# Patient Record
Sex: FEMALE | Race: WHITE | NOT HISPANIC OR LATINO | Employment: OTHER | URBAN - METROPOLITAN AREA
[De-identification: names, ages, dates, MRNs, and addresses within clinical notes are randomized per-mention and may not be internally consistent; named-entity substitution may affect disease eponyms.]

---

## 2017-03-30 DIAGNOSIS — Z30.41 SURVEILLANCE OF PREVIOUSLY PRESCRIBED CONTRACEPTIVE PILL: ICD-10-CM

## 2017-03-30 RX ORDER — NORETHINDRONE 0.35 MG/1
TABLET ORAL
Qty: 84 TABLET | Refills: 0 | Status: CANCELLED | OUTPATIENT
Start: 2017-03-30

## 2017-03-30 NOTE — TELEPHONE ENCOUNTER
Medication Detail      Disp Refills Start End DAW   norethindrone (MICRONOR) 0.35 MG per tablet 90 tablet 2 3/21/2016  No   Sig: Take 1 tablet (0.35 mg) by mouth daily       Last Office Visit with [facility], [facility] or [facility] prescribing provider: 12/17/15

## 2017-03-31 NOTE — TELEPHONE ENCOUNTER
,      Pt has not been seen since Dec 2015.  Please call pt.   She needs appt for refills.    Becca Long, RN, BSN

## 2017-04-10 ENCOUNTER — OFFICE VISIT (OUTPATIENT)
Dept: FAMILY MEDICINE | Facility: CLINIC | Age: 42
End: 2017-04-10

## 2017-04-10 VITALS
BODY MASS INDEX: 22.09 KG/M2 | HEART RATE: 66 BPM | SYSTOLIC BLOOD PRESSURE: 96 MMHG | WEIGHT: 140 LBS | DIASTOLIC BLOOD PRESSURE: 66 MMHG | TEMPERATURE: 97.6 F | OXYGEN SATURATION: 97 %

## 2017-04-10 DIAGNOSIS — F17.200 TOBACCO DEPENDENCY: Primary | ICD-10-CM

## 2017-04-10 DIAGNOSIS — Z30.41 SURVEILLANCE OF PREVIOUSLY PRESCRIBED CONTRACEPTIVE PILL: ICD-10-CM

## 2017-04-10 DIAGNOSIS — G35 MULTIPLE SCLEROSIS (H): ICD-10-CM

## 2017-04-10 PROCEDURE — 99213 OFFICE O/P EST LOW 20 MIN: CPT | Performed by: FAMILY MEDICINE

## 2017-04-10 RX ORDER — ACETAMINOPHEN AND CODEINE PHOSPHATE 120; 12 MG/5ML; MG/5ML
1 SOLUTION ORAL DAILY
Qty: 90 TABLET | Refills: 3 | Status: SHIPPED | OUTPATIENT
Start: 2017-04-10 | End: 2017-07-24

## 2017-04-10 NOTE — PROGRESS NOTES
SUBJECTIVE:                                                    Shanna Shanks is a 41 year old female who presents to clinic today for the following health issues:      Medication Followup of norethindrone    Taking Medication as prescribed: yes    Side Effects:  None    Medication Helping Symptoms:  yes       Contraception start -   Method interested in: oral contraceptives              Methods used previously: pill: norethindrone   Problems with previous methods: no    History of pregnancies:         No LMP recorded.           Lab Results   Component Value Date    PAP NIL 2015     : 2  Para: 0  Menstrual cycle: regular  Flow: heavy  History of migraines: no  Smoker: YES    Accompanying Signs & Symptoms:   Dysuria: YES- hesitant   Vaginal discharge: no  Painful intercourse: no    Precipitating and/or Alleviating factors:    Currently sexually active: YES  In stable relationship: YES  Desire STD testing: not applicable  Are you planning a pregnancy soon: no    Smoking around 1/2 pack per day.     No side effects from micronor. Having regular menstrual periods. No history of abnormal pap smear.     MS - in remission for years.     Problem list and histories reviewed & adjusted, as indicated.  Additional history: as documented    Labs reviewed in EPIC    Reviewed and updated as needed this visit by clinical staff       Reviewed and updated as needed this visit by Provider      Social History     Social History     Marital status:      Spouse name: Jama     Number of children: 2     Years of education: N/A     Occupational History     computer graphic design Self     Social History Main Topics     Smoking status: Current Every Day Smoker     Packs/day: 0.25     Types: Cigarettes     Smokeless tobacco: None     Alcohol use 3.5 oz/week     7 Standard drinks or equivalent per week     Drug use: No     Sexual activity: Not Currently     Partners: Male     Birth control/ protection: Pill,  Rhythm     Other Topics Concern     Parent/Sibling W/ Cabg, Mi Or Angioplasty Before 65f 55m? No     Social History Narrative    Caffeine intake/servings daily - 2    Calcium intake/servings daily - 2-3    Exercise 7 times weekly - describe runs on treadmill for 5 minutes    Sunscreen used - Yes    Seatbelts used - Yes    Guns stored in the home - No    Self Breast Exam - Yes    Pap test up to date -  Yes    Eye exam up to date -  Yes    Dental exam up to date -  Yes    DEXA scan up to date -  Yes    Flex Sig/Colonoscopy up to date -  Not Applicable    Mammography up to date -  Not Applicable    Immunizations reviewed and up to date - Yes    Abuse: Current or Past (Physical, Sexual or Emotional) - No    Do you feel safe in your environment - Yes    Do you cope well with stress - Yes    Do you suffer from insomnia - No    Last updated by: Michelle Sanchez  12/22/2010                     Allergies   Allergen Reactions     Diphenhydramine Rash     benadryl cream     Nickel      Patient Active Problem List   Diagnosis     Multiple sclerosis (H)     UTI (urinary tract infection)     CARDIOVASCULAR SCREENING; LDL GOAL LESS THAN 160     Dysmenorrhea     Tobacco dependency     Reviewed medications, social history and  past medical and surgical history.    Review of system: for general, respiratory, CVS, GI and psychiatry negative except for noted above.     EXAM:  BP 96/66 (Cuff Size: Adult Regular)  Pulse 66  Temp 97.6  F (36.4  C) (Oral)  Wt 140 lb (63.5 kg)  SpO2 97%  BMI 22.09 kg/m2  Constitutional: healthy, alert and no distress   Psychiatric: mentation appears normal and affect normal/bright       ASSESSMENT / PLAN:  (F17.200) Tobacco dependency  (primary encounter diagnosis)  Comment: ongoing.  is also pushing her to quit.   Plan: she is not ready to quit yet.     (Z30.41) Surveillance of previously prescribed contraceptive pill  Comment: not a candidate of combined OCP due to her age ad smoking. OK to  continue POP.  Plan: norethindrone (MICRONOR) 0.35 MG per tablet             (G35) Multiple sclerosis (H)  Comment:  Seeing neurologist.   Plan: in remission for multiple years.

## 2017-04-10 NOTE — NURSING NOTE
"Chief Complaint   Patient presents with     Recheck Medication     norethindrone     Contraception       Initial BP 96/66 (Cuff Size: Adult Regular)  Pulse 66  Temp 97.6  F (36.4  C) (Oral)  Wt 140 lb (63.5 kg)  SpO2 97%  BMI 22.09 kg/m2 Estimated body mass index is 22.09 kg/(m^2) as calculated from the following:    Height as of 3/16/15: 5' 6.75\" (1.695 m).    Weight as of this encounter: 140 lb (63.5 kg).  Medication Reconciliation: complete     Luana Torres, DEE      "

## 2017-04-10 NOTE — MR AVS SNAPSHOT
"              After Visit Summary   4/10/2017    Shanna Shanks    MRN: [id number]           Patient Information     Date Of Birth          1975        Visit Information        Provider Department      4/10/2017 11:00 AM Malvin Beck MD [facility]        Today's Diagnoses     Tobacco dependency    -  1    Surveillance of previously prescribed contraceptive pill        Multiple sclerosis (H)           Follow-ups after your visit        Who to contact     If you have questions or need follow up information about today's clinic visit or your schedule please contact [facility] directly at [phone].  Normal or non-critical lab and imaging results will be communicated to you by MyChart, letter or phone within 4 business days after the clinic has received the results. If you do not hear from us within 7 days, please contact the clinic through [employer] or phone. If you have a critical or abnormal lab result, we will notify you by phone as soon as possible.  Submit refill requests through [employer] or call your pharmacy and they will forward the refill request to us. Please allow 3 business days for your refill to be completed.          Additional Information About Your Visit        MyChart Information     [employer] lets you send messages to your doctor, view your test results, renew your prescriptions, schedule appointments and more. To sign up, go to [email or web address] . Click on \"Log in\" on the left side of the screen, which will take you to the Welcome page. Then click on \"Sign up Now\" on the right side of the page.     You will be asked to enter the access code listed below, as well as some personal information. Please follow the directions to create your username and password.     Your access code is: [email or web address]  Expires: 2017  3:01 PM     Your access code will  in 90 days. If you need help or a new code, please call your [facility] or " [phone].        Care EveryWhere ID     This is your Care EveryWhere ID. This could be used by other organizations to access your [place] medical records  [id number]        Your Vitals Were     Pulse Temperature Pulse Oximetry BMI (Body Mass Index)          66 97.6  F (36.4  C) (Oral) 97% 22.09 kg/m2         Blood Pressure from Last 3 Encounters:   04/10/17 96/66   12/17/15 96/62   03/16/15 106/66    Weight from Last 3 Encounters:   04/10/17 140 lb (63.5 kg)   12/17/15 134 lb 8 oz (61 kg)   03/16/15 139 lb 8 oz (63.3 kg)              Today, you had the following     No orders found for display         Where to get your medicines      These medications were sent to [facility] [address] [place] [address] & [address], [place] [address]     Phone:  [phone]     norethindrone 0.35 MG per tablet          Primary Care Provider Office Phone # Fax #    Malvin Cesario Beck [phone]       [address] [address]        Thank you!     Thank you for choosing [facility]  for your care. Our goal is always to provide you with excellent care. Hearing back from our patients is one way we can continue to improve our services. Please take a few minutes to complete the written survey that you may receive in the mail after your visit with us. Thank you!             Your Updated Medication List - Protect others around you: Learn how to safely use, store and throw away your medicines at www.disposemymeds.org.          This list is accurate as of: 4/10/17  3:01 PM.  Always use your most recent med list.                   Brand Name Dispense Instructions for use    AMBIEN PO      Take  by mouth. Pt is unsure of dose. As needed       AMPYRA PO          baclofen 10 MG tablet    LIORESAL         cyclobenzaprine 10 MG tablet    FLEXERIL    30 tablet    Take 0.5-1 tablets (5-10 mg) by mouth 3  times daily as needed for muscle spasms       dimethyl fumarate delayed release capsule    TECFIDERA     Take 120 mg by mouth 2 times daily       fluticasone 50 MCG/ACT spray    FLONASE    3 Package    Spray 1-2 sprays into both nostrils daily       ibuprofen 800 MG tablet    ADVIL/MOTRIN    60 tablet    Take 1 tablet (800 mg) by mouth every 8 hours as needed for moderate pain       norethindrone 0.35 MG per tablet    MICRONOR    90 tablet    Take 1 tablet (0.35 mg) by mouth daily       venlafaxine 37.5 MG Tb24 24 hr tablet    EFFEXOR-ER    46 tablet    Take 1 tablet daily for 14 days, then  Take 2 tablets daily       vitamin D 23907 UNIT capsule    ERGOCALCIFEROL

## 2017-07-24 ENCOUNTER — APPOINTMENT (OUTPATIENT)
Dept: CT IMAGING | Facility: CLINIC | Age: 42
DRG: 097 | End: 2017-07-24
Attending: EMERGENCY MEDICINE
Payer: COMMERCIAL

## 2017-07-24 ENCOUNTER — HOSPITAL ENCOUNTER (INPATIENT)
Facility: CLINIC | Age: 42
LOS: 6 days | Discharge: NURSING, REHAB OR LONG-TERM CARE | DRG: 097 | End: 2017-07-30
Attending: EMERGENCY MEDICINE | Admitting: INTERNAL MEDICINE
Payer: COMMERCIAL

## 2017-07-24 ENCOUNTER — APPOINTMENT (OUTPATIENT)
Dept: GENERAL RADIOLOGY | Facility: CLINIC | Age: 42
DRG: 097 | End: 2017-07-24
Attending: EMERGENCY MEDICINE
Payer: COMMERCIAL

## 2017-07-24 DIAGNOSIS — G93.40 ENCEPHALOPATHY: Primary | ICD-10-CM

## 2017-07-24 DIAGNOSIS — G35 MULTIPLE SCLEROSIS (H): ICD-10-CM

## 2017-07-24 DIAGNOSIS — R56.9 SEIZURES (H): ICD-10-CM

## 2017-07-24 DIAGNOSIS — B00.4 HERPES ENCEPHALITIS: ICD-10-CM

## 2017-07-24 DIAGNOSIS — R40.2430 GLASGOW COMA SCALE TOTAL SCORE 3-8, UNSPECIFIED TIME: ICD-10-CM

## 2017-07-24 DIAGNOSIS — J96.00 ACUTE RESPIRATORY FAILURE, UNSPECIFIED WHETHER WITH HYPOXIA OR HYPERCAPNIA (H): ICD-10-CM

## 2017-07-24 LAB
ALBUMIN UR-MCNC: 30 MG/DL
AMPHETAMINES UR QL SCN: NORMAL
ANION GAP SERPL CALCULATED.3IONS-SCNC: 11 MMOL/L (ref 3–14)
APPEARANCE UR: CLEAR
APTT PPP: 29 SEC (ref 22–37)
BACTERIA #/AREA URNS HPF: ABNORMAL /HPF
BARBITURATES UR QL: NORMAL
BASOPHILS # BLD AUTO: 0 10E9/L (ref 0–0.2)
BASOPHILS NFR BLD AUTO: 0 %
BENZODIAZ UR QL: NORMAL
BILIRUB UR QL STRIP: NEGATIVE
BUN SERPL-MCNC: 9 MG/DL (ref 7–30)
CALCIUM SERPL-MCNC: 8.2 MG/DL (ref 8.5–10.1)
CANNABINOIDS UR QL SCN: NORMAL
CHLORIDE SERPL-SCNC: 105 MMOL/L (ref 94–109)
CO2 SERPL-SCNC: 23 MMOL/L (ref 20–32)
COCAINE UR QL: NORMAL
COLOR UR AUTO: YELLOW
CREAT SERPL-MCNC: 0.63 MG/DL (ref 0.52–1.04)
DIFFERENTIAL METHOD BLD: ABNORMAL
EOSINOPHIL # BLD AUTO: 0 10E9/L (ref 0–0.7)
EOSINOPHIL NFR BLD AUTO: 0 %
ERYTHROCYTE [DISTWIDTH] IN BLOOD BY AUTOMATED COUNT: 13.6 % (ref 10–15)
GFR SERPL CREATININE-BSD FRML MDRD: ABNORMAL ML/MIN/1.7M2
GLUCOSE BLDC GLUCOMTR-MCNC: 104 MG/DL (ref 70–99)
GLUCOSE SERPL-MCNC: 111 MG/DL (ref 70–99)
GLUCOSE UR STRIP-MCNC: NEGATIVE MG/DL
HCT VFR BLD AUTO: 36.3 % (ref 35–47)
HGB BLD-MCNC: 12 G/DL (ref 11.7–15.7)
HGB UR QL STRIP: NEGATIVE
HYALINE CASTS #/AREA URNS LPF: ABNORMAL /LPF (ref 0–2)
IMM GRANULOCYTES # BLD: 0 10E9/L (ref 0–0.4)
IMM GRANULOCYTES NFR BLD: 0.3 %
INR PPP: 1.05 (ref 0.86–1.14)
INTERPRETATION ECG - MUSE: NORMAL
KETONES UR STRIP-MCNC: 40 MG/DL
LACTATE BLD-SCNC: 0.8 MMOL/L (ref 0.7–2.1)
LEUKOCYTE ESTERASE UR QL STRIP: NEGATIVE
LYMPHOCYTES # BLD AUTO: 0.7 10E9/L (ref 0.8–5.3)
LYMPHOCYTES NFR BLD AUTO: 6.4 %
MCH RBC QN AUTO: 30.8 PG (ref 26.5–33)
MCHC RBC AUTO-ENTMCNC: 33.1 G/DL (ref 31.5–36.5)
MCV RBC AUTO: 93 FL (ref 78–100)
MONOCYTES # BLD AUTO: 0.6 10E9/L (ref 0–1.3)
MONOCYTES NFR BLD AUTO: 5.5 %
MUCOUS THREADS #/AREA URNS LPF: PRESENT /LPF
NEUTROPHILS # BLD AUTO: 10.1 10E9/L (ref 1.6–8.3)
NEUTROPHILS NFR BLD AUTO: 87.8 %
NITRATE UR QL: NEGATIVE
NRBC # BLD AUTO: 0 10*3/UL
NRBC BLD AUTO-RTO: 0 /100
OPIATES UR QL SCN: NORMAL
PCP UR QL SCN: NORMAL
PH UR STRIP: 6 PH (ref 5–7)
PLATELET # BLD AUTO: 209 10E9/L (ref 150–450)
POTASSIUM SERPL-SCNC: 4.4 MMOL/L (ref 3.4–5.3)
RBC # BLD AUTO: 3.9 10E12/L (ref 3.8–5.2)
RBC #/AREA URNS AUTO: ABNORMAL /HPF (ref 0–2)
SODIUM SERPL-SCNC: 139 MMOL/L (ref 133–144)
SP GR UR STRIP: 1.02 (ref 1–1.03)
TRANS CELLS #/AREA URNS HPF: ABNORMAL /HPF
URN SPEC COLLECT METH UR: ABNORMAL
UROBILINOGEN UR STRIP-ACNC: 0.2 EU/DL (ref 0.2–1)
WBC # BLD AUTO: 11.5 10E9/L (ref 4–11)
WBC #/AREA URNS AUTO: ABNORMAL /HPF (ref 0–2)

## 2017-07-24 PROCEDURE — 40000940 XR CHEST PORT 1 VW

## 2017-07-24 PROCEDURE — 80048 BASIC METABOLIC PNL TOTAL CA: CPT | Performed by: EMERGENCY MEDICINE

## 2017-07-24 PROCEDURE — 40000256 ZZH STATISTIC CARDIOPULM RESUSCITATION

## 2017-07-24 PROCEDURE — 85025 COMPLETE CBC W/AUTO DIFF WBC: CPT | Performed by: EMERGENCY MEDICINE

## 2017-07-24 PROCEDURE — 25000125 ZZHC RX 250: Performed by: EMERGENCY MEDICINE

## 2017-07-24 PROCEDURE — 25000128 H RX IP 250 OP 636: Performed by: EMERGENCY MEDICINE

## 2017-07-24 PROCEDURE — 93005 ELECTROCARDIOGRAM TRACING: CPT

## 2017-07-24 PROCEDURE — 25000128 H RX IP 250 OP 636

## 2017-07-24 PROCEDURE — 70470 CT HEAD/BRAIN W/O & W/DYE: CPT | Mod: XS

## 2017-07-24 PROCEDURE — 85610 PROTHROMBIN TIME: CPT | Performed by: EMERGENCY MEDICINE

## 2017-07-24 PROCEDURE — 70498 CT ANGIOGRAPHY NECK: CPT

## 2017-07-24 PROCEDURE — 20000003 ZZH R&B ICU

## 2017-07-24 PROCEDURE — 00000146 ZZHCL STATISTIC GLUCOSE BY METER IP

## 2017-07-24 PROCEDURE — 40000281 ZZH STATISTIC TRANSPORT TIME EA 15 MIN

## 2017-07-24 PROCEDURE — 70460 CT HEAD/BRAIN W/DYE: CPT

## 2017-07-24 PROCEDURE — 71010 XR CHEST PORT 1 VW: CPT

## 2017-07-24 PROCEDURE — 40000276 ZZH STATISTIC RCP TIME ED VENT EA 10 MIN

## 2017-07-24 PROCEDURE — 31500 INSERT EMERGENCY AIRWAY: CPT

## 2017-07-24 PROCEDURE — 81001 URINALYSIS AUTO W/SCOPE: CPT | Performed by: EMERGENCY MEDICINE

## 2017-07-24 PROCEDURE — 87040 BLOOD CULTURE FOR BACTERIA: CPT | Performed by: EMERGENCY MEDICINE

## 2017-07-24 PROCEDURE — 99291 CRITICAL CARE FIRST HOUR: CPT | Performed by: INTERNAL MEDICINE

## 2017-07-24 PROCEDURE — 94002 VENT MGMT INPAT INIT DAY: CPT

## 2017-07-24 PROCEDURE — 25000128 H RX IP 250 OP 636: Performed by: PSYCHIATRY & NEUROLOGY

## 2017-07-24 PROCEDURE — 25000125 ZZHC RX 250

## 2017-07-24 PROCEDURE — 40000275 ZZH STATISTIC RCP TIME EA 10 MIN

## 2017-07-24 PROCEDURE — 83605 ASSAY OF LACTIC ACID: CPT | Performed by: EMERGENCY MEDICINE

## 2017-07-24 PROCEDURE — 85730 THROMBOPLASTIN TIME PARTIAL: CPT | Performed by: EMERGENCY MEDICINE

## 2017-07-24 PROCEDURE — 99285 EMERGENCY DEPT VISIT HI MDM: CPT | Mod: 25

## 2017-07-24 PROCEDURE — 36415 COLL VENOUS BLD VENIPUNCTURE: CPT

## 2017-07-24 RX ORDER — IOPAMIDOL 755 MG/ML
120 INJECTION, SOLUTION INTRAVASCULAR ONCE
Status: COMPLETED | OUTPATIENT
Start: 2017-07-24 | End: 2017-07-24

## 2017-07-24 RX ORDER — OXYBUTYNIN CHLORIDE 5 MG/1
5 TABLET, EXTENDED RELEASE ORAL DAILY
Status: ON HOLD | COMMUNITY
End: 2017-07-29

## 2017-07-24 RX ORDER — NALOXONE HYDROCHLORIDE 0.4 MG/ML
INJECTION, SOLUTION INTRAMUSCULAR; INTRAVENOUS; SUBCUTANEOUS
Status: COMPLETED
Start: 2017-07-24 | End: 2017-07-24

## 2017-07-24 RX ORDER — ONDANSETRON 2 MG/ML
INJECTION INTRAMUSCULAR; INTRAVENOUS
Status: COMPLETED
Start: 2017-07-24 | End: 2017-07-24

## 2017-07-24 RX ORDER — ETOMIDATE 2 MG/ML
10 INJECTION INTRAVENOUS ONCE
Status: COMPLETED | OUTPATIENT
Start: 2017-07-24 | End: 2017-07-24

## 2017-07-24 RX ORDER — PROPOFOL 10 MG/ML
INJECTION, EMULSION INTRAVENOUS
Status: COMPLETED
Start: 2017-07-24 | End: 2017-07-24

## 2017-07-24 RX ORDER — DIMETHYL FUMARATE 120 MG/1
240 CAPSULE ORAL 2 TIMES DAILY
Status: DISCONTINUED | OUTPATIENT
Start: 2017-07-25 | End: 2017-07-28

## 2017-07-24 RX ORDER — VENLAFAXINE HYDROCHLORIDE 75 MG/1
75 CAPSULE, EXTENDED RELEASE ORAL DAILY
Status: ON HOLD | COMMUNITY
End: 2018-06-05

## 2017-07-24 RX ORDER — NALOXONE HYDROCHLORIDE 0.4 MG/ML
.1-.4 INJECTION, SOLUTION INTRAMUSCULAR; INTRAVENOUS; SUBCUTANEOUS
Status: DISCONTINUED | OUTPATIENT
Start: 2017-07-24 | End: 2017-07-30 | Stop reason: HOSPADM

## 2017-07-24 RX ORDER — HEPARIN SODIUM 5000 [USP'U]/.5ML
5000 INJECTION, SOLUTION INTRAVENOUS; SUBCUTANEOUS EVERY 8 HOURS
Status: DISCONTINUED | OUTPATIENT
Start: 2017-07-25 | End: 2017-07-30 | Stop reason: HOSPADM

## 2017-07-24 RX ORDER — PROPOFOL 10 MG/ML
5-75 INJECTION, EMULSION INTRAVENOUS CONTINUOUS
Status: DISCONTINUED | OUTPATIENT
Start: 2017-07-24 | End: 2017-07-25

## 2017-07-24 RX ORDER — ACETAMINOPHEN AND CODEINE PHOSPHATE 120; 12 MG/5ML; MG/5ML
1 SOLUTION ORAL DAILY
COMMUNITY
End: 2018-03-07

## 2017-07-24 RX ORDER — ACETAMINOPHEN AND CODEINE PHOSPHATE 120; 12 MG/5ML; MG/5ML
0.35 SOLUTION ORAL DAILY
Status: DISCONTINUED | OUTPATIENT
Start: 2017-07-25 | End: 2017-07-30 | Stop reason: HOSPADM

## 2017-07-24 RX ORDER — HEPARIN SODIUM 5000 [USP'U]/.5ML
5000 INJECTION, SOLUTION INTRAVENOUS; SUBCUTANEOUS EVERY 8 HOURS
Status: DISCONTINUED | OUTPATIENT
Start: 2017-07-24 | End: 2017-07-24

## 2017-07-24 RX ADMIN — IOPAMIDOL 120 ML: 755 INJECTION, SOLUTION INTRAVENOUS at 18:47

## 2017-07-24 RX ADMIN — ETOMIDATE 10 MG: 2 INJECTION, SOLUTION INTRAVENOUS at 18:16

## 2017-07-24 RX ADMIN — PROPOFOL 25 MCG/KG/MIN: 10 INJECTION, EMULSION INTRAVENOUS at 18:28

## 2017-07-24 RX ADMIN — NALOXONE HYDROCHLORIDE 0.4 MG: 0.4 INJECTION, SOLUTION INTRAMUSCULAR; INTRAVENOUS; SUBCUTANEOUS at 18:12

## 2017-07-24 RX ADMIN — SODIUM CHLORIDE 100 ML: 9 INJECTION, SOLUTION INTRAVENOUS at 18:46

## 2017-07-24 RX ADMIN — SODIUM CHLORIDE 1000 ML: 9 INJECTION, SOLUTION INTRAVENOUS at 19:40

## 2017-07-24 RX ADMIN — ONDANSETRON 4 MG: 2 SOLUTION INTRAMUSCULAR; INTRAVENOUS at 18:07

## 2017-07-24 RX ADMIN — SUCCINYLCHOLINE CHLORIDE 75 MG: 20 INJECTION, SOLUTION INTRAMUSCULAR; INTRAVENOUS at 18:17

## 2017-07-24 RX ADMIN — LEVETIRACETAM 500 MG: 100 INJECTION, SOLUTION INTRAVENOUS at 21:01

## 2017-07-24 NOTE — ED PROVIDER NOTES
History   Chief Complaint:  Altered Mental Status    HPI   Shanna Shanks is a 42 year old female with a history of MS who presents via EMS with altered mental status. EMS states the patient was found in a chair by her  slumped over. EMS states the house smelt of ammonia and was very hot. EMS states blood sugar was 133, blood pressure ws 113/74, tachycardic, incontinent, has not seen patient move right arm or leg, and no known trauma. EMS reports baseline function is issues with balance. She was incontinent of urine and had some blood around her nose.     Patients : (an hour after patient arrived)   Patient has MS with right sided weaknesses. He states the patient has been fine the past few days and today. He reports he texted  her today, last being at noon,  and she had no complaints, came home today and found the patient slumped over in a chair. He denies known trauma, alcohol/drug abuse.     Allergies:  Diphenhydramine  nickel    Medications:    norethindrone (MICRONOR) 0.35 MG per tablet  baclofen (LIORESAL) 10 MG tablet  vitamin D (ERGOCALCIFEROL) 71660 UNIT capsule  dimethyl fumarate (TECFIDERA) delayed release capsule  Dalfampridine (AMPYRA PO)  fluticasone (FLONASE) 50 MCG/ACT nasal spray  cyclobenzaprine (FLEXERIL) 10 MG tablet  venlafaxine (EFFEXOR-ER) 37.5 MG TB24  ibuprofen (ADVIL,MOTRIN) 800 MG tablet  Zolpidem Tartrate (AMBIEN PO)    Past Medical History:    MS    Past Surgical History:    dilation and curettage    Family History:    diabetes    Social History:  Marital Status:   [2]  Smoking status: current every day  alcohol use: yes  Lives at Home with  Jama  Arrived to ED via EMS  Followed at [facility]    Review of Systems   Unable to perform ROS: Patient unresponsive     Physical Exam   Patient Vitals for the past 24 hrs:   BP Temp Temp src Pulse Heart Rate Resp SpO2 Weight   07/24/17 1911 - 100.1  F (37.8  C) Rectal - - - - -   07/24/17 1901 - - - - - -  100 % -   07/24/17 1825 (!) 117/95 - - - - - - -   07/24/17 1822 - - - 91 91 (!) 6 92 % 61.5 kg (135 lb 9.3 oz)   07/24/17 1819 152/84 - - - 115 23 98 % -   07/24/17 1815 117/75 - - - 93 14 - -          Physical Exam  Constitutional:  Unresponsive except withdraws to pain on left.      Appears well-developed and well-nourished.   HENT:   Head:    Mild right facial droop    Mouth/Throat:   Oropharynx is clear and moist.      Mucous membranes are normal.   Eyes:    Conjunctivae normal and EOM are normal.      dialted pupils right greater than left  Neck:    Normal range of motion. Neck supple.   Cardiovascular:  Normal rate, regular rhythm, S1 normal and S2 normal.      No gallop and no friction rub. No murmur heard.  Pulmonary/Chest:  Breath sounds normal. No respiratory distress.      No wheezes. No rhonchi. No rales.   Abdominal:   Soft. No hepatosplenomegaly.     No rebound.    Musculoskeletal:     Neurological:   Unresponsive. Withdraws left arm and leg to pain. Lies with eyes closed. Does not open eyes to command or sternal rub. Right pupil slightly bigger than left. .   Skin:    Skin is warm and dry.       Emergency Department Course   ECG (18:12:08):  Rate 99 bpm. CA interval 178. QRS duration 86. QT/QTc 354/454. P-R-T axes 75 66 71. Normal sinus rhythm. Possible left atrial enlargement. Borderline ECG.  Interpreted at 1813 by Melina Reyes MD.    Imaging:  Radiographic findings were communicated with the Admitting MD who voiced understanding of the findings.  CT angiogram head neck:   Normal neck and head CTA.  As read by Radiology.    CT head w/o contrast:  Diffuse cerebral volume loss and cerebral white matter  changes consistent with the patient's history of multiple sclerosis.  No evidence for acute intracranial pathology.  As read by Radiology.    CT head w contrast:  Normal CT perfusion of the brain.  As read by Radiology.    Laboratory:  CBC: WBC 11.5(H) o/w WNL (HGB 12.0, )   BMP:  "glucose 111(H), calcium 8.2(L) o/w WNL (Creatinine 0.63)  INR: 1.05  Partial thromboplastin: 29    Procedures:  Intubation Procedure Note:   Date/Time: 6:13 PM  Performed by: Dr. Reyes    The procedure was performed in an emergent situation.  Risks and benefits: risks, benefits and alternatives were discussed.  Patient identity confirmed: verbally with patient and arm band  Time out: Immediately prior to procedure a \"time out\" was called to verify the correct patient, procedure, equipment, support staff and site/side marked as required.    Indication: Acute respiratory failure. and Airway protection.    Intubation method: Glidescope  Patient status: Unconscious  Preoxygenation: Mask  Pretreatment medications: None  Sedatives: Etomidate  Paralytic: succinylcholine  Laryngoscope size: Mac 3  Tube size: 7.0 cuffed with cuff inflated after placement  Number of attempts: 1  Cricoid pressure: yes  Cords visualized: yes  Post-procedure assessment: Breath sounds equal bilaterally with chest rise and absent over the epigastrium, Chest x-ray interpreted by me demonstrating endotracheal tube in appropriate position and CO2 detector.  ETT to teeth: 23 cm  Tube secured with: ETT orourke    Patient tolerated the procedure well with no immediate complications.  Complications:  None    Interventions:  1812 narcan 0.4 mg IV  1817 Anectine 76 mg IV  1816 Amidate 10 mg IV  1828 Diprivan infusion 25 mcg IV    Emergency Department Course:  Past medical records, nursing notes, and vitals reviewed.  I performed an exam of the patient and obtained history, as documented above.  1807 4 days narcan given - no results  1608 Called code stroke   ECG was obtained - see results above.   1817 Etomidate 10 mg administered  1817 Succinylcholine given 75 mg  1818 patient was intubated successfully.   IV inserted and blood drawn.  The patient was sent for a head and neck angiogram, head CT w & w/o contrast while in the emergency department, " findings above.  The patients right side is moving.   Patients  arrived.   1911 rectal temperature of 100.1  Findings and plan explained to  The  spouse who consents to admission.   : Discussed the patient with Dr. Philip, who will admit the patient to a ICU bed for further monitoring, evaluation, and treatment.     Impression & Plan    Medical Decision Making:  Patient came in here with minimal history other than she was found unresponsive by  with no movement of right side per paramedics and dilated right pupil. I was not able to get history from  until an hour after the patient arrived. Essentially l I was dealing with an unresponsive patient who could only to withdrawal on the left side. Due to concern that she could have intracranial hemorrhage she was intubated and then sent to CT scan per code stroke. She actually had no findings on CT. Dr. Mauro has seen her and reviewed that. ON coming back from CT she had some movement of the right side. Dr. Mauro was concerned she may have a UTI, a catheter was placed and she had no evidence of infection in her urine. She also has a normal chest xray. Blood cultures and lactate have been drawn. At this point it is unclear why she has had this unresponsive episode with the right sided weakness. She is normally somewhat weak in the right, but she was completely weak on the right side on presentation here. Seizure is considered as well. I have talked to Dr. Philip. Patient is moving around now more here, but I will keep her intubated because I do not have a cause for the event that has occurred and until we have a better sense of that we will keep the tube in  . Assessment: unresponsive episode with right sided paralysis unclear cause,   MS.       Critical Care time:  was 45 minutes for this patient excluding procedures.    Diagnosis:    ICD-10-CM   1. Encephalopathy G93.40   2. Glen coma scale total score 3-8, unspecified time (H) R40.7889   3. Multiple  sclerosis (H) G35   4. Acute respiratory failure, unspecified whether with hypoxia or hypercapnia (H) J96.00     Disposition:  Admitted to ICU    Jaylyn Bahena  7/24/2017    EMERGENCY DEPARTMENT    I, Jaylyn Bahena, am serving as a scribe at 6:04 PM on 7/24/2017 to document services personally performed by Melina Reyes MD based on my observations and the provider's statements to me.                          Melina Reyes MD  07/25/17 0007

## 2017-07-24 NOTE — CONSULTS
[facility]  [facility]    NeuroCritical Care Consultation Note     Shanna Shanks MRN# [id number]   YOB: 1975 Age: 42 year old    Code Status:Prior   Date of Admission: 7/24/2017  Date of Consult: 07/24/2017    ADDENDUM: 7/24/2017  7:59 PM  UA is negative  Will start on Keppra 500 BID.   ----------------------------------------------------  Francisco Mauro MD, PhD, FAHA      _________________________________   Primary Care Physician   Malvin Beck MD  ______________________________________________         Assessment & Plan   ______________________________________________  (G93.40) Encephalopathy  (primary encounter diagnosis)  --Negative code stroke evaluation  --No ICH  --Likely UTI related encephalopathy  ---May be related to seizure  ----Will get UA, urine drugs  ------If negative, will get EEG  (R40.7820) [place] coma scale total score 3-8, unspecified time (H)  --See above  (G35) Multiple sclerosis (H)  --Management in [facility]  (J96.00) Acute respiratory failure, unspecified whether with hypoxia or hypercapnia (H)  --Patient can be extubated in ICU  #. DVT Prophylaxis  --Mechanical   #. PT/OT/Speech  --Start  evaluations  #. Nutrition / GI Prophylaxis  --Per recommendations of speech therapy    #. Code Status: Full Code     TIME:   Neurocritical care time:  60 minutes for evaluation and management of coma and encephalopathy. Patient is critically ill and has a very high risk of deterioration  ----------------------------------------------------------------------------------  ----------------------------------------------------------------------------------  Reason for consult: Code stroke    Chief Complaint   ______________________________________________  Altered mental status  History is obtained from the electronic health record and patient's family    History of Present Illness    ______________________________________________  42 year old female with a history of MS who presents via EMS with altered mental status. EMS states the patient was found in a chair by her  slumped over. EMS states the house smelt of ammonia and was very hot. EMS states blood sugar was 133, blood pressure ws 113/74, tachycardic, incontinent, has not seen patient move right arm or leg, and no known trauma. EMS reports baseline function is issues with balance. On arrival to ER, her right pupil was large and she did not move her right side. Code stroke called. Patient was intubated and asked to hyperventilate patient. I arrived while patient was scanned in CT. Her CT head did not show any acute changes, no large clots on CTA. Patient started moving both extremities. Her pupils were equal. I changed RR to 16. In ER room, patient continues to move all extremities. Per , patient was last normal about 12 today. She apparently has right sided chronic weakness. No seizures, frequent UTIs.     Past Medical History    ______________________________________________  Past Medical History:   Diagnosis Date     Multiple sclerosis (H) 3/2/96    last exacerbation 3yrs ago, no meds x 6 months     Tobacco dependency      Past Surgical History   ______________________________________________  Past Surgical History:   Procedure Laterality Date     HC DILATION/CURETTAGE DIAG/THER NON OB  1/1/05    D & C, missed AB     Prior to Admission Medications   ______________________________________________  Prior to Admission Medications   Prescriptions Last Dose Informant Patient Reported? Taking?   Dalfampridine (AMPYRA PO) 7/24/2017 at X1 Care Giver Yes Yes   Sig: Take 10 mg by mouth 2 times daily    Zolpidem Tartrate (AMBIEN PO)  Care Giver Yes Yes   Sig: Take 10 mg by mouth nightly as needed Pt is unsure of dose.  As needed   baclofen (LIORESAL) 10 MG tablet  Care Giver Yes Yes   Sig: Take 10-20 mg by mouth 4 times daily as  needed    dimethyl fumarate (TECFIDERA) delayed release capsule 7/24/2017 at X1 Care Giver Yes Yes   Sig: Take 240 mg by mouth 2 times daily    norethindrone (MICRONOR) 0.35 MG per tablet 7/24/2017 at Unknown time Care Giver Yes Yes   Sig: Take 1 tablet by mouth daily   oxybutynin (DITROPAN-XL) 5 MG 24 hr tablet 7/24/2017 at Unknown time Care Giver Yes Yes   Sig: Take 5 mg by mouth daily   venlafaxine (EFFEXOR-XR) 75 MG 24 hr capsule 7/24/2017 at Unknown time Care Giver Yes Yes   Sig: Take 75 mg by mouth daily   vitamin D (ERGOCALCIFEROL) 26539 UNIT capsule 7/24/2017 Care Giver Yes Yes   Sig: Take 50,000 Units by mouth once a week       Facility-Administered Medications: None     Allergies   Allergies   Allergen Reactions     Diphenhydramine Rash     benadryl cream     Nickel        Social History   ______________________________________________  Social History     Social History     Marital status:      Spouse name: Jama     Number of children: 2     Years of education: N/A     Occupational History     computer graphic design Self     Social History Main Topics     Smoking status: Current Every Day Smoker     Packs/day: 0.25     Types: Cigarettes     Smokeless tobacco: Not on file     Alcohol use 3.5 oz/week     7 Standard drinks or equivalent per week     Drug use: No     Sexual activity: Not Currently     Partners: Male     Birth control/ protection: Pill, Rhythm     Other Topics Concern     Parent/Sibling W/ Cabg, Mi Or Angioplasty Before 65f 55m? No     Social History Narrative    Caffeine intake/servings daily - 2    Calcium intake/servings daily - 2-3    Exercise 7 times weekly - describe runs on treadmill for 5 minutes    Sunscreen used - Yes    Seatbelts used - Yes    Guns stored in the home - No    Self Breast Exam - Yes    Pap test up to date -  Yes    Eye exam up to date -  Yes    Dental exam up to date -  Yes    DEXA scan up to date -  Yes    Flex Sig/Colonoscopy up to date -  Not Applicable     Mammography up to date -  Not Applicable    Immunizations reviewed and up to date - Yes    Abuse: Current or Past (Physical, Sexual or Emotional) - No    Do you feel safe in your environment - Yes    Do you cope well with stress - Yes    Do you suffer from insomnia - No    Last updated by: Michelle Sanchez  12/22/2010                       Family History   ______________________________________________  Family History   Problem Relation Age of Onset     Family History Negative Mother      DIABETES Father      Cancer - colorectal Paternal Grandmother      HEART DISEASE Paternal Grandfather      MI       Review of Systems   ______________________________________________  Review of systems is not obtainable due to patient factors - intubation    Physical Exam   ______________________________________________  Weight:135 lbs 9.33 oz; Height:Data Unavailable  Temp: 98.8  F (37.1  C) Temp src: Bladder BP: 121/80 Pulse: 98 Heart Rate: 98 Resp: 18 SpO2: 99 % O2 Device: Mechanical Ventilator    General Appearance:  No acute distress  Neuro:       Mental Status Exam:  Sedated, intubated, comatose. S/L untestable due to intubation. Does not follow commands.  Mental status is decreased and affected by sedation       Cranial Nerves:  Pupils 4 mm,  reactive. Occulocephalis reflexes are present. Corneal reflexes present. Face symmetric. Gag/Cough reflex present. Other CN are untestable           Motor:  Minimal movements to stimulation on both sided L>R       Reflexes:  Low DTR, toes equivocal       Sensory:  Untestable                    Coordination:   Untestable       Gait:  Untestable  Neck: no nuchal rigidity, normal thyroid. No carotid bruits.    Cardiovascular: Regular rate and rhythm, no m/r/g  Lungs: Clear to auscultation  Abdomen: Soft, not tender, not distended  Extremities: No clubbing, no cyanosis, no edema    Data   ______________________________________________  All Data personally reviewed:       Labs:   CBC  RESULTS:       Recent Labs  Lab 07/24/17 1808   WBC 11.5*   RBC 3.90   HGB 12.0   HCT 36.3        Basic Metabolic Panel:   Recent Labs   Lab Test  07/24/17 1808 11/18/13 02/20/12   0938   NA  139  139  141   POTASSIUM  4.4  4.0  4.3   CHLORIDE  105  108  107   CO2  23   --   23   BUN  9  5  17   CR  0.63  0.91  0.79   GLC  111*  86  90   JULIET  8.2*  8.9  8.7     Liver panel:  Recent Labs   Lab Test 11/18/13 02/20/12   0938   PROTTOTAL  7.0  7.2   ALBUMIN  4.2  4.0   BILITOTAL  0.6  0.8   ALKPHOS  48  52   AST  25  22   ALT  17  14     INR:  Recent Labs   Lab Test  07/24/17 1808   INR  1.05      Lipid Profile:  Recent Labs   Lab Test  02/20/12 0938   CHOL  242*   HDL  72   LDL  140*   TRIG  147   CHOLHDLRATIO  3.4     Thyroid Panel:No lab results found.   Vitamin B12: No lab results found.   Vitamin D level: No lab results found.  A1C: No lab results found.  Troponin I: No lab results found.  Ammonia: No lab results found.  CK: No lab results found.     CRP inflammation: No lab results found.  ESR: No lab results found.    JULIA: No lab results found.    ANCA: No lab results found.   Drug Screen:   Recent Labs   Lab Test  07/24/17 1912   UAMP  Negative   Cutoff for a negative amphetamine is 500 ng/mL or less.     UBARB  Negative   Cutoff for a negative barbiturate is 200 ng/mL or less.     BENZODIAZEUR  Negative   Cutoff for a negative benzodiazepine is 200 ng/mL or less.     UCANN  Negative   Cutoff for a negative cannabinoid is 50 ng/mL or less.     UCOC  Negative   Cutoff for a negative cocaine is 300 ng/mL or less.     OPIT  Negative   Cutoff for a negative opiate is 300 ng/mL or less.     UPCP  Negative   Cutoff for a negative PCP is 25 ng/mL or less.       Alcohol level:No lab results found.  UA Results:  Recent Labs   Lab Test  07/24/17 1912   COLOR  Yellow   APPEARANCE  Clear   URINEGLC  Negative   URINEBILI  Negative   URINEKETONE  40*   SG  1.025   UBLD  Negative   URINEPH  6.0   PROTEIN   30*   UROBILINOGEN  0.2   NITRITE  Negative   LEUKEST  Negative   RBCU  2-5*   WBCU  O - 2     Most Recent 6 Bacteria Isolates From Any Culture (See EPIC Reports for Culture Details):  Recent Labs   Lab Test  12/17/15   0949  03/16/15   0909  10/21/11   1049  09/18/09   1125   CULT  >100,000 colonies/mL Escherichia coli*  >100,000 colonies/mL mixed urogenital jen  >100,000 colonies/mL Coagulase negative Staphylococcus  >100,000 colonies/mL Staphylococcus aureus        Cardiac US:   --       Neurophysiology:   --       Imaging:   All imaging studies were reviewed personally  CT head:   --severe generalized atrophy, hydrocephalus ex-vacuo, nothing acute  CTA neck/head:  --No large clots, no perfusion deficits.

## 2017-07-24 NOTE — IP AVS SNAPSHOT
MRN:[id number]                      After Visit Summary   7/24/2017    Shanna Shansk    MRN: [id number]           Thank you!     Thank you for choosing [facility] for your care. Our goal is always to provide you with excellent care. Hearing back from our patients is one way we can continue to improve our services. Please take a few minutes to complete the written survey that you may receive in the mail after you visit with us. Thank you!        Patient Information     Date Of Birth          1975        Designated Caregiver       Most Recent Value    Caregiver    Will someone help with your care after discharge? yes    Name of designated caregiver yvonne    Phone number of caregiver [phone]    Caregiver address same as patient      About your hospital stay     You were admitted on:  July 24, 2017 You last received care in the:  [facility]    You were discharged on:  July 30, 2017       Who to Call     For medical emergencies, please call 911.  For non-urgent questions about your medical care, please call your primary care provider or clinic, [phone]          Attending Provider     Provider Specialty    Melina Reyes MD Emergency Medicine    Cho, Manoj Palmer MD Internal Medicine       Primary Care Provider Office Phone # Fax #    Malvin Cesario Beck [phone]      After Care Instructions     Activity - Up with nursing assistance           [facility] Diet as Tolerated       Follow this diet upon discharge: Regular diet            IV access       Right midline. Routine cares per IV access protocol.            Mantoux instructions       Give two-step Mantoux (PPD) Per Facility Policy Yes                  Additional Services     Occupational Therapy Adult Consult       Evaluate and treat as clinically indicated.    Reason:  Functional immobility            Physical Therapy Adult Consult       Evaluate and treat as clinically  "indicated.    Reason:  Physical deconditioning            Speech Language Path Adult Consult       Evaluate and treat as clinically indicated.    Reason:  Dysphagia                  Further instructions from your care team       Follow up with your primary neurologist in one month.     Pending Results     Date and Time Order Name Status Description    7/25/2017 1106 [place] virus RNA by PCR CSF Tube 3 In process     7/25/2017 1106 CSF Culture Aerobic Bacterial Tube 2 Preliminary             Statement of Approval     Ordered          07/30/17 1128  I have reviewed and agree with all the recommendations and orders detailed in this document.  EFFECTIVE NOW     Approved and electronically signed by:  Boris Britton MD           07/29/17 1157  I have reviewed and agree with all the recommendations and orders detailed in this document.  EFFECTIVE NOW     Approved and electronically signed by:  Boris Britton MD             Admission Information     Date & Time Provider Department Dept. Phone    7/24/2017 Manoj Philip MD [address] [facility] [phone]      Your Vitals Were     Blood Pressure Pulse Temperature Respirations Height Weight    123/83 (BP Location: Left arm) 85 99.7  F (37.6  C) (Oral) 16 1.695 m (5' 6.73\") 61.9 kg (136 lb 7.4 oz)    Pulse Oximetry BMI (Body Mass Index)                92% 21.55 kg/m2          [facility] Information     [facility] lets you send messages to your doctor, view your test results, renew your prescriptions, schedule appointments and more. To sign up, go to [email or web address] . Click on \"Log in\" on the left side of the screen, which will take you to the Welcome page. Then click on \"Sign up Now\" on the right side of the page.     You will be asked to enter the access code listed below, as well as some personal information. Please follow the directions to create your username and password.     Your access code is: [email or web address]  Expires: 10/27/2017 10:06 " AM     Your access code will  in 90 days. If you need help or a new code, please call your [facility] clinic or [phone].        Care EveryWhere ID     This is your Care EveryWhere ID. This could be used by other organizations to access your [facility] medical records  [id number]        Equal Access to Services     JORDANMELCHOR MEREDITH : Hadii aad ku hadasho Soomaali, waaxda luqadaha, qaybta kaalmada adeegyada, liza powers haytomasn louie oscarlea patrick . So [facility] [phone].    ATENCIÓN: Si habla español, tiene a gatica disposición servicios gratuitos de asistencia lingüística. Llame al [phone].    We comply with applicable federal civil rights laws and [place] laws. We do not discriminate on the basis of race, color, national origin, age, disability sex, sexual orientation or gender identity.               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 Diamond office while on this medication.   Refills:  0       levETIRAcetam 750 MG tablet   Commonly known as:  KEPPRA   Used for:  Seizures (H)        Dose:  750 mg   Take 1 tablet (750 mg) by mouth 2 times daily   Refills:  0         CONTINUE these medicines which have NOT CHANGED        Dose / Directions    baclofen 10 MG tablet   Commonly known as:  LIORESAL        Dose:  10-20 mg   Take 10-20 mg by mouth 4 times daily as needed   Refills:  3       norethindrone 0.35 MG per tablet   Commonly known as:  MICRONOR        Dose:  1 tablet   Take 1 tablet by mouth daily   Refills:  0       venlafaxine 75 MG 24 hr capsule   Commonly known as:  EFFEXOR-XR        Dose:  75 mg   Take 75 mg by mouth daily   Refills:  0       vitamin D 97853 UNIT capsule   Commonly known as:  ERGOCALCIFEROL        Dose:  15483 Units   Take 50,000  Units by mouth once a week   Refills:  1         STOP taking     AMBIEN PO           AMPYRA PO           dimethyl fumarate delayed release capsule   Commonly known as:  TECFIDERA           oxybutynin 5 MG 24 hr tablet   Commonly known as:  DITROPAN-XL                Where to get your medicines      Some of these will need a paper prescription and others can be bought over the counter. Ask your nurse if you have questions.     You don't need a prescription for these medications     acyclovir 625 mg    levETIRAcetam 750 MG tablet                Protect others around you: Learn how to safely use, store and throw away your medicines at www.disposemymeds.org.             Medication List: This is a list of all your medications and when to take them. Check marks below indicate your daily home schedule. Keep this list as a reference.      Medications           Morning Afternoon Evening Bedtime As Needed    acyclovir 625 mg   Inject 625 mg into the vein every 8 hours for 10 days Please fax creat level to Dr. Diamond office while on this medication.   Last time this was given:  600 mg on 7/30/2017  6:42 AM                                baclofen 10 MG tablet   Commonly known as:  LIORESAL   Take 10-20 mg by mouth 4 times daily as needed                                levETIRAcetam 750 MG tablet   Commonly known as:  KEPPRA   Take 1 tablet (750 mg) by mouth 2 times daily   Last time this was given:  750 mg on 7/30/2017  8:58 AM                                norethindrone 0.35 MG per tablet   Commonly known as:  MICRONOR   Take 1 tablet by mouth daily   Last time this was given:  0.35 mg on 7/30/2017  8:58 AM                                venlafaxine 75 MG 24 hr capsule   Commonly known as:  EFFEXOR-XR   Take 75 mg by mouth daily   Last time this was given:  75 mg on 7/30/2017  8:58 AM                                vitamin D 19317 UNIT capsule   Commonly known as:  ERGOCALCIFEROL   Take 50,000 Units by mouth once a week                                           More Information        Patient Education    Levetiracetam Oral solution    Levetiracetam Oral tablet    Levetiracetam Oral tablet, extended-release    Levetiracetam Solution for injection  Levetiracetam Oral tablet  What is this medicine?  LEVETIRACETAM (eusebia javier) is an antiepileptic drug. It is used with other medicines to treat certain types of seizures.  This medicine may be used for other purposes; ask your health care provider or pharmacist if you have questions.  What should I tell my health care provider before I take this medicine?  They need to know if you have any of these conditions:    kidney disease    suicidal thoughts, plans, or attempt; a previous suicide attempt by you or a family member    an unusual or allergic reaction to levetiracetam, other medicines, foods, dyes, or preservatives    pregnant or trying to get pregnant    breast-feeding  How should I use this medicine?  Take this medicine by mouth with a glass of water. Follow the directions on the prescription label. Swallow the tablets whole. Do not crush or chew this medicine. You may take this medicine with or without food. Take your doses at regular intervals. Do not take your medicine more often than directed. Do not stop taking this medicine or any of your seizure medicines unless instructed by your doctor or health care professional. Stopping your medicine suddenly can increase your seizures or their severity.  A special MedGuide will be given to you by the pharmacist with each prescription and refill. Be sure to read this information carefully each time.  Contact your pediatrician or health care professional regarding the use of this medication in children. While this drug may be prescribed for children as young as 4 years of age for selected conditions, precautions do apply.  Overdosage: If you think you have taken too much of this medicine contact a poison control center or emergency  room at once.  NOTE: This medicine is only for you. Do not share this medicine with others.  What if I miss a dose?  If you miss a dose, take it as soon as you can. If it is almost time for your next dose, take only that dose. Do not take double or extra doses.  What may interact with this medicine?  This medicine may interact with the following medications:    carbamazepine    colesevelam    probenecid    sevelamer  This list may not describe all possible interactions. Give your health care provider a list of all the medicines, herbs, non-prescription drugs, or dietary supplements you use. Also tell them if you smoke, drink alcohol, or use illegal drugs. Some items may interact with your medicine.  What should I watch for while using this medicine?  Visit your doctor or health care professional for a regular check on your progress. Wear a medical identification bracelet or chain to say you have epilepsy, and carry a card that lists all your medications.  It is important to take this medicine exactly as instructed by your health care professional. When first starting treatment, your dose may need to be adjusted. It may take weeks or months before your dose is stable. You should contact your doctor or health care professional if your seizures get worse or if you have any new types of seizures.  You may get drowsy or dizzy. Do not drive, use machinery, or do anything that needs mental alertness until you know how this medicine affects you. Do not stand or sit up quickly, especially if you are an older patient. This reduces the risk of dizzy or fainting spells. Alcohol may interfere with the effect of this medicine. Avoid alcoholic drinks.  The use of this medicine may increase the chance of suicidal thoughts or actions. Pay special attention to how you are responding while on this medicine. Any worsening of mood, or thoughts of suicide or dying should be reported to your health care professional right away.  Women who  become pregnant while using this medicine may enroll in the [place] Antiepileptic Drug Pregnancy Registry by calling 1-221.482.2223. This registry collects information about the safety of antiepileptic drug use during pregnancy.  What side effects may I notice from receiving this medicine?  Side effects you should report to your doctor or health care professional as soon as possible:    allergic reactions like skin rash, itching or hives, swelling of the face, lips, or tongue    breathing problems    dark urine    general ill feeling or flu-like symptoms    problems with balance, talking, walking    unusually weak or tired    worsening of mood, thoughts or actions of suicide or dying    yellowing of the eyes or skin  Side effects that usually do not require medical attention (report to your doctor or health care professional if they continue or are bothersome):    diarrhea    dizzy, drowsy    headache    loss of appetite  This list may not describe all possible side effects. Call your doctor for medical advice about side effects. You may report side effects to FDA at 6-926-WIS-6061.  Where should I keep my medicine?  Keep out of reach of children.  Store at room temperature between 15 and 30 degrees C (59 and 86 degrees F). Throw away any unused medicine after the expiration date.  NOTE:This sheet is a summary. It may not cover all possible information. If you have questions about this medicine, talk to your doctor, pharmacist, or health care provider. Copyright  2016 Gold Standard                Acyclovir injection  What is this medicine?  ACYCLOVIR (ay SYE kloe veer) is an antiviral medicine. It is used to treat or prevent infections caused by certain kinds of viruses. Examples of these infections include herpes and shingles. This medicine will not cure herpes.  How should I use this medicine?  This medicine is infused into a vein. It is usually given by a health care professional in a hospital or clinic  setting.  If you receive this medicine at home, you will be taught how to prepare and give this medicine. Use exactly as directed. Take your medicine at regular intervals. Do not take your medicine more often than directed.  It is important that you put your used needles and syringes in a special sharps container. Do not put them in a trash can. If you do not have a sharps container, call your pharmacist or healthcare provider to get one.  Talk to your pediatrician regarding the use of this medicine in children. While this drug may be prescribed for children as young as  for selected conditions, precautions do apply.  What side effects may I notice from receiving this medicine?  Side effects that you should report to your doctor or health care professional as soon as possible:    allergic reactions like skin rash, itching or hives, swelling of the face, lips, or tongue    chest pain    confusion, hallucinations, tremor    dark urine    increased sensitivity to the sun    redness, blistering, peeling or loosening of the skin, including inside the mouth    seizures    trouble passing urine or change in the amount of urine    unusual bleeding or bruising, or pinpoint red spots on the skin    unusually weak or tired    yellowing of the eyes or skin  Side effects that usually do not require medical attention (report to your doctor or health care professional if they continue or are bothersome):    diarrhea    fever    headache    nausea, vomiting    stomach upset  What may interact with this medicine?    probenecid  What if I miss a dose?  If you miss a dose, take it as soon as you can. If it is almost time for your next dose, take only that dose. Do not take double or extra doses.  Where should I keep my medicine?  Keep out of the reach of children.  If you are using this medicine at home, you will be instructed on how to store this medicine. Throw away any unused medicine after the expiration date on the  label.  What should I tell my health care provider before I take this medicine?  They need to know if you have any of these conditions:    immune system problems    kidney disease    an unusual or allergic reaction to acyclovir, valacyclovir, other medicines, foods, dyes, or preservatives    pregnant or trying to get pregnant    breast-feeding  What should I watch for while using this medicine?  Tell your doctor or health care professional if your symptoms do not improve or if you get new symptoms.  You can still pass chickenpox, shingles, or herpes to another person even while you are taking this medicine. Avoid contact with others as directed. Genital herpes is a sexually transmitted disease. Talk to your doctor about how to stop the spread of infection.  NOTE:This sheet is a summary. It may not cover all possible information. If you have questions about this medicine, talk to your doctor, pharmacist, or health care provider. Copyright  2017 Gold Standard                Viral Encephalitis    Encephalitis is a viral infection of the brain. It may cause headache, stiff neck, irritability, fever, drowsiness, nausea and vomiting. Severe cases may also cause confusion, bizarre behavior or a seizure.  The disease may be spread by close contact with a person who has encephalitis. It may also be spread by mosquitoes, ticks and other insects that carry the virus. It may occur as a complication of a current infection with herpes, measles, mumps, or chicken pox. Rarely, some vaccinations cause this illness.  Except for herpes or chicken pox virus, antivirals are not used to treat this condition. Other medicines can be used to treat the symptoms of this disease. In mild cases, most persons recover fully in 2 to 4 weeks.  Home care    Rest in bed until you feel better. Stay home from school/work for at least one week to prevent spreading the virus to others.    Take any medicines as directed. Ask your healthcare provider about  taking over-the-counter medicines (such as ibuprofen or acetaminophen) for fever and headache. (If you have chronic liver or kidney disease or ever had a stomach ulcer or GI bleeding, let your healthcare provider know before using these medicines.)    If you have a fever, drink extra water, sports drinks or other fluids to prevent dehydration.  Follow-up care  Follow up with your healthcare provider during the next week or as directed by our staff. This helps ensure that you are getting better as expected.  When to seek medical advice  Call the healthcare provider if any of the following occur:    Fever of 100.4 F (38 C) or higher not better with medicine, or as directed by your healthcare provider    Headache or stiff neck continue or get worse    Increasing drowsiness, confusion, or bizarre behavior    Not able to keep fluids down due to vomiting    Weakness of an arm or leg or one side of the face    Trouble with speech or vision    Trouble walking    Seizure or loss of consciousness  Date Last Reviewed: 8/23/2015 2000-2017 [employer]. [address] [place] [address], [place] [address]. All rights reserved. This information is not intended as a substitute for professional medical care. Always follow your healthcare professional's instructions.

## 2017-07-24 NOTE — ED NOTES
Bed: ST02  Expected date: 7/24/17  Expected time: 5:54 PM  Means of arrival:   Comments:  [phone] AMS/unresponsive

## 2017-07-24 NOTE — IP AVS SNAPSHOT
[address] [facility]    [address]    [place] [address]    Phone:  [phone]                                       After Visit Summary   7/24/2017    Shanna Shanks    MRN: [id number]           After Visit Summary Signature Page     I have received my discharge instructions, and my questions have been answered. I have discussed any challenges I see with this plan with the nurse or doctor.    ..........................................................................................................................................  Patient/Patient Representative Signature      ..........................................................................................................................................  Patient Representative Print Name and Relationship to Patient    ..................................................               ................................................  Date                                            Time    ..........................................................................................................................................  Reviewed by Signature/Title    ...................................................              ..............................................  Date                                                            Time

## 2017-07-25 ENCOUNTER — APPOINTMENT (OUTPATIENT)
Dept: MRI IMAGING | Facility: CLINIC | Age: 42
DRG: 097 | End: 2017-07-25
Attending: PSYCHIATRY & NEUROLOGY
Payer: COMMERCIAL

## 2017-07-25 ENCOUNTER — APPOINTMENT (OUTPATIENT)
Dept: GENERAL RADIOLOGY | Facility: CLINIC | Age: 42
DRG: 097 | End: 2017-07-25
Attending: NURSE PRACTITIONER
Payer: COMMERCIAL

## 2017-07-25 ENCOUNTER — APPOINTMENT (OUTPATIENT)
Dept: SPEECH THERAPY | Facility: CLINIC | Age: 42
DRG: 097 | End: 2017-07-25
Attending: PSYCHIATRY & NEUROLOGY
Payer: COMMERCIAL

## 2017-07-25 LAB
ANION GAP SERPL CALCULATED.3IONS-SCNC: 8 MMOL/L (ref 3–14)
BUN SERPL-MCNC: 6 MG/DL (ref 7–30)
CALCIUM SERPL-MCNC: 8.1 MG/DL (ref 8.5–10.1)
CHLORIDE SERPL-SCNC: 105 MMOL/L (ref 94–109)
CO2 SERPL-SCNC: 25 MMOL/L (ref 20–32)
CREAT SERPL-MCNC: 0.59 MG/DL (ref 0.52–1.04)
ERYTHROCYTE [DISTWIDTH] IN BLOOD BY AUTOMATED COUNT: 13.5 % (ref 10–15)
GFR SERPL CREATININE-BSD FRML MDRD: ABNORMAL ML/MIN/1.7M2
GLUCOSE BLDC GLUCOMTR-MCNC: 100 MG/DL (ref 70–99)
GLUCOSE BLDC GLUCOMTR-MCNC: 120 MG/DL (ref 70–99)
GLUCOSE SERPL-MCNC: 111 MG/DL (ref 70–99)
HCT VFR BLD AUTO: 33.5 % (ref 35–47)
HGB BLD-MCNC: 11.2 G/DL (ref 11.7–15.7)
MCH RBC QN AUTO: 31 PG (ref 26.5–33)
MCHC RBC AUTO-ENTMCNC: 33.4 G/DL (ref 31.5–36.5)
MCV RBC AUTO: 93 FL (ref 78–100)
PLATELET # BLD AUTO: 182 10E9/L (ref 150–450)
POTASSIUM SERPL-SCNC: 3.5 MMOL/L (ref 3.4–5.3)
POTASSIUM SERPL-SCNC: 4.3 MMOL/L (ref 3.4–5.3)
RBC # BLD AUTO: 3.61 10E12/L (ref 3.8–5.2)
SODIUM SERPL-SCNC: 138 MMOL/L (ref 133–144)
WBC # BLD AUTO: 10.5 10E9/L (ref 4–11)

## 2017-07-25 PROCEDURE — 83735 ASSAY OF MAGNESIUM: CPT | Performed by: INTERNAL MEDICINE

## 2017-07-25 PROCEDURE — 95813 EEG EXTND MNTR 61-119 MIN: CPT

## 2017-07-25 PROCEDURE — 00000146 ZZHCL STATISTIC GLUCOSE BY METER IP

## 2017-07-25 PROCEDURE — 25000128 H RX IP 250 OP 636: Performed by: NURSE PRACTITIONER

## 2017-07-25 PROCEDURE — 99233 SBSQ HOSP IP/OBS HIGH 50: CPT | Performed by: INTERNAL MEDICINE

## 2017-07-25 PROCEDURE — 25000128 H RX IP 250 OP 636: Performed by: INTERNAL MEDICINE

## 2017-07-25 PROCEDURE — 85027 COMPLETE CBC AUTOMATED: CPT | Performed by: INTERNAL MEDICINE

## 2017-07-25 PROCEDURE — 40000225 ZZH STATISTIC SLP WARD VISIT: Performed by: SPEECH-LANGUAGE PATHOLOGIST

## 2017-07-25 PROCEDURE — 20000003 ZZH R&B ICU

## 2017-07-25 PROCEDURE — 71010 XR CHEST PORT 1 VW: CPT

## 2017-07-25 PROCEDURE — 92610 EVALUATE SWALLOWING FUNCTION: CPT | Mod: GN | Performed by: SPEECH-LANGUAGE PATHOLOGIST

## 2017-07-25 PROCEDURE — 36415 COLL VENOUS BLD VENIPUNCTURE: CPT | Performed by: INTERNAL MEDICINE

## 2017-07-25 PROCEDURE — A9585 GADOBUTROL INJECTION: HCPCS | Performed by: INTERNAL MEDICINE

## 2017-07-25 PROCEDURE — 70553 MRI BRAIN STEM W/O & W/DYE: CPT

## 2017-07-25 PROCEDURE — 80048 BASIC METABOLIC PNL TOTAL CA: CPT | Performed by: INTERNAL MEDICINE

## 2017-07-25 PROCEDURE — 84132 ASSAY OF SERUM POTASSIUM: CPT | Performed by: INTERNAL MEDICINE

## 2017-07-25 PROCEDURE — 25000128 H RX IP 250 OP 636: Performed by: PSYCHIATRY & NEUROLOGY

## 2017-07-25 RX ORDER — SODIUM CHLORIDE 9 MG/ML
INJECTION, SOLUTION INTRAVENOUS CONTINUOUS
Status: DISCONTINUED | OUTPATIENT
Start: 2017-07-25 | End: 2017-07-30

## 2017-07-25 RX ORDER — VENLAFAXINE HYDROCHLORIDE 75 MG/1
75 CAPSULE, EXTENDED RELEASE ORAL DAILY
Status: DISCONTINUED | OUTPATIENT
Start: 2017-07-26 | End: 2017-07-30 | Stop reason: HOSPADM

## 2017-07-25 RX ORDER — ACETAMINOPHEN AND CODEINE PHOSPHATE 120; 12 MG/5ML; MG/5ML
1 SOLUTION ORAL DAILY
Status: DISCONTINUED | OUTPATIENT
Start: 2017-07-25 | End: 2017-07-25

## 2017-07-25 RX ORDER — GADOBUTROL 604.72 MG/ML
1 INJECTION INTRAVENOUS ONCE
Status: COMPLETED | OUTPATIENT
Start: 2017-07-25 | End: 2017-07-25

## 2017-07-25 RX ORDER — BACLOFEN 10 MG/1
10-20 TABLET ORAL 4 TIMES DAILY PRN
Status: DISCONTINUED | OUTPATIENT
Start: 2017-07-25 | End: 2017-07-30 | Stop reason: HOSPADM

## 2017-07-25 RX ADMIN — LEVETIRACETAM 750 MG: 100 INJECTION, SOLUTION INTRAVENOUS at 21:43

## 2017-07-25 RX ADMIN — SODIUM CHLORIDE: 9 INJECTION, SOLUTION INTRAVENOUS at 08:45

## 2017-07-25 RX ADMIN — LEVETIRACETAM 750 MG: 100 INJECTION, SOLUTION INTRAVENOUS at 09:21

## 2017-07-25 RX ADMIN — ACYCLOVIR SODIUM 600 MG: 50 INJECTION, SOLUTION INTRAVENOUS at 18:41

## 2017-07-25 RX ADMIN — HEPARIN SODIUM 5000 UNITS: 5000 INJECTION, SOLUTION INTRAVENOUS; SUBCUTANEOUS at 08:50

## 2017-07-25 RX ADMIN — SODIUM CHLORIDE: 9 INJECTION, SOLUTION INTRAVENOUS at 21:52

## 2017-07-25 RX ADMIN — HEPARIN SODIUM 5000 UNITS: 5000 INJECTION, SOLUTION INTRAVENOUS; SUBCUTANEOUS at 00:55

## 2017-07-25 RX ADMIN — GADOBUTROL 6 ML: 604.72 INJECTION INTRAVENOUS at 11:23

## 2017-07-25 RX ADMIN — ACYCLOVIR SODIUM 600 MG: 50 INJECTION, SOLUTION INTRAVENOUS at 12:51

## 2017-07-25 RX ADMIN — HEPARIN SODIUM 5000 UNITS: 5000 INJECTION, SOLUTION INTRAVENOUS; SUBCUTANEOUS at 15:35

## 2017-07-25 ASSESSMENT — ACTIVITIES OF DAILY LIVING (ADL)
DRESS: 0-->INDEPENDENT
BATHING: 0-->INDEPENDENT
NUMBER_OF_TIMES_PATIENT_HAS_FALLEN_WITHIN_LAST_SIX_MONTHS: 0
FALL_HISTORY_WITHIN_LAST_SIX_MONTHS: NO
AMBULATION: 0-->INDEPENDENT
COGNITION: 0 - NO COGNITION ISSUES REPORTED
RETIRED_COMMUNICATION: 0-->UNDERSTANDS/COMMUNICATES WITHOUT DIFFICULTY
TOILETING: 0-->INDEPENDENT
TRANSFERRING: 0-->INDEPENDENT
SWALLOWING: 0-->SWALLOWS FOODS/LIQUIDS WITHOUT DIFFICULTY
RETIRED_EATING: 0-->INDEPENDENT

## 2017-07-25 NOTE — PLAN OF CARE
Problem: Goal Outcome Summary  Goal: Goal Outcome Summary  PT:Pt with another discipline this PM, unable to try back.

## 2017-07-25 NOTE — PROGRESS NOTES
Patient intubated at 1920 with ETT 7.0 and secured at 24@lip. Confirmed bilateral breath sound and positive Et CO2. BBS clear diminished with SpO2 99%.     Ventilation Mode: CMV/AC  FiO2 (%): 100 %  Rate Set (breaths/minute): 16 breaths/min  Tidal Volume Set (mL): 500 mL  PEEP (cm H2O): 5 cmH2O  Oxygen Concentration (%): 100 %  Resp: 6    Patient full ventilatory support and will continue to monitor asa ordered.    Mandeep Zhao RT  7/24/2017

## 2017-07-25 NOTE — PROGRESS NOTES
1 hour video EEG # , ordered by Dr. Mauro.  Pt opens and closes eyes spontaneously on record.  Can open or close eyes upon command.  RN did assessment while video was running.  Dr. Mauro present to witness EEG.  Semi-continuous lip pursing mvmts seen.

## 2017-07-25 NOTE — PROGRESS NOTES
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  Will get LP for further evaluation  Will start Acyclovir       ----------------------------------------------------  Francisco Mauro MD, PhD, FA        ADDENDUM: 7/25/2017  8:45 AM  EEG - left sided discharges  Increased Keppra to 750 mg BID  ----------------------------------------------------  Francisco Mauro MD, PhD, [facility]      _________________________________     Main Plans for Today   EEG  MRI brain  Assessment & Plan   #(G93.40) Encephalopathy  (primary encounter diagnosis)  --Negative code stroke evaluation  --UA is completely normal  ------ will get EEG, most likely seizure  --Will get MRI brain with and without  (R40.2110) [place] coma scale total score 3-8, unspecified time (H)  --see above  (G35) Multiple sclerosis (H)  --Advanced MS  --Management [facility]  --Continued tobacco abuse will further promote progression of MS  (J96.00) Acute respiratory failure, unspecified whether with hypoxia or hypercapnia (H)  --Extubated 7/24/17  #. Cardiovascular  --stable   #. Infection   --Normal WBC  #. Endocrine:   --Insulin protocol to keep blood sugars 100-150.  #. Heme/Onc:   --Minimal anemia  #. Renal  --Sodium goal 140-155  #. Skin/Musculoskeletal:  -- No issues  #. PT/OT/Speech  --continue  evaluations   #.Nutrition  --Per speech therapy evaluation   #. ICU prophylaxis:   --Stress ulcer prophylaxis Protonix  --DVT prophylaxis: mechanical and SQ lovenox    CODE STATUS: Full Code    Family update by me today: yes    Interval History   Extubated overnight. Remains mute since extubation. Reacts to examiner and visitors. Large tongue  "laceration    Physical Exam     Vitals: Blood pressure 107/75, pulse 98, temperature 99.5  F (37.5  C), resp. rate 21, height 1.695 m (5' 6.73\"), weight 62.3 kg (137 lb 5.6 oz), SpO2 100 %, not currently breastfeeding.  Tmax: Temp (24hrs), Av.6  F (37.6  C), Min:98.1  F (36.7  C), Max:100.4  F (38  C)    Vital Signs with Ranges  Temp:  [98.1  F (36.7  C)-100.4  F (38  C)] 99.5  F (37.5  C)  Pulse:  [91-98] 98  Heart Rate:  [] 89  Resp:  [6-23] 21  BP: (101-152)/(66-95) 107/75  FiO2 (%):  [100 %] 100 %  SpO2:  [92 %-100 %] 100 %   I&O:  I/O this shift:  In: -   Out: 770 [Urine:770]  I/O last 3 completed shifts:  In: 44.14 [I.V.:44.14]  Out: 925 [Urine:925] I/O since admission: -1650.86         General Appearance:   No acute distress  Neuro:       Mental Status Exam:   Awake, alert. Mute. Mental status is normal       Cranial Nerves:  Pupils 3 mm, reactive. EOMI. Face sensation is normal. Face is symmetric.Tongue and uvula are midline. Tongue with a bite salma Other CN are normal           Motor:  Right sided weakness 4/5 Tone and bulk are normal           Reflexes:  Normal DTR.Toes downgoing.        Sensory:   Untestable                  Coordination:    Untestable       Gait: Untestable  Cardiovascular: Regular rate and rhythm, no m/r/g  Lungs: Ventilation Mode: CMV/AC  FiO2 (%): 100 %  Rate Set (breaths/minute): 16 breaths/min  Tidal Volume Set (mL): 500 mL  PEEP (cm H2O): 5 cmH2O  Oxygen Concentration (%): 100 %  Resp: 21  Both hemithoraces are symmetrical, auscultation of lungs revealed no bronchovesicular sounds, expirium prolongation, wheezing, rhonci and crackles  Abdomen: Soft, not tender, not distended  Skin/Extremities: No clubbing, cyanosis, no edema     Medications   Infusions medications:    propofol (DIPRIVAN) infusion Stopped (17)     Schedule medications:    levETIRAcetam  500 mg Intravenous Q12H     heparin  5,000 Units Subcutaneous Q8H     norethindrone  0.35 mg Oral Daily     " dimethyl fumarate  240 mg Oral BID     PRN medications:naloxone  Data       Labotary Data:   All data was reviewed by me personally  CBC RESULTS:  Recent Labs   Lab Test  07/25/17   0451  07/24/17   1808   WBC  10.5  11.5*   RBC  3.61*  3.90   HGB  11.2*  12.0   HCT  33.5*  36.3   PLT  182  209     Basic Metabolic Panel:  Recent Labs   Lab Test  07/25/17   0451  07/24/17   1808 11/18/13 02/20/12   0938   NA  138  139  139  141   POTASSIUM  4.3  4.4  4.0  4.3   CHLORIDE  105  105  108  107   CO2  25  23   --   23   BUN  6*  9  5  17   CR  0.59  0.63  0.91  0.79   GLC  111*  111*  86  90   JULIET  8.1*  8.2*  8.9  8.7     ABG:No lab results found.  Liver panel:  Recent Labs   Lab Test 11/18/13 02/20/12   0938   PROTTOTAL  7.0  7.2   ALBUMIN  4.2  4.0   BILITOTAL  0.6  0.8   ALKPHOS  48  52   AST  25  22   ALT  17  14     Procalcitonin: No lab results found.  Coagulation  Recent Labs   Lab Test  07/24/17   1808   INR  1.05   PTT  29      Lipid Profile:  Recent Labs   Lab Test  02/20/12   0938   CHOL  242*   HDL  72   LDL  140*   TRIG  147   CHOLHDLRATIO  3.4     Thyroid Panel:No lab results found.   Vitamin B12: No lab results found.   Vitamin D:No lab results found.  A1C: No lab results found.  Troponin I: No lab results found.  CPK: No lab results found.  Ammonia: No lab results found.  Serum Osmolality:No lab results found.  Most Recent 6 Bacteria Isolates From Any Culture (See EPIC Reports for Culture Details):  Recent Labs   Lab Test  12/17/15   0949  03/16/15   0909  10/21/11   1049  09/18/09   1125   CULT  >100,000 colonies/mL Escherichia coli*  >100,000 colonies/mL mixed urogenital jen  >100,000 colonies/mL Coagulase negative Staphylococcus  >100,000 colonies/mL Staphylococcus aureus     UA Results:  Recent Labs   Lab Test  07/24/17   1912   COLOR  Yellow   APPEARANCE  Clear   URINEGLC  Negative   URINEBILI  Negative   URINEKETONE  40*   SG  1.025   UBLD  Negative   URINEPH  6.0   PROTEIN  30*   UROBILINOGEN   0.2   NITRITE  Negative   LEUKEST  Negative   RBCU  2-5*   WBCU  O - 2          Cardiac US:   --     Neurophysiology:   --       Imaging:   All imaging studies were reviewed personally  CT head:   Diffuse cerebral volume loss and cerebral white matter  changes consistent with the patient's history of multiple sclerosis.  No evidence for acute intracranial pathology.  CT perfusion  Normal CT perfusion of the brain.  CT angiography neck/head  Normal neck and head CTA.  MRI brain:   --        Time Spent on this Encounter      Time:   Neurocritical care time:  I spent 35 minutes bedside and on the inpatient unit today managing the neurocritical care of Shanna Shanks in relation to the issues listed in this note. Patient is critically ill / has very high risk of deterioration

## 2017-07-25 NOTE — CONSULTS
[facility]  CRITICAL CARE ADMISSION/CONSULTATION    Shanna Shanks MRN: [id number]  1975  Date of Admission:7/24/2017          HPI   Shanna Shanks IS a 42 year old female admitted on 7/24/2017 with significant history for MS, tobacco/EtOH use who was found unresponsive today, slumped over chair, by her . He last reports her being herself this morning. When she was found, she had dry blood around her nares/mouth and urine on the floor. She has no h/o seizures. She was brought to the ED by ambulance and intubated immediately for presumed CNS catastrophe. Head CT did not show any acute abnormalities. Of note, she started to move purposefully following intubation. She was sent to ICU for further evaluation.          Past Medical History:      Past Medical History:   Diagnosis Date     Multiple sclerosis (H) 3/2/96    last exacerbation 3yrs ago, no meds x 6 months     Tobacco dependency              Past Surgical History:      Past Surgical History:   Procedure Laterality Date     HC DILATION/CURETTAGE DIAG/THER NON OB  1/1/05    D & C, missed AB            Social History:     Social History   Substance Use Topics     Smoking status: Current Every Day Smoker     Packs/day: 0.25     Types: Cigarettes     Smokeless tobacco: Not on file     Alcohol use 3.5 oz/week     7 Standard drinks or equivalent per week            Family History:     Family History   Problem Relation Age of Onset     Family History Negative Mother      DIABETES Father      Cancer - colorectal Paternal Grandmother      HEART DISEASE Paternal Grandfather      MI             Allergies:   Please see allergy list which was reviewed this admission.         Medications:       levETIRAcetam  500 mg Intravenous Q12H            Review of Systems:   Unable to assess due to critical illness         Physical Examination:   Temp:  [98.1  F (36.7  C)-100.1  F (37.8  C)] 98.8  F (37.1  C)  Pulse:  [91-98] 98  Heart Rate:  []  98  Resp:  [6-23] 18  BP: (111-152)/(66-95) 121/80  FiO2 (%):  [100 %] 100 %  SpO2:  [92 %-100 %] 99 %    Intake/Output Summary (Last 24 hours) at 07/24/17 2254  Last data filed at 07/24/17 2247   Gross per 24 hour   Intake                0 ml   Output              925 ml   Net             -925 ml     Wt Readings from Last 4 Encounters:   07/24/17 62.3 kg (137 lb 5.6 oz)   04/10/17 63.5 kg (140 lb)   12/17/15 61 kg (134 lb 8 oz)   03/16/15 63.3 kg (139 lb 8 oz)     BP - Mean:  [] 93  Ventilation Mode: CMV/AC  FiO2 (%): 100 %  Rate Set (breaths/minute): 16 breaths/min  Tidal Volume Set (mL): 500 mL  PEEP (cm H2O): 5 cmH2O  Oxygen Concentration (%): 100 %  Resp: 18  No lab results found in last 7 days.    GEN: intubated, sedated   HEENT: head ncat, sclera anicteric, OP patent, trachea midline   PULM: unlabored synchronous with vent, clear anteriorly    CV/COR: RRR S1S2 no gallop,  No rub, no murmur  ABD: soft nontender, hypoactive bowel sounds, no mass  EXT:  Edema   warm  NEURO: grossly intact  SKIN: no obvious rash      Assessment and plan :     Neurology/Psychiatry/Pain/Sedation:   1. Encephalopathy. Given her clinical findings, it is concerning for acute onset seizure. Her tox screen was negative and there is no concern by family for intentional overdose. Recommend EEG and brain MRI in am. Appreciate neuro input.   2. MS. Controlled with current regimen.     Cardiovascular/Ventilator Management/Renal/Fluids/Electrolytes/ID:   1. Intubated for airway protection. On minimal vent settings and easy to oxygenate/ventilate. Plan is to hold sedation and assess neuro status. If she is able to become more alert, will consider extubation.   2. Start rocephin empirically for potential aspiration.     GI/Nutrition:   1. NPO    Endocrine/Hematology/Oncology:   1. ICU glucose protocol    Disposition/Code Status/Other  1. Critically ill. Cont supportive care  2. Code: Full     ICU Prophylaxis:   1. DVT: Hep Subq  2. VAP:  HOB 30 degrees, chlorhexidine rinse  3. Stress Ulcer: PPI/H2 blocker  4. Restraints: Nonviolent soft two point restraints required and necessary for patient safety and continued cares and good effect as patient continues to pull at necessary lines, tubes despite education and distraction. Will readdress daily.   5. IV Access - central access required and necessary for continued patient cares  6. Feeding - npo    I have personally reviewed the daily labs, imaging studies, cultures and discussed the case with referring physician and consulting physicians.     This patient is critically ill and I have provided 30 minutes of critical care time (excluding procedures) on July 24, 2017.     Manoj Philip MD   Critical Care Staff  [phone]         Data:   All data and imaging reviewed     ROUTINE ICU LABS (Last four results)  CMP  Recent Labs  Lab 07/24/17  1808      POTASSIUM 4.4   CHLORIDE 105   CO2 23   ANIONGAP 11   *   BUN 9   CR 0.63   GFRESTIMATED >90Non  GFR Calc   GFRESTBLACK >90African American GFR Calc   JULIET 8.2*     CBC  Recent Labs  Lab 07/24/17  1808   WBC 11.5*   RBC 3.90   HGB 12.0   HCT 36.3   MCV 93   MCH 30.8   MCHC 33.1   RDW 13.6        INR  Recent Labs  Lab 07/24/17  1808   INR 1.05     Arterial Blood GasNo lab results found in last 7 days.    All cultures:  No results for input(s): CULT in the last 168 hours.  Recent Results (from the past 24 hour(s))   CT Head w/o Contrast    Narrative    CT OF THE HEAD WITHOUT CONTRAST 7/24/2017 6:45 PM     COMPARISON: None.    HISTORY: Code Stroke. Altered mental status.    TECHNIQUE: 5 mm thick axial CT images of the head were acquired  without IV contrast material.    FINDINGS: There is moderate diffuse cerebral volume loss. There are  extensive confluent areas of decreased density in the cerebral white  matter bilaterally that are consistent with the patient's history of  multiple sclerosis.    The ventricles and basal cisterns  are within normal limits in  configuration given the degree of cerebral volume loss.  There is no  midline shift. There are no extra-axial fluid collections.    No intracranial hemorrhage, mass or recent infarct.    The visualized paranasal sinuses are well-aerated. There is no  mastoiditis. There are no fractures of the visualized bones.      Impression    IMPRESSION: Diffuse cerebral volume loss and cerebral white matter  changes consistent with the patient's history of multiple sclerosis.  No evidence for acute intracranial pathology.    Radiation dose for this scan was reduced using automated exposure  control, adjustment of the mA and/or kV according to patient size, or  iterative reconstruction technique.     GEOFFREY GILL MD   CTA Angiogram Head Neck    Narrative    CT ANGIOGRAM OF THE HEAD AND NECK WITHOUT AND WITH CONTRAST  7/24/2017  6:46 PM     COMPARISON: None.    HISTORY: Code Stroke. Multiple sclerosis. Altered mental status.    TECHNIQUE:  Precontrast localizing scans were followed by CT  angiography with an injection of 120 mL Isovue 370 nonionic  intravenous contrast material with scans through the head and neck.   Images were transferred to a separate 3-D workstation where  multiplanar reformations and 3-D images were created.  Estimates of  carotid stenoses are made relative to the distal internal carotid  artery diameters except as noted.      FINDINGS:   Neck CTA: The bilateral common carotid, bilateral cervical internal  carotid and bilateral vertebral arteries are patent and unremarkable.    Head CTA: The bilateral distal internal carotid, basilar, bilateral  anterior cerebral, bilateral middle cerebral and bilateral posterior  cerebral arteries are patent and unremarkable.      Impression    IMPRESSION: Normal neck and head CTA.    Radiation dose for this scan was reduced using automated exposure  control, adjustment of the mA and/or kV according to patient size, or  iterative reconstruction  technique.      GEOFFREY GILL MD   CT Head w Contrast    Narrative    CT BRAIN PERFUSION 7/24/2017 6:55 PM    COMPARISON: None.    HISTORY: Code Stroke.    TECHNIQUE: Time sequential axial CT images of the head were acquired  during the administration of intravenous contrast (50 mL Isovue-370).  CTA images of the [place] as well as color perfusion maps of  the brain were created from this time sequential axial source data.    FINDINGS: There are no focal or regional perfusion defects in the  brain.      Impression    IMPRESSION: Normal CT perfusion of the brain.    Radiation dose for this scan was reduced using automated exposure  control, adjustment of the mA and/or kV according to patient size, or  iterative reconstruction technique.      GEOFFREY GILL MD   Chest  XR, 1 view PORTABLE    Narrative    CHEST PORTABLE ONE VIEW  7/24/2017 7:29 PM     COMPARISON: None.    HISTORY: Post intubation.    FINDINGS: There is an endotracheal tube in place with its tip at the  level of the clavicular heads. The lungs are clear. There is no  pleural effusion or pneumothorax. Heart size is normal with no  evidence for congestive failure.     GEOFFREY GILL MD

## 2017-07-25 NOTE — PLAN OF CARE
Problem: Goal Outcome Summary  Goal: Goal Outcome Summary  Patient alert this morning. Unable to follow commands consistantly. Nods appropriately. Pupils EERL. Weaker Right sided extremities. Patient will not speak, but speaks well at home baseline.  NSR. BP WNL.   Lung sounds diminished throughout. 2 L NC.  [place] U/O >30/HR  Report given to oncoming nurse. WIll continue to monitor.

## 2017-07-25 NOTE — PLAN OF CARE
Problem: Goal Outcome Summary  Goal: Goal Outcome Summary  Discharge Planner SLP   Patient plan for discharge: Not known yet.  Current status: Bedside swallow evaluation completed. Patient presents with moderate to severe oral and pharyngeal dysphagia at bedside. Patient was alert and able to follow some commands but not enough to complete a full oral motor examination. Her voice is aphonic but she was able to achieve voicing with imitiation of vowel sounds and single words. She was unable to produce a cough for airway protection so limited trials given with nectar thick liquids by spoon. She demonstrated premature entry, delayed swallow response and reduced laryngeal elevation. This resulted in overt Sx of aspiration with a teaspoon amount of nectar thick liquids. No further trials given due to high risk for aspiration. Recommend: 1. Continue NPO overnight and will re-assess swallow function in the pm, due to a LP in the morning.    Barriers to return to prior living situation: Dysphagia communication.  Recommendations for discharge: Upon further work up anticipate IP rehab.  Rationale for recommendations: Continue ST for dysphagia and PO readiness.        Entered by: Tawny Pulliam 07/25/2017 3:51 PM

## 2017-07-25 NOTE — PHARMACY-ADMISSION MEDICATION HISTORY
Admission medication history interview status for the 7/24/2017  admission is complete. See EPIC admission navigator for prior to admission medications     Medication history source reliability:Good    Actions taken by pharmacist (provider contacted, etc):None     Additional medication history information not noted on PTA med list :None    Medication reconciliation/reorder completed by provider prior to medication history? No    Time spent in this activity: 20 min    Prior to Admission medications    Medication Sig Last Dose Taking? Auth Provider   norethindrone (MICRONOR) 0.35 MG per tablet Take 1 tablet by mouth daily 7/24/2017 at Unknown time Yes Unknown, Entered By History   venlafaxine (EFFEXOR-XR) 75 MG 24 hr capsule Take 75 mg by mouth daily 7/24/2017 at Unknown time Yes Unknown, Entered By History   oxybutynin (DITROPAN-XL) 5 MG 24 hr tablet Take 5 mg by mouth daily 7/24/2017 at Unknown time Yes Unknown, Entered By History   baclofen (LIORESAL) 10 MG tablet Take 10-20 mg by mouth 4 times daily as needed   Yes Reported, Patient   vitamin D (ERGOCALCIFEROL) 32720 UNIT capsule Take 50,000 Units by mouth once a week  7/24/2017 Yes Reported, Patient   dimethyl fumarate (TECFIDERA) delayed release capsule Take 240 mg by mouth 2 times daily  7/24/2017 at X1 Yes Reported, Patient   Dalfampridine (AMPYRA PO) Take 10 mg by mouth 2 times daily  7/24/2017 at X1 Yes Reported, Patient   Zolpidem Tartrate (AMBIEN PO) Take 10 mg by mouth nightly as needed Pt is unsure of dose.  As needed  Yes Reported, Patient

## 2017-07-25 NOTE — PROGRESS NOTES
ONE-HOUR VIDEO EEG MONITORING REPORT    EEG #:      DATE OF RECORDIN2017    INDICATION:  For evaluation of seizures.     EEG demonstrated sharper looking waves, primarily on the left side of the brain with a spike-and-wave appearance.  They are not rhythmic and are not related to subclinical seizures but they are relatively frequent suggesting possible epilepsy coming from the left side of the brain.  Given patient's diagnosis of multiple sclerosis, it may likely be related to multiple sclerosis.     IMPRESSION:  No status epilepticus, no subclinical seizures, but abnormal EEG with spikes originating from the left side of the brain.        Francisco Mauro MD, PhD, FAHA    D:  2017 10:38 T:  2017 13:40  Document:  [id number] [place]\DK

## 2017-07-25 NOTE — PROGRESS NOTES
07/25/17 1530   General Information   Onset Date 07/24/17   Start of Care Date 07/25/17   Referring Physician Dr. Mauro   Patient Profile Review/OT: Additional Occupational Profile Info See Profile for full history and prior level of function   Patient/Family Goals Statement Patient did not state.   Swallowing Evaluation Bedside swallow evaluation   Behaviorial Observations Alert   Mode of current nutrition NPO   Respiratory Status Extubated on (date);O2 Supply  (7/24/17. less then a day. )   Type of O2 supply Nasal cannula  (1 liter)   Comments Shanna Shanks is a 42 year old female who was admitted on 7/24/2017 with unresponsiveness and right hemiplegia. CT was negative for a stroke. New onset of seizures, tongue laceration and progress MS.     Clinical Swallow Evaluation   Oral Musculature unable to assess due to poor participation/comprehension   Structural Abnormalities present  (tongue laceration.)   Dentition present and adequate   Mucosal Quality adequate   Swallow Eval   Feeding Assistance frequent cues/help required   Clinical Swallow Eval: Nectar Thick Liquid Texture Trial   Mode of Presentation, Nectar spoon;fed by clinician   Volume of Nectar Presented 1 teaspoon   Oral Phase, Nectar Poor AP movement;Premature pharyngeal entry   Pharyngeal Phase, Nectar impaired;coughing/choking;reduction in laryngeal movement;repeated swallows   Diagnostic Statement Overt Sx of aspiration.    Swallow Compensations   Swallow Compensations Pacing;Reduce amounts   Results Suspect aspiration   Swallow Eval: Clinical Impressions   Skilled Criteria for Therapy Intervention Skilled criteria met.  Treatment indicated.   Functional Assessment Scale (FAS) 2   Treatment Diagnosis Severe dysp   Diet texture recommendations NPO   Therapy Frequency daily   Predicted Duration of Therapy Intervention (days/wks) 1 week.   Anticipated Discharge Disposition inpatient rehabilitation facility   Risks and Benefits of Treatment  have been explained. Yes   Patient, family and/or staff in agreement with Plan of Care Yes   Clinical Impression Comments Patient presents with moderate to severe oral and pharyngeal dysphagia at bedside. Patient was alert and able to follow some commands but not enough to complete a full oral motor examination. Her voice is aphonic but she was able to achieve voicing with imitiation of vowel sounds and single words. She was unable to produce a cough for airway protection so limited trials given with nectar thick liquids by spoon. She demonstrated premature entry, delayed swallow response and reduced laryngeal elevation. This resulted in overt Sx of aspiration with a teaspoon amount of nectar thick liquids. No further trials given. Recommend: 1. Continue NPO overnight and will re-assess swallow function in the pm, due to a LP in the morning.     Total Evaluation Time   Total Evaluation Time (Minutes) 18

## 2017-07-25 NOTE — PROGRESS NOTES
Patient had Heparin at 0850.  Per radiology protocol an 8 hr hold is preferred prior to a Lumbar Puncture.  Reported this to Xray.  A hold on tomorrows dose 7-26-17 is ordered.  Will plan on the Lumbar Puncture in the a.m. 7-26-17.

## 2017-07-25 NOTE — ED NOTES
Pt soiled with dried blood around nose; and moist shirt of either vomit or large amounts of sweat.  Unresponsive; code stroke called and MD to intubate.

## 2017-07-25 NOTE — PROGRESS NOTES
[facility]    Hospitalist Progress Note    Date of Service (when I saw the patient): 07/25/2017    Assessment & Plan   Shanna Shanks is a 42 year old female who was admitted on 7/24/2017 with unresponsiveness and right hemiplegia.    Encephalopathy, suspected secondary to seizures and postictal state  Presented with unresponsive episode associated with right hemiplegia.  Underwent stroke workup in the emergency department including CT head and CTA head and neck which was negative for bleed or large clot.  Due to her level of unresponsiveness initially as well as concern for catastrophic neurologic event, she was intubated in the emergency department and admitted to the ICU.  She has since been successfully extubated.  Her mental status has been improving per her family although she remains nonverbal, inconsistent with commands and is not back to her previous baseline.  Further neurologic workup including EEG has revealed left-sided seizure activity, likely accounting for her presentation.  - Neurology following, has increased levetiracetam 7/25/2017   - MRI brain pending per neurology   - Consult PT/OT    Fevers  Noted to have low-grade fevers overnight with T max of 38 degrees Celsius.  Has not been on antibiotics since admission.  Initial urinalysis and chest x-ray unremarkable. CNS infection is unlikely his mental status is consistently clearing in the absence of antibiotics.  - Recheck chest x-ray for any evidence of evolving pneumonia, at risk for aspiration given her unresponsive episode    Multiple sclerosis  - Neurology following  - Continue prior to admission dimethyl fumarate  - Has baclofen p.r.n. on home medication list,  reports that she needs this sparingly, typically only a few times a week.  Will continue.  - Will hold prior to admission dalfampridine, as seizure is listed as a contraindication     Urinary incontinence  Associated with multiple sclerosis  -  Temporarily hold prior to admission oxybutynin until mental status further clears.  She had tolerated this well at home without mental status issues, unlikely that this is contributing to her presenting encephalopathy significantly  - Discontinue Hurtado catheter    Depression  - Resume prior to admission venlafaxine    DVT Prophylaxis: SCDs  Code Status: Full Code    Disposition: Transfer to neurology floor. Expected discharge unclear, pending further control of seizure activity, return to baseline functioning.    Boris Britton    Interval History   Discussed ICU course with intensivist team.  Also discussed with family at bedside ( and mother).  They report that while she is still not responding to questions, she has been more alert as the day has progressed.  She continues to have repetitive movements of the right side of her mouth, her  reports that he is in this at home, typically associated with times of stress, although it is more noticeable here in the hospital.  She has been intermittently following commands.  She is not back to her neurologic baseline.     -Data reviewed today: I reviewed all new labs and imaging results over the last 24 hours. I personally reviewed no images or EKG's today.    Physical Exam   Temp: 99.7  F (37.6  C) Temp src: Bladder BP: 117/78 Pulse: 98 Heart Rate: 77 Resp: 11 SpO2: 96 % O2 Device: None (Room air) Oxygen Delivery: 2 LPM  Vitals:    07/24/17 1822 07/24/17 2038   Weight: 61.5 kg (135 lb 9.3 oz) 62.3 kg (137 lb 5.6 oz)     Vital Signs with Ranges  Temp:  [98.1  F (36.7  C)-100.4  F (38  C)] 99.7  F (37.6  C)  Pulse:  [91-98] 98  Heart Rate:  [] 77  Resp:  [6-23] 11  BP: (101-152)/(66-95) 117/78  FiO2 (%):  [100 %] 100 %  SpO2:  [92 %-100 %] 96 %  I/O last 3 completed shifts:  In: 44.14 [I.V.:44.14]  Out: 1390 [Urine:1390]    Constitutional: Middle-aged female, NAD  Respiratory: Limited by shallow inspiration, grossly clear  Cardiovascular: RRR, no m/r/g.  No peripheral edema.  GI: Soft, non-tender, non-distended. BS normoactive.   Skin/Integumen: Warm, dry  Neuro: Opens eyes easily to touch and voice.  Nonverbal.  Following some commands although inconsistently and unable to participate adequately for formal neurologic exam.  Moving all extremities.  Pupils equal round reactive.  Repetitive movements of the right side of the face noted.     Medications     NaCl 75 mL/hr at 07/25/17 0845       levETIRAcetam  750 mg Intravenous Q12H     heparin  5,000 Units Subcutaneous Q8H     norethindrone  0.35 mg Oral Daily     dimethyl fumarate  240 mg Oral BID       Data     Recent Labs  Lab 07/25/17  0451 07/24/17  1808   WBC 10.5 11.5*   HGB 11.2* 12.0   MCV 93 93    209   INR  --  1.05    139   POTASSIUM 4.3 4.4   CHLORIDE 105 105   CO2 25 23   BUN 6* 9   CR 0.59 0.63   ANIONGAP 8 11   JULIET 8.1* 8.2*   * 111*       Recent Results (from the past 24 hour(s))   CT Head w/o Contrast    Narrative    CT OF THE HEAD WITHOUT CONTRAST 7/24/2017 6:45 PM     COMPARISON: None.    HISTORY: Code Stroke. Altered mental status.    TECHNIQUE: 5 mm thick axial CT images of the head were acquired  without IV contrast material.    FINDINGS: There is moderate diffuse cerebral volume loss. There are  extensive confluent areas of decreased density in the cerebral white  matter bilaterally that are consistent with the patient's history of  multiple sclerosis.    The ventricles and basal cisterns are within normal limits in  configuration given the degree of cerebral volume loss.  There is no  midline shift. There are no extra-axial fluid collections.    No intracranial hemorrhage, mass or recent infarct.    The visualized paranasal sinuses are well-aerated. There is no  mastoiditis. There are no fractures of the visualized bones.      Impression    IMPRESSION: Diffuse cerebral volume loss and cerebral white matter  changes consistent with the patient's history of multiple  sclerosis.  No evidence for acute intracranial pathology.    Radiation dose for this scan was reduced using automated exposure  control, adjustment of the mA and/or kV according to patient size, or  iterative reconstruction technique.     GEOFFREY GILL MD   CTA Angiogram Head Neck    Narrative    CT ANGIOGRAM OF THE HEAD AND NECK WITHOUT AND WITH CONTRAST  7/24/2017  6:46 PM     COMPARISON: None.    HISTORY: Code Stroke. Multiple sclerosis. Altered mental status.    TECHNIQUE:  Precontrast localizing scans were followed by CT  angiography with an injection of 120 mL Isovue 370 nonionic  intravenous contrast material with scans through the head and neck.   Images were transferred to a separate 3-D workstation where  multiplanar reformations and 3-D images were created.  Estimates of  carotid stenoses are made relative to the distal internal carotid  artery diameters except as noted.      FINDINGS:   Neck CTA: The bilateral common carotid, bilateral cervical internal  carotid and bilateral vertebral arteries are patent and unremarkable.    Head CTA: The bilateral distal internal carotid, basilar, bilateral  anterior cerebral, bilateral middle cerebral and bilateral posterior  cerebral arteries are patent and unremarkable.      Impression    IMPRESSION: Normal neck and head CTA.    Radiation dose for this scan was reduced using automated exposure  control, adjustment of the mA and/or kV according to patient size, or  iterative reconstruction technique.      GEOFFREY GILL MD   CT Head w Contrast    Narrative    CT BRAIN PERFUSION 7/24/2017 6:55 PM    COMPARISON: None.    HISTORY: Code Stroke.    TECHNIQUE: Time sequential axial CT images of the head were acquired  during the administration of intravenous contrast (50 mL Isovue-370).  CTA images of the [place] as well as color perfusion maps of  the brain were created from this time sequential axial source data.    FINDINGS: There are no focal or regional  perfusion defects in the  brain.      Impression    IMPRESSION: Normal CT perfusion of the brain.    Radiation dose for this scan was reduced using automated exposure  control, adjustment of the mA and/or kV according to patient size, or  iterative reconstruction technique.      GEOFFREY GILL MD   Chest  XR, 1 view PORTABLE    Narrative    CHEST PORTABLE ONE VIEW  7/24/2017 7:29 PM     COMPARISON: None.    HISTORY: Post intubation.    FINDINGS: There is an endotracheal tube in place with its tip at the  level of the clavicular heads. The lungs are clear. There is no  pleural effusion or pneumothorax. Heart size is normal with no  evidence for congestive failure.     GEOFFREY GILL MD

## 2017-07-25 NOTE — PLAN OF CARE
Problem: Goal Outcome Summary  Goal: Goal Outcome Summary  Outcome: Improving  Continued improvements in neuro status, follows commands intermittently, making slight attempts to speak. Family at bedside, has been updated multiple times. LP planned for tomorrow AM, will hold heparin. Hemodynamically stable.

## 2017-07-25 NOTE — PROGRESS NOTES
Discussed case and transferred general medical cares to hospitality service this AM.  With new suspicion of HSV infection, pt will not transfer out of unit.  We will continue to follow peripherally at this time and defer to hospitalists and neurology for cares unless there is a worsening in condition.    Chen Neil

## 2017-07-26 ENCOUNTER — APPOINTMENT (OUTPATIENT)
Dept: GENERAL RADIOLOGY | Facility: CLINIC | Age: 42
DRG: 097 | End: 2017-07-26
Attending: PSYCHIATRY & NEUROLOGY
Payer: COMMERCIAL

## 2017-07-26 ENCOUNTER — APPOINTMENT (OUTPATIENT)
Dept: SPEECH THERAPY | Facility: CLINIC | Age: 42
DRG: 097 | End: 2017-07-26
Attending: PSYCHIATRY & NEUROLOGY
Payer: COMMERCIAL

## 2017-07-26 LAB
ALBUMIN SERPL-MCNC: 2.8 G/DL (ref 3.4–5)
ALP SERPL-CCNC: 35 U/L (ref 40–150)
ALT SERPL W P-5'-P-CCNC: 24 U/L (ref 0–50)
ANION GAP SERPL CALCULATED.3IONS-SCNC: 10 MMOL/L (ref 3–14)
APPEARANCE CSF: CLEAR
AST SERPL W P-5'-P-CCNC: 36 U/L (ref 0–45)
BILIRUB DIRECT SERPL-MCNC: 0.1 MG/DL (ref 0–0.2)
BILIRUB SERPL-MCNC: 0.4 MG/DL (ref 0.2–1.3)
BUN SERPL-MCNC: 6 MG/DL (ref 7–30)
CALCIUM SERPL-MCNC: 7.6 MG/DL (ref 8.5–10.1)
CHLORIDE SERPL-SCNC: 108 MMOL/L (ref 94–109)
CO2 SERPL-SCNC: 23 MMOL/L (ref 20–32)
COLOR CSF: COLORLESS
CREAT SERPL-MCNC: 0.56 MG/DL (ref 0.52–1.04)
GFR SERPL CREATININE-BSD FRML MDRD: ABNORMAL ML/MIN/1.7M2
GLUCOSE CSF-MCNC: 58 MG/DL (ref 40–70)
GLUCOSE SERPL-MCNC: 97 MG/DL (ref 70–99)
GRAM STN SPEC: NORMAL
LYMPH ABN NFR CSF MANUAL: 56 %
MAGNESIUM SERPL-MCNC: 1.7 MG/DL (ref 1.6–2.3)
MICRO REPORT STATUS: NORMAL
MONOS+MACROS NFR CSF MANUAL: 16 %
NEUTROPHILS NFR CSF MANUAL: 28 %
POTASSIUM SERPL-SCNC: 3.5 MMOL/L (ref 3.4–5.3)
PROT CSF-MCNC: 29 MG/DL (ref 15–60)
PROT SERPL-MCNC: 5.7 G/DL (ref 6.8–8.8)
RBC # CSF MANUAL: 0 /UL (ref 0–2)
SODIUM SERPL-SCNC: 141 MMOL/L (ref 133–144)
SPECIMEN SOURCE: NORMAL
TUBE # CSF: 4 #
WBC # CSF MANUAL: 8 /UL (ref 0–5)

## 2017-07-26 PROCEDURE — 25000128 H RX IP 250 OP 636: Performed by: INTERNAL MEDICINE

## 2017-07-26 PROCEDURE — 40000225 ZZH STATISTIC SLP WARD VISIT: Performed by: SPEECH-LANGUAGE PATHOLOGIST

## 2017-07-26 PROCEDURE — 87798 DETECT AGENT NOS DNA AMP: CPT | Performed by: PSYCHIATRY & NEUROLOGY

## 2017-07-26 PROCEDURE — 62270 DX LMBR SPI PNXR: CPT

## 2017-07-26 PROCEDURE — 25000132 ZZH RX MED GY IP 250 OP 250 PS 637: Performed by: INTERNAL MEDICINE

## 2017-07-26 PROCEDURE — 87529 HSV DNA AMP PROBE: CPT | Performed by: PSYCHIATRY & NEUROLOGY

## 2017-07-26 PROCEDURE — 12000000 ZZH R&B MED SURG/OB

## 2017-07-26 PROCEDURE — 92526 ORAL FUNCTION THERAPY: CPT | Mod: GN | Performed by: SPEECH-LANGUAGE PATHOLOGIST

## 2017-07-26 PROCEDURE — 25000128 H RX IP 250 OP 636: Performed by: PSYCHIATRY & NEUROLOGY

## 2017-07-26 PROCEDURE — 87070 CULTURE OTHR SPECIMN AEROBIC: CPT | Performed by: PSYCHIATRY & NEUROLOGY

## 2017-07-26 PROCEDURE — 25000128 H RX IP 250 OP 636: Performed by: NURSE PRACTITIONER

## 2017-07-26 PROCEDURE — 93005 ELECTROCARDIOGRAM TRACING: CPT

## 2017-07-26 PROCEDURE — 89051 BODY FLUID CELL COUNT: CPT | Performed by: PSYCHIATRY & NEUROLOGY

## 2017-07-26 PROCEDURE — 84157 ASSAY OF PROTEIN OTHER: CPT | Performed by: PSYCHIATRY & NEUROLOGY

## 2017-07-26 PROCEDURE — 99232 SBSQ HOSP IP/OBS MODERATE 35: CPT | Performed by: INTERNAL MEDICINE

## 2017-07-26 PROCEDURE — 80076 HEPATIC FUNCTION PANEL: CPT | Performed by: INTERNAL MEDICINE

## 2017-07-26 PROCEDURE — 93010 ELECTROCARDIOGRAM REPORT: CPT | Performed by: INTERNAL MEDICINE

## 2017-07-26 PROCEDURE — 80048 BASIC METABOLIC PNL TOTAL CA: CPT | Performed by: INTERNAL MEDICINE

## 2017-07-26 PROCEDURE — 36415 COLL VENOUS BLD VENIPUNCTURE: CPT | Performed by: INTERNAL MEDICINE

## 2017-07-26 PROCEDURE — 86592 SYPHILIS TEST NON-TREP QUAL: CPT | Performed by: PSYCHIATRY & NEUROLOGY

## 2017-07-26 PROCEDURE — 82945 GLUCOSE OTHER FLUID: CPT | Performed by: PSYCHIATRY & NEUROLOGY

## 2017-07-26 PROCEDURE — 87205 SMEAR GRAM STAIN: CPT | Performed by: PSYCHIATRY & NEUROLOGY

## 2017-07-26 RX ORDER — LIDOCAINE HYDROCHLORIDE 10 MG/ML
5 INJECTION, SOLUTION EPIDURAL; INFILTRATION; INTRACAUDAL; PERINEURAL ONCE
Status: COMPLETED | OUTPATIENT
Start: 2017-07-26 | End: 2017-07-26

## 2017-07-26 RX ADMIN — SODIUM CHLORIDE: 9 INJECTION, SOLUTION INTRAVENOUS at 05:13

## 2017-07-26 RX ADMIN — ACYCLOVIR SODIUM 600 MG: 50 INJECTION, SOLUTION INTRAVENOUS at 20:11

## 2017-07-26 RX ADMIN — LIDOCAINE HYDROCHLORIDE 5 ML: 10 INJECTION, SOLUTION EPIDURAL; INFILTRATION; INTRACAUDAL; PERINEURAL at 10:23

## 2017-07-26 RX ADMIN — SODIUM CHLORIDE 500 ML: 9 INJECTION, SOLUTION INTRAVENOUS at 00:42

## 2017-07-26 RX ADMIN — ACYCLOVIR SODIUM 600 MG: 50 INJECTION, SOLUTION INTRAVENOUS at 02:40

## 2017-07-26 RX ADMIN — LEVETIRACETAM 750 MG: 100 INJECTION, SOLUTION INTRAVENOUS at 08:35

## 2017-07-26 RX ADMIN — SODIUM CHLORIDE: 9 INJECTION, SOLUTION INTRAVENOUS at 20:12

## 2017-07-26 RX ADMIN — ACETAMINOPHEN AND CODEINE PHOSPHATE 0.35 MG: 120; 12 SOLUTION ORAL at 16:51

## 2017-07-26 RX ADMIN — VENLAFAXINE HYDROCHLORIDE 75 MG: 75 CAPSULE, EXTENDED RELEASE ORAL at 16:51

## 2017-07-26 RX ADMIN — ACYCLOVIR SODIUM 600 MG: 50 INJECTION, SOLUTION INTRAVENOUS at 11:16

## 2017-07-26 RX ADMIN — LEVETIRACETAM 750 MG: 100 INJECTION, SOLUTION INTRAVENOUS at 22:32

## 2017-07-26 NOTE — PROGRESS NOTES
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 PT/OT/Speech  --continue  evaluations   #.Nutrition  --Per speech therapy evaluation   #. ICU prophylaxis:   --Stress ulcer prophylaxis Protonix  --DVT prophylaxis: mechanical, patient is able to ambulate    CODE STATUS: Full Code    Family update by me today: yes    Interval History   No further seizures. Speaks slightly more, three-four words at a time.      Physical Exam     Temp: 96.8  F (36  C) Temp src: Bladder BP: 122/81   Heart Rate: 83 Resp: 13 SpO2: 96 % O2 Device: None (Room air) Oxygen Delivery: 1 LPM      General Appearance:   No acute distress  Neuro:       Mental Status Exam:   Awake, alert. Speaks more, still with aphasia. Mental status is normal       Cranial Nerves:  Pupils 3 mm, reactive. EOMI. Face sensation is normal. Face is symmetric.Tongue and uvula are midline. Tongue with a bite salma Other CN are normal           Motor:  Right sided weakness 4/5 Tone and bulk are normal           Reflexes:  Normal DTR.Toes downgoing.        Sensory:   Untestable                  Coordination:    Untestable       Gait: Untestable  Cardiovascular: Regular rate and rhythm, no m/r/g  Lungs: Resp: 13  Both hemithoraces are symmetrical, auscultation of lungs revealed no bronchovesicular sounds, expirium prolongation, wheezing, rhonci and crackles  Abdomen: Soft, not tender, not  distended  Skin/Extremities: No clubbing, cyanosis, no edema     Medications   Infusions medications:    NaCl 125 mL/hr at 07/26/17 0513     - MEDICATION INSTRUCTIONS -       Schedule medications:    levETIRAcetam  750 mg Intravenous Q12H     venlafaxine  75 mg Oral Daily     acyclovir (ZOVIRAX) IV  10 mg/kg Intravenous Q8H     heparin  5,000 Units Subcutaneous Q8H     norethindrone  0.35 mg Oral Daily     dimethyl fumarate  240 mg Oral BID     PRN medications:baclofen, - MEDICATION INSTRUCTIONS -, HOLD MEDICATION, naloxone  Data       Labotary Data:   All data was reviewed by me personally  CBC RESULTS:  Recent Labs   Lab Test  07/25/17 0451 07/24/17   1808   WBC  10.5  11.5*   RBC  3.61*  3.90   HGB  11.2*  12.0   HCT  33.5*  36.3   PLT  182  209     Basic Metabolic Panel:  Recent Labs   Lab Test  07/26/17   0515  07/25/17   2255  07/25/17 0451 07/24/17   1808 11/18/13 02/20/12   0938   NA  141   --   138  139  139  141   POTASSIUM  3.5  3.5  4.3  4.4  4.0  4.3   CHLORIDE  108   --   105  105  108  107   CO2  23   --   25  23   --   23   BUN  6*   --   6*  9  5  17   CR  0.56   --   0.59  0.63  0.91  0.79   GLC  97   --   111*  111*  86  90   JULIET  7.6*   --   8.1*  8.2*  8.9  8.7     ABG:No lab results found.  Liver panel:  Recent Labs   Lab Test  07/26/17   0515 11/18/13  02/20/12   0938   PROTTOTAL  5.7*  7.0  7.2   ALBUMIN  2.8*  4.2  4.0   BILITOTAL  0.4  0.6  0.8   ALKPHOS  35*  48  52   AST  36  25  22   ALT  24  17  14     Procalcitonin: No lab results found.  Coagulation  Recent Labs   Lab Test  07/24/17   1808   INR  1.05   PTT  29      Lipid Profile:  Recent Labs   Lab Test  02/20/12   0938   CHOL  242*   HDL  72   LDL  140*   TRIG  147   CHOLHDLRATIO  3.4     Thyroid Panel:No lab results found.   Vitamin B12: No lab results found.   Vitamin D:No lab results found.  A1C: No lab results found.  Troponin I: No lab results found.  CPK: No lab results found.  Ammonia: No lab results found.  Serum  Osmolality:No lab results found.  Most Recent 6 Bacteria Isolates From Any Culture (See EPIC Reports for Culture Details):  Recent Labs   Lab Test  07/24/17   1950  07/24/17   1932  12/17/15   0949  03/16/15   0909  10/21/11   1049  09/18/09   1125   CULT  No growth after 2 days  No growth after 2 days  >100,000 colonies/mL Escherichia coli*  >100,000 colonies/mL mixed urogenital jen  >100,000 colonies/mL Coagulase negative Staphylococcus  >100,000 colonies/mL Staphylococcus aureus     UA Results:  Recent Labs   Lab Test  07/24/17   1912   COLOR  Yellow   APPEARANCE  Clear   URINEGLC  Negative   URINEBILI  Negative   URINEKETONE  40*   SG  1.025   UBLD  Negative   URINEPH  6.0   PROTEIN  30*   UROBILINOGEN  0.2   NITRITE  Negative   LEUKEST  Negative   RBCU  2-5*   WBCU  O - 2          Cardiac US:   --     Neurophysiology:   --       Imaging:   All imaging studies were reviewed personally  CT head:   Diffuse cerebral volume loss and cerebral white matter  changes consistent with the patient's history of multiple sclerosis.  No evidence for acute intracranial pathology.  CT perfusion  Normal CT perfusion of the brain.  CT angiography neck/head  Normal neck and head CTA.  MRI brain:   --

## 2017-07-26 NOTE — PROGRESS NOTES
ICU Multi-Disciplinary Note  Patient condition reviewed and discussed while on multidisciplinary rounds today.   The Critical Care service will continue to follow peripherally while patient is within the ICU. We are readily available should issues arise.  Please feel free to contact us for anything with which we may be of assistance.    Chen Neil

## 2017-07-26 NOTE — PLAN OF CARE
Problem: Goal Outcome Summary  Goal: Goal Outcome Summary  OT: Per discussion with RN, pt on bedrest for remainder of day post lumbar puncture. Will re-schedule OT eval for 7/27.

## 2017-07-26 NOTE — PROGRESS NOTES
[facility]    Hospitalist Progress Note    Date of Service (when I saw the patient): 07/26/2017    Assessment & Plan   Shanna Shanks is a 42 year old female who was admitted on 7/24/2017 with unresponsiveness and right hemiplegia.    Encephalopathy, suspected secondary to HSV encephalitis  Seizures, suspected secondary to HSV encephalitis  Presented with unresponsive episode associated with right hemiplegia.  Underwent stroke workup in the emergency department including CT head and CTA head and neck which was negative for bleed or large clot.  Due to her level of unresponsiveness initially as well as concern for catastrophic neurologic event, she was intubated in the emergency department and admitted to the ICU.  She has since been successfully extubated, with some improvement in mental status but not to baseline, and concern for left-sided seizure activity on EEG.   - MRI 7/25/17 concerning for HSV encephalitis with temporal lobe involvement, associated seizures. Increased risk w/ chronic immunosuppressant for multiple sclerosis  - Neurology consulted, appreciate assistance  - Continue acyclovir IV  - Continues on levetiracetam   - LP 7/26/2017 per neurology, results pending  - PT/OT/Speech    Fevers, likely secondary to HSV encephalitis   Noted to have low-grade fevers overnight 7/25/17with Tmax of 38 degrees Celsius.  Has not been on antibiotics since admission.  Initial urinalysis and chest x-ray unremarkable. MRI results concerning for HSV encephalitis  - LP results pending  - Fevers resolved on anti-viral therapy  - CXR ordered 7/25/17 due to fevers with improving mild residual infiltrate (not commented on on original XR), denies cough or shortness of breath, doubt pneumonia at this time    Multiple sclerosis  - Neurology following  - Continue prior to admission dimethyl fumarate, baclofen p.r.n.  - Continue holding prior to admission dalfampridine, as seizure is listed as a  contraindication     Urinary incontinence  Associated with multiple sclerosis  - Temporarily hold prior to admission oxybutynin until mental status further clears.  She had tolerated this well at home without mental status issues, unlikely that this is contributing to her presenting encephalopathy significantly  - Discontinue Hurtado catheter    Depression  Continue prior to admission venlafaxine    DVT Prophylaxis: Heparin SQ when able to resume post-LP  Code Status: Full Code    Disposition: Transfer to neurology floor when bed available. Expected discharge unclear, pending LP results, ongoing improvement in mental status, antiviral plan    Boris Britton    Interval History   Mental status continues to improve from previous, but still not to baseline. Able to now speak. Patient denies any vision changes, focal numbness/tingling/weakness, cough, shortness of breath.     -Data reviewed today: I reviewed all new labs and imaging results over the last 24 hours. I personally reviewed no images or EKG's today.    Physical Exam   Temp: 99  F (37.2  C) Temp src: Bladder BP: 119/71   Heart Rate: 67 Resp: 10 SpO2: 98 % O2 Device: None (Room air) Oxygen Delivery: 1 LPM  Vitals:    07/24/17 1822 07/24/17 2038 07/26/17 0200   Weight: 61.5 kg (135 lb 9.3 oz) 62.3 kg (137 lb 5.6 oz) 61.6 kg (135 lb 12.9 oz)     Vital Signs with Ranges  Temp:  [96.8  F (36  C)-99  F (37.2  C)] 99  F (37.2  C)  Heart Rate:  [67-95] 67  Resp:  [9-35] 10  BP: (105-122)/(66-83) 119/71  SpO2:  [96 %-99 %] 98 %  I/O last 3 completed shifts:  In: 3043.75 [I.V.:2543.75; IV Piggyback:500]  Out: 865 [Urine:865]    Constitutional: Middle-aged female, NAD  Respiratory: Lungs clear to auscultation bilaterally  Cardiovascular: RRR, no m/r/g. No peripheral edema.  GI: Soft, non-tender, non-distended. BS normoactive.   Skin/Integumen: Warm, dry  Neuro: Alert. Not able to answer orientation questions correctly. Following all commands. R>L weakness upper and lower  extremities.    Medications     NaCl 125 mL/hr at 07/26/17 1116     - MEDICATION INSTRUCTIONS -         levETIRAcetam  750 mg Intravenous Q12H     venlafaxine  75 mg Oral Daily     acyclovir (ZOVIRAX) IV  10 mg/kg Intravenous Q8H     heparin  5,000 Units Subcutaneous Q8H     norethindrone  0.35 mg Oral Daily     dimethyl fumarate  240 mg Oral BID       Data     Recent Labs  Lab 07/26/17  0515 07/25/17  2255 07/25/17  0451 07/24/17  1808   WBC  --   --  10.5 11.5*   HGB  --   --  11.2* 12.0   MCV  --   --  93 93   PLT  --   --  182 209   INR  --   --   --  1.05     --  138 139   POTASSIUM 3.5 3.5 4.3 4.4   CHLORIDE 108  --  105 105   CO2 23  --  25 23   BUN 6*  --  6* 9   CR 0.56  --  0.59 0.63   ANIONGAP 10  --  8 11   JULIET 7.6*  --  8.1* 8.2*   GLC 97  --  111* 111*   ALBUMIN 2.8*  --   --   --    PROTTOTAL 5.7*  --   --   --    BILITOTAL 0.4  --   --   --    ALKPHOS 35*  --   --   --    ALT 24  --   --   --    AST 36  --   --   --        No results found for this or any previous visit (from the past 24 hour(s)).

## 2017-07-26 NOTE — PLAN OF CARE
Problem: Goal Outcome Summary  Goal: Goal Outcome Summary  Discharge Planner SLP   Patient plan for discharge: Did not state  Current status:Improved communication and ability to follow directions. Thin water via teaspoon and cup trialed with adequate oral phase and no overt s/sx of aspiration. Throat clearing noted with thin water via straw. Puree textures trialed with prolonged oral management and delayed swallow. No overt s/sx of aspiration noted. Advanced textures not targeted due to fatigue. Recommended: dysphagia diet level 1 and thin liquids, no straws, pt upright with intake, encourage self feeding, avoid talking/distractions during intake, meds crushed in puree.   Barriers to return to prior living situation: Dysphagia; communication  Recommendations for discharge: Pending further workup/progress; likely IP rehab  Rationale for recommendations: Pt would benefit from continued ST to target dysphagia goals and speech/language eval as appropriate       Entered by: Steffanie Calderon 07/26/2017 1:11 PM

## 2017-07-26 NOTE — PROGRESS NOTES
RADIOLOGY PROCEDURE NOTE  Patient name: Shanna Shanks  MRN: [id number]  : 1975    Pre-procedure diagnosis: Encephalopathy  Post-procedure diagnosis: Same    Procedure Date/Time: 2017  10:37 AM  Procedure: Lumbar puncture  Estimated blood loss: None  Specimen(s) collected with description: 8 cc clear CSF  The patient tolerated the procedure well with no immediate complications.  Significant findings:none    See imaging dictation for procedural details.    Provider name: Ty Woodard  Assistant(s):None

## 2017-07-26 NOTE — PLAN OF CARE
Problem: Goal Outcome Summary  Goal: Goal Outcome Summary  PT: Pt gone to procedure, lumbar puncture. Will be on bed rest once she returns.

## 2017-07-26 NOTE — PROVIDER NOTIFICATION
Notified Dr. Plascencia of low UOP and peaked T waves. Order for K level and 500 ml bolus. Later notified Dr. Plascencia of prolonged QT of .46, EKG ordered and interpreted. No new orders at this time.

## 2017-07-26 NOTE — PLAN OF CARE
Problem: Goal Outcome Summary  Goal: Goal Outcome Summary  Outcome: Improving  Pt had lumbar punctured completed this morning, tolerated well. Site cdi w/ band aid covering site. Bedrest. No complaints of headache. Family present at bedside. Plan to transfer to neurology unit this afternoon.

## 2017-07-26 NOTE — PLAN OF CARE
Problem: Goal Outcome Summary  Goal: Goal Outcome Summary  Alert to self, disoriented x3. AWAN, right side weaker than left. Speaking more clearly a few words at a time, sometimes the responses are not what she intends and gets frustrated. Refused oral cares frequently due to sore on tongue. VSS. Speech to see today, has been NPO. Plan for LP, heparin has been held. Right IV infiltrated while infusing acyclovir, IV stopped and removed. Site was pink, warm and edematous. Called pharmacy for information, solution is irritant, site marked, elevated and cold compress applied, no pain noted. Site is improving.

## 2017-07-27 ENCOUNTER — APPOINTMENT (OUTPATIENT)
Dept: SPEECH THERAPY | Facility: CLINIC | Age: 42
DRG: 097 | End: 2017-07-27
Payer: COMMERCIAL

## 2017-07-27 ENCOUNTER — APPOINTMENT (OUTPATIENT)
Dept: PHYSICAL THERAPY | Facility: CLINIC | Age: 42
DRG: 097 | End: 2017-07-27
Attending: INTERNAL MEDICINE
Payer: COMMERCIAL

## 2017-07-27 ENCOUNTER — APPOINTMENT (OUTPATIENT)
Dept: OCCUPATIONAL THERAPY | Facility: CLINIC | Age: 42
DRG: 097 | End: 2017-07-27
Attending: INTERNAL MEDICINE
Payer: COMMERCIAL

## 2017-07-27 LAB
ANION GAP SERPL CALCULATED.3IONS-SCNC: 11 MMOL/L (ref 3–14)
BUN SERPL-MCNC: 4 MG/DL (ref 7–30)
CALCIUM SERPL-MCNC: 8 MG/DL (ref 8.5–10.1)
CHLORIDE SERPL-SCNC: 106 MMOL/L (ref 94–109)
CO2 SERPL-SCNC: 22 MMOL/L (ref 20–32)
CREAT SERPL-MCNC: 0.52 MG/DL (ref 0.52–1.04)
GFR SERPL CREATININE-BSD FRML MDRD: ABNORMAL ML/MIN/1.7M2
GLUCOSE SERPL-MCNC: 93 MG/DL (ref 70–99)
HSV1 DNA CSF QL NAA+PROBE: NORMAL
HSV2 DNA CSF QL NAA+PROBE: NORMAL
MICROBIOLOGIST REVIEW: NORMAL
PLATELET # BLD AUTO: 213 10E9/L (ref 150–450)
POTASSIUM SERPL-SCNC: 3.4 MMOL/L (ref 3.4–5.3)
SODIUM SERPL-SCNC: 139 MMOL/L (ref 133–144)

## 2017-07-27 PROCEDURE — 40000193 ZZH STATISTIC PT WARD VISIT: Performed by: PHYSICAL THERAPIST

## 2017-07-27 PROCEDURE — 97162 PT EVAL MOD COMPLEX 30 MIN: CPT | Mod: GP | Performed by: PHYSICAL THERAPIST

## 2017-07-27 PROCEDURE — 40000133 ZZH STATISTIC OT WARD VISIT

## 2017-07-27 PROCEDURE — 85049 AUTOMATED PLATELET COUNT: CPT | Performed by: INTERNAL MEDICINE

## 2017-07-27 PROCEDURE — 97530 THERAPEUTIC ACTIVITIES: CPT | Mod: GO

## 2017-07-27 PROCEDURE — 25000128 H RX IP 250 OP 636: Performed by: INTERNAL MEDICINE

## 2017-07-27 PROCEDURE — 92526 ORAL FUNCTION THERAPY: CPT | Mod: GN | Performed by: SPEECH-LANGUAGE PATHOLOGIST

## 2017-07-27 PROCEDURE — 12000000 ZZH R&B MED SURG/OB

## 2017-07-27 PROCEDURE — 25000132 ZZH RX MED GY IP 250 OP 250 PS 637: Performed by: INTERNAL MEDICINE

## 2017-07-27 PROCEDURE — 97530 THERAPEUTIC ACTIVITIES: CPT | Mod: GP | Performed by: PHYSICAL THERAPIST

## 2017-07-27 PROCEDURE — 25000128 H RX IP 250 OP 636: Performed by: PSYCHIATRY & NEUROLOGY

## 2017-07-27 PROCEDURE — 97167 OT EVAL HIGH COMPLEX 60 MIN: CPT | Mod: GO

## 2017-07-27 PROCEDURE — 97116 GAIT TRAINING THERAPY: CPT | Mod: GP | Performed by: PHYSICAL THERAPIST

## 2017-07-27 PROCEDURE — 36415 COLL VENOUS BLD VENIPUNCTURE: CPT | Performed by: INTERNAL MEDICINE

## 2017-07-27 PROCEDURE — 80048 BASIC METABOLIC PNL TOTAL CA: CPT | Performed by: INTERNAL MEDICINE

## 2017-07-27 PROCEDURE — 40000225 ZZH STATISTIC SLP WARD VISIT: Performed by: SPEECH-LANGUAGE PATHOLOGIST

## 2017-07-27 PROCEDURE — 97535 SELF CARE MNGMENT TRAINING: CPT | Mod: GO

## 2017-07-27 PROCEDURE — 99232 SBSQ HOSP IP/OBS MODERATE 35: CPT | Performed by: INTERNAL MEDICINE

## 2017-07-27 RX ADMIN — DIMETHYL FUMARATE 240 MG: 120 CAPSULE ORAL at 20:19

## 2017-07-27 RX ADMIN — HEPARIN SODIUM 5000 UNITS: 5000 INJECTION, SOLUTION INTRAVENOUS; SUBCUTANEOUS at 16:19

## 2017-07-27 RX ADMIN — ACYCLOVIR SODIUM 600 MG: 50 INJECTION, SOLUTION INTRAVENOUS at 04:20

## 2017-07-27 RX ADMIN — ACETAMINOPHEN AND CODEINE PHOSPHATE 0.35 MG: 120; 12 SOLUTION ORAL at 08:28

## 2017-07-27 RX ADMIN — ACYCLOVIR SODIUM 600 MG: 50 INJECTION, SOLUTION INTRAVENOUS at 20:20

## 2017-07-27 RX ADMIN — VENLAFAXINE HYDROCHLORIDE 75 MG: 75 CAPSULE, EXTENDED RELEASE ORAL at 08:28

## 2017-07-27 RX ADMIN — LEVETIRACETAM 750 MG: 100 INJECTION, SOLUTION INTRAVENOUS at 23:12

## 2017-07-27 RX ADMIN — HEPARIN SODIUM 5000 UNITS: 5000 INJECTION, SOLUTION INTRAVENOUS; SUBCUTANEOUS at 23:12

## 2017-07-27 RX ADMIN — DIMETHYL FUMARATE 240 MG: 120 CAPSULE ORAL at 08:29

## 2017-07-27 RX ADMIN — HEPARIN SODIUM 5000 UNITS: 5000 INJECTION, SOLUTION INTRAVENOUS; SUBCUTANEOUS at 08:29

## 2017-07-27 RX ADMIN — HEPARIN SODIUM 5000 UNITS: 5000 INJECTION, SOLUTION INTRAVENOUS; SUBCUTANEOUS at 01:07

## 2017-07-27 RX ADMIN — LEVETIRACETAM 750 MG: 100 INJECTION, SOLUTION INTRAVENOUS at 08:29

## 2017-07-27 RX ADMIN — ACYCLOVIR SODIUM 600 MG: 50 INJECTION, SOLUTION INTRAVENOUS at 13:45

## 2017-07-27 ASSESSMENT — ACTIVITIES OF DAILY LIVING (ADL): PREVIOUS_RESPONSIBILITIES: MEAL PREP;MEDICATION MANAGEMENT

## 2017-07-27 NOTE — PROGRESS NOTES
Met w/ pt's sister Janee at her request before she leaves for vacation.  She is an RN at [employer] and very involved in her sister's care.  Janee states that pt is pretty much home bound during the day while  Jama is working.  She doesn't drive.  She has ataxia and doesn't use a walker or cane but is able to ambulate independently using walls and furniture for more balance.  She has some mild cognitive deficits at baseline but is oriented and able to manage ADLs.  She doesn't do higher level cognitive tasks such as finances. Pt has been resistant to home care/DME in the past.  Jama was looking into a day program for her a couple days a week but had not yet confirmed that.    Pt is not at her baseline and therapy evals are pending.  Discussed possibility of ARU and whether pt/husb would be amenable.  Janee thinks it would be a great idea and that Jama would be amenable to whatever is recommended.  She believes pt would do what Jama thinks is best.  Await therapy evals.    Addendum:  Therapy is recommending ARU and state pt/husb on board.  Made referral to Nigel Cody who thinks pt looks appropriate.  He will meet with them tomorrow. Updated SW.

## 2017-07-27 NOTE — PROGRESS NOTES
07/27/17 0915   Quick Adds   Type of Visit Initial Occupational Therapy Evaluation   Living Environment   Lives With spouse;child(hugo), dependent  ( and two daughters)   Living Arrangements house   Home Accessibility stairs to enter home;stairs within home;bed and bath are not on the first floor;tub/shower is not walk in   Number of Stairs to Enter Home 8   Number of Stairs Within Home 10   Transportation Available car;family or friend will provide  (Pt no longer drives. Family provides. )   Self-Care   Dominant Hand right   Usual Activity Tolerance good   Current Activity Tolerance fair   Equipment Currently Used at Home none   Functional Level Prior   Ambulation 0-->independent   Transferring 0-->independent   Toileting 0-->independent   Bathing 0-->independent   Dressing 0-->independent   Eating 0-->independent   Communication 0-->understands/communicates without difficulty   Swallowing 0-->swallows foods/liquids without difficulty   Cognition 0 - no cognition issues reported   Fall history within last six months no   General Information   Onset of Illness/Injury or Date of Surgery - Date 07/24/17   Referring Physician Dr Britton    Patient/Family Goals Statement Pt plans to discharge home.    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 PMH of MS   Precautions/Limitations fall precautions;seizure precautions   Cognitive Status Examination   Orientation person;time   Level of Consciousness alert;confused   Able to Follow Commands WNL/WFL   Personal Safety (Cognitive) at risk behaviors demonstrated;decreased insight to deficits   Memory impaired   Visual Perception   Visual Perception No deficits were identified   Visual Perception Comments Denies blurred or doubled vision.    Pain Assessment   Patient Currently in Pain No   Range of Motion (ROM)   ROM Quick Adds No deficits were identified   ROM Comment B  UE WNL   Strength   Manual Muscle Testing Quick Adds Other   Strength Comments Baseline R UE weakness; however, MMT of 4/5. L UE 5/5    Hand Strength   Hand Strength Comments R grasp slightly weaker than L   Coordination   Upper Extremity Coordination Right UE impaired  (moderate)   Mobility   Bed Mobility Bed mobility skill: Rolling/Turning;Bed mobility skill: Scooting/Bridging;Bed mobility skill: Sit to supine;Bed mobility skill: Supine to sit;Bed mobility analysis   Bed Mobility Skill: Rolling/Turning   Level of [place] - Bed Mobility Skill Rolling Turning moderate assist (50% patients effort)   Bed Mobility Skill: Scooting/Bridging   Level of [place]: Scooting/Bridging moderate assist (50% patients effort)   Bed Mobility Skill: Sit to Supine   Level of [place]: Sit/Supine moderate assist (50% patients effort)   Bed Mobility Skill: Supine to Sit   Level of [place]: Supine/Sit moderate assist (50% patients effort)   Bed Mobility Analysis   Bed Mobility Limitations cognitive deficits;decreased ability to use arms for pushing/pulling   Impairments Contributing to Impaired Bed Mobility impaired balance;decreased strength   Transfer Skills   Transfer Transfer Safety Analysis Bed/Chair;Transfer Skill: Stand to Sit;Transfer Safety Analysis Sit/Stand   Transfer Skill: Sit to Stand   Level of [place]: Sit/Stand moderate assist (50% patients effort)   Physical Assist/Nonphysical Assist: Sit/Stand 2 persons   Assistive Device for Transfer: Sit/Stand standard walker   Transfer Safety Analysis Sit/Stand   Transfer Safety Concerns Noted: Sit/Stand decreased balance during turns;losing balance backward   Impaired Transfers: Sit/Stand impaired balance;decreased strength   Upper Body Dressing   Level of [place]: Dress Upper Body moderate assist (50% patients effort)   Lower Body Dressing   Level of [place]: Dress Lower Body moderate assist (50% patients effort)   Toileting   Level of  "[place]: Toilet moderate assist (50% patients effort)   Grooming   Level of [place]: Grooming minimum assist (75% patients effort)   Instrumental Activities of Daily Living (IADL)   Previous Responsibilities meal prep;medication management   Activities of Daily Living Analysis   Impairments Contributing to Impaired Activities of Daily Living balance impaired;cognition impaired;coordination impaired;strength decreased   General Therapy Interventions   Planned Therapy Interventions ADL retraining;cognition;neuromuscular re-education;strengthening;transfer training   Clinical Impression   Criteria for Skilled Therapeutic Interventions Met yes, treatment indicated   OT Diagnosis Decreased I and safety with ADLs   Influenced by the following impairments R UE weakness and incoordination; Impaired cognition and safety; Decreased balance   Assessment of Occupational Performance 5 or more Performance Deficits   Identified Performance Deficits Dressing; TOileting; Bathing; Med mgmt; Transportation   Clinical Decision Making (Complexity) High complexity   Therapy Frequency 2 times/day   Predicted Duration of Therapy Intervention (days/wks) 3 days   Anticipated Discharge Disposition Acute Rehabilitation Facility   Risks and Benefits of Treatment have been explained. Yes   Patient, Family & other staff in agreement with plan of care Yes   [facility] [facility] \"6 Clicks\"   2016, Trustees of [facility], under license to [employer].  All rights reserved.   6 Clicks Short Forms Daily Activity Inpatient Short Form   [facility] AM-PAC  \"6 Clicks\" Daily Activity Inpatient Short Form   1. Putting on and taking off regular lower body clothing? 2 - A Lot   2. Bathing (including washing, rinsing, drying)? 2 - A Lot   3. Toileting, which includes using toilet, bedpan or urinal? 2 - A Lot   4. Putting on and taking off regular upper body clothing? 3 - A Little   5. Taking care of personal grooming such as " brushing teeth? 3 - A Little   6. Eating meals? 4 - None   Daily Activity Raw Score (Score out of 24.Lower scores equate to lower levels of function) 16   Total Evaluation Time   Total Evaluation Time (Minutes) 15

## 2017-07-27 NOTE — PROGRESS NOTES
[facility]  [facility] [place]  Neuro-ICU Progress Note       Admission Date: 7/24/2017  Date of Service:07/27/2017   Hospital Day: 4     _________________________________     Main Plans for Today   Continue acyclovir  ID consult  Assessment & Plan   #(G93.40) Encephalopathy  (primary encounter diagnosis)  --Most likely HSV encephalitis given left temporal lobe lesion  -----EEG - seizures originating from left side  -----LP- 8 WBC  ---Started on Acyclovir 7/25/17  -----It may be related to immunosuppression secondary to Tecfidera  --Will get ID involved. HSV is negative that can be seen in 10% of the cases.   (G35) Multiple sclerosis (H)  --Advanced [facility]  [facility]  #. PT/OT/Speech  --continue  evaluations   #.Nutrition  --Per speech therapy evaluation   #. ICU prophylaxis:   --Stress ulcer prophylaxis Protonix  --DVT prophylaxis: mechanical, patient is able to ambulate    CODE STATUS: Full Code    Family update by me today: yes    Interval History   No further seizures. Speaks slightly more, three-four words at a time.      Physical Exam     Temp: 97.9  F (36.6  C) Temp src: Axillary BP: 126/79   Heart Rate: 76 Resp: 18 SpO2: 97 % O2 Device: None (Room air)        General Appearance:   No acute distress  Neuro:       Mental Status Exam:   Awake, alert. Speaks more, still with aphasia. Mental status is normal       Cranial Nerves:  Pupils 3 mm, reactive. EOMI. Face sensation is normal. Face is symmetric.Tongue and uvula are midline. Tongue with a bite salma Other CN are normal           Motor:  Right sided weakness 4/5 Tone and bulk are normal           Reflexes:  Normal DTR.Toes downgoing.        Sensory:   Untestable                  Coordination:    Untestable       Gait: Untestable  Cardiovascular: Regular rate and rhythm, no m/r/g  Lungs: Resp: 18  Both hemithoraces are symmetrical, auscultation of lungs revealed no bronchovesicular sounds, expirium prolongation,  wheezing, rhonci and crackles  Abdomen: Soft, not tender, not distended  Skin/Extremities: No clubbing, cyanosis, no edema     Medications   Infusions medications:    NaCl 100 mL/hr at 07/27/17 0058     - MEDICATION INSTRUCTIONS -       Schedule medications:    levETIRAcetam  750 mg Intravenous Q12H     venlafaxine  75 mg Oral Daily     acyclovir (ZOVIRAX) IV  10 mg/kg Intravenous Q8H     heparin  5,000 Units Subcutaneous Q8H     norethindrone  0.35 mg Oral Daily     dimethyl fumarate  240 mg Oral BID     PRN medications:baclofen, - MEDICATION INSTRUCTIONS -, HOLD MEDICATION, naloxone  Data       Labotary Data:   All data was reviewed by me personally  CBC RESULTS:  Recent Labs   Lab Test  07/27/17   0742  07/25/17 0451 07/24/17   1808   WBC   --   10.5  11.5*   RBC   --   3.61*  3.90   HGB   --   11.2*  12.0   HCT   --   33.5*  36.3   PLT  213  182  209     Basic Metabolic Panel:  Recent Labs   Lab Test  07/27/17   0742  07/26/17   0515  07/25/17   2255  07/25/17   0451  07/24/17   1808 11/18/13 02/20/12   0938   NA  139  141   --   138  139  139  141   POTASSIUM  3.4  3.5  3.5  4.3  4.4  4.0  4.3   CHLORIDE  106  108   --   105  105  108  107   CO2  22  23   --   25  23   --   23   BUN  4*  6*   --   6*  9  5  17   CR  0.52  0.56   --   0.59  0.63  0.91  0.79   GLC  93  97   --   111*  111*  86  90   JULIET  8.0*  7.6*   --   8.1*  8.2*  8.9  8.7     ABG:No lab results found.  Liver panel:  Recent Labs   Lab Test  07/26/17   0515 11/18/13 02/20/12   0938   PROTTOTAL  5.7*  7.0  7.2   ALBUMIN  2.8*  4.2  4.0   BILITOTAL  0.4  0.6  0.8   ALKPHOS  35*  48  52   AST  36  25  22   ALT  24  17  14     Procalcitonin: No lab results found.  Coagulation  Recent Labs   Lab Test  07/24/17   1808   INR  1.05   PTT  29      Lipid Profile:  Recent Labs   Lab Test  02/20/12   0938   CHOL  242*   HDL  72   LDL  140*   TRIG  147   CHOLHDLRATIO  3.4     Thyroid Panel:No lab results found.   Vitamin B12: No lab results found.    Vitamin D:No lab results found.  A1C: No lab results found.  Troponin I: No lab results found.  CPK: No lab results found.  Ammonia: No lab results found.  Serum Osmolality:No lab results found.  Most Recent 6 Bacteria Isolates From Any Culture (See EPIC Reports for Culture Details):  Recent Labs   Lab Test  07/24/17   1950  07/24/17   1932  12/17/15   0949  03/16/15   0909  10/21/11   1049  09/18/09   1125   CULT  No growth after 3 days  No growth after 3 days  >100,000 colonies/mL Escherichia coli*  >100,000 colonies/mL mixed urogenital jen  >100,000 colonies/mL Coagulase negative Staphylococcus  >100,000 colonies/mL Staphylococcus aureus     UA Results:  Recent Labs   Lab Test  07/24/17 1912   COLOR  Yellow   APPEARANCE  Clear   URINEGLC  Negative   URINEBILI  Negative   URINEKETONE  40*   SG  1.025   UBLD  Negative   URINEPH  6.0   PROTEIN  30*   UROBILINOGEN  0.2   NITRITE  Negative   LEUKEST  Negative   RBCU  2-5*   WBCU  O - 2     CSF studies (WBC, RBC, Glucose, Protein):  Recent Labs   Lab Test  07/26/17   1025   CWBC  8*   CRBC  0   CGLU  58   CTP  29   HSV is negative       Cardiac US:   --     Neurophysiology:   --EEG - left sided spikes       Imaging:   All imaging studies were reviewed personally  CT head:   Diffuse cerebral volume loss and cerebral white matter  changes consistent with the patient's history of multiple sclerosis.  No evidence for acute intracranial pathology.  CT perfusion  Normal CT perfusion of the brain.  CT angiography neck/head  Normal neck and head CTA.  MRI brain:   1. Mildly restricted diffusion in the anterior medial left temporal  lobe and anterior hippocampal region with some mild associated  increased T2 signal but no enhancement. Pattern could be due to a  focal area of ischemic infarction although it is difficult to exclude  herpes encephalitis.  2. Ventriculomegaly is slightly out of proportion to the subarachnoid  spaces with marked thinning of the corpus callosum  but no ballooning  of the temporal horns. Findings are most likely due to atrophy  although it is difficult to exclude communicating hydrocephalus.  3. Moderately extensive white matter changes in both hemispheres which  are most likely due to chronic small vessel ischemic disease given the  patient's age.   4. No evidence for intracranial hemorrhage or any focal mass lesions.

## 2017-07-27 NOTE — PROGRESS NOTES
Care Transition Initial Assessment -   Reason For Consult: discharge planning  Met with: Patient and Family    Active Problems:    Encephalopathy         DATA  Lives With: child(hugo), dependent, spouse  Living Arrangements: house  Description of Support System: Supportive, Involved  Who is your support system?: , Sibling(s), Parents    Identified issues/concerns regarding health management: Pt admitted with encephalopathy. Pt has MS and lives at home with her . Pt is needing and increase in services and care. Pt's , Jama, states that they were considering enrolling pt in [facility] prior to admission to hospital.  Jama says that this hospitalization presents an opportunity/motivation to increase services for pt. He expressed that pt could use both physical and cognitive activities to maintain function as best she can.    Pt and family are in agreement to have [facility] assess pt for possible admission when appropriate.      Quality Of Family Relationships: supportive. Parents and siblings involved  Transportation Available: family or friend will provide      ASSESSMENT  Cognitive Status:  awake and alert  Concerns to be addressed:  Probable referral to [facility] for discharge . Follow up resources post rehab.     PLAN  Financial costs for the patient includes TBD  Patient given options and choices for discharge Pt currnetly being assessed. Patient/family is agreeable to possible ARU placement.

## 2017-07-27 NOTE — PROGRESS NOTES
[facility]    Hospitalist Progress Note    Date of Service (when I saw the patient): 07/27/2017    Assessment & Plan   Shanna Shanks is a 42 year old female who was admitted on 7/24/2017 with unresponsiveness and right hemiplegia.    Encephalopathy, suspected secondary to HSV encephalitis  Seizures, suspected secondary to HSV encephalitis  Presented with unresponsive episode associated with right hemiplegia.  Underwent stroke workup in the emergency department including CT head and CTA head and neck which was negative for bleed or large clot.  Due to her level of unresponsiveness initially as well as concern for catastrophic neurologic event, she was intubated in the emergency department and admitted to the ICU.  She has since been successfully extubated, with some improvement in mental status but not to baseline, and concern for left-sided seizure activity on EEG. 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 Increased risk w/ chronic immunosuppressant for multiple sclerosis  - Neurology consulted, appreciate assistance  - HSV PCR on CSF negative, but still with high clinical suspicion. Agree with continuing acyclovir. ID consulted per neurology   - Continues on levetiracetam   - PT/OT/Speech. Anticipate ARU on discharge.    Fevers, likely secondary to HSV encephalitis   Noted to have low-grade fevers overnight 7/25/17with Tmax of 38 degrees Celsius.  Has not been on antibiotics since admission.  Initial urinalysis and chest x-ray unremarkable. MRI results concerning for HSV encephalitis. CXR ordered 7/25/17 due to fevers with improving mild residual infiltrate (not commented on on original XR), denies cough or shortness of breath, doubt pneumonia at this time  - LP with 8 WBC, negative HSV on PCR, see above  - Remains afebrile since anti-viral therapy started     Multiple sclerosis  Follows with [facility].  -  Neurology following  - Continue prior to admission dimethyl fumarate, baclofen p.r.n. Continue holding prior to admission dalfampridine, as seizure is listed as a contraindication     Urinary incontinence  Associated with multiple sclerosis. Hurtado catheter discontinued 7/26/17  - Continue holding prior to admission oxybutynin until mental status further clears.  She had tolerated this well at home without mental status issues, unlikely that this is contributing to her presenting encephalopathy significantly, but also wish to avoid exacerbating confusion     Depression  Continue prior to admission venlafaxine    DVT Prophylaxis: Heparin SQ  Code Status: Full Code    Disposition: Expected discharge unclear, pending ID consult and antiviral plan, anticipate ARU on discharge    Boirs BREANNA Reba    Interval History    again notes improvement in mental status and functioning compared to yesterday, though still not to baseline. Denies any vision changes, focal numbness/tingling/weakness. No other new complaints.     -Data reviewed today: I reviewed all new labs and imaging results over the last 24 hours. I personally reviewed no images or EKG's today.    Physical Exam   Temp: 97.9  F (36.6  C) Temp src: Axillary BP: 126/79   Heart Rate: 76 Resp: 18 SpO2: 97 % O2 Device: None (Room air)    Vitals:    07/24/17 2038 07/26/17 0200 07/27/17 0611   Weight: 62.3 kg (137 lb 5.6 oz) 61.6 kg (135 lb 12.9 oz) 64 kg (141 lb 1.5 oz)     Vital Signs with Ranges  Temp:  [97.9  F (36.6  C)-99.5  F (37.5  C)] 97.9  F (36.6  C)  Heart Rate:  [65-90] 76  Resp:  [9-22] 18  BP: (117-131)/(79-88) 126/79  SpO2:  [97 %] 97 %  I/O last 3 completed shifts:  In: 1945.84 [P.O.:60; I.V.:1885.84]  Out: 1775 [Urine:1775]    Constitutional: Middle-aged female, NAD  Respiratory: Lungs clear to auscultation bilaterally  Cardiovascular: RRR, no m/r/g. No peripheral edema.  GI: Soft, non-tender, non-distended. BS normoactive.   Skin/Integumen: Warm,  "dry  Neuro: Alert. Oriented to \"hospital\", not specific one. Not oriented to date. Following commands.     Medications     NaCl 100 mL/hr at 07/27/17 0058     - MEDICATION INSTRUCTIONS -         levETIRAcetam  750 mg Intravenous Q12H     venlafaxine  75 mg Oral Daily     acyclovir (ZOVIRAX) IV  10 mg/kg Intravenous Q8H     heparin  5,000 Units Subcutaneous Q8H     norethindrone  0.35 mg Oral Daily     dimethyl fumarate  240 mg Oral BID       Data     Recent Labs  Lab 07/27/17  0742 07/26/17  0515 07/25/17  2255 07/25/17  0451 07/24/17  1808   WBC  --   --   --  10.5 11.5*   HGB  --   --   --  11.2* 12.0   MCV  --   --   --  93 93     --   --  182 209   INR  --   --   --   --  1.05    141  --  138 139   POTASSIUM 3.4 3.5 3.5 4.3 4.4   CHLORIDE 106 108  --  105 105   CO2 22 23  --  25 23   BUN 4* 6*  --  6* 9   CR 0.52 0.56  --  0.59 0.63   ANIONGAP 11 10  --  8 11   JULIET 8.0* 7.6*  --  8.1* 8.2*   GLC 93 97  --  111* 111*   ALBUMIN  --  2.8*  --   --   --    PROTTOTAL  --  5.7*  --   --   --    BILITOTAL  --  0.4  --   --   --    ALKPHOS  --  35*  --   --   --    ALT  --  24  --   --   --    AST  --  36  --   --   --        No results found for this or any previous visit (from the past 24 hour(s)).  "

## 2017-07-27 NOTE — PROGRESS NOTES
Pharmacist notified to clarify if 0000 subQ heparin dose can be given post lumbar puncture. Per pharmacist, okay to give dose.

## 2017-07-27 NOTE — PLAN OF CARE
Problem: Goal Outcome Summary  Goal: Goal Outcome Summary  Outcome: No Change  A&O to self. No seizure activity observed. NV with baseline R. Facial droop, R. Paresis, and WFD. Inc. Of bowel x1, pineda patent. Poor appetite. LP site CDI.

## 2017-07-27 NOTE — PLAN OF CARE
Problem: Goal Outcome Summary  Goal: Goal Outcome Summary  Pt is a 42 year old female admitted with HSV encephalitis and hx of MS. Pt lives with spouse and daughter in house with stairs as barriers. At Baseline, pt was independent with all mobility.  Discharge Planner PT   Patient plan for discharge: Pt did not state but was agreeable to continued rehab  Current status: Pt requires Tiffanie of 2 for gait, Tiffanie for transfers using walker, pt has cognitive deficits and is a fall risk.  Barriers to return to prior living situation: stairs to enter and within home, does not have walk in shower, has cognitive deficits,  Recommendations for discharge: ARU  Rationale for recommendations: Pt needs multiple therapies, multiple times a day. Pt is below baseline with potential to improve. Pt's  was starting to look into therapy options and wants increased support for pt.       Entered by: Yadira Morales 07/27/2017 12:26 PM

## 2017-07-27 NOTE — PLAN OF CARE
Problem: Goal Outcome Summary  Goal: Goal Outcome Summary  OT: Eval complete and Tx initiated. Pt admitted with seizures secondary to HSV encephalitis. Prior to admit, pt lives in house with  and two daughters and reports I with ADLs, med mgmt, and meal prep.      Discharge Planner OT   Patient plan for discharge: Home  Current status: Pt required mod A x2 with FWW for standing at EOB to initiate functional transfers. Pt completed LE dressing task with mod A.   Barriers to return to prior living situation: Stairs to enter and within home; Bathtub; Fall risk and safety concern  Recommendations for discharge: ARU  Rationale for recommendations: Pt limited by impaired cognition and safety, R UE weakness, and decreased balance; thus, pt would benefit from twice daily OT intervention to progress I with ADL participation. Pt's  present and supportive for ARU at discharge. Pt eager to return to PLOF and demonstrates excellent participation in OT.        Entered by: Ana Luz 07/27/2017 10:06 AM

## 2017-07-27 NOTE — PLAN OF CARE
Problem: Goal Outcome Summary  Goal: Goal Outcome Summary  Outcome: No Change  VSS. Tmax 99.5. Alert, oriented to self. Slight right side hemiparesis and facial droop. Able to speak and respond to simple questions. Able to state the names of her siblings and . Following commands. Denies pain. Repositioned every 2 hours. Hurtado in place with adequate output, will be removed this AM. Tolerated water while sitting upright. Sister at bedside overnight.

## 2017-07-27 NOTE — PLAN OF CARE
Problem: Goal Outcome Summary  Goal: Goal Outcome Summary  Discharge Planner SLP   Patient plan for discharge:  looking into rehab options  Current status: SLP: Follow up dysphagia treatment provided with family present. RN reported occasional impulsive eating with cues to swallow before additional bites. Advanced semi-solid textures trialed with prolonged mastication and no overt s/sx of aspiration. Recommended: upgrade to dysphagia diet level 2, thin liquids, no straws, small bites/sips, alternate solids and liquids, reduce distractions during meals, slow rate.   Barriers to return to prior living situation: Dysphagia; cognitive-linguistic impairment?  Recommendations for discharge: ARU  Rationale for recommendations: Continued ST to target diet tolerance, strategies and advanced textures; possible cognitive-linguistic evaluation as appropriate       Entered by: Steffanie Calderon 07/27/2017 3:18 PM

## 2017-07-27 NOTE — PROGRESS NOTES
07/27/17 1157   Quick Adds   Type of Visit Initial PT Evaluation   Living Environment   Lives With child(hugo), dependent;spouse   Living Arrangements house   Home Accessibility bed and bath are not on the first floor;stairs to enter home;stairs within home;tub/shower is not walk in   Number of Stairs to Enter Home 8   Number of Stairs Within Home 10   Transportation Available family or friend will provide   Self-Care   Dominant Hand right   Usual Activity Tolerance good   Current Activity Tolerance fair   Regular Exercise no   Equipment Currently Used at Home none   Activity/Exercise/Self-Care Comment Pt was independent with ADLs and mobility, ambulation on level surface was independent but slower, uneven surface requires support   Functional Level Prior   Ambulation 0-->independent   Transferring 0-->independent   Toileting 0-->independent   Bathing 0-->independent   Dressing 0-->independent   Eating 0-->independent   Communication 0-->understands/communicates without difficulty   Swallowing 0-->swallows foods/liquids without difficulty   Cognition 0 - no cognition issues reported   Fall history within last six months no   Which of the above functional risks had a recent onset or change? ambulation;transferring;eating;cognition   General Information   Onset of Illness/Injury or Date of Surgery - Date 07/24/17   Referring Physician Dr. Mauro   Patient/Family Goals Statement Pt and  are agreeable to ARU or other apporpriate follow up therapy   Pertinent History of Current Problem (include personal factors and/or comorbidities that impact the POC) Pt is a 42 year old female with hx of MS who has been diagnosed with HSV encephalitis with seizures. Pts  reports that he was looking into programs to better support spouse with mobility.   Precautions/Limitations fall precautions;seizure precautions   Weight-Bearing Status - LLE full weight-bearing   Weight-Bearing Status - RLE full weight-bearing   General  Observations Pt awake and alert. Turns to  to help answer or confirm answers to questions   Cognitive Status Examination   Orientation person   Level of Consciousness alert   Follows Commands and Answers Questions 75% of the time;able to follow single-step instructions   Memory impaired   Cognitive Comment Pt is below baseline for word finding, memory per    Pain Assessment   Patient Currently in Pain No   Posture    Posture Comments Flexed trunk, forward shoulders, holds head in cervical rotation.   Range of Motion (ROM)   ROM Comment WFL all extremities   Strength   Strength Comments Decreased strength for antigravity movement, decreased  strength right, baseline is decreased strength on right   Bed Mobility   Bed Mobility Comments supine to sit: Tiffanie   Transfer Skills   Transfer Comments sit to stand: slightly impulsive, Tiffanie and cues   Gait   Gait Comments Pt ambulated to door of room adn back to chair using walker and Tiffanie of 2.    Balance   Balance Comments requires support for static and dynamic standing   Sensory Examination   Sensory Perception Comments Pt denies any sensation deficits   Coordination   Coordination Comments Decreased coordination for heel to shin bilat   General Therapy Interventions   Planned Therapy Interventions balance training;bed mobility training;gait training;neuromuscular re-education;strengthening;transfer training;progressive activity/exercise   Clinical Impression   Criteria for Skilled Therapeutic Intervention yes, treatment indicated   PT Diagnosis Difficulty with gait   Influenced by the following impairments Decreased cognition, decreased coordination, decreased strength of LE and UE iwth rigt weaker, decreased balance, decreased endurance   Functional limitations due to impairments Increased assist for all functional mobility, decreased independece compared to baseline.   Clinical Presentation Evolving/Changing   Clinical Presentation Rationale clinical  "observation and judgement, mobility compared to baseline andn trend of pt's mobility.   Clinical Decision Making (Complexity) Moderate complexity   Therapy Frequency` 2 times/day   Predicted Duration of Therapy Intervention (days/wks) 5-7 days   Anticipated Discharge Disposition Acute Rehabilitation Facility   Risk & Benefits of therapy have been explained Yes   Patient, Family & other staff in agreement with plan of care Yes   [facility] TM \"6 Clicks\"   2016, Trustees of [facility], under license to [employer].  All rights reserved.   6 Clicks Short Forms Basic Mobility Inpatient Short Form   [facility] AM-PAC  \"6 Clicks\" V.2 Basic Mobility Inpatient Short Form   1. Turning from your back to your side while in a flat bed without using bedrails? 3 - A Little   2. Moving from lying on your back to sitting on the side of a flat bed without using bedrails? 3 - A Little   3. Moving to and from a bed to a chair (including a wheelchair)? 3 - A Little   4. Standing up from a chair using your arms (e.g., wheelchair, or bedside chair)? 3 - A Little   5. To walk in hospital room? 2 - A Lot   6. Climbing 3-5 steps with a railing? 2 - A Lot   Basic Mobility Raw Score (Score out of 24.Lower scores equate to lower levels of function) 16   Total Evaluation Time   Total Evaluation Time (Minutes) 15     "

## 2017-07-27 NOTE — PROGRESS NOTES
[facility]  [facility] [place]  Neuro-ICU Progress Note       Admission Date: 7/24/2017  Date of Service:07/27/2017   Hospital Day: 4     _________________________________     Main Plans for Today   Continue acyclovir  ID consult  Assessment & Plan   #(G93.40) Encephalopathy  (primary encounter diagnosis)  --Most likely HSV encephalitis given left temporal lobe lesion  -----EEG - seizures originating from left side  -----LP- 8 WBC  ---Started on Acyclovir 7/25/17  -----It may be related to immunosuppression secondary to Tecfidera  --Will get ID involved. HSV is negative that can be seen in 10% of the cases.   (G35) Multiple sclerosis (H)  --Advanced MS  --[facility]  #. PT/OT/Speech  --continue  evaluations   #.Nutrition  --Per speech therapy evaluation   #. ICU prophylaxis:   --Stress ulcer prophylaxis Protonix  --DVT prophylaxis: mechanical, patient is able to ambulate    CODE STATUS: Full Code    Family update by me today: yes    Interval History   No further seizures. Speaks slightly more, three-four words at a time.      Physical Exam     Temp: 97.9  F (36.6  C) Temp src: Axillary BP: 126/79   Heart Rate: 76 Resp: 18 SpO2: 97 % O2 Device: None (Room air)        General Appearance:   No acute distress  Neuro:       Mental Status Exam:   Awake, alert. Speaks more, still with aphasia. Mental status is normal       Cranial Nerves:  Pupils 3 mm, reactive. EOMI. Face sensation is normal. Face is symmetric.Tongue and uvula are midline. Tongue with a bite salma Other CN are normal           Motor:  Right sided weakness 4/5 Tone and bulk are normal           Reflexes:  Normal DTR.Toes downgoing.        Sensory:   Untestable                  Coordination:    Untestable       Gait: Untestable  Cardiovascular: Regular rate and rhythm, no m/r/g  Lungs: Resp: 18  Both hemithoraces are symmetrical, auscultation of lungs revealed no bronchovesicular sounds, expirium prolongation,  wheezing, rhonci and crackles  Abdomen: Soft, not tender, not distended  Skin/Extremities: No clubbing, cyanosis, no edema     Medications   Infusions medications:    NaCl 100 mL/hr at 07/27/17 0058     - MEDICATION INSTRUCTIONS -       Schedule medications:    levETIRAcetam  750 mg Intravenous Q12H     venlafaxine  75 mg Oral Daily     acyclovir (ZOVIRAX) IV  10 mg/kg Intravenous Q8H     heparin  5,000 Units Subcutaneous Q8H     norethindrone  0.35 mg Oral Daily     dimethyl fumarate  240 mg Oral BID     PRN medications:baclofen, - MEDICATION INSTRUCTIONS -, HOLD MEDICATION, naloxone  Data       Labotary Data:   All data was reviewed by me personally  CBC RESULTS:  Recent Labs   Lab Test  07/27/17   0742  07/25/17 0451 07/24/17   1808   WBC   --   10.5  11.5*   RBC   --   3.61*  3.90   HGB   --   11.2*  12.0   HCT   --   33.5*  36.3   PLT  213  182  209     Basic Metabolic Panel:  Recent Labs   Lab Test  07/27/17   0742  07/26/17   0515  07/25/17   2255  07/25/17   0451  07/24/17   1808 11/18/13 02/20/12   0938   NA  139  141   --   138  139  139  141   POTASSIUM  3.4  3.5  3.5  4.3  4.4  4.0  4.3   CHLORIDE  106  108   --   105  105  108  107   CO2  22  23   --   25  23   --   23   BUN  4*  6*   --   6*  9  5  17   CR  0.52  0.56   --   0.59  0.63  0.91  0.79   GLC  93  97   --   111*  111*  86  90   JULIET  8.0*  7.6*   --   8.1*  8.2*  8.9  8.7     ABG:No lab results found.  Liver panel:  Recent Labs   Lab Test  07/26/17   0515 11/18/13 02/20/12   0938   PROTTOTAL  5.7*  7.0  7.2   ALBUMIN  2.8*  4.2  4.0   BILITOTAL  0.4  0.6  0.8   ALKPHOS  35*  48  52   AST  36  25  22   ALT  24  17  14     Procalcitonin: No lab results found.  Coagulation  Recent Labs   Lab Test  07/24/17   1808   INR  1.05   PTT  29      Lipid Profile:  Recent Labs   Lab Test  02/20/12   0938   CHOL  242*   HDL  72   LDL  140*   TRIG  147   CHOLHDLRATIO  3.4     Thyroid Panel:No lab results found.   Vitamin B12: No lab results found.    Vitamin D:No lab results found.  A1C: No lab results found.  Troponin I: No lab results found.  CPK: No lab results found.  Ammonia: No lab results found.  Serum Osmolality:No lab results found.  Most Recent 6 Bacteria Isolates From Any Culture (See EPIC Reports for Culture Details):  Recent Labs   Lab Test  07/24/17   1950  07/24/17   1932  12/17/15   0949  03/16/15   0909  10/21/11   1049  09/18/09   1125   CULT  No growth after 3 days  No growth after 3 days  >100,000 colonies/mL Escherichia coli*  >100,000 colonies/mL mixed urogenital jen  >100,000 colonies/mL Coagulase negative Staphylococcus  >100,000 colonies/mL Staphylococcus aureus     UA Results:  Recent Labs   Lab Test  07/24/17 1912   COLOR  Yellow   APPEARANCE  Clear   URINEGLC  Negative   URINEBILI  Negative   URINEKETONE  40*   SG  1.025   UBLD  Negative   URINEPH  6.0   PROTEIN  30*   UROBILINOGEN  0.2   NITRITE  Negative   LEUKEST  Negative   RBCU  2-5*   WBCU  O - 2     CSF studies (WBC, RBC, Glucose, Protein):  Recent Labs   Lab Test  07/26/17   1025   CWBC  8*   CRBC  0   CGLU  58   CTP  29   HSV is negative       Cardiac US:   --     Neurophysiology:   --EEG - left sided spikes       Imaging:   All imaging studies were reviewed personally  CT head:   Diffuse cerebral volume loss and cerebral white matter  changes consistent with the patient's history of multiple sclerosis.  No evidence for acute intracranial pathology.  CT perfusion  Normal CT perfusion of the brain.  CT angiography neck/head  Normal neck and head CTA.  MRI brain:   1. Mildly restricted diffusion in the anterior medial left temporal  lobe and anterior hippocampal region with some mild associated  increased T2 signal but no enhancement. Pattern could be due to a  focal area of ischemic infarction although it is difficult to exclude  herpes encephalitis.  2. Ventriculomegaly is slightly out of proportion to the subarachnoid  spaces with marked thinning of the corpus callosum  but no ballooning  of the temporal horns. Findings are most likely due to atrophy  although it is difficult to exclude communicating hydrocephalus.  3. Moderately extensive white matter changes in both hemispheres which  are most likely due to chronic small vessel ischemic disease given the  patient's age.   4. No evidence for intracranial hemorrhage or any focal mass lesions.

## 2017-07-27 NOTE — PLAN OF CARE
Problem: Goal Outcome Summary  Goal: Goal Outcome Summary  Outcome: Improving  Patient alert to self and family, slight right droop and right hemiparesis which is baseline from MS, patient tolerates DD1 with thin, up in room with 1 assist ,GB and walker, voiding after pineda d/cd. Plan for FVA at discharge when stable

## 2017-07-28 ENCOUNTER — APPOINTMENT (OUTPATIENT)
Dept: SPEECH THERAPY | Facility: CLINIC | Age: 42
DRG: 097 | End: 2017-07-28
Payer: COMMERCIAL

## 2017-07-28 ENCOUNTER — APPOINTMENT (OUTPATIENT)
Dept: OCCUPATIONAL THERAPY | Facility: CLINIC | Age: 42
DRG: 097 | End: 2017-07-28
Payer: COMMERCIAL

## 2017-07-28 ENCOUNTER — APPOINTMENT (OUTPATIENT)
Dept: PHYSICAL THERAPY | Facility: CLINIC | Age: 42
DRG: 097 | End: 2017-07-28
Payer: COMMERCIAL

## 2017-07-28 LAB
ANION GAP SERPL CALCULATED.3IONS-SCNC: 9 MMOL/L (ref 3–14)
BUN SERPL-MCNC: 4 MG/DL (ref 7–30)
CALCIUM SERPL-MCNC: 8.1 MG/DL (ref 8.5–10.1)
CHLORIDE SERPL-SCNC: 109 MMOL/L (ref 94–109)
CO2 SERPL-SCNC: 25 MMOL/L (ref 20–32)
CREAT SERPL-MCNC: 0.57 MG/DL (ref 0.52–1.04)
GFR SERPL CREATININE-BSD FRML MDRD: ABNORMAL ML/MIN/1.7M2
GLUCOSE SERPL-MCNC: 93 MG/DL (ref 70–99)
INTERPRETATION ECG - MUSE: NORMAL
POTASSIUM SERPL-SCNC: 3.2 MMOL/L (ref 3.4–5.3)
SODIUM SERPL-SCNC: 143 MMOL/L (ref 133–144)
VDRL CSF QL: NORMAL

## 2017-07-28 PROCEDURE — 92526 ORAL FUNCTION THERAPY: CPT | Mod: GN | Performed by: SPEECH-LANGUAGE PATHOLOGIST

## 2017-07-28 PROCEDURE — 97530 THERAPEUTIC ACTIVITIES: CPT | Mod: GO

## 2017-07-28 PROCEDURE — 25000128 H RX IP 250 OP 636: Performed by: PSYCHIATRY & NEUROLOGY

## 2017-07-28 PROCEDURE — 36415 COLL VENOUS BLD VENIPUNCTURE: CPT | Performed by: INTERNAL MEDICINE

## 2017-07-28 PROCEDURE — 97530 THERAPEUTIC ACTIVITIES: CPT | Mod: GP

## 2017-07-28 PROCEDURE — 97535 SELF CARE MNGMENT TRAINING: CPT | Mod: GO

## 2017-07-28 PROCEDURE — 40000225 ZZH STATISTIC SLP WARD VISIT: Performed by: SPEECH-LANGUAGE PATHOLOGIST

## 2017-07-28 PROCEDURE — 25000128 H RX IP 250 OP 636: Performed by: INTERNAL MEDICINE

## 2017-07-28 PROCEDURE — 97116 GAIT TRAINING THERAPY: CPT | Mod: GP

## 2017-07-28 PROCEDURE — 99232 SBSQ HOSP IP/OBS MODERATE 35: CPT | Performed by: INTERNAL MEDICINE

## 2017-07-28 PROCEDURE — 80048 BASIC METABOLIC PNL TOTAL CA: CPT | Performed by: INTERNAL MEDICINE

## 2017-07-28 PROCEDURE — 12000000 ZZH R&B MED SURG/OB

## 2017-07-28 PROCEDURE — 36569 INSJ PICC 5 YR+ W/O IMAGING: CPT

## 2017-07-28 PROCEDURE — 40000193 ZZH STATISTIC PT WARD VISIT

## 2017-07-28 PROCEDURE — 25000132 ZZH RX MED GY IP 250 OP 250 PS 637

## 2017-07-28 PROCEDURE — 27210577 ZZ H INTRODUCER MICRO SET

## 2017-07-28 PROCEDURE — 25000132 ZZH RX MED GY IP 250 OP 250 PS 637: Performed by: INTERNAL MEDICINE

## 2017-07-28 PROCEDURE — 40000133 ZZH STATISTIC OT WARD VISIT

## 2017-07-28 RX ORDER — LEVETIRACETAM 750 MG/1
750 TABLET ORAL 2 TIMES DAILY
Status: DISCONTINUED | OUTPATIENT
Start: 2017-07-28 | End: 2017-07-30 | Stop reason: HOSPADM

## 2017-07-28 RX ORDER — MAGNESIUM SULFATE HEPTAHYDRATE 40 MG/ML
4 INJECTION, SOLUTION INTRAVENOUS EVERY 4 HOURS PRN
Status: DISCONTINUED | OUTPATIENT
Start: 2017-07-28 | End: 2017-07-30 | Stop reason: HOSPADM

## 2017-07-28 RX ORDER — POTASSIUM CHLORIDE 1.5 G/1.58G
20-40 POWDER, FOR SOLUTION ORAL
Status: DISCONTINUED | OUTPATIENT
Start: 2017-07-28 | End: 2017-07-30 | Stop reason: HOSPADM

## 2017-07-28 RX ORDER — POTASSIUM CHLORIDE 1500 MG/1
20-40 TABLET, EXTENDED RELEASE ORAL
Status: DISCONTINUED | OUTPATIENT
Start: 2017-07-28 | End: 2017-07-30 | Stop reason: HOSPADM

## 2017-07-28 RX ORDER — POTASSIUM CHLORIDE 29.8 MG/ML
20 INJECTION INTRAVENOUS
Status: DISCONTINUED | OUTPATIENT
Start: 2017-07-28 | End: 2017-07-30 | Stop reason: HOSPADM

## 2017-07-28 RX ORDER — POTASSIUM CL/LIDO/0.9 % NACL 10MEQ/0.1L
10 INTRAVENOUS SOLUTION, PIGGYBACK (ML) INTRAVENOUS
Status: DISCONTINUED | OUTPATIENT
Start: 2017-07-28 | End: 2017-07-30 | Stop reason: HOSPADM

## 2017-07-28 RX ORDER — POTASSIUM CHLORIDE 7.45 MG/ML
10 INJECTION INTRAVENOUS
Status: DISCONTINUED | OUTPATIENT
Start: 2017-07-28 | End: 2017-07-30 | Stop reason: HOSPADM

## 2017-07-28 RX ORDER — ACETAMINOPHEN 650 MG/1
650 SUPPOSITORY RECTAL EVERY 4 HOURS PRN
Status: DISCONTINUED | OUTPATIENT
Start: 2017-07-28 | End: 2017-07-30 | Stop reason: HOSPADM

## 2017-07-28 RX ORDER — ACETAMINOPHEN 325 MG/1
650 TABLET ORAL EVERY 4 HOURS PRN
Status: DISCONTINUED | OUTPATIENT
Start: 2017-07-28 | End: 2017-07-30 | Stop reason: HOSPADM

## 2017-07-28 RX ADMIN — SODIUM CHLORIDE: 9 INJECTION, SOLUTION INTRAVENOUS at 02:14

## 2017-07-28 RX ADMIN — ACYCLOVIR SODIUM 600 MG: 50 INJECTION, SOLUTION INTRAVENOUS at 04:13

## 2017-07-28 RX ADMIN — LEVETIRACETAM 750 MG: 750 TABLET, FILM COATED ORAL at 10:08

## 2017-07-28 RX ADMIN — ACYCLOVIR SODIUM 600 MG: 50 INJECTION, SOLUTION INTRAVENOUS at 13:21

## 2017-07-28 RX ADMIN — SODIUM CHLORIDE: 9 INJECTION, SOLUTION INTRAVENOUS at 14:44

## 2017-07-28 RX ADMIN — DIMETHYL FUMARATE 240 MG: 120 CAPSULE ORAL at 10:08

## 2017-07-28 RX ADMIN — LEVETIRACETAM 750 MG: 750 TABLET, FILM COATED ORAL at 19:58

## 2017-07-28 RX ADMIN — HEPARIN SODIUM 5000 UNITS: 5000 INJECTION, SOLUTION INTRAVENOUS; SUBCUTANEOUS at 09:03

## 2017-07-28 RX ADMIN — ACETAMINOPHEN AND CODEINE PHOSPHATE 0.35 MG: 120; 12 SOLUTION ORAL at 10:08

## 2017-07-28 RX ADMIN — POTASSIUM CHLORIDE 20 MEQ: 1.5 POWDER, FOR SOLUTION ORAL at 17:55

## 2017-07-28 RX ADMIN — HEPARIN SODIUM 5000 UNITS: 5000 INJECTION, SOLUTION INTRAVENOUS; SUBCUTANEOUS at 16:10

## 2017-07-28 RX ADMIN — VENLAFAXINE HYDROCHLORIDE 75 MG: 75 CAPSULE, EXTENDED RELEASE ORAL at 09:03

## 2017-07-28 RX ADMIN — POTASSIUM CHLORIDE 40 MEQ: 1.5 POWDER, FOR SOLUTION ORAL at 11:14

## 2017-07-28 RX ADMIN — ACYCLOVIR SODIUM 600 MG: 50 INJECTION, SOLUTION INTRAVENOUS at 19:57

## 2017-07-28 ASSESSMENT — VISUAL ACUITY
OU: NORMAL ACUITY

## 2017-07-28 NOTE — PLAN OF CARE
Problem: Goal Outcome Summary  Goal: Goal Outcome Summary  Discharge Planner OT   Patient plan for discharge: ARU  Current status: Pt required min A with FWW for toilet transfer, LE dressing mgmt, and standing at sink for hygiene tasks.   Barriers to return to prior living situation: Stairs to enter and within home; Bathtub; Fall risk; Safety concern with lack of insight into deficits  Recommendations for discharge: ARU  Rationale for recommendations: Pt limited by impaired cognition and safety, decreased balance, and R UE weakness and incoordination; thus, will proceed with twice daily OT intervention. Pt making improvements and demonstrates excellent tolerance and participation. Pt's  present and very supportive.        Entered by: Ana Luz 07/28/2017 10:38 AM       OT: Pt eating at time of afternoon session.

## 2017-07-28 NOTE — PROGRESS NOTES
ARC admissions: Met with the patient to discuss with her therapy recommendations for ARC. The patient is pleasant, confused, and reports she is motivated to work with therapies. Per care coordinator, the patient's family is motivated for the patient to discharge to [facility]. Pamphlet left with patient for family review.     Thank you for the referral, we will continue to follow this patient for post acute placement.     Determination of admission is based upon the patient's need for an intensive, interdisciplinary approach to rehabilitation, their ability to progress, their ability to tolerate intensive therapies, their need for daily physician supervision, their need for twenty four hour nursing assistance, and their ability and willingness to participate in such a program.    Nigel Cody CM  Rehab Liaison/  [facility]  7/28/2017    2:56 PM

## 2017-07-28 NOTE — PLAN OF CARE
Problem: Goal Outcome Summary  Goal: Goal Outcome Summary  Outcome: Improving  Disoriented to situation. Up with one, gait belt, walker. Generalized weakness. VSS. Adv to regular diet. Denies pain. Midline placed in RUE. K replaced. ARU at time of discharge with midline for acyclovir.     [id number]  Alert to self only this afternoon, other neuros unchanged. Denies pain.

## 2017-07-28 NOTE — PROGRESS NOTES
[facility]    Infectious Disease Progress Note    Date of Service (when I saw the patient): 07/28/2017     Assessment & Plan   Shanna Shanks is a 42 year old female who was admitted on 7/24/2017.     Impression:  42 y.o female with Multiple sclerosis on Tecfidera.   Admitted with acute onset encephalopathy which has been resolving.   MRI raises a possibility of HSV encephalitis given temporal lobe lesions.   LP has only 8 wbc and negative for HSV PCR. If encephalitis not unlikely to have negative finding in the CSF.   On Acyclovir. Seems to be improving.      Recommendations:   Agree with possibility of Herpes encephalitis especially given rapid improvement with therpay, and would recommend continuing treatment with Acyclovir IV. 14 days course. Day 4/14 today.  Will need midline.   Order for IV acyclovir in the chart if gets discharged over the weekend.        Angela Newton MD    Interval History   Afebrile   No other new complaints feeling stronger     Physical Exam   Temp: 98.3  F (36.8  C) Temp src: Oral BP: 125/85 Pulse: 79 Heart Rate: 82 Resp: 16 SpO2: 97 % O2 Device: None (Room air)    Vitals:    07/26/17 0200 07/27/17 0611 07/28/17 0417   Weight: 61.6 kg (135 lb 12.9 oz) 64 kg (141 lb 1.5 oz) 64.5 kg (142 lb 3.2 oz)     Vital Signs with Ranges  Temp:  [97.5  F (36.4  C)-99.3  F (37.4  C)] 98.3  F (36.8  C)  Pulse:  [70-93] 79  Heart Rate:  [75-85] 82  Resp:  [16-18] 16  BP: (115-125)/(77-85) 125/85  SpO2:  [95 %-98 %] 97 %    Constitutional: Awake, alert, cooperative, no apparent distress  Lungs: Clear to auscultation bilaterally, no crackles or wheezing  Cardiovascular: Regular rate and rhythm, normal S1 and S2, and no murmur noted  Abdomen: Normal bowel sounds, soft, non-distended, non-tender  Skin: No rashes, no cyanosis, no edema  Other:    Medications     NaCl 100 mL/hr at 07/28/17 0214     - MEDICATION INSTRUCTIONS -         levETIRAcetam  750 mg Oral BID     venlafaxine  75 mg  Oral Daily     acyclovir (ZOVIRAX) IV  10 mg/kg Intravenous Q8H     heparin  5,000 Units Subcutaneous Q8H     norethindrone  0.35 mg Oral Daily     dimethyl fumarate  240 mg Oral BID       Data   All microbiology laboratory data reviewed.  Recent Labs   Lab Test  07/27/17   0742  07/25/17   0451  07/24/17   1808   WBC   --   10.5  11.5*   HGB   --   11.2*  12.0   HCT   --   33.5*  36.3   MCV   --   93  93   PLT  213  182  209     Recent Labs   Lab Test  07/28/17   0748  07/27/17   0742  07/26/17   0515   CR  0.57  0.52  0.56     No lab results found.  Recent Labs   Lab Test  07/26/17   1025  07/24/17   1950  07/24/17   1932  12/17/15   0949  03/16/15   0909  10/21/11   1049  09/18/09   1125   CULT  Culture negative monitoring continues  No growth after 4 days  No growth after 4 days  >100,000 colonies/mL Escherichia coli*  >100,000 colonies/mL mixed urogenital jen  >100,000 colonies/mL Coagulase negative Staphylococcus  >100,000 colonies/mL Staphylococcus aureus

## 2017-07-28 NOTE — PROVIDER NOTIFICATION
"Text page to Dr. Britton, \"Pt c/o headache-requesting PRN med. Also would you like potassium corrected? 3.2 today. Thanks\"  "

## 2017-07-28 NOTE — PLAN OF CARE
Problem: Goal Outcome Summary  Goal: Goal Outcome Summary  Discharge Planner SLP   Patient plan for discharge: ARU  Current status: SLP: Follow up dysphagia treatment held with pt s family present. Regular textures trialed with no overt s/sx of aspiration throughout. Pt continues to be an aspiration risk given decreased cognition. Recommended: upgrade to regular diet with continued thin liquids; upright with all intake, small bites and sips, slow rate, reduce distractions during meals. Discussed cognitive-linguistic skills.  reported daily improvements with pt  grasping for words  at baseline. In conversation, difficulty following directions and delayed processing time noted. Pt and  verbalized agreement with ST in hospital to target swallowing goals and ST at ARU to eval/treat cognitive-linguistic skills  Barriers to return to prior living situation: Decreased cognition  Recommendations for discharge: ARU  Rationale for recommendations: Pt would benefit from continued ST to target 1-2 sessions for diet tolerance and cognitive-linguistic eval as appropriate.        Entered by: Steffanie Calderon 07/28/2017 12:17 PM

## 2017-07-28 NOTE — PLAN OF CARE
Problem: Goal Outcome Summary  Goal: Goal Outcome Summary  Discharge Planner PT   Patient plan for discharge: None stated   Current status: Pt transfers sit <> stand with min-mod a x 1 and FWW. Pivot transfers to toilet with min-mod a x 1 and FWW. Ambulates for distance of 100' with FWW and min a x 1. No overt LOB noted, however, pt is unsteady during ambulation. Pt reports fatigue with amb of this distance.   Barriers to return to prior living situation: Decreased activity tolerance, falls risk, inability to amb household distances.   Recommendations for discharge: ARU   Rationale for recommendations: Pt is motivated to participate in therapy and has strong family support. Would benefit from cont therapy in ARU setting to progress I with mobility, to decrease falls risk, and to facilitate return to PLOF.        Entered by: Issac Kerr 07/28/2017 9:54 AM

## 2017-07-28 NOTE — PROGRESS NOTES
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 Will advise to hold it for now and family will discuss it with her primary neurologist.   (G35) Multiple sclerosis (H)  --Advanced MS  --Management [facility] clinic  #. PT/OT/Speech  --continue  evaluations   #.Nutrition  --Per speech therapy evaluation   #. ICU prophylaxis:   --Stress ulcer prophylaxis Protonix  --DVT prophylaxis: mechanical, patient is able to ambulate    CODE STATUS: Full Code    Family update by me today: yes    Patient can be d/c to ARU with PICC line and acyclovir. Will sign off    Interval History   No further seizures. Speaks slightly more, three-four words at a time.      Physical Exam     Temp: 98.3  F (36.8  C) Temp src: Oral BP: 125/85 Pulse: 79 Heart Rate: 82 Resp: 16 SpO2: 97 % O2 Device: None (Room air)        General Appearance:   No acute distress  Neuro:       Mental Status Exam:   Awake, alert. Speaks more, still with aphasia. Mental status is normal       Cranial Nerves:  Pupils 3 mm, reactive. EOMI. Face sensation is normal. Face is symmetric.Tongue and uvula are midline. Tongue with a bite salma Other CN are normal           Motor:  Right sided weakness 4/5. Tone and bulk are normal           Reflexes:  Normal DTR.Toes downgoing.        Sensory:   Untestable                  Coordination:    Untestable       Gait: Untestable  Cardiovascular: Regular rate and rhythm, no m/r/g  Lungs: Resp: 16  Both hemithoraces are symmetrical,  auscultation of lungs revealed no bronchovesicular sounds, expirium prolongation, wheezing, rhonci and crackles  Abdomen: Soft, not tender, not distended  Skin/Extremities: No clubbing, cyanosis, no edema     Medications   Infusions medications:    NaCl 100 mL/hr at 07/28/17 0214     - MEDICATION INSTRUCTIONS -       Schedule medications:    levETIRAcetam  750 mg Intravenous Q12H     venlafaxine  75 mg Oral Daily     acyclovir (ZOVIRAX) IV  10 mg/kg Intravenous Q8H     heparin  5,000 Units Subcutaneous Q8H     norethindrone  0.35 mg Oral Daily     dimethyl fumarate  240 mg Oral BID     PRN medications:baclofen, - MEDICATION INSTRUCTIONS -, HOLD MEDICATION, naloxone  Data       Labotary Data:   All data was reviewed by me personally  CBC RESULTS:  Recent Labs   Lab Test  07/27/17   0742  07/25/17   0451  07/24/17   1808   WBC   --   10.5  11.5*   RBC   --   3.61*  3.90   HGB   --   11.2*  12.0   HCT   --   33.5*  36.3   PLT  213  182  209     Basic Metabolic Panel:  Recent Labs   Lab Test  07/27/17   0742  07/26/17   0515  07/25/17   2255  07/25/17   0451  07/24/17   1808 11/18/13 02/20/12   0938   NA  139  141   --   138  139  139  141   POTASSIUM  3.4  3.5  3.5  4.3  4.4  4.0  4.3   CHLORIDE  106  108   --   105  105  108  107   CO2  22  23   --   25  23   --   23   BUN  4*  6*   --   6*  9  5  17   CR  0.52  0.56   --   0.59  0.63  0.91  0.79   GLC  93  97   --   111*  111*  86  90   JULIET  8.0*  7.6*   --   8.1*  8.2*  8.9  8.7     ABG:No lab results found.  Liver panel:  Recent Labs   Lab Test  07/26/17   0515 11/18/13 02/20/12   0938   PROTTOTAL  5.7*  7.0  7.2   ALBUMIN  2.8*  4.2  4.0   BILITOTAL  0.4  0.6  0.8   ALKPHOS  35*  48  52   AST  36  25  22   ALT  24  17  14     Procalcitonin: No lab results found.  Coagulation  Recent Labs   Lab Test  07/24/17   1808   INR  1.05   PTT  29      Lipid Profile:  Recent Labs   Lab Test  02/20/12   0938   CHOL  242*   HDL  72   LDL  140*   TRIG  147   CHOLHDLRATIO  3.4      Thyroid Panel:No lab results found.   Vitamin B12: No lab results found.   Vitamin D:No lab results found.  A1C: No lab results found.  Troponin I: No lab results found.  CPK: No lab results found.  Ammonia: No lab results found.  Serum Osmolality:No lab results found.  Most Recent 6 Bacteria Isolates From Any Culture (See EPIC Reports for Culture Details):  Recent Labs   Lab Test  07/26/17   1025  07/24/17   1950  07/24/17   1932  12/17/15   0949  03/16/15   0909  10/21/11   1049   CULT  Culture negative monitoring continues  No growth after 4 days  No growth after 4 days  >100,000 colonies/mL Escherichia coli*  >100,000 colonies/mL mixed urogenital jen  >100,000 colonies/mL Coagulase negative Staphylococcus     UA Results:  Recent Labs   Lab Test  07/24/17   1912   COLOR  Yellow   APPEARANCE  Clear   URINEGLC  Negative   URINEBILI  Negative   URINEKETONE  40*   SG  1.025   UBLD  Negative   URINEPH  6.0   PROTEIN  30*   UROBILINOGEN  0.2   NITRITE  Negative   LEUKEST  Negative   RBCU  2-5*   WBCU  O - 2     CSF studies (WBC, RBC, Glucose, Protein):  Recent Labs   Lab Test  07/26/17   1025   CWBC  8*   CRBC  0   CGLU  58   CTP  29   HSV is negative       Cardiac US:   --     Neurophysiology:   --EEG - left sided spikes       Imaging:   All imaging studies were reviewed personally  CT head:   Diffuse cerebral volume loss and cerebral white matter  changes consistent with the patient's history of multiple sclerosis.  No evidence for acute intracranial pathology.  CT perfusion  Normal CT perfusion of the brain.  CT angiography neck/head  Normal neck and head CTA.  MRI brain:   1. Mildly restricted diffusion in the anterior medial left temporal lobe and anterior hippocampal region with some mild associated  increased T2 signal but no enhancement. Pattern could be due to a focal area of ischemic infarction although it is difficult to exclude  herpes encephalitis.  2. Ventriculomegaly is slightly out of proportion  to the subarachnoid spaces with marked thinning of the corpus callosum but no ballooning  of the temporal horns. Findings are most likely due to atrophy although it is difficult to exclude communicating hydrocephalus.  3. Moderately extensive white matter changes in both hemispheres which are most likely due to chronic small vessel ischemic disease given the  patient's age.   4. No evidence for intracranial hemorrhage or any focal mass lesions.

## 2017-07-28 NOTE — PROGRESS NOTES
[facility]    Hospitalist Progress Note    Date of Service (when I saw the patient): 07/28/2017    Assessment & Plan   Shanna Shanks is a 42 year-old female with history of multiple sclerosis, urinary incontinence who was admitted on 7/24/2017 with unresponsiveness and right hemiplegia.    Encephalopathy, suspected secondary to HSV encephalitis  Seizures, suspected secondary to HSV encephalitis  Presented with unresponsive episode associated with right hemiplegia.  Underwent stroke workup in the emergency department including CT head and CTA head and neck which was negative for bleed or large clot.  Due to her level of unresponsiveness initially as well as concern for catastrophic neurologic event, she was intubated in the emergency department and admitted to the ICU.  She has since been successfully extubated. Concern for left-sided seizure activity on EEG. 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 Increased risk w/ chronic immunosuppressant for multiple sclerosis  - Neurology consulted, appreciate assistance. Have now signed off.   - HSV PCR on CSF negative, but still with high clinical suspicion. Agree with continuing acyclovir. ID consulted and agrees with treatment as well, plan to complete 14 day course of IV acyclovir   - Midline placed for acyclovir 7/28/2017  - Ongoing improvement in mental status, still not to baseline  - Continues on levetiracetam per neurology  - PT/OT/Speech. Anticipate ARU on discharge.    Fevers, likely secondary to HSV encephalitis   Noted to have low-grade fevers overnight 7/25/17with Tmax of 38 degrees Celsius.  Has not been on antibiotics since admission.  Initial urinalysis and chest x-ray unremarkable. MRI results concerning for HSV encephalitis. CXR ordered 7/25/17 due to fevers with improving mild residual infiltrate (not commented on on original XR), denies cough or shortness of  breath, doubt pneumonia at this time.  LP with 8 WBC, negative HSV on PCR, see above  - Remains afebrile since anti-viral therapy started     Multiple sclerosis  Follows with [facility].  - Neurology consulted during admission as above.   - Discontinued prior to admission dimethyl fumarate at neurology recommendations. Continue holding prior to admission dalfampridine, as seizure is listed as a contraindication  - Continue baclofen PRN    Urinary incontinence  Associated with multiple sclerosis. Hurtado catheter discontinued 7/26/17  - Continue holding prior to admission oxybutynin until mental status at baseline.  She had tolerated this well at home without mental status issues, unlikely that this is contributing to her presenting encephalopathy significantly, but also wish to avoid exacerbating confusion     Depression  Continue prior to admission venlafaxine    DVT Prophylaxis: Heparin SQ  Code Status: Full Code    Disposition: Expected discharge to ARU once bed available.     Boris Britton    Interval History   Continues to feel improved. Denies any new symptoms. Denies vision changes, focal numbness/tingling/weakness.     -Data reviewed today: I reviewed all new labs and imaging results over the last 24 hours. I personally reviewed no images or EKG's today.    Physical Exam   Temp: 98  F (36.7  C) Temp src: Oral BP: 115/75 Pulse: 85 Heart Rate: 82 Resp: 16 SpO2: 99 % O2 Device: None (Room air)    Vitals:    07/26/17 0200 07/27/17 0611 07/28/17 0417   Weight: 61.6 kg (135 lb 12.9 oz) 64 kg (141 lb 1.5 oz) 64.5 kg (142 lb 3.2 oz)     Vital Signs with Ranges  Temp:  [98  F (36.7  C)-99.3  F (37.4  C)] 98  F (36.7  C)  Pulse:  [70-93] 85  Heart Rate:  [75-82] 82  Resp:  [16-18] 16  BP: (115-125)/(75-85) 115/75  SpO2:  [96 %-99 %] 99 %  I/O last 3 completed shifts:  In: 4843 [P.O.:400; I.V.:4443]  Out: -     Constitutional: Middle-aged female, NAD  Respiratory: Lungs clear to auscultation  "bilaterally  Cardiovascular: RRR, no m/r/g. No peripheral edema.  GI: Soft, non-tender, non-distended. BS normoactive.   Skin/Integumen: Warm, dry  Neuro: Alert. Oriented to \"hospital\", not specific one. Able to state date, although reading from white board in the room. Upper and lower extremity strength intact and symmetric bilaterally. Following all commands.     Medications     NaCl 100 mL/hr at 07/28/17 1444     - MEDICATION INSTRUCTIONS -         levETIRAcetam  750 mg Oral BID     sodium chloride (PF)  10 mL Intracatheter Q8H     venlafaxine  75 mg Oral Daily     acyclovir (ZOVIRAX) IV  10 mg/kg Intravenous Q8H     heparin  5,000 Units Subcutaneous Q8H     norethindrone  0.35 mg Oral Daily       Data     Recent Labs  Lab 07/28/17  0748 07/27/17  0742 07/26/17  0515  07/25/17  0451 07/24/17  1808   WBC  --   --   --   --  10.5 11.5*   HGB  --   --   --   --  11.2* 12.0   MCV  --   --   --   --  93 93   PLT  --  213  --   --  182 209   INR  --   --   --   --   --  1.05    139 141  --  138 139   POTASSIUM 3.2* 3.4 3.5  < > 4.3 4.4   CHLORIDE 109 106 108  --  105 105   CO2 25 22 23  --  25 23   BUN 4* 4* 6*  --  6* 9   CR 0.57 0.52 0.56  --  0.59 0.63   ANIONGAP 9 11 10  --  8 11   JULIET 8.1* 8.0* 7.6*  --  8.1* 8.2*   GLC 93 93 97  --  111* 111*   ALBUMIN  --   --  2.8*  --   --   --    PROTTOTAL  --   --  5.7*  --   --   --    BILITOTAL  --   --  0.4  --   --   --    ALKPHOS  --   --  35*  --   --   --    ALT  --   --  24  --   --   --    AST  --   --  36  --   --   --    < > = values in this interval not displayed.    No results found for this or any previous visit (from the past 24 hour(s)).  "

## 2017-07-28 NOTE — PLAN OF CARE
Problem: Goal Outcome Summary  Goal: Goal Outcome Summary  Outcome: Improving  A&O to self only. Neuros intact except R hemiparesis and slight R facial droop. VSS. DD2 diet. Up with SBA and walker. Denies pain. ID and social work consulted to see today.

## 2017-07-29 ENCOUNTER — APPOINTMENT (OUTPATIENT)
Dept: OCCUPATIONAL THERAPY | Facility: CLINIC | Age: 42
DRG: 097 | End: 2017-07-29
Payer: COMMERCIAL

## 2017-07-29 ENCOUNTER — APPOINTMENT (OUTPATIENT)
Dept: PHYSICAL THERAPY | Facility: CLINIC | Age: 42
DRG: 097 | End: 2017-07-29
Payer: COMMERCIAL

## 2017-07-29 ENCOUNTER — APPOINTMENT (OUTPATIENT)
Dept: GENERAL RADIOLOGY | Facility: CLINIC | Age: 42
DRG: 097 | End: 2017-07-29
Attending: INTERNAL MEDICINE
Payer: COMMERCIAL

## 2017-07-29 LAB
ALBUMIN UR-MCNC: NEGATIVE MG/DL
APPEARANCE UR: CLEAR
BILIRUB UR QL STRIP: NEGATIVE
COLOR UR AUTO: ABNORMAL
GLUCOSE UR STRIP-MCNC: NEGATIVE MG/DL
HGB UR QL STRIP: ABNORMAL
KETONES UR STRIP-MCNC: NEGATIVE MG/DL
LEUKOCYTE ESTERASE UR QL STRIP: NEGATIVE
MUCOUS THREADS #/AREA URNS LPF: PRESENT /LPF
NITRATE UR QL: NEGATIVE
PH UR STRIP: 7.5 PH (ref 5–7)
POTASSIUM SERPL-SCNC: 3.9 MMOL/L (ref 3.4–5.3)
RBC #/AREA URNS AUTO: 20 /HPF (ref 0–2)
SP GR UR STRIP: 1.01 (ref 1–1.03)
URN SPEC COLLECT METH UR: ABNORMAL
UROBILINOGEN UR STRIP-MCNC: NORMAL MG/DL (ref 0–2)
WBC #/AREA URNS AUTO: 2 /HPF (ref 0–2)

## 2017-07-29 PROCEDURE — 40000239 ZZH STATISTIC VAT ROUNDS

## 2017-07-29 PROCEDURE — 40000133 ZZH STATISTIC OT WARD VISIT

## 2017-07-29 PROCEDURE — 97116 GAIT TRAINING THERAPY: CPT | Mod: GP

## 2017-07-29 PROCEDURE — 25000128 H RX IP 250 OP 636: Performed by: INTERNAL MEDICINE

## 2017-07-29 PROCEDURE — 36415 COLL VENOUS BLD VENIPUNCTURE: CPT | Performed by: INTERNAL MEDICINE

## 2017-07-29 PROCEDURE — 25000132 ZZH RX MED GY IP 250 OP 250 PS 637

## 2017-07-29 PROCEDURE — 97535 SELF CARE MNGMENT TRAINING: CPT | Mod: GO

## 2017-07-29 PROCEDURE — 71020 XR CHEST 2 VW: CPT

## 2017-07-29 PROCEDURE — 25000132 ZZH RX MED GY IP 250 OP 250 PS 637: Performed by: INTERNAL MEDICINE

## 2017-07-29 PROCEDURE — 12000000 ZZH R&B MED SURG/OB

## 2017-07-29 PROCEDURE — 97530 THERAPEUTIC ACTIVITIES: CPT | Mod: GP

## 2017-07-29 PROCEDURE — 40000193 ZZH STATISTIC PT WARD VISIT

## 2017-07-29 PROCEDURE — 25000128 H RX IP 250 OP 636: Performed by: PSYCHIATRY & NEUROLOGY

## 2017-07-29 PROCEDURE — 84132 ASSAY OF SERUM POTASSIUM: CPT | Performed by: INTERNAL MEDICINE

## 2017-07-29 PROCEDURE — 81001 URINALYSIS AUTO W/SCOPE: CPT | Performed by: INTERNAL MEDICINE

## 2017-07-29 PROCEDURE — 99233 SBSQ HOSP IP/OBS HIGH 50: CPT | Performed by: INTERNAL MEDICINE

## 2017-07-29 RX ORDER — BISACODYL 10 MG
10 SUPPOSITORY, RECTAL RECTAL DAILY PRN
Status: DISCONTINUED | OUTPATIENT
Start: 2017-07-29 | End: 2017-07-30 | Stop reason: HOSPADM

## 2017-07-29 RX ORDER — POLYETHYLENE GLYCOL 3350 17 G/17G
17 POWDER, FOR SOLUTION ORAL DAILY PRN
Status: DISCONTINUED | OUTPATIENT
Start: 2017-07-29 | End: 2017-07-30 | Stop reason: HOSPADM

## 2017-07-29 RX ORDER — AMOXICILLIN 250 MG
1-2 CAPSULE ORAL 2 TIMES DAILY PRN
Status: DISCONTINUED | OUTPATIENT
Start: 2017-07-29 | End: 2017-07-30 | Stop reason: HOSPADM

## 2017-07-29 RX ORDER — LEVETIRACETAM 750 MG/1
750 TABLET ORAL 2 TIMES DAILY
Status: ON HOLD | DISCHARGE
Start: 2017-07-29 | End: 2017-08-09

## 2017-07-29 RX ADMIN — VENLAFAXINE HYDROCHLORIDE 75 MG: 75 CAPSULE, EXTENDED RELEASE ORAL at 09:02

## 2017-07-29 RX ADMIN — SODIUM CHLORIDE: 9 INJECTION, SOLUTION INTRAVENOUS at 04:10

## 2017-07-29 RX ADMIN — ACETAMINOPHEN 650 MG: 325 TABLET, FILM COATED ORAL at 20:04

## 2017-07-29 RX ADMIN — LEVETIRACETAM 750 MG: 750 TABLET, FILM COATED ORAL at 09:02

## 2017-07-29 RX ADMIN — ACYCLOVIR SODIUM 600 MG: 50 INJECTION, SOLUTION INTRAVENOUS at 11:06

## 2017-07-29 RX ADMIN — HEPARIN SODIUM 5000 UNITS: 5000 INJECTION, SOLUTION INTRAVENOUS; SUBCUTANEOUS at 16:39

## 2017-07-29 RX ADMIN — ACYCLOVIR SODIUM 600 MG: 50 INJECTION, SOLUTION INTRAVENOUS at 20:04

## 2017-07-29 RX ADMIN — ACYCLOVIR SODIUM 600 MG: 50 INJECTION, SOLUTION INTRAVENOUS at 04:08

## 2017-07-29 RX ADMIN — HEPARIN SODIUM 5000 UNITS: 5000 INJECTION, SOLUTION INTRAVENOUS; SUBCUTANEOUS at 01:11

## 2017-07-29 RX ADMIN — LEVETIRACETAM 750 MG: 750 TABLET, FILM COATED ORAL at 20:04

## 2017-07-29 RX ADMIN — ACETAMINOPHEN 650 MG: 325 TABLET, FILM COATED ORAL at 13:04

## 2017-07-29 RX ADMIN — ACETAMINOPHEN AND CODEINE PHOSPHATE 0.35 MG: 120; 12 SOLUTION ORAL at 09:03

## 2017-07-29 RX ADMIN — HEPARIN SODIUM 5000 UNITS: 5000 INJECTION, SOLUTION INTRAVENOUS; SUBCUTANEOUS at 09:02

## 2017-07-29 ASSESSMENT — VISUAL ACUITY
OU: NORMAL ACUITY

## 2017-07-29 NOTE — PLAN OF CARE
Problem: Goal Outcome Summary  Goal: Goal Outcome Summary  Outcome: Improving  Disoriented to time/place/situation. Generalized weakness. Denies N/T. Up with one, gait belt, walker. Denies pain. Regular diet. RUE midline patent. Plan to d/c to [facility] at 1230 via spouse.

## 2017-07-29 NOTE — PROGRESS NOTES
Social Work Progress Note  Pt chart reviewed. Pt discussed in interdisciplinary rounds.     Intervention: Pt will d/c today to [facility] at 1230 via family. Pt and family are amenable to current plan and has no further questions at this time.     Team members notified: Bedside RN, Charge RN [facility]    Plan:  Anticipated Discharge Placement: [facility]   [address] [place], [place] [address] [address]  [phone]  Barriers: None identified at this time.   Follow-up plan: No further plans to follow. Sw available should a need arise.     RACHEL Mayer, JEN  [phone]  7/29/2017 10:28 AM    ADDENDUM:   Pt's discharge has been cancelled for today due to her noted fever per MD. Per Kerry, pt will be reviewed tomorrow for admission.     RACHEL Mayer, JEN  [phone]  7/29/2017 12:49 PM

## 2017-07-29 NOTE — DISCHARGE SUMMARY
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    Hospital Course   Shanna Shanks was admitted on 7/24/2017.  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 Underwent stroke workup in the Emergency Department including CT head and CTA head and neck which was negative for bleed or large clot. Due to her level of unresponsiveness initially as well as concern for catastrophic neurologic event, she was intubated in the Emergency Department and admitted to the ICU.  She was successfully extubated shortly after. Neurology consulted. Concern for left-sided seizure activity on EEG. 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 Increased risk with chronic immunosuppressants for multiple sclerosis. Underwent LP with 8 WBC, HSV PCR negative, however with with high clinical suspicion for HSV encephalitis. Started on acyclovir. Infectious disease consulted, agreed with treatment as well, plan to complete 14 day course of IV acyclovir. Midline placed for acyclovir 7/28/2017. Ongoing improvement in mental status, still not to baseline. Started on levetiracetam with dose increased per neurology, recommended to continue on this with follow-up with her neurologist through [facility] for ongoing management.  PT/OT/Speech consulted during admission, recommended discharge to ARU for ongoing therapies.      Fevers, likely secondary to HSV encephalitis   Noted to have low-grade fevers overnight 7/25/17 with Tmax of 38 degrees Celsius.  Initial urinalysis and chest x-ray unremarkable. MRI results concerning for HSV encephalitis. CXR ordered 7/25/17 due to fevers with improving mild residual infiltrate (not commented on on original XR), denies cough or shortness of breath, doubt pneumonia at this time.  LP as above. Remained afebrile following initiation of antiviral therapy.      Multiple sclerosis  Follows with [facility] clinic. Prior to admission dalfampridine held, as seizure is listed as a contraindication. Initially continued on dimethyl fumarate, discontinued at neurology recommendations given HSV encephalitis, recommended to follow-up closely with her [facility] neurologist. Continue baclofen PRN.      Urinary incontinence  Associated with multiple sclerosis. Prior to admission on oxybutynin, which was held on admission due to change in mental status. She had tolerated this well at home without mental status issues, unlikely that this is contributing to her presenting encephalopathy significantly, but also wish to avoid exacerbating confusion therefore continued to hold on discharge until mental status closer to baseline and/or urinary symptoms become too bothersome.      Depression  Continue prior to admission venlafaxine    Boris Britton    Significant Results and Procedures   XR lumbar puncture 7/26/17    Pending Results   These results will be followed up by neurology / hospitalist.   Unresulted Labs Ordered in the Past 30 Days of this Admission     Date and Time Order Name Status Description    7/25/2017 1106 [place] virus RNA by PCR CSF Tube 3 In process     7/25/2017 1106 CSF Culture Aerobic Bacterial Tube 2 Preliminary     7/24/2017 1938 Blood culture Preliminary     7/24/2017 1938 Blood culture Preliminary         "  Code Status   Full Code       Primary Care Physician   Malvin Beck MD    Physical Exam   Temp: 100.9  F (38.3  C) Temp src: Oral BP: 125/85 Pulse: 92 Heart Rate: 93 Resp: 16 SpO2: 95 % O2 Device: None (Room air)    Vitals:    07/27/17 0611 07/28/17 0417 07/29/17 0603   Weight: 64 kg (141 lb 1.5 oz) 64.5 kg (142 lb 3.2 oz) 64.6 kg (142 lb 6.7 oz)     Vital Signs with Ranges  Temp:  [98.6  F (37  C)-100.9  F (38.3  C)] 100.9  F (38.3  C)  Pulse:  [78-92] 92  Heart Rate:  [75-94] 93  Resp:  [16] 16  BP: (112-125)/(72-98) 125/85  SpO2:  [93 %-98 %] 95 %  I/O last 3 completed shifts:  In: 5263 [P.O.:820; I.V.:4443]  Out: -     Constitutional: Middle-aged female, NAD  Respiratory: Lungs clear to auscultation bilaterally  Cardiovascular: RRR, no m/r/g. No peripheral edema.  GI: Soft, non-tender, non-distended. BS normoactive.   Skin/Integumen: Warm, dry  Neuro: Alert. Oriented to \"hospital\", not specific one. Able to state date, although reading from white board in the room. Unable to state with detail the reason for her hospitalization. Upper and lower extremity strength intact and symmetric bilaterally. Following all commands.     Discharge Disposition   Discharged to home  Condition at discharge: Stable    Consultations This Hospital Stay   PHYSICAL THERAPY ADULT IP CONSULT  OCCUPATIONAL THERAPY ADULT IP CONSULT  SPEECH LANGUAGE PATH ADULT IP CONSULT  SOCIAL WORK IP CONSULT  INFECTIOUS DISEASES IP CONSULT  VASCULAR ACCESS ADULT IP CONSULT  OCCUPATIONAL THERAPY ADULT IP CONSULT  PHYSICAL THERAPY ADULT IP CONSULT  SPEECH LANGUAGE PATH ADULT IP CONSULT    Time Spent on this Encounter   Boris ESPOSITO, personally saw the patient today and spent 40 minutes discharging this patient.    Discharge Orders     Mantoux instructions   Give two-step Mantoux (PPD) Per Facility Policy Yes     Activity - Up with nursing assistance     IV access   Right midline. Routine cares per IV access protocol.     Full Code "     Occupational Therapy Adult Consult   Evaluate and treat as clinically indicated.    Reason:  Functional immobility     Physical Therapy Adult Consult   Evaluate and treat as clinically indicated.    Reason:  Physical deconditioning     Speech Language Path Adult Consult   Evaluate and treat as clinically indicated.    Reason:  Dysphagia     Advance Diet as Tolerated   Follow this diet upon discharge: Regular diet       Discharge Medications   Current Discharge Medication List      START taking these medications    Details   levETIRAcetam (KEPPRA) 750 MG tablet Take 1 tablet (750 mg) by mouth 2 times daily    Associated Diagnoses: Seizures (H)      acyclovir 625 mg Inject 625 mg into the vein every 8 hours for 10 days Please fax creat level to Dr. Diamond office while on this medication.    Associated Diagnoses: Herpes encephalitis         CONTINUE these medications which have NOT CHANGED    Details   norethindrone (MICRONOR) 0.35 MG per tablet Take 1 tablet by mouth daily      venlafaxine (EFFEXOR-XR) 75 MG 24 hr capsule Take 75 mg by mouth daily      baclofen (LIORESAL) 10 MG tablet Take 10-20 mg by mouth 4 times daily as needed   Refills: 3      vitamin D (ERGOCALCIFEROL) 23174 UNIT capsule Take 50,000 Units by mouth once a week   Refills: 1         STOP taking these medications       oxybutynin (DITROPAN-XL) 5 MG 24 hr tablet Comments:   Reason for Stopping:         dimethyl fumarate (TECFIDERA) delayed release capsule Comments:   Reason for Stopping:         Dalfampridine (AMPYRA PO) Comments:   Reason for Stopping:         Zolpidem Tartrate (AMBIEN PO) Comments:   Reason for Stopping:             Allergies   Allergies   Allergen Reactions     Diphenhydramine Rash     benadryl cream     Nickel      Data   Most Recent 3 CBC's:  Recent Labs   Lab Test  07/27/17   0742  07/25/17   0451  07/24/17   1808   WBC   --   10.5  11.5*   HGB   --   11.2*  12.0   MCV   --   93  93   PLT  213  182  209      Most Recent 3  BMP's:  Recent Labs   Lab Test  07/29/17   0005  07/28/17   0748  07/27/17   0742  07/26/17   0515   NA   --   143  139  141   POTASSIUM  3.9  3.2*  3.4  3.5   CHLORIDE   --   109  106  108   CO2   --   25  22  23   BUN   --   4*  4*  6*   CR   --   0.57  0.52  0.56   ANIONGAP   --   9  11  10   JULIET   --   8.1*  8.0*  7.6*   GLC   --   93  93  97     Most Recent 2 LFT's:  Recent Labs   Lab Test  07/26/17   0515 11/18/13   AST  36  25   ALT  24  17   ALKPHOS  35*  48   BILITOTAL  0.4  0.6     Most Recent INR's and Anticoagulation Dosing History:  Anticoagulation Dose History     Recent Dosing and Labs Latest Ref Rng & Units 7/24/2017    INR 0.86 - 1.14 1.05        Most Recent 3 Troponin's:No lab results found.  Most Recent Cholesterol Panel:  Recent Labs   Lab Test  02/20/12   0938   CHOL  242*   LDL  140*   HDL  72   TRIG  147     Most Recent 6 Bacteria Isolates From Any Culture (See EPIC Reports for Culture Details):  Recent Labs   Lab Test  07/26/17   1025  07/24/17   1950  07/24/17   1932  12/17/15   0949  03/16/15   0909  10/21/11   1049   CULT  Culture negative monitoring continues  No growth after 5 days  No growth after 5 days  >100,000 colonies/mL Escherichia coli*  >100,000 colonies/mL mixed urogenital jen  >100,000 colonies/mL Coagulase negative Staphylococcus     Most Recent TSH, T4 and A1c Labs:No lab results found.  Results for orders placed or performed during the hospital encounter of 07/24/17   CTA Angiogram Head Neck    Narrative    CT ANGIOGRAM OF THE HEAD AND NECK WITHOUT AND WITH CONTRAST  7/24/2017  6:46 PM     COMPARISON: None.    HISTORY: Code Stroke. Multiple sclerosis. Altered mental status.    TECHNIQUE:  Precontrast localizing scans were followed by CT  angiography with an injection of 120 mL Isovue 370 nonionic  intravenous contrast material with scans through the head and neck.   Images were transferred to a separate 3-D workstation where  multiplanar reformations and 3-D images were  created.  Estimates of  carotid stenoses are made relative to the distal internal carotid  artery diameters except as noted.      FINDINGS:   Neck CTA: The bilateral common carotid, bilateral cervical internal  carotid and bilateral vertebral arteries are patent and unremarkable.    Head CTA: The bilateral distal internal carotid, basilar, bilateral  anterior cerebral, bilateral middle cerebral and bilateral posterior  cerebral arteries are patent and unremarkable.      Impression    IMPRESSION: Normal neck and head CTA.    Radiation dose for this scan was reduced using automated exposure  control, adjustment of the mA and/or kV according to patient size, or  iterative reconstruction technique.      GEOFFREY GILL MD   CT Head w/o Contrast    Narrative    CT OF THE HEAD WITHOUT CONTRAST 7/24/2017 6:45 PM     COMPARISON: None.    HISTORY: Code Stroke. Altered mental status.    TECHNIQUE: 5 mm thick axial CT images of the head were acquired  without IV contrast material.    FINDINGS: There is moderate diffuse cerebral volume loss. There are  extensive confluent areas of decreased density in the cerebral white  matter bilaterally that are consistent with the patient's history of  multiple sclerosis.    The ventricles and basal cisterns are within normal limits in  configuration given the degree of cerebral volume loss.  There is no  midline shift. There are no extra-axial fluid collections.    No intracranial hemorrhage, mass or recent infarct.    The visualized paranasal sinuses are well-aerated. There is no  mastoiditis. There are no fractures of the visualized bones.      Impression    IMPRESSION: Diffuse cerebral volume loss and cerebral white matter  changes consistent with the patient's history of multiple sclerosis.  No evidence for acute intracranial pathology.    Radiation dose for this scan was reduced using automated exposure  control, adjustment of the mA and/or kV according to patient size, or  iterative  reconstruction technique.     GEOFFREY GILL MD   CT Head w Contrast    Narrative    CT BRAIN PERFUSION 7/24/2017 6:55 PM    COMPARISON: None.    HISTORY: Code Stroke.    TECHNIQUE: Time sequential axial CT images of the head were acquired  during the administration of intravenous contrast (50 mL Isovue-370).  CTA images of the [place] as well as color perfusion maps of  the brain were created from this time sequential axial source data.    FINDINGS: There are no focal or regional perfusion defects in the  brain.      Impression    IMPRESSION: Normal CT perfusion of the brain.    Radiation dose for this scan was reduced using automated exposure  control, adjustment of the mA and/or kV according to patient size, or  iterative reconstruction technique.      GEOFFREY GILL MD   Chest  XR, 1 view PORTABLE    Narrative    CHEST PORTABLE ONE VIEW  7/24/2017 7:29 PM     COMPARISON: None.    HISTORY: Post intubation.    FINDINGS: There is an endotracheal tube in place with its tip at the  level of the clavicular heads. The lungs are clear. There is no  pleural effusion or pneumothorax. Heart size is normal with no  evidence for congestive failure.     GEOFFREY GILL MD   MR Brain w/o & w Contrast    Narrative    MRI BRAIN WITHOUT AND WITH CONTRAST  7/25/2017 11:27 AM    HISTORY: Encephalopathy.     TECHNIQUE: Multiplanar, multisequence MRI of the brain without and  with 6 mL Gadavist.    COMPARISON: None.    FINDINGS: Diffusion-weighted images show some minimally restricted  diffusion in the anterior medial temporal lobe involving the uncus  amygdala in the anterior hippocampal region. There is also some  increased signal intensity on the FLAIR and T2-weighted images in this  location but it is not very well pronounced. Postcontrast images do  not show any abnormal areas of enhancement in this region. Gradient  echo sequences do not show any evidence for any hemorrhage. There is  some marked thinning and bowing of  the corpus callosum and there is  some ventriculomegaly somewhat out of proportion in size to the  subarachnoid spaces although the subarachnoid spaces are also  enlarged. Patchy white matter changes are seen in both hemispheres.  Postcontrast images are slightly limited by motion artifact but there  are no abnormal areas of enhancement or any focal mass lesions.      Impression    IMPRESSION:  1. Mildly restricted diffusion in the anterior medial left temporal  lobe and anterior hippocampal region with some mild associated  increased T2 signal but no enhancement. Pattern could be due to a  focal area of ischemic infarction although it is difficult to exclude  herpes encephalitis.  2. Ventriculomegaly is slightly out of proportion to the subarachnoid  spaces with marked thinning of the corpus callosum but no ballooning  of the temporal horns. Findings are most likely due to atrophy  although it is difficult to exclude communicating hydrocephalus.  3. Moderately extensive white matter changes in both hemispheres which  are most likely due to chronic small vessel ischemic disease given the  patient's age.   4. No evidence for intracranial hemorrhage or any focal mass lesions.    HARSHAD STODDARD MD   XR Chest Port 1 View    Narrative    CHEST ONE VIEW PORTABLE July 25, 2017 9:55 AM     HISTORY: Hypoxia.    COMPARISON: 7/24/2017.      Impression    IMPRESSION: Endotracheal tube has been removed. Mild residual  infiltrate behind the heart, improved. Right lung is clear. Normal  pulmonary vascularity.    LACHO ONEAL MD   XR Lumbar Puncture Spinal Tap Diag    Narrative    EXAM: XR LUMBAR PUNCTURE SPINAL TAP DIAGNOSTIC  7/26/2017 10:38 AM       History:  Encephalopathy possibly due to HSV.     PROCEDURE: The patient consented for a lumbar puncture. The benefits  and risks of the procedure were explained to the patient, including  but not limited to worsening headache, hemorrhage, infection, lower  extremity pain, or nerve  root injury. The patient was sterilely  prepped and draped with the patient in the supine position, over the  lower back. Under fluoroscopic guidance, the interlaminar spaces were  noted. 1% lidocaine was administered for local anesthetic over the  L3-4 interlaminar space, and a 22 gauge needle was advanced into the  thecal sac under fluoroscopic guidance. There was initial aspiration  of clear CSF. About 8 cc's total were aspirated.  The needle was  removed. There were no immediate complications associated with the  procedure.       Fluoro time: 0.1 minutes.   Number of Images: 2      Impression    IMPRESSION: Successful lumbar puncture.    DELLA CATALAN PA-C

## 2017-07-29 NOTE — PROVIDER NOTIFICATION
"Text page to Dr. Britton, \"please see chest XR results, thanks\"    Encourage IS, continue to monitor.   "

## 2017-07-29 NOTE — PLAN OF CARE
Problem: Goal Outcome Summary  Goal: Goal Outcome Summary  Discharge Planner PT   Patient plan for discharge: None stated   Current status: Pt transfers sit <> stand with FWW and min a x 1. Pivot transfers to toilet with min a x 1 and FWW. Ambulates for distance of 150' with FWW and min a x 1. Negotiates 3 stairs with min a x 1 and use of bilateral rails.   Barriers to return to prior living situation: Decreased I with all mob, fatigue with amb of household distances, decreased activity tolerance, falls risk.   Recommendations for discharge: ARU   Rationale for recommendations: Pt is motivated to participate in therapy. Would benefit from cont therapy in ARU setting to progress I with mobility, decrease falls risk, and facilitate return to PLOF.        Entered by: Issac Kerr 07/29/2017 9:29 AM

## 2017-07-29 NOTE — PLAN OF CARE
Problem: Goal Outcome Summary  Goal: Goal Outcome Summary  Outcome: Improving  Disoriented to situation and time. Neuros intact, except generalized weakness. VSS. Reg diet. On 14 day acyclovir course. Midline in RUE. Up with A1/GB/walker. Denies pain. Plan is to d/c to ARU once Neuro signs off and pt is stable.

## 2017-07-29 NOTE — PLAN OF CARE
Problem: Goal Outcome Summary  Goal: Goal Outcome Summary  Outcome: Improving  Disoriented to situation. Generalized weakness. Denies N/T. Up with one, gait belt, walker. Frequently voiding, PVR's ok. VSS, T this afternoon 97.7. Encouraging IS/deep breathing. Denies pain. Regular diet. RUE midline patent. Possible ARU d/c tomorrow.

## 2017-07-29 NOTE — PLAN OF CARE
Problem: Goal Outcome Summary  Goal: Goal Outcome Summary  OT: pt seen in AM for ADLs.   Discharge Planner OT   Patient plan for discharge: ARU  Current status: Min A of 1 with amb with walker, Min A of 1 grooming/hygiene at sink, toilet transfer and hygiene d/t balance, cognition,and  neuromotor deficits. Requires set-up, min to moderate cueing through all self-care tasks and physical and verbal cues for safe walker manipulation during ambulatory tasks. CGA-Min A for balance during functional transfers.   Barriers to return to prior living situation: stairs, tub/shower not walk-in, impaired balance, cognition, weakness  Recommendations for discharge: ARU  Rationale for recommendations: pt will benefit from intensive therapies, is able to tolerate multiple sessions per day, is significantly below baseline with potential to improve to allow return home w/family and services as needed.        Entered by: Ajit Brooks 07/29/2017 9:04 AM

## 2017-07-29 NOTE — PLAN OF CARE
Problem: Goal Outcome Summary  Goal: Goal Outcome Summary  OT: pt seen BID today tolerating both sessions well.  Discharge Planner OT   Patient plan for discharge: ARU  Current status: Min A of 1 with mobility during self-care tasks with visual and verbal cues for safe walker manipulation, Min A toileting, H/G sink, oriented to self, knew names of family but not ages of kids, not oriented to month, year  Barriers to return to prior living situation: requiring assist with self-cares and mob d/t decreased balance, cognition  Recommendations for discharge: ARU  Rationale for recommendations: tolerating therapies well, anticipate continued progress and ability to return home w/family and services as needed       Entered by: Ajit Brooks 07/29/2017 2:19 PM

## 2017-07-29 NOTE — PROGRESS NOTES
Update:     Fever noted to 100.9F with recheck 101.2F. Patient,  and RN deny any complaints of urinary frequency, dysuria, cough, abdominal pain. Just had bowel movement which was formed. Mental status slowly improving. Discussed with Dr. Martinez, accepting PM&R at ARU. Requested delay in discharge for additional work-up, will cancel discharge and obtain CXR and UA. Monitor fever curve overnight to ensure stability with no evolving infection and reassess for discharge tomorrow.     See accompanying discharge summary for daily note and physical exam.     Addendum: UA with 20 RBC; negative nitrite, LE and WBC. CXR with shallow inspiration with bibasilar atelectasis and possible infiltrates. Revisited with patient. Continues to deny any cough, shortness of breath, is oxygenating well on room air, now afebrile. Suspect likely atelectasis on XR. Encourage IS, deep inspiration, up out of bed as much as able.       Time Spent on this Encounter   I spent 45 minutes on the unit/floor managing the care of Shanna Shanks. Over 50% of my time was spent counseling the patient and/or coordinating care regarding services listed in this note and accompanying discharge summary.    Boris Britton MD   Hospitalist  [phone]

## 2017-07-30 ENCOUNTER — APPOINTMENT (OUTPATIENT)
Dept: PHYSICAL THERAPY | Facility: CLINIC | Age: 42
DRG: 097 | End: 2017-07-30
Payer: COMMERCIAL

## 2017-07-30 ENCOUNTER — HOSPITAL ENCOUNTER (INPATIENT)
Facility: CLINIC | Age: 42
LOS: 11 days | Discharge: HOME | DRG: 097 | End: 2017-08-10
Attending: PHYSICAL MEDICINE & REHABILITATION | Admitting: PHYSICAL MEDICINE & REHABILITATION
Payer: COMMERCIAL

## 2017-07-30 ENCOUNTER — APPOINTMENT (OUTPATIENT)
Dept: OCCUPATIONAL THERAPY | Facility: CLINIC | Age: 42
DRG: 097 | End: 2017-07-30
Payer: COMMERCIAL

## 2017-07-30 VITALS
TEMPERATURE: 98.9 F | RESPIRATION RATE: 16 BRPM | BODY MASS INDEX: 21.42 KG/M2 | DIASTOLIC BLOOD PRESSURE: 66 MMHG | HEART RATE: 85 BPM | SYSTOLIC BLOOD PRESSURE: 108 MMHG | HEIGHT: 67 IN | WEIGHT: 136.47 LBS | OXYGEN SATURATION: 96 %

## 2017-07-30 DIAGNOSIS — R41.89 IMPAIRED COGNITION: ICD-10-CM

## 2017-07-30 DIAGNOSIS — G35 MULTIPLE SCLEROSIS (H): ICD-10-CM

## 2017-07-30 DIAGNOSIS — N39.0 URINARY TRACT INFECTION, SITE UNSPECIFIED: Primary | ICD-10-CM

## 2017-07-30 DIAGNOSIS — B00.4 HERPES ENCEPHALITIS: ICD-10-CM

## 2017-07-30 DIAGNOSIS — R56.9 SEIZURES (H): ICD-10-CM

## 2017-07-30 LAB
BACTERIA SPEC CULT: NO GROWTH
BACTERIA SPEC CULT: NO GROWTH
ERYTHROCYTE [DISTWIDTH] IN BLOOD BY AUTOMATED COUNT: 13.8 % (ref 10–15)
HCT VFR BLD AUTO: 29.9 % (ref 35–47)
HGB BLD-MCNC: 10 G/DL (ref 11.7–15.7)
Lab: NORMAL
Lab: NORMAL
MCH RBC QN AUTO: 30.3 PG (ref 26.5–33)
MCHC RBC AUTO-ENTMCNC: 33.4 G/DL (ref 31.5–36.5)
MCV RBC AUTO: 91 FL (ref 78–100)
MICRO REPORT STATUS: NORMAL
MICRO REPORT STATUS: NORMAL
PLATELET # BLD AUTO: 235 10E9/L (ref 150–450)
RBC # BLD AUTO: 3.3 10E12/L (ref 3.8–5.2)
SPECIMEN SOURCE: NORMAL
WBC # BLD AUTO: 6.4 10E9/L (ref 4–11)
WNV RNA SER QL NAA+PROBE: NORMAL

## 2017-07-30 PROCEDURE — 85027 COMPLETE CBC AUTOMATED: CPT | Performed by: INTERNAL MEDICINE

## 2017-07-30 PROCEDURE — 25000128 H RX IP 250 OP 636: Performed by: INTERNAL MEDICINE

## 2017-07-30 PROCEDURE — 25000128 H RX IP 250 OP 636: Performed by: PHYSICAL MEDICINE & REHABILITATION

## 2017-07-30 PROCEDURE — 12800006 ZZH R&B REHAB

## 2017-07-30 PROCEDURE — 40000239 ZZH STATISTIC VAT ROUNDS

## 2017-07-30 PROCEDURE — 97535 SELF CARE MNGMENT TRAINING: CPT | Mod: GO

## 2017-07-30 PROCEDURE — 25000132 ZZH RX MED GY IP 250 OP 250 PS 637

## 2017-07-30 PROCEDURE — 25000132 ZZH RX MED GY IP 250 OP 250 PS 637: Performed by: PHYSICAL MEDICINE & REHABILITATION

## 2017-07-30 PROCEDURE — 25000132 ZZH RX MED GY IP 250 OP 250 PS 637: Performed by: INTERNAL MEDICINE

## 2017-07-30 PROCEDURE — 25000128 H RX IP 250 OP 636: Performed by: PSYCHIATRY & NEUROLOGY

## 2017-07-30 PROCEDURE — 99239 HOSP IP/OBS DSCHRG MGMT >30: CPT | Performed by: INTERNAL MEDICINE

## 2017-07-30 PROCEDURE — 40000133 ZZH STATISTIC OT WARD VISIT

## 2017-07-30 PROCEDURE — 99207 ZZC CDG-CODE INCORRECT PER BILLING BASED ON TIME: CPT | Performed by: INTERNAL MEDICINE

## 2017-07-30 PROCEDURE — 40000193 ZZH STATISTIC PT WARD VISIT

## 2017-07-30 PROCEDURE — 36415 COLL VENOUS BLD VENIPUNCTURE: CPT | Performed by: INTERNAL MEDICINE

## 2017-07-30 PROCEDURE — 97530 THERAPEUTIC ACTIVITIES: CPT | Mod: GP

## 2017-07-30 RX ORDER — BACLOFEN 10 MG/1
10-20 TABLET ORAL 4 TIMES DAILY PRN
Status: DISCONTINUED | OUTPATIENT
Start: 2017-07-30 | End: 2017-08-01

## 2017-07-30 RX ORDER — ACETAMINOPHEN 325 MG/1
325-975 TABLET ORAL EVERY 6 HOURS PRN
Status: DISCONTINUED | OUTPATIENT
Start: 2017-07-30 | End: 2017-08-10 | Stop reason: HOSPADM

## 2017-07-30 RX ORDER — VENLAFAXINE HYDROCHLORIDE 75 MG/1
75 TABLET, EXTENDED RELEASE ORAL DAILY
Status: DISCONTINUED | OUTPATIENT
Start: 2017-07-31 | End: 2017-08-10 | Stop reason: HOSPADM

## 2017-07-30 RX ORDER — AMOXICILLIN 250 MG
1-4 CAPSULE ORAL 2 TIMES DAILY
Status: DISCONTINUED | OUTPATIENT
Start: 2017-07-30 | End: 2017-08-10 | Stop reason: HOSPADM

## 2017-07-30 RX ORDER — ERGOCALCIFEROL 1.25 MG/1
50000 CAPSULE, LIQUID FILLED ORAL WEEKLY
Status: DISCONTINUED | OUTPATIENT
Start: 2017-07-30 | End: 2017-08-10 | Stop reason: HOSPADM

## 2017-07-30 RX ORDER — HEPARIN SODIUM 5000 [USP'U]/.5ML
5000 INJECTION, SOLUTION INTRAVENOUS; SUBCUTANEOUS EVERY 8 HOURS
Status: DISCONTINUED | OUTPATIENT
Start: 2017-07-30 | End: 2017-08-04

## 2017-07-30 RX ORDER — LEVETIRACETAM 750 MG/1
750 TABLET ORAL 2 TIMES DAILY
Status: DISCONTINUED | OUTPATIENT
Start: 2017-07-30 | End: 2017-08-10 | Stop reason: HOSPADM

## 2017-07-30 RX ORDER — ACETAMINOPHEN AND CODEINE PHOSPHATE 120; 12 MG/5ML; MG/5ML
1 SOLUTION ORAL DAILY
Status: DISCONTINUED | OUTPATIENT
Start: 2017-07-31 | End: 2017-08-10 | Stop reason: HOSPADM

## 2017-07-30 RX ADMIN — SENNOSIDES AND DOCUSATE SODIUM 2 TABLET: 8.6; 5 TABLET ORAL at 21:32

## 2017-07-30 RX ADMIN — ACYCLOVIR SODIUM 625 MG: 50 INJECTION, SOLUTION INTRAVENOUS at 22:08

## 2017-07-30 RX ADMIN — ACETAMINOPHEN 650 MG: 325 TABLET, FILM COATED ORAL at 09:15

## 2017-07-30 RX ADMIN — SODIUM CHLORIDE: 9 INJECTION, SOLUTION INTRAVENOUS at 00:18

## 2017-07-30 RX ADMIN — ACETAMINOPHEN AND CODEINE PHOSPHATE 0.35 MG: 120; 12 SOLUTION ORAL at 08:58

## 2017-07-30 RX ADMIN — LEVETIRACETAM 750 MG: 750 TABLET, FILM COATED ORAL at 21:33

## 2017-07-30 RX ADMIN — ACYCLOVIR SODIUM 625 MG: 50 INJECTION, SOLUTION INTRAVENOUS at 21:35

## 2017-07-30 RX ADMIN — ERGOCALCIFEROL 50000 UNITS: 1.25 CAPSULE ORAL at 21:34

## 2017-07-30 RX ADMIN — VENLAFAXINE HYDROCHLORIDE 75 MG: 75 CAPSULE, EXTENDED RELEASE ORAL at 08:58

## 2017-07-30 RX ADMIN — HEPARIN SODIUM 5000 UNITS: 5000 INJECTION, SOLUTION INTRAVENOUS; SUBCUTANEOUS at 00:18

## 2017-07-30 RX ADMIN — ACYCLOVIR SODIUM 600 MG: 50 INJECTION, SOLUTION INTRAVENOUS at 06:42

## 2017-07-30 RX ADMIN — LEVETIRACETAM 750 MG: 750 TABLET, FILM COATED ORAL at 08:58

## 2017-07-30 RX ADMIN — HEPARIN SODIUM 5000 UNITS: 5000 INJECTION, SOLUTION INTRAVENOUS; SUBCUTANEOUS at 08:58

## 2017-07-30 RX ADMIN — HEPARIN SODIUM 5000 UNITS: 5000 INJECTION, SOLUTION INTRAVENOUS; SUBCUTANEOUS at 21:33

## 2017-07-30 ASSESSMENT — VISUAL ACUITY
OU: NORMAL ACUITY

## 2017-07-30 NOTE — PLAN OF CARE
Problem: Goal Outcome Summary  Goal: Goal Outcome Summary  Occupational Therapy Discharge Summary     Reason for therapy discharge:    Discharged to acute rehabilitation facility.     Progress towards therapy goal(s). See goals on Care Plan in [facility] electronic health record for goal details.  Goals not met.  Barriers to achieving goals:   discharge from facility.     Therapy recommendation(s):    Continued therapy is recommended.  Rationale/Recommendations:  to advance ADL/mobilities/cognitive function/home safety.

## 2017-07-30 NOTE — PLAN OF CARE
Problem: Goal Outcome Summary  Goal: Goal Outcome Summary  Discharge Planner PT   Patient plan for discharge: None stated   Current status: Pt transfers supine <> sit SBA. Transfers sit <> stand with FWW and min a x 1. Ambulates for distance of 40' with FWW and min a x 1. Performs balance activities in stance, at times requiring min a x 1 for correction due to LOB.   Barriers to return to prior living situation: Impaired balance, decreased activity tolerance, decreased I with mobility, falls risk.  Recommendations for discharge: ARU  Rationale for recommendations: Pt is motivated to participate in therapy. Would benefit from cont therapy in ARU setting to progress I with transfers ad mobility, to increase activity tolerance, and to decrease risk for falls.        Entered by: Issac Kerr 07/30/2017 8:24 AM

## 2017-07-30 NOTE — PLAN OF CARE
Problem: Goal/Outcome  Goal: Goal Outcome Summary  Outcome: No Change  FOCUS/GOAL  Medical management     ASSESSMENT, INTERVENTIONS AND CONTINUING PLAN FOR GOAL:  Pt arrived and was admitted to unit around 1315. Oriented pt and her , Jama to room, call light, and unit. Also educated pt and family on importance of calling for staff assistance prior to transfers until cleared by therapy. Pt denies pain at this time. Nursing will continue with admission.

## 2017-07-30 NOTE — IP AVS SNAPSHOT
MRN:[id number]                      After Visit Summary   7/30/2017    Shanna Shanks    MRN: [id number]           Thank you!     Thank you for choosing [facility] for your care. Our goal is always to provide you with excellent care. Hearing back from our patients is one way we can continue to improve our services. Please take a few minutes to complete the written survey that you may receive in the mail after you visit with us. Thank you!        Patient Information     Date Of Birth          1975        About your hospital stay     You were admitted on:  July 30, 2017 You last received care in the:   [facility]    You were discharged on:  August 10, 2017        Reason for your hospital stay       Rehab to regain function.                  Who to Call     For medical emergencies, please call 911.  For non-urgent questions about your medical care, please call your primary care provider or clinic, [phone]          Attending Provider     Provider Specialty    Bossman Bassett MD Physical Medicine and Rehab       Primary Care Provider Office Phone # Fax #    Malvin Cesario Beck [phone]      After Care Instructions     Diet       Follow this diet upon discharge: Orders Placed This Encounter      Room Service      Combination Diet Regular Diet Adult; Thin Liquids (water, ice chips, juice, milk, gelatin, ice cream, etc)                  Your next 10 appointments already scheduled     Aug 11, 2017 10:15 AM CDT   Neuro Eval with Kimmy Miller, PT   [facility], Physical Therapy - Outpatient ([facility])    [address], [address]  [place] [address]   [phone]            Aug 18, 2017  1:00 PM CDT   Neuro Eval with Alexus Faye, NATASHA   [facility], [place], Speech Therapy - Oupatient ([facility])    [address], [address]  [place] [address]   [phone]  "           Aug 21, 2017 10:00 AM CDT   Neuro Eval with Rosie Pink, OT   [facility], [place], Occupational Therapy - Outpatient ([facility])    [address], [address]  [place] [address]   [phone]              Additional Services     Occupational Therapy Referral       *This therapy referral will be filtered to a centralized scheduling office at [facility] and the patient will receive a call to schedule an appointment at a [place] location most convenient for them. *     [facility] provides Occupational Therapy evaluation and treatment and many specialty services across the [facility].  If requesting a specialty program, please choose from the list below.    If you have not heard from the scheduling office within 2 business days, please call [phone] for all locations, with the exception of [place], please call [phone].     Treatment: Evaluation & Treatment  Special Instructions/Modalities: none  Special Programs: none    Please be aware that coverage of these services is subject to the terms and limitations of your health insurance plan.  Call member services at your health plan with any benefit or coverage questions.      **Note to Provider:  If you are referring outside of [place] for the therapy appointment, please list the name of the location in the \"special instructions\" above, print the referral and give to the patient to schedule the appointment.            Physical Therapy Referral       *This therapy referral will be filtered to a centralized scheduling office at [facility] and the patient will receive a call to schedule an appointment at a [place] location most convenient for them. *     [facility] provides Physical Therapy evaluation and treatment and many specialty services across the [facility].  If requesting a specialty program, please " "choose from the list below.    If you have not heard from the scheduling office within 2 business days, please call [phone] for all locations, with the exception of [place], please call [phone].  Treatment: Evaluation & Treatment  Special Instructions/Modalities: none  Special Programs: None    Please be aware that coverage of these services is subject to the terms and limitations of your health insurance plan.  Call member services at your health plan with any benefit or coverage questions.      **Note to Provider:  If you are referring outside of [facility] for the therapy appointment, please list the name of the location in the \"special instructions\" above, print the referral and give to the patient to schedule the appointment.            Speech Therapy Referral       *This therapy referral will be filtered to a centralized scheduling office at [facility] and the patient will receive a call to schedule an appointment at a [facility] location most convenient for them. *     [facility] provides Speech Therapy evaluation and treatment and many specialty services across the [facility] system.  If requesting a specialty program, please choose from the list below.  If you have not heard from the scheduling office within 2 business days, please call [phone] for all locations, with the exception of [place], please call [phone].       Treatment: Evaluation & Treatment  Speech Treatment Diagnosis: Cognitive Deficits  Special Instructions: none  Special Programs: none    Please be aware that coverage of these services is subject to the terms and limitations of your health insurance plan.  Call member services at your health plan with any benefit or coverage questions.      **Note to Provider:  If you are referring outside [facility] for the therapy appointment, please list the name of the location in the \"special instructions\" above, print the referral and give to the " "patient to schedule the appointment.                  Further instructions from your care team       Follow-Up Appointments:    Outpatient PT,OT,SLP    Follow up with your [facility] Neurologist after discharge. Please call to make that appointment.    Follow up with your Urologist after discharge. Please call to make that appointment.    Follow up with Rehabilitation Physician, if needed. You can call the Rehab Clinic at [phone] to schedule an appointment, if you would so desire.      Pending Results     No orders found from 2017 to 2017.            Statement of Approval     Ordered          08/10/17 0959  I have reviewed and agree with all the recommendations and orders detailed in this document.  EFFECTIVE NOW     Approved and electronically signed by:  Lucian Kate MD             Admission Information     Date & Time Provider Department Dept. Phone    2017 Bossman Bassett MD  ACUTE REHAB [phone]      Your Vitals Were     Blood Pressure Pulse Temperature Respirations Height Weight    116/62 (BP Location: Left arm) 76 98.5  F (36.9  C) (Oral) 16 1.702 m (5' 7\") 65.4 kg (144 lb 3.2 oz)    Pulse Oximetry BMI (Body Mass Index)                97% 22.58 kg/m2          [facility] Information     [facility] lets you send messages to your doctor, view your test results, renew your prescriptions, schedule appointments and more. To sign up, go to [email or web address] . Click on \"Log in\" on the left side of the screen, which will take you to the Welcome page. Then click on \"Sign up Now\" on the right side of the page.     You will be asked to enter the access code listed below, as well as some personal information. Please follow the directions to create your username and password.     Your access code is: [email or web address]  Expires: 10/27/2017 10:06 AM     Your access code will  in 90 days. If you need help or a new code, please call your [facility] or [phone].        Care EveryWhere " ID     This is your Care EveryWhere ID. This could be used by other organizations to access your [facility] medical records  [id number]        Equal Access to Services     DUANE HARPER : Mihir Lyon, tonyadana silvercaioha, juan angienegra stauffer, waxfrancois ryanin hayaarobbie kingsaundra prado laaryrobbie nicolette So [facility] [phone].    ATENCIÓN: Si habla español, tiene a gatica disposición servicios gratuitos de asistencia lingüística. Llame al [phone].    We comply with applicable federal civil rights laws and [place] laws. We do not discriminate on the basis of race, color, national origin, age, disability sex, sexual orientation or gender identity.               Review of your medicines      UNREVIEWED medicines. 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        Dose:  10 mg/kg   Inject 625 mg into the vein every 8 hours for 10 days Please fax creat level to Dr. Diamond office while on this medication.   Refills:  0         START taking        Dose / Directions    nitroFURantoin (macrocrystal-monohydrate) 100 MG capsule   Commonly known as:  MACROBID   Indication:  Urinary Tract Infection   Used for:  Urinary tract infection, site unspecified        Dose:  100 mg   Take 1 capsule (100 mg) by mouth every 12 hours   Quantity:  6 capsule   Refills:  0       oxybutynin 10 MG 24 hr tablet   Commonly known as:  DITROPAN-XL   Used for:  Multiple sclerosis (H)        Dose:  10 mg   Take 1 tablet (10 mg) by mouth At Bedtime   Quantity:  30 tablet   Refills:  0         CONTINUE these medicines which have NOT CHANGED        Dose / Directions    baclofen 10 MG tablet   Commonly known as:  LIORESAL        Dose:  10-20 mg   Take 10-20 mg by mouth 4 times daily as needed   Refills:  3       levETIRAcetam 750 MG tablet   Commonly known as:  KEPPRA   Used for:  Seizures (H)        Dose:   750 mg   Take 1 tablet (750 mg) by mouth 2 times daily   Quantity:  60 tablet   Refills:  0       norethindrone 0.35 MG per tablet   Commonly known as:  MICRONOR        Dose:  1 tablet   Take 1 tablet by mouth daily   Refills:  0       venlafaxine 75 MG 24 hr capsule   Commonly known as:  EFFEXOR-XR        Dose:  75 mg   Take 75 mg by mouth daily   Refills:  0       vitamin D 67196 UNIT capsule   Commonly known as:  ERGOCALCIFEROL        Dose:  71783 Units   Take 50,000 Units by mouth once a week   Refills:  1            Where to get your medicines      These medications were sent to [facility] [address], [place] [address]     Phone:  [phone]     levETIRAcetam 750 MG tablet    nitroFURantoin (macrocrystal-monohydrate) 100 MG capsule         Some of these will need a paper prescription and others can be bought over the counter. Ask your nurse if you have questions.     You don't need a prescription for these medications     oxybutynin 10 MG 24 hr tablet                Protect others around you: Learn how to safely use, store and throw away your medicines at www.disposemymeds.org.             Medication List: This is a list of all your medications and when to take them. Check marks below indicate your daily home schedule. Keep this list as a reference.      Medications           Morning Afternoon Evening Bedtime As Needed    baclofen 10 MG tablet   Commonly known as:  LIORESAL   Take 10-20 mg by mouth 4 times daily as needed                                   levETIRAcetam 750 MG tablet   Commonly known as:  KEPPRA   Take 1 tablet (750 mg) by mouth 2 times daily   Last time this was given:  750 mg on 8/10/2017  7:42 AM                                      nitroFURantoin (macrocrystal-monohydrate) 100 MG capsule   Commonly known as:  MACROBID   Take 1 capsule (100 mg) by mouth every 12 hours   Last time this was given:  100 mg  on 8/10/2017  7:43 AM                                      norethindrone 0.35 MG per tablet   Commonly known as:  MICRONOR   Take 1 tablet by mouth daily   Last time this was given:  0.35 mg on 8/10/2017  7:43 AM                                   oxybutynin 10 MG 24 hr tablet   Commonly known as:  DITROPAN-XL   Take 1 tablet (10 mg) by mouth At Bedtime   Last time this was given:  10 mg on 8/9/2017  8:55 PM                                   venlafaxine 75 MG 24 hr capsule   Commonly known as:  EFFEXOR-XR   Take 75 mg by mouth daily                                   vitamin D 66301 UNIT capsule   Commonly known as:  ERGOCALCIFEROL   Take 50,000 Units by mouth once a week   Last time this was given:  50,000 Units on 8/6/2017  7:18 PM            Next dose due 8/13                         ASK your doctor about these medications           Morning Afternoon Evening Bedtime As Needed    acyclovir 625 mg   Inject 625 mg into the vein every 8 hours for 10 days Please fax creat level to Dr. Diamond office while on this medication.   Last time this was given:  625 mg on 8/4/2017  8:38 AM   Ask about: Should I take this medication?

## 2017-07-30 NOTE — PLAN OF CARE
Problem: Goal Outcome Summary  Goal: Goal Outcome Summary  Outcome: Improving  Disoriented to situation and time. Neuros intact, except generalized weakness. VSS. Reg diet. On acyclovir course. Midline in RUE. Up with A1/GB/walker. Incontinent bladder, slight amount of blood noted in brief. Denies pain. Plan is to d/c to ARU once pt stable.

## 2017-07-30 NOTE — PROGRESS NOTES
Social Work Progress Note  Pt chart reviewed. Pt discussed in interdisciplinary rounds.     Intervention: Per Kerry with [facility], they are able to accept pt today at 1230. Pt will be transported by spouse.   Team members notified: Bedside RN, Charge RN & HUC    Plan:  Anticipated Discharge Placement: [facility]   [address] [place], [place] [address] [place] [address]  [phone]  Barriers: None identified at this time.   Follow-up plan:  No further plans to follow. Sw available should a need arise.     RACHEL Mayer, [facility]  [phone]  7/30/2017 12:36 PM

## 2017-07-30 NOTE — IP AVS SNAPSHOT
[facility]    [address] [place] [id number]    Phone:  [phone]                                       After Visit Summary   7/30/2017    Shanna Shanks    MRN: [id number]           After Visit Summary Signature Page     I have received my discharge instructions, and my questions have been answered. I have discussed any challenges I see with this plan with the nurse or doctor.    ..........................................................................................................................................  Patient/Patient Representative Signature      ..........................................................................................................................................  Patient Representative Print Name and Relationship to Patient    ..................................................               ................................................  Date                                            Time    ..........................................................................................................................................  Reviewed by Signature/Title    ...................................................              ..............................................  Date                                                            Time

## 2017-07-30 NOTE — DISCHARGE SUMMARY
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    Hospital Course   Shanna Shanks was admitted on 7/24/2017.  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 Underwent stroke workup in the Emergency Department including CT head and CTA head and neck which was negative for bleed or large clot. Due to her level of unresponsiveness initially as well as concern for catastrophic neurologic event, she was intubated in the Emergency Department and admitted to the ICU.  She was successfully extubated shortly after. Neurology consulted. Concern for left-sided seizure activity on EEG. 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 Increased risk with chronic immunosuppressants for multiple sclerosis. Underwent LP with 8 WBC, HSV PCR negative, however with with high clinical suspicion for HSV encephalitis. Started on acyclovir. Infectious disease consulted, agreed with treatment as well, plan to complete 14 day course of IV acyclovir (end date 8/7/17). Midline placed for acyclovir 7/28/2017. Ongoing improvement in mental status, still not to baseline. Started on levetiracetam with dose increased per neurology, recommended to continue on this with follow-up with her neurologist through [facility] for ongoing  management. PT/OT/Speech consulted during admission, recommended discharge to ARU for ongoing therapies.     Fevers, likely secondary to HSV encephalitis and atelectasis  Noted to have low-grade fevers overnight 7/25/17 with Tmax of 38 degrees Celsius.  Initial urinalysis and chest x-ray unremarkable. MRI results concerning for HSV encephalitis. CXR ordered 7/25/17 due to fevers with improving mild residual infiltrate (not commented on on original XR), denies cough or shortness of breath, doubt pneumonia at this time.  LP as above. Remained afebrile following initiation of antiviral therapy, subsequently with fever to 38.4C just prior to initial planned discharge. Urinalysis with blood and RBC (patient menstruating), no evidence for infection. CXR with bibasilar infiltrates likely secondary to atelectasis, no associated shortness of breath, cough, hypoxia, leukocytosis to suspect findings due to pneumonia. Subsequently afebrile.      Multiple sclerosis  Follows with [facility]. Prior to admission dalfampridine held, as seizure is listed as a contraindication. Initially continued on dimethyl fumarate, discontinued at neurology recommendations given HSV encephalitis, recommended to follow-up closely with her [facility] neurologist. Continue baclofen PRN.      Urinary incontinence  Associated with multiple sclerosis. Prior to admission on oxybutynin, which was held on admission due to change in mental status. She had tolerated this well at home without mental status issues, unlikely that this is contributing to her presenting encephalopathy significantly, but also wish to avoid exacerbating confusion therefore continued to hold on discharge until mental status closer to baseline and/or urinary symptoms become too bothersome.      Depression  Continue prior to admission venlafaxine    Boris Britton    Significant Results and Procedures   XR lumbar puncture 7/26/17    Pending Results   These results will be followed up by  "neurology / hospitalist.   Unresulted Labs Ordered in the Past 30 Days of this Admission     Date and Time Order Name Status Description    7/25/2017 1106 [place] virus RNA by PCR CSF Tube 3 In process     7/25/2017 1106 CSF Culture Aerobic Bacterial Tube 2 Preliminary          Code Status   Full Code       Primary Care Physician   Malvin Beck MD    Physical Exam   Temp: 99.7  F (37.6  C) Temp src: Oral BP: 123/83 Pulse: 85 Heart Rate: 108 Resp: 16 SpO2: 92 % O2 Device: None (Room air)    Vitals:    07/28/17 0417 07/29/17 0603 07/30/17 0612   Weight: 64.5 kg (142 lb 3.2 oz) 64.6 kg (142 lb 6.7 oz) 61.9 kg (136 lb 7.4 oz)     Vital Signs with Ranges  Temp:  [97.7  F (36.5  C)-101.2  F (38.4  C)] 99.7  F (37.6  C)  Pulse:  [] 85  Heart Rate:  [] 108  Resp:  [16-18] 16  BP: (113-128)/(66-85) 123/83  SpO2:  [92 %-98 %] 92 %  I/O last 3 completed shifts:  In: 400 [P.O.:400]  Out: 600 [Urine:600]    Constitutional: Middle-aged female, NAD  Respiratory: Lungs clear to auscultation bilaterally  Cardiovascular: RRR, no m/r/g. No peripheral edema.  GI: Soft, non-tender, non-distended. BS normoactive.   Skin/Integumen: Warm, dry  Neuro: Alert. Oriented to \"hospital\", not specific one. Unable to state date or with detail the reason for her hospitalization. Upper and lower extremity strength intact and symmetric bilaterally. Face symmetric. Tongue midline, EOMI. Following all commands.     Discharge Disposition   Discharged to home  Condition at discharge: Stable    Consultations This Hospital Stay   PHYSICAL THERAPY ADULT IP CONSULT  OCCUPATIONAL THERAPY ADULT IP CONSULT  SPEECH LANGUAGE PATH ADULT IP CONSULT  SOCIAL WORK IP CONSULT  INFECTIOUS DISEASES IP CONSULT  VASCULAR ACCESS ADULT IP CONSULT  OCCUPATIONAL THERAPY ADULT IP CONSULT  PHYSICAL THERAPY ADULT IP CONSULT  SPEECH LANGUAGE PATH ADULT IP CONSULT    Time Spent on this Encounter   IBoris, personally saw the patient today and " spent 40 minutes discharging this patient.    Discharge Orders     Mantoux instructions   Give two-step Mantoux (PPD) Per Facility Policy Yes     Activity - Up with nursing assistance     IV access   Right midline. Routine cares per IV access protocol.     Full Code     Occupational Therapy Adult Consult   Evaluate and treat as clinically indicated.    Reason:  Functional immobility     Physical Therapy Adult Consult   Evaluate and treat as clinically indicated.    Reason:  Physical deconditioning     Speech Language Path Adult Consult   Evaluate and treat as clinically indicated.    Reason:  Dysphagia     Advance Diet as Tolerated   Follow this diet upon discharge: Regular diet       Discharge Medications   Current Discharge Medication List      START taking these medications    Details   levETIRAcetam (KEPPRA) 750 MG tablet Take 1 tablet (750 mg) by mouth 2 times daily    Associated Diagnoses: Seizures (H)      acyclovir 625 mg Inject 625 mg into the vein every 8 hours for 10 days Please fax creat level to Dr. Diamond office while on this medication.    Associated Diagnoses: Herpes encephalitis         CONTINUE these medications which have NOT CHANGED    Details   norethindrone (MICRONOR) 0.35 MG per tablet Take 1 tablet by mouth daily      venlafaxine (EFFEXOR-XR) 75 MG 24 hr capsule Take 75 mg by mouth daily      baclofen (LIORESAL) 10 MG tablet Take 10-20 mg by mouth 4 times daily as needed   Refills: 3      vitamin D (ERGOCALCIFEROL) 39255 UNIT capsule Take 50,000 Units by mouth once a week   Refills: 1         STOP taking these medications       oxybutynin (DITROPAN-XL) 5 MG 24 hr tablet Comments:   Reason for Stopping:         dimethyl fumarate (TECFIDERA) delayed release capsule Comments:   Reason for Stopping:         Dalfampridine (AMPYRA PO) Comments:   Reason for Stopping:         Zolpidem Tartrate (AMBIEN PO) Comments:   Reason for Stopping:             Allergies   Allergies   Allergen Reactions      Diphenhydramine Rash     benadryl cream     Nickel      Data   Most Recent 3 CBC's:  Recent Labs   Lab Test  07/30/17   0755  07/27/17   0742  07/25/17   0451  07/24/17   1808   WBC  6.4   --   10.5  11.5*   HGB  10.0*   --   11.2*  12.0   MCV  91   --   93  93   PLT  235  213  182  209      Most Recent 3 BMP's:  Recent Labs   Lab Test  07/29/17   0005  07/28/17   0748  07/27/17   0742  07/26/17   0515   NA   --   143  139  141   POTASSIUM  3.9  3.2*  3.4  3.5   CHLORIDE   --   109  106  108   CO2   --   25  22  23   BUN   --   4*  4*  6*   CR   --   0.57  0.52  0.56   ANIONGAP   --   9  11  10   JULIET   --   8.1*  8.0*  7.6*   GLC   --   93  93  97     Most Recent 2 LFT's:  Recent Labs   Lab Test  07/26/17   0515 11/18/13   AST  36  25   ALT  24  17   ALKPHOS  35*  48   BILITOTAL  0.4  0.6     Most Recent INR's and Anticoagulation Dosing History:  Anticoagulation Dose History     Recent Dosing and Labs Latest Ref Rng & Units 7/24/2017    INR 0.86 - 1.14 1.05        Most Recent 3 Troponin's:No lab results found.  Most Recent Cholesterol Panel:  Recent Labs   Lab Test  02/20/12   0938   CHOL  242*   LDL  140*   HDL  72   TRIG  147     Most Recent 6 Bacteria Isolates From Any Culture (See EPIC Reports for Culture Details):  Recent Labs   Lab Test  07/26/17   1025  07/24/17   1950  07/24/17   1932  12/17/15   0949  03/16/15   0909  10/21/11   1049   CULT  Culture negative monitoring continues  No growth  No growth  >100,000 colonies/mL Escherichia coli*  >100,000 colonies/mL mixed urogenital jen  >100,000 colonies/mL Coagulase negative Staphylococcus     Most Recent TSH, T4 and A1c Labs:No lab results found.  Results for orders placed or performed during the hospital encounter of 07/24/17   CTA Angiogram Head Neck    Narrative    CT ANGIOGRAM OF THE HEAD AND NECK WITHOUT AND WITH CONTRAST  7/24/2017  6:46 PM     COMPARISON: None.    HISTORY: Code Stroke. Multiple sclerosis. Altered mental status.    TECHNIQUE:   Precontrast localizing scans were followed by CT  angiography with an injection of 120 mL Isovue 370 nonionic  intravenous contrast material with scans through the head and neck.   Images were transferred to a separate 3-D workstation where  multiplanar reformations and 3-D images were created.  Estimates of  carotid stenoses are made relative to the distal internal carotid  artery diameters except as noted.      FINDINGS:   Neck CTA: The bilateral common carotid, bilateral cervical internal  carotid and bilateral vertebral arteries are patent and unremarkable.    Head CTA: The bilateral distal internal carotid, basilar, bilateral  anterior cerebral, bilateral middle cerebral and bilateral posterior  cerebral arteries are patent and unremarkable.      Impression    IMPRESSION: Normal neck and head CTA.    Radiation dose for this scan was reduced using automated exposure  control, adjustment of the mA and/or kV according to patient size, or  iterative reconstruction technique.      GEOFFREY GILL MD   CT Head w/o Contrast    Narrative    CT OF THE HEAD WITHOUT CONTRAST 7/24/2017 6:45 PM     COMPARISON: None.    HISTORY: Code Stroke. Altered mental status.    TECHNIQUE: 5 mm thick axial CT images of the head were acquired  without IV contrast material.    FINDINGS: There is moderate diffuse cerebral volume loss. There are  extensive confluent areas of decreased density in the cerebral white  matter bilaterally that are consistent with the patient's history of  multiple sclerosis.    The ventricles and basal cisterns are within normal limits in  configuration given the degree of cerebral volume loss.  There is no  midline shift. There are no extra-axial fluid collections.    No intracranial hemorrhage, mass or recent infarct.    The visualized paranasal sinuses are well-aerated. There is no  mastoiditis. There are no fractures of the visualized bones.      Impression    IMPRESSION: Diffuse cerebral volume loss and  cerebral white matter  changes consistent with the patient's history of multiple sclerosis.  No evidence for acute intracranial pathology.    Radiation dose for this scan was reduced using automated exposure  control, adjustment of the mA and/or kV according to patient size, or  iterative reconstruction technique.     GEOFFREY GILL MD   CT Head w Contrast    Narrative    CT BRAIN PERFUSION 7/24/2017 6:55 PM    COMPARISON: None.    HISTORY: Code Stroke.    TECHNIQUE: Time sequential axial CT images of the head were acquired  during the administration of intravenous contrast (50 mL Isovue-370).  CTA images of the [place] of [place] as well as color perfusion maps of  the brain were created from this time sequential axial source data.    FINDINGS: There are no focal or regional perfusion defects in the  brain.      Impression    IMPRESSION: Normal CT perfusion of the brain.    Radiation dose for this scan was reduced using automated exposure  control, adjustment of the mA and/or kV according to patient size, or  iterative reconstruction technique.      GEOFFREY GILL MD   Chest  XR, 1 view PORTABLE    Narrative    CHEST PORTABLE ONE VIEW  7/24/2017 7:29 PM     COMPARISON: None.    HISTORY: Post intubation.    FINDINGS: There is an endotracheal tube in place with its tip at the  level of the clavicular heads. The lungs are clear. There is no  pleural effusion or pneumothorax. Heart size is normal with no  evidence for congestive failure.     GEOFFREY GILL MD   MR Brain w/o & w Contrast    Narrative    MRI BRAIN WITHOUT AND WITH CONTRAST  7/25/2017 11:27 AM    HISTORY: Encephalopathy.     TECHNIQUE: Multiplanar, multisequence MRI of the brain without and  with 6 mL Gadavist.    COMPARISON: None.    FINDINGS: Diffusion-weighted images show some minimally restricted  diffusion in the anterior medial temporal lobe involving the uncus  amygdala in the anterior hippocampal region. There is also some  increased signal  intensity on the FLAIR and T2-weighted images in this  location but it is not very well pronounced. Postcontrast images do  not show any abnormal areas of enhancement in this region. Gradient  echo sequences do not show any evidence for any hemorrhage. There is  some marked thinning and bowing of the corpus callosum and there is  some ventriculomegaly somewhat out of proportion in size to the  subarachnoid spaces although the subarachnoid spaces are also  enlarged. Patchy white matter changes are seen in both hemispheres.  Postcontrast images are slightly limited by motion artifact but there  are no abnormal areas of enhancement or any focal mass lesions.      Impression    IMPRESSION:  1. Mildly restricted diffusion in the anterior medial left temporal  lobe and anterior hippocampal region with some mild associated  increased T2 signal but no enhancement. Pattern could be due to a  focal area of ischemic infarction although it is difficult to exclude  herpes encephalitis.  2. Ventriculomegaly is slightly out of proportion to the subarachnoid  spaces with marked thinning of the corpus callosum but no ballooning  of the temporal horns. Findings are most likely due to atrophy  although it is difficult to exclude communicating hydrocephalus.  3. Moderately extensive white matter changes in both hemispheres which  are most likely due to chronic small vessel ischemic disease given the  patient's age.   4. No evidence for intracranial hemorrhage or any focal mass lesions.    HARSHAD STODDARD MD   XR Chest Port 1 View    Narrative    CHEST ONE VIEW PORTABLE July 25, 2017 9:55 AM     HISTORY: Hypoxia.    COMPARISON: 7/24/2017.      Impression    IMPRESSION: Endotracheal tube has been removed. Mild residual  infiltrate behind the heart, improved. Right lung is clear. Normal  pulmonary vascularity.    LACHO ONEAL MD   XR Lumbar Puncture Spinal Tap Diag    Narrative    EXAM: XR LUMBAR PUNCTURE SPINAL TAP DIAGNOSTIC  7/26/2017  10:38 AM       History:  Encephalopathy possibly due to HSV.     PROCEDURE: The patient consented for a lumbar puncture. The benefits  and risks of the procedure were explained to the patient, including  but not limited to worsening headache, hemorrhage, infection, lower  extremity pain, or nerve root injury. The patient was sterilely  prepped and draped with the patient in the supine position, over the  lower back. Under fluoroscopic guidance, the interlaminar spaces were  noted. 1% lidocaine was administered for local anesthetic over the  L3-4 interlaminar space, and a 22 gauge needle was advanced into the  thecal sac under fluoroscopic guidance. There was initial aspiration  of clear CSF. About 8 cc's total were aspirated.  The needle was  removed. There were no immediate complications associated with the  procedure.       Fluoro time: 0.1 minutes.   Number of Images: 2      Impression    IMPRESSION: Successful lumbar puncture.    DELLA CATALAN PA-C

## 2017-07-30 NOTE — PLAN OF CARE
Problem: Goal Outcome Summary  Goal: Goal Outcome Summary  Outcome: Adequate for Discharge Date Met:  07/30/17  Disoriented to time/situation intermittently, forgetful. Generalized weakness. Denies N/T. Up with one, gait belt, walker. VSS. C/o headache decreased with tylenol and ice pack. Regular diet. RUE midline patent. D/c to ARU via spouse. D/c meds, instructions, f/u reviewed with pt/spouse prior to d/c. Report called to  ARU RN.

## 2017-07-30 NOTE — H&P
[facility], [place]    Physical Medicine and Rehabilitation admission note     History of Present Illness     Shanna Shanks is an 42 year old female who presented with unresponsiveness and right hemiplegia.        Hospital Course     Shanna Shanks was admitted on 7/24/2017.  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 Underwent stroke workup in the Emergency Department including CT head and CTA head and neck which was negative for bleed or large clot. Due to her level of unresponsiveness initially as well as concern for catastrophic neurologic event, she was intubated in the Emergency Department and admitted to the ICU.  She was successfully extubated shortly after. Neurology consulted. Concern for left-sided seizure activity on EEG. 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 Increased risk with chronic immunosuppressants for multiple sclerosis. Underwent LP with 8 WBC, HSV PCR negative, however with with high clinical suspicion for HSV encephalitis. Started on acyclovir. Infectious disease consulted, agreed with treatment as well, plan to complete 14 day course of IV acyclovir (end date 8/7/17). Midline placed for acyclovir 7/28/2017. Ongoing improvement in mental status, still not to baseline. Started on levetiracetam with dose increased per neurology, recommended to continue on this with follow-up with her neurologist through [facility] for ongoing management. PT/OT/Speech consulted during admission, recommended discharge to ARU for ongoing therapies.      Fevers, likely secondary to HSV encephalitis and atelectasis  Noted to have low-grade fevers overnight 7/25/17 with Tmax of 38 degrees Celsius.  Initial urinalysis and chest x-ray  unremarkable. MRI results concerning for HSV encephalitis. CXR ordered 7/25/17 due to fevers with improving mild residual infiltrate (not commented on on original XR), denies cough or shortness of breath, doubt pneumonia at this time.  LP as above. Remained afebrile following initiation of antiviral therapy, subsequently with fever to 38.4C just prior to initial planned discharge. Urinalysis with blood and RBC (patient menstruating), no evidence for infection. CXR with bibasilar infiltrates likely secondary to atelectasis, no associated shortness of breath, cough, hypoxia, leukocytosis to suspect findings due to pneumonia. Subsequently afebrile.       Multiple sclerosis  Follows with [facility] clinic. Prior to admission dalfampridine held, as seizure is listed as a contraindication. Initially continued on dimethyl fumarate, discontinued at neurology recommendations given HSV encephalitis, recommended to follow-up closely with her [facility] neurologist. Continue baclofen PRN.       Urinary incontinence  Associated with multiple sclerosis. Prior to admission on oxybutynin, which was held on admission due to change in mental status. She had tolerated this well at home without mental status issues, unlikely that this is contributing to her presenting encephalopathy significantly, but also wish to avoid exacerbating confusion therefore continued to hold on discharge until mental status closer to baseline and/or urinary symptoms become too bothersome.       Depression  Continue prior to admission venlafaxine          Review of Systems:      No difficulties with vision, hearing swallowing. No headache or nausea. No SOB, chest pains or abdominal pains. No bowel problems. Has urinary incontinence from MS. No rashes or joint swelling. No significant loss of strength or sensation in her arms or legs. No numbness or tingling in her arms or legs. Does have cognitive changes from baseline that are improving according to her family.      Medications:    See chart     Physical Exam:      All vitals stable  Constitutional: Alert, NAD    Lungs:  Clear    Cardiovascular:  RRR   Abdomen: Soft    Genitounirinary:  Urinary incontinence   Musculoskeletal: No joint swelling or redness    Neurologic:  No new focal changes. Follows commands. Is talkative but has word finding difficulties.  Cognition: Unsure of date and place.     Assessment is as above.     See post admission rehab note for details.    Plan:    Plan for acute rehab admission with 60 min each of PT, OT and Speech therapies per day.  Rehab MD to follow for medical issues of MS and HSV encephalitis.  Rehab Nursing for medications, risk of falls, patient education and vitals  Continue Acyclovir IV for total of 10 days.       MARTA HINOJOSA    Total time spent >60 min with this admission.

## 2017-07-30 NOTE — PLAN OF CARE
Problem: Goal Outcome Summary  Goal: Goal Outcome Summary  Physical Therapy Discharge Summary     Reason for therapy discharge:    Discharged to acute rehabilitation facility.     Progress towards therapy goal(s). See goals on Care Plan in [facility] electronic health record for goal details.  Goals partially met.  Barriers to achieving goals:   discharge from facility.     Therapy recommendation(s):    Continued therapy is recommended.  Rationale/Recommendations:  Pt would benefit from cont therapy in ARU setting to progress I with bed mob and transfers, to decrease falls risk, and to facilitate return to PLOF. .

## 2017-07-30 NOTE — PROGRESS NOTES
Post Admission Physician Evaluation:     I have compared Ms Shanks'scondition on admission to acute rehabilitation to that outlined in the preadmission screen. History and physical exam performed by me. No significant differences are identified and the patient remains appropriate for an inpatient rehabilitation facility level of care to manage medical issues and address functional impairments due to HSV encephalitis.     Comorbid medical conditions being managed: MS     Prior functional level: independent.     Present function: cognitive changes, decrease ADLs     Anticipated rehabilitation course:  Will benefit from intensive rehabilitation includin minutes each of PT,OT and Speech  Rehabilitation nursing  Close management by physiatry     Prognosis: patient is expected to achieve a level of Mod I with mobility, transfers and ADLs and CGA with stairs within her home. Patient will need asst from her husbandfor IADLs.     Estimated length of stay:  10-14 days

## 2017-07-31 LAB
BACTERIA SPEC CULT: NO GROWTH
MICRO REPORT STATUS: NORMAL
SPECIMEN SOURCE: NORMAL

## 2017-07-31 PROCEDURE — 97535 SELF CARE MNGMENT TRAINING: CPT | Mod: GO

## 2017-07-31 PROCEDURE — 40000193 ZZH STATISTIC PT WARD VISIT: Performed by: STUDENT IN AN ORGANIZED HEALTH CARE EDUCATION/TRAINING PROGRAM

## 2017-07-31 PROCEDURE — 97165 OT EVAL LOW COMPLEX 30 MIN: CPT | Mod: GO

## 2017-07-31 PROCEDURE — 12800006 ZZH R&B REHAB

## 2017-07-31 PROCEDURE — 97163 PT EVAL HIGH COMPLEX 45 MIN: CPT | Mod: GP | Performed by: STUDENT IN AN ORGANIZED HEALTH CARE EDUCATION/TRAINING PROGRAM

## 2017-07-31 PROCEDURE — 25000132 ZZH RX MED GY IP 250 OP 250 PS 637: Performed by: PHYSICAL MEDICINE & REHABILITATION

## 2017-07-31 PROCEDURE — 25000128 H RX IP 250 OP 636: Performed by: PHYSICAL MEDICINE & REHABILITATION

## 2017-07-31 PROCEDURE — 25000125 ZZHC RX 250: Performed by: PHYSICAL MEDICINE & REHABILITATION

## 2017-07-31 PROCEDURE — 97532 ZZHC SP COGNITIVE SKILLS EA 15 MIN: CPT | Mod: GN | Performed by: SPEECH-LANGUAGE PATHOLOGIST

## 2017-07-31 PROCEDURE — 40000133 ZZH STATISTIC OT WARD VISIT

## 2017-07-31 PROCEDURE — 97116 GAIT TRAINING THERAPY: CPT | Mod: GP | Performed by: STUDENT IN AN ORGANIZED HEALTH CARE EDUCATION/TRAINING PROGRAM

## 2017-07-31 PROCEDURE — 97530 THERAPEUTIC ACTIVITIES: CPT | Mod: GP | Performed by: STUDENT IN AN ORGANIZED HEALTH CARE EDUCATION/TRAINING PROGRAM

## 2017-07-31 PROCEDURE — 40000225 ZZH STATISTIC SLP WARD VISIT: Performed by: SPEECH-LANGUAGE PATHOLOGIST

## 2017-07-31 PROCEDURE — 92523 SPEECH SOUND LANG COMPREHEN: CPT | Mod: GN | Performed by: SPEECH-LANGUAGE PATHOLOGIST

## 2017-07-31 RX ORDER — LIDOCAINE 40 MG/G
CREAM TOPICAL
Status: DISCONTINUED | OUTPATIENT
Start: 2017-07-31 | End: 2017-08-10 | Stop reason: HOSPADM

## 2017-07-31 RX ORDER — HEPARIN SODIUM,PORCINE 10 UNIT/ML
5-10 VIAL (ML) INTRAVENOUS EVERY 24 HOURS
Status: DISCONTINUED | OUTPATIENT
Start: 2017-07-31 | End: 2017-08-08

## 2017-07-31 RX ORDER — HEPARIN SODIUM,PORCINE 10 UNIT/ML
5-10 VIAL (ML) INTRAVENOUS
Status: DISCONTINUED | OUTPATIENT
Start: 2017-07-31 | End: 2017-08-08

## 2017-07-31 RX ADMIN — ACETAMINOPHEN 975 MG: 325 TABLET, FILM COATED ORAL at 09:19

## 2017-07-31 RX ADMIN — ACYCLOVIR SODIUM 625 MG: 50 INJECTION, SOLUTION INTRAVENOUS at 13:53

## 2017-07-31 RX ADMIN — HEPARIN SODIUM 5000 UNITS: 5000 INJECTION, SOLUTION INTRAVENOUS; SUBCUTANEOUS at 13:53

## 2017-07-31 RX ADMIN — LEVETIRACETAM 750 MG: 750 TABLET, FILM COATED ORAL at 20:21

## 2017-07-31 RX ADMIN — VENLAFAXINE HYDROCHLORIDE 75 MG: 75 TABLET, EXTENDED RELEASE ORAL at 08:59

## 2017-07-31 RX ADMIN — ACYCLOVIR SODIUM 625 MG: 50 INJECTION, SOLUTION INTRAVENOUS at 06:16

## 2017-07-31 RX ADMIN — HEPARIN SODIUM 5000 UNITS: 5000 INJECTION, SOLUTION INTRAVENOUS; SUBCUTANEOUS at 05:43

## 2017-07-31 RX ADMIN — ACETAMINOPHEN AND CODEINE PHOSPHATE 0.35 MG: 120; 12 SOLUTION ORAL at 08:55

## 2017-07-31 RX ADMIN — HEPARIN SODIUM 5000 UNITS: 5000 INJECTION, SOLUTION INTRAVENOUS; SUBCUTANEOUS at 20:21

## 2017-07-31 RX ADMIN — SODIUM CHLORIDE, PRESERVATIVE FREE 5 ML: 5 INJECTION INTRAVENOUS at 23:31

## 2017-07-31 RX ADMIN — ACYCLOVIR SODIUM 625 MG: 50 INJECTION, SOLUTION INTRAVENOUS at 22:08

## 2017-07-31 RX ADMIN — LEVETIRACETAM 750 MG: 750 TABLET, FILM COATED ORAL at 08:55

## 2017-07-31 RX ADMIN — SENNOSIDES AND DOCUSATE SODIUM 3 TABLET: 8.6; 5 TABLET ORAL at 08:55

## 2017-07-31 RX ADMIN — SENNOSIDES AND DOCUSATE SODIUM 1 TABLET: 8.6; 5 TABLET ORAL at 20:21

## 2017-07-31 RX ADMIN — SODIUM CHLORIDE, PRESERVATIVE FREE 5 ML: 5 INJECTION INTRAVENOUS at 20:22

## 2017-07-31 ASSESSMENT — ACTIVITIES OF DAILY LIVING (ADL)
WHICH_OF_THE_ABOVE_FUNCTIONAL_RISKS_HAD_A_RECENT_ONSET_OR_CHANGE?: AMBULATION;TRANSFERRING;TOILETING;BATHING;DRESSING
AMBULATION: 3-->ASSISTIVE EQUIPMENT AND PERSON
COGNITION: 0 - NO COGNITION ISSUES REPORTED
SWALLOWING: 0-->SWALLOWS FOODS/LIQUIDS WITHOUT DIFFICULTY
BATHING: 0-->INDEPENDENT
TRANSFERRING: 0-->INDEPENDENT
FALL_HISTORY_WITHIN_LAST_SIX_MONTHS: NO
AMBULATION: 0-->INDEPENDENT
TOILETING: 0-->INDEPENDENT
RETIRED_EATING: 0-->INDEPENDENT
RETIRED_COMMUNICATION: 0-->UNDERSTANDS/COMMUNICATES WITHOUT DIFFICULTY
TRANSFERRING: 3-->ASSISTIVE EQUIPMENT AND PERSON
DRESS: 0-->INDEPENDENT
PREVIOUS_RESPONSIBILITIES: MEAL PREP;HOUSEKEEPING;LAUNDRY;SHOPPING;YARDWORK;MEDICATION MANAGEMENT;FINANCES;DRIVING;CHILD CARE

## 2017-07-31 NOTE — PLAN OF CARE
Problem: Goal/Outcome  Goal: Goal Outcome Summary  FOCUS/GOAL  Bowel management, Bladder management, Pain management, Medical management, Mobility and Safety management     ASSESSMENT, INTERVENTIONS AND CONTINUING PLAN FOR GOAL:  Pt is alert and oriented to self and place. Pt able to identify month, but unsure of date and year. Has been both incontinent, requiring total bed change x 1, and continent of urine. PVRs continue to be high, encourage double voiding. Pt has her menses, needs reminders to change feminine hygiene products. No BM, passing flatus. Denied pain this shift. Up with assist of 1 with gait belt and walker to bathroom. Pt not consistently using call light, frequent rounding to ensure safety. Bed alarm on in zone 2 as pt gets up quickly. Seizure pads on 3 bed rails. Pt able to make needs known. Will continue with POC.

## 2017-07-31 NOTE — PLAN OF CARE
Problem: Goal Outcome Summary  Goal: Goal Outcome Summary  Speech Language Therapy Discharge Summary     Reason for therapy discharge:    Discharged to acute rehabilitation facility.     Progress towards therapy goal(s). See goals on Care Plan in [facility] electronic health record for goal details.  Goals not met.  Barriers to achieving goals:   discharge from facility.     Therapy recommendation(s):    Continued therapy is recommended.  Rationale/Recommendations:  Recommended: upgrade to regular diet with continued thin liquids; upright with all intake, small bites and sips, slow rate, reduce distractions during meals. Discussed cognitive-linguistic skills.  reported daily improvements with pt  grasping for words  at baseline. In conversation, difficulty following directions and delayed processing time noted. Pt and  verbalized agreement with ST in hospital to target swallowing goals and ST at ARU to eval/treat cognitive-linguistic skills.

## 2017-07-31 NOTE — PROGRESS NOTES
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 Admitted to inpatient acute rehabilitation 7/30.    Still early in her recovery. Does have considerable cognitive and orientation deficits. While she's doing some ambulation there is inconsistencies and definitely want someone with for all transfers and walking.    Team rounds later this week to review her progress and update plan of care along with estimated length of stay.    Medically:  Continue with IV acyclovir outlined through August 4.  Procedure prophylaxis continues with Keppra 750 mg twice daily.  Until more mobile will continue with heparin 3 times daily though anticipate off this by discharge.  For mood she continues with Effexor daily    Vitals:    07/30/17 2329 07/31/17 0600 07/31/17 0730 07/31/17 1500   BP: 123/75 125/72 114/67 94/54   BP Location: Left arm Left arm Left arm Left arm   Pulse: 75 76 83 79   Resp: 16 16 16 16   Temp: 99.5  F (37.5  C) 98.7  F (37.1  C) 98.8  F (37.1  C) 98  F (36.7  C)   TempSrc: Oral Oral Oral Oral   SpO2: 98% 93% 95% 98%   Weight:       Height:         Sought twice, earlier during physical therapy session later in her room resting. Slightly sleepy with the second visit but fully alert earlier. No diaphoresis  There is some odd answers are not always directed on track to the question especially if the question is more open-ended. For example asking about her sleep, she references historical sleep pattern and yet doesn't redirect back to recent history and the relevance of this impact on her fatigue.  Heart regular rate and rhythm  Lungs clear to auscultation bilaterally  Abdomen soft nontender normal bowel sounds  No peripheral edema

## 2017-07-31 NOTE — PLAN OF CARE
Problem: Goal/Outcome  Goal: Goal Outcome Summary  FOCUS/GOAL  Bowel management, Bladder management, Pain management, Mobility and Safety management     ASSESSMENT, INTERVENTIONS AND CONTINUING PLAN FOR GOAL:  Pt is alert and confused, oriented to person and place. Calm and cooperative with cares, speech noted to be illogical at times. VSS. Given Tylenol per PRN orders x1 for c/o headache with some relief of symptoms reported. Pt currently having menses, given frequent reminders to change feminine hygiene products. Continent and incontinent episodes occurred, pericares provided. Double voiding encouraged. Transfers with walker/gaitbelt and ao1. Pt continues to demonstrate poor safety awareness, will occasionally use call light but has usually activated bed alarm immediately after calling for help. Pt reminded to not get up without assistance, chair and bed alarms used for safety. Seizure precautions maintained. Will continue to monitor.

## 2017-07-31 NOTE — PLAN OF CARE
Problem: Goal/Outcome  Goal: Goal Outcome Summary  Completed formal spech-language eval and informal cognitive assessment per MD orders. Pt presents with moderate deficits in language and moderate to severe deficits in cognition. Areas of impairment in language are in auditory comprehension and verbal expression/verbal flucney- reduced overall wrod retreival ( which is further impacted by reduced throught organziation). With cognition- pt demonstrates mos to severe impairments in sustained , flexible and alternating attention; overall reduced short term memory as well as reduced general orientation and impaired mental flexibilty/ problem solving. Pt is further impulsive and demonstrates deficits in executive function. Pt's skills are below baseline and pt would benefit in skilled SLP intervention to improve functional language and cognition for independence and safety in IADL's

## 2017-07-31 NOTE — PLAN OF CARE
Problem: Goal/Outcome  Goal: Goal Outcome Summary  Orders received, chart reviewed, eval completed and treatment initiated.  PLOF pt was ind for functional mobility, though will need to check with  as to level of steadiness and if she did need assist with any IADLs- pt poor historian. Pt was not able to give details about her home set up, nor was she able to give her daughter's ages. Currently requires close SBA-CGA for mobility with IV pole or FWW, though PT has started some ambulation w/o AD. Anticipate ~7 days to meet POC goals at ind to potentially supervision level 2/2 cognitive deficits.

## 2017-07-31 NOTE — PROGRESS NOTES
07/31/17 1100   General Information, SLP   Type of Evaluation Speech and Language;Cognitive-Linguistic   Type of Visit Initial   Start of Care Date 07/31/17   Onset of Illness/Injury or Date of Surgery - Date 07/25/17   Referring Physician Solo Martinez Md   Patient/Family Goals Statement to improve thinking   Pertinent History of Current Problem Presented with unresponsive episode associated with right hemiplegia. Underwent stroke workup in the Emergency Department including CT head and CTA head and neck which was negative for bleed or large clot. Due to her level of unresponsiveness initially as well as concern for catastrophic neurologic event, she was intubated in the Emergency Department and admitted to the ICU.  She was successfully extubated shortly after. Neurology consulted. Concern for left-sided seizure activity on EEG. 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 Increased risk with chronic immunosuppressants for multiple sclerosis.   Precautions/Limitations fall precautions;swallowing precautions   General Observations Pt with forgetfulness of biographic details- could not remember one of her childrens' ages or her own age   Oral Motor Sensory Function   Completed on Swallow Evaluation Completed Clinical Bedside Swallow Evaluation  (Completed in hospital- pt on regular diet)   Speech   Deficits in Articulation Flaccid dysarthria   Speech Comments Mild dysarthria   Language: Auditory Comprehension (understanding of spoken language)   Tests were administered at the following levels Moderate (routine daily activities)   Yes/No Sentence and Simple Paragraph; [place] Diagnostic Aphasia Exam 3 short (out of 6 total) 3   Functional Assessment Scale (Auditory Comprehension) Moderate Impairment   Comments (Auditory Comprehension) Pt with dificulty with processing mildly lengthier info- at the mod level of difficulty- needs repetition and  increased time to aid in processing info   Language: Verbal Expression (use of spoken language to express information)   Tests were administered at the following levels Complex (vocation/community/social activities)   Generate Sentences; [place] Test for Differential Diagnosis Of Aphasia (out of 6 total) 4   Define Words; [place] Test for Differential Diagnosis Of Aphasia (out of 10 total) 9   Generative Naming Score; Cognitive Linguistic Quick Test 2   Generative Naming; Cognitive Linguistic Quick Test Result Below mean   Conversation; [place] Diagnostic Aphasia Exam rating (out of 5 total) 3   Functional Assessment Scale (Verbal Expression) Moderate Impairment   Comments (Verbal Expression) Overall reduced thoguht organization is further impacting wrod retrieval- verbal fluency   Reading Comprehension (understanding of written language)   Tests were administered at the following levels Complex (vocation/community/social activities)   Sentences and Paragraphs; [place] Diagnostic Aphasia Exam (out of 10 total) 9   Functional Assessment Scale (Reading Comprehension) Minimal Impairment   Cognitive Status Examination   Attention impaired  (not able to complete flexible or alternating- too difficult)   Behavioral Observations confused;distractible;impulsive   Orientation impaired  (1/5 general oreientation)   Short Term Memory impaired  (0/3 delayed recall)   Long Term Memory impaired  (could not remember her age or her daughter's age)   Reasoning impaired  (2/4 verabal ps; 3/6 verbal reasoning 1/2 numerics)   Executive Function Deficits Noted impulsivity;inhibition;insight/awareness;mental flexibility;organization;planning;self-correction   Additional cognitive-linguistic evaluation indicated  yes   Standardized cognitive-linguistic assessment completed to be completed during future session   Cognitive Status Exam Comments Pt demonstrates moderate to severe deficits in all levels of attention, problem solving  reasoning, general orientation and recnet recall; Pt also demonstrates in executive function- organization, mental flexibilty, self- correction, impulsivity   General Therapy Interventions   Planned Therapy Interventions Cognitive Treatment;Language   Cognitive treatment Internal memory strategy training;External memory strategy training;Progressive attention training   Language Auditory comprehension;Verbal expression   Clinical Impression, SLP Eval   Criteria for Skilled Therapeutic Interventions Met Yes;Treatment indicated   SLP Diagnosis Moderate cognitive lingiustic deficits   Influenced by the following factors/impairments reduced insight, poor impulse control   Rehab Potential Good, to achieve stated therapy goals   Rehab potential affected by distractibility; reduced insight, impulsivity   Therapy Frequency Daily   Predicted Duration of Therapy Intervention (days/wks) 3 weeks   Anticipated Discharge Disposition Home with Outpatient Therapy   Risks and Benefits of Treatment have been explained. Yes   Patient, Family & other staff in agreement with plan of care Yes   Clinical Impression Comments Completed formal spech-language eval and informal cognitive assessment per MD orders. Pt presents with moderate deficits in language and moderate to severe deficits in cognition. Areas of impairment in language are in auditory comprehension and verbal expression/verbal flucney- reduced overall wrod retreival ( which is further impacted by reduced throught organziation). With cognition- pt demonstrates mos to severe impairments in sustained , flexible and alternating attention; overall reduced short term memory as well as reduced general orientation and impaired mental flexibilty/ problem solving. Pt is further impulsive and demonstrates deficits in executive function. Pt's skills are below baseline and pt would benefit in skilled SLP intervention to improve functional language and cognition for independence and safety  in IADL's   Total Evaluation Time      Total Evaluation Time (Minutes) 60

## 2017-07-31 NOTE — PROGRESS NOTES
" 07/31/17 1106   Quick Adds   Type of Visit Initial Occupational Therapy Evaluation   Living Environment   Lives With spouse;child(hugo), dependent   Living Arrangements house   Home Accessibility stairs to enter home;stairs within home;tub/shower is not walk in   Number of Stairs to Enter Home 8   Number of Stairs Within Home 10   Transportation Available family or friend will provide   Living Environment Comment OT: pt lives with  and daughter but unable to state if she has a tub or walk in shower. Pt unable to recall if she has a walker, shower chair.    Self-Care   Dominant Hand right   Usual Activity Tolerance good   Current Activity Tolerance fair   Regular Exercise no   Equipment Currently Used at Home none   Activity/Exercise/Self-Care Comment OT: pt was independent with adls/iadls. Pt likes to walk around with daughter. Pt unable to state if she works but stated that her \"pretty much\" takes care of everything. Pt likes \"all kinds\" of art.    Functional Level Prior   Ambulation 0-->independent   Transferring 0-->independent   Toileting 0-->independent   Bathing 0-->independent   Dressing 0-->independent   Eating 0-->independent   Communication 0-->understands/communicates without difficulty   Swallowing 0-->swallows foods/liquids without difficulty   Cognition 0 - no cognition issues reported   Fall history within last six months no   Which of the above functional risks had a recent onset or change? ambulation;transferring;toileting;bathing;dressing;cognition   Prior Functional Level Comment OT: pt was independent with adls/iadls. Pt likes to walk around with daughter. Pt unable to state if she works but stated that her \"pretty much\" takes care of everything. Pt likes \"all kinds\" of art.        Present no   General Information   Onset of Illness/Injury or Date of Surgery - Date 07/24/17   Referring Physician Dr. 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    Cognitive Status Examination   Level of Consciousness alert   Personal Safety (Cognitive) impulsive;unaware of cognitive deficits;unaware of functional deficits;unaware of consequences of deficits   Memory impaired   Attention Alternating attention impaired, difficulty shifting between tasks;Divided attention impaired, difficulty with simultaneous tasks;Sustained attention impaired;Quiet environment required   Cognitive Comment OT: pt is cooperative and engaged but requires vcs to eat her food, unable to recall children's ages, living situation, hobbies, difficulty recalling children's age, unaware of safety and was found getting up on her own.    Visual Perception   Visual Perception No deficits were identified   Sensory Examination   Sensory Comments OT: per pt, no numbness or tingling in her hands.    Pain Assessment   Patient Currently in Pain No   Integumentary/Edema   Integumentary/Edema no deficits were identifed   Posture   Posture not impaired   Range of Motion (ROM)   ROM Comment OT: BUE WFL   Strength   Strength Comments OT: BUE shoulder flexion/abduction 4+/5. BUE elbow flexion/exetendion 5/5.    Coordination   Upper Extremity Coordination No deficits were identified   Fine Motor Coordination OT; impaired opposition d/t pt unable to follow directions.    ARC Assessment Only   Acute Rehab FIM See FIM scores for Mobility/ADL Assessment   Instrumental Activities of Daily Living (IADL)   Previous Responsibilities meal prep;housekeeping;laundry;shopping;yardwork;medication " management;finances;driving;   Activities of Daily Living Analysis   Impairments Contributing to Impaired Activities of Daily Living balance impaired;cognition impaired;coordination impaired;flexibility decreased;motor control impaired;pain   General Therapy Interventions   Planned Therapy Interventions ADL retraining;IADL retraining;balance training;bed mobility training;cognition;motor coordination training;groups;transfer training;home program guidelines   Clinical Impression   Criteria for Skilled Therapeutic Interventions Met yes, treatment indicated   OT Diagnosis OT; generalized weakness, impaired adls/iadls, cognition   Influenced by the following impairments OT: PT currently demonstrates decreased ability to complete adls/iadls, impaired mobility, standing balance/tolerance, impaired transfers, impaired memory deficits. Skilled OT needed to address above mentioned deficits in order for a safe return home with  and children and depending on progression pt might need supervision for safety at home for higher level iadls.    Assessment of Occupational Performance 1-3 Performance Deficits   Identified Performance Deficits OT; impaired social skills, bathing, home management   Clinical Decision Making (Complexity) Low complexity   Therapy Frequency daily   Predicted Duration of Therapy Intervention (days/wks) 7-10 days   Anticipated Equipment Needs at Discharge shower chair;tub bench   Anticipated Discharge Disposition Home;Home with Outpatient Therapy;Home with Assist   Risks and Benefits of Treatment have been explained. Yes   Patient, Family & other staff in agreement with plan of care Yes   Clinical Impression Comments OT: PT currently demonstrates decreased ability to complete adls/iadls, impaired mobility, standing balance/tolerance, impaired transfers, impaired memory deficits. Skilled OT needed to address above mentioned deficits in order for a safe return home with  and children  and depending on progression pt might need supervision for safety at home for higher level iadls.    Total Evaluation Time   Total Evaluation Time (Minutes) 25

## 2017-07-31 NOTE — PROGRESS NOTES
Social Work: Initial Assessment with Discharge Plan    Patient Name: Shanna Shanks  : 1975  Age: 42 year old  MRN: [id number]  Completed assessment with: Pt  Admitted to ARU: 2017    Presenting Information   Date of SW assessment: 2017  Health Care Directive: Patient considering completing  Primary Health Care Agent: Default to next of kin.  Secondary Health Care Agent: TYRELL  Living Situation: Pt lives at home with her  Jama and DTR's Alexandria and Tegan.   Previous Functional Status: Pt was independent and active before hospitalization.   DME available: See therapy notes  Patient and family understanding of hospitalization: When asked pt stated that she does not know how she even got to Acute Rehab. She has no memory of her hospital stay.  Cultural/Language/Spiritual Considerations: English speaking.       Physical Health  Reason for admission: No diagnosis found.    Provider Information   Primary Care Physician:Malvin Beck   : TYRELL    Mental Health/Chemical Dependency:   Diagnosis: No concerns stated from pt.   Alcohol/Tobacco/Narcotis: Tobacco dependency pt had no comment on this.   Support/Services in Place: Unknown  Services Needed/Recommended: TYRELL  Sexuality/Intimacy: No concerns.     Support System  Marital Status:   Family support: Pt has the support of her  and DTR's  Other support available: Pt did not list any other supports.     Community Resources  Current in home services: No current services.   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   Income Source: Self  Education: High school  Financial Concerns:  No concerns  Insurance: [facility]/ [facility].       Discharge Plan   Patient and family discharge goal: Pt's goal is to return home with her  and children   Provided Education on discharge plan: YES  Patient agreeable to discharge plan:   YES  Provided education and attained signature for Medicare IM and IRF Patient Rights and Privacy Information provided to patient : NO  Provided patient with [place] Resources: NA  Barriers to discharge: Medical clearance, safe DC plan.     Discharge Recommendations   Disposition: TBD  Transportation Needs: Family will provide.   Name of Transportation Company and Phone: NA    BIMS: 15/15 showing cognitively intact- recorded in flow sheet on 7/31    Please invite to Care Conference:    Jama  [phone]       07/31/17 1500   Living Arrangements   Lives With child(hugo), dependent;spouse   Living Arrangements house   Able to Return to Prior Living Arrangements yes   Home Safety   Patient Feels Safe Living in Home? yes   Discharge Needs Assessment   Patient/family verbalizes understanding of discharge plan recommendations? No   Equipment Currently Used at Home none   Transportation Available car;family or friend will provide   Abuse Risk Screen   QUESTION TO PATIENT:  Has a member of your family or a partner(now or in the past) intimidated, hurt, manipulated, or controlled you in any way? no   QUESTION TO PATIENT: Do you feel safe going back to the place where you are living? yes   OBSERVATION: Is there reason to believe there has been maltreatment of a vulnerable adult (ie. Physical/Sexual/Emotional abuse, self neglect, lack of adequate food, shelter, medical care, or financial exploitation)? no   Mental Health Suicide Risk   Have you ever thought about hurting yourself now or in the past? no     BOB Morejon  Covering Acute Rehab

## 2017-07-31 NOTE — PLAN OF CARE
Problem: Goal/Outcome  Goal: Goal Outcome Summary  FOCUS/GOAL  Medical management and Safety management     ASSESSMENT, INTERVENTIONS AND CONTINUING PLAN FOR GOAL:  Pt here with Hx of MS and encephalopathy. She requires alarms at all times due to cognitive deficits.  She is alert to self and does not make needs known.  She requires A1 with walker.  Her  (dom) would like to talk with SW about community resources and interventions to assist with MS and recent cognitive dysfunction.  They have a small child.  She is on menses and requires prompting for feminine products (in bathroom left drawer).  She is on acyclovir q8hrs via iv.

## 2017-07-31 NOTE — PLAN OF CARE
Problem: Goal/Outcome  Goal: Goal Outcome Summary  OT: initial eval completed and tx initiated. pt has severe cognitive deficits which impedes in safety and impaired adls. pt needs alarms in chair and bed. estimated length of stay 7-10 days. pt currently is CGA/SBA without AD for mobiltiy to the bathroom.

## 2017-08-01 PROCEDURE — 12800006 ZZH R&B REHAB

## 2017-08-01 PROCEDURE — 25000128 H RX IP 250 OP 636: Performed by: PHYSICAL MEDICINE & REHABILITATION

## 2017-08-01 PROCEDURE — 97535 SELF CARE MNGMENT TRAINING: CPT | Mod: GO | Performed by: OCCUPATIONAL THERAPIST

## 2017-08-01 PROCEDURE — 40000225 ZZH STATISTIC SLP WARD VISIT: Performed by: SPEECH-LANGUAGE PATHOLOGIST

## 2017-08-01 PROCEDURE — 97116 GAIT TRAINING THERAPY: CPT | Mod: GP

## 2017-08-01 PROCEDURE — 97750 PHYSICAL PERFORMANCE TEST: CPT | Mod: GP

## 2017-08-01 PROCEDURE — 97110 THERAPEUTIC EXERCISES: CPT | Mod: GP

## 2017-08-01 PROCEDURE — 92610 EVALUATE SWALLOWING FUNCTION: CPT | Mod: GN | Performed by: SPEECH-LANGUAGE PATHOLOGIST

## 2017-08-01 PROCEDURE — 40000193 ZZH STATISTIC PT WARD VISIT

## 2017-08-01 PROCEDURE — 97532 ZZHC SP COGNITIVE SKILLS EA 15 MIN: CPT | Mod: GN | Performed by: SPEECH-LANGUAGE PATHOLOGIST

## 2017-08-01 PROCEDURE — 25000132 ZZH RX MED GY IP 250 OP 250 PS 637: Performed by: PHYSICAL MEDICINE & REHABILITATION

## 2017-08-01 PROCEDURE — 40000133 ZZH STATISTIC OT WARD VISIT: Performed by: OCCUPATIONAL THERAPIST

## 2017-08-01 RX ADMIN — VENLAFAXINE HYDROCHLORIDE 75 MG: 75 TABLET, EXTENDED RELEASE ORAL at 08:55

## 2017-08-01 RX ADMIN — HEPARIN SODIUM 5000 UNITS: 5000 INJECTION, SOLUTION INTRAVENOUS; SUBCUTANEOUS at 12:43

## 2017-08-01 RX ADMIN — SENNOSIDES AND DOCUSATE SODIUM 2 TABLET: 8.6; 5 TABLET ORAL at 08:55

## 2017-08-01 RX ADMIN — LEVETIRACETAM 750 MG: 750 TABLET, FILM COATED ORAL at 08:55

## 2017-08-01 RX ADMIN — HEPARIN SODIUM 5000 UNITS: 5000 INJECTION, SOLUTION INTRAVENOUS; SUBCUTANEOUS at 05:15

## 2017-08-01 RX ADMIN — ACYCLOVIR SODIUM 625 MG: 50 INJECTION, SOLUTION INTRAVENOUS at 22:08

## 2017-08-01 RX ADMIN — HEPARIN SODIUM 5000 UNITS: 5000 INJECTION, SOLUTION INTRAVENOUS; SUBCUTANEOUS at 22:08

## 2017-08-01 RX ADMIN — LEVETIRACETAM 750 MG: 750 TABLET, FILM COATED ORAL at 22:08

## 2017-08-01 RX ADMIN — ACETAMINOPHEN AND CODEINE PHOSPHATE 0.35 MG: 120; 12 SOLUTION ORAL at 08:56

## 2017-08-01 RX ADMIN — ACYCLOVIR SODIUM 625 MG: 50 INJECTION, SOLUTION INTRAVENOUS at 05:20

## 2017-08-01 RX ADMIN — ACYCLOVIR SODIUM 625 MG: 50 INJECTION, SOLUTION INTRAVENOUS at 13:40

## 2017-08-01 NOTE — PLAN OF CARE
Problem: Goal/Outcome  Goal: Goal Outcome Summary  Outcome: No Change  Pt was incontinent to bladder at around midnight. Linen changed, pt was washed up and new gown placed. While in the process of cleaning pt, she requested to go to the bathroom, she voided using the toilet. Bladder scan was 200 ml. Pt is on her time and needs to be reminded to change or remove pad/tampon regularly. Pt can be confused at times and also forgetful. Midline in right arm. Pt denied pain. No complaints. Will continue to monitor.

## 2017-08-01 NOTE — PROGRESS NOTES
08/01/17 1059   Signing Clinician's Name / Credentials   Signing clinician's name / credentials Daniela Palmer Ms/CCC-SLP   Quick Adds   Rehab Discipline SLP   Additional Documentation   SLP Plan SLP: 2nd session- see for language goals- Aud comp and veral expression and cognitive goals: sustained attention, recent recall and general orientation, problem solving/reasoning-hold on formal assessment for a couple of days- but can do Rbans when ready   Total Session Time   Total Session Time (minutes) 30 minutes   [facility] or TCU Only   What unit is patient on? Acute Rehab   SLP - Acute Rehab Center Time   Individual Time (minutes) - enter zero if not applicable - SLP 30  (30 cognitive lsinguistic skills)   Group Time (minutes) - enter zero if not applicable  - SLP 0   Concurrent Time (minutes) - enter zero if not applicable  - SLP 0   Co-Treatment Time (minutes) - enter zero if not applicable  - SLP 0   ARC Total Session Time (minutes) - SLP 30   Comprehension (FIM)   Functional Performance Requires extra time;Requires repetition (see comments);Requires slowed speech, raised voice,visual cues or gestures;Moderate prompting (comprehends basic ideas 50-74% of the time)   FIM Score 3- Moderate assistance: patient expends between 50 and 74% of effort   Expression (FIM)   Functional Performance Minimal prompting-expresses basic ideas 75-90% of the time   Expression Comment Compelted wrod finding task- naming to description- 9/10- some increased time a times to retreive words   FIM Score 4- Minimal contact assistance: patient expends 75% or more of effort   Social Interaction (FIM)   Functional Performance Modified independence-only occasionally loses control   Social Interaction Comment falling asleep at times- cues to attend; pleasant   FIM Score 6- Modified independence   Problem Solving (FIM)   Functional Performance Moderate direction-needs help to solve routine problems 26-50% of the time   Problem Solving Comment  Compelted a written problem solving task that also targeted divided attention- pt needing mod cues to locate info on a schedule   FIM Score 3- Moderate assistance: patient expends between 50 and 74% of effort   Memory (FIM)   Functional Performance Maximal prompting-recognizes and remembers 25-49% of the time   Memory Comment Pt still having difficulty with general orientation tasks- still not remembering her birthday, did not know what year it was, or what time of day it was; also did not know daughters age; visual recall task- pt at 4/10 recall of visual details   FIM Score 2- Maximal assistance: patient expends between 25 and 49% of effort

## 2017-08-01 NOTE — PLAN OF CARE
Problem: Goal/Outcome  Goal: Goal Outcome Summary  OT: Completed full shower with ADL routine. Physically performing at SBA level with fww, completing some amb in room without AD. Demonstrating significant cognitive deficits limiting overall performance. Requiring cues to attend to task, initiation, sequencing, and problem solving. Ultimately responding best to combination of gestural and verbal cues.

## 2017-08-01 NOTE — PLAN OF CARE
Problem: Goal/Outcome  Goal: Goal Outcome Summary  Outcome: No Change  VSS. Pt was confused at times. Denied pain or need for pain control. Tolerated regular diet. Denied any nausea, CP, SOB, lightheadedness or dizziness. Voiding without pain or difficulty, incontinent at times.  Pt on her menses and needs reminding to change sanitary pad/tampon.  Passing flatus. Pt had incontinent BM today. Up in chair for a couple hours and ate dinner.   Resting in bed at this time with call light in reach and able to make needs known.

## 2017-08-01 NOTE — PLAN OF CARE
Problem: Goal/Outcome  Goal: Goal Outcome Summary  Rn: Denies pain, confused, alarms on at all times.  Skin intact.  Ambulate with gait belt, assist of one and walker. Generalized weakness and sl unsteady gait. Urinating without problems today, PVR check 0 cc. Continues with IV zovirax. Continue to monitor that pt does not put in tampon and forgets about it.  No tampons at this time, no red blood noted with menses, this is reported to be end of her cycle. Turns and repositions independently in bed without problems.

## 2017-08-01 NOTE — PROGRESS NOTES
08/01/17 0900   Signing Clinician's Name / Credentials   Signing clinician's name / credentials Bri Ivy OTR/L   Quick Adds   Rehab Discipline OT   ADL Training   Minutes of Treatment 60   Symptoms Noted During/After Treatment none   Treatment Environment structured;Tasks graded to facilitate independence   Treatment Detail OT: Completed full shower with ADL routine. Focus on several methods of cueing to increase participation and IND. Pt ultimately responding best with combination of gestural and verbal cueing. Noting improvement with mobility, demonstrating SBA at physical level but requiring extensive cueing to maximize safety. Please see FIM for functional performance.   Patient Response Inconsistent ability to follow techniques   Additional Documentation   OT Plan OT: ADL routine focus on initiation/sequencing-trial different methods of cueing, functional cognition with simple IADLs, schedule for CPT or EFPT.   Total Session Time   Total Session Time (minutes) 60 minutes  (60 ADL)   OT - Acute Rehab Center Time   Individual Time (minutes) - enter zero if not applicable - OT 60  (60 ADL)   Group Time (minutes) - enter zero if not applicable  - OT 0   Concurrent Time (minutes) - enter zero if not applicable  - OT 0   Co-Treatment Time (minutes) - enter zero if not applicable  - OT 0   ARC Total Session Time (minutes) - OT 60   Social Interaction (FIM)   Social Interaction Comment OT: Fatigued, pleasant and cooperative.   Problem Solving (FIM)   Functional Performance Moderate direction-needs help to solve routine problems 26-50% of the time   Problem Solving Comment OT: Step by step gestural/verbal cues to initiate and sequence ADL routine. Pt maintaining attention for 30 sec periods and then becoming distracted/picking at skin and nose. Requiring extensive cueing to attend to routine.    FIM Score 3- Moderate assistance: patient expends between 50 and 74% of effort   Memory (FIM)   Functional Performance  Maximal prompting-recognizes and remembers 25-49% of the time   Memory Comment OT: Unable to recall information thorughout session, requiring max prompting to recall plan for session.   FIM Score 2- Maximal assistance: patient expends between 25 and 49% of effort   Bladder (FIM)   Functional Performance Continent and uses toilet in bathroom   Bladder Comment OT: Pt pad wet at beginning of session, requiring assist to change. Pt continent twice during session, able to void in BR toilet.   Oral Hygiene   Complete independence for oral hygiene tasks no   Describe performance OT: SBA and verbal cueing to sequence/continue while standing at sink.   FIM Score: Oral Hygiene 5- Supervision or Setup   Environmental assistance for oral hygiene tasks Skilled supervision for safety   Grooming (except oral cares) (FIM)   Grooming Task Performed Hair grooming;Washing hands;Washing face   Grooming Comment OT: SBA standing g/h at sink. Cueing to attend to task and to sequence.   FIM Score 5- Supervision or Setup   Upper Body Dressing (FIM)   Completely independent with Upper Body Dressing no   Describe performance OT: Set up of shirt, cues to iniaite task, pt able to don shirt with set up/cueing assist.   FIM Score: Upper Body Dressing 5- Supervision or Setup   Clothing Utilized Shirt;Bra   Environmental Assistance for dressing and undressing clothing for upper body Set-up assistance prior to the activity   Lower Body Dressing (FIM) (Pants/Undergarments)   Complete independence with Lower Body Dressing  Pants/Undergarments no   Describe performance OT: Set up of clothing on bed, cue to initiate. Pt sitting on chair, able to thread pants, and pull up over hips without cueing.   FIM Score: Lower Body Dressing Pants/Undergarments 5- Supervision or Setup   Clothing Utilized Pants   Environmental Assistance for dressing and undressing clothing for upper body Verbal assistance or cues required for donning pants/undergarments (no physical  assistance required)   Lower Body Dressing putting on/taking off footwear (FIM)   Complete independence with Lower Body Dressing Footwear no   Describe performance OT: Placed shoes and socks in front of pt. Requring 2 verbal cues to initiate task. Pt able to doff/don socks and shoes IND after cueing.   FIM Score: Lower Body Dressing Putting on/taking off footwear 5- Supervision or Setup   Environmental Assistance for donning/doffing socks/shoes and other footwear Set-up assistance prior to the activity   Shower/Bathe self (FIM)   Completely independent with Shower/Bath no   Describe performance OT: Steadying assist to wash ondina area/gluteal region. Set up of environment required. Pt needing step by step verbal cues to complete bathing tasks. Pt able to sustain task for 20-30 sec, then beginning to pick at face/nose. Needing cueing to redirect and wash next body part. Unable to safely manage handheld showe head, so requiring assist to rinse body parts.    FIM Score: Shower/Bathe 4- Minimal contact assistance: patient expends 75% or more of effort   Environmental assistance to shower/bathe including washing/rinsing and drying all body parts Verbal assistance or cues required to wash/rinse/dry body parts (no physical assistance required)   Adaptive Equipment utilized for bathing Shower stool/ extended tub bench;Hand held shower   Tub / Shower Transfer (FIM)   Assistive Devices Grab bar;Extended tub bench   Functional Performance Steadying assist for tub/shower transfer (NO lifting assistance)   Tub/Shower Transfer Comment OT: CGA SPT to tub bench with fww and grab bar.   FIM Score 4- Minimal contact assistance: patient expends 75% or more of effort   Toilet Hygiene (FIM)   Complete independence Toileting Task no   Describe performance OT: IND toilet hygiene. SBA clothing management, cues to completely pull up over hips.    FIM Score: Toilet (clothing down, perineal hygiene, clothing up  5- Supervision or Setup    Environmental assistance for toileting task Verbal assistance or cues required to wash/rinse/dry body parts (no physical assistance required)   Toilet Transfer (FIM)   Complete independence with getting on and off a toilet or commode no   Describe performance OT: SBA SPT to toilet with fww, at times forgetting to bring walker during transfer.   FIM Score: Toilet Transfer 5- Supervision or Setup   Environmental assistance for getting on and off a toilet or commode Skilled supervision for safety   Adaptive Equipment utilized for Toilet Transfer Grab bar   Chair/bed-to-chair Transfer (FIM)   Complete independence for chair-bed-to-chair transfer no   Describe performance OT: SBA SPT to bedside chair, cues to use walker and navigate around objects in room.    Environmental assistance for getting from chair-bed-to-chair Supervision for safety concerns   Adaptive Equipment utilized for chair-bed-to-chair Walker   Roll Left and Right   Assistance Needed Independent   Physical Assistance Level No physical assistance   Comment OT: IND rolling R/L HOB flat no AD.   Sit to Lying   Assistance Needed Independent   Physical Assistance Level No physical assistance   Comment OT: Supine <-> EOB with HOB flat no bed rail.   Lying to Sitting on Side of Bed   Assistance Needed Independent   Physical Assistance Level No physical assistance   Comment OT: Supine <-> EOB with HOB flat no bed rail.   Sit to Stand   Assistance Needed Adaptive equipment   Physical Assistance Level No physical assistance   Comment OT: SBA STS from bedside chair with fww.   Wheel 50 Feet with Two Turns   Comment OT: Will not be using a w/c at time of d/c.   Reason if not Attempted Activity not applicable   Wheel 150 Feet   Reason if not Attempted Activity not applicable   Picking Up Object   Physical Assistance Level Contact guard assist   Comment OT: Picking up towel from floor from standing position using fww to stabilize. CGA to return to standing position.

## 2017-08-01 NOTE — PROGRESS NOTES
08/01/17 0842   Signing Clinician's Name / Credentials   Signing clinician's name / credentials Daniela Palmer Ms/CCC-SLP   Quick Adds   Rehab Discipline SLP   Additional Documentation   SLP Plan SLP: 2nd session- see for language goals- Aud comp and veral expression and cognitive goals: sustained attention, recent recall and general orientation, problem solving/reasoning-hold on formal assessment for a couple of days- but can do Rbans when ready   Total Session Time   Total Session Time (minutes) 30 minutes   [facility] or TCU Only   What unit is patient on? Acute Rehab   SLP - Acute Rehab Center Time   Individual Time (minutes) - enter zero if not applicable - SLP 30  (30 swallow eval)   Group Time (minutes) - enter zero if not applicable  - SLP 0   Concurrent Time (minutes) - enter zero if not applicable  - SLP 0   Co-Treatment Time (minutes) - enter zero if not applicable  - SLP 0   ARC Total Session Time (minutes) - SLP 30   Eating (FIM)   Completely independent with self-feeding no   Environmental Assistance Needed Set-up assistance of environment   Describe performance SLP: completed swallow eval- pt tolerating current regualr diet with thin liquids - see eval for complete details. No further swallow intervention is indicated at this time Pt did however require some assistance with cutting food   FIM Score 5- Supervision or Setup

## 2017-08-01 NOTE — PROGRESS NOTES
08/01/17 1102   Signing Clinician's Name / Credentials   Signing clinician's name / credentials Radha Light PT, DPT   Quick Adds   Rehab Discipline PT   Therapeutic Activity   Treatment Detail PT: sup<>sit MOD I with HOB raised, sit<>stands with SBA, v/c for hand placement   Therapeutic Exercise   Treatment Detail PT: amb to/from therapy with CGA/SBA. v/c for upright posture.   Neuromuscular Re-education   Treatment Detail PT: completed DGI, see note for details.   Additional Documentation   Rehab Comments PT: reports fatigue throughout session. pt perseverating on talking/seeing her .  Assist to make phone call at end of session   PT Plan PT: standing balance, Nustep   Total Session Time   Total Session Time (minutes) 45 minutes   PT - Acute Rehab Center Time   Individual Time (minutes) - enter zero if not applicable - PT 45  (15 neuro; 10TE; 20gait)   Group Time (minutes) - enter zero if not applicable  - PT 0   Concurrent Time (minutes) - enter zero if not applicable  - PT 0   Co-Treatment Time (minutes) - enter zero if not applicable  - PT 0   [facility] Total Session Time (minutes) - PT 45   Problem Solving (FIM)   Problem Solving Comment PT: pt having difficulty using phone to call , step by step cues given   Memory (FIM)   Memory Comment PT: poor recall of info through session   Sit to Lying   Comment PT: MOD I with HOB raised; assist to move bedrails   Lying to Sitting on Side of Bed   Comment PT: MOD I with HOB raised, assist for bed rails   Sit to Stand   Comment PT: SBA with FWW   Locomotion (FIM)   Locomotion Comment PT: amb to/from therapy with FWW and CGA/SBA   Stairs (FIM)   Assistive Devices 2 rails   Functional Performance Safety concern (see comments)   Stairs Comment PT: up/down 12 stairs with 2 HR and CGA, pt using reciprocal pattern up and down   1 Step (Curb)   Assistance Needed Verbal cues   Physical Assistance Level 25%-49%   Comment PT: MIN A to step up and down from curb without  AD   4 Steps   Comment PT: up/down 12 stairs with 2 HR and CGA, pt using reciprocal pattern up and down   12 Steps   Comment PT: up/down 12 stairs with 2 HR and CGA, pt using reciprocal pattern up and down   Car Transfer   Assistance Needed Supervision;Set-up / clean-up   Physical Assistance Level No physical assistance   Comment PT: SBA for transfer in/out of white car with FWW; assist for car door

## 2017-08-01 NOTE — PROGRESS NOTES
08/01/17 0900   General Information   Onset Date 07/25/17   Start of Care Date 07/31/17   Referring Physician Solo Martinez MD   Patient/Family Goals Statement to keep eating a regular diet   Swallowing Evaluation Bedside swallow evaluation   Behaviorial Observations Confused;Distractible;Impulsive   Mode of current nutrition Oral diet   Type of oral diet Regular;Thin liquid   Respiratory Status Room air   Comments Presented with unresponsive episode associated with right hemiplegia. Underwent stroke workup in the Emergency Department including CT head and CTA head and neck which was negative for bleed or large clot. Due to her level of unresponsiveness initially as well as concern for catastrophic neurologic event, she was intubated in the Emergency Department and admitted to the ICU.  She was successfully extubated shortly after. Neurology consulted. Concern for left-sided seizure activity on EEG. 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 Increased risk with chronic immunosuppressants for multiple sclerosis.   Clinical Swallow Evaluation   Oral Musculature generally intact   Structural Abnormalities none present   Dentition present and adequate   Mucosal Quality good   Mandibular Strength and Mobility intact   Oral Labial Strength and Mobility WFL   Lingual Strength and Mobility impaired coordination   Velar Elevation intact   Buccal Strength and Mobility intact   Laryngeal Function Cough;Throat clear;Swallow;Voicing initiated;Dry swallow palpated   Additional Documentation Yes   Additional evaluation(s) completed today No   Swallow Eval   Feeding Assistance set up only required   Clinical Swallow Eval: Thin Liquid Texture Trial   Mode of Presentation, Thin Liquids cup   Volume of Liquid or Food Presented 4 oz   Oral Phase of Swallow WFL   Pharyngeal Phase of Swallow intact   Diagnostic Statement No aspiration s/s with multiple trials of  thin liquids by cup rim; swallow timely   Clinical Swallow Eval: Solid Food Texture Trial   Mode of Presentation, Solid self-fed   Volume of Solid Food Presented multiple bites of regualr solids   Oral Phase, Solid other (see comments)  (mildly slower oral prep)   Pharyngeal Phase, Solid intact   Successful Strategies Trialed During Procedure, Solid hard swallow;other (see comments)  (alternate solids and liquids)   Diagnostic Statement No s/s of aspiration with multiple bites of regular solids- pt has some mildly slower oral prep but swallow sequence is complete; no oral residue and no sensation of pharyngeal retention.    VFSS Evaluation   VFSS Additional Documentation No   FEES Evaluation   Additional Documentation No   Swallow Compensations   Swallow Compensations Alternate viscosity of consistencies;Pacing;Reduce amounts   Esophageal Phase of Swallow   Patient reports or presents with symptoms of esophageal dysphagia No   Swallow Eval: Clinical Impressions   Skilled Criteria for Therapy Intervention No problems identified which require skilled intervention   Functional Assessment Scale (FAS) 6   Dysphagia Outcome Severity Scale (AGUSTIN) Level 6 - AGUSTIN   Treatment Diagnosis minimal oral pharyngeal dysphagia   Diet texture recommendations Regular diet;Thin liquids   Recommended Feeding/Eating Techniques alternate between small bites and sips of food/liquid;maintain upright posture during/after eating for 30 mins;small sips/bites   Anticipated Discharge Disposition home w/ assist   Patient, family and/or staff in agreement with Plan of Care Yes   Clinical Impression Comments Completed clinicial swallow eval per MD orders- pt had been seen for swallowing while still in hopsital buthad advanced to regualr diet with thin liquids just prior to d/c. Based on today;s reassessment- Swallow function is WFL with only minimal deficits in oral prep- mildly slower for regualr solids but swallow sequence is complete without any  oral residue and no sensation of pharyngeal retention. Thre is not aspiration s/s on regular solids or thin liquids. Pt is confused and impoulsive ans so at times takes very large bites but despite this is still managing current regular diet. No further sptx intevention is needed for swallowing- reocmmend continuing with current regular diet with thin liquids. Pt should continue to be urpight for all po, take small sips/bites. pace self- eat slowly.    Total Evaluation Time   Total Evaluation Time (Minutes) 30

## 2017-08-01 NOTE — PROGRESS NOTES
" 07/31/17 1027   Quick Adds   Type of Visit Initial PT Evaluation   Living Environment   Lives With child(hugo), dependent;significant other   Living Arrangements house   Home Accessibility other (see comments)  (request home eval to improve saftey)   Number of Stairs to Enter Home 8   Number of Stairs Within Home 10   Transportation Available family or friend will provide   Living Environment Comment stair details from chart review- pt unable to state details of home   Self-Care   Dominant Hand right   Usual Activity Tolerance good   Current Activity Tolerance fair   Regular Exercise no   Equipment Currently Used at Home none   Activity/Exercise/Self-Care Comment from chart review: Pt was independent with ADLs and mobility, ambulation on level surface was independent but slower, uneven surface requires support. pt unable to state   Functional Level Prior   Ambulation 0-->independent   Transferring 0-->independent   Toileting 0-->independent   Bathing 0-->independent   Dressing 0-->independent   Eating 0-->independent   Communication 0-->understands/communicates without difficulty   Swallowing 0-->swallows foods/liquids without difficulty   Cognition 0 - no cognition issues reported   Fall history within last six months no   Which of the above functional risks had a recent onset or change? 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 Solo Martinez (Dr. Bossman Bassett attending)   Patient/Family Goals Statement unable to state   Pertinent History of Current Problem (include personal factors and/or comorbidities that impact the POC) presented to ED with unresponsive episode associated with right hemiplegia. 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 PMH includes MS with chronic " immunosuppression use, depression and UI in context of MS   Precautions/Limitations fall precautions   Heart Disease Risk Factors Stress;High blood pressure   Cognitive Status Examination   Orientation person;place   Level of Consciousness alert;lethargic/somnolent   Follows Commands and Answers Questions 75% of the time   Personal Safety and Judgment intact   Memory intact   Cognitive Comment limited insights into deficits. unable to report on home set up   Pain Assessment   Patient Currently in Pain No   Integumentary/Edema   Integumentary/Edema no deficits were identifed   Posture    Posture Kyphosis;Protracted shoulders;Forward head position   Range of Motion (ROM)   ROM Quick Adds No deficits were identified   Strength   Strength Comments difficult to formally assess 2/2 command following, Through functional observation has at least antigravity strength grossly   ARC Assessment Only   Acute Rehab FIM See FIM scores for Mobility/ADL Assessment   Balance   Balance Comments good sitting balance, able to WS outside [place]. unsteady upon standing, needs support at back of legs. is able to perform bimanual task while standing once up on feet for a while   Sensory Examination   Sensory Perception Comments impaired LT left side. further formal testing not completed 2/2 command following and lethergy. Needed to keep eval functional, otherwise pt falling asleep and not able to stay on task   Coordination   Coordination Comments impaired coordination as noted during ambulation   Muscle Tone   Muscle Tone no deficits were identified   General Therapy Interventions   Planned Therapy Interventions ADL retraining;IADL retraining;balance training;gait training;groups;motor coordination training;strengthening;transfer training;home program guidelines;risk factor education;progressive activity/exercise   Clinical Impression   Criteria for Skilled Therapeutic Intervention yes, treatment indicated   PT Diagnosis impaired functional  mobility   Influenced by the following impairments functional strength, balance, somatonsensation, coordination, activity tolerance   Functional limitations due to impairments independence with functional mobility, transfers, gait, stairs, household and community mobility   Clinical Presentation Unstable/Unpredictable   Clinical Presentation Rationale pt with > 4 personal factors, impairments in PT/OT/SLP domains   Clinical Decision Making (Complexity) High complexity   Therapy Frequency` other (see comments)  (60-75 minutes daily)   Predicted Duration of Therapy Intervention (days/wks) 7 days   Anticipated Discharge Disposition Home with Assist;Home with Home Therapy;Home with Outpatient Therapy  (pending progress, activity tolerance)   Risk & Benefits of therapy have been explained Yes   Patient, Family & other staff in agreement with plan of care Yes   Clinical Impression Comments Pt is not baseline and will benefit from skilled PT services in order to maximize independence with functional mobility and decrease care giver burden. Pt with significnat cognitive deficits which may neccesitate 24/7 supervision at home pending clearing.   Total Evaluation Time   Total Evaluation Time (Minutes) 16

## 2017-08-01 NOTE — PROGRESS NOTES
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 Admitted to inpatient acute rehabilitation 7/30.    Participating in therapies. Per SLP, ok to continue with regular diet. Definite deficits in cognitive domain  Per SLP note:  Pt also seen for cognitive and language goals- improved word finding noted in structured task but in general conversation- language is disorganized and at times nonsensical. Pt remains disoriented to place, time and self ( ie: does not know her age, the year or 1 of her daughters age); short term recall remains mod to severely impaired as well as impairments in sustained an flexible attention, basic to mod level problem solving and reasoning.  Still early in her recovery. Does have considerable cognitive and orientation deficits. While she's doing some ambulation there is inconsistencies and definitely want someone with for all transfers and walking.    PT with DGI 12/24.    Team rounds later this week to review her progress and update plan of care along with estimated length of stay.    Medically:  Continue with IV acyclovir outlined through August 4.  Procedure prophylaxis continues with Keppra 750 mg twice daily.  Until more mobile will continue with heparin 3 times daily though anticipate off this by discharge.  For mood she continues with Effexor daily    Vitals:    07/31/17 0730 07/31/17 1500 08/01/17 0750 08/01/17 1500   BP: 114/67 94/54 115/68 100/52   BP Location: Left arm Left arm Left arm Left arm   Pulse: 83 79 78 72   Resp: 16 16 16 16   Temp: 98.8  F (37.1  C) 98  F (36.7  C) 97.3  F (36.3  C) 98.4  F (36.9  C)   TempSrc: Oral Oral Oral Oral   SpO2: 95% 98% 98% 96%   Weight:       Height:         Saw more briefly today, no diaphoresis, calm comfortable.  Breathing easy without cough or wheeze.  walking with therapy requiring cues to attend to environment but physically has a symmetric reciprocal gait  pattern.      sodium chloride (PF)  10 mL Intracatheter Q8H     heparin lock flush  5-10 mL Intracatheter Q24H     levETIRAcetam  750 mg Oral BID     norethindrone  1 tablet Oral Daily     venlafaxine  75 mg Oral Daily     vitamin D  50,000 Units Oral Weekly     ACYCLOVIR in 100 mL (for dose < 700 mg)  625 mg Intravenous Q8H     senna-docusate  1-4 tablet Oral BID     heparin  5,000 Units Subcutaneous Q8H

## 2017-08-01 NOTE — PLAN OF CARE
Problem: Goal/Outcome  Goal: Goal Outcome Summary  Completed clinicial swallow eval per MD orders- pt had been seen for swallowing while still in hopsital buthad advanced to regualr diet with thin liquids just prior to d/c. Based on today;s reassessment- Swallow function is WFL with only minimal deficits in oral prep- mildly slower for regualr solids but swallow sequence is complete without any oral residue and no sensation of pharyngeal retention. Thre is not aspiration s/s on regular solids or thin liquids. Pt is confused and impoulsive ans so at times takes very large bites but despite this is still managing current regular diet. No further sptx intevention is needed for swallowing- reocmmend continuing with current regular diet with thin liquids. Pt should continue to be urpight for all po, take small sips/bites. pace self- eat slowly.      Pt also seen for cognitive and language goals- improved word finding noted in structured task but in general conversation- language is disorganized and at times nonsensical. Pt remains disoriented to place, time and self ( ie: does not know her age, the year or 1 of her daughters age); short term recall remains mod to severely impaired as well as impairments in sustained an flexible attention, basic to mod level problem solving and reasoning.

## 2017-08-01 NOTE — PROGRESS NOTES
08/01/17 1100   Signing Clinician's Name / Credentials   Signing clinician's name / credentials Radha Light PT, DPT   Dynamic Gait Index (Valdemar and Forrester Hocking, 1995)   Gait Level Surface 2   Change in Gait Speed 2   Gait and Horizontal Head Turns 1   Gait with Vertical Head Turns 1   Gait and Pivot Turns 1   Step Over Obstacle 1   Step Around Obstacles 2   Steps 2   Total Dynamic Gait Index Score  (A score of 19 or less has been correlated to an increased risk of falls in community dwelling older adults, patients with vestibular disorders, and patients with MS.)   Total Score (out of 24) 12     Dynamic Gait Index (DGI):The DGI is a measure of balance during gait that is reliable and valid for the elderly and individuals with Parkinson's disease, MS, vestibular disorders, or s/p stroke. Gait assistive device used: FWW     Patient score: 12/24  Scores ?19/24 indicate an increased risk for falls according to Brittany et al 2000  Minimal Detectable Change = 2.9 in community dwelling elderly according to Wily et al 2011    Assessment (rationale for performing, application to patient s function & care plan): pt new to the unit.  Pt at a high fall risk at this time and should be using AD for mobility as well as having staff assist for safety with all functional mobility.   Minutes billed as physical performance test: 15

## 2017-08-02 LAB
ANION GAP SERPL CALCULATED.3IONS-SCNC: 9 MMOL/L (ref 3–14)
BUN SERPL-MCNC: 10 MG/DL (ref 7–30)
CALCIUM SERPL-MCNC: 8.7 MG/DL (ref 8.5–10.1)
CHLORIDE SERPL-SCNC: 105 MMOL/L (ref 94–109)
CO2 SERPL-SCNC: 27 MMOL/L (ref 20–32)
CREAT SERPL-MCNC: 0.64 MG/DL (ref 0.52–1.04)
ERYTHROCYTE [DISTWIDTH] IN BLOOD BY AUTOMATED COUNT: 14.1 % (ref 10–15)
GFR SERPL CREATININE-BSD FRML MDRD: ABNORMAL ML/MIN/1.7M2
GLUCOSE SERPL-MCNC: 115 MG/DL (ref 70–99)
HCT VFR BLD AUTO: 35.7 % (ref 35–47)
HGB BLD-MCNC: 11.7 G/DL (ref 11.7–15.7)
MCH RBC QN AUTO: 30.7 PG (ref 26.5–33)
MCHC RBC AUTO-ENTMCNC: 32.8 G/DL (ref 31.5–36.5)
MCV RBC AUTO: 94 FL (ref 78–100)
PLATELET # BLD AUTO: 314 10E9/L (ref 150–450)
POTASSIUM SERPL-SCNC: 4.7 MMOL/L (ref 3.4–5.3)
RBC # BLD AUTO: 3.81 10E12/L (ref 3.8–5.2)
SODIUM SERPL-SCNC: 141 MMOL/L (ref 133–144)
WBC # BLD AUTO: 6.1 10E9/L (ref 4–11)

## 2017-08-02 PROCEDURE — 97532 ZZHC SP COGNITIVE SKILLS EA 15 MIN: CPT | Mod: GN | Performed by: SPEECH-LANGUAGE PATHOLOGIST

## 2017-08-02 PROCEDURE — 40000193 ZZH STATISTIC PT WARD VISIT

## 2017-08-02 PROCEDURE — 80048 BASIC METABOLIC PNL TOTAL CA: CPT | Performed by: PHYSICAL MEDICINE & REHABILITATION

## 2017-08-02 PROCEDURE — 85027 COMPLETE CBC AUTOMATED: CPT | Performed by: PHYSICAL MEDICINE & REHABILITATION

## 2017-08-02 PROCEDURE — 96125 COGNITIVE TEST BY HC PRO: CPT | Mod: GN | Performed by: SPEECH-LANGUAGE PATHOLOGIST

## 2017-08-02 PROCEDURE — 97112 NEUROMUSCULAR REEDUCATION: CPT | Mod: GP

## 2017-08-02 PROCEDURE — 36415 COLL VENOUS BLD VENIPUNCTURE: CPT | Performed by: PHYSICAL MEDICINE & REHABILITATION

## 2017-08-02 PROCEDURE — 25000128 H RX IP 250 OP 636: Performed by: PHYSICAL MEDICINE & REHABILITATION

## 2017-08-02 PROCEDURE — 12800006 ZZH R&B REHAB

## 2017-08-02 PROCEDURE — 40000193 ZZH STATISTIC PT WARD VISIT: Performed by: STUDENT IN AN ORGANIZED HEALTH CARE EDUCATION/TRAINING PROGRAM

## 2017-08-02 PROCEDURE — 97116 GAIT TRAINING THERAPY: CPT | Mod: GP

## 2017-08-02 PROCEDURE — 25000132 ZZH RX MED GY IP 250 OP 250 PS 637: Performed by: PHYSICAL MEDICINE & REHABILITATION

## 2017-08-02 PROCEDURE — 40000225 ZZH STATISTIC SLP WARD VISIT: Performed by: SPEECH-LANGUAGE PATHOLOGIST

## 2017-08-02 PROCEDURE — 97116 GAIT TRAINING THERAPY: CPT | Mod: GP | Performed by: STUDENT IN AN ORGANIZED HEALTH CARE EDUCATION/TRAINING PROGRAM

## 2017-08-02 PROCEDURE — 25000125 ZZHC RX 250: Performed by: PHYSICAL MEDICINE & REHABILITATION

## 2017-08-02 PROCEDURE — 97110 THERAPEUTIC EXERCISES: CPT | Mod: GP

## 2017-08-02 RX ADMIN — SODIUM CHLORIDE, PRESERVATIVE FREE 5 ML: 5 INJECTION INTRAVENOUS at 08:40

## 2017-08-02 RX ADMIN — LEVETIRACETAM 750 MG: 750 TABLET, FILM COATED ORAL at 20:46

## 2017-08-02 RX ADMIN — SODIUM CHLORIDE, PRESERVATIVE FREE 5 ML: 5 INJECTION INTRAVENOUS at 20:46

## 2017-08-02 RX ADMIN — ACYCLOVIR SODIUM 625 MG: 50 INJECTION, SOLUTION INTRAVENOUS at 21:55

## 2017-08-02 RX ADMIN — SODIUM CHLORIDE, PRESERVATIVE FREE 5 ML: 5 INJECTION INTRAVENOUS at 23:00

## 2017-08-02 RX ADMIN — VENLAFAXINE HYDROCHLORIDE 75 MG: 75 TABLET, EXTENDED RELEASE ORAL at 08:11

## 2017-08-02 RX ADMIN — HEPARIN SODIUM 5000 UNITS: 5000 INJECTION, SOLUTION INTRAVENOUS; SUBCUTANEOUS at 20:46

## 2017-08-02 RX ADMIN — ACYCLOVIR SODIUM 625 MG: 50 INJECTION, SOLUTION INTRAVENOUS at 05:19

## 2017-08-02 RX ADMIN — ACETAMINOPHEN AND CODEINE PHOSPHATE 0.35 MG: 120; 12 SOLUTION ORAL at 08:11

## 2017-08-02 RX ADMIN — ACYCLOVIR SODIUM 625 MG: 50 INJECTION, SOLUTION INTRAVENOUS at 13:19

## 2017-08-02 RX ADMIN — LEVETIRACETAM 750 MG: 750 TABLET, FILM COATED ORAL at 08:11

## 2017-08-02 RX ADMIN — HEPARIN SODIUM 5000 UNITS: 5000 INJECTION, SOLUTION INTRAVENOUS; SUBCUTANEOUS at 13:19

## 2017-08-02 RX ADMIN — SENNOSIDES AND DOCUSATE SODIUM 2 TABLET: 8.6; 5 TABLET ORAL at 08:12

## 2017-08-02 RX ADMIN — HEPARIN SODIUM 5000 UNITS: 5000 INJECTION, SOLUTION INTRAVENOUS; SUBCUTANEOUS at 05:17

## 2017-08-02 NOTE — PLAN OF CARE
Problem: Goal/Outcome  Goal: Goal Outcome Summary  Outcome: No Change  FOCUS/GOAL  Bladder management and Medical management     ASSESSMENT, INTERVENTIONS AND CONTINUING PLAN FOR GOAL:  Alert to self only, confused to time, place and situation, did not use call light or set off bed alarm during the this shift. Needs CGA with belt and walker to the bathroom, had one bladder incontinent episode at the beginning of the shift, rest of shift was continent with toileting every two hrs, no menses noted. Midline to right arm in place and patent, no redness or swelling noted, Acyclovir infused without difficulty per orders, tolerated well infusion, declined discomfort to insertion site. Moves independently in bed. Pt noted to be resting comfortable in bed during rounds.

## 2017-08-02 NOTE — PLAN OF CARE
Problem: Goal/Outcome  Goal: Goal Outcome Summary  PT: pt yawning, closing eyes and hanging head down through session due to fatigue, reporting has not been sleeping well; pt needing redirection throughout session and to stay on task at hand. -10 due to fatigue.

## 2017-08-02 NOTE — PLAN OF CARE
Problem: Goal/Outcome  Goal: Goal Outcome Summary  Repeatable Battery for the Assessment of Neuropsychological Status (RBANS) FORM     Immediate Memory Visuospatial/Constructional Language Attention Delayed Memory Total Scale   Index Score 44 66 76 82 40 53   Percentile Rank <0.1 1 5 12 <.01 0.1               SLP:  Pt seen for administration of RBANS Form B.  Results are based on a mean of 100 and a standard deviation of +/- 15.    Interpretation: Pt has severe deficits with immediate memory, visuospatial, delayed memory, language, and slightly better score in attention.    Face to Face Administration: 60  Scoring/Interpretation: 10  Total Time: 70

## 2017-08-02 NOTE — PLAN OF CARE
Problem: Goal/Outcome  Goal: Goal Outcome Summary  OT: Pt. Not appropriate for 7 AM session. Pulling covers over head and minimally receptive to get out of bed other than toileting needs. Pt. Awakened every 2 hours for toileting. Scheduling notified.

## 2017-08-02 NOTE — PLAN OF CARE
"Problem: Goal/Outcome  Goal: Goal Outcome Summary  FOCUS/GOAL  Bowel management, Bladder management, Mobility and Cognition/Memory/Judgment/Problem solving     ASSESSMENT, INTERVENTIONS AND CONTINUING PLAN FOR GOAL:  Pt is alert and confused, VSS. Denies c/o pain, shortness of breath, or nausea/vomitting. Continent of bladder and bowels using toilet in bathroom, loose stool reported per NA staff, held [place] at HS. Pt transfers to bathroom with CGA/gait belt without difficulty. Has frequently activated bed alarm by attempting to self-transfer to bathroom, despite frequent reminders to use call light for assistance. Reported to charge nurse to consider moving to room closer to nursing station. Pt c/o midline IV site feeling \"sore\" when starting IV acyclovir. No redness, swelling, or drainage noted, normal skin temperature, dressing C/D/I, denies burning with medication administration. Reported to charge nurse who also assessed site, will continue to closely monitor and communicated to oncoming shift/Ruth. Menstrual bleeding reported to be decreasing, advised pt to use pads until end of cycle. Will continue to monitor.       "

## 2017-08-03 PROCEDURE — 25000132 ZZH RX MED GY IP 250 OP 250 PS 637: Performed by: PHYSICAL MEDICINE & REHABILITATION

## 2017-08-03 PROCEDURE — 40000193 ZZH STATISTIC PT WARD VISIT: Performed by: STUDENT IN AN ORGANIZED HEALTH CARE EDUCATION/TRAINING PROGRAM

## 2017-08-03 PROCEDURE — 40000225 ZZH STATISTIC SLP WARD VISIT: Performed by: SPEECH-LANGUAGE PATHOLOGIST

## 2017-08-03 PROCEDURE — 40000193 ZZH STATISTIC PT WARD VISIT

## 2017-08-03 PROCEDURE — 92507 TX SP LANG VOICE COMM INDIV: CPT | Mod: GN | Performed by: SPEECH-LANGUAGE PATHOLOGIST

## 2017-08-03 PROCEDURE — 97535 SELF CARE MNGMENT TRAINING: CPT | Mod: GO | Performed by: OCCUPATIONAL THERAPIST

## 2017-08-03 PROCEDURE — 25000128 H RX IP 250 OP 636: Performed by: PHYSICAL MEDICINE & REHABILITATION

## 2017-08-03 PROCEDURE — 97116 GAIT TRAINING THERAPY: CPT | Mod: GP | Performed by: STUDENT IN AN ORGANIZED HEALTH CARE EDUCATION/TRAINING PROGRAM

## 2017-08-03 PROCEDURE — 97530 THERAPEUTIC ACTIVITIES: CPT | Mod: GP

## 2017-08-03 PROCEDURE — 25000125 ZZHC RX 250: Performed by: PHYSICAL MEDICINE & REHABILITATION

## 2017-08-03 PROCEDURE — 12800006 ZZH R&B REHAB

## 2017-08-03 PROCEDURE — 97530 THERAPEUTIC ACTIVITIES: CPT | Mod: GP | Performed by: STUDENT IN AN ORGANIZED HEALTH CARE EDUCATION/TRAINING PROGRAM

## 2017-08-03 PROCEDURE — 40000133 ZZH STATISTIC OT WARD VISIT: Performed by: OCCUPATIONAL THERAPIST

## 2017-08-03 PROCEDURE — 97116 GAIT TRAINING THERAPY: CPT | Mod: GP

## 2017-08-03 RX ADMIN — LEVETIRACETAM 750 MG: 750 TABLET, FILM COATED ORAL at 08:26

## 2017-08-03 RX ADMIN — VENLAFAXINE HYDROCHLORIDE 75 MG: 75 TABLET, EXTENDED RELEASE ORAL at 08:27

## 2017-08-03 RX ADMIN — ERGOCALCIFEROL 50000 UNITS: 1.25 CAPSULE ORAL at 20:53

## 2017-08-03 RX ADMIN — SODIUM CHLORIDE, PRESERVATIVE FREE 5 ML: 5 INJECTION INTRAVENOUS at 14:57

## 2017-08-03 RX ADMIN — ACETAMINOPHEN AND CODEINE PHOSPHATE 0.35 MG: 120; 12 SOLUTION ORAL at 08:27

## 2017-08-03 RX ADMIN — ACYCLOVIR SODIUM 625 MG: 50 INJECTION, SOLUTION INTRAVENOUS at 21:54

## 2017-08-03 RX ADMIN — LEVETIRACETAM 750 MG: 750 TABLET, FILM COATED ORAL at 20:57

## 2017-08-03 RX ADMIN — HEPARIN SODIUM 5000 UNITS: 5000 INJECTION, SOLUTION INTRAVENOUS; SUBCUTANEOUS at 20:52

## 2017-08-03 RX ADMIN — SENNOSIDES AND DOCUSATE SODIUM 2 TABLET: 8.6; 5 TABLET ORAL at 08:26

## 2017-08-03 RX ADMIN — ACYCLOVIR SODIUM 625 MG: 50 INJECTION, SOLUTION INTRAVENOUS at 13:45

## 2017-08-03 RX ADMIN — HEPARIN SODIUM 5000 UNITS: 5000 INJECTION, SOLUTION INTRAVENOUS; SUBCUTANEOUS at 05:19

## 2017-08-03 RX ADMIN — SODIUM CHLORIDE, PRESERVATIVE FREE 5 ML: 5 INJECTION INTRAVENOUS at 23:12

## 2017-08-03 RX ADMIN — HEPARIN SODIUM 5000 UNITS: 5000 INJECTION, SOLUTION INTRAVENOUS; SUBCUTANEOUS at 13:11

## 2017-08-03 RX ADMIN — ACYCLOVIR SODIUM 625 MG: 50 INJECTION, SOLUTION INTRAVENOUS at 05:20

## 2017-08-03 NOTE — PROGRESS NOTES
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 Admitted to inpatient acute rehabilitation 7/30.    Interval history:  There is an episode during speech pathology this morning with 20-30 seconds of minimal responsiveness and closing her eyes with some shaking but subsequently seemed to be conversing reasonably without clear post ictal changes. While this might be some focal seizure it's not clearly so and will monitor for any pattern or recurrence. She does continue with Keppra.    Formal team rounds held today, please see separate document in Plan of Care tab for full details. Briefly still early in recovery course, not clear what trajectory she's on though severe cognitive deficits are most limiting factor. Care conference to be determined after with a better sense of her trajectory. Anticipating she will need 24-hour support unless she clears considerably.     This afternoon I met with her  Jama. He reports she was having some milder range cognitive difficulties forgetting some details here and there though currently this is significantly different. She also had some subtle gait instability but generally was walking around independently.  I reviewed the concepts of recovery and not might be contributing to her impairments. Still very early as above though there is concern and likely will require higher level supervision. It outlined previously considering the [facility] date program though I would need to be functioning at a higher level to be able to participate in that relatively independent based setting.    Medically:  Continue with IV acyclovir outlined through August 4.  Procedure prophylaxis continues with Keppra 750 mg twice daily.  Until more mobile will continue with heparin 3 times daily though anticipate off this by discharge.  For mood she continues with Effexor daily    Vitals:    08/02/17  1603 08/03/17 0539 08/03/17 0631 08/03/17 1613   BP: 109/65  109/58 118/75   BP Location: Left arm   Left arm   Pulse: 85  75 78   Resp: 16  16 16   Temp:  99.4  F (37.4  C) 98.9  F (37.2  C) 97.1  F (36.2  C)   TempSrc:  Oral Oral Oral   SpO2: 99%  93% 98%   Weight:       Height:         Alert bend chair bedside eating dinner.  Fluent speech and language.  Bit confused differentiating the call light from her phone.  Heart regular rate and rhythm  Lungs clear  Into venous access site nontender        sodium chloride (PF)  10 mL Intracatheter Q8H     heparin lock flush  5-10 mL Intracatheter Q24H     levETIRAcetam  750 mg Oral BID     norethindrone  1 tablet Oral Daily     venlafaxine  75 mg Oral Daily     vitamin D  50,000 Units Oral Weekly     ACYCLOVIR in 100 mL (for dose < 700 mg)  625 mg Intravenous Q8H     senna-docusate  1-4 tablet Oral BID     heparin  5,000 Units Subcutaneous Q8H     Coordination of care:  25 minutes  Face-to-face:              40 minutes  Total time:                  65 minutes

## 2017-08-03 NOTE — PLAN OF CARE
Acute Rehab Care Conference/Team Rounds      Type: Team Rounds    Present: Dr. Bossman Bassett, Alpa Carmen PA, Kimmy Saini SW, Bri Ivy OT, Lucila Topete PT, Freda Ferrari SLP, Terri Mcleod , Juanpablo Thomas, Dariana White Dietician, Maya Whitman RN.       Discharge Barriers/Treatment/Education    Rehab Diagnosis: Encephalopathy, possible HSV    Active Medical Co-morbidities/Prognosis: Multiple sclerosis    Safety: Does not use call light, had set off bed alarm previous shifts, but not during the night shift. Alert to self and place only    Pain: Denies pain or discomfort.    Medications, Skin, Tubes/Lines: Takes whole pills. No skin issues. Midline to right arm.    Swallowing/Nutrition: Reportedly tolerating regular diet, thin fluids    Bowel/Bladder: Continent of bladder and bowel wit the use of toilet, independent with toilet hygiene.    Psychosocial: Pt resides with  and children. Goal to return home with continued support from family.     ADLs/IADLs: Variable performance with ADLs. Physically able to complete ADLs with SBA/CGA, however performance is significantly impacted by cognitive deficits. Pt requiring Mod-Max verbal cues to initiate, sequence, and problem solve basic ADLs. Unless redirected every 30 sec, pt will fail to continue task at hand. Level of support in home environment and baseline level of function is questionable. Anticipate pt will require 24/7 support and supervision with all functional tasks. Pt will benefit from ongoing IP ARU to improve IND with ADLs, provide family training, and functional cognition. Recommend HC OT at time of discharge.    Mobility: Pt with variable performance and participation, at times limited by fatigue. Safety with mobility impacted in part due to significant cognitive deficits in addition to balance deficits. Scored 12/24 on DGI indicating increased falls risk. Gait with staggered quality, variable LE placement. SBA on  stairs. Pt with limited insight into deficits. Will likely need strict 24 hour supervision, including safety/sanitary sequencing with toileting. Anticipate up to one more weke to meet POC goals. Recommend home PT upon discharge.     Cognition/Language:SLP: pt continues to demonstrate moderate deficits for attention, memory, safety/insight and reasoning. Recall is poor, even with only few minutes delay.  Also addressing language skills for more complex auditory comprehension and verbal expression.  At this time would anticipate 24 hour supervision upon return to home, but still pending significant progress. Further SLP tx after discharge home.    Community Re-Entry: will need CGA-MIN A at this time.    Transportation: will need assist, transfer not a barrier.    Decision maker: will need  to augment given her impaired insight and judgment currently    Plan of Care and goals reviewed and updated.    Discharge Plan/Recommendations    Fall Precautions: continue    Overall plan for the patient: still early in recovery course, not clear what trajectory she's on though severe cognitive deficits are most limiting factor. Not clear baseline function yet and need to augment with family. Care conference to be determined after with a better sense of her trajectory. Anticipating she will need 24-hour support unless she clears considerably.       Utilization Review and Continued Stay Justification    Medical Necessity Criteria:    For any criteria that is not met, please document reason and plan for discharge, transfer, or modification of plan of care to address.    Requires intensive rehabilitation program to treat functional deficits?: Yes    Requires 3x per week or greater involvement of rehabilitation physician to oversee rehabilitation program?: Yes    Requires rehabilitation nursing interventions?: Yes    Patient is making functional progress?: Yes    There is a potential for additional functional progress?  Yes    Patient is participating in therapy 3 hours per day a minimum of 5 days per week or 15 hours per week in 7 day period?:Yes    Has discharge needs that require coordinated discharge planning approach?:Yes        Final Physician Sign off    Statement of Approval: I agree with all the recommendations detailed in this document.      Patient Goals        1. Pt will d/c home/community setting upon completion of therapy/RN goals.   2. Pt and family will be involved in d/c planning and will be made aware of services available to meet pts needs.     OT target date for goal attainment: 08/09/17  OT Frequency: daily for  minutes  OT goal: hygiene/grooming: independent  OT goal: upper body dressing: Independent  OT goal: lower body dressing: Independent  OT goal: upper body bathing: Independent  OT goal: lower body bathing: Independent  OT goal: toilet transfer/toileting: Independent  OT goal: meal preparation: Supervision/stand-by assist  OT goal: home management: Supervision/stand-by assist   OT goal 1: Pt will complete wet shower transfer with supervision for safety.      PT target date for goal attainment: 08/07/18  PT Frequency: 60-75 minutes daily  PT goal: bed mobility: Rolling, Bridging, Independent, Supine to/from sit  PT goal: gait: 50 feet, Greater than 200 feet, Supervision/stand-by assist, Modified independent (ind for home, SBA for community distances)  PT goal: stairs: Greater than 10 stairs, Rail on right (for home access)  PT goal: perform aerobic activity with stable cardiovascular response: 10 minutes, NuStep, continuous activity (for endurance, cardiovascular health)  PT goal 1: Perform floor transfer w/ furniture assist, SBA or < in order to demo safe fall recovery  PT Goal 2: Using handout be ind with HEP for strength for improved functional mobility  PT Goal 3: Using handout be ind with HEP for balance for improved functional mobility   PT Goal 4: Perfom car transfer SBA in order to access  personal transportation  PT Goal 5: Pt will improve DGI to greater than 19 to decrease fall risk    SLP target date for goal attainment: 08/21/17  SLP Frequency: daily   SLP goal 1: Pt gary complete mod levelauditory comprehension tasks with 90% accuracy for improved following of directions in ADL's  SLP goal 2: Pt will independently utilize word retrieval strategies to improve word finding in complex level conversation and verbal expression tasks with 90% accuracy  SLP goal 3: Pt will utilize compensatory memory strategies and attention strateig to complete general orientation, short term recall and sustained/divided attention tasks with 90% accuracy  SLP goal 4: Pt will complete basic to mod level problem solving, reasoning, sequencing and organizing tasks with 90% accuracy     Patient Goal:  Pain Management: Pt will advocate for pain medications, especially before therapies, prior to discharge.   Patient/Family Goal: Bladder: Pt will participate in toileting schedule to regain continence prior to discharge.   Patient/Family Goal: Medication Management: Pt will participate in MAP prior to discharge.   Goal: Skin Integrity: Pt will verbalize 3 methods for preventing pressure ulcers and remain free of skin breakdown prior to discharge.

## 2017-08-03 NOTE — PLAN OF CARE
Problem: Goal/Outcome  Goal: Goal Outcome Summary  Outcome: No Change  FOCUS/GOAL  Bladder management and Medical management     ASSESSMENT, INTERVENTIONS AND CONTINUING PLAN FOR GOAL:  Alert to self and place only, able to make needs known, did not use call light or set off bed alarm. Continent of bladder wit the use of toilet x2, independent with toilet hygiene. Moves independently in bed. Midline to right arm in place and patent, Acyclovir infused per orders, no redness or swelling noted to insertion site, tolerated well procedure. T 99.2 and 98.9 at the end of the shift, rest of vitals within limits. Denies pain or discomfort.

## 2017-08-03 NOTE — PROGRESS NOTES
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 Admitted to inpatient acute rehabilitation 7/30.    Shanna did miss some therapy today. 7 AM session she was very tired then at the end of the day physical therapy, all participating, she was sleepy. By my visit roughly 6:30 PM she was too alert and eating her dinner. She remains a bit confused about brought her orientation of situation.    Team rounds tomorrow to review her progress and update plan of care along with estimated length of stay.    Medically:  Continue with IV acyclovir outlined through August 4.  Procedure prophylaxis continues with Keppra 750 mg twice daily.  Until more mobile will continue with heparin 3 times daily though anticipate off this by discharge.  For mood she continues with Effexor daily    Vitals:    08/01/17 1500 08/02/17 0600 08/02/17 0736 08/02/17 1603   BP: 100/52 109/64 101/56 109/65   BP Location: Left arm Left arm  Left arm   Pulse: 72 64  85   Resp: 16 16  16   Temp: 98.4  F (36.9  C) 98.5  F (36.9  C)     TempSrc: Oral Oral     SpO2: 96% 95%  99%   Weight:       Height:         Alert bend chair bedside eating dinner.  Fluent speech and language.  Bit confused differentiating the call light from her phone.  Heart regular rate and rhythm  Lungs clear  Into venous access site nontender        sodium chloride (PF)  10 mL Intracatheter Q8H     heparin lock flush  5-10 mL Intracatheter Q24H     levETIRAcetam  750 mg Oral BID     norethindrone  1 tablet Oral Daily     venlafaxine  75 mg Oral Daily     vitamin D  50,000 Units Oral Weekly     ACYCLOVIR in 100 mL (for dose < 700 mg)  625 mg Intravenous Q8H     senna-docusate  1-4 tablet Oral BID     heparin  5,000 Units Subcutaneous Q8H

## 2017-08-03 NOTE — PLAN OF CARE
Problem: Goal/Outcome  Goal: Goal Outcome Summary  Outcome: No Change  A/O to self and place. Thought it was 1997. Reoriented to situation. Denies pain. CMS intact. Tolerated regular diet with reminders to eat. Incontinent and wearing breif. Up with CGA per report, only resting in bed during last 4 hours. Resting in bed at this time with call light in reach. Bed alarm on. Frequent rounding due to confusion. Continue to monitor.  BABAK Davenport RN  Critical Care [facility]

## 2017-08-03 NOTE — PLAN OF CARE
Problem: Goal/Outcome  Goal: Goal Outcome Summary  Confused to time, situation, does not use call light. Bed/Chair alarms on at all times. Voids frequently 200/100 cc amounts. Bladder scanned once for 180. MD aware of frequent voids. Up into BR with assist of one.  Excellent po intake, drinking a lot of fluids. Denies any pain.

## 2017-08-03 NOTE — PLAN OF CARE
"Problem: Goal/Outcome  Goal: Goal Outcome Summary  SLP: during treatment session pt had episode whereupon she closed her eyes, tremor in hands, and no response  to therapist's question (lasted 20-30 seconds). She stated \"this is from my MS.\" Alerted RN, uncertain if this is known behavior.      "

## 2017-08-03 NOTE — PROGRESS NOTES
"CLINICAL NUTRITION SERVICES - ASSESSMENT NOTE     Nutrition Prescription    RECOMMENDATIONS FOR MDs/PROVIDERS TO ORDER:  None    Malnutrition Status:    Pt does not meet criteria.    Recommendations already ordered by Registered Dietitian (RD):  None    Future/Additional Recommendations:  None     REASON FOR ASSESSMENT  Shanna Shanks is a/an 42 year old female assessed by the dietitian for LOS    NUTRITION HISTORY  Patient is on a regular diet at home with baseline good intakes of 3 meals/day.    CURRENT NUTRITION ORDERS  Diet: Regular  Intake/Tolerance: Pt reports her appetite is good. She is enjoying the meals, intakes have been % per flowsheet records. She is ordering well for three meals/day (on room service with assist).    LABS  Labs reviewed    MEDICATIONS  Medications reviewed  Vitamin D    ANTHROPOMETRICS  Height: 170.2 cm (5' 7\")  Most Recent Weight: 65.4 kg (144 lb 3.2 oz)    IBW: 61.8 kg  BMI: Normal BMI  Weight History: Pt denies any recent significant weight changes. She states current weight (144 lbs) is consistent with baseline weight. Adm weight to [facility] of 61.5 kg (135 lb 9.3 oz) on 7/24 with noted fluctuations/ trend up since admission. Question possible fluid versus true weight gains.  Wt Readings from Last 5 Encounters:   07/30/17 65.4 kg (144 lb 3.2 oz)   07/30/17 61.9 kg (136 lb 7.4 oz)   04/10/17 63.5 kg (140 lb)   12/17/15 61 kg (134 lb 8 oz)   03/16/15 63.3 kg (139 lb 8 oz)       Dosing Weight: 65 kg (current weight)    ASSESSED NUTRITION NEEDS  Estimated Energy Needs: 5588-3527 kcals/day (25 - 30 kcals/kg)  Justification: Maintenance  Estimated Protein Needs: 65-78 grams protein/day (1 - 1.2 grams of pro/kg)  Justification: Increased needs  Estimated Fluid Needs: 1 mL/kcal or per MD goals   Justification: Maintenance    PHYSICAL FINDINGS  See malnutrition section below.     MALNUTRITION  % Intake: No decreased intake noted  % Weight Loss: None " noted  Subcutaneous Fat Loss: None observed  Muscle Loss: None observed  Fluid Accumulation/Edema: None noted  Malnutrition Diagnosis: Patient does not meet two of the above criteria necessary for diagnosing malnutrition    NUTRITION DIAGNOSIS  No nutrition diagnosis at this time.     INTERVENTIONS  Implementation  Nutrition Education: encouraged continued good po intakes of TID meal trays.      Monitoring/Evaluation  RD will follow in weekly rounds.      Yadira White RD

## 2017-08-04 PROCEDURE — 97530 THERAPEUTIC ACTIVITIES: CPT | Mod: GP

## 2017-08-04 PROCEDURE — 25000132 ZZH RX MED GY IP 250 OP 250 PS 637: Performed by: PHYSICAL MEDICINE & REHABILITATION

## 2017-08-04 PROCEDURE — 97110 THERAPEUTIC EXERCISES: CPT | Mod: GP

## 2017-08-04 PROCEDURE — 12800006 ZZH R&B REHAB

## 2017-08-04 PROCEDURE — 97535 SELF CARE MNGMENT TRAINING: CPT | Mod: GO | Performed by: OCCUPATIONAL THERAPIST

## 2017-08-04 PROCEDURE — 25000128 H RX IP 250 OP 636: Performed by: PHYSICAL MEDICINE & REHABILITATION

## 2017-08-04 PROCEDURE — 40000225 ZZH STATISTIC SLP WARD VISIT: Performed by: SPEECH-LANGUAGE PATHOLOGIST

## 2017-08-04 PROCEDURE — 96125 COGNITIVE TEST BY HC PRO: CPT | Mod: GO | Performed by: OCCUPATIONAL THERAPIST

## 2017-08-04 PROCEDURE — 40000133 ZZH STATISTIC OT WARD VISIT: Performed by: OCCUPATIONAL THERAPIST

## 2017-08-04 PROCEDURE — 40000193 ZZH STATISTIC PT WARD VISIT

## 2017-08-04 PROCEDURE — 97532 ZZHC SP COGNITIVE SKILLS EA 15 MIN: CPT | Mod: GN | Performed by: SPEECH-LANGUAGE PATHOLOGIST

## 2017-08-04 RX ADMIN — HEPARIN SODIUM 5000 UNITS: 5000 INJECTION, SOLUTION INTRAVENOUS; SUBCUTANEOUS at 12:37

## 2017-08-04 RX ADMIN — VENLAFAXINE HYDROCHLORIDE 75 MG: 75 TABLET, EXTENDED RELEASE ORAL at 09:21

## 2017-08-04 RX ADMIN — ACETAMINOPHEN AND CODEINE PHOSPHATE 0.35 MG: 120; 12 SOLUTION ORAL at 09:23

## 2017-08-04 RX ADMIN — ACYCLOVIR SODIUM 625 MG: 50 INJECTION, SOLUTION INTRAVENOUS at 08:38

## 2017-08-04 RX ADMIN — SODIUM CHLORIDE, PRESERVATIVE FREE 5 ML: 5 INJECTION INTRAVENOUS at 19:57

## 2017-08-04 RX ADMIN — LEVETIRACETAM 750 MG: 750 TABLET, FILM COATED ORAL at 09:22

## 2017-08-04 RX ADMIN — HEPARIN SODIUM 5000 UNITS: 5000 INJECTION, SOLUTION INTRAVENOUS; SUBCUTANEOUS at 06:51

## 2017-08-04 RX ADMIN — SENNOSIDES AND DOCUSATE SODIUM 2 TABLET: 8.6; 5 TABLET ORAL at 09:21

## 2017-08-04 RX ADMIN — SENNOSIDES AND DOCUSATE SODIUM 1 TABLET: 8.6; 5 TABLET ORAL at 19:56

## 2017-08-04 RX ADMIN — LEVETIRACETAM 750 MG: 750 TABLET, FILM COATED ORAL at 19:56

## 2017-08-04 NOTE — PLAN OF CARE
Problem: Goal/Outcome  Goal: Goal Outcome Summary  Outcome: Improving  FOCUS/GOAL  Safety management     ASSESSMENT, INTERVENTIONS AND CONTINUING PLAN FOR GOAL:  Pt alert and in bed all NOC, pt set of her bed alarms x2 this shift. Pt denies pain/ discomfort. NSG to continue monitoring.

## 2017-08-04 NOTE — PROGRESS NOTES
Cognitive Performance Test    SUMMARY OF TEST:    The Cognitive Performance Test (CPT) is a standardized performance-based assessment to measure working memory/executive function processing capacities that underlie functional performance. Subtasks include common basic and instrumental activities of daily living (ADL/IADL) which are rated based on the manner in which patients respond to task demands of varying complexity. The total CPT score describes a level of functioning that indicates how information is processed, implications for functional activities, potential safety risks and a recommended level of supervision or assist based on cognitive function. The highest total score on this test is in the range of 5.6 to 5.8.    DATE OF TESTIN17    RESULTS OF TESTING:                                                                                           CPT Subtest Results    MEDBOX:  SHOP/GLOVES:  PHONE: 4.5/6   WASH:  4.5/5 TRAVEL: 3. TOAST:    DRESS:    TOTAL CPT SCORE:  4.0/5.6     Average CPT Score  4.0/5.6    INTERPRETATION OF TEST RESULTS:    Based on the Cognitive Performance Test, this patient scored at CPT Level .  See CPT Levels reference below.    Summary of functional cognitive status:   Moderate cognitive-functional disability; abstract to concrete thought processes. Working memory and executive function impairments are obvious. Difficulty with planning and problem solving.  Behavior is goal-directed, but unable to follow multi-step directions, is easily distracted, and may not recognize mistakes.  Inability to anticipate hazards or understand precautions.  Safety:  Recommend 24-hour supervision for safety. Supervision needed for medication management and for hazardous activities. May not be able to follow a restricted diet. Can get lost in unfamiliar surroundings. Generally, persons functioning at level 4 should not be driving.   ADL:  Some decline in quality or frequency of ADL.   [place] enhanced by use of a routine, simple concrete directions, and caregiver set-up of needed items. Complex tasks such as money or home management typically requires assistance.  Relies heavily on vision to guide behavior; will ignore objects/hazards not in plain sight and can be distracted by irrelevant objects. Often has poor insight.  Able to carry out social conversation and may verbally  cover  for deficits leading caregivers to believe they are capable of functioning independently.     Factors affecting performance:  Impaired mobility   Balance impairment  Impulsive actions    Recommendations:  Assist for ADL/IADL:  ADL, Meal preparation, Cleaning, Laundry, Shopping, Finances, Driving and Medication management       TIME ADMINISTERING TEST: 60    TIME FOR INTERPRETATION AND PREPARATION OF REPORT: 15    TOTAL TIME: 75      CPT Levels Reference:    Patient's Average CPT Score:  4.0/5.6                                                                                                                                                  Individual scores range along a continuum as outlined below.  In addition to cognitive status, other factors may affect safety in a home environment.  Please refer to specific recommendations for this patient.    ___5.6-5.8  Normal functioning (absence of cognitive-functional disability).  Independent in managing personal affairs, monitors and directs own behavior.  Uses complex information to carry out daily activities with safety and accuracy.    Proficient with instrumental activities of daily living (IADL) and learning new activity.  Problems are anticipated, errors are avoided, and consequences of actions are considered.      ___5.0   Mild cognitive-functional disability; deficits in working memory and executive thought processes. Difficulty using complex information. Problems may be observed with recent memory, judgment, reasoning and planning ahead. May be impulsive or  "have difficulty anticipating consequences.  Safety:  May require assistance to plan ahead; or to manage complex medication schedules, appointments or finances.  Hazardous activities may need to be monitored or limited.  ADL:  Mild functional decline.  Able to complete basic self-care and routine household tasks.  May have difficulty with complex daily tasks such as reading, writing, meal preparation, shopping or driving.   Learns through hands on teaching. Self-centered behavior or difficulty considering the needs of others may be seen related to trouble seeing the  whole picture\". Can appear disorganized or uninhibited.    ___4.5  Mild to moderate cognitive-functional disability. Significant deficits in working memory and executive thought processes. Judgment, reasoning and planning show obvious impairment.  Distractible with inability to shift attention/actions given competing stimuli.  Difficulty with problem solving and managing details. Complex daily tasks performed with inconsistency, difficulty, or error.     Safety:  Medications should be monitored, stove use may require supervision, and driving ability may be affected.  Impaired safety awareness with inability to anticipate potential problems.  May not recognize or respond to emergent situations. Requires frequent check-in support.   ADL:  Mild difficulty with simple everyday self-care tasks. Benefits from structured, routine activity.  Will likely need reminders to complete tasks outside of the routine. Requires assistance with planning and IADL tasks like shopping and finances. Learns concrete tasks through repetition, but performance may not generalize. Tends to be impulsive with poor insight. Self centered behavior or inability to consider the needs of others is common.    _X__4.0  Moderate cognitive-functional disability; abstract to concrete thought processes. Working memory and executive function impairments are obvious. Difficulty with planning and " problem solving.  Behavior is goal-directed, but unable to follow multi-step directions, is easily distracted, and may not recognize mistakes.  Inability to anticipate hazards or understand precautions.  Safety:  Recommend 24-hour supervision for safety. Supervision needed for medication management and for hazardous activities. May not be able to follow a restricted diet. Can get lost in unfamiliar surroundings. Generally, persons functioning at level 4 should not be driving.   ADL:  Some decline in quality or frequency of ADL.  [place] enhanced by use of a routine, simple concrete directions, and caregiver set-up of needed items. Complex tasks such as money or home management typically requires assistance.  Relies heavily on vision to guide behavior; will ignore objects/hazards not in plain sight and can be distracted by irrelevant objects. Often has poor insight.  Able to carry out social conversation and may verbally  cover  for deficits leading caregivers to believe they are capable of functioning independently.       ___3.5  Moderate cognitive-functional disability; increased cues needed for task completion. Aware of concrete task steps but needs prompting or cues to initiate and complete simple tasks. Attention span is limited, simple directions may need to be repeated, and re-focus to a topic or task may be required.  Safety:  24-hour supervision required for safety and for assistance with daily tasks. Assistance required with medications, and access to medication should be limited. Meals, nutrition and dietary restrictions need to be monitored.  All hazardous activities should be restricted or supervised. Should not drive. Prone to wandering and can become lost.  ADL:  Moderate functional decline. Familiar tasks usually requires set-up of supplies and directions to complete steps. May need objects handed to them for task initiation. Function best with a set schedule in familiar surroundings with  familiar people. All complex tasks must be done by others. Vocabulary is diminished and speech often unfocused.

## 2017-08-04 NOTE — PLAN OF CARE
"Problem: Goal/Outcome  Goal: Goal Outcome Summary  FOCUS/GOAL  Bowel management and Bladder management     ASSESSMENT, INTERVENTIONS AND CONTINUING PLAN FOR GOAL:  AO to self, CGA, does not remember to use call light; sets off alarms. Continent of B&B. Answers \" I don't know\" or \"cannot remember \" to most questions.  Denies pain, pleasant and cooperative with care. Continue with POC             "

## 2017-08-04 NOTE — PLAN OF CARE
Problem: Goal/Outcome  Goal: Goal Outcome Summary  SLP: Continued to work on cognitive and communication goals (memory, problem solving, and written expression). Pt needed frequent reminders to stay on task/focus. Pt needed increased response time to answer the questions.

## 2017-08-04 NOTE — PROGRESS NOTES
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 Has multiple sclerosis with some underlying mild cognitive deficits but definitely considerably below prior baseline. Admitted to inpatient acute rehabilitation 7/30.    Interval history:  CPT test today scoring 4.0. Shanna continues to have impulsivity and poor insight. Reviewed with the therapy team and were exploring if she could be consistently safe stimulating in her room without adaptive equipment. This would require being very clutter free. That may be possible by this weekend or into next week. So far we are not seeing significant improving trajectory in the cognitive domains. We'll see is beginning to next week if this remains very flat or hopefully some functional improvements. This will inform our estimates of length of stay and discharge planning.    Medically:  HSV encephalopathy, finishing IV acyclovir August 4.  Multiple sclerosis: She continues off of her MS medication Tecfidera for now with its partial immune suppression.   Seizure prophylaxis continues with Keppra 750 mg twice daily.  She's walking better and will discontinue DVT prophylactic heparin.  For mood she continues with Effexor daily.    Vitals:    08/03/17 0539 08/03/17 0631 08/03/17 1613 08/04/17 0945   BP:  109/58 118/75 121/68   BP Location:   Left arm Left arm   Pulse:  75 78 92   Resp:  16 16 16   Temp: 99.4  F (37.4  C) 98.9  F (37.2  C) 97.1  F (36.2  C) 97.7  F (36.5  C)   TempSrc: Oral Oral Oral Oral   SpO2:  93% 98% 98%   Weight:       Height:         Alert earlier, observed in therapy session. Walking with some hallway distances needing some cues to attend to environment.  Fluent speech and language.  Later saw in her room where she is bit sleepy and resting.    Review of systems at this time denying any headache pain shortness of breath or nausea.      sodium chloride (PF)  10 mL Intracatheter Q8H      heparin lock flush  5-10 mL Intracatheter Q24H     levETIRAcetam  750 mg Oral BID     norethindrone  1 tablet Oral Daily     venlafaxine  75 mg Oral Daily     vitamin D  50,000 Units Oral Weekly     ACYCLOVIR in 100 mL (for dose < 700 mg)  625 mg Intravenous Q8H     senna-docusate  1-4 tablet Oral BID     heparin  5,000 Units Subcutaneous Q8H

## 2017-08-04 NOTE — PLAN OF CARE
Problem: Goal/Outcome  Goal: Goal Outcome Summary  PT: pt with good participation today, however session limited d/t pt eating breakfast and nursing needs.  Pt cont to need SBA for transfers and amb.  Will cont with POC, progressing as pt able to tolerate.

## 2017-08-04 NOTE — PLAN OF CARE
Problem: Goal/Outcome  Goal: Goal Outcome Summary  FOCUS/GOAL  Bowel management, Bladder management, Pain management and Mobility     ASSESSMENT, INTERVENTIONS AND CONTINUING PLAN FOR GOAL:        Patient A&O to self only, very confused regarding time and situation. Patient was continent of bowel and bladder, BM this shift, Senna held. Patient set off bed alarm several times this shift, does not remember to use call light. Writer noticed patient eating skin off her face and nasal drainage. When asked about it, patient did not know she was doing it. Continue POC.

## 2017-08-05 PROCEDURE — 97116 GAIT TRAINING THERAPY: CPT | Mod: GP

## 2017-08-05 PROCEDURE — 40000225 ZZH STATISTIC SLP WARD VISIT: Performed by: SPEECH-LANGUAGE PATHOLOGIST

## 2017-08-05 PROCEDURE — 87086 URINE CULTURE/COLONY COUNT: CPT | Performed by: UROLOGY

## 2017-08-05 PROCEDURE — 25000132 ZZH RX MED GY IP 250 OP 250 PS 637: Performed by: UROLOGY

## 2017-08-05 PROCEDURE — 97112 NEUROMUSCULAR REEDUCATION: CPT | Mod: GP

## 2017-08-05 PROCEDURE — 87088 URINE BACTERIA CULTURE: CPT | Performed by: UROLOGY

## 2017-08-05 PROCEDURE — 40000133 ZZH STATISTIC OT WARD VISIT

## 2017-08-05 PROCEDURE — 40000193 ZZH STATISTIC PT WARD VISIT

## 2017-08-05 PROCEDURE — 25000128 H RX IP 250 OP 636: Performed by: PHYSICAL MEDICINE & REHABILITATION

## 2017-08-05 PROCEDURE — 87186 SC STD MICRODIL/AGAR DIL: CPT | Performed by: UROLOGY

## 2017-08-05 PROCEDURE — 97535 SELF CARE MNGMENT TRAINING: CPT | Mod: GO

## 2017-08-05 PROCEDURE — 12800006 ZZH R&B REHAB

## 2017-08-05 PROCEDURE — 97530 THERAPEUTIC ACTIVITIES: CPT | Mod: GP

## 2017-08-05 PROCEDURE — 97532 ZZHC SP COGNITIVE SKILLS EA 15 MIN: CPT | Mod: GN | Performed by: SPEECH-LANGUAGE PATHOLOGIST

## 2017-08-05 PROCEDURE — 25000132 ZZH RX MED GY IP 250 OP 250 PS 637: Performed by: PHYSICAL MEDICINE & REHABILITATION

## 2017-08-05 RX ORDER — OXYBUTYNIN CHLORIDE 10 MG/1
10 TABLET, EXTENDED RELEASE ORAL AT BEDTIME
Status: DISCONTINUED | OUTPATIENT
Start: 2017-08-05 | End: 2017-08-10 | Stop reason: HOSPADM

## 2017-08-05 RX ADMIN — ACETAMINOPHEN AND CODEINE PHOSPHATE 0.35 MG: 120; 12 SOLUTION ORAL at 08:39

## 2017-08-05 RX ADMIN — LEVETIRACETAM 750 MG: 750 TABLET, FILM COATED ORAL at 08:39

## 2017-08-05 RX ADMIN — SENNOSIDES AND DOCUSATE SODIUM 2 TABLET: 8.6; 5 TABLET ORAL at 19:34

## 2017-08-05 RX ADMIN — VENLAFAXINE HYDROCHLORIDE 75 MG: 75 TABLET, EXTENDED RELEASE ORAL at 08:39

## 2017-08-05 RX ADMIN — SENNOSIDES AND DOCUSATE SODIUM 4 TABLET: 8.6; 5 TABLET ORAL at 08:39

## 2017-08-05 RX ADMIN — LEVETIRACETAM 750 MG: 750 TABLET, FILM COATED ORAL at 19:34

## 2017-08-05 RX ADMIN — SODIUM CHLORIDE, PRESERVATIVE FREE 5 ML: 5 INJECTION INTRAVENOUS at 19:34

## 2017-08-05 RX ADMIN — OXYBUTYNIN CHLORIDE 10 MG: 10 TABLET, FILM COATED, EXTENDED RELEASE ORAL at 21:27

## 2017-08-05 NOTE — CONSULTS
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  Per report, patient has been struggling with urinary urgency and frequency during this admission, also frequently incontinent. Patient denies incontinence with coughing, laughing, sneezing. Does have urgency associated with leakage.  Has been getting bladder scans that routinely show low PVRs ~180 ml.     Per review of care everywhere, she has seen Dr. Natasha Franz, a urologist with [facility]- last seen in Jan 2017. Per her note she has had recurrent UTIs in the past. She was started on oxybutynin XL 5 mg as well as ppx Bactrim for UTI prevention.  Per review of current medications neither of these is ordered. She isn't sure how long she took these for previously or if there was any improvement in symptoms.  She has had two UAs which did not appear grossly infection and no UCx during this admission.            Past Medical History:     Past Medical History:   Diagnosis Date     Multiple sclerosis (H) 3/2/96    last exacerbation 3yrs ago, no meds x 6 months     Tobacco dependency             Past Surgical History:     Past Surgical History:   Procedure Laterality Date     HC DILATION/CURETTAGE DIAG/THER NON OB  1/1/05    D & C, missed AB            Social History:     Social History   Substance Use Topics     Smoking status: Current Every Day Smoker     Packs/day: 0.25     Types: Cigarettes     Smokeless tobacco: Not on file     Alcohol use 3.5 oz/week     7 Standard drinks or equivalent per week            Family History:     Family History   Problem Relation Age of Onset     Family  History Negative Mother      DIABETES Father      Cancer - colorectal Paternal Grandmother      HEART DISEASE Paternal Grandfather      MI            Allergies:     Allergies   Allergen Reactions     Diphenhydramine Rash     benadryl cream     Nickel             Medications:     Current Facility-Administered Medications   Medication     lidocaine 1 % 1 mL     lidocaine (LMX4) kit     sodium chloride (PF) 0.9% PF flush 10-20 mL     sodium chloride (PF) 0.9% PF flush 10 mL     heparin lock flush 10 UNIT/ML injection 5-10 mL     heparin lock flush 10 UNIT/ML injection 5-10 mL     levETIRAcetam (KEPPRA) tablet 750 mg     norethindrone (MICRONOR) 0.35 MG per tablet 0.35 mg     venlafaxine (EFFEXOR-ER) 24 hr tablet 75 mg     vitamin D (ERGOCALCIFEROL) capsule 50,000 Units     acetaminophen (TYLENOL) tablet 325-975 mg     senna-docusate (SENOKOT-S;PERICOLACE) 8.6-50 MG per tablet 1-4 tablet             Review of Systems:    ROS: 10 point ROS neg other than the symptoms noted above in the HPI           Physical Exam:   VS:  T: 98    HR: 70    BP: 111/57    RR: 20   GEN:  AOx3.  NAD.    CV:  RRR  LUNGS: Non-labored breathing.   BACK:  No midline or CVA tenderness.  ABD:  Soft.  NT.  ND.  No rebound or guarding.  No masses.  EXT:  Warm, well perfused.  No edema.  SKIN:  Warm.  Dry.  No rashes.          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 Urology consulted for urinary frequency, urgency and urge incontinence.  PVRs low - rules out overflow incontinence.  Has had a few UAs that looked  clean, UTI less likely.  Suspect NGB as source of symptoms      Plan:     - Send urine culture to rule out infection (ordered for you).  Would hold on restarting her ppx bactrim at this time as we don't documentation of recurrent UTIs at this time and patient isn't sure about this.    - Would start oxybutynin XL 10 mg qd (ordered for you) for urgency/frequency.  Most common side effect of this is dry mouth, dry eyes, constipation.  If side effects are too severe would try Detrol XL 4 mg as next step. Call urology if there are any questions about this.     - Discussed with patient that she should establish care with urologist regarding here NGB - would involve annual renal u/s, Cr checks, possible urodynamics.  We would be happy to set her up here with Dr. Madrid, otherwise she can follow-up with her outside urologist Dr. Franz if interested.  When patient is closer to discharge if she wants follow-up with urology please contact us to arrange this.      - Urology will sign off. Please contact resident/PA on call with any questions or concerns.         This patient's exam findings, labs, and imaging discussed with urology staff surgeon Dr. Layton, who developed the treatment plan.    Lionel Escamilla MD  Urology Resident

## 2017-08-05 NOTE — PLAN OF CARE
Problem: Goal/Outcome  Goal: Goal Outcome Summary  Outcome: No Change  FOCUS/GOAL  Bladder management and Medical management     ASSESSMENT, INTERVENTIONS AND CONTINUING PLAN FOR GOAL:  Alert, confused to time, place and situation, has not use call light or set off bed alarm thus far. Incontinent of bladder x1 with bed linen change needed, followed with use of toilet, . Continent of bladder x1 with PVR 0. Diaphoretic x2 and needed her gown to be changed.  T 99.0. Denies pain or discomfort. Needs SBA with transfers and ambulation.

## 2017-08-05 NOTE — PROGRESS NOTES
"PM&R PROGRESS NOTE     Patient seen and examined today.  Coordinated with team.      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   PMH of Multiple sclerosis with some underlying mild cognitive deficits but definitely considerably below prior baseline.     Main issues today are     Functionally, Shanna Shanks is participating in the therapies in a motivated fashion. She is making good but slow progress. Has cognitive deficits ongoing, continues to have problems with problem solving and memory deficits. Recent CPT was 4/5. Speech quality is also impaired. Will likely need supervision on discharge   Care conference scheduled for next week.       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  Multiple sclerosis: She continues off of her MS medication Tecfidera for now with its partial immune suppression. Endorses   Seizure prophylaxis continues with Keppra 750 mg twice daily.  DVT prophylactic heparin discontinued recently   History of depression: on Effexor daily.  Incontinence of bladder with lower PVR's. Reviewed chart. She is seen by neurology at [facility] for MS. She was seen by urology for recurrent UTI, and incontinence. Suspect likely neurogenic bladder secondary to MS. Will consult urology, as she can benefit from formal urological studies       Physical exam    Vital signs:  Temp: 98  F (36.7  C) Temp src: Oral BP: 111/57 Pulse: 70   Resp: 20 SpO2: 98 % O2 Device: None (Room air)   Height: 170.2 cm (5' 7\") Weight: 65.4 kg (144 lb 3.2 oz)  Estimated body mass index is 22.58 kg/(m^2) as calculated from the following:    Height as of this encounter: 1.702 m (5' 7\").    Weight as of this encounter: 65.4 kg (144 lb 3.2 oz).  HEENT NC AT PRTL EOM good   Neck supple  Heart S1S2  Lungs CTA  Abdomen  benign BS positive NT NR   LE no edema            REVIEWED THE " MEDICATIONS BELOW   Current Facility-Administered Medications   Medication     lidocaine 1 % 1 mL     lidocaine (LMX4) kit     sodium chloride (PF) 0.9% PF flush 10-20 mL     sodium chloride (PF) 0.9% PF flush 10 mL     heparin lock flush 10 UNIT/ML injection 5-10 mL     heparin lock flush 10 UNIT/ML injection 5-10 mL     levETIRAcetam (KEPPRA) tablet 750 mg     norethindrone (MICRONOR) 0.35 MG per tablet 0.35 mg     venlafaxine (EFFEXOR-ER) 24 hr tablet 75 mg     vitamin D (ERGOCALCIFEROL) capsule 50,000 Units     acetaminophen (TYLENOL) tablet 325-975 mg     senna-docusate (SENOKOT-S;PERICOLACE) 8.6-50 MG per tablet 1-4 tablet       No results found for this or any previous visit (from the past 24 hour(s)).    Total of 25 min spent in this encounter, includes coordination.

## 2017-08-05 NOTE — PLAN OF CARE
Problem: Goal/Outcome  Goal: Goal Outcome Summary  FOCUS/GOAL  Bladder management     ASSESSMENT, INTERVENTIONS AND CONTINUING PLAN FOR GOAL:  Pt alert to self and impulsive. Set off chair and bed alarm several times to use bathroom and voiding frequently. At times incontinent. Spouse visited before dinner. Pt forgetful and asking for hot chocolate to be added to dinner but she had already added it on earlier. She doesn't remember last BM, last charted 8/3. PICC dressing changed and slightly red around opening, pt states it itches and has been scratching dressing, began to scratch during sterile dressing change. No drainage or other symptoms. PVR 310cc, then 175. CGA with no AD, unsteady.

## 2017-08-05 NOTE — PLAN OF CARE
Problem: Goal/Outcome  Goal: Goal Outcome Summary  Attempted word finding tasks- pt completed [employer] game- but needed mod prompting to aid in fleixbile thinking with this task. With task involvign describing advantages and disagvantages of certain situations- pt at 70% accuacy with iding- some impoved verbal fluency. Pt is able to make needs known but at times her language is nonsensical and tangentia, lImpoved some with general orientation questions- ie: knwe her age today and that the month was August and also knew what grade her daughters are in vs previously pt was not able to state thes- but still saying the year is 1994. With memory recall task- recall of 5 words and then answering a question that relates to 1 of the words- pt at 70% accuracy - 7/10 when she heard info 2 times

## 2017-08-05 NOTE — PLAN OF CARE
Problem: Goal/Outcome  Goal: Goal Outcome Summary  FOCUS/GOAL  Bowel management and Bladder management     ASSESSMENT, INTERVENTIONS AND CONTINUING PLAN FOR GOAL:  Alert, forgetful, impulsive, sest off bed alarms.CGA with gait belt. Incontinent of bladder, no BM this shift. PVR at 185 at 0843.   Denies pain, denies SOB cooperative with care. Continue with POC.

## 2017-08-06 LAB
BACTERIA SPEC CULT: ABNORMAL
Lab: ABNORMAL
MICRO REPORT STATUS: ABNORMAL
MICROORGANISM SPEC CULT: ABNORMAL
SPECIMEN SOURCE: ABNORMAL

## 2017-08-06 PROCEDURE — 40000225 ZZH STATISTIC SLP WARD VISIT: Performed by: SPEECH-LANGUAGE PATHOLOGIST

## 2017-08-06 PROCEDURE — 25000128 H RX IP 250 OP 636: Performed by: PHYSICAL MEDICINE & REHABILITATION

## 2017-08-06 PROCEDURE — 25000132 ZZH RX MED GY IP 250 OP 250 PS 637: Performed by: PHYSICAL MEDICINE & REHABILITATION

## 2017-08-06 PROCEDURE — 97110 THERAPEUTIC EXERCISES: CPT | Mod: GP | Performed by: PHYSICAL THERAPIST

## 2017-08-06 PROCEDURE — 97530 THERAPEUTIC ACTIVITIES: CPT | Mod: GP | Performed by: PHYSICAL THERAPIST

## 2017-08-06 PROCEDURE — 97532 ZZHC SP COGNITIVE SKILLS EA 15 MIN: CPT | Mod: GN | Performed by: SPEECH-LANGUAGE PATHOLOGIST

## 2017-08-06 PROCEDURE — 12800006 ZZH R&B REHAB

## 2017-08-06 PROCEDURE — 25000132 ZZH RX MED GY IP 250 OP 250 PS 637: Performed by: UROLOGY

## 2017-08-06 PROCEDURE — 97535 SELF CARE MNGMENT TRAINING: CPT | Mod: GO | Performed by: OCCUPATIONAL THERAPIST

## 2017-08-06 PROCEDURE — 40000193 ZZH STATISTIC PT WARD VISIT: Performed by: PHYSICAL THERAPIST

## 2017-08-06 PROCEDURE — 40000133 ZZH STATISTIC OT WARD VISIT: Performed by: OCCUPATIONAL THERAPIST

## 2017-08-06 PROCEDURE — 97116 GAIT TRAINING THERAPY: CPT | Mod: GP | Performed by: PHYSICAL THERAPIST

## 2017-08-06 RX ADMIN — VENLAFAXINE HYDROCHLORIDE 75 MG: 75 TABLET, EXTENDED RELEASE ORAL at 07:44

## 2017-08-06 RX ADMIN — OXYBUTYNIN CHLORIDE 10 MG: 10 TABLET, FILM COATED, EXTENDED RELEASE ORAL at 19:18

## 2017-08-06 RX ADMIN — SODIUM CHLORIDE, PRESERVATIVE FREE 5 ML: 5 INJECTION INTRAVENOUS at 19:18

## 2017-08-06 RX ADMIN — LEVETIRACETAM 750 MG: 750 TABLET, FILM COATED ORAL at 19:18

## 2017-08-06 RX ADMIN — LEVETIRACETAM 750 MG: 750 TABLET, FILM COATED ORAL at 07:44

## 2017-08-06 RX ADMIN — SENNOSIDES AND DOCUSATE SODIUM 4 TABLET: 8.6; 5 TABLET ORAL at 19:18

## 2017-08-06 RX ADMIN — ERGOCALCIFEROL 50000 UNITS: 1.25 CAPSULE ORAL at 19:18

## 2017-08-06 RX ADMIN — ACETAMINOPHEN AND CODEINE PHOSPHATE 0.35 MG: 120; 12 SOLUTION ORAL at 07:44

## 2017-08-06 RX ADMIN — SENNOSIDES AND DOCUSATE SODIUM 4 TABLET: 8.6; 5 TABLET ORAL at 07:44

## 2017-08-06 NOTE — PLAN OF CARE
Problem: Goal/Outcome  Goal: Goal Outcome Summary  FOCUS/GOAL  Bowel management and Bladder management     ASSESSMENT, INTERVENTIONS AND CONTINUING PLAN FOR GOAL:  A with forgetfulness, at times does not use call light, sets off alarms. CGA with gait belt for transfer. Bladder urgency and frequency. NO BM this shift; 4 Senokots administered.  PVR of 180 CC at 1100. Eating 100% of meals and a couple of ice creams; good appetite. Denies pain, pleasant with cares. Continue with POC.

## 2017-08-06 NOTE — PLAN OF CARE
Problem: Goal/Outcome  Goal: Goal Outcome Summary  FOCUS/GOAL  Bladder management and Cognition/Memory/Judgment/Problem solving     ASSESSMENT, INTERVENTIONS AND CONTINUING PLAN FOR GOAL:  Pt alert to self, confused and continues to set off alarms. Spouse visiting for a few hours and brought patient 405ml [employer]bucks coffee. Pt eats slowly but good appetite. During dinner, asked for more coffee. This may have been why pt voiding so frequent this shift, more than yesterday. Urine clear, light yellow. Retaining urine in 200s but most recent 99cc. Urine culture obtained and starting oxybutynin tonight. Last recorded BM 8/3, no BM tonight.

## 2017-08-06 NOTE — PLAN OF CARE
Problem: Goal/Outcome  Goal: Goal Outcome Summary  Outcome: No Change  FOCUS/GOAL  Bladder management and Medical management     ASSESSMENT, INTERVENTIONS AND CONTINUING PLAN FOR GOAL:  Alert to self only, confused to time, place and situation, easily redirected. Pt does not use call light and has been setting the bed alarm off several times. Incontinent of bladder at the beginning of the shift and also used toilet, PVR 0. Continent of bladder x3 thus far. Midline to right arm in place and [patent, flushed per orders. Denies pain or discomfort. Anurag stockings on. Pleasant & cooperative.

## 2017-08-06 NOTE — PLAN OF CARE
Problem: Goal/Outcome  Goal: Goal Outcome Summary  PT: Pt distractible, needing cues to stay on task for bocce ball game outdoors, cga to T[place]anie for amb on grass. Cues for path finding on unit.  COnt toward goals.

## 2017-08-06 NOTE — PROGRESS NOTES
"PM&R PROGRESS NOTE     Patient seen and examined today.  Coordinated with team.      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   PMH of Multiple sclerosis with some underlying mild cognitive deficits but definitely considerably below prior baseline. She also has history of neurogenic bladder with recurrent UTI's.     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  Multiple sclerosis: She continues off of her MS medication Tecfidera for now with its partial immune suppression.   Seen by Urology Dr Escamilla, who I spoke with. Started on oxybutynin 10 mg XL, and UA/UC pending. Consider staring prophylaxis with bactrim, once results available. PVR's in 180's with urgency and frequency.   Seizure prophylaxis continues with Keppra 750 mg twice daily.  History of depression: on Effexor daily.    Functionally, Shanna Shanks is participating in the therapies in a motivated fashion. She is making good progress. Has cognitive deficits ongoing, continues to have problems with problem solving and memory deficits. She needs assistance with sequencing her basic adl's. Will likely need assist with iadl's such medications and finances. Her SO was at her bedside and is a good historian.     Care conference scheduled for next week, on Wednesday.     Physical exam    Vital signs:  Temp: 97.6  F (36.4  C) Temp src: Oral BP: 106/60 Pulse: 101   Resp: 16 SpO2: 97 % O2 Device: None (Room air)   Height: 170.2 cm (5' 7\") Weight: 65.4 kg (144 lb 3.2 oz)  Estimated body mass index is 22.58 kg/(m^2) as calculated from the following:    Height as of this encounter: 1.702 m (5' 7\").    Weight as of this encounter: 65.4 kg (144 lb 3.2 oz).  HEENT NC AT PRTL EOM good   Neck supple  Heart S1S2  Lungs CTA  Abdomen  benign BS positive NT NR   LE no edema            REVIEWED THE " MEDICATIONS BELOW   Current Facility-Administered Medications   Medication     oxybutynin (DITROPAN-XL) 24 hr tablet 10 mg     lidocaine 1 % 1 mL     lidocaine (LMX4) kit     sodium chloride (PF) 0.9% PF flush 10-20 mL     sodium chloride (PF) 0.9% PF flush 10 mL     heparin lock flush 10 UNIT/ML injection 5-10 mL     heparin lock flush 10 UNIT/ML injection 5-10 mL     levETIRAcetam (KEPPRA) tablet 750 mg     norethindrone (MICRONOR) 0.35 MG per tablet 0.35 mg     venlafaxine (EFFEXOR-ER) 24 hr tablet 75 mg     vitamin D (ERGOCALCIFEROL) capsule 50,000 Units     acetaminophen (TYLENOL) tablet 325-975 mg     senna-docusate (SENOKOT-S;PERICOLACE) 8.6-50 MG per tablet 1-4 tablet       Results for orders placed or performed during the hospital encounter of 07/30/17 (from the past 24 hour(s))   Urology IP Consult: urinary incontinence in a patient with MS; Consultant may enter orders: Yes; Patient to be seen: Routine - within 24 hours    Narrative    Lionel Escamilla MD     8/5/2017  4:24 PM  Urology Consult    Name: Shanna Shanks    MRN: [id number]   YOB: 1975               Chief Complaint:   Urinary frequency  Incontinence    History is obtained from the patient and chart review          History of Present Illness:   Shanna Shanks is a 42 year old female with PMH of MS and   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  Per report, patient   has been struggling with urinary urgency and frequency during   this admission, also frequently incontinent. Patient denies   incontinence with coughing, laughing, sneezing. Does have urgency   associated with leakage.  Has been getting bladder scans that   routinely show low PVRs ~180 ml.     Per review of care everywhere, she has seen Dr. Natasha Franz, a   urologist with [facility]- last seen in Jan 2017. Per her   note she has had recurrent UTIs in the past. She was started  on   oxybutynin XL 5 mg as well as ppx Bactrim for UTI prevention.    Per review of current medications neither of these is ordered.   She isn't sure how long she took these for previously or if there   was any improvement in symptoms.  She has had two UAs which did   not appear grossly infection and no UCx during this admission.            Past Medical History:     Past Medical History:   Diagnosis Date     Multiple sclerosis (H) 3/2/96    last exacerbation 3yrs ago, no meds x 6 months     Tobacco dependency             Past Surgical History:     Past Surgical History:   Procedure Laterality Date     HC DILATION/CURETTAGE DIAG/THER NON OB  1/1/05    D & C, missed AB            Social History:     Social History   Substance Use Topics     Smoking status: Current Every Day Smoker     Packs/day: 0.25     Types: Cigarettes     Smokeless tobacco: Not on file     Alcohol use 3.5 oz/week     7 Standard drinks or equivalent per week            Family History:     Family History   Problem Relation Age of Onset     Family History Negative Mother      DIABETES Father      Cancer - colorectal Paternal Grandmother      HEART DISEASE Paternal Grandfather      MI            Allergies:     Allergies   Allergen Reactions     Diphenhydramine Rash     benadryl cream     Nickel             Medications:     Current Facility-Administered Medications   Medication     lidocaine 1 % 1 mL     lidocaine (LMX4) kit     sodium chloride (PF) 0.9% PF flush 10-20 mL     sodium chloride (PF) 0.9% PF flush 10 mL     heparin lock flush 10 UNIT/ML injection 5-10 mL     heparin lock flush 10 UNIT/ML injection 5-10 mL     levETIRAcetam (KEPPRA) tablet 750 mg     norethindrone (MICRONOR) 0.35 MG per tablet 0.35 mg     venlafaxine (EFFEXOR-ER) 24 hr tablet 75 mg     vitamin D (ERGOCALCIFEROL) capsule 50,000 Units     acetaminophen (TYLENOL) tablet 325-975 mg     senna-docusate (SENOKOT-S;PERICOLACE) 8.6-50 MG per tablet 1-4   tablet             Review of  Systems:    ROS: 10 point ROS neg other than the symptoms noted above in the   HPI           Physical Exam:   VS:  T: 98    HR: 70    BP: 111/57    RR: 20   GEN:  AOx3.  NAD.    CV:  RRR  LUNGS: Non-labored breathing.   BACK:  No midline or CVA tenderness.  ABD:  Soft.  NT.  ND.  No rebound or guarding.  No masses.  EXT:  Warm, well perfused.  No edema.  SKIN:  Warm.  Dry.  No rashes.          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 Urology   consulted for urinary frequency, urgency and urge incontinence.    PVRs low - rules out overflow incontinence.  Has had a few UAs   that looked clean, UTI less likely.  Suspect NGB as source of   symptoms      Plan:     - Send urine culture to rule out infection (ordered for you).    Would hold on restarting her ppx bactrim at this time as we don't   documentation of recurrent UTIs at this time and patient isn't   sure about this.    - Would start oxybutynin XL 10 mg qd (ordered for you) for   urgency/frequency.  Most common side effect of this is dry mouth,   dry eyes, constipation.  If side effects are too severe would try   Detrol XL 4 mg as next step. Call urology if there are any   questions about this.     - Discussed with patient that she should establish care with   urologist regarding here NGB - would involve annual renal u/s, Cr   checks, possible urodynamics.  We would be happy to set her up   here with Dr. Madrid, otherwise she can follow-up with her   outside urologist Dr. Franz if interested.  When patient is   closer to  discharge if she wants follow-up with urology please   contact us to arrange this.      - Urology will sign off. Please contact resident/PA on call with   any questions or concerns.         This patient's exam findings, labs, and imaging discussed with   urology staff surgeon Dr. Layton, who developed the treatment   plan.    Lionel Escamilla MD  Urology Resident              Urine Culture Aerobic Bacterial   Result Value Ref Range    Specimen Description Midstream Urine     Special Requests Specimen received in preservative     Culture Micro (A)      >100,000 colonies/mL Escherichia coli Susceptibility testing in progress    Micro Report Status Pending        Total of 25 min spent in this encounter, includes coordination.

## 2017-08-06 NOTE — PLAN OF CARE
Problem: Goal/Outcome  Goal: Goal Outcome Summary  With written problem solving task that involved alternating and divided attention- needing frequent cues and mod assist to complete at 90% accuracy. General orientation questions- did not know the year she was born but could state the date; did not know the year we were in or the month- also not orientated to place but pt did know the grades that he daughters are in. With recent memory task- recall of visual scene- pt able to recall 5/10 details

## 2017-08-06 NOTE — PLAN OF CARE
"Problem: Goal/Outcome  Goal: Goal Outcome Summary  Outcome: Therapy, progress toward functional goals as expected  OT- Patient with difficulty sequencing basic care tasks this a.m.   Does ask \"o.k. Now what do I do?\"      "

## 2017-08-06 NOTE — PLAN OF CARE
Problem: Goal/Outcome  Goal: Goal Outcome Summary  FOCUS/GOAL  Bladder management and Safety management     ASSESSMENT, INTERVENTIONS AND CONTINUING PLAN FOR GOAL:  Patient alert to self, spouse and daughter visiting during dinner. Pt continues to deny constipation discomfort and states she usually has BMs every couple days, not daily. Bowel sounds active, abdomen soft and nontender. Nurse ordered 2 prune juices with dinner, is passing gas. Received scheduled stool softner. Continues to set off alarms and impulsive. Continues to retain some urine. At one point, sat on toilet for several minutes with no urine output, 10 min later was able to void. C/o feeling warm, temp 98.1, no fever or chills, but then felt cold after shower. Urine culture results pending.

## 2017-08-07 PROCEDURE — 40000133 ZZH STATISTIC OT WARD VISIT

## 2017-08-07 PROCEDURE — 97530 THERAPEUTIC ACTIVITIES: CPT | Mod: GP | Performed by: REHABILITATION PRACTITIONER

## 2017-08-07 PROCEDURE — 97110 THERAPEUTIC EXERCISES: CPT | Mod: GP | Performed by: REHABILITATION PRACTITIONER

## 2017-08-07 PROCEDURE — 25000128 H RX IP 250 OP 636: Performed by: PHYSICAL MEDICINE & REHABILITATION

## 2017-08-07 PROCEDURE — 25000132 ZZH RX MED GY IP 250 OP 250 PS 637: Performed by: UROLOGY

## 2017-08-07 PROCEDURE — 25000132 ZZH RX MED GY IP 250 OP 250 PS 637: Performed by: STUDENT IN AN ORGANIZED HEALTH CARE EDUCATION/TRAINING PROGRAM

## 2017-08-07 PROCEDURE — 40000193 ZZH STATISTIC PT WARD VISIT: Performed by: REHABILITATION PRACTITIONER

## 2017-08-07 PROCEDURE — 25000132 ZZH RX MED GY IP 250 OP 250 PS 637: Performed by: PHYSICAL MEDICINE & REHABILITATION

## 2017-08-07 PROCEDURE — 12800006 ZZH R&B REHAB

## 2017-08-07 PROCEDURE — 97535 SELF CARE MNGMENT TRAINING: CPT | Mod: GO

## 2017-08-07 PROCEDURE — 97532 ZZHC SP COGNITIVE SKILLS EA 15 MIN: CPT | Mod: GN | Performed by: SPEECH-LANGUAGE PATHOLOGIST

## 2017-08-07 PROCEDURE — 40000225 ZZH STATISTIC SLP WARD VISIT: Performed by: SPEECH-LANGUAGE PATHOLOGIST

## 2017-08-07 RX ORDER — CIPROFLOXACIN 250 MG/1
500 TABLET, FILM COATED ORAL EVERY 12 HOURS SCHEDULED
Status: DISCONTINUED | OUTPATIENT
Start: 2017-08-07 | End: 2017-08-07

## 2017-08-07 RX ORDER — NITROFURANTOIN 25; 75 MG/1; MG/1
100 CAPSULE ORAL EVERY 12 HOURS SCHEDULED
Status: DISCONTINUED | OUTPATIENT
Start: 2017-08-07 | End: 2017-08-10 | Stop reason: HOSPADM

## 2017-08-07 RX ADMIN — SENNOSIDES AND DOCUSATE SODIUM 2 TABLET: 8.6; 5 TABLET ORAL at 19:50

## 2017-08-07 RX ADMIN — SODIUM CHLORIDE, PRESERVATIVE FREE 5 ML: 5 INJECTION INTRAVENOUS at 19:50

## 2017-08-07 RX ADMIN — LEVETIRACETAM 750 MG: 750 TABLET, FILM COATED ORAL at 19:50

## 2017-08-07 RX ADMIN — LEVETIRACETAM 750 MG: 750 TABLET, FILM COATED ORAL at 08:57

## 2017-08-07 RX ADMIN — ACETAMINOPHEN AND CODEINE PHOSPHATE 0.35 MG: 120; 12 SOLUTION ORAL at 09:03

## 2017-08-07 RX ADMIN — NITROFURANTOIN (MONOHYDRATE/MACROCRYSTALS) 100 MG: 75; 25 CAPSULE ORAL at 19:50

## 2017-08-07 RX ADMIN — OXYBUTYNIN CHLORIDE 10 MG: 10 TABLET, FILM COATED, EXTENDED RELEASE ORAL at 19:50

## 2017-08-07 RX ADMIN — NITROFURANTOIN (MONOHYDRATE/MACROCRYSTALS) 100 MG: 75; 25 CAPSULE ORAL at 09:02

## 2017-08-07 RX ADMIN — VENLAFAXINE HYDROCHLORIDE 75 MG: 75 TABLET, EXTENDED RELEASE ORAL at 08:57

## 2017-08-07 NOTE — PLAN OF CARE
Problem: Goal/Outcome  Goal: Goal Outcome Summary  SLP: Pt engaged in problem solving tasks. Pt required occasional cueing to stay on task. She did well given verbal redirection and encouragement. Pt had difficulty with a calendar based task and required assistance on where and how to find the answers.

## 2017-08-07 NOTE — PROGRESS NOTES
PM&R PROGRESS NOTE     Patient seen and examined today.  Coordinated with team.      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   PMH of Multiple sclerosis with some underlying mild cognitive deficits but definitely considerably below prior baseline. She also has history of neurogenic bladder with recurrent UTI's.     Main issues today are   Medically  HSV encephalopathy, finished IV acyclovir on August 4.  Multiple sclerosis: She continues off of her MS medication Tecfidera for now with its partial immune suppression.   Seen by Urology Dr Escamilla and has been started on oxybutynin 10 mg XL.    UA/UC shows greater than 100,000 E Coli sensitive to Macrobid, which was just started on the patient. Will continue a 5 day course.    Seizure prophylaxis continues with Keppra 750 mg twice daily.  History of depression: on Effexor daily.    Functionally, Shanna Shanks is participating in the therapies in a motivated fashion. She is making good progress. Has cognitive deficits ongoing, continues to have problems with problem solving and memory deficits. She needs assistance with sequencing her basic adl's. Will likely need assist with iadl's such medications and finances.     Care conference scheduled for this Wednesday.     Physical exam    Vital signs:   Date/Time Temp Pulse Heart Rate BP Resp SpO2 Oxygen Delivery O2 Device 0-10 Pain Scale Weight Who     08/07/17 0759 99  F (37.2  C) 74 -- 109/52 16 99 % -- None (Room air) -- -- PD     08/06/17 1557 98.1  F (36.7  C) 76 -- 115/77 18 98 % -- None (Room air) -- -- TY     08/06/17 1006 -- 101 -- 106/60 -- -- -- -- -- -- PF     08/06/17 0724 97.6  F (36.4  C) 70 -- 108/61 16 97 % -- None (Room air) -- -- AD              Sitting in chair at bedside comfortably  HEENT NC AT   Neck supple  Heart rrr  Lungs Clear  Abdomen  Nondistended, nontender  LE no edema             REVIEWED THE MEDICATIONS BELOW   Current Facility-Administered Medications   Medication     nitroFURantoin (macrocrystal-monohydrate) (MACROBID) capsule 100 mg     oxybutynin (DITROPAN-XL) 24 hr tablet 10 mg     lidocaine 1 % 1 mL     lidocaine (LMX4) kit     sodium chloride (PF) 0.9% PF flush 10-20 mL     sodium chloride (PF) 0.9% PF flush 10 mL     heparin lock flush 10 UNIT/ML injection 5-10 mL     heparin lock flush 10 UNIT/ML injection 5-10 mL     levETIRAcetam (KEPPRA) tablet 750 mg     norethindrone (MICRONOR) 0.35 MG per tablet 0.35 mg     venlafaxine (EFFEXOR-ER) 24 hr tablet 75 mg     vitamin D (ERGOCALCIFEROL) capsule 50,000 Units     acetaminophen (TYLENOL) tablet 325-975 mg     senna-docusate (SENOKOT-S;PERICOLACE) 8.6-50 MG per tablet 1-4 tablet       No results found for this or any previous visit (from the past 24 hour(s)).    Total of 25 min spent in this encounter, including coordination.     MARTHA Kate MD

## 2017-08-07 NOTE — PLAN OF CARE
Problem: Goal/Outcome  Goal: Goal Outcome Summary  Outcome: No Change  FOCUS/GOAL  Bowel management, Bladder management and Medical management     ASSESSMENT, INTERVENTIONS AND CONTINUING PLAN FOR GOAL:  Alert and oriented to self only, easily redirected. Pt did not use call light to get assistance, set off bed alarm x2. Needs CGA with belt for transfers to toilet. Pt had one bladder incontinence episode and was continent x2. Had one continent medium, formed bowel movement and one large incontinent loose stool, needed assist with perineal cares 2/2 wiping from back to front even after redirected. U/C final results shows >100,000 E.coli with sensitivities, message left for MD on call. T 98.4. Midline in place and patent, flushed per orders. Denies pain or discomfort. Fall precautions in place at all times.

## 2017-08-08 PROCEDURE — 97530 THERAPEUTIC ACTIVITIES: CPT | Mod: GP

## 2017-08-08 PROCEDURE — 40000225 ZZH STATISTIC SLP WARD VISIT: Performed by: SPEECH-LANGUAGE PATHOLOGIST

## 2017-08-08 PROCEDURE — 12800006 ZZH R&B REHAB

## 2017-08-08 PROCEDURE — 25000132 ZZH RX MED GY IP 250 OP 250 PS 637: Performed by: UROLOGY

## 2017-08-08 PROCEDURE — 25000132 ZZH RX MED GY IP 250 OP 250 PS 637: Performed by: PHYSICAL MEDICINE & REHABILITATION

## 2017-08-08 PROCEDURE — 97532 ZZHC SP COGNITIVE SKILLS EA 15 MIN: CPT | Mod: GN | Performed by: SPEECH-LANGUAGE PATHOLOGIST

## 2017-08-08 PROCEDURE — 97116 GAIT TRAINING THERAPY: CPT | Mod: GP

## 2017-08-08 PROCEDURE — 97112 NEUROMUSCULAR REEDUCATION: CPT | Mod: GP

## 2017-08-08 PROCEDURE — 40000193 ZZH STATISTIC PT WARD VISIT

## 2017-08-08 PROCEDURE — 40000133 ZZH STATISTIC OT WARD VISIT

## 2017-08-08 PROCEDURE — 97535 SELF CARE MNGMENT TRAINING: CPT | Mod: GO

## 2017-08-08 RX ADMIN — NITROFURANTOIN (MONOHYDRATE/MACROCRYSTALS) 100 MG: 75; 25 CAPSULE ORAL at 20:00

## 2017-08-08 RX ADMIN — NITROFURANTOIN (MONOHYDRATE/MACROCRYSTALS) 100 MG: 75; 25 CAPSULE ORAL at 08:07

## 2017-08-08 RX ADMIN — LEVETIRACETAM 750 MG: 750 TABLET, FILM COATED ORAL at 08:07

## 2017-08-08 RX ADMIN — SENNOSIDES AND DOCUSATE SODIUM 4 TABLET: 8.6; 5 TABLET ORAL at 08:07

## 2017-08-08 RX ADMIN — ACETAMINOPHEN AND CODEINE PHOSPHATE 0.35 MG: 120; 12 SOLUTION ORAL at 08:10

## 2017-08-08 RX ADMIN — LEVETIRACETAM 750 MG: 750 TABLET, FILM COATED ORAL at 20:00

## 2017-08-08 RX ADMIN — OXYBUTYNIN CHLORIDE 10 MG: 10 TABLET, FILM COATED, EXTENDED RELEASE ORAL at 20:00

## 2017-08-08 RX ADMIN — SENNOSIDES AND DOCUSATE SODIUM 2 TABLET: 8.6; 5 TABLET ORAL at 20:00

## 2017-08-08 RX ADMIN — VENLAFAXINE HYDROCHLORIDE 75 MG: 75 TABLET, EXTENDED RELEASE ORAL at 08:07

## 2017-08-08 NOTE — PROGRESS NOTES
PM&R PROGRESS NOTE     Patient seen and examined today.  Coordinated with team.      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   PMH of Multiple sclerosis with some underlying mild cognitive deficits but definitely considerably below prior baseline. She also has history of neurogenic bladder with recurrent UTI's.     Main issues today are   Medically  HSV encephalopathy, finished IV acyclovir on August 4.     IV orders discontinued today, 08/08, as pt no longer needs IV line. It was used for IV acyclovir.    Multiple sclerosis: She continues off of her MS medication Tecfidera for now with its partial immune suppression.     Seen by Urology Dr Escamilla and has been started on oxybutynin 10 mg XL. Will need to follow up with Urology after discharge.    UA/UC shows greater than 100,000 E Coli sensitive to Macrobid, will continue a 5 day course (to end on 08/12/2017).    Seizure prophylaxis continues with Keppra 750 mg twice daily.    History of depression: on Effexor daily.    Functionally, Shanna Shanks is participating in the therapies in a motivated fashion. She is making good progress. Has cognitive deficits ongoing, continues to have problems with problem solving and memory deficits. She needs assistance with sequencing her basic adl's. Will likely need assist with iadl's such medications and finances.     Care conference scheduled for this Wednesday at 14:00.     Physical exam    Vital signs:      Date/Time Temp Pulse Heart Rate BP Resp SpO2 Oxygen Delivery O2 Device 0-10 Pain Scale Weight Who     08/08/17 1004 99.2  F (37.3  C) 78 -- 111/56 16 97 % -- None (Room air) -- -- YN     08/07/17 1553 99.4  F (37.4  C) 61 -- 112/61 16 98 % -- None (Room air) -- -- AM     08/07/17 0759 99  F (37.2  C) 74 -- 109/52 16 99 % -- None (Room air) -- --                                                                                               Sitting upright in bed comfortably  HEENT NC AT   Neck supple  Heart rrr  Lungs Clear  Abdomen  Nondistended, nontender  LE no edema   Patient still quite confused. Oriented to person and place and that she had an infection resulting in hospitalization. Patient unaware of date. Thought it was June. Did not know the year.           REVIEWED THE MEDICATIONS BELOW   Current Facility-Administered Medications   Medication     nitroFURantoin (macrocrystal-monohydrate) (MACROBID) capsule 100 mg     oxybutynin (DITROPAN-XL) 24 hr tablet 10 mg     lidocaine 1 % 1 mL     lidocaine (LMX4) kit     sodium chloride (PF) 0.9% PF flush 10 mL     levETIRAcetam (KEPPRA) tablet 750 mg     norethindrone (MICRONOR) 0.35 MG per tablet 0.35 mg     venlafaxine (EFFEXOR-ER) 24 hr tablet 75 mg     vitamin D (ERGOCALCIFEROL) capsule 50,000 Units     acetaminophen (TYLENOL) tablet 325-975 mg     senna-docusate (SENOKOT-S;PERICOLACE) 8.6-50 MG per tablet 1-4 tablet       Total of 25 min spent in this encounter, including coordination.     MARTHA Kate MD

## 2017-08-08 NOTE — PLAN OF CARE
Problem: Goal/Outcome  Goal: Goal Outcome Summary  Completed task on I-pad- 4 pictures- pt needed max cues to deiscern the relationship and common theme between 4 pictures to spell a word.Compelted memory match on I-pad- pt randomly turning over pictures to try to get a match- no observable strategy to aid in recall. --needed max assist to recall where cards were located.

## 2017-08-08 NOTE — PLAN OF CARE
Problem: Goal/Outcome  Goal: Goal Outcome Summary  Outcome: Therapy, progress toward functional goals as expected  SLP: Patient seen for cognitive-communication treatment. Patient participated in new card game (phase 10) and required multiple instructions throughout game and moderate cues to follow rules of play. Note reduced insight/awareness into deficits.

## 2017-08-08 NOTE — PLAN OF CARE
Problem: Goal/Outcome  Goal: Goal Outcome Summary  Outcome: Improving  FOCUS/GOAL  Medical management     ASSESSMENT, INTERVENTIONS AND CONTINUING PLAN FOR GOAL:  Pt's midline out per order. Pt has been getting up on her own,regardless the educations were given for safety precautions. Pt transferred with supervision, alarm on for safety.  Nursing will cont POC  .

## 2017-08-08 NOTE — PLAN OF CARE
Problem: Goal/Outcome  Goal: Goal Outcome Summary  Outcome: No Change  Patient is disoriented to place and time, set off bed alarm x 3. Amb to the BR with CGA 1, need steadying to sit on the toilet, Supervision for hygiene, continent of bladder on  the toilet x 2   PVR 11 and 319,slept most of the night, will continue POC

## 2017-08-08 NOTE — PLAN OF CARE
Problem: Goal/Outcome  Goal: Goal Outcome Summary  Pt participated well for AM session with dynamic balance and dual task gait with no overt LOB; pt demonstrated increase in instability during ambulation for PM session with toes getting caught and some stumbling needing intermittent mod A from writer, pt attributing this to fatigue.

## 2017-08-08 NOTE — PLAN OF CARE
Problem: Goal/Outcome  Goal: Goal Outcome Summary  FOCUS/GOAL  Bowel/bladder, safety     ASSESSMENT, INTERVENTIONS AND CONTINUING PLAN FOR GOAL:  Patient is alert to self, has confusion and displayed some impulsivity on this shift, continue with bed/chair alarms at all times.  Patient had family visiting intermittently tonight and was pleasant/conversing with them.  Set off the chair alarm when needing to use the bathroom and continues to have some frequency.  Patient is able to ambulate with SBA and gait belt to the toilet and is able to perform bathroom hygiene but did not have proper technique and was wiping from front to back, teaching done and patient verbalized understanding but will probably still be needing oversight.  Patient ate well for evening meal, denies any pain on this shift, continue with POC.

## 2017-08-09 PROCEDURE — 25000132 ZZH RX MED GY IP 250 OP 250 PS 637: Performed by: PHYSICAL MEDICINE & REHABILITATION

## 2017-08-09 PROCEDURE — 40000225 ZZH STATISTIC SLP WARD VISIT: Performed by: SPEECH-LANGUAGE PATHOLOGIST

## 2017-08-09 PROCEDURE — 12800006 ZZH R&B REHAB

## 2017-08-09 PROCEDURE — 40000187 ZZH STATISTIC PATIENT MED CONFERENCE < 30 MIN: Performed by: SPEECH-LANGUAGE PATHOLOGIST

## 2017-08-09 PROCEDURE — 25000132 ZZH RX MED GY IP 250 OP 250 PS 637: Performed by: UROLOGY

## 2017-08-09 PROCEDURE — 97532 ZZHC SP COGNITIVE SKILLS EA 15 MIN: CPT | Mod: GN | Performed by: SPEECH-LANGUAGE PATHOLOGIST

## 2017-08-09 PROCEDURE — 97535 SELF CARE MNGMENT TRAINING: CPT | Mod: GO

## 2017-08-09 PROCEDURE — 97530 THERAPEUTIC ACTIVITIES: CPT | Mod: GP

## 2017-08-09 PROCEDURE — 40000133 ZZH STATISTIC OT WARD VISIT

## 2017-08-09 PROCEDURE — 40000183 ZZH STATISTIC PT MED CONFERENCE < 30 MIN: Performed by: STUDENT IN AN ORGANIZED HEALTH CARE EDUCATION/TRAINING PROGRAM

## 2017-08-09 PROCEDURE — 99368 TEAM CONF W/O PAT BY HC PRO: CPT

## 2017-08-09 PROCEDURE — 40000193 ZZH STATISTIC PT WARD VISIT

## 2017-08-09 PROCEDURE — 97112 NEUROMUSCULAR REEDUCATION: CPT | Mod: GP

## 2017-08-09 RX ORDER — NITROFURANTOIN 25; 75 MG/1; MG/1
100 CAPSULE ORAL EVERY 12 HOURS
Qty: 6 CAPSULE | Refills: 0 | Status: ON HOLD | OUTPATIENT
Start: 2017-08-09 | End: 2017-08-15

## 2017-08-09 RX ORDER — OXYBUTYNIN CHLORIDE 10 MG/1
10 TABLET, EXTENDED RELEASE ORAL AT BEDTIME
Qty: 30 TABLET | Status: ON HOLD
Start: 2017-08-09 | End: 2018-06-05

## 2017-08-09 RX ORDER — LEVETIRACETAM 750 MG/1
750 TABLET ORAL 2 TIMES DAILY
Qty: 60 TABLET | Refills: 0 | Status: SHIPPED | OUTPATIENT
Start: 2017-08-09 | End: 2018-03-07

## 2017-08-09 RX ADMIN — OXYBUTYNIN CHLORIDE 10 MG: 10 TABLET, FILM COATED, EXTENDED RELEASE ORAL at 20:55

## 2017-08-09 RX ADMIN — NITROFURANTOIN (MONOHYDRATE/MACROCRYSTALS) 100 MG: 75; 25 CAPSULE ORAL at 20:55

## 2017-08-09 RX ADMIN — VENLAFAXINE HYDROCHLORIDE 75 MG: 75 TABLET, EXTENDED RELEASE ORAL at 07:34

## 2017-08-09 RX ADMIN — SENNOSIDES AND DOCUSATE SODIUM 2 TABLET: 8.6; 5 TABLET ORAL at 20:55

## 2017-08-09 RX ADMIN — SENNOSIDES AND DOCUSATE SODIUM 2 TABLET: 8.6; 5 TABLET ORAL at 07:34

## 2017-08-09 RX ADMIN — LEVETIRACETAM 750 MG: 750 TABLET, FILM COATED ORAL at 20:55

## 2017-08-09 RX ADMIN — NITROFURANTOIN (MONOHYDRATE/MACROCRYSTALS) 100 MG: 75; 25 CAPSULE ORAL at 07:34

## 2017-08-09 RX ADMIN — LEVETIRACETAM 750 MG: 750 TABLET, FILM COATED ORAL at 07:34

## 2017-08-09 RX ADMIN — ACETAMINOPHEN AND CODEINE PHOSPHATE 0.35 MG: 120; 12 SOLUTION ORAL at 07:34

## 2017-08-09 NOTE — PLAN OF CARE
Problem: Goal/Outcome  Goal: Goal Outcome Summary  FOCUS/GOAL  Bowel management, Bladder management and Medical management     ASSESSMENT, INTERVENTIONS AND CONTINUING PLAN FOR GOAL:  AO to self, impulsive, alarms at all times for safety.CGA with gait belt.   cc at 1248. Still experiencing urgency and frequency with urination. On UTI antibiotic. VSS, denies pain, denies SOB, pleasant and cooperative with cares.

## 2017-08-09 NOTE — CARE CONFERENCE
Acute Rehab Care Conference/Team Rounds      Type: Patient Conference    Present: Dr Lucian Kate, Kimmy Saini [facility], Shanna Dimitris patient, , Lucila Topete PT, Sangeeta Reinoso OT, Tracee Bateman, OTR/L  Kimmy Romo SLP, Ron Callaway RN.       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 PMH of Multiple sclerosis with some underlying mild cognitive deficits but definitely considerably below prior baseline. She also has history of neurogenic bladder with recurrent UTI's.     Active Medical Co-morbidities/Prognosis: see HPI and all notes.    Safety: Patient is CGA when transferring, alarms on bed at all times, pt is impulsive and gets up independently without calling for help, pt is confused and only A&O to self    Pain: Patient denies pain    Medications, Skin, Tubes/Lines: No tubes/lines present, no skin issues, pt will participate in the MAP program before discharge    Swallowing/Nutrition: Tolerating Regular/thin diet.     Bowel/Bladder: Patient is continent and incontinent of bladder, continent of bowel, uses toilet in bathroom, last BM 8/7/17    Psychosocial: Pt resides with her  and two daughters. Goal to return home with their continued family support. Team working towards a safe d/c plan.     ADLs/IADLs:OT: Pt. currently SBA for full AM ADL routine, toileting and toilet transfers. Recommending assist for all IADL s including meal prep and medication management. Family training completed today and  receptive and plans to provide 24/7 support with additional caregiver assist. Anticipating d/c [facility] and [facility].     Mobility: Pt is currently ambulating on unit and transferring with no AD, SBA for safety due to instability impacted by cognitive and balance deficits and for pathfinding. Pt demonstrates slow, staggered  "gait. Pt is able to ascend/descend 12 - 6\" steps with SBA. Scored 12/24 on DGI 8/1/17, indicates increased risk for falls. Recommending 24 hour supervision 2/2 cognitive impairments and OT PT upon discharge.    Cognition/Language:  Pt continues to demonstrate moderate deficits for attention, memory, safety/insight, and reasoning.  During basic level reasoning tasks, Pt requires assist for initiation and deciphering information that is less concrete. Also targeting language skills for more complex auditory comprehension and verbal expression, continues with word finding difficulties. 8/4/17 CPT 4.0/5.6 indicating need for 24/7 assist ADL/IADL. Recommend OP SLP at d/c with 24 hour supervision.      Community Re-Entry: SBA 2/2 cognition    Transportation: needs assist, transfer not a barrier    Decision maker: self and significant other    Plan of Care and goals reviewed and updated.    Discharge Plan/Recommendations    Fall Precautions: continue    Patient/Family input to goals: okay with rehab plan    Anticipated rehab needs following discharge: outpatient PT,OT,SLP    Anticipated care giver support after discharge:     Estimated length of stay: Thursday, 08/10    Overall plan for the patient: as above.      Utilization Review and Continued Stay Justification    Medical Necessity Criteria:    For any criteria that is not met, please document reason and plan for discharge, transfer, or modification of plan of care to address.    Requires intensive rehabilitation program to treat functional deficits?: Yes    Requires 3x per week or greater involvement of rehabilitation physician to oversee rehabilitation program?: Yes    Requires rehabilitation nursing interventions?: Yes    Patient is making functional progress?: Yes    There is a potential for additional functional progress? Yes    Patient is participating in therapy 3 hours per day a minimum of 5 days per week or 15 hours per week in 7 day period?:Yes    Has " discharge needs that require coordinated discharge planning approach?:Yes      Barriers/Concerns related to meeting medical necessity criteria:  none    Team Plan to Address Concern:  na      Final Physician Sign off    Statement of Approval: reviewed and approved. Lucian Kate      Patient Goals      OT target date for goal attainment: 08/09/17  OT Frequency: daily for  minutes  OT goal: hygiene/grooming: independent  OT goal: upper body dressing: Independent  OT goal: lower body dressing: Independent  OT goal: upper body bathing: Independent  OT goal: lower body bathing: Independent  OT goal: toilet transfer/toileting: Independent  OT goal: meal preparation: Supervision/stand-by assist  OT goal: home management: Supervision/stand-by assist  OT goal 1: Pt will complete wet shower transfer with supervision for safety.       PT target date for goal attainment: 08/07/18  PT Frequency: 60-75 minutes daily  PT goal: bed mobility: Rolling, Bridging, Independent, Supine to/from sit     PT goal: gait: 50 feet, Greater than 200 feet, Supervision/stand-by assist, Modified independent (ind for home, SBA for community distances)  PT goal: stairs: Greater than 10 stairs, Rail on right (for home access)  PT goal: perform aerobic activity with stable cardiovascular response: 10 minutes, NuStep, continuous activity (for endurance, cardiovascular health)  PT goal 1: Perform floor transfer w/ furniture assist, SBA or < in order to demo safe fall recovery  PT Goal 2: Using handout be ind with HEP for strength for improved functional mobility  PT Goal 3: Using handout be ind with HEP for balance for improved functional mobility   PT Goal 4: Perfom car transfer SBA in order to access personal transportation  PT Goal 5: Pt will improve DGI to greater than 19 to decrease fall risk     All PT functional mobility goals- performing at SBA level 2/2 cognitive impairments       SLP target date for goal attainment: 08/21/17  SLP  Frequency: daily  SLP goal 1: Pt gary complete mod levelauditory comprehension tasks with 90% accuracy for improved following of directions in ADL's  SLP goal 2: Pt will independently utilize word retrieval strategies to improve word finding in complex level conversation and verbal expression tasks with 90% accuracy  SLP goal 3: Pt will utilize compensatory memory strategies and attention strateig to complete general orientation, short term recall and sustained/divided attention tasks with 90% accuracy  SLP goal 4: Pt will complete basic to mod level problem solving, reasoning, sequencing and organizing tasks with 90% accuracy       Nursing Goals    Patient Goal:  Pain Management: Pt will advocate for pain medications, especially before therapies, prior to discharge.       Patient/Family Goal: Bladder: Pt will participate in toileting schedule to regain continence prior to discharge.      Patient/ Family Goal: Self Care: By discharge, patient patient will demonstrate ability to perform ADLs with minimal supervision.

## 2017-08-09 NOTE — PROGRESS NOTES
PM&R PROGRESS NOTE     Patient seen and examined today.  Coordinated with team.      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   PMH of Multiple sclerosis with some underlying mild cognitive deficits but definitely considerably below prior baseline. She also has history of neurogenic bladder with recurrent UTI's.     Main issues today are     Rehabilitation: Care Conference today. See separate note for details. To discharge home with  tomorrow with outpatient PT,OT,SLP.  will provide 24/7 supervision at time of discharge. To follow up with Neurologist and Urologist.    Medically  HSV encephalopathy, finished IV acyclovir on August 4.     Multiple sclerosis: She continues off of her MS medication Tecfidera for now with its partial immune suppression.     Seen by Urology Dr Escamilla and has been started on oxybutynin 10 mg XL. Will need to follow up with Urology after discharge.    UA/UC shows greater than 100,000 E Coli sensitive to Macrobid, will continue a 5 day course (to end on 08/12/2017).    Seizure prophylaxis continues with Keppra 750 mg twice daily.    History of depression: on Effexor daily.      Physical exam    Vital signs:    Date/Time Temp Pulse Heart Rate BP Resp SpO2 Oxygen Delivery O2 Device 0-10 Pain Scale Weight Who      08/09/17 0700 98.6  F (37  C) 66 -- 100/54 16 98 % -- None (Room air) -- -- YN     08/08/17 1529 99.2  F (37.3  C) 71 -- 120/80 17 98 % -- None (Room air) -- -- DC     08/08/17 1004 99.2  F (37.3  C) 78 -- 111/56 16 97 % -- None (Room air) -- -- YN         Sitting upright in chair comfortably  HEENT NC AT   Neck supple  Heart rrr  Lungs Clear  Abdomen  Nondistended, nontender  LE no edema   Patient still quite confused.         REVIEWED THE MEDICATIONS BELOW   Current Facility-Administered Medications   Medication     nitroFURantoin (macrocrystal-monohydrate)  (MACROBID) capsule 100 mg     oxybutynin (DITROPAN-XL) 24 hr tablet 10 mg     lidocaine 1 % 1 mL     lidocaine (LMX4) kit     levETIRAcetam (KEPPRA) tablet 750 mg     norethindrone (MICRONOR) 0.35 MG per tablet 0.35 mg     venlafaxine (EFFEXOR-ER) 24 hr tablet 75 mg     vitamin D (ERGOCALCIFEROL) capsule 50,000 Units     acetaminophen (TYLENOL) tablet 325-975 mg     senna-docusate (SENOKOT-S;PERICOLACE) 8.6-50 MG per tablet 1-4 tablet       Total of 50 min spent in this encounter, patient evaluation, Rehab Care Conference, rehab plans, and notes    MARTHA Kate MD

## 2017-08-09 NOTE — DISCHARGE SUMMARY
[facility]   Acute Rehabilitation Unit  Discharge summary     Date of Admission: 7/30/2017  Date of Discharge: 08/10/2017  Disposition: discharge home with outpatient PT,OT and SLP  Primary Care Physician: Malvin Beck  Attending physician: Bossman Bassett MD, Lucian Kate MD        discharge diagnosis    Suspected HSV encephalitis with seizures and encephalopathy   Fevers, likely secondary to HSV encephalitis   Multiple sclerosis  Urinary incontinence  Depression          brief summary  Shanna Shanks is a 42 year old female      Shanna Shanks is an 42 year old female who presented to [facility] with unresponsiveness and right hemiplegia.  Patient underwent stroke workup in the Emergency Department including CT head and CTA head and neck which was negative for bleed or large clot. Due to her level of unresponsiveness initially as well as concern for catastrophic neurologic event, she was intubated in the Emergency Department and admitted to the ICU.  She was successfully extubated shortly after. Neurology consulted. Concern for left-sided seizure activity on EEG. 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 Increased risk with chronic immunosuppressants for multiple sclerosis. Underwent LP with 8 WBC, HSV PCR negative, however with with high clinical suspicion for HSV encephalitis. Started on acyclovir. Infectious disease consulted, agreed with treatment as well, plan to complete 14 day course of IV acyclovir (end date 8/7/17). Midline placed for acyclovir 7/28/2017. Ongoing improvement in mental status, still not to baseline. Started on levetiracetam with dose increased per neurology, recommended to continue on this with follow-up with her neurologist through [facility] for ongoing management. PT/OT/Speech consulted during admission, recommended discharge to ARU for ongoing  "therapies.         rehabilitation course    Patient admitted to ARU on 07/30/2017. A Care Conference was held the day before discharge, on 08/09:    Safety: Patient is CGA when transferring, alarms on bed at all times, pt is impulsive and gets up independently without calling for help, pt is confused and only A&O to self     Pain: Patient denies pain     Medications, Skin, Tubes/Lines: No tubes/lines present, no skin issues, pt will participate in the MAP program before discharge     Swallowing/Nutrition: Tolerating Regular/thin diet.      Bowel/Bladder: Patient is continent and incontinent of bladder, continent of bowel, uses toilet in bathroom, last BM 8/7/17. Patient started on Nitrofuratoin for UTI and will continue for another 3 days.     Psychosocial: Pt resides with her  and two daughters. Goal to return home with their continued family support. Team working towards a safe d/c plan.      ADLs/IADLs:OT: Pt. currently SBA for full AM ADL routine, toileting and toilet transfers. Recommending assist for all IADL s including meal prep and medication management. Family training completed today and  receptive and plans to provide 24/7 support with additional caregiver assist. Anticipating d/[facility] and adult [facility] program.      Mobility: Pt is currently ambulating on unit and transferring with no AD, SBA for safety due to instability impacted by cognitive and balance deficits and for pathfinding. Pt demonstrates slow, staggered gait. Pt is able to ascend/descend 12 - 6\" steps with SBA. Scored 12/24 on DGI 8/1/17, indicates increased risk for falls. Recommending 24 hour supervision 2/2 cognitive impairments and OT PT upon discharge.     Cognition/Language:  Pt continues to demonstrate moderate deficits for attention, memory, safety/insight, and reasoning.  During basic level reasoning tasks, Pt requires assist for initiation and deciphering information that is less " concrete. Also targeting language skills for more complex auditory comprehension and verbal expression, continues with word finding difficulties. 8/4/17 CPT 4.0/5.6 indicating need for 24/7 assist ADL/IADL. Recommend OP SLP at d/c with 24 hour supervision.      dISCHARGE MEDICATIONS  Current Discharge Medication List      START taking these medications    Details   nitroFURantoin, macrocrystal-monohydrate, (MACROBID) 100 MG capsule Take 1 capsule (100 mg) by mouth every 12 hours  Qty: 6 capsule, Refills: 0    Associated Diagnoses: Urinary tract infection, site unspecified      oxybutynin (DITROPAN-XL) 10 MG 24 hr tablet Take 1 tablet (10 mg) by mouth At Bedtime  Qty: 30 tablet    Associated Diagnoses: Multiple sclerosis (H)         CONTINUE these medications which have CHANGED    Details   levETIRAcetam (KEPPRA) 750 MG tablet Take 1 tablet (750 mg) by mouth 2 times daily  Qty: 60 tablet, Refills: 0    Associated Diagnoses: Seizures (H)         CONTINUE these medications which have NOT CHANGED    Details   norethindrone (MICRONOR) 0.35 MG per tablet Take 1 tablet by mouth daily      venlafaxine (EFFEXOR-XR) 75 MG 24 hr capsule Take 75 mg by mouth daily      baclofen (LIORESAL) 10 MG tablet Take 10-20 mg by mouth 4 times daily as needed   Refills: 3      vitamin D (ERGOCALCIFEROL) 86340 UNIT capsule Take 50,000 Units by mouth once a week   Refills: 1         STOP taking these medications       acyclovir 625 mg Comments:   Reason for Stopping:                 DISCHARGE INSTRUCTIONS AND FOLLOW UP    Discharge Procedure Orders  Physical Therapy Referral   Referral Type: Rehab Therapy Physical Therapy     Occupational Therapy Referral   Referral Type: Occupational Therapy     Speech Therapy Referral   Referral Type: Therapeutic Services     Reason for your hospital stay   Order Comments: Rehab to regain function.     Full Code     Diet   Order Comments: Follow this diet upon discharge: Orders Placed This Encounter      Room Service     Combination Diet Regular Diet Adult; Thin Liquids (water, ice chips, juice, milk, gelatin, ice cream, etc)   Order Specific Question Answer Comments   Is discharge order? Yes         Follow up with your Urologist and your Neurologist at [facility] after discharge.    physical examination    Most recent Vital Signs:   Vitals:    08/08/17 1004 08/08/17 1529 08/09/17 0700 08/09/17 1508   BP: 111/56 120/80 100/54 112/77   BP Location: Left arm Left arm Left arm Right arm   Pulse: 78 71 66 85   Resp: 16 17 16 16   Temp: 99.2  F (37.3  C) 99.2  F (37.3  C) 98.6  F (37  C) 97.4  F (36.3  C)   TempSrc: Oral Oral Oral Oral   SpO2: 97% 98% 98% 99%   Weight:       Height:           Patient is alert and in no acute distress. She continues to be confused, but is very pleasant.  HEENT WNL  Heart rrr  Lungs clear    >30 minutes spent in discharge, including >50% in counseling and coordination of care, medication review and plan of care recommended on follow up.     Discharge summary was forwarded to Malvin Beck (PCP) at the time of discharge, so as to bridge from hospital to outpatient care.     It was our pleasure to care for Shanna Shanks during this hospitalization. Please do not hesitate to contact me should there be questions regarding the hospital course or discharge plan.        Lucian Kate MD, PhD  Rehab Medicine Service

## 2017-08-09 NOTE — DISCHARGE INSTRUCTIONS
Follow-Up Appointments:    Outpatient PT,OT,SLP    Follow up with your [facility] Neurologist after discharge. Please call to make that appointment.    Follow up with your Urologist after discharge. Please call to make that appointment.    Follow up with Rehabilitation Physician, if needed. You can call the Rehab Clinic at [phone] to schedule an appointment, if you would so desire.

## 2017-08-09 NOTE — PLAN OF CARE
Problem: Goal/Outcome  Goal: Goal Outcome Summary  Outcome: No Change  FOCUS/GOAL  Bladder management, Medication management and Cognition/Memory/Judgment/Problem solving     ASSESSMENT, INTERVENTIONS AND CONTINUING PLAN FOR GOAL:  Pt alert to only self. In a good mood, good sense of humor. Denied pain. Impulsive and not calling prior to getting out of bed. Self transferred to bathroom x4 this shift. Alarms on.

## 2017-08-10 VITALS
SYSTOLIC BLOOD PRESSURE: 116 MMHG | BODY MASS INDEX: 22.63 KG/M2 | HEIGHT: 67 IN | DIASTOLIC BLOOD PRESSURE: 62 MMHG | TEMPERATURE: 98.5 F | WEIGHT: 144.2 LBS | OXYGEN SATURATION: 97 % | RESPIRATION RATE: 16 BRPM | HEART RATE: 76 BPM

## 2017-08-10 LAB — MAGNESIUM SERPL-MCNC: 2.3 MG/DL (ref 1.6–2.3)

## 2017-08-10 PROCEDURE — 97112 NEUROMUSCULAR REEDUCATION: CPT | Mod: GP

## 2017-08-10 PROCEDURE — 97532 ZZHC SP COGNITIVE SKILLS EA 15 MIN: CPT | Mod: GN

## 2017-08-10 PROCEDURE — 36415 COLL VENOUS BLD VENIPUNCTURE: CPT | Performed by: PHYSICAL MEDICINE & REHABILITATION

## 2017-08-10 PROCEDURE — 40000133 ZZH STATISTIC OT WARD VISIT: Performed by: STUDENT IN AN ORGANIZED HEALTH CARE EDUCATION/TRAINING PROGRAM

## 2017-08-10 PROCEDURE — 40000225 ZZH STATISTIC SLP WARD VISIT

## 2017-08-10 PROCEDURE — 83735 ASSAY OF MAGNESIUM: CPT | Performed by: PHYSICAL MEDICINE & REHABILITATION

## 2017-08-10 PROCEDURE — 25000132 ZZH RX MED GY IP 250 OP 250 PS 637: Performed by: PHYSICAL MEDICINE & REHABILITATION

## 2017-08-10 PROCEDURE — 97535 SELF CARE MNGMENT TRAINING: CPT | Mod: GO | Performed by: STUDENT IN AN ORGANIZED HEALTH CARE EDUCATION/TRAINING PROGRAM

## 2017-08-10 PROCEDURE — 97530 THERAPEUTIC ACTIVITIES: CPT | Mod: GP

## 2017-08-10 PROCEDURE — 40000193 ZZH STATISTIC PT WARD VISIT

## 2017-08-10 PROCEDURE — 97116 GAIT TRAINING THERAPY: CPT | Mod: GP

## 2017-08-10 RX ADMIN — NITROFURANTOIN (MONOHYDRATE/MACROCRYSTALS) 100 MG: 75; 25 CAPSULE ORAL at 07:43

## 2017-08-10 RX ADMIN — LEVETIRACETAM 750 MG: 750 TABLET, FILM COATED ORAL at 07:42

## 2017-08-10 RX ADMIN — ACETAMINOPHEN AND CODEINE PHOSPHATE 0.35 MG: 120; 12 SOLUTION ORAL at 07:43

## 2017-08-10 RX ADMIN — VENLAFAXINE HYDROCHLORIDE 75 MG: 75 TABLET, EXTENDED RELEASE ORAL at 07:43

## 2017-08-10 NOTE — PLAN OF CARE
Problem: Goal/Outcome  Goal: Goal Outcome Summary  FOCUS/GOAL  Bowel management and Bladder management     ASSESSMENT, INTERVENTIONS AND CONTINUING PLAN FOR GOAL:  *AO to self, impulsive, CGA with gait belt for transfer. Incontinent of bladder, continent of bowel; has had episode of bowel incontinence. Denies pain. Pt discharged  Home at 1340 with spouse who will provide 24 hour supervision.

## 2017-08-10 NOTE — PLAN OF CARE
Problem: Goal/Outcome  Goal: Goal Outcome Summary     Speech Language Therapy Discharge Summary     Reason for therapy discharge:    Discharged to home with outpatient therapy.     Progress towards therapy goal(s). See goals on Care Plan in Epic electronic health record for goal details.  Goals partially met.  Barriers to achieving goals:   discharge from facility.     Therapy recommendation(s):    Continued therapy is recommended.  Rationale/Recommendations:  See last note below. Recommend ongoing SLP services for cognitive-linguistic skills..      pt demonstrating progress, but well below baseline.  Noting improvement with language goals for complex auditory comprehension, word finding. Pt has moderate deficits for insight, attention, recent/working memory and reasoning.  She is better able to focus on tasks, but still with frequent redirection, especially if distracted, wanting to converse.Recommend 24 hour supervision/assist, including IADLS for medications/finances. Further SLP recommended.

## 2017-08-10 NOTE — PLAN OF CARE
Problem: Goal/Outcome  Goal: Goal Outcome Summary  FOCUS/GOAL  Bladder management     ASSESSMENT, INTERVENTIONS AND CONTINUING PLAN FOR GOAL:  Pt impulsive setting off bed alarms several shifts to self transfer to toilet. Is confused, offered toilet when staff in room but she refused, then set off alarm a minute later. Continues to retain urine, PVRs in 200s and 300s. SBA with no AD. Plan to d/c tomorrow wish spouse around 1300. Denies pain.

## 2017-08-10 NOTE — PLAN OF CARE
Problem: Goal/Outcome  Goal: Goal Outcome Summary  Occupational Therapy Discharge Summary     Reason for therapy discharge:    Discharged to home with outpatient therapy.     Progress towards therapy goal(s). See goals on Care Plan in [facility] electronic health record for goal details.  Goals partially met.  Barriers to achieving goals:   discharge from facility.     Therapy recommendation(s):    Continued therapy is recommended.  Rationale/Recommendations:  Pt is discharging with 24/7 supervision for ADL and assist for IADL. Pt completed family training prior to DC. Pt will benefit from continued therapy to address cognition and activity progression. .           Problem: OT General Care Plan  Goal: Hygiene/Grooming (OT)  Hygiene/Grooming (OT)   Outcome: Adequate for Discharge Date Met:  08/10/17  SBA provided by   Goal: Upper Body Dressing (OT)  Upper Body Dressing (OT)   Outcome: Adequate for Discharge Date Met:  08/10/17  SBA provided by   Goal: Lower Body Dressing (OT)  Lower Body Dressing (OT)   Outcome: Adequate for Discharge Date Met:  08/10/17  SBA provided by   Goal: Upper Body Bathing (OT)  Upper Body Bathing (OT)   Outcome: Adequate for Discharge Date Met:  08/10/17  SBA provided by   Goal: Lower Body Bathing (OT)  Lower Body Bathing (OT)   Outcome: Adequate for Discharge Date Met:  08/10/17  SBA provided by   Goal: Toilet Transfer/Toileting (OT)  Toilet Transfer/Toileting (OT)   Outcome: Adequate for Discharge Date Met:  08/10/17  SBA provided by   Goal: Meal Preparation (OT)  Meal Preparation (OT)   Outcome: Adequate for Discharge Date Met:  08/10/17  Assist from family  Goal: Home Management (OT)  Home Management (OT)   Outcome: Adequate for Discharge Date Met:  08/10/17  Assist from family  Goal: OT Goal 1  OT Goal 1   Outcome: Adequate for Discharge Date Met:  08/10/17  CGA provided by

## 2017-08-10 NOTE — PROGRESS NOTES
08/10/17 1000   Signing Clinician's Name / Credentials   Signing clinician's name / credentials Vikki Calvo DPT   Dynamic Gait Index (Valdemar and Forrester [place], 1995)   Gait Level Surface 2   Change in Gait Speed 0   Gait and Horizontal Head Turns 1   Gait with Vertical Head Turns 1   Gait and Pivot Turns 1   Step Over Obstacle 1   Step Around Obstacles 2   Steps 2   Total Dynamic Gait Index Score  (A score of 19 or less has been correlated to an increased risk of falls in community dwelling older adults, patients with vestibular disorders, and patients with MS.)   Total Score (out of 24) 10     Dynamic Gait Index (DGI):The DGI is a measure of balance during gait that is reliable and valid for the elderly and individuals with Parkinson's disease, MS, vestibular disorders, or s/p stroke. Gait assistive device used: none     Patient score: 10/24  Scores ?19/24 indicate an increased risk for falls according to Brittany et al 2000  Minimal Detectable Change = 2.9 in community dwelling elderly according to Wily et al 2011    Assessment (rationale for performing, application to patient s function & care plan): gait instability  Minutes billed as physical performance test: 10      PT: Pt cont. To be fall risk. Engaged in functional gait training and DGI w/ balance challenges. Pt demo inc difficutly/instabilty when stepping over boxes and tends to compensate by using circumduction of BLE, VC for improved hip flexion, fair improvmeent noted. Pt engaged in floor transfer, SBA, demo good carryover of technique from previous session. Up/down 4 steps x 2 w/ reciprocal pattern, CGA for safeyt

## 2017-08-10 NOTE — PLAN OF CARE
Acute Rehab Care Conference/Team Rounds      Type:     Present:       Discharge Barriers/Treatment/Education    Rehab Diagnosis:     Active Medical Co-morbidities/Prognosis:     Safety:Impulsive, use call light x 1 but did not wait for attendant, found sitting on the toilet    Pain:Denies pain    Medications, Skin, Tubes/Lines:Takes meds whole with supervision, skin is intact, no tubes or lines    Swallowing/Nutrition:SLP: pt tolerating regular diet, thin fluids    Bowel/Bladder:incontinent of bowels, continent of bladder tonight but had some episodes of incontinence before, able to complete ondina-care with supervision, LB 8/8. Need assist if bowel inc occurs.    Psychosocial: Pt resides with her . Pt is planning to d/c home with their continued support. Team working towards a safe d/c plan.     ADLs/IADLs:    Mobility:     Cognition/Language:SLP: pt demonstrating progress, but well below baseline.  Noting improvement with language goals for complex auditory comprehension, word finding. Pt has moderate deficits for insight, attention, recent/working memory and reasoning.  She is better able to focus on tasks, but still with frequent redirection, especially if distracted, wanting to converse.Recommend 24 hour supervision/assist, including IADLS for medications/finances. Further SLP recommended.    Community Re-Entry:    Transportation:    Decision maker:     Plan of Care and goals reviewed and updated.    Discharge Plan/Recommendations    Fall Precautions:     Patient/Family input to goals:     Anticipated rehab needs following discharge:     Anticipated care giver support after discharge:     Estimated length of stay:     Overall plan for the patient:       Utilization Review and Continued Stay Justification    Medical Necessity Criteria:    For any criteria that is not met, please document reason and plan for discharge, transfer, or modification of plan of care to address.    Requires intensive  rehabilitation program to treat functional deficits?:     Requires 3x per week or greater involvement of rehabilitation physician to oversee rehabilitation program?:     Requires rehabilitation nursing interventions?:     Patient is making functional progress?:     There is a potential for additional functional progress?     Patient is participating in therapy 3 hours per day a minimum of 5 days per week or 15 hours per week in 7 day period?:    Has discharge needs that require coordinated discharge planning approach?:      Barriers/Concerns related to meeting medical necessity criteria:      Team Plan to Address Concern:        Final Physician Sign off    Statement of Approval:     Patient Goals        1. Pt will d/c home/community setting upon completion of therapy/RN goals.  2. Pt and family will be involved in d/c planning and will be made aware of services available to meet pts needs.              SLP target date for goal attainment: 08/21/17  SLP Frequency: daily   SLP goal 1: Pt gary complete mod levelauditory comprehension tasks with 90% accuracy for improved following of directions in ADL's  SLP goal 2: Pt will independently utilize word retrieval strategies to improve word finding in complex level conversation and verbal expression tasks with 90% accuracy  SLP goal 3: Pt will utilize compensatory memory strategies and attention strateig to complete general orientation, short term recall and sustained/divided attention tasks with 90% accuracy  SLP goal 4: Pt will complete basic to mod level problem solving, reasoning, sequencing and organizing tasks with 90% accuracy           This is a shared note that multiple disciplines have contributed, however was left incomplete and unsigned by provider. Signing and closing note to preserve information that had been documented.

## 2017-08-11 ENCOUNTER — HOSPITAL ENCOUNTER (OUTPATIENT)
Dept: PHYSICAL THERAPY | Facility: CLINIC | Age: 42
Setting detail: THERAPIES SERIES
End: 2017-08-11
Attending: PHYSICAL MEDICINE & REHABILITATION
Payer: COMMERCIAL

## 2017-08-11 PROCEDURE — 40000719 ZZHC STATISTIC PT DEPARTMENT NEURO VISIT: Performed by: PHYSICAL THERAPIST

## 2017-08-11 PROCEDURE — 97162 PT EVAL MOD COMPLEX 30 MIN: CPT | Mod: GP | Performed by: PHYSICAL THERAPIST

## 2017-08-11 PROCEDURE — 97110 THERAPEUTIC EXERCISES: CPT | Mod: GP | Performed by: PHYSICAL THERAPIST

## 2017-08-11 NOTE — PROGRESS NOTES
08/11/17 1000   Quick Adds   Type of Visit Initial OP PT Evaluation   General Information   Start of Care Date 08/11/17   Referring Physician Dr Lucian Kate   Orders Evaluate and Treat as Indicated   Order Date 08/09/17   Medical Diagnosis Multiple Sclerosis, Herpes encephalitis   Onset of illness/injury or Date of Surgery 07/24/17   Precautions/Limitations fall precautions   Surgical/Medical history reviewed Yes   Pertinent history of current problem (include personal factors and/or comorbidities that impact the POC) Pt with previously diagnosed relapsing remitting MS - managed at [facility]. First sx were weakness in legs, had some memory difficulties but could still manage own meds.  did finances, not driving for a few years. Pt admitted 7/24/17 with fever, confusion, weakness. 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 FSH 7/24-7/30, ARU 7/30-8/10/2017. Discharged home with plan for 24hr supervision due to impaired cognition and impulsivity - provided by  who can work from home. They note that pt is one of 8 children, all local, so there is potential for other support but nothing has been set up yet. Prior to this admission, pt had been starting process to start at [facility] program. Complexity: severely impaired memory, impulsivitity, fatigue, high fall risk.   Pertinent Visual History   notes she did have a few MS exacerbations where vision was impacted, but nothing recently. No glasses.   [facility]   Home/Community Accessibility Comments 2 levels with basement - family room upstairs, bedroom and bathroom main floor, laundry in basement ( is doing). Notes she wall/furniture walks a lot.   Assistive Devices Comments no AD - resistant to use.   Patient/Family Goals Statement Improve balance and walking, start at Day Program.   General Information Comments Exercise: walking around block, no formal program for  stretch/strengthen. : [place]. Daughter Tegan at home 12yo, has another daughter 22yo out of the home.   Fall Risk Screen   Fall screen completed by PT   Per patient - Fall 2 or more times in past year? No   Per patient - Fall with injury in past year? No   Timed Up and Go score (seconds) 30.19  (no AD)   Is patient a fall risk? Yes;Department fall risk interventions implemented  (direct supervision in bathroom and with all amb.)   Pain   Patient currently in pain No   Vital Signs   Pulse 86   /67   Cognitive Status Examination   Orientation person   Level of Consciousness alert   Follows Commands and Answers Questions 75% of the time;able to follow single-step instructions   Personal Safety and Judgment impaired;impulsive   Memory impaired  (not a reliable historian.)   Cognitive Comment place: hospital. Time: December.   Posture   Posture Comments Standing with increased trunk/hip/knee flexion. Sitting WNL posture.   Range of Motion (ROM)   ROM Quick Adds no deficits were identified   Strength   Strength Comments B sh flex 4/5, biceps 4/5, triceps 4/5. Pt notes no hand difficulties. hip flex R 3+5, L 4/5. Quads R 3+/5, L 4/5. HS R 3+/5, L 4/5. DF/PF B 4/5.   Bed Mobility   Bed Mobility Comments NT - per pt no issues.   Transfer Skills   Transfer Comments Pt unable to stand from a standard height chair without use of UEs. Able to stand with use of 1 UE, but needs cues to attain full upright posture. Takes 8 steps to complete 90* turn.   Gait   Gait Comments Pt amb with very short step lengths B, flexed posture. Pt does better with UE support from PT or  with cues for BIG steps.   Gait Special Tests   Gait Special Tests 25 FOOT TIMED WALK;FUNCTIONAL GAIT ASSESSMENT   Gait Special Tests 25 Foot Timed Walk   Seconds 20.13   Steps 27 Steps   Comments no AD   Gait Special Tests Functional Gait Assessment Score out of 30   Score out of 30 5   Comments see note   Balance Special Tests Modified CTSIB  Conditions   Condition 1, seconds 30 Seconds   Condition 2, seconds 10 Seconds   Condition 4, seconds 0 Seconds   Condition 5, seconds 0 Seconds   Sensory Examination   Sensory Perception Comments pt denies sensory changes - not formally tested.   Planned Therapy Interventions   Planned Therapy Interventions balance training;gait training;neuromuscular re-education;ROM;strengthening;stretching;transfer training   Clinical Impression   Criteria for Skilled Therapeutic Interventions Met yes, treatment indicated   PT Diagnosis impaired balance and gait   Influenced by the following impairments B LE weakness, fatigue, impaired cognition with impulsivity.   Functional limitations due to impairments impaired balance and gait, need for 24hr supervision   Clinical Presentation Evolving/Changing   Clinical Presentation Rationale pt has been making progress while on acute rehab, just discharged home yesterday.   Clinical Decision Making (Complexity) Moderate complexity   Therapy Frequency 2 times/Week   Predicted Duration of Therapy Intervention (days/wks) 90 days   Risk & Benefits of therapy have been explained Yes   Patient, Family & other staff in agreement with plan of care Yes   Goal 1   Goal Identifier FGA   Goal Description Pt demo FGA >15/30 for improved balance with dynamic activities to facilitate improved saftey at home, in community, and with Day Program.   Target Date 11/08/17   Goal 2   Goal Identifier TUG   Goal Description Pt demo decreased risk for falls with household mobility with AAD via TUG <15.1 secs.   Target Date 11/08/17   Goal 3   Goal Identifier 25' walk   Goal Description Pt demo improved gait quality and efficiency via 25' walk <14 sec with AAD.   Target Date 11/08/17   Goal 4   Goal Identifier 3MWT   Goal Description Pt demo improved activity tolerance via 20% improvement in 3MWT from baseline testing with AAD for improved community accessibility.   Target Date 11/08/17   Goal 5   Goal Identifier  HEP   Goal Description Pt demo ability to complete HEP with handouts, min verbal cues from family for balance, LE strength, and endurance for ongoing wellness.   Target Date 11/08/17   Total Evaluation Time   Total Evaluation Time (Minutes) 45

## 2017-08-11 NOTE — PROGRESS NOTES
08/11/17 1000   Signing Clinician's Name / Credentials   Signing clinician's name / credentials Valeria Miller, DPT, NCS   Functional Gait Assessment (GURPREET Pickens, FRANK Eubanks, et al. (2004))   1. GAIT LEVEL SURFACE 1  (16.1 secs)   2. CHANGE IN GAIT SPEED 0   3. GAIT WITH HORIZONTAL HEAD TURNS 1   4. GAIT WITH VERTICAL HEAD TURNS 0   5. GAIT AND PIVOT TURN 1   6. STEP OVER OBSTACLE 0   7. GAIT WITH NARROW BASE OF SUPPORT 0   8. GAIT WITH EYES CLOSED 0   9. AMBULATING BACKWARDS 1   10. STEPS 1   Total Functional Gait Assessment Score   TOTAL SCORE: (MAXIMUM SCORE 30) 5     Functional Gait Assessment (FGA): The FGA assesses postural stability during various walking tasks.   Gait assistive device used: none    Patient Score: 5/30  Scores of <22/30 have been correlated with predicting falls in community-dwelling older adults according to Nelia & Bry 2010.   Scores of <18/30 have been correlated with increased risk for falls in patients with Parkinsons Disease according to Dayne Dao Zhou et al 2014.  Minimal Detectable Change for patients with acute/chronic stroke = 4.2 according to Chencho & Shellschparrish 2009  Minimal Detectable Change for patients with vestibular disorder = 8 according to Nelia & Bry 2010    Assessment (rationale for performing, application to patient s function & care plan): assess falls risk  Minutes billed as physical performance test: 0 - day of eval

## 2017-08-11 NOTE — DISCHARGE INSTRUCTIONS
Walking along front of the block 1-2x per day.    Standing up from a chair without using hands (if able) GOAL 5x in a row

## 2017-08-11 NOTE — IP AVS SNAPSHOT
"                  MRN:[id number]                      After Visit Summary   2017    Shanna Shanks    MRN: [id number]           Visit Information        Provider Department      2017 10:15 AM Kimmy Miller, PT [facility], [place], Physical Therapy - Outpatient        Your next 10 appointments already scheduled     Aug 18, 2017  1:00 PM CDT   Neuro Eval with Alexus Faye, SLP   [facility], [place], Speech Therapy - Oupatient ([facility])    [address], [address]  [place] [address]   [phone]            Aug 21, 2017 10:00 AM CDT   Neuro Eval with Rosie Pink, OT   [facility], [place], Occupational Therapy - Outpatient ([facility])    [address], [address]  [place] [address]   [phone]                Further instructions from your care team       Walking along front of the block 1-2x per day.    Standing up from a chair without using hands (if able) GOAL 5x in a row          MyChart Information     SinglePlatform lets you send messages to your doctor, view your test results, renew your prescriptions, schedule appointments and more. To sign up, go to [email or web address] . Click on \"Log in\" on the left side of the screen, which will take you to the Welcome page. Then click on \"Sign up Now\" on the right side of the page.     You will be asked to enter the access code listed below, as well as some personal information. Please follow the directions to create your username and password.     Your access code is: [email or web address]  Expires: 10/27/2017 10:06 AM     Your access code will  in 90 days. If you need help or a new code, please call your [place] clinic or [phone].        Care EveryWhere ID     This is your Care EveryWhere ID. This could be used by other organizations to access your [place] medical records  [id number]        Equal Access to Services     DUANE HARPER AH: Mihir chahal " Sonali, kandy penny, juan kaalmadana stauffer, liza caldwell. So [facility] [phone].    ATENCIÓN: Si habla español, tiene a gatica disposición servicios gratuitos de asistencia lingüística. Llame al [phone].    We comply with applicable federal civil rights laws and [place] laws. We do not discriminate on the basis of race, color, national origin, age, disability sex, sexual orientation or gender identity.

## 2017-08-12 ENCOUNTER — APPOINTMENT (OUTPATIENT)
Dept: ULTRASOUND IMAGING | Facility: CLINIC | Age: 42
DRG: 606 | End: 2017-08-12
Attending: EMERGENCY MEDICINE
Payer: COMMERCIAL

## 2017-08-12 ENCOUNTER — APPOINTMENT (OUTPATIENT)
Dept: CT IMAGING | Facility: CLINIC | Age: 42
DRG: 606 | End: 2017-08-12
Attending: EMERGENCY MEDICINE
Payer: COMMERCIAL

## 2017-08-12 ENCOUNTER — NURSE TRIAGE (OUTPATIENT)
Dept: NURSING | Facility: CLINIC | Age: 42
End: 2017-08-12

## 2017-08-12 ENCOUNTER — APPOINTMENT (OUTPATIENT)
Dept: GENERAL RADIOLOGY | Facility: CLINIC | Age: 42
DRG: 606 | End: 2017-08-12
Attending: EMERGENCY MEDICINE
Payer: COMMERCIAL

## 2017-08-12 ENCOUNTER — HOSPITAL ENCOUNTER (INPATIENT)
Facility: CLINIC | Age: 42
LOS: 3 days | Discharge: HOME | DRG: 606 | End: 2017-08-15
Attending: EMERGENCY MEDICINE | Admitting: INTERNAL MEDICINE
Payer: COMMERCIAL

## 2017-08-12 DIAGNOSIS — R41.0 CONFUSION: ICD-10-CM

## 2017-08-12 DIAGNOSIS — T50.905A MEDICATION REACTION, INITIAL ENCOUNTER: Primary | ICD-10-CM

## 2017-08-12 DIAGNOSIS — G35 MULTIPLE SCLEROSIS (H): ICD-10-CM

## 2017-08-12 LAB
ACID FAST STN SPEC: NORMAL
ALBUMIN SERPL-MCNC: 3.1 G/DL (ref 3.4–5)
ALBUMIN UR-MCNC: 10 MG/DL
ALP SERPL-CCNC: 223 U/L (ref 40–150)
ALT SERPL W P-5'-P-CCNC: 340 U/L (ref 0–50)
ANION GAP SERPL CALCULATED.3IONS-SCNC: 7 MMOL/L (ref 3–14)
APPEARANCE UR: CLEAR
AST SERPL W P-5'-P-CCNC: 348 U/L (ref 0–45)
B BURGDOR IGG CSF QL IB: NORMAL
B BURGDOR IGM CSF QL IB: NORMAL
BASOPHILS # BLD AUTO: 0 10E9/L (ref 0–0.2)
BASOPHILS NFR BLD AUTO: 0.3 %
BILIRUB SERPL-MCNC: 0.4 MG/DL (ref 0.2–1.3)
BILIRUB UR QL STRIP: NEGATIVE
BUN SERPL-MCNC: 10 MG/DL (ref 7–30)
CALCIUM SERPL-MCNC: 8.5 MG/DL (ref 8.5–10.1)
CHLORIDE SERPL-SCNC: 100 MMOL/L (ref 94–109)
CO2 SERPL-SCNC: 26 MMOL/L (ref 20–32)
COLOR UR AUTO: YELLOW
CREAT SERPL-MCNC: 0.7 MG/DL (ref 0.52–1.04)
DIFFERENTIAL METHOD BLD: ABNORMAL
EOSINOPHIL # BLD AUTO: 0 10E9/L (ref 0–0.7)
EOSINOPHIL NFR BLD AUTO: 0.2 %
ERYTHROCYTE [DISTWIDTH] IN BLOOD BY AUTOMATED COUNT: 14.5 % (ref 10–15)
GFR SERPL CREATININE-BSD FRML MDRD: ABNORMAL ML/MIN/1.7M2
GLUCOSE SERPL-MCNC: 107 MG/DL (ref 70–99)
GLUCOSE UR STRIP-MCNC: NEGATIVE MG/DL
HCT VFR BLD AUTO: 34.7 % (ref 35–47)
HGB BLD-MCNC: 11.7 G/DL (ref 11.7–15.7)
HGB UR QL STRIP: NEGATIVE
IMM GRANULOCYTES # BLD: 0.1 10E9/L (ref 0–0.4)
IMM GRANULOCYTES NFR BLD: 0.9 %
KETONES UR STRIP-MCNC: NEGATIVE MG/DL
LACTATE BLD-SCNC: 0.6 MMOL/L (ref 0.7–2.1)
LEUKOCYTE ESTERASE UR QL STRIP: NEGATIVE
LYMPHOCYTES # BLD AUTO: 1.1 10E9/L (ref 0.8–5.3)
LYMPHOCYTES NFR BLD AUTO: 19.2 %
MCH RBC QN AUTO: 31 PG (ref 26.5–33)
MCHC RBC AUTO-ENTMCNC: 33.7 G/DL (ref 31.5–36.5)
MCV RBC AUTO: 92 FL (ref 78–100)
MICRO REPORT STATUS: NORMAL
MONOCYTES # BLD AUTO: 0.7 10E9/L (ref 0–1.3)
MONOCYTES NFR BLD AUTO: 11.7 %
NEUTROPHILS # BLD AUTO: 4 10E9/L (ref 1.6–8.3)
NEUTROPHILS NFR BLD AUTO: 67.7 %
NITRATE UR QL: NEGATIVE
NRBC # BLD AUTO: 0 10*3/UL
NRBC BLD AUTO-RTO: 0 /100
PH UR STRIP: 6.5 PH (ref 5–7)
PLATELET # BLD AUTO: 261 10E9/L (ref 150–450)
POTASSIUM SERPL-SCNC: 3.5 MMOL/L (ref 3.4–5.3)
PROCALCITONIN SERPL-MCNC: 4.09 NG/ML
PROT SERPL-MCNC: 7.2 G/DL (ref 6.8–8.8)
RBC # BLD AUTO: 3.78 10E12/L (ref 3.8–5.2)
RBC #/AREA URNS AUTO: 14 /HPF (ref 0–2)
SODIUM SERPL-SCNC: 133 MMOL/L (ref 133–144)
SP GR UR STRIP: 1.01 (ref 1–1.03)
SPECIMEN SOURCE: NORMAL
SQUAMOUS #/AREA URNS AUTO: <1 /HPF (ref 0–1)
URN SPEC COLLECT METH UR: ABNORMAL
UROBILINOGEN UR STRIP-MCNC: NORMAL MG/DL (ref 0–2)
WBC # BLD AUTO: 5.9 10E9/L (ref 4–11)
WBC #/AREA URNS AUTO: 1 /HPF (ref 0–2)

## 2017-08-12 PROCEDURE — 83605 ASSAY OF LACTIC ACID: CPT | Performed by: EMERGENCY MEDICINE

## 2017-08-12 PROCEDURE — 25000128 H RX IP 250 OP 636: Performed by: EMERGENCY MEDICINE

## 2017-08-12 PROCEDURE — 86705 HEP B CORE ANTIBODY IGM: CPT | Performed by: EMERGENCY MEDICINE

## 2017-08-12 PROCEDURE — 87206 SMEAR FLUORESCENT/ACID STAI: CPT | Performed by: EMERGENCY MEDICINE

## 2017-08-12 PROCEDURE — 25000132 ZZH RX MED GY IP 250 OP 250 PS 637: Performed by: EMERGENCY MEDICINE

## 2017-08-12 PROCEDURE — 009U3ZX DRAINAGE OF SPINAL CANAL, PERCUTANEOUS APPROACH, DIAGNOSTIC: ICD-10-PCS | Performed by: EMERGENCY MEDICINE

## 2017-08-12 PROCEDURE — 76705 ECHO EXAM OF ABDOMEN: CPT

## 2017-08-12 PROCEDURE — 99207 ZZC CDG-CODE CATEGORY CHANGED: CPT | Performed by: INTERNAL MEDICINE

## 2017-08-12 PROCEDURE — 84145 PROCALCITONIN (PCT): CPT | Performed by: EMERGENCY MEDICINE

## 2017-08-12 PROCEDURE — 87040 BLOOD CULTURE FOR BACTERIA: CPT | Performed by: EMERGENCY MEDICINE

## 2017-08-12 PROCEDURE — 96365 THER/PROPH/DIAG IV INF INIT: CPT

## 2017-08-12 PROCEDURE — 87075 CULTR BACTERIA EXCEPT BLOOD: CPT | Performed by: EMERGENCY MEDICINE

## 2017-08-12 PROCEDURE — 87798 DETECT AGENT NOS DNA AMP: CPT | Performed by: EMERGENCY MEDICINE

## 2017-08-12 PROCEDURE — 87086 URINE CULTURE/COLONY COUNT: CPT | Performed by: EMERGENCY MEDICINE

## 2017-08-12 PROCEDURE — 87186 SC STD MICRODIL/AGAR DIL: CPT | Performed by: EMERGENCY MEDICINE

## 2017-08-12 PROCEDURE — 89050 BODY FLUID CELL COUNT: CPT | Performed by: EMERGENCY MEDICINE

## 2017-08-12 PROCEDURE — 86706 HEP B SURFACE ANTIBODY: CPT | Performed by: EMERGENCY MEDICINE

## 2017-08-12 PROCEDURE — 86708 HEPATITIS A ANTIBODY: CPT | Performed by: EMERGENCY MEDICINE

## 2017-08-12 PROCEDURE — 81001 URINALYSIS AUTO W/SCOPE: CPT | Performed by: EMERGENCY MEDICINE

## 2017-08-12 PROCEDURE — 70450 CT HEAD/BRAIN W/O DYE: CPT

## 2017-08-12 PROCEDURE — 86704 HEP B CORE ANTIBODY TOTAL: CPT | Performed by: EMERGENCY MEDICINE

## 2017-08-12 PROCEDURE — 87800 DETECT AGNT MULT DNA DIREC: CPT | Performed by: EMERGENCY MEDICINE

## 2017-08-12 PROCEDURE — 96361 HYDRATE IV INFUSION ADD-ON: CPT

## 2017-08-12 PROCEDURE — 87205 SMEAR GRAM STAIN: CPT | Performed by: EMERGENCY MEDICINE

## 2017-08-12 PROCEDURE — 99223 1ST HOSP IP/OBS HIGH 75: CPT | Mod: AI | Performed by: INTERNAL MEDICINE

## 2017-08-12 PROCEDURE — 80053 COMPREHEN METABOLIC PANEL: CPT | Performed by: EMERGENCY MEDICINE

## 2017-08-12 PROCEDURE — 84157 ASSAY OF PROTEIN OTHER: CPT | Performed by: EMERGENCY MEDICINE

## 2017-08-12 PROCEDURE — 86617 LYME DISEASE ANTIBODY: CPT | Performed by: EMERGENCY MEDICINE

## 2017-08-12 PROCEDURE — 86803 HEPATITIS C AB TEST: CPT | Performed by: EMERGENCY MEDICINE

## 2017-08-12 PROCEDURE — 85025 COMPLETE CBC W/AUTO DIFF WBC: CPT | Performed by: EMERGENCY MEDICINE

## 2017-08-12 PROCEDURE — 71020 XR CHEST 2 VW: CPT

## 2017-08-12 PROCEDURE — 87077 CULTURE AEROBIC IDENTIFY: CPT | Performed by: EMERGENCY MEDICINE

## 2017-08-12 PROCEDURE — 62270 DX LMBR SPI PNXR: CPT

## 2017-08-12 PROCEDURE — 82945 GLUCOSE OTHER FLUID: CPT | Performed by: EMERGENCY MEDICINE

## 2017-08-12 PROCEDURE — 87070 CULTURE OTHR SPECIMN AEROBIC: CPT | Performed by: EMERGENCY MEDICINE

## 2017-08-12 PROCEDURE — 25000128 H RX IP 250 OP 636

## 2017-08-12 PROCEDURE — 87799 DETECT AGENT NOS DNA QUANT: CPT | Performed by: EMERGENCY MEDICINE

## 2017-08-12 PROCEDURE — 12000000 ZZH R&B MED SURG/OB

## 2017-08-12 PROCEDURE — 86665 EPSTEIN-BARR CAPSID VCA: CPT | Performed by: EMERGENCY MEDICINE

## 2017-08-12 PROCEDURE — 87498 ENTEROVIRUS PROBE&REVRS TRNS: CPT | Performed by: EMERGENCY MEDICINE

## 2017-08-12 PROCEDURE — 80177 DRUG SCRN QUAN LEVETIRACETAM: CPT | Performed by: EMERGENCY MEDICINE

## 2017-08-12 PROCEDURE — 86644 CMV ANTIBODY: CPT | Performed by: EMERGENCY MEDICINE

## 2017-08-12 PROCEDURE — 99285 EMERGENCY DEPT VISIT HI MDM: CPT | Mod: 25

## 2017-08-12 PROCEDURE — 87899 AGENT NOS ASSAY W/OPTIC: CPT | Performed by: EMERGENCY MEDICINE

## 2017-08-12 PROCEDURE — 87102 FUNGUS ISOLATION CULTURE: CPT | Performed by: EMERGENCY MEDICINE

## 2017-08-12 RX ORDER — LORAZEPAM 2 MG/ML
0.5 INJECTION INTRAMUSCULAR ONCE
Status: COMPLETED | OUTPATIENT
Start: 2017-08-12 | End: 2017-08-12

## 2017-08-12 RX ORDER — DIPHENHYDRAMINE HYDROCHLORIDE 50 MG/ML
25 INJECTION INTRAMUSCULAR; INTRAVENOUS ONCE
Status: COMPLETED | OUTPATIENT
Start: 2017-08-12 | End: 2017-08-12

## 2017-08-12 RX ORDER — IBUPROFEN 600 MG/1
600 TABLET, FILM COATED ORAL ONCE
Status: COMPLETED | OUTPATIENT
Start: 2017-08-12 | End: 2017-08-12

## 2017-08-12 RX ORDER — CEFAZOLIN SODIUM 1 G/50ML
1250 SOLUTION INTRAVENOUS ONCE
Status: COMPLETED | OUTPATIENT
Start: 2017-08-12 | End: 2017-08-13

## 2017-08-12 RX ORDER — CEFTRIAXONE 2 G/1
2 INJECTION, POWDER, FOR SOLUTION INTRAMUSCULAR; INTRAVENOUS ONCE
Status: COMPLETED | OUTPATIENT
Start: 2017-08-12 | End: 2017-08-13

## 2017-08-12 RX ORDER — LORAZEPAM 2 MG/ML
INJECTION INTRAMUSCULAR
Status: COMPLETED
Start: 2017-08-12 | End: 2017-08-12

## 2017-08-12 RX ORDER — SODIUM CHLORIDE 9 MG/ML
1000 INJECTION, SOLUTION INTRAVENOUS CONTINUOUS
Status: DISCONTINUED | OUTPATIENT
Start: 2017-08-12 | End: 2017-08-13

## 2017-08-12 RX ADMIN — DIPHENHYDRAMINE HYDROCHLORIDE 25 MG: 50 INJECTION, SOLUTION INTRAMUSCULAR; INTRAVENOUS at 21:27

## 2017-08-12 RX ADMIN — SODIUM CHLORIDE 1000 ML: 9 INJECTION, SOLUTION INTRAVENOUS at 21:05

## 2017-08-12 RX ADMIN — LORAZEPAM 0.5 MG: 2 INJECTION INTRAMUSCULAR at 22:30

## 2017-08-12 RX ADMIN — LORAZEPAM 0.5 MG: 2 INJECTION INTRAMUSCULAR; INTRAVENOUS at 22:30

## 2017-08-12 RX ADMIN — CEFTRIAXONE 2 G: 2 INJECTION, POWDER, FOR SOLUTION INTRAMUSCULAR; INTRAVENOUS at 23:46

## 2017-08-12 RX ADMIN — IBUPROFEN 600 MG: 600 TABLET ORAL at 21:03

## 2017-08-12 NOTE — IP AVS SNAPSHOT
[facility] [address] [facility]    [address] [place] [address]    Phone:  [phone]                                       After Visit Summary   8/12/2017    Shanna Shanks    MRN: [id number]           After Visit Summary Signature Page     I have received my discharge instructions, and my questions have been answered. I have discussed any challenges I see with this plan with the nurse or doctor.    ..........................................................................................................................................  Patient/Patient Representative Signature      ..........................................................................................................................................  Patient Representative Print Name and Relationship to Patient    ..................................................               ................................................  Date                                            Time    ..........................................................................................................................................  Reviewed by Signature/Title    ...................................................              ..............................................  Date                                                            Time

## 2017-08-12 NOTE — LETTER
Transition Communication Hand-off for Care Transitions to Next Level of Care Provider    Name: Shanna Shanks  MRN #: [id number]  Primary Care Provider: Malvin Beck MD     Primary Clinic: [address] [place] [id number]     Reason for Hospitalization:  Confusion [R41.0]  Admit Date/Time: 8/12/2017  8:13 PM  Discharge Date: 8/15/17  Payor Source: Payor: [employer] / Plan: [place] [facility] / Product Type: Indemnity /     Readmission Assessment Measure (AYDEN) Risk Score/category: Average    Reason for Communication Hand-off Referral: Fragility    Discharge Plan:Home with spouse      Already enrolled in Tele-monitoring program and name of program:  NA  Follow-up specialty is recommended: Yes, Needs to see her Neurologist Dr Reese, [facility] Neurology clinic within 2 weeks.  I called them concerning  this.  They need to talk to Dr Reese and then will call pt's spouse with appointment time. Their number is ([phone])  Follow-up plan:  Future Appointments  Date Time Provider Department Center   8/16/2017 4:00 PM Malvin Beck MD [facility]   8/25/2017 12:00 PM [facility]   8/25/2017 1:00 PM Alexus Faye, SLP [facility]       Any outstanding tests or procedures:        Referrals     Future Labs/Procedures    Occupational Therapy Referral     Comments:    Resume all outpatient therapy services    Physical Therapy Referral     Comments:    Resume all outpatient therapy services    Speech Therapy Referral     Comments:    Resume all outpatient therapy services            Key Recommendations:  Pt was admitted with fever and confusion.  Blood and CSF cultures are still negative, so no clear focal infection.  Pt had elevated LFTs along with a rash, so thought this could be drug reaction.  Pt is also on Keppra (recent seizure) and Macrodantin (UTI) that could also cause these symptoms.  Pt has multiple sclerosis.  She was here 7/24/17 to 7/30/17 thought  to HSV encephalopathy.  She went to [facility] and discharged home 8/10/17.  She was followed by both Neurology and Infectious Disease while here.  Neurology was concerned she may have PML.  There are a number of labs still pending.  She needs to follow-up with her Neurologist within two weeks (See info above).  Pt's spouse has planned a trip out to [place].  They are leaving this Thursday evening and returning 8/24.  The Neurologist that followed pt here did not feel this is a good idea due to brain lesion and recent seizure.  This information was relayed to pt's spouse.  He had been given the OK to travel from her Neurologist prior to this admission.  Pt has outpt PT/OT and ST at the [facility].  Feel they will definitely need added services in the near future given her MS.  Pt's spouse is able to work from home.  Pt's cognition at times is disoriented somewhat.  She is requiring stand by assist for ambulation presently.    Jenna Mondragon    AVS/Discharge Summary is the source of truth; this is a helpful guide for improved communication of patient story

## 2017-08-12 NOTE — IP AVS SNAPSHOT
MRN:[id number]                      After Visit Summary   8/12/2017    Shanna Shanks    MRN: [id number]           Thank you!     Thank you for choosing [facility] for your care. Our goal is always to provide you with excellent care. Hearing back from our patients is one way we can continue to improve our services. Please take a few minutes to complete the written survey that you may receive in the mail after you visit with us. Thank you!        Patient Information     Date Of Birth          1975        Designated Caregiver       Most Recent Value    Caregiver    Will someone help with your care after discharge? yes    Name of designated caregiver Jama     Phone number of caregiver     Caregiver address [facility]      About your hospital stay     You were admitted on:  August 12, 2017 You last received care in the:  [place] [address] [facility]    You were discharged on:  August 15, 2017        Reason for your hospital stay       You were admitted for fever and rash which is thought to be due to drug reaction.                  Who to Call     For medical emergencies, please call 911.  For non-urgent questions about your medical care, please call your primary care provider or clinic, [phone]          Attending Provider     Provider Specialty    Aldo Gay MD Emergency Medicine    Ben Champagne MD Emergency Medicine    RebekaArnoldo glaser MD Internal Medicine       Primary Care Provider Office Phone # Fax #    Malvin Beck [phone]      After Care Instructions     Activity       Your activity upon discharge: activity as tolerated            Diet       Follow this diet upon discharge:  Regular Diet Adult                  Follow-up Appointments     Follow-up and recommended labs and tests        Follow up with primary care provider, Malvin Beck MD, within 7-14 days for hospital follow- up.   Consider repeat LFT's.  Follow up with outpatient Neurologist in 7-14 days for follow up of pending lab studies.  [facility] Neurology clinic will be calling you for an appointment time, as you will need to be seen sooner than your original follow-up appointment scheduled for 9/27/17                  Your next 10 appointments already scheduled     Aug 16, 2017  4:00 PM CDT   Office Visit with Malvin Beck MD   [facility] ([facility])    [address] [place] [id number] [phone]           Bring a current list of meds and any records pertaining to this visit. For Physicals, please bring immunization records and any forms needing to be filled out. Please arrive 10 minutes early to complete paperwork.            Aug 25, 2017 12:00 PM CDT   Neuro Eval with Rosie Pink OT   [facility] [place], Occupational Therapy - Outpatient ([facility])    [address]  [place] [address]   [phone]            Aug 25, 2017  1:00 PM CDT   Neuro Eval with Alexus Faye, NATASHA   [facility] [place], Speech Therapy - Oupatient ([facility])    [address]  [place] [address]   [phone]              Additional Services     Occupational Therapy Referral       Resume all outpatient therapy services            Physical Therapy Referral       Resume all outpatient therapy services            Speech Therapy Referral       Resume all outpatient therapy services                  Pending Results     Date and Time Order Name Status Description    8/15/2017 0000 GRAEME Virus Ab Index Reflex Inhibition In process     8/15/2017 0000 GRAEME Virus by PCR In process     8/12/2017 2227 Fungus culture Preliminary     8/12/2017 2227 Toxoplasma gondii by PCR (1mL minimum) In process     8/12/2017 2227 Anaerobic CSF culture Preliminary     8/12/2017 2227 CSF Culture Aerobic  "Bacterial Preliminary     8/12/2017 2040 Blood culture Preliminary     8/12/2017 2040 Blood culture Preliminary             Statement of Approval     Ordered          08/15/17 1006  I have reviewed and agree with all the recommendations and orders detailed in this document.  EFFECTIVE NOW     Approved and electronically signed by:  Ron Ba MD             Admission Information     Date & Time Provider Department Dept. Phone    8/12/2017 Arnoldo Staley MD [place] [address] [facility] [phone]      Your Vitals Were     Blood Pressure Pulse Temperature Respirations Height Weight    105/60 (BP Location: Right arm) 54 97.8  F (36.6  C) (Axillary) 16 1.702 m (5' 7\") 63.6 kg (140 lb 3.4 oz)    Pulse Oximetry BMI (Body Mass Index)                96% 21.96 kg/m2          MyChart Information     [employer] gives you secure access to your electronic health record. If you see a primary care provider, you can also send messages to your care team and make appointments. If you have questions, please call your primary care clinic.  If you do not have a primary care provider, please call [phone] and they will assist you.        Care EveryWhere ID     This is your Care EveryWhere ID. This could be used by other organizations to access your [facility] medical records  [id number]        Equal Access to Services     DUANE HARPER : Mihir pengo Sojhonatanali, waaxda luqadaha, qaybta kaalmada adeegyada, liza caldwell. So [facility] [phone].    ATENCIÓN: Si habla español, tiene a gatica disposición servicios gratuitos de asistencia lingüística. Llame al [phone].    We comply with applicable federal civil rights laws and [place] laws. We do not discriminate on the basis of race, color, national origin, age, disability sex, sexual orientation or gender identity.               Review of your medicines      CONTINUE these medicines which have NOT CHANGED        Dose / Directions "    baclofen 10 MG tablet   Commonly known as:  LIORESAL        Dose:  10-20 mg   Take 10-20 mg by mouth 4 times daily as needed   Refills:  3       CRANBERRY EXTRACT PO        Dose:  1 tablet   Take 1 tablet by mouth daily   Refills:  0       levETIRAcetam 750 MG tablet   Commonly known as:  KEPPRA   Used for:  Seizures (H)        Dose:  750 mg   Take 1 tablet (750 mg) by mouth 2 times daily   Quantity:  60 tablet   Refills:  0       norethindrone 0.35 MG per tablet   Commonly known as:  MICRONOR        Dose:  1 tablet   Take 1 tablet by mouth daily   Refills:  0       oxybutynin 10 MG 24 hr tablet   Commonly known as:  DITROPAN-XL   Used for:  Multiple sclerosis (H)        Dose:  10 mg   Take 1 tablet (10 mg) by mouth At Bedtime   Quantity:  30 tablet   Refills:  0       venlafaxine 75 MG 24 hr capsule   Commonly known as:  EFFEXOR-XR        Dose:  75 mg   Take 75 mg by mouth daily   Refills:  0       vitamin D 14198 UNIT capsule   Commonly known as:  ERGOCALCIFEROL        Dose:  44178 Units   Take 50,000 Units by mouth once a week On Sundays   Refills:  1         STOP taking     nitroFURantoin (macrocrystal-monohydrate) 100 MG capsule   Commonly known as:  MACROBID                    Protect others around you: Learn how to safely use, store and throw away your medicines at www.disposemymeds.org.             Medication List: This is a list of all your medications and when to take them. Check marks below indicate your daily home schedule. Keep this list as a reference.      Medications           Morning Afternoon Evening Bedtime As Needed    baclofen 10 MG tablet   Commonly known as:  LIORESAL   Take 10-20 mg by mouth 4 times daily as needed                                   CRANBERRY EXTRACT PO   Take 1 tablet by mouth daily   Next Dose Due:  8/16/17                                   levETIRAcetam 750 MG tablet   Commonly known as:  KEPPRA   Take 1 tablet (750 mg) by mouth 2 times daily   Last time this was given:   750 mg on 8/15/2017 10:00 AM   Next Dose Due:  8/16/17                                      norethindrone 0.35 MG per tablet   Commonly known as:  MICRONOR   Take 1 tablet by mouth daily   Next Dose Due:  8/16/17                                   oxybutynin 10 MG 24 hr tablet   Commonly known as:  DITROPAN-XL   Take 1 tablet (10 mg) by mouth At Bedtime   Last time this was given:  10 mg on 8/14/2017  9:38 PM   Next Dose Due:  8/16/17                                   venlafaxine 75 MG 24 hr capsule   Commonly known as:  EFFEXOR-XR   Take 75 mg by mouth daily   Last time this was given:  75 mg on 8/15/2017 10:00 AM   Next Dose Due:  8/16/17                                   vitamin D 34650 UNIT capsule   Commonly known as:  ERGOCALCIFEROL   Take 50,000 Units by mouth once a week On Sundays

## 2017-08-13 ENCOUNTER — APPOINTMENT (OUTPATIENT)
Dept: MRI IMAGING | Facility: CLINIC | Age: 42
DRG: 606 | End: 2017-08-13
Attending: INTERNAL MEDICINE
Payer: COMMERCIAL

## 2017-08-13 PROBLEM — R50.9 FEVER OF UNKNOWN ORIGIN: Status: ACTIVE | Noted: 2017-08-13

## 2017-08-13 LAB
ALBUMIN SERPL-MCNC: 2.7 G/DL (ref 3.4–5)
ALP SERPL-CCNC: 189 U/L (ref 40–150)
ALT SERPL W P-5'-P-CCNC: 255 U/L (ref 0–50)
ANION GAP SERPL CALCULATED.3IONS-SCNC: 9 MMOL/L (ref 3–14)
APPEARANCE CSF: CLEAR
AST SERPL W P-5'-P-CCNC: 166 U/L (ref 0–45)
BILIRUB DIRECT SERPL-MCNC: <0.1 MG/DL (ref 0–0.2)
BILIRUB SERPL-MCNC: 0.3 MG/DL (ref 0.2–1.3)
BUN SERPL-MCNC: 8 MG/DL (ref 7–30)
CALCIUM SERPL-MCNC: 7.9 MG/DL (ref 8.5–10.1)
CHLORIDE SERPL-SCNC: 107 MMOL/L (ref 94–109)
CO2 SERPL-SCNC: 21 MMOL/L (ref 20–32)
COLOR CSF: COLORLESS
CREAT SERPL-MCNC: 0.57 MG/DL (ref 0.52–1.04)
CRYPTOC AG SPEC QL: NORMAL
ERYTHROCYTE [DISTWIDTH] IN BLOOD BY AUTOMATED COUNT: 14.7 % (ref 10–15)
EV RNA SPEC QL NAA+PROBE: NORMAL
GFR SERPL CREATININE-BSD FRML MDRD: ABNORMAL ML/MIN/1.7M2
GLUCOSE CSF-MCNC: 62 MG/DL (ref 40–70)
GLUCOSE SERPL-MCNC: 165 MG/DL (ref 70–99)
GRAM STN SPEC: NORMAL
HCT VFR BLD AUTO: 33.4 % (ref 35–47)
HGB BLD-MCNC: 11.2 G/DL (ref 11.7–15.7)
Lab: NORMAL
MCH RBC QN AUTO: 31 PG (ref 26.5–33)
MCHC RBC AUTO-ENTMCNC: 33.5 G/DL (ref 31.5–36.5)
MCV RBC AUTO: 93 FL (ref 78–100)
MICRO REPORT STATUS: NORMAL
MICRO REPORT STATUS: NORMAL
PLATELET # BLD AUTO: 228 10E9/L (ref 150–450)
POTASSIUM SERPL-SCNC: 3.8 MMOL/L (ref 3.4–5.3)
PROT CSF-MCNC: 34 MG/DL (ref 15–60)
PROT SERPL-MCNC: 6.6 G/DL (ref 6.8–8.8)
RBC # BLD AUTO: 3.61 10E12/L (ref 3.8–5.2)
RBC # CSF MANUAL: 0 /UL (ref 0–2)
SODIUM SERPL-SCNC: 137 MMOL/L (ref 133–144)
SPECIMEN SOURCE: NORMAL
TUBE # CSF: 4 #
WBC # BLD AUTO: 7.2 10E9/L (ref 4–11)
WBC # CSF MANUAL: 1 /UL (ref 0–5)

## 2017-08-13 PROCEDURE — 80048 BASIC METABOLIC PNL TOTAL CA: CPT | Performed by: INTERNAL MEDICINE

## 2017-08-13 PROCEDURE — 80076 HEPATIC FUNCTION PANEL: CPT | Performed by: INTERNAL MEDICINE

## 2017-08-13 PROCEDURE — 99233 SBSQ HOSP IP/OBS HIGH 50: CPT | Performed by: INTERNAL MEDICINE

## 2017-08-13 PROCEDURE — 85027 COMPLETE CBC AUTOMATED: CPT | Performed by: INTERNAL MEDICINE

## 2017-08-13 PROCEDURE — A9585 GADOBUTROL INJECTION: HCPCS | Performed by: INTERNAL MEDICINE

## 2017-08-13 PROCEDURE — 25000128 H RX IP 250 OP 636: Performed by: INTERNAL MEDICINE

## 2017-08-13 PROCEDURE — 25000128 H RX IP 250 OP 636: Performed by: EMERGENCY MEDICINE

## 2017-08-13 PROCEDURE — 12000000 ZZH R&B MED SURG/OB

## 2017-08-13 PROCEDURE — 25000132 ZZH RX MED GY IP 250 OP 250 PS 637: Performed by: INTERNAL MEDICINE

## 2017-08-13 PROCEDURE — 70553 MRI BRAIN STEM W/O & W/DYE: CPT

## 2017-08-13 PROCEDURE — 36415 COLL VENOUS BLD VENIPUNCTURE: CPT | Performed by: INTERNAL MEDICINE

## 2017-08-13 RX ORDER — POTASSIUM CHLORIDE 1.5 G/1.58G
20-40 POWDER, FOR SOLUTION ORAL
Status: DISCONTINUED | OUTPATIENT
Start: 2017-08-13 | End: 2017-08-15 | Stop reason: HOSPADM

## 2017-08-13 RX ORDER — IBUPROFEN 400 MG/1
400 TABLET, FILM COATED ORAL EVERY 6 HOURS PRN
Status: DISCONTINUED | OUTPATIENT
Start: 2017-08-13 | End: 2017-08-15

## 2017-08-13 RX ORDER — VENLAFAXINE HYDROCHLORIDE 75 MG/1
75 CAPSULE, EXTENDED RELEASE ORAL DAILY
Status: DISCONTINUED | OUTPATIENT
Start: 2017-08-13 | End: 2017-08-15 | Stop reason: HOSPADM

## 2017-08-13 RX ORDER — SODIUM CHLORIDE 9 MG/ML
INJECTION, SOLUTION INTRAVENOUS CONTINUOUS
Status: DISCONTINUED | OUTPATIENT
Start: 2017-08-13 | End: 2017-08-13

## 2017-08-13 RX ORDER — ACETAMINOPHEN 325 MG/1
650 TABLET ORAL EVERY 4 HOURS PRN
Status: DISCONTINUED | OUTPATIENT
Start: 2017-08-13 | End: 2017-08-13

## 2017-08-13 RX ORDER — OXYBUTYNIN CHLORIDE 10 MG/1
10 TABLET, EXTENDED RELEASE ORAL AT BEDTIME
Status: DISCONTINUED | OUTPATIENT
Start: 2017-08-13 | End: 2017-08-15 | Stop reason: HOSPADM

## 2017-08-13 RX ORDER — NALOXONE HYDROCHLORIDE 0.4 MG/ML
.1-.4 INJECTION, SOLUTION INTRAMUSCULAR; INTRAVENOUS; SUBCUTANEOUS
Status: DISCONTINUED | OUTPATIENT
Start: 2017-08-13 | End: 2017-08-15 | Stop reason: HOSPADM

## 2017-08-13 RX ORDER — VANCOMYCIN HYDROCHLORIDE 1 G/200ML
1000 INJECTION, SOLUTION INTRAVENOUS EVERY 12 HOURS
Status: DISCONTINUED | OUTPATIENT
Start: 2017-08-13 | End: 2017-08-14

## 2017-08-13 RX ORDER — POTASSIUM CHLORIDE 7.45 MG/ML
10 INJECTION INTRAVENOUS
Status: DISCONTINUED | OUTPATIENT
Start: 2017-08-13 | End: 2017-08-15 | Stop reason: HOSPADM

## 2017-08-13 RX ORDER — GADOBUTROL 604.72 MG/ML
7 INJECTION INTRAVENOUS ONCE
Status: COMPLETED | OUTPATIENT
Start: 2017-08-13 | End: 2017-08-13

## 2017-08-13 RX ORDER — POTASSIUM CHLORIDE 29.8 MG/ML
20 INJECTION INTRAVENOUS
Status: DISCONTINUED | OUTPATIENT
Start: 2017-08-13 | End: 2017-08-15 | Stop reason: HOSPADM

## 2017-08-13 RX ORDER — LIDOCAINE 40 MG/G
CREAM TOPICAL
Status: DISCONTINUED | OUTPATIENT
Start: 2017-08-13 | End: 2017-08-15 | Stop reason: HOSPADM

## 2017-08-13 RX ORDER — POTASSIUM CHLORIDE 1500 MG/1
20-40 TABLET, EXTENDED RELEASE ORAL
Status: DISCONTINUED | OUTPATIENT
Start: 2017-08-13 | End: 2017-08-15 | Stop reason: HOSPADM

## 2017-08-13 RX ORDER — BACLOFEN 10 MG/1
10-20 TABLET ORAL 4 TIMES DAILY PRN
Status: DISCONTINUED | OUTPATIENT
Start: 2017-08-13 | End: 2017-08-15 | Stop reason: HOSPADM

## 2017-08-13 RX ORDER — ONDANSETRON 4 MG/1
4 TABLET, ORALLY DISINTEGRATING ORAL EVERY 6 HOURS PRN
Status: DISCONTINUED | OUTPATIENT
Start: 2017-08-13 | End: 2017-08-15 | Stop reason: HOSPADM

## 2017-08-13 RX ORDER — ACETAMINOPHEN 650 MG/1
650 SUPPOSITORY RECTAL EVERY 4 HOURS PRN
Status: DISCONTINUED | OUTPATIENT
Start: 2017-08-13 | End: 2017-08-13

## 2017-08-13 RX ORDER — LEVETIRACETAM 750 MG/1
750 TABLET ORAL 2 TIMES DAILY
Status: DISCONTINUED | OUTPATIENT
Start: 2017-08-13 | End: 2017-08-15 | Stop reason: HOSPADM

## 2017-08-13 RX ORDER — POTASSIUM CL/LIDO/0.9 % NACL 10MEQ/0.1L
10 INTRAVENOUS SOLUTION, PIGGYBACK (ML) INTRAVENOUS
Status: DISCONTINUED | OUTPATIENT
Start: 2017-08-13 | End: 2017-08-15 | Stop reason: HOSPADM

## 2017-08-13 RX ORDER — ONDANSETRON 2 MG/ML
4 INJECTION INTRAMUSCULAR; INTRAVENOUS EVERY 6 HOURS PRN
Status: DISCONTINUED | OUTPATIENT
Start: 2017-08-13 | End: 2017-08-15 | Stop reason: HOSPADM

## 2017-08-13 RX ORDER — CEFTRIAXONE 1 G/1
1 INJECTION, POWDER, FOR SOLUTION INTRAMUSCULAR; INTRAVENOUS EVERY 24 HOURS
Status: DISCONTINUED | OUTPATIENT
Start: 2017-08-13 | End: 2017-08-14

## 2017-08-13 RX ADMIN — GADOBUTROL 7 ML: 604.72 INJECTION INTRAVENOUS at 22:12

## 2017-08-13 RX ADMIN — CEFTRIAXONE 1 G: 1 INJECTION, POWDER, FOR SOLUTION INTRAMUSCULAR; INTRAVENOUS at 22:37

## 2017-08-13 RX ADMIN — ACETAMINOPHEN 650 MG: 325 TABLET, FILM COATED ORAL at 06:54

## 2017-08-13 RX ADMIN — IBUPROFEN 400 MG: 400 TABLET ORAL at 17:19

## 2017-08-13 RX ADMIN — LEVETIRACETAM 750 MG: 750 TABLET, FILM COATED ORAL at 15:53

## 2017-08-13 RX ADMIN — OXYBUTYNIN CHLORIDE 10 MG: 10 TABLET, EXTENDED RELEASE ORAL at 22:37

## 2017-08-13 RX ADMIN — LEVETIRACETAM 750 MG: 750 TABLET, FILM COATED ORAL at 22:36

## 2017-08-13 RX ADMIN — VANCOMYCIN HYDROCHLORIDE 1250 MG: 5 INJECTION, POWDER, LYOPHILIZED, FOR SOLUTION INTRAVENOUS at 00:58

## 2017-08-13 RX ADMIN — VANCOMYCIN HYDROCHLORIDE 1000 MG: 1 INJECTION, SOLUTION INTRAVENOUS at 19:12

## 2017-08-13 RX ADMIN — IBUPROFEN 400 MG: 400 TABLET ORAL at 09:41

## 2017-08-13 RX ADMIN — VENLAFAXINE HYDROCHLORIDE 75 MG: 75 CAPSULE, EXTENDED RELEASE ORAL at 15:53

## 2017-08-13 RX ADMIN — IBUPROFEN 400 MG: 400 TABLET ORAL at 23:28

## 2017-08-13 RX ADMIN — SODIUM CHLORIDE: 9 INJECTION, SOLUTION INTRAVENOUS at 01:01

## 2017-08-13 ASSESSMENT — ACTIVITIES OF DAILY LIVING (ADL)
BATHING: 2-->ASSISTIVE PERSON
COGNITION: 0 - NO COGNITION ISSUES REPORTED
AMBULATION: 0-->INDEPENDENT
EATING: 2-->ASSISTIVE PERSON
FALL_HISTORY_WITHIN_LAST_SIX_MONTHS: NO
SWALLOWING: 0-->SWALLOWS FOODS/LIQUIDS WITHOUT DIFFICULTY
SWALLOWING: 0-->SWALLOWS FOODS/LIQUIDS WITHOUT DIFFICULTY
DRESS: 0-->INDEPENDENT
TOILETING: 0-->INDEPENDENT
BATHING: 0-->INDEPENDENT
TOILETING: 2-->ASSISTIVE PERSON
DRESS: 2-->ASSISTIVE PERSON
RETIRED_COMMUNICATION: 0-->UNDERSTANDS/COMMUNICATES WITHOUT DIFFICULTY
COMMUNICATION: 0-->UNDERSTANDS/COMMUNICATES WITHOUT DIFFICULTY
RETIRED_EATING: 0-->INDEPENDENT
TRANSFERRING: 0-->INDEPENDENT

## 2017-08-13 NOTE — ED NOTES
[facility]  ED Nurse Handoff Report    ED Chief complaint: Fever (Patient has history of MS. Patient was seen here a couple weeks ago for a viral infection. Then was diagnosed a little over a week ago. Patient is due to finish her Baclofen tomorrow. Spouse noticed rash started today. Patient having fevers 103.1 at home. Altered mental status and increased weakness. Incontinent of urine. )      ED Diagnosis:   Final diagnoses:   Confusion       Code Status: Full Code    Allergies:   Allergies   Allergen Reactions     Diphenhydramine Rash     benadryl cream     Nickel        Activity level - Baseline/Home:  Independent    Activity Level - Current:   Unable to Assess     Needed?: No    Isolation: No  Infection: Not Applicable    Bariatric?: No    Vital Signs:   Vitals:    08/12/17 2100 08/12/17 2105 08/12/17 2115 08/12/17 2130   BP:  108/74 113/72    Resp: 14 14 14 8   Temp:       TempSrc:       SpO2: 97% 97% 97% 97%   Height:           Cardiac Rhythm: ,        Pain level: 0-10 Pain Scale: 0    Is this patient confused?: Yes    Patient Report: Initial Complaint: Fevers  Focused Assessment: Shanna is a 42 year old female with a history of multiple sclerosis who was recently hospitalized at [facility] for suspected HSV encephalitis with fever, despite only 8 WBCs in her CSF and a negative HSV PCR, and was discharged three days ago with lasting confusion. She has completed a course of acyclovir, and is also nearly finished with a course of Macrobid for a recent e. coli UTI. She presents with family via EMS for evaluation of fever.  reports some mild chills yesterday, and then today spiked fevers up to 103.1F this evening. This was associated with chills, generalized weakness, and some rash over her abdomen and bilateral anterior thighs and upper arms. He states patient's altered mental status is actually improved since discharge. The patient denies any pains whatsoever. She provides little  further history due to her altered mental status.  Tests Performed: Labs, US RUQ, CT head, CxR, LP, Blood cultures  Abnormal Results:  Elevated liver enzymes. Hazy RLL on XR questionable for pneumonia, questionable area on CT for evolving infarct vs herpes encephalitis, unspecified liver lesion (angioma vs adenoma?), CSF pending  Treatments provided: Ibuprofen, IVFs, Lorazepam    Family Comments: Spouse at bedside (dom)    OBS brochure/video discussed/provided to patient: N/A    ED Medications:   Medications   0.9% sodium chloride BOLUS (1,000 mLs Intravenous New Bag 8/12/17 2105)     Followed by   0.9% sodium chloride infusion (not administered)   cefTRIAXone (ROCEPHIN) 2 g vial to attach to  ml bag for ADULTS or NS 50 ml bag for PEDS (not administered)   vancomycin 1250 mg in 0.9% NaCl 250 mL PREMIX (not administered)   ibuprofen (ADVIL/MOTRIN) tablet 600 mg (600 mg Oral Given 8/12/17 2103)   diphenhydrAMINE (BENADRYL) injection 25 mg (25 mg Intravenous Given 8/12/17 2127)   LORazepam (ATIVAN) injection 0.5 mg (0.5 mg Intravenous Given 8/12/17 2230)       Drips infusing?:  Yes      ED NURSE PHONE NUMBER: [phone]

## 2017-08-13 NOTE — CONSULTS
ID consult dictated IMP 1 41 yo female complex, now acute fever, despite procal , suspect this is drug fever, keppra>macrodantin, LFTs, rash and no focal infection site    REC await cxs, antibiotics for now

## 2017-08-13 NOTE — PHARMACY-VANCOMYCIN DOSING SERVICE
Pharmacy Vancomycin Initial Note  Date of Service 2017  Patient's  1975  42 year old, female    Indication: Bacteremia    Current estimated CrCl = Estimated Creatinine Clearance: 133.2 mL/min (based on Cr of 0.57).    Creatinine for last 3 days  2017:  8:30 PM Creatinine 0.70 mg/dL  2017:  9:10 AM Creatinine 0.57 mg/dL    Recent Vancomycin Level(s) for last 3 days  No results found for requested labs within last 72 hours.      Vancomycin IV Administrations (past 72 hours)                   vancomycin 1250 mg in 0.9% NaCl 250 mL PREMIX (mg) 1,250 mg New Bag 17 0058                Nephrotoxins and other renal medications (Future)    Start     Dose/Rate Route Frequency Ordered Stop    17 1800  vancomycin (VANCOCIN) 1000 mg in dextrose 5% 200 mL PREMIX      1,000 mg Intravenous EVERY 12 HOURS 17 1737      17 0851  ibuprofen (ADVIL/MOTRIN) tablet 400 mg      400 mg Oral EVERY 6 HOURS PRN 17 0852            Contrast Orders - past 72 hours     None                Plan:  1.  Start vancomycin  1000 mg IV q12h.   2.  Goal Trough Level: 10-15 mg/L  1of2 BC Gr+ cocci in clusters. No current fever of vital sign changes. On concurrent  Ceftriaxone. Clinically improving.    3.  Pharmacy will check trough levels as appropriate in 1-3 Days.    4. Serum creatinine levels will be ordered Daily.    5. [place] method utilized to dose vancomycin therapy: Method 1    Ron Romano

## 2017-08-13 NOTE — ED PROVIDER NOTES
History     Chief Complaint:  Fever     The history is provided by the spouse. The history is limited by the condition of the patient.      Shanna Shanks is a 42 year old female with a history of multiple sclerosis who was recently hospitalized at [facility] for suspected HSV encephalitis with fever, despite only 8 WBCs in her CSF and a negative HSV PCR, and was discharged three days ago with lasting confusion (see discharge summary by Dr. Kate on 8/9/17 for further details). She has completed a course of acyclovir, and is also nearly finished with a course of Macrobid for recent e. coli UTI. She presents with family via EMS for evaluation of fever.  reports development yesterday of fevers up to 103.1F this evening. This was associated with chills, generalized weakness, and some rash over her abdomen and bilateral anterior thighs. He states patient's altered mental status is actually improved since discharge. The patient denies any pains whatsoever. She provides little further history due to her altered mental status.       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 No pertinent surgical history.     Family History:    MI (paternal grandfather)  Ca, colorectal (paternal grandmother)    Social History:  Former 0.25ppd tobacco smoker. Social drinker.  Marital Status:   [2]       Review of Systems   Unable to perform ROS: Mental status change       Physical Exam     Patient Vitals for the past 24 hrs:   BP Temp Temp src Heart Rate Resp SpO2 Height   08/12/17 2014 111/70 100.8  F (38.2  C) Oral 84 16 98 % 1.715 m (5'  "7.5\")      Physical Exam   Constitutional:   Thin appearing confused     HENT:   Head: Normocephalic and atraumatic.   Right Ear: External ear normal.   Mouth/Throat: Oropharynx is clear and moist.   Eyes: Conjunctivae and EOM are normal. Pupils are equal, round, and reactive to light.   Neck: Normal range of motion. Neck supple. No JVD present.   Cardiovascular: Normal rate, regular rhythm and normal heart sounds.    Pulmonary/Chest: Effort normal and breath sounds normal.   Abdominal: Soft. Bowel sounds are normal. She exhibits no distension. There is no tenderness. There is no rebound.   Musculoskeletal: Normal range of motion.   Lymphadenopathy:     She has no cervical adenopathy.   Neurological: She is alert. She is disoriented. She displays normal reflexes. No cranial nerve deficit. She exhibits normal muscle tone. Coordination normal. GCS eye subscore is 4. GCS verbal subscore is 4. GCS motor subscore is 6.   Skin: Skin is warm and dry.   Psychiatric: She has a normal mood and affect. Her behavior is normal. Judgment normal.   Nursing note and vitals reviewed.        Emergency Department Course   Laboratory:  CBC w/diff: RBC 3.78 (L), Hct 34.7 (L), o/w WNL (WBC 5.9, Hgb 11.7, Plt 261   CMP: Glu 107 (H), albumin 3.1 (L),  (H), o/w WNL (Cr 0.7)  Lactic acid: 0.6 (L)  Procalcitonin: 4.09 (WNL)   UA (cath specimen): 10 protein, 14 RBCs, o/w negative    Urine culture: pending   Blood culture: pending x2       PROCEDURE:  [facility] Procedure Note          Lumbar Puncture:      Time: 6:37 PM  Performed by: Aldo Gay  Authorized by: Aldo Gay    Indications: fever and confusion    Consent given by: Patient and Family who states understanding of the procedure being performed after discussing the risks, benefits and alternatives.    Prior to the start of the procedure and with procedural staff participation, I verbally confirmed the patient s identity using two indicators, relevant " allergies, that the procedure was appropriate and matched the consent or emergent situation, and that the correct equipment/implants were available. Immediately prior to starting the procedure I conducted the Time Out with the procedural staff and re-confirmed the patient s name, procedure, and site/side. (The Joint Commission universal protocol was followed.) Yes    Under sterile conditions the patient was positioned Sitting, bent forward. Betadine solution and sterile drapes were utilized.  Local anesthetic at the site: 2 ml of lidocaine 1% without epinephrine from the LP tray  A 22 G 3.5 inch pediatric spinal needle was inserted at the L 3-4 interspace.  Opening Pressurewas not checked.  A total of 6mL of clear and colorless spinal fluid was obtained and sent to the laboratory.   After the needle was removed, a bandaid and pressure were applied and the patient was instructed to stay horizontal until the results were back.    Complications:  None    Patient tolerance: Patient tolerated the procedure well with no immediate complications.      Interventions:  2103: ibuprofen 600mg, PO  2105: Normal Saline 0.9% 1L, IV  2127: benadryl 25mg, IV       Emergency Department Course:  Patient and spouse and daughter  2100: Discussed the case with Dr. Coughlin, on-call for Neurology.  Findings and plan explained to the Patient and spouse and daughters who consents to admission.      Discussed the patient with Dr. Oleary of the Hospitalist Service, who will admit the patient to a monitored bed for further monitoring, evaluation, and treatment.      Impression & Plan    Medical Decision Making:  The patient presents with confusion and low-grade fever. The patient has a complex recent past medical history that includes HSV encephalitis. The patient was more confused at home but this seems to have improved with time. In her lab workup there is a slight elevation in her LFTs and therefore an evaluation for CBD obstruction was  performed. If negative we will consider LP but ultimately due to fever and altered mental status would recommend admission and Neurology consultation again due to recent history of HSV encephalitis.    Care also discussed with infectious disease, doubt HSV encephalitis, with only 8 wbc, and negative pcr, but will retap and send csf for other evaluatoins, including fungal culture per Dr. Diamond.Did perform LP with consent from , due to altered mental status and inability to consent patien tdirectly due to presenting complaint.        Diagnosis:    ICD-10-CM    1. Confusion R41.0 Urine Culture Aerobic Bacterial     Blood culture     Blood culture     Glucose CSF: Tube 2     Protein total CSF: Tube 2     Gram stain     CSF Culture Aerobic Bacterial     Cell count with differential CSF: Tube 4     Cryptococcus antigen (Diane Ink)     Anaerobic CSF culture     Lyme IgG and IgM CSF Immunoblot: Tube 3     Varicella Zoster DNA PCR CSF or Skin Swab (1 mL minimum)     Toxoplasma gondii by PCR (1mL minimum)     Enterovirus PCR CSF     Acid fast stain     Fungus culture     EBV PCR Quantitative Serum Plasma CSF     Hepatitis B core antibody IgM     EBV Capsid Antibody IgG     Hepatitis C antibody     Hepatitis B Surface Antibody     Hepatitis A Antibody IgG     Hepatitis B core antibody     CMV Antibody IgG     Basic metabolic panel     CBC with platelets     Hepatic panel     1. Altered mental status   2. Low-grade fever   3. History of HSV encephalitis     Disposition:  Admit to Medicine, consult Neurology      I, Trenton Arcos, am serving as a scribe at 8:29 PM on 8/12/2017 to document services personally performed by Aldo Gay MD based on my observations and the provider's statements to me.      Trenton Arcos  8/12/2017    EMERGENCY DEPARTMENT       Aldo Gay MD  08/13/17 [id number]

## 2017-08-13 NOTE — PLAN OF CARE
Problem: Goal Outcome Summary  Goal: Goal Outcome Summary  Outcome: Improving  A&Ox3. Disoriented to time. Pleasant, cooperative with cares. VSS on RA. C/o HA this AM, given PRN ibuprofen x 1 with relief. Neuros intact. Tolerating regular diet. Up to bathroom with assist of 1, walker, and gait belt. Voiding adequately. Appears shaky on feet. Side rails on bed padded. Dressing to LP site is C/D/I. Spouse at bedside. IV SL. Nursing will continue to monitor.

## 2017-08-13 NOTE — H&P
PRIMARY CARE PROVIDER:  Mlavin Beck MD.      DATE OF ADMISSION:  08/12/2017.      CHIEF COMPLAINT:  Fever.      HISTORY OF PRESENT ILLNESS:  Ms. 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  The patient was recently at [facility] from 07/24/2017-07/30/2017.  She was admitted with unresponsiveness and right-sided hemiplegia.  Workup was suspicious for HSV encephalitis.  She was started on a course of acyclovir and was discharged to acute rehab.  Infectious Disease and Neurology was consulted at that time.  She was also placed on Keppra per Neurology.  She stayed at acute rehab and was discharged from the acute rehab on 08/10/2017, which is 2 days ago.  Apparently, she was making pretty good progress there and per the , she was close to 80% of her baseline.  At the rehab, she was also started on a course for urinary tract infection and she still has a couple of doses of antibiotics left.  She was doing okay at home; however, this evening, she started having temperature up to 103 degree Fahrenheit associated with chills.  At the peak of her fever, she was also confused and so she was brought to the emergency room.  The history was predominantly obtained with the  at the bedside.  Per him, there has been no localizing symptoms reported by the patient.  No cough, no dysuria, no abdominal pain.  He did notice a faint rash in her abdomen and trunk earlier in the morning, which appears like it has receded or subsided in the last few hours.  There was no vomiting, no headache reported.      In the emergency room, the patient was evaluated by Dr. Gay.  Basic lab work was done which showed normal chemistries and electrolytes.  She had a new change in LFTs.  Her AST and ALT were in the 300s, but her bilirubin was normal.  A right upper  quadrant ultrasound was done which did not show any acute process.  Her UA was unremarkable.  Her white cell count was normal; however, her procalcitonin was high at 4.09.  Both Infectious Disease and Neurology was consulted by Dr. Gay and Infectious Disease recommended a repeat lumbar puncture.  She also received a dose of empiric vancomycin and Rocephin in the emergency room after the lumbar puncture and cultures were drawn.      As far as other pertinent review of systems, no chest pain reported, no dyspnea reported.      PAST MEDICAL HISTORY:     1.  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  Multiple sclerosis.   3.  Depression.      ALLERGIES:  To diphenhydramine and nickel.      SOCIAL AND PERSONAL HISTORY:  She lives with her .  No tobacco, alcohol or illicit drug use.  She was, however, a former smoker.      FAMILY HISTORY:  Grandmother with colorectal cancer, otherwise unremarkable.      REVIEW OF SYSTEMS:  A complete review of system was done and was negative for anything else other than that mentioned in the HPI.      ASSESSMENT AND PLAN:  Ms. Shanks is a 42-year-old female.  She is not in any obvious distress.  She received a dose of Ativan for lumbar puncture, so she is a little more somnolent at the moment.   VITAL SIGNS:  Temperature 102.5, blood pressure 113/72, pulse of 85, respiratory rate 18, O2 sat is 97% on room air.   HEENT:  Atraumatic, normocephalic, no pallor.   NECK:  Supple with good range of motion.  No clinical evidence of meningismus.   RESPIRATORY:  Good air entry bilaterally with normal effort of breathing.   CARDIOVASCULAR:  Regular rate and rhythm.  There is no murmur.   GASTROINTESTINAL:  Abdomen is soft, nontender.   EXTREMITIES:  There is no edema, cyanosis or clubbing.   SKIN:  She has a very faint maculopapular rash in her abdomen and bilateral thighs but the color is fading.   NEUROLOGIC:  She is awake.  She  answers simple questions appropriately, but she is a little somnolent because of the medication effects.  She is moving all 4 extremities.      LABORATORY AND DIAGNOSTIC DATA:  LFTs with transaminases in the 300s.  Lactic acid normal, procalcitonin 4.09.  UA unremarkable.  Chest x-ray was read as  right lower lobe opacities concerning for pneumonia; however, it appears to be improved since 07/29/2017.  Ultrasound of the right upper quadrant without evidence of acute cholecystitis.  CT head without any acute findings.      ASSESSMENT AND PLAN:  Ms. 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     1.  Febrile illness on the background of recent herpes simplex virus encephalitis.  The source of her fever is unclear.  She has got a negative urinalysis and chest x-ray and a gallbladder study; however, her procalcitonin is elevated at 4.09.  Lumbar puncture has been done, will await the results of that.  At this time, she has received empiric ceftriaxone and vancomycin.  Dr. Gay spoke with Dr. Diamond about the workup and appropriate studies on the cerebral spinal fluid has been sent including acid fast stain, cell count and differential, CMV antibody, cryptococcal antigen, Gilles-Barr virus PCR and enterovirus PCR and fungal cultures.  Likewise, hepatitis screen has also been sent because of the elevated liver function tests.  I will not put a continuous order for antibiotics, will defer that to Infectious Disease in the morning.  She is appropriately covered with a dose of Rocephin and vancomycin through to tomorrow.   2.  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  Patient is on Keppra at home 750 mg twice a day, I will continue that.   3.  Recent urinary tract infection.   Patient was discharged on nitrofurantoin.  She is due to complete her course tomorrow.  She has received a dose of Rocephin, so she should be adequately treated for that.   4.  Depression.  She is on Effexor XR 75 mg daily, I will continue that.   5.  Multiple sclerosis.  She is on Baclofen 10-20 mg 4 times daily as needed, that will be continued.   6.  Anticipated length of stay more than 2 midnights.   7.  Skin rash.  The  did notice skin rash today on her abdomen and thighs, but that appears to be fading now.  It will have to be clinically monitored and reevaluated in the morning.   8.  Code status:  Full code.         LAURI BIRCH MD             D: 2017 23:16   T: 2017 00:41   MT: BENJI      Name:     PAPI KING   MRN:      [id number]        Account:      [id number]   :      1975           Admitted:     [id number]      Document: [id number]       cc: Malvin Beck MD

## 2017-08-13 NOTE — PROVIDER NOTIFICATION
MD Notification    Notified Person:  MD    Notified Persons Name: Dr. Nava    Notification Date/Time: 8/13/2017 at 0839    Notification Interaction:  Talked with Physician    Purpose of Notification: Patient c/o intermittent HA. She inquires if she can have ibuprofen as this works for her at home. Current orders for acetaminophen, but orders state no acetaminophen products. Can patient have ibuprofen instead? Please place order if appropriate.    Orders Received: See order for PRN ibuprofen.    Comments:

## 2017-08-13 NOTE — TELEPHONE ENCOUNTER
calling reports return of fever, was hospitalized recently for encephalitis and now has a fever again.  Caller states I think we need to go to [facility] to triage if 911 was more appropriate, caller said I don't think that is necessary but if she gets worse or can't walk then we will call 911.  Is bringing her to hospital now.  Reason for Disposition    Patient sounds very sick or weak to the triager    Additional Information    Negative: [1] Difficult to awaken or acting confused (disoriented, slurred speech) AND [2] present now AND [3] diabetic    Negative: [1] Difficult to awaken or acting confused (disoriented, slurred speech) AND [2] present now AND [3] new onset    Negative: [1] Weakness of the face, arm, or leg on one side of the body AND [2] new onset    Negative: [1] Numbness of the face, arm, or leg on one side of the body AND [2] new onset    Negative: [1] Loss of speech or garbled speech AND [2] new onset    Negative: Difficulty breathing or bluish lips    Negative: Shock suspected (e.g., cold/pale/clammy skin, too weak to stand, low BP, rapid pulse)    Negative: Seeing, hearing, or feeling things that are not there (i.e., visual, auditory, or tactile hallucinations)    Negative: Followed a head injury    Negative: Drug overdose suspected    Negative: Sounds like a life-threatening emergency to the triager    Negative: Alcohol use, abuse or dependence: question or problem related to    Negative: Drug abuse or dependence: question or problem related to    Negative: Headache or vomiting    Negative: Stiff neck (can't touch chin to chest)    Negative: Bizarre or paranoid behavior    Negative: Fever > 100.5 F (38.1 C)    Protocols used: CONFUSION - DELIRIUM-ADULT-AH

## 2017-08-13 NOTE — PHARMACY-ADMISSION MEDICATION HISTORY
Admission medication history interview status for the 8/12/2017  admission is complete. See EPIC admission navigator for prior to admission medications     Medication history source reliability:Good    Actions taken by pharmacist (provider contacted, etc): Chart review (discharged summary notes from 8/9)     Additional medication history information not noted on PTA med list :None    Medication reconciliation/reorder completed by provider prior to medication history? No    Time spent in this activity: 10 min    Prior to Admission medications    Medication Sig Last Dose Taking? Auth Provider   CRANBERRY EXTRACT PO Take 1 tablet by mouth daily 8/12/2017 at Unknown time Yes Unknown, Entered By History   levETIRAcetam (KEPPRA) 750 MG tablet Take 1 tablet (750 mg) by mouth 2 times daily 8/12/2017 at am Yes Lucian Kate MD   nitroFURantoin, macrocrystal-monohydrate, (MACROBID) 100 MG capsule Take 1 capsule (100 mg) by mouth every 12 hours 8/12/2017 at am Yes Lucian Kate MD   oxybutynin (DITROPAN-XL) 10 MG 24 hr tablet Take 1 tablet (10 mg) by mouth At Bedtime 8/11/2017 at Unknown time Yes Lucian Kate MD   norethindrone (MICRONOR) 0.35 MG per tablet Take 1 tablet by mouth daily 8/12/2017 at Unknown time Yes Unknown, Entered By History   baclofen (LIORESAL) 10 MG tablet Take 10-20 mg by mouth 4 times daily as needed  Past Month at Unknown time Yes Reported, Patient   vitamin D (ERGOCALCIFEROL) 78221 UNIT capsule Take 50,000 Units by mouth once a week On Sundays 8/6/2017 at Unknown time Yes Reported, Patient   venlafaxine (EFFEXOR-XR) 75 MG 24 hr capsule Take 75 mg by mouth daily More than a month at Unknown time  Unknown, Entered By History

## 2017-08-13 NOTE — CONSULTS
INFECTIOUS DISEASE CONSULTATION      Room 618      REQUESTING PHYSICIAN:  Ron Nava MD      IMPRESSION:   1.  Shanna Shanks is a 42-year-old female with acute fever, all occurring in the context of recent treatment for herpes simplex meningitis.  No particular focal symptoms.  Cultures so far negative.  Procalcitonin elevated but not clear focal infection, overall I suspect this is a drug reaction, has a rash, elevated LFTs and is on Keppra and is being treated with Macrodantin for urinary tract infection.  Both those drugs would be suspect.   2.  Prior admission with acute encephalopathy that included MRI findings and 8 white cells in her CSF.  Herpes simplex was negative, but strongly suggestive MRI scans so was treated with 2 weeks of acyclovir.  Current lumbar puncture is negative so it does not appear to have ongoing inflammation or infection, still slight mental status changes per the  but certainly improved.   3.  Underlying multiple sclerosis.   4.  Positive urine culture, currently being treated with Macrodantin, did not have a urinalysis with it, previously normal urines and not having any urinary tract infection symptoms.      RECOMMENDATIONS:     1.  Getting broad-spectrum antibiotics currently based on possible sepsis and procalcitonin elevation will continue for now but relatively soon stop if we do not find any obvious infection.   2.  Significant suspicion for a drug reaction here, has a significant rash, elevated liver function tests, this complex might go along with a Keppra allergic reaction and Macrodantin also possible suspect.      HISTORY OF PRESENT ILLNESS:  This 42-year-old female is seen in consultation due to fever.  The patient is known to our group, being seen by Dr. Newton previously.  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  The patient had a  lumbar puncture done that had only 8 white cells in it and negative herpes simplex.  She was regardless treated with a 2-week course of acyclovir based on the CNS finding in the MRI scan and on this, she had some progressive improvement occur.  She initially was in rehab then discharged home.   says there is still some slight changes but general improvement.  She certainly was not having major fevers, chills, sweats or sepsis with that initial illness.  At the rehab center, she apparently had a positive urine culture and was treated with Macrodantin even though she is having no UTI symptoms (no UA with that to assess significance of the culture).  She otherwise did not have any sense of being ill until abrupt onset of shaking chills and fever just prior to admission.  At presentation here, a repeat lumbar puncture was done and was negative.  Cultures have been obtained, the urine is unremarkable.  Chest x-ray without obvious infiltrates.  Liver function tests are significantly elevated and a rash had developed in the day or 2 prior to this basically coinciding with the development of fever.  She has been on Keppra and was on Macrodantin.      PAST MEDICAL HISTORY:  Multiple sclerosis, history of the recent illness as above.      ALLERGIES:  No known antimicrobial allergies prior to this.      SOCIAL AND FAMILY HISTORY:  Was just in acute rehab, otherwise now living at home with her .  No alcohol or tobacco use.  Of note, she is a former smoker.  No family history of particular note.      REVIEW OF SYSTEMS:  Largely as above, relating history reasonably well, currently only a slight bit of confusion.  Does not look that toxic or ill at present.      PHYSICAL EXAMINATION:     VITAL SIGNS:  Temperature is down earlier temperature as high as 103 degrees.   HEENT:  No thrush or oropharyngeal lesions.  Pupils reactive.   NECK:  Supple and nontender without lymphadenopathy.   HEART:  Regular rhythm, no  particular murmur.   LUNGS:  Clear bilaterally.   ABDOMEN:  Soft and nontender.   EXTREMITIES:  Irregular macular rash in multiple locations consistent with a drug eruption.      LABORATORY DATA:  Liver function tests roughly 10 times normal.  Chest x-ray negative.  Lumbar puncture with 1 white cell.  Cultures all pending.  Procalcitonin 4.      Thank you much for this consultation.  I will follow the patient with you.         TRISTIAN PUENTES MD             D: 2017 14:06   T: 2017 15:00   MT: HILDA      Name:     PAPI KING   MRN:      [id number]        Account:       [id number]   :      1975           Consult Date:  2017      Document: [id number]       cc: Ron Nava MD

## 2017-08-13 NOTE — PROVIDER NOTIFICATION
Text paged Dr. Elijah LEON about +blood culture result.     Call back received, order received for [facility]- pharmacy to dose.

## 2017-08-13 NOTE — CONSULTS
[facility] [place]  [facility]    Neurology Consultation    Shanna Shanks MRN# [id number]   YOB: 1975 Age: 42 year old    Code Status:Full Code   Date of Admission: 8/12/2017  Date of Consult:08/13/2017                                                                                       Assessment and Plan:                                         #. Acute encephalopathy, associated with fever of 103, which has resolved overnight.  Etiology is not entirely clear.  Relationship to/diagnosis of previous episode/hospitalization for encephalopathy unclear-presumptive diagnosis of viral, herpes encephalitis which was resolving with management including acyclovir as well as anticonvulsant therapy.  This episode could represent decompensation from high fever-in the setting of recent illness from which she had not fully recovered previous cognitive status.  Patients with multiple sclerosis are very sensitive to high fevers with regard to neurologic status.  Question whether this was a reaction to nitrofurantoin.  Question relationship to other systemic disease in view of the elevated liver profile.    --Recommend repeat MRI scan of the brain to follow up on the previous abnormality in the left temporal lobe.  I do not feel strongly that antibiotics/antivirals need to be continued in view of the normal spinal fluid and her remarkable recovery overnight.  Defer to infectious diseases  Follow up on CSF studies that are still pending    #. Previously abnormal EEG suggesting seizure activity as a cause for encephalopathy  --Continue levetiracetam  #. History of multiple sclerosis-stable with regard to exacerbations over the last two years since starting The dura  --holding tecfidera in view of recent infectious illness.  Dr. Reese/Kevin treating outpatient neurologist.  #. DVT Prophylaxis  --Mechanical per hospitalist  #. PT/OT/Speech  --Start evaluations  #.  Nutrition / GI Prophylaxis  --Per recommendations of speech therapy      ----------------------------------------------------------------------------------  ----------------------------------------------------------------------------------  Reason for consult: I was asked by Dr. Gardner to evaluate this patient for acute encephalpathy.       Chief Complaint:   confusion  History is obtained from the patient / chart/ at bedside       History of Present Illness:   This patient is a 42 year old female who presents with Acute confusional/unresponsive state which came on fairly suddenly last evening after developing a high fever of 103.1.  This patient's history is rather complex as she was hospitalized in late July with encephalopathy.  Her workup revealed a lesion in the left temporal lobe.  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  Herpes simplex serologies were normal.  She was treated with acyclovir and had gradually been improving.  She was also identified to have abnormal EEG for which she was started on levetiracetam.  She had completed a course of rehabilitation and was discharged on 8/9.  She had been doing well until Saturday afternoon.  Her  does note that cognitively, she was more impaired compared to her baseline but was continuing to improve during the course of rehabilitation.  She has a long-standing history of multiple sclerosis.  She was switched to Tecfidera after a exacerbation.  She has been stable since then with right-sided weakness, incoordination and cognitive difficulties at her baseline.  On review of records, she had been treated with nitrofurantoin during the course of her rehabilitation to manage urinary tract infection.  Overnight, She has significantly improved- reports that she is back to her baseline compared to two days ago.  While in the emergency room, she did undergo a CSF analysis-preliminary  results are normal.  No other cause of infection is identified.  Her workup does reveal elevated liver functions for which medical evaluation is pending.       Past Medical History:     Past Medical History:   Diagnosis Date     Multiple sclerosis (H) 3/2/96    last exacerbation 3yrs ago, no meds x 6 months     Tobacco dependency          Past Surgical History:     Past Surgical History:   Procedure Laterality Date     HC DILATION/CURETTAGE DIAG/THER NON OB  1/1/05    D & C, missed AB          Social History:     Social History     Social History     Marital status:      Spouse name: Jama     Number of children: 2     Years of education: N/A     Occupational History     computer graphic design Self     Social History Main Topics     Smoking status: Former Smoker     Packs/day: 0.25     Types: Cigarettes     Smokeless tobacco: Never Used      Comment: quit a few weeks ago     Alcohol use 3.5 oz/week     7 Standard drinks or equivalent per week      Comment: social drinker     Drug use: No     Sexual activity: Not Currently     Partners: Male     Birth control/ protection: Pill, Rhythm     Other Topics Concern     Parent/Sibling W/ Cabg, Mi Or Angioplasty Before 65f 55m? No     Social History Narrative    Caffeine intake/servings daily - 2    Calcium intake/servings daily - 2-3    Exercise 7 times weekly - describe runs on treadmill for 5 minutes    Sunscreen used - Yes    Seatbelts used - Yes    Guns stored in the home - No    Self Breast Exam - Yes    Pap test up to date -  Yes    Eye exam up to date -  Yes    Dental exam up to date -  Yes    DEXA scan up to date -  Yes    Flex Sig/Colonoscopy up to date -  Not Applicable    Mammography up to date -  Not Applicable    Immunizations reviewed and up to date - Yes    Abuse: Current or Past (Physical, Sexual or Emotional) - No    Do you feel safe in your environment - Yes    Do you cope well with stress - Yes    Do you suffer from insomnia - No    Last updated  by: Michelle Sanchez  12/22/2010                     Patient denies smoking, no significant alcohol intake, denies illicit drugs use       Family History:     Family History   Problem Relation Age of Onset     Family History Negative Mother      DIABETES Father      Cancer - colorectal Paternal Grandmother      HEART DISEASE Paternal Grandfather      MI     Reviewed and not felt to be contributory.       Home Medications:     Prior to Admission Medications   Prescriptions Last Dose Informant Patient Reported? Taking?   CRANBERRY EXTRACT PO 8/12/2017 at Unknown time Care Giver Yes Yes   Sig: Take 1 tablet by mouth daily   baclofen (LIORESAL) 10 MG tablet Past Month at Unknown time Care Giver Yes Yes   Sig: Take 10-20 mg by mouth 4 times daily as needed    levETIRAcetam (KEPPRA) 750 MG tablet 8/12/2017 at am Care Giver No Yes   Sig: Take 1 tablet (750 mg) by mouth 2 times daily   nitroFURantoin, macrocrystal-monohydrate, (MACROBID) 100 MG capsule 8/12/2017 at am Care Giver No Yes   Sig: Take 1 capsule (100 mg) by mouth every 12 hours   norethindrone (MICRONOR) 0.35 MG per tablet 8/12/2017 at Unknown time Care Giver Yes Yes   Sig: Take 1 tablet by mouth daily   oxybutynin (DITROPAN-XL) 10 MG 24 hr tablet 8/11/2017 at Unknown time Care Giver No Yes   Sig: Take 1 tablet (10 mg) by mouth At Bedtime   venlafaxine (EFFEXOR-XR) 75 MG 24 hr capsule More than a month at Unknown time Care Giver Yes No   Sig: Take 75 mg by mouth daily   vitamin D (ERGOCALCIFEROL) 62768 UNIT capsule 8/6/2017 at Unknown time Care Giver Yes Yes   Sig: Take 50,000 Units by mouth once a week On Sundays      Facility-Administered Medications: None          Allergy:     Allergies   Allergen Reactions     Diphenhydramine Rash     benadryl cream     Nickel           Inpatient Medications:   Scheduled Meds:    sodium chloride (PF)  3 mL Intracatheter Q8H     PRN Meds: lidocaine (buffered or not buffered), lidocaine 4%, sodium chloride (PF), potassium  "chloride, potassium chloride, potassium chloride, potassium chloride with lidocaine, potassium chloride, acetaminophen, acetaminophen, naloxone, ondansetron **OR** ondansetron, ibuprofen        Review of Systems    The Review of Systems is negative other than noted in the HPI  A comprehensive review of  10 systems was performed and found to be negative except as described in this note  CONSTITUTIONAL: negative , change in weight  INTEGUMENTARY/SKIN: + rash or obvious new lesions  ENT/MOUTH: no sore throat, new sinus pain or nasal drainage, no neck mass noted  RESP: No pleuretic pain, No cough, no hemoptysis, No SOB   CV: negative for chest pain, palpitations or peripheral edema  GI: no nausea, vomiting, change in stools  : no dysuria or hematuria  MUSCULOSKELETAL: no myalgias, arthralgias or join efffusion  ENDOCRINE: no history of polyuria, polydyspsia or symptoms of thyroid dysfunction  PSYCHIATRIC: no change in mood stable  LYMPHATIC: no new lymphadenopathy  HEME: no bleeding or easy bruisability  NEURO: see HPI       Physical Exam:   Physical Exam   Vitals:  Height:5' 7\"  Weight:146 lbs 2.64 oz   Temp: 98.4  F (36.9  C) Temp src: Oral BP: 103/62 Pulse: 84 Heart Rate: 62 Resp: 18 SpO2: 95 % O2 Device: None (Room air)    General Appearance:  No acute distress  Neuro:       Mental Status Exam:    Alert.  Oriented toself.  Not month or year .  Language and speech intact.         Cranial Nerves:   Vision: able to count fingers both eyes; visual fields intact both eyes; PERRL, EOMI-subtle ashok to right, face strength normal/symmetric; facial sensation intact bilaterally; no masseter or tongue deviation; hearing intact; Shoulder elevation intact.  Palate elevation intact.  Fundus:  Technically difficult.            Motor:   Spastic tone R>L.  Mild right side weakness 4+.  Mild distal calf atrophy           Reflexes:  Increased on right side Plantar extensor r>l side no clonus       Sensory:  Cold and vibration intact " x4                   Coordination:   Ataxic r>>L       Gait:  Not tested due to fall risk concerns.   Neck: no nuchal rigidity, normal thyroid. No carotid bruits.    Cardiovascular: Regular rate and rhythm, no m/r/g    Extremities: No clubbing, no cyanosis, no edema       Data:   ROUTINE IP LABS   CBC RESULTS:     Recent Labs  Lab 08/13/17 0910 08/12/17 2030   WBC 7.2 5.9   RBC 3.61* 3.78*   HGB 11.2* 11.7   HCT 33.4* 34.7*    261     Basic Metabolic Panel:   Recent Labs   Lab Test  08/12/17 2030 08/02/17   0851  07/29/17   0005  07/28/17   0748   NA  133  141   --   143   POTASSIUM  3.5  4.7  3.9  3.2*   CHLORIDE  100  105   --   109   CO2  26  27   --   25   BUN  10  10   --   4*   CR  0.70  0.64   --   0.57   GLC  107*  115*   --   93   JULIET  8.5  8.7   --   8.1*     Liver panel:  Recent Labs   Lab Test  08/12/17 2030 07/26/17   0515 11/18/13 02/20/12   0938   PROTTOTAL  7.2  5.7*  7.0  7.2   ALBUMIN  3.1*  2.8*  4.2  4.0   BILITOTAL  0.4  0.4  0.6  0.8   ALKPHOS  223*  35*  48  52   AST  348*  36  25  22   ALT  340*  24  17  14     INR:  Recent Labs   Lab Test  07/24/17   1808   INR  1.05      Lipid Profile:  Recent Labs   Lab Test  02/20/12   0938   CHOL  242*   HDL  72   LDL  140*   TRIG  147   CHOLHDLRATIO  3.4          IMAGING:   All imaging studies were reviewed personally  MRI BRAIN WITHOUT AND WITH CONTRAST  7/25/2017 11:27 AM     HISTORY: Encephalopathy.      TECHNIQUE: Multiplanar, multisequence MRI of the brain without and  with 6 mL Gadavist.     COMPARISON: None.     FINDINGS: Diffusion-weighted images show some minimally restricted  diffusion in the anterior medial temporal lobe involving the uncus  amygdala in the anterior hippocampal region. There is also some  increased signal intensity on the FLAIR and T2-weighted images in this  location but it is not very well pronounced. Postcontrast images do  not show any abnormal areas of enhancement in this region. Gradient  echo sequences  do not show any evidence for any hemorrhage. There is  some marked thinning and bowing of the corpus callosum and there is  some ventriculomegaly somewhat out of proportion in size to the  subarachnoid spaces although the subarachnoid spaces are also  enlarged. Patchy white matter changes are seen in both hemispheres.  Postcontrast images are slightly limited by motion artifact but there  are no abnormal areas of enhancement or any focal mass lesions.         IMPRESSION:  1. Mildly restricted diffusion in the anterior medial left temporal  lobe and anterior hippocampal region with some mild associated  increased T2 signal but no enhancement. Pattern could be due to a  focal area of ischemic infarction although it is difficult to exclude  herpes encephalitis.  2. Ventriculomegaly is slightly out of proportion to the subarachnoid  spaces with marked thinning of the corpus callosum but no ballooning  of the temporal horns. Findings are most likely due to atrophy  although it is difficult to exclude communicating hydrocephalus.  3. Moderately extensive white matter changes in both hemispheres which  are most likely due to chronic small vessel ischemic disease given the  patient's age.   4. No evidence for intracranial hemorrhage or any focal mass lesions  CT SCAN OF THE HEAD WITHOUT CONTRAST   8/12/2017 10:00 PM      HISTORY: Evaluate for confusion.     TECHNIQUE:  Axial images of the head and coronal reformations without  IV contrast material. Radiation dose for this scan was reduced using  automated exposure control, adjustment of the mA and/or kV according  to patient size, or iterative reconstruction technique.     COMPARISON: Brain MR 7/25/2017.     FINDINGS: No evidence of acute intracranial hemorrhage, mass effect,  or midline shift. There is subtle hypodensity in the medial left  temporal lobe and left hippocampus in the area that demonstrated  restricted diffusion on prior MR.     Fairly extensive  periventricular white matter hypodensities probably  representing chronic microvascular ischemic disease are unchanged.  Moderate diffuse parenchymal volume loss. Ventricular size is  unchanged. No abnormal extra-axial fluid collection. Basal cisterns  are patent.     The visualized portions of the sinuses and mastoids appear normal.  There is no evidence of trauma.          IMPRESSION:  1. No evidence of acute intracranial hemorrhage, mass, or herniation.  2. Subtle hypodensity in the region of restricted diffusion on  previous MR may represent evolving infarct although herpes  encephalitis cannot be excluded.  3. Extensive periventricular white matter hypodensities likely due to  chronic microvascular ischemic disease. Moderate diffuse parenchymal  volume loss

## 2017-08-13 NOTE — ED NOTES
Straight catheterization for urine without complications. Urine sample labeled and sent to lab. Patient reports need to urinate. Patient assisted onto bedpan. Unable to urinate. Patient assisted to place depends on. Family at bedside. Updated on continued POC and waits. Deny needs. Continue to monitor.

## 2017-08-13 NOTE — ED NOTES
Labs drawn from pre-existing 18 ga PIV in Left upper forearm without complication. PIV saline locked.

## 2017-08-13 NOTE — PROGRESS NOTES
[facility]  Hospitalist Progress Note  Ron Nava MD  08/13/2017    Assessment & Plan   ASSESSMENT AND PLAN:  Ms. 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     1.  Febrile illness - suspect drug reaction.  - was treated for recent herpes simplex virus encephalitis, although CSF PCR is negative due to her MS diagosis.  - her fever along with rash and elevated LFT's consistent with drug fever, likely secondary to nitrofurantoin that was started about a week ago.  - appreciate ID and neurology consultation.  - procalcitonin is elevated, not clear its role in drug fevers  - continue ceftriaxone for now, repeat UA is normal  - cxr shows resolving right sided pneumonia as compared to 7/29, suspect this is the source for the procalcitonin elevation.  - will defer to ID with respect to antibiotics, discontinue vancomycin.  2.  Seizures and abnormal EEG and left temporal lobe abnormality.  -  Patient recently started on Keppra at home 750 mg twice a day, I will continue that.   - neurology following, will order MRI for follow up.  3.  Recent urinary tract infection.    - repeat UA is normal.  - currently on Rocephin.  4.  Depression.   - continue on Effexor XR 75 mg daily.  5.  Multiple sclerosis.    - continue on Baclofen 10-20 mg 4 times daily as needed, that will be continued.   6.  Skin rash.    - consistent with drug reaction along with fever and elevated LFT's  - rash improving  7.  Disposition  - anticipate discharge tomorrow after being seen by neurology and ID    Code status:  Full code.     Interval History   - chart reviewed  - appreciate neurology and ID      -Data reviewed today: I reviewed all new labs and imaging over the last 24 hours. I personally reviewed no  "images or EKG's today.    Physical Exam   Heart Rate: 62, Blood pressure 103/62, pulse 84, temperature 98.4  F (36.9  C), temperature source Oral, resp. rate 18, height 1.702 m (5' 7\"), weight 66.3 kg (146 lb 2.6 oz), SpO2 95 %, not currently breastfeeding.  Vitals:    08/13/17 0024   Weight: 66.3 kg (146 lb 2.6 oz)     Vital Signs with Ranges  Temp:  [97.8  F (36.6  C)-102.5  F (39.2  C)] 98.4  F (36.9  C)  Pulse:  [84] 84  Heart Rate:  [60-94] 62  Resp:  [8-18] 18  BP: ()/(59-74) 103/62  SpO2:  [95 %-100 %] 95 %  I/O's Last 24 hours  I/O last 3 completed shifts:  In: 1036 [P.O.:40; I.V.:996]  Out: -     Constitutional: Awake, alert, cooperative, no apparent distress  Respiratory: Clear to auscultation bilaterally, no crackles or wheezing  Cardiovascular: Regular rate and rhythm, normal S1 and S2, and no murmur noted  GI: Normal bowel sounds, soft, non-distended, non-tender  Skin/Integumen: No rashes, no cyanosis, no edema  Other:      Medications   All medications were reviewed.    NaCl 100 mL/hr at 08/13/17 0101     NaCl         sodium chloride (PF)  3 mL Intracatheter Q8H        Data     Recent Labs  Lab 08/13/17  0910 08/12/17  2030   WBC 7.2 5.9   HGB 11.2* 11.7   MCV 93 92    261    133   POTASSIUM 3.8 3.5   CHLORIDE 107 100   CO2 21 26   BUN 8 10   CR 0.57 0.70   ANIONGAP 9 7   JULIET 7.9* 8.5   * 107*   ALBUMIN 2.7* 3.1*   PROTTOTAL 6.6* 7.2   BILITOTAL 0.3 0.4   ALKPHOS 189* 223*   * 340*   * 348*       Recent Results (from the past 24 hour(s))   Chest XR,  PA & LAT    Narrative    CHEST TWO VIEW   8/12/2017 9:38 PM     HISTORY: Fever    COMPARISON: 7/29/2017      Impression    IMPRESSION: Hazy right lower lobe opacity is concerning for pneumonia,  however it appears to be improved since 7/29/2017. No pleural  effusion. No pneumothorax. Bones are unremarkable. Cardiac silhouette  within normal limits.    ROSA MARIA LY MD   Head CT w/o contrast    Narrative    CT SCAN OF " THE HEAD WITHOUT CONTRAST   8/12/2017 10:00 PM     HISTORY: Evaluate for confusion.    TECHNIQUE:  Axial images of the head and coronal reformations without  IV contrast material. Radiation dose for this scan was reduced using  automated exposure control, adjustment of the mA and/or kV according  to patient size, or iterative reconstruction technique.    COMPARISON: Brain MR 7/25/2017.    FINDINGS: No evidence of acute intracranial hemorrhage, mass effect,  or midline shift. There is subtle hypodensity in the medial left  temporal lobe and left hippocampus in the area that demonstrated  restricted diffusion on prior MR.    Fairly extensive periventricular white matter hypodensities probably  representing chronic microvascular ischemic disease are unchanged.  Moderate diffuse parenchymal volume loss. Ventricular size is  unchanged. No abnormal extra-axial fluid collection. Basal cisterns  are patent.    The visualized portions of the sinuses and mastoids appear normal.  There is no evidence of trauma.       Impression    IMPRESSION:  1. No evidence of acute intracranial hemorrhage, mass, or herniation.  2. Subtle hypodensity in the region of restricted diffusion on  previous MR may represent evolving infarct although herpes  encephalitis cannot be excluded.  3. Extensive periventricular white matter hypodensities likely due to  chronic microvascular ischemic disease. Moderate diffuse parenchymal  volume loss.      ROSA MARIA LY MD   Abdomen US, limited (RUQ only)    Narrative    RIGHT UPPER QUADRANT ULTRASOUND 8/12/2017 10:22 PM    HISTORY: Elevated LFTs, confusion, evaluate for CBD obstruction.    COMPARISON: None.    FINDINGS:    Gallbladder: Normal with no cholelithiasis, wall thickening or focal  tenderness.      Bile ducts: CHD is normal diameter. No intrahepatic biliary  dilatation.    Liver: There is a fairly well-defined hyperechoic lesion within the  right hepatic lobe that measures 3.1 x 3.4 x 2.6 cm. The  liver  measures 19.7 cm in length.    Pancreas: Normal    Right kidney: Normal.      Impression    IMPRESSION:  Fairly well-defined hyperechoic 3.4 cm lesion in the  right hepatic lobe. Most commonly this would represent a hepatic  hemangioma, however hepatic adenoma would remain on the differential.  Otherwise unremarkable right upper quadrant ultrasound.    MD Ron SHEFFIELD MD  Pager [phone]

## 2017-08-13 NOTE — PLAN OF CARE
Problem: Goal Outcome Summary  Goal: Goal Outcome Summary  Outcome: No Change  On transfer to unit:  Alert to self only; lethargic; arouses to repeated sternal rub; PERRLA; confused; upper motor strength-3; Lower motor strength-4. Second neuro check (0430) A/O x 3--disoriented to time; confused at times; Alert; arouses to voice; Upper motor strength improved to 4.  Hypotensive otherwise VSS on RA; max Temp.-98.2.  Pt. Was diaphoretic on arrival; no other signs or symptoms of fever.  Rash on groin/abdomen; Blanchable redness on Coccyx; turned and repositioned--pink and patient now independently positioning.  Incontinent at times.  Assist x 1; walker/GB.  Regular diet; poor appetite.  , Jama, at bedside.

## 2017-08-14 LAB
ALBUMIN SERPL-MCNC: 2.9 G/DL (ref 3.4–5)
ALP SERPL-CCNC: 177 U/L (ref 40–150)
ALT SERPL W P-5'-P-CCNC: 205 U/L (ref 0–50)
AST SERPL W P-5'-P-CCNC: 84 U/L (ref 0–45)
BACTERIA SPEC CULT: NO GROWTH
BILIRUB DIRECT SERPL-MCNC: 0.1 MG/DL (ref 0–0.2)
BILIRUB SERPL-MCNC: 0.2 MG/DL (ref 0.2–1.3)
CMV IGG SERPL QL IA: ABNORMAL AI (ref 0–0.8)
EBV VCA IGG SER QL IA: ABNORMAL AI (ref 0–0.8)
HAV IGG SER QL IA: ABNORMAL
HBV CORE AB SERPL QL IA: NONREACTIVE
HBV CORE IGM SERPL QL IA: NORMAL
HBV SURFACE AB SERPL IA-ACNC: 0.91 M[IU]/ML
HCV AB SERPL QL IA: NORMAL
LEVETIRACETAM SERPL-MCNC: 10 UG/ML
Lab: NORMAL
MICRO REPORT STATUS: NORMAL
PROT SERPL-MCNC: 6.8 G/DL (ref 6.8–8.8)
SPECIMEN SOURCE: NORMAL
SPECIMEN TYPE: NORMAL
VARICELLA ZOSTER DNA PCR COMMENT: NORMAL
VZV DNA SPEC QL NAA+PROBE: NORMAL

## 2017-08-14 PROCEDURE — 25000132 ZZH RX MED GY IP 250 OP 250 PS 637: Performed by: INTERNAL MEDICINE

## 2017-08-14 PROCEDURE — 12000000 ZZH R&B MED SURG/OB

## 2017-08-14 PROCEDURE — 99232 SBSQ HOSP IP/OBS MODERATE 35: CPT | Performed by: HOSPITALIST

## 2017-08-14 PROCEDURE — 80076 HEPATIC FUNCTION PANEL: CPT | Performed by: INTERNAL MEDICINE

## 2017-08-14 PROCEDURE — 36415 COLL VENOUS BLD VENIPUNCTURE: CPT | Performed by: INTERNAL MEDICINE

## 2017-08-14 RX ADMIN — IBUPROFEN 400 MG: 400 TABLET ORAL at 12:41

## 2017-08-14 RX ADMIN — IBUPROFEN 400 MG: 400 TABLET ORAL at 20:13

## 2017-08-14 RX ADMIN — LEVETIRACETAM 750 MG: 750 TABLET, FILM COATED ORAL at 09:01

## 2017-08-14 RX ADMIN — LEVETIRACETAM 750 MG: 750 TABLET, FILM COATED ORAL at 21:38

## 2017-08-14 RX ADMIN — IBUPROFEN 400 MG: 400 TABLET ORAL at 06:24

## 2017-08-14 RX ADMIN — OXYBUTYNIN CHLORIDE 10 MG: 10 TABLET, EXTENDED RELEASE ORAL at 21:38

## 2017-08-14 RX ADMIN — VENLAFAXINE HYDROCHLORIDE 75 MG: 75 CAPSULE, EXTENDED RELEASE ORAL at 09:01

## 2017-08-14 NOTE — PLAN OF CARE
Problem: Goal Outcome Summary  Goal: Goal Outcome Summary  Outcome: No Change  A&O. VSS on RA. C/o HA for most of AM despite receiving PRN ibuprofen at about 0630. Neuros intact. No complaints of dizziness, vision changes. Up to bathroom with SBA and gait belt. Voiding adequately. Tolerating regular diet. Minimal edema to L hand/forearm, slightly pink in color (previous IV infiltration site, previously marked). No warmth to the touch. Dressing to LP site C/D/I. IV abx discontinued, no IV access. Family at bedside. Nursing will continue to monitor.

## 2017-08-14 NOTE — PROGRESS NOTES
[facility]    Infectious Disease Progress Note    Date of Service (when I saw the patient): 08/14/2017     Assessment & Plan   Shanna Shanks is a 42 year old female who was admitted on 8/12/2017.     IMPRESSION:   1.  Shanna Shanks is a 42-year-old female with acute fever, all occurring in the context of recent treatment for herpes simplex meningitis.  No particular focal symptoms.  Cultures so far negative.  Procalcitonin elevated but not clear focal infection, overall I suspect this is a drug reaction, has a rash, elevated LFTs and is on Keppra and is being treated with Macrodantin for urinary tract infection.  Both those drugs would be suspect.   2.  Prior admission with acute encephalopathy that included MRI findings and 8 white cells in her CSF.  Herpes simplex was negative, but strongly suggestive MRI scans so was treated with 2 weeks of acyclovir.  Current lumbar puncture is negative so it does not appear to have ongoing inflammation or infection, still slight mental status changes per the  but certainly improved.   3.  Underlying multiple sclerosis.   4.  Positive urine culture, currently being treated with Macrodantin, did not have a urinalysis with it, previously normal urines and not having any urinary tract infection symptoms.       RECOMMENDATIONS:     1.  Getting broad-spectrum antibiotics currently based on possible sepsis and procalcitonin elevation, but given negative, CSF studies negative cultures, negative imaging will favor stopping. Noted 1/2 positive blood cultures for CoNS, consider contaminate.   2.  Significant suspicion for a drug reaction here, has a significant rash, elevated liver function tests, this complex might go along with a Keppra allergic reaction and Macrodantin also possible suspect.           Angela Newton MD    Interval History   Afebrile now   Blood culture 1/2 positive for CoNS  CSF studies and cultures are negative so far.     Physical  Exam   Temp: 98.3  F (36.8  C) Temp src: Oral BP: 112/67   Heart Rate: 66 Resp: 18 SpO2: 96 % O2 Device: None (Room air)    Vitals:    08/13/17 0024 08/14/17 0627   Weight: 66.3 kg (146 lb 2.6 oz) 63.6 kg (140 lb 3.4 oz)     Vital Signs with Ranges  Temp:  [97.2  F (36.2  C)-98.3  F (36.8  C)] 98.3  F (36.8  C)  Heart Rate:  [63-66] 66  Resp:  [16-19] 18  BP: (110-124)/(67-71) 112/67  SpO2:  [96 %-99 %] 96 %    Constitutional: awake and alert, no distress   Lungs: Clear to auscultation bilaterally, no crackles or wheezing  Cardiovascular: Regular rate and rhythm, normal S1 and S2, and no murmur noted  Abdomen: Normal bowel sounds, soft, non-distended, non-tender  Skin: No rashes appreciated on the exam today  Other:    Medications        sodium chloride (PF)  3 mL Intracatheter Q8H     levETIRAcetam  750 mg Oral BID     oxybutynin  10 mg Oral At Bedtime     venlafaxine  75 mg Oral Daily     cefTRIAXone  1 g Intravenous Q24H     vancomycin (VANCOCIN) IV  1,000 mg Intravenous Q12H       Data   All microbiology laboratory data reviewed.  Recent Labs   Lab Test  08/13/17   0910  08/12/17 2030 08/02/17   0851   WBC  7.2  5.9  6.1   HGB  11.2*  11.7  11.7   HCT  33.4*  34.7*  35.7   MCV  93  92  94   PLT  228  261  314     Recent Labs   Lab Test  08/13/17   0910  08/12/17   2030  08/02/17   0851   CR  0.57  0.70  0.64     No lab results found.  Recent Labs   Lab Test  08/12/17   2250  08/12/17 2053  08/12/17 2045 08/12/17 2030 08/05/17   1755  07/26/17   1025  07/24/17   1950  07/24/17   1932  12/17/15   0949   CULT  Culture negative monitoring continues  Culture negative monitoring continues  No growth  No growth after 1 day  Cultured on the 1st day of incubation: Gram positive cocci in clusters  Critical Value/Significant Value, preliminary result only, called to and read   back by  nicole lora rn @4717 8/13/17. ct  (Note)  POSITIVE for Staphylococci other than S.aureus, S.epidermidis and  S.lugdunensis,  by [employer] multiplex nucleic acid test.  Coagulase-negative staphylococci are the most common venipuncture or  collection associated skin CONTAMINANTS grown in blood cultures.  Final identification and antimicrobial susceptibility testing will be  verified by standard methods.    Specimen tested with Mobstatsigene multiplex, gram-positive blood culture  nucleic acid test for the following targets: Staph aureus, Staph  epidermidis, Staph lugdunensis, other Staph species, Enterococcus  faecalis, Enterococcus faecium, Streptococcus species, S. agalactiae,  S. anginosus grp., S. pneumoniae, S. pyogenes, Listeria sp., mecA  (methicillin resistance) and Debra/B (vancomycin resistance).    Critical Value/Significant  Value called to and read back by RUI CHOUDHURY RN @2037 8/13/17. CT  *  >100,000 colonies/mL Escherichia coli*  No growth  No growth  No growth  >100,000 colonies/mL Escherichia coli*

## 2017-08-14 NOTE — PROGRESS NOTES
"[facility]  [facility]  Neurology Daily Note     10/6/11 2:09 PM        Assessment and Plan:     Fever of unknown origin in a pt. With RRMS who takes Tecfidera.    Many labs on CSF are still pending.    I recommend to add GRAEME virus AB testing to blood and add to CSF if possible as Tecfidera may be associated with PML.    If CSF can not be added - I still rec blood GRAEME virus.    Pt. Needs to f/up with Dr. Reese to discuss relults and decided if additional CSF study for GRAEME will be needde.    I explained all to pt. And her .    She can d/c as appropriate and follow up with neurology.            Interval History:   Chart is reviewed. Pt. Has RRMS, on Tecfidera. Pt. Is not aware of GRAEME virus status.    She presented with fever, brain lesion and confusion/SZ.    I reviewed all labs, CSF data and MRIs. 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           Review of Systems:   Cognitive funstion is better but back to baseline. HA today          Medications:          sodium chloride (PF)  3 mL Intracatheter Q8H     levETIRAcetam  750 mg Oral BID     oxybutynin  10 mg Oral At Bedtime     venlafaxine  75 mg Oral Daily          Physical Exam:   Vitals: Blood pressure 112/67, pulse 84, temperature 98.3  F (36.8  C), temperature source Oral, resp. rate 18, height 1.702 m (5' 7\"), weight 63.6 kg (140 lb 3.4 oz), SpO2 96 %, not currently breastfeeding.  General Appearance:  Awake, alert, oriented to herself and place  Neuro:       Mental Status Exam:  Mildly slow in giving history       Cranial Nerves: intact VF, symmetrical face          Motor: moves both UEs and LEs           Reflexes:hupoactive       Sensory: nl LT and vibration                 Coordination:  ntact FNF                 Data:     Results for orders placed or performed during the hospital encounter of 08/12/17 (from the past 24 hour(s))   MR Brain w/o & w Contrast    Narrative    MRI BRAIN WITHOUT " AND WITH CONTRAST  8/13/2017 10:28 PM    HISTORY:  Follow up of recent left temporal abnormality.     TECHNIQUE:  Multiplanar, multisequence MRI of the brain without and  with 7mL Gadavist    COMPARISON: Head CT 8/12/2017. Brain MR 7/25/2017.    FINDINGS:  There is subtle restricted diffusion within the medial left  temporal lobe and left hippocampus in a similar region as what was  seen from MR 7/25/2017; however, the amount of restricted diffusion  has decreased since that time. There is mild gyral edema in these  regions and slight increased T2 hyperintense signal in this area. No  petechial hemorrhage is identified within this region on gradient echo  images. No abnormal contrast enhancement within this region.    There are new findings of intracranial hypotension with mild diffuse  pachymeningeal enhancement, convex border of the transverse sinuses,  and slight expansion of the pituitary gland compared to prior MR. No  definite extra-axial fluid collection is identified.    No acute intracranial hemorrhage. No evidence of infarct. No mass  effect or midline shift.    Diffuse parenchymal volume loss again noted. Extensive periventricular  white matter T2 hyperintensity appears similar to prior and may be due  to demyelinating disease and/or chronic microvascular ischemic  disease. Ventricular size is unchanged without evidence of  hydrocephalus.      Impression    IMPRESSION:  1. Abnormal signal and slight edema is still seen within the medial  left temporal lobe and left hippocampus, although the signal  abnormality is less pronounced when compared to prior MR 7/25/2017.  Given the persistence of signal abnormality, favor that this  represents evolution/sequela of herpes encephalitis. No evidence of  hemorrhage within this area.  2. New findings of intracranial hypotension. No extra-axial fluid  collection.  3. Parenchymal volume loss and extensive periventricular white matter  changes appear similar to  prior.    ROSA MARIA LY MD   Hepatic panel   Result Value Ref Range    Bilirubin Direct 0.1 0.0 - 0.2 mg/dL    Bilirubin Total 0.2 0.2 - 1.3 mg/dL    Albumin 2.9 (L) 3.4 - 5.0 g/dL    Protein Total 6.8 6.8 - 8.8 g/dL    Alkaline Phosphatase 177 (H) 40 - 150 U/L     (H) 0 - 50 U/L    AST 84 (H) 0 - 45 U/L

## 2017-08-14 NOTE — PROGRESS NOTES
Nurses state difficulty starting PIV.  Will have them call ID to see if they would like Midline or Picc line inserted.

## 2017-08-14 NOTE — PROGRESS NOTES
IV access team paged to place IV for IV abx. Writer was informed by NOC nurse that she attempted to place new IV without success and flight nurse attempted to place new IV without success.

## 2017-08-14 NOTE — PROGRESS NOTES
[facility]    Hospitalist Progress Note      Assessment & Plan   Shanna Shanks is a 42 year old female who was admitted on 8/12/2017.   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       Febrile illness, possible drug reaction.  Was treated for recent herpes simplex virus encephalitis, admission CSF studies negative (normal counts, gram stain, enterovirus PCR, cryptococcal Ag).  Procal elevated without source of infection, possibly due to recent PNA (admission CXR shows improvement compared to 7/29).  LFT elevation and rash may be consistent with drug reaction (possibly due to macrobid).    - EBV and HSV PCR pending, CSF culture ngtd  - single blood culture with coag neg staph, will stop vanco  - afebrile without leukocytosis since admission  - continues on empiric ceftriaxone  - LFT's trending down, rash improving  - MRI 8/13 shows improvement in abnormal signal of left temporal lobe and hippocampus compared to prior, possibly resolving HSV encephalitis  - ID and Neuro recs appreciated    Seizures and abnormal EEG and left temporal lobe abnormality.  - continue PTA Keppra (note admission level mildly low, defer dose adjustment to Neuro, if needed)    Recent urinary tract infection.  Admission UA is wnl, culture ngtd.    - receiving ceftriaxone since admission    Depression.   - continue on Effexor XR 75 mg daily.    Multiple sclerosis.    - continue on Baclofen 10-20 mg 4 times daily as needed    Skin rash.  Consistent with drug reaction along with fever and elevated LFT's.  - rash improving, nearly resolved      DVT Prophylaxis: Pneumatic Compression Devices  Code Status: Full Code    Disposition: Expected discharge in 1 days if antibiotics stopped and remains  afebrile.    Ron Ba    Interval History   Patient denies any fever/chills/sweats, dyspnea, cough, nausea, diarrhea, abdominal pain.  Rash is improving.   is present and feels mental status is improving.  No other complaints.    -Data reviewed today: I reviewed all new labs and imaging results over the last 24 hours. I personally reviewed no images or EKG's today.    Physical Exam   Temp: 98.3  F (36.8  C) Temp src: Oral BP: 112/67   Heart Rate: 66 Resp: 18 SpO2: 96 % O2 Device: None (Room air)    Vitals:    08/13/17 0024 08/14/17 0627   Weight: 66.3 kg (146 lb 2.6 oz) 63.6 kg (140 lb 3.4 oz)     Vital Signs with Ranges  Temp:  [97.2  F (36.2  C)-98.3  F (36.8  C)] 98.3  F (36.8  C)  Heart Rate:  [63-66] 66  Resp:  [16-19] 18  BP: (110-124)/(67-71) 112/67  SpO2:  [96 %-99 %] 96 %  I/O last 3 completed shifts:  In: 756 [P.O.:480; I.V.:276]  Out: -     Constitutional: Well developed, well nourished female in no acute distress  Respiratory: Lungs clear to ausculation bilaterally without crackles or wheezes, no tachypnea  Cardiovascular: regular rate and rhythm, normal S1/S2 without murmur, rubs or gallops, no peripheral edema  GI: abdomen soft, non-tender, non-distended, normal bowel sounds  Skin/Integumen:  Minimal erythematous rash of right thigh  Other:  alert, oriented to self and hospital only, cranial nerves grossly intact    Medications        sodium chloride (PF)  3 mL Intracatheter Q8H     levETIRAcetam  750 mg Oral BID     oxybutynin  10 mg Oral At Bedtime     venlafaxine  75 mg Oral Daily     cefTRIAXone  1 g Intravenous Q24H       Data     Recent Labs  Lab 08/14/17  0910 08/13/17  0910 08/12/17  2030   WBC  --  7.2 5.9   HGB  --  11.2* 11.7   MCV  --  93 92   PLT  --  228 261   NA  --  137 133   POTASSIUM  --  3.8 3.5   CHLORIDE  --  107 100   CO2  --  21 26   BUN  --  8 10   CR  --  0.57 0.70   ANIONGAP  --  9 7   JULIET  --  7.9* 8.5   GLC  --  165* 107*   ALBUMIN 2.9* 2.7* 3.1*   PROTTOTAL 6.8  6.6* 7.2   BILITOTAL 0.2 0.3 0.4   ALKPHOS 177* 189* 223*   * 255* 340*   AST 84* 166* 348*       Recent Results (from the past 24 hour(s))   MR Brain w/o & w Contrast    Narrative    MRI BRAIN WITHOUT AND WITH CONTRAST  8/13/2017 10:28 PM    HISTORY:  Follow up of recent left temporal abnormality.     TECHNIQUE:  Multiplanar, multisequence MRI of the brain without and  with 7mL Gadavist    COMPARISON: Head CT 8/12/2017. Brain MR 7/25/2017.    FINDINGS:  There is subtle restricted diffusion within the medial left  temporal lobe and left hippocampus in a similar region as what was  seen from MR 7/25/2017; however, the amount of restricted diffusion  has decreased since that time. There is mild gyral edema in these  regions and slight increased T2 hyperintense signal in this area. No  petechial hemorrhage is identified within this region on gradient echo  images. No abnormal contrast enhancement within this region.    There are new findings of intracranial hypotension with mild diffuse  pachymeningeal enhancement, convex border of the transverse sinuses,  and slight expansion of the pituitary gland compared to prior MR. No  definite extra-axial fluid collection is identified.    No acute intracranial hemorrhage. No evidence of infarct. No mass  effect or midline shift.    Diffuse parenchymal volume loss again noted. Extensive periventricular  white matter T2 hyperintensity appears similar to prior and may be due  to demyelinating disease and/or chronic microvascular ischemic  disease. Ventricular size is unchanged without evidence of  hydrocephalus.      Impression    IMPRESSION:  1. Abnormal signal and slight edema is still seen within the medial  left temporal lobe and left hippocampus, although the signal  abnormality is less pronounced when compared to prior MR 7/25/2017.  Given the persistence of signal abnormality, favor that this  represents evolution/sequela of herpes encephalitis. No evidence  of  hemorrhage within this area.  2. New findings of intracranial hypotension. No extra-axial fluid  collection.  3. Parenchymal volume loss and extensive periventricular white matter  changes appear similar to prior.    ROSA MARIA LY MD

## 2017-08-14 NOTE — PROVIDER NOTIFICATION
MD Notification    Notified Person:  MD    Notified Persons Name: Dr. Ba    Notification Date/Time: 8/14/2017 at 1218    Notification Interaction:  Talked with Physician    Purpose of Notification: FYI, patient's  has noticed that patient's HA is worse with movement, such as getting up from sitting to standing or walking. Should we be concerned about CSF leak?     Orders Received: Awaiting MD response.    Comments:

## 2017-08-14 NOTE — PLAN OF CARE
Problem: Goal Outcome Summary  Goal: Goal Outcome Summary  Outcome: Improving  A/O x 3; disoriented to time; alert; confused at times; pleasant/cooperative.  VSS on RA.  Afebrile.  Neuros intact.  C/O headache rating 4/10; ibuprofen given at change of shift with some relief.  C/O heartburn; requested warm milk with relief.  LUE--lower forearm/hand infiltration site--+1 edema in hand; slight pink discoloration; no pain; picture taken with IPAD this shift (per protocol).  Lumbar puncture dressing C/D/I.  SBA; walker, GB.  Regular diet tolerated.  Denies chills, nausea, vomiting, or SOB.  Possible D/C today after neuro and ID round.

## 2017-08-14 NOTE — PLAN OF CARE
Problem: Goal Outcome Summary  Goal: Goal Outcome Summary  Outcome: Improving  Patient up SBA to BR, voiding frequently (baseline per pt). Adequate intake. MRI completed this evening. Patients IV infiltrated during vanco infusion, area marked. Swelling and redness have improved. Neuro checks intact.

## 2017-08-14 NOTE — PROVIDER NOTIFICATION
Called Dr. Diamond' office number. Awaiting call back to see if a PICC or midline should be placed prior to new IV placement.

## 2017-08-14 NOTE — PROVIDER NOTIFICATION
MD Notification    Notified Person:  MD    Notified Persons Name: Dr. Ba    Notification Date/Time: 8/14/2017 at 1134    Notification Interaction:  Talked with Physician    Purpose of Notification: Please place order to discontinue PIV if IV abx are no longer needed.    Orders Received: PIV order discontinued.    Comments:

## 2017-08-15 VITALS
HEART RATE: 54 BPM | BODY MASS INDEX: 22.01 KG/M2 | RESPIRATION RATE: 16 BRPM | SYSTOLIC BLOOD PRESSURE: 105 MMHG | TEMPERATURE: 97.8 F | HEIGHT: 67 IN | OXYGEN SATURATION: 96 % | WEIGHT: 140.21 LBS | DIASTOLIC BLOOD PRESSURE: 60 MMHG

## 2017-08-15 LAB
DIAGNOSTIC IMP SPEC-IMP: NORMAL
EBV DNA # SPEC NAA+PROBE: NORMAL {COPIES}/ML
EBV DNA SPEC NAA+PROBE-LOG#: NORMAL {LOG_COPIES}/ML
SPECIMEN SOURCE: NORMAL

## 2017-08-15 PROCEDURE — 25000132 ZZH RX MED GY IP 250 OP 250 PS 637: Performed by: INTERNAL MEDICINE

## 2017-08-15 PROCEDURE — 36415 COLL VENOUS BLD VENIPUNCTURE: CPT | Performed by: PSYCHIATRY & NEUROLOGY

## 2017-08-15 PROCEDURE — 87798 DETECT AGENT NOS DNA AMP: CPT | Performed by: PSYCHIATRY & NEUROLOGY

## 2017-08-15 PROCEDURE — 40000975 ZZHCL STATISTIC JC VIR AB INDEX INHIB: Performed by: PSYCHIATRY & NEUROLOGY

## 2017-08-15 PROCEDURE — 99239 HOSP IP/OBS DSCHRG MGMT >30: CPT | Performed by: HOSPITALIST

## 2017-08-15 RX ORDER — ACETAMINOPHEN 325 MG/1
650 TABLET ORAL EVERY 6 HOURS PRN
Status: DISCONTINUED | OUTPATIENT
Start: 2017-08-15 | End: 2017-08-15 | Stop reason: HOSPADM

## 2017-08-15 RX ORDER — ACETAMINOPHEN 650 MG/1
650 SUPPOSITORY RECTAL EVERY 6 HOURS PRN
Status: DISCONTINUED | OUTPATIENT
Start: 2017-08-15 | End: 2017-08-15 | Stop reason: HOSPADM

## 2017-08-15 RX ADMIN — IBUPROFEN 400 MG: 400 TABLET ORAL at 12:55

## 2017-08-15 RX ADMIN — LEVETIRACETAM 750 MG: 750 TABLET, FILM COATED ORAL at 10:00

## 2017-08-15 RX ADMIN — VENLAFAXINE HYDROCHLORIDE 75 MG: 75 CAPSULE, EXTENDED RELEASE ORAL at 10:00

## 2017-08-15 RX ADMIN — IBUPROFEN 400 MG: 400 TABLET ORAL at 06:51

## 2017-08-15 NOTE — PROGRESS NOTES
[facility]    Infectious Disease Progress Note    Date of Service (when I saw the patient): 08/15/2017     Assessment & Plan   Shanna Shanks is a 42 year old female who was admitted on 8/12/2017.     IMPRESSION:   1.  Shanna Shanks is a 42-year-old female with acute fever, all occurring in the context of recent treatment for herpes simplex meningitis.  No particular focal symptoms.  Cultures so far negative.  Procalcitonin elevated but not clear focal infection, overall I suspect this is a drug reaction, has a rash, elevated LFTs and is on Keppra and is being treated with Macrodantin for urinary tract infection.  Both those drugs would be suspect.   2.  Prior admission with acute encephalopathy that included MRI findings and 8 white cells in her CSF.  Herpes simplex was negative, but strongly suggestive MRI scans so was treated with 2 weeks of acyclovir.  Current lumbar puncture is negative so it does not appear to have ongoing inflammation or infection, still slight mental status changes per the  but certainly improved.   3.  Underlying multiple sclerosis.   4.  Positive urine culture, currently being treated with Macrodantin, did not have a urinalysis with it, previously normal urines and not having any urinary tract infection symptoms.       RECOMMENDATIONS:     1.  CSF studies negative, off antibiotics and afebrile, rash all gone. Noted 1/2 positive blood cultures for CoNS, consider contaminate. Noted GRAEME virus studies in progress.   2.  Significant suspicion for a drug reaction here, has a significant rash, elevated liver function tests, this complex might go along with a Keppra allergic reaction and Macrodantin also possible suspect.   3. Ok to D.c from ID stand point with close follow up with neurology for the pending studies.       Angela Newton MD    Interval History   Afebrile now   Blood culture 1/2 positive for CoNS  CSF studies and cultures are negative so far.      Physical Exam   Temp: 97.8  F (36.6  C) Temp src: Axillary BP: 105/60 Pulse: 54 Heart Rate: 62 Resp: 16 SpO2: 96 % O2 Device: None (Room air)    Vitals:    08/13/17 0024 08/14/17 0627   Weight: 66.3 kg (146 lb 2.6 oz) 63.6 kg (140 lb 3.4 oz)     Vital Signs with Ranges  Temp:  [97.7  F (36.5  C)-97.8  F (36.6  C)] 97.8  F (36.6  C)  Pulse:  [54] 54  Heart Rate:  [62-68] 62  Resp:  [16-18] 16  BP: (105-112)/(60-70) 105/60  SpO2:  [96 %-98 %] 96 %    Constitutional: awake and alert, no distress   Lungs: Clear to auscultation bilaterally, no crackles or wheezing  Cardiovascular: Regular rate and rhythm, normal S1 and S2, and no murmur noted  Abdomen: Normal bowel sounds, soft, non-distended, non-tender  Skin: No rashes appreciated on the exam today  Other:    Medications        sodium chloride (PF)  3 mL Intracatheter Q8H     levETIRAcetam  750 mg Oral BID     oxybutynin  10 mg Oral At Bedtime     venlafaxine  75 mg Oral Daily       Data   All microbiology laboratory data reviewed.  Recent Labs   Lab Test  08/13/17   0910  08/12/17 2030 08/02/17   0851   WBC  7.2  5.9  6.1   HGB  11.2*  11.7  11.7   HCT  33.4*  34.7*  35.7   MCV  93  92  94   PLT  228  261  314     Recent Labs   Lab Test  08/13/17   0910  08/12/17 2030 08/02/17   0851   CR  0.57  0.70  0.64     No lab results found.  Recent Labs   Lab Test  08/12/17   2250  08/12/17 2053 08/12/17 2045 08/12/17 2030 08/05/17   1755  07/26/17   1025  07/24/17   1950  07/24/17   1932  12/17/15   0949   CULT  Culture negative after 1 day  Culture negative monitoring continues  Culture negative monitoring continues  No growth  No growth after 2 days  Cultured on the 1st day of incubation: Staphylococcus hominis This isolate is   presumed to be clindamycin resistant based on detection of inducible   clindamycin resistance. Erythromycin and clindamycin are resistant, therefore,   they are not recommended for use.  Critical Value/Significant Value,  preliminary result only, called to and read   back by  rui choudhury rn @1710 8/13/17. ct  (Note)  POSITIVE for Staphylococci other than S.aureus, S.epidermidis and  S.lugdunensis, by [employer] multiplex nucleic acid test.  Coagulase-negative staphylococci are the most common venipuncture or  collection associated skin CONTAMINANTS grown in blood cultures.  Final identification and antimicrobial susceptibility testing will be  verified by standard methods.    Specimen tested with Geodruidigene multiplex, gram-positive blood culture  nucleic acid test for the following targets: Staph aureus, Staph  epidermidis, Staph lugdunensis, other Staph species, Enterococcus  faecalis, Enterococcus faecium, S treptococcus species, S. agalactiae,  S. anginosus grp., S. pneumoniae, S. pyogenes, Listeria sp., mecA  (methicillin resistance) and Debra/B (vancomycin resistance).    Critical Value/Significant Value called to and read back by RUI CHOUDHURY RN @2039 8/13/17. CT  *  >100,000 colonies/mL Escherichia coli*  No growth  No growth  No growth  >100,000 colonies/mL Escherichia coli*

## 2017-08-15 NOTE — PROGRESS NOTES
I was called to see a patient regarding a potential spinal headache after an LP on 8/12/17.  Pt describes having headaches when she sits up.  Per , she has been having these HA's since their arrival.  Denies vision changes, photophobia, N/V.  Ibuprofen given with near 100% improvement in symptoms.  She was able to sit up and walk around without a HA on my exam.  Discussed with  that there was a low probability that this was a spinal HA.  Encouraged po intake, caffeine, and ibuprofen.   and patient expressed understanding and agreed.  Instructed to call us if symptoms worsened or had any associated symptoms to return to ED.    Please call anesthesia with questions or concerns.      Kerry Pires MD  [facility], [facility]  Anesthesiologist Pager:  [phone]

## 2017-08-15 NOTE — PLAN OF CARE
Problem: Goal Outcome Summary  Goal: Goal Outcome Summary  Outcome: No Change  A&Ox3-4, intermittent disorientation to time. Speech illogical at times. VSS on RA. Neuros intact except intermittent headache (with position changes). Pain managed with PRN Ibuprofen, rest, repositioning and cool compress. Slept well between cares. Seizure precautions maintained. Continues to progress towards plan of care.

## 2017-08-15 NOTE — PROGRESS NOTES
Dr Harp came to see pt after pt had discharged.  Discussed difficulty in securing close Neuro follow-up with the [facility].  Also brought up pt and family going on vacation on Thursday.  Dr Harp did not feel this was a good idea due to her recent seizure with brain lesion.  Writer did call pt's spouse and discussed this.  He very much appreciated the call and was sending message to Dr Reese, pt's Neurologist.

## 2017-08-15 NOTE — PROVIDER NOTIFICATION
Dr. Ba notified that pt had no HA when lying down but when got up had HA of 7-8/10. Will page neurology.   1215: Spoke w/ Dr. Harp regarding HA. Orders received for blood patch and to encourage PO intake as well as caffeine intake.   1300: Spoke w/ Anesthesia, they need to speak to ordering provider. Neuro contacted and they are not rounding in hospital currently and are unable to speak with anesthesiologist currently; defer to hospitalist. Hospitalist paged regarding this and asked them to please speak with anesthesia.

## 2017-08-15 NOTE — PLAN OF CARE
Problem: Goal Outcome Summary  Goal: Goal Outcome Summary  Outcome: Improving  VSS. Adequate for discharge. All d/c instructions reviewed w/ pt, all questions answered. All belongings sent w/ pt. D/c to home w/ spouse.

## 2017-08-15 NOTE — PLAN OF CARE
Problem: Goal Outcome Summary  Goal: Goal Outcome Summary  Outcome: No Change  A&Ox3-4, intermittent disorientation to time. Speech illogical at times. VSS on RA. Neuros intact except intermittent headache (with position changes). Pain managed with PRN Ibuprofen, rest, repositioning and cool compress. Regular diet, tolerates well. Up SBA+GB to BR, voiding adequately. Visited with family at dinner. EBV resulted -ve. D/C pending labs and clinical course.

## 2017-08-15 NOTE — PROGRESS NOTES
Care Transition Initial Assessment - RN    Met with: Patient and her Spouse Jama  DATA   Active Problems:    Fever of unknown origin       Cognitive Status: awake, alert, oriented and disoriented.     Contact information and PCP information verified: Yes    Lives With: child(hugo), dependent, and spouse   Insurance concerns: No Insurance issues identified    ASSESSMENT  Patient currently receives the following services:  No home services yet.  She presently is getting outpatient ST/PT/OT at [facility].       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  She was [facility] to  [facility].  She discharged from there on 8/10.  She is needing SBA here and her cognition is in and out.  Her Spouse Jama is able to work from home.  Therapies were not involved with pt here.  Jama shared that he knows from now on that she will require someone to be with her.  He is going to need support.  He presently wants to continue outpatient therapies.  What is adding to the stress is, he planned a train trip to [place].  They are leaving late on Thursday 8/17 and returning 8/24.  Pt still has pending labs.  She will need close follow-up as recommended by all physicians involved with pt's care.  Pt continues to complain of headache pain frontal region.  The [facility] clinic was called with an update and will discuss with Dr Reese.  This Neurologist is booked solid until the first of the year.  Pt had an appointment scheduled previously for 9/27/17.  They will fit pt in and call pt's spouse.    A PCP appointment was scheduled for tomorrow afternoon, due to pt going on her trip.    PLAN  Patient/family is agreeable to the plan?  Yes: Home with close follow-up  Patient anticipates discharging to home today.  Care coordinator will send hand off to the [facility].  [facility] is not on [facility].  Pt/spouse have signed  Release of Information.  Will fax documents to [facility].     Patient anticipates needs for home equipment: No  Appointments: PCP, Dr Beck 8/16/17 at 4:00pm

## 2017-08-15 NOTE — DISCHARGE SUMMARY
[facility]    Discharge Summary  Hospitalist    Date of Admission:  8/12/2017  Date of Discharge:  8/15/2017  Discharging Provider: Ron Ba  Date of Service (when I saw the patient): 08/15/17    Discharge Diagnoses   Possible drug reaction  Recent seizure with left temporal lobe abnormality on MRI  Depression  MS    History of Present Illness   Shanna Shanks is an 42 year old female who presented with rash and fever, though to be due to drug reaction, though further infectious work-up is pending at discharge.    Hospital Course   Shanna Shanks was admitted on 8/12/2017.  The following problems were addressed during her hospitalization:    Possible drug reaction.  Presented with rash and fever and found to have elevated LFT's.  Neuro and ID consulted given recent HSV encephalitis, admission CSF studies negative (normal counts, gram stain, enterovirus PCR, HSV PCR, EBV PCR, cryptococcal Ag), CSF cultures negative to date.  Hep A, B and C serologies negative.  Procal elevated without source of infection, possibly due to recent PNA (admission CXR shows improvement compared to 7/29).  Single blood culture with coag negative Staph felt to be contaminant.  MRI 8/13 shows improvement in abnormal signal of left temporal lobe and hippocampus compared to prior, possibly resolving HSV encephalitis.  Neurology recommended addition of GRAEME virus (results pending at discharge) and follow up with outpatient neurologist.  LFT elevation and rash may be consistent with drug reaction (possibly due to macrobid) and both improved prior to discharge.  Remained afebrile off antibiotics >24 hours prior to discharge.        Seizures and abnormal EEG and left temporal lobe abnormality.  Continue PTA Keppra (note admission level mildly low, Neuro not recommending dose adjustment).     Depression.   Continue on Effexor XR 75 mg daily.     Multiple sclerosis.    Continue on Baclofen 10-20 mg 4 times daily as  needed and follow up with outpatient neurologist.    Ron Ba    Significant Results and Procedures   See imaging below    Pending Results   These results will be followed up by: Hospitalist service, PCP and Neurology  Unresulted Labs Ordered in the Past 30 Days of this Admission     Date and Time Order Name Status Description    8/15/2017 0000 GRAEME Virus Ab Index Reflex Inhibition In process     8/15/2017 0000 GRAEME Virus by PCR In process     8/12/2017 2227 Fungus culture Preliminary     8/12/2017 2227 Toxoplasma gondii by PCR (1mL minimum) In process     8/12/2017 2227 Anaerobic CSF culture Preliminary     8/12/2017 2227 CSF Culture Aerobic Bacterial Preliminary     8/12/2017 2040 Blood culture Preliminary     8/12/2017 2040 Blood culture Preliminary           Code Status   Full Code       Primary Care Physician   Malvin Beck MD    Constitutional: well developed, well nourished female in no acute distress  Respiratory: lungs clear to auscultation bilaterally, no crackles or wheezes, no tachypnea  Cardiovascular: regular rate and rhythm, normal S1/S2, no murmur, rubs or gallops  GI: abdomen soft, non-tender, non-distended, normal bowel sounds  Lymph/Hematologic: No peripheral edema  Skin: No rash or bruising  Musculoskeletal: Extremities warm and well perfused  Neurologic: alert and appropriate, cranial nerves grossly intact, gross motor movements intact  Psychiatric: normal affect    Discharge Disposition   Discharged to home  Condition at discharge: Stable    Consultations This Hospital Stay   PHARMACY TO DOSE VANCO  NEUROLOGY IP CONSULT  INFECTIOUS DISEASES IP CONSULT  PHARMACY TO DOSE VANCO    Time Spent on this Encounter   I, Ron Ba, personally saw the patient today and spent greater than 30 minutes discharging this patient.    Discharge Orders     Reason for your hospital stay   You were admitted for fever and rash which is thought to be due to drug reaction.     Follow-up and recommended  labs and tests    Follow up with primary care provider, Malvin Beck MD, within 7-14 days for hospital follow- up.  Consider repeat LFT's.  Follow up with outpatient Neurologist in 7-14 days for follow up of pending lab studies.     Activity   Your activity upon discharge: activity as tolerated     Full Code     Diet   Follow this diet upon discharge:  Regular Diet Adult       Discharge Medications   Current Discharge Medication List      CONTINUE these medications which have NOT CHANGED    Details   CRANBERRY EXTRACT PO Take 1 tablet by mouth daily      levETIRAcetam (KEPPRA) 750 MG tablet Take 1 tablet (750 mg) by mouth 2 times daily  Qty: 60 tablet, Refills: 0    Associated Diagnoses: Seizures (H)      oxybutynin (DITROPAN-XL) 10 MG 24 hr tablet Take 1 tablet (10 mg) by mouth At Bedtime  Qty: 30 tablet    Associated Diagnoses: Multiple sclerosis (H)      norethindrone (MICRONOR) 0.35 MG per tablet Take 1 tablet by mouth daily      baclofen (LIORESAL) 10 MG tablet Take 10-20 mg by mouth 4 times daily as needed   Refills: 3      vitamin D (ERGOCALCIFEROL) 78661 UNIT capsule Take 50,000 Units by mouth once a week On Sundays  Refills: 1      venlafaxine (EFFEXOR-XR) 75 MG 24 hr capsule Take 75 mg by mouth daily         STOP taking these medications       nitroFURantoin, macrocrystal-monohydrate, (MACROBID) 100 MG capsule Comments:   Reason for Stopping:             Allergies   Allergies   Allergen Reactions     Diphenhydramine Rash     benadryl cream     Nickel      Nitrofurantoin Rash     Data   Most Recent 3 CBC's:  Recent Labs   Lab Test  08/13/17   0910  08/12/17   2030  08/02/17   0851   WBC  7.2  5.9  6.1   HGB  11.2*  11.7  11.7   MCV  93  92  94   PLT  228  261  314      Most Recent 3 BMP's:  Recent Labs   Lab Test  08/13/17   0910  08/12/17   2030  08/02/17   0851   NA  137  133  141   POTASSIUM  3.8  3.5  4.7   CHLORIDE  107  100  105   CO2  21  26  27   BUN  8  10  10   CR  0.57  0.70  0.64    ANIONGAP  9  7  9   JULIET  7.9*  8.5  8.7   GLC  165*  107*  115*     Most Recent 2 LFT's:  Recent Labs   Lab Test  08/14/17   0910  08/13/17   0910   AST  84*  166*   ALT  205*  255*   ALKPHOS  177*  189*   BILITOTAL  0.2  0.3     Most Recent 6 Bacteria Isolates From Any Culture (See EPIC Reports for Culture Details):  Recent Labs   Lab Test  08/12/17   2250  08/12/17   2053  08/12/17   2045  08/12/17   2030  08/05/17   1755  07/26/17   1025   CULT  Culture negative after 1 day  Culture negative monitoring continues  Culture negative monitoring continues  No growth  No growth after 2 days  Cultured on the 1st day of incubation: Staphylococcus hominis This isolate is   presumed to be clindamycin resistant based on detection of inducible   clindamycin resistance. Erythromycin and clindamycin are resistant, therefore,   they are not recommended for use.  Critical Value/Significant Value, preliminary result only, called to and read   back by  rui choudhury rn @7627 8/13/17. ct  (Note)  POSITIVE for Staphylococci other than S.aureus, S.epidermidis and  S.lugdunensis, by Verigene multiplex nucleic acid test.  Coagulase-negative staphylococci are the most common venipuncture or  collection associated skin CONTAMINANTS grown in blood cultures.  Final identification and antimicrobial susceptibility testing will be  verified by standard methods.    Specimen tested with Verigene multiplex, gram-positive blood culture  nucleic acid test for the following targets: Staph aureus, Staph  epidermidis, Staph lugdunensis, other Staph species, Enterococcus  faecalis, Enterococcus faecium, S treptococcus species, S. agalactiae,  S. anginosus grp., S. pneumoniae, S. pyogenes, Listeria sp., mecA  (methicillin resistance) and Debra/B (vancomycin resistance).    Critical Value/Significant Value called to and read back by RUI CHOUDHURY RN @1252 8/13/17. CT  *  >100,000 colonies/mL Escherichia coli*  No growth       Results for orders  placed or performed during the hospital encounter of 08/12/17   Head CT w/o contrast    Narrative    CT SCAN OF THE HEAD WITHOUT CONTRAST   8/12/2017 10:00 PM     HISTORY: Evaluate for confusion.    TECHNIQUE:  Axial images of the head and coronal reformations without  IV contrast material. Radiation dose for this scan was reduced using  automated exposure control, adjustment of the mA and/or kV according  to patient size, or iterative reconstruction technique.    COMPARISON: Brain MR 7/25/2017.    FINDINGS: No evidence of acute intracranial hemorrhage, mass effect,  or midline shift. There is subtle hypodensity in the medial left  temporal lobe and left hippocampus in the area that demonstrated  restricted diffusion on prior MR.    Fairly extensive periventricular white matter hypodensities probably  representing chronic microvascular ischemic disease are unchanged.  Moderate diffuse parenchymal volume loss. Ventricular size is  unchanged. No abnormal extra-axial fluid collection. Basal cisterns  are patent.    The visualized portions of the sinuses and mastoids appear normal.  There is no evidence of trauma.       Impression    IMPRESSION:  1. No evidence of acute intracranial hemorrhage, mass, or herniation.  2. Subtle hypodensity in the region of restricted diffusion on  previous MR may represent evolving infarct although herpes  encephalitis cannot be excluded.  3. Extensive periventricular white matter hypodensities likely due to  chronic microvascular ischemic disease. Moderate diffuse parenchymal  volume loss.      ROSA MARIA LY MD   Chest XR,  PA & LAT    Narrative    CHEST TWO VIEW   8/12/2017 9:38 PM     HISTORY: Fever    COMPARISON: 7/29/2017      Impression    IMPRESSION: Hazy right lower lobe opacity is concerning for pneumonia,  however it appears to be improved since 7/29/2017. No pleural  effusion. No pneumothorax. Bones are unremarkable. Cardiac silhouette  within normal limits.    ROSA MARIA LY MD    Abdomen US, limited (RUQ only)    Narrative    RIGHT UPPER QUADRANT ULTRASOUND 8/12/2017 10:22 PM    HISTORY: Elevated LFTs, confusion, evaluate for CBD obstruction.    COMPARISON: None.    FINDINGS:    Gallbladder: Normal with no cholelithiasis, wall thickening or focal  tenderness.      Bile ducts: CHD is normal diameter. No intrahepatic biliary  dilatation.    Liver: There is a fairly well-defined hyperechoic lesion within the  right hepatic lobe that measures 3.1 x 3.4 x 2.6 cm. The liver  measures 19.7 cm in length.    Pancreas: Normal    Right kidney: Normal.      Impression    IMPRESSION:  Fairly well-defined hyperechoic 3.4 cm lesion in the  right hepatic lobe. Most commonly this would represent a hepatic  hemangioma, however hepatic adenoma would remain on the differential.  Otherwise unremarkable right upper quadrant ultrasound.    ROSA MARIA LY MD   MR Brain w/o & w Contrast    Narrative    MRI BRAIN WITHOUT AND WITH CONTRAST  8/13/2017 10:28 PM    HISTORY:  Follow up of recent left temporal abnormality.     TECHNIQUE:  Multiplanar, multisequence MRI of the brain without and  with 7mL Gadavist    COMPARISON: Head CT 8/12/2017. Brain MR 7/25/2017.    FINDINGS:  There is subtle restricted diffusion within the medial left  temporal lobe and left hippocampus in a similar region as what was  seen from MR 7/25/2017; however, the amount of restricted diffusion  has decreased since that time. There is mild gyral edema in these  regions and slight increased T2 hyperintense signal in this area. No  petechial hemorrhage is identified within this region on gradient echo  images. No abnormal contrast enhancement within this region.    There are new findings of intracranial hypotension with mild diffuse  pachymeningeal enhancement, convex border of the transverse sinuses,  and slight expansion of the pituitary gland compared to prior MR. No  definite extra-axial fluid collection is identified.    No acute  intracranial hemorrhage. No evidence of infarct. No mass  effect or midline shift.    Diffuse parenchymal volume loss again noted. Extensive periventricular  white matter T2 hyperintensity appears similar to prior and may be due  to demyelinating disease and/or chronic microvascular ischemic  disease. Ventricular size is unchanged without evidence of  hydrocephalus.      Impression    IMPRESSION:  1. Abnormal signal and slight edema is still seen within the medial  left temporal lobe and left hippocampus, although the signal  abnormality is less pronounced when compared to prior MR 7/25/2017.  Given the persistence of signal abnormality, favor that this  represents evolution/sequela of herpes encephalitis. No evidence of  hemorrhage within this area.  2. New findings of intracranial hypotension. No extra-axial fluid  collection.  3. Parenchymal volume loss and extensive periventricular white matter  changes appear similar to prior.    ROSA MARIA LY MD

## 2017-08-16 ENCOUNTER — CARE COORDINATION (OUTPATIENT)
Dept: CARE COORDINATION | Facility: CLINIC | Age: 42
End: 2017-08-16

## 2017-08-16 ENCOUNTER — TELEPHONE (OUTPATIENT)
Dept: FAMILY MEDICINE | Facility: CLINIC | Age: 42
End: 2017-08-16

## 2017-08-16 ENCOUNTER — OFFICE VISIT (OUTPATIENT)
Dept: FAMILY MEDICINE | Facility: CLINIC | Age: 42
End: 2017-08-16
Payer: COMMERCIAL

## 2017-08-16 VITALS
SYSTOLIC BLOOD PRESSURE: 116 MMHG | HEART RATE: 57 BPM | OXYGEN SATURATION: 98 % | BODY MASS INDEX: 21.07 KG/M2 | TEMPERATURE: 97.5 F | RESPIRATION RATE: 16 BRPM | WEIGHT: 134.5 LBS | DIASTOLIC BLOOD PRESSURE: 64 MMHG

## 2017-08-16 DIAGNOSIS — G93.40 ENCEPHALOPATHY: Primary | ICD-10-CM

## 2017-08-16 DIAGNOSIS — G97.1 SPINAL PUNCTURE HEADACHE: ICD-10-CM

## 2017-08-16 DIAGNOSIS — R74.8 ELEVATED LIVER ENZYMES: ICD-10-CM

## 2017-08-16 DIAGNOSIS — G35 MULTIPLE SCLEROSIS (H): ICD-10-CM

## 2017-08-16 LAB
SPECIMEN SOURCE: NORMAL
T GONDII DNA SPEC QL NAA+PROBE: NOT DETECTED

## 2017-08-16 PROCEDURE — 99495 TRANSJ CARE MGMT MOD F2F 14D: CPT | Performed by: FAMILY MEDICINE

## 2017-08-16 NOTE — TELEPHONE ENCOUNTER
Patient discharged yesterday, 8/15/17 and has hospital follow up appt today, 8/16/17.    KONG MaldonadoN, RN

## 2017-08-16 NOTE — TELEPHONE ENCOUNTER
Please call patient for IP follow up  Looks like patient has appointment schedule to be seen today 8-16-17

## 2017-08-16 NOTE — PROGRESS NOTES
Clinic Care Coordination Contact  OUTREACH    Referral Information:  Referral Source: CTS  Reason for Contact: post hosp discharge  Care Conference: No     Universal Utilization:   ED Visits in last year: 2  Hospital visits in last year: 3  Last PCP appointment: 04/10/17  Missed Appointments: 0  Concerns: none at this time per pt  Multiple Providers or Specialists: neuro at [facility]    Clinical Concerns:  Current Medical Concerns: pt states that she has no concerns at this time and that she is coing well with  taking care of her   Current Behavioral Concerns: non that RN CC is aware of  Education Provided to patient: none  Clinical Pathway Name: None  Clinical Pathway: None    Medication Management:  Current Outpatient Prescriptions   Medication     CRANBERRY EXTRACT PO     levETIRAcetam (KEPPRA) 750 MG tablet     oxybutynin (DITROPAN-XL) 10 MG 24 hr tablet     norethindrone (MICRONOR) 0.35 MG per tablet     venlafaxine (EFFEXOR-XR) 75 MG 24 hr capsule     baclofen (LIORESAL) 10 MG tablet     vitamin D (ERGOCALCIFEROL) 17849 UNIT capsule     No current facility-administered medications for this visit.           Functional Status:  Mobility Status: Dependent/Assisted by Another     Transportation: pt  drives           Psychosocial:  Current living arrangement:: I live in a private home with spouse  Financial/Insurance: pvt       Resources and Interventions:  Current Resources:  ;  Pt states that she does not need outpt therapies and that she and her  do well with her cares at home  Pt does hae [facility] neurology cares at [facility]        Advanced Care Plans/Directives on file:: No        Goals:                  Barriers: pt states that she has no questions or barriers at this time  Strengths: pt states that she is able to handle her own self care and that her  is good at caring for her if needed  Patient/Caregiver understanding: yes  Frequency of Care Coordination:  (none)  Upcoming  appointment: 08/16/17     Plan:   RN CC reviewed EMR and Care Coordination is no longer needed at this time  Pt closed to RN CC at this time  Flaca Jordan RN Clinic Care Coordinator  [facility] [phone]

## 2017-08-16 NOTE — NURSING NOTE
"Chief Complaint   Patient presents with     Hospital F/U       Initial /64 (BP Location: Right arm, Patient Position: Chair, Cuff Size: Adult Regular)  Pulse 57  Temp 97.5  F (36.4  C) (Tympanic)  Resp 16  Wt 134 lb 8 oz (61 kg)  SpO2 98%  BMI 21.07 kg/m2 Estimated body mass index is 21.07 kg/(m^2) as calculated from the following:    Height as of 8/13/17: 5' 7\" (1.702 m).    Weight as of this encounter: 134 lb 8 oz (61 kg).  Medication Reconciliation: complete     Sana Del Rio, DEE      "

## 2017-08-16 NOTE — MR AVS SNAPSHOT
After Visit Summary   8/16/2017    Shanna Shanks    MRN: [id number]           Patient Information     Date Of Birth          1975        Visit Information        Provider Department      8/16/2017 4:00 PM Malvin Beck MD [facility]        Today's Diagnoses     Encephalopathy    -  1    Multiple sclerosis (H)        Spinal puncture headache        Elevated liver enzymes           Follow-ups after your visit        Your next 10 appointments already scheduled     Aug 25, 2017 12:00 PM CDT   Neuro Eval with Rosie Pink OT   [facility] [place], Occupational Therapy - Outpatient ([facility])    [address], [address]  [place] [address]   [phone]            Aug 25, 2017  1:00 PM CDT   Neuro Eval with NATASHA Pastor   [facility], Speech Therapy - Oupatient ([facility])    [address], [address]  [place] [address]   [phone]              Who to contact     If you have questions or need follow up information about today's clinic visit or your schedule please contact [facility] directly at [phone].  Normal or non-critical lab and imaging results will be communicated to you by MyChart, letter or phone within 4 business days after the clinic has received the results. If you do not hear from us within 7 days, please contact the clinic through BandApphart or phone. If you have a critical or abnormal lab result, we will notify you by phone as soon as possible.  Submit refill requests through Tour Engine or call your pharmacy and they will forward the refill request to us. Please allow 3 business days for your refill to be completed.          Additional Information About Your Visit        MyChart Information     Tour Engine gives you secure access to your electronic health record. If you see a primary care provider, you can also send messages to  your care team and make appointments. If you have questions, please call your primary care clinic.  If you do not have a primary care provider, please call [phone] and they will assist you.        Care EveryWhere ID     This is your Care EveryWhere ID. This could be used by other organizations to access your [place] medical records  [id number]        Your Vitals Were     Pulse Temperature Respirations Pulse Oximetry BMI (Body Mass Index)       57 97.5  F (36.4  C) (Tympanic) 16 98% 21.07 kg/m2        Blood Pressure from Last 3 Encounters:   08/16/17 116/64   08/15/17 105/60   08/10/17 116/62    Weight from Last 3 Encounters:   08/16/17 134 lb 8 oz (61 kg)   08/14/17 140 lb 3.4 oz (63.6 kg)   07/30/17 144 lb 3.2 oz (65.4 kg)              Today, you had the following     No orders found for display       Primary Care Provider Office Phone # Fax #    Malvin Cesario Beck [phone] [address] [place], [place] [id number]        Equal Access to Services     MELCHOR HARPER : Hadii marcio ku hadasho Sorip, waaxda luqadaha, qaybta kaalmada eh, liza patrick . So [facility] [phone].    ATENCIÓN: Si habla español, tiene a gatica disposición servicios gratuitos de asistencia lingüística. [facility] [phone].    We comply with applicable federal civil rights laws and [place] laws. We do not discriminate on the basis of race, color, national origin, age, disability sex, sexual orientation or gender identity.            Thank you!     Thank you for choosing [facility]  for your care. Our goal is always to provide you with excellent care. Hearing back from our patients is one way we can continue to improve our services. Please take a few minutes to complete the written survey that you may receive in the mail after your visit with us. Thank you!             Your Updated Medication List - Protect others around you: Learn how to safely use, store and  throw away your medicines at www.disposemymeds.org.          This list is accurate as of: 8/16/17 11:59 PM.  Always use your most recent med list.                   Brand Name Dispense Instructions for use Diagnosis    baclofen 10 MG tablet    LIORESAL     Take 10-20 mg by mouth 4 times daily as needed        CRANBERRY EXTRACT PO      Take 1 tablet by mouth daily        levETIRAcetam 750 MG tablet    KEPPRA    60 tablet    Take 1 tablet (750 mg) by mouth 2 times daily    Seizures (H)       norethindrone 0.35 MG per tablet    MICRONOR     Take 1 tablet by mouth daily        oxybutynin 10 MG 24 hr tablet    DITROPAN-XL    30 tablet    Take 1 tablet (10 mg) by mouth At Bedtime    Multiple sclerosis (H)       venlafaxine 75 MG 24 hr capsule    EFFEXOR-XR     Take 75 mg by mouth daily        vitamin D 14059 UNIT capsule    ERGOCALCIFEROL     Take 50,000 Units by mouth once a week On Sundays

## 2017-08-16 NOTE — PROGRESS NOTES
SUBJECTIVE:                                                    Shanna Shanks is a 42 year old female who presents to clinic today for the following health issues:    Hospital Follow-up Visit:    Hospital/Nursing Home/IP Rehab Facility: [facility]  Date of Admission: 8/12/17  Date of Discharge: 8/15/17  Reason(s) for Admission: Fever of unknown of origin, possible drug reaction, recent seizure with left temporal lobe abnormality on MRI            Problems taking medications regularly:  None       Medication changes since discharge: None       Problems adhering to non-medication therapy:  None    Summary of hospitalization:  [facility] discharge summary reviewed  Diagnostic Tests/Treatments reviewed.  Follow up needed: none  Other Healthcare Providers Involved in Patient s Care:         Specialist appointment - neurologist.  Update since discharge: improved.      Post Discharge Medication Reconciliation: discharge medications reconciled, continue medications without change.  Plan of care communicated with patient and family     Coding guidelines for this visit:  Type of Medical   Decision Making Face-to-Face Visit       within 7 Days of discharge Face-to-Face Visit        within 14 days of discharge   Moderate Complexity [id number]   High Complexity [id number]        scheduled to see neurologist on 8/30/17    Per  - she is looking better and improving well. Had lumbar puncture and still having headache. Worn out.   No fever.          Problem list and histories reviewed & adjusted, as indicated.  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 No     Social History Narrative    Caffeine intake/servings daily - 2    Calcium intake/servings daily - 2-3    Exercise 7 times weekly - describe runs on treadmill for 5 minutes    Sunscreen used - Yes    Seatbelts used - Yes    Guns stored in the home - No    Self Breast Exam - Yes    Pap test up to date -  Yes    Eye exam up to date -  Yes    Dental exam up to date -  Yes    DEXA scan up to date -  Yes    Flex Sig/Colonoscopy up to date -  Not Applicable    Mammography up to date -  Not Applicable    Immunizations reviewed and up to date - Yes    Abuse: Current or Past (Physical, Sexual or Emotional) - No    Do you feel safe in your environment - Yes    Do you cope well with stress - Yes    Do you suffer from insomnia - No    Last updated by: Michelle Sanchez  12/22/2010                     Allergies   Allergen Reactions     Diphenhydramine Rash     benadryl cream     Nickel      Nitrofurantoin Rash     Patient Active Problem List   Diagnosis     Multiple sclerosis (H)     UTI (urinary tract infection)     CARDIOVASCULAR SCREENING; LDL GOAL LESS THAN 160     Dysmenorrhea     Tobacco dependency     Encephalopathy     Herpes encephalitis     Fever of unknown origin     Reviewed medications, social history and  past medical and surgical history.    Review of system: for general, respiratory, CVS, GI and psychiatry negative except for noted above.     EXAM:  /64 (BP Location: Right arm, Patient Position: Chair, Cuff Size: Adult Regular)   Pulse 57  Temp 97.5  F (36.4  C) (Tympanic)  Resp 16  Wt 134 lb 8 oz (61 kg)  SpO2 98%  BMI 21.07 kg/m2  Constitutional: healthy, alert and no distress   Psychiatric: mentation appears normal and affect normal/bright  Cardiovascular: RRR. No murmurs,  Respiratory: negative, Lungs clear. No crackles or wheezing. No tachypnea.   Abdomen: Abdomen soft, non-tender. BS normal. No masses, organomegaly  NEURO: stooped gait. Keeping head down due to headache. Asked her to lie down on exam table and feeling much better.   JOINT/EXTREMITIES: stooped gait    ASSESSMENT / PLAN:  (G93.40) Encephalopathy  (primary encounter diagnosis)  Comment: long hospitalization and suspecting hsv encephalitis, had possible seizures. Was sent to rehab and then again to hospital with drug rash. Had spinal tap to rule out GRAEME virus   Plan: improving slowly.     (G35) Multiple sclerosis (H)  Comment:    Plan:  Seeing neurologist at [facility]. Continue same. Already scheduled to see them.     (G97.1) Spinal puncture headache  Comment:    Plan: from recent spinal tap. Fluids and if not improving may need follow up and blood patch. They understood.     Elevated liver enzymes  Comment - most likely from drug reaction. Last labs this week. Recheck in 2-3 weeks. Seeing neurologist and they will check it over there.     Red flag symptoms discussed.

## 2017-08-17 LAB
JCPYV DNA SERPL QL NAA+PROBE: NOT DETECTED
SPECIMEN SOURCE: NORMAL

## 2017-08-18 LAB
BACTERIA SPEC CULT: NO GROWTH
Lab: NORMAL
SPECIMEN SOURCE: NORMAL

## 2017-08-19 LAB
BACTERIA SPEC CULT: NO GROWTH
Lab: NORMAL
SPECIMEN SOURCE: NORMAL

## 2017-08-20 LAB
BACTERIA SPEC CULT: ABNORMAL
Lab: ABNORMAL
SPECIMEN SOURCE: ABNORMAL

## 2017-08-21 LAB — LAB SCANNED RESULT: NORMAL

## 2017-08-25 ENCOUNTER — HOSPITAL ENCOUNTER (OUTPATIENT)
Dept: SPEECH THERAPY | Facility: CLINIC | Age: 42
Setting detail: THERAPIES SERIES
End: 2017-08-25
Attending: PHYSICAL MEDICINE & REHABILITATION
Payer: COMMERCIAL

## 2017-08-25 ENCOUNTER — HOSPITAL ENCOUNTER (OUTPATIENT)
Dept: OCCUPATIONAL THERAPY | Facility: CLINIC | Age: 42
Setting detail: THERAPIES SERIES
End: 2017-08-25
Attending: PHYSICAL MEDICINE & REHABILITATION
Payer: COMMERCIAL

## 2017-08-25 PROCEDURE — 40000125 ZZHC STATISTIC OT OUTPT VISIT: Performed by: OCCUPATIONAL THERAPIST

## 2017-08-25 PROCEDURE — 97167 OT EVAL HIGH COMPLEX 60 MIN: CPT | Mod: GO | Performed by: OCCUPATIONAL THERAPIST

## 2017-08-25 PROCEDURE — 40000211 ZZHC STATISTIC SLP  DEPARTMENT VISIT: Performed by: SPEECH-LANGUAGE PATHOLOGIST

## 2017-08-25 PROCEDURE — 97166 OT EVAL MOD COMPLEX 45 MIN: CPT | Mod: GO | Performed by: OCCUPATIONAL THERAPIST

## 2017-08-25 PROCEDURE — 92523 SPEECH SOUND LANG COMPREHEN: CPT | Mod: GN | Performed by: SPEECH-LANGUAGE PATHOLOGIST

## 2017-08-25 ASSESSMENT — ACTIVITIES OF DAILY LIVING (ADL): COMMUNITY_MOBILITY: SPOUSE DRIVES

## 2017-08-27 LAB
BACTERIA SPEC CULT: NORMAL
Lab: NORMAL
SPECIMEN SOURCE: NORMAL

## 2017-08-28 NOTE — ADDENDUM NOTE
Encounter addended by: Alexus Faye, SLP on: 8/28/2017  9:39 AM<BR>     Actions taken: Sign clinical note, Flowsheet accepted

## 2017-08-28 NOTE — PROGRESS NOTES
"   08/25/17 1500   General Information   Type of Evaluation Speech and Language   Type Of Visit Initial   Start Of Care Date 08/25/17   Referring Physician (Lucian Kate)   Orders Evaluate And Treat   Medical Diagnosis MS; herpes encephalitis; impaired cognition   Onset Of Illness/injury Or Date Of Surgery 07/24/17   Precautions/Limitations seizure precautions   Hearing WFL   Surgical/Medical history reviewed Yes  (per chart review)   Pertinent History Of Current Problem Pt seen without her spouse. Has had PT and OT evaluations.  When asked about memory, pt stating \"I can't remember anything\".  Unable to really describe what has happened to her or what her baseline is.  Stating \"I think things are just fine\".  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  She does not endorse any new changes in her speech or swallowing since her hospitalization.  Estimates she was diagnosed with MS about 10 years ago.   Prior Level Of Function Comment Unknown   Current Community Support  Family/friend caregiver;Therapy services   Patient Role/employment History Employed   Living environment House/[facility]  (with  and child)   General Observations Pleasant and willing to participate in evaluation; socially inappropriate at times.  Using humor and sarcasm.   Patient/family Goals Unable to state   Oral Motor Sensory Function   Comments No swallowing changes per pt and spouse.   Speech   Speech Comments No overt changes to speech/intelligibility per pt and spouse.   Language: Auditory Comprehension (understanding of spoken language)   Tests were administered at the following levels Moderate (routine daily activities)   Yes/No Sentence and Simple Paragraph; [place] Diagnostic Aphasia Exam 3 short (out of 6 total) 5  (improved from acute care)   Language: Verbal Expression (use of spoken language to express information)   Tests were administered at the following levels Complex " (vocation/community/social activities)   Generate Sentences; [place] Test for Differential Diagnosis Of Aphasia (out of 6 total) 5  (improved)   Generative Naming Score; Cognitive Linguistic Quick Test 3   Generative Naming; Cognitive Linguistic Quick Test Result Below mean  (slight improvement)   Conversation; [place] Diagnostic Aphasia Exam rating (out of 5 total) 4  (improved)   Reading Comprehension (understanding of written language)   Tests were administered at the following levels Moderate (routine daily activities)   Functional Reading; Reading Comprehension Battery for Aphasia (out of 10 total) 9  (unchanged)   Pragmatics (the social or functional use of a language)   Deficits noted in Conversational Skills Turn-taking;Circumlocutions   Deficits noted in the Use of Linguistic Context Topic Maintenance;Turn-taking;Referencing Skills   Cognitive Status Examination   Attention impaired   Behavioral Observations alert;distractible;inappropriate;delayed processing   Orientation Oriented to person and day of week.  Grossly oriented to place (hospital vs clinic) I know I'm not in the hospital in [facility].  Not oriented to month.  Relaying personal history appropriately.   Education Assessment   Barriers to Learning Cognitive   General Therapy Interventions   Planned Therapy Interventions Cognitive Treatment   Intervention Comments when pt is appropriate to address goals for cognition based on progress with PT/OT   Clinical Impression, SLP Eval   Criteria for Skilled Therapeutic Interventions Met yes   SLP Diagnosis Cognitive deficits   Influenced by the following factors/impairments MS; encephalitis   Functional limitations due to impairments Impaired safety/judgement; decreased awareness of deficits   Therapy Frequency 1 time;per week   Predicted Duration of Therapy Intervention (days/wks) 4-6 weeks   Risks and Benefits of Treatment have been explained. Yes   Patient, Family & other staff in agreement with plan of  care Yes   Cognitive/Communication Goals   Cognitive/Communication Goals 1;2;3   Cognitive/Communication Goal 1   Goal Description Identify and implement compensatory stategies for memory given assist from caregiver.   Target Date 11/25/17   Cognitive/Communication Goal 2   Goal Description Improve immediate memory of 3-4 items presented verbally to 85% given extra time.   Target Date 11/25/17   Cognitive/Communication Goal 3   Goal Description Decrease instances of socially inappropriate behaviors given modeling and feedback to less than 25% of the time in a session.   Target Date 11/25/17   Total Session Time   Total Evaluation Time 50

## 2017-08-29 ENCOUNTER — HOSPITAL ENCOUNTER (OUTPATIENT)
Dept: PHYSICAL THERAPY | Facility: CLINIC | Age: 42
Setting detail: THERAPIES SERIES
End: 2017-08-29
Attending: PHYSICAL MEDICINE & REHABILITATION
Payer: COMMERCIAL

## 2017-08-29 PROCEDURE — 40000719 ZZHC STATISTIC PT DEPARTMENT NEURO VISIT: Performed by: PHYSICAL THERAPIST

## 2017-08-29 PROCEDURE — 97116 GAIT TRAINING THERAPY: CPT | Mod: GP | Performed by: PHYSICAL THERAPIST

## 2017-08-29 PROCEDURE — 97110 THERAPEUTIC EXERCISES: CPT | Mod: GP | Performed by: PHYSICAL THERAPIST

## 2017-08-29 NOTE — ADDENDUM NOTE
Encounter addended by: Rosie Pink OT on: 8/29/2017 10:31 AM<BR>     Actions taken: Sign clinical note, Flowsheet accepted

## 2017-08-29 NOTE — PROGRESS NOTES
08/25/17 1200   Quick Adds   Type of Visit Initial Outpatient Occupational Therapy Evaluation   General Information   Start Of Care Date 08/25/17   Referring Physician Lucian Kate   Orders Evaluate and treat as indicated   Other Orders continued op therapies   Orders Date 08/15/17   Medical Diagnosis MS, Medical Encephalopathy   Onset of Illness/Injury or Date of Surgery 08/15/17  (order)   Precautions/Limitations No known precautions/limitations   Surgical/Medical History Reviewed Yes   Additional Occupational Profile Info/Pertinent History of Current Problem Recent hospitalization 8/12-8/15 for fever and seizures with herpes encephalopathy.  Was taking tecifdera for her MS but that has stopped?  Looking into possible GRAEME virus +. History of spasticity and UTI's   Comments/Observations Here today, dropped off by spouse who gave brief history before leaving with daughter.  Wants to know her baseline and if she can be left alone.   Role/Living Environment   Current Community Support Family/friend caregiver  (spouse, 2 daughters)   Patient role/Employment history Other/comments  (artist)   Current Living Environment House   Number of Stairs to Enter Home 12  (per patient report)   Number of Stairs Within Home flight up and down   Primary Bathroom Set Up/Equipment Tub/Shower combo   Home/Community Accessibility Comments Now staying on main level   Role/Living Environment Comments Mother whom has family support to assist with deficits from MS and Encephalopathy.   Patient/family Goals Statement To determine baseline and amout of help that is needed, spouse wants to know if she can stay home alone   Pain   Patient currently in pain Yes   Pain comments unable to report where   Fall Risk Screen   Fall screen completed by OT   Have you fallen 2 or more times in the last year? No   Is the patient a fall risk? Yes;Department Fall Risk Interventions Implemented   Comments per patient report, seeing PT for balance   Cognitive  "Status Examination   Orientation Person   Level of Consciousness Alert   Follows Commands and Answers Questions 75% of the time   Personal Safety and Judgment Impaired   Memory Impaired   Attention Distractible during evaluation;Selective attention impaired, difficulty ignoring irrelevant stimuli;Alternating attention impaired, difficulty shifting between tasks;Divided attention impaired, difficulty with simultaneous tasks   Organization/Problem Solving Prioritizing of information/tasks impaired;Categorization of information impaired;Sequencing impaired;Problem solving impaired   Executive Function Impulsive;Working memory impaired, decreased storage of information for performing tasks;Cognitive flexibility impaired;Planning ability impaired;Self awareness/monitoring impaired   Cognitive Comment MOCA: 10/30 with deficits in all areas   Visual Perception   Visual Perception No deficits were identified   Sensation   Upper Extremity Sensory Examination No deficits were identified   Sensation Comments picking at \"hairs\" non visible in mouth entire session   Range of Motion (ROM)   ROM Quick Adds No deficits identified   Strength   Strength Comments 4+   Hand Strength   Hand Dominance Right   Left Hand  (pounds) 45 pounds   Right Hand  (pounds) 52 pounds   Hand Strength Comments within -2SD f/mean   Coordination   Left Hand, Nine Hole Peg Test (seconds) 45.71   Right Hand, Nine [place] Peg Test (seconds) 40.68   Coordination Comments BNL Bilaterally, intentional tremor noted.   Balance   Balance Comments defer to PT   Functional Mobility   Ambulation NO AD, wall walking, slow pace with wide [place]   Transfer Skill   Level of [place]: Transfers independent   Bathing   Level of [place] - Bathing independent   Physical Assist/Nonphysical Assist - Bathing supervision   Bathing Comments Takes baths, spouse reports needing to give her cues.   Upper Body Dressing   Level of [place]: Dress Upper Body minimum " "assist (75% patients effort)   Lower Body Dressing   Level of [place]: Dress Lower Body minimum assist (75% patients effort)   Toileting   Level of [place]: Toilet independent   Physical Assist/Nonphysical Assist: Toilet 1 person assist   Grooming   Level of [place]: Grooming independent   Grooming Comments supervision and cues for thoroughness or to complete   Eating/Self-Feeding   Level of [place]: Eating independent   Instrumental Activities of Daily Living Assessment   IADL Quick Adds Care of Others;Community Mobility;Communication/Computer Use;Home/Financial/Management;Meal Planning/Preparation   Meal Planning/Preparation Micro meals only   Home/Financial Management Spouse and children complete   Communication/Computer Use Doesn't use computer/internet as she \"doesnt have a reason\"   Community Mobility Spouse drives   Care of Others older daughter with grandson, 6 year old daughter   Planned Therapy Interventions   Planned Therapy Interventions ADL training;IADL training;Cognitive skills;Cognitive performance testing;ROM;Self care/Home management;Strengthening;Stretching;Therapeutic activities;Coordination training   Adult OT Eval Goals   OT Eval Goals (Adult) 1;2;3   OT Goal 1   Goal Identifier Memory and problem soliving   Goal Description Patient will demo/report use of 3-4 new strategies in order to be oriented to time and place and to be able to be safely left alone for a few hours.   Target Date 11/23/17   OT Goal 2   Goal Identifier Cognitive testing   Goal Description Patient will participate in Cognitive testing in order to make reccomendations about being alone and ability levels.   Target Date 11/23/17   OT Goal 3   Goal Identifier HEP   Goal Description Patient will be independent with UE HEP for strength and coordination in order to increase functional participation in ADL's and manage spasticity.   Target Date 11/23/17   Clinical Impression   Criteria for Skilled Therapeutic " Interventions Met Yes, treatment indicated   OT Diagnosis progressive weakness and cognitive impairment   Influenced by the following impairments tremor, weakness, cognitive impairment,    Assessment of Occupational Performance 5 or more Performance Deficits   Identified Performance Deficits Unable to indepedentnyl complete g/h, bathing, dressing, cooking, cleaning, care of children, community mobility ect   Clinical Decision Making (Complexity) High complexity   Therapy Frequency up to 10 sessions as needed   Predicted Duration of Therapy Intervention (days/wks) in 90 days   Risks and Benefits of Treatment have been explained. Yes   Patient, Family & other staff in agreement with plan of care Yes   Clinical Impression Comments skilled Ot services needed to asses memory and make safety rec as well as determine appropriate HEP for increased strength and coordination   Education Assessment   Barriers To Learning Emotional;Cognitive;Physical   Preferred Learning Style Listening;Demonstration;Pictures/video   Total Evaluation Time   Total Evaluation Time 60   Electronically signed by:  Rosie GLORIA/NEETU, ATP       Occupational Therapist, Assistive   [phone]      fax: [phone]      [email or web address], [address]  [place]   [address]

## 2017-08-30 ENCOUNTER — TELEPHONE (OUTPATIENT)
Dept: FAMILY MEDICINE | Facility: CLINIC | Age: 42
End: 2017-08-30

## 2017-08-31 ENCOUNTER — HOSPITAL ENCOUNTER (OUTPATIENT)
Dept: OCCUPATIONAL THERAPY | Facility: CLINIC | Age: 42
Setting detail: THERAPIES SERIES
End: 2017-08-31
Attending: PHYSICAL MEDICINE & REHABILITATION
Payer: COMMERCIAL

## 2017-08-31 PROCEDURE — 96125 COGNITIVE TEST BY HC PRO: CPT | Mod: GO | Performed by: OCCUPATIONAL THERAPIST

## 2017-08-31 PROCEDURE — 40000125 ZZHC STATISTIC OT OUTPT VISIT: Performed by: OCCUPATIONAL THERAPIST

## 2017-08-31 NOTE — PROGRESS NOTES
Cognitive Performance Test    SUMMARY OF TEST:    The Cognitive Performance Test (CPT) is a standardized performance-based assessment to measure working memory/executive function processing capacities that underlie functional performance. Subtasks include common basic and instrumental activities of daily living (ADL/IADL) which are rated based on the manner in which patients respond to task demands of varying complexity. The total CPT score describes a level of functioning that indicates how information is processed, implications for functional activities, potential safety risks and a recommended level of supervision or assist based on cognitive function. The highest total score on this test is in the range of 5.6 to 5.8.    DATE OF TESTING: August 31, 2017    RESULTS OF TESTING:                                                                                         CPT Subtest Results    MEDBOX: 4.5/6 SHOP/GLOVES: 5/6 PHONE: NT/6   WASH:  5/5 TRAVEL: 4/6 TOAST: 4/5   DRESS: NT/5   TOTAL CPT SCORE:  22.5/28     Average CPT Score  4.5/5.6    INTERPRETATION OF TEST RESULTS:    Based on the Cognitive Performance Test, this patient scored at CPT Level 4.5.  See CPT Levels reference below.    Summary of functional cognitive status:   Medbox: Able to identify as needed pill and not place in box, fluidia correct, small errors with thinifa and flomaxafen.  Specific cues given and unable to correct two days in a row for thinifa or add two pills in the evening for flomaxafen.    Shop: cues needed to check for bills in wallet and for bottom cheaper gloves, then able to pay correct amount.  Travel: unable to locate structure with written cues, very confused  Toast: Cues needed to located plug in and finish up, butter not used as it was not noticed on table  Wash: Completed correctly.    Extra time needed to process and complete all activities.    Factors affecting performance:  Emotional Status  Impulsive  "actions    Recommendations:    Supervision in living settin hour                                                       TIME ADMINISTERING TEST: 45    TIME FOR INTERPRETATION AND PREPARATION OF REPORT: 10    TOTAL TIME: 55    CPT Levels Reference:    Patient's Average CPT Score:  4.5                                                                                                                                                  Individual scores range along a continuum as outlined below.  In addition to cognitive status, other factors may affect safety in a home environment.  Please refer to specific recommendations for this patient.    ___5.6-5.8  Normal functioning (absence of cognitive-functional disability).  Independent in managing personal affairs, monitors and directs own behavior.  Uses complex information to carry out daily activities with safety and accuracy.    Proficient with instrumental activities of daily living (IADL) and learning new activity.  Problems are anticipated, errors are avoided, and consequences of actions are considered.      ___5.0   Mild cognitive-functional disability; deficits in working memory and executive thought processes. Difficulty using complex information. Problems may be observed with recent memory, judgment, reasoning and planning ahead. May be impulsive or have difficulty anticipating consequences.  Safety:  May require assistance to plan ahead; or to manage complex medication schedules, appointments or finances.  Hazardous activities may need to be monitored or limited.  ADL:  Mild functional decline.  Able to complete basic self-care and routine household tasks.  May have difficulty with complex daily tasks such as reading, writing, meal preparation, shopping or driving.   Learns through hands on teaching. Self-centered behavior or difficulty considering the needs of others may be seen related to trouble seeing the  whole picture\". Can appear disorganized or " uninhibited.    _x_4.5  Mild to moderate cognitive-functional disability. Significant deficits in working memory and executive thought processes. Judgment, reasoning and planning show obvious impairment.  Distractible with inability to shift attention/actions given competing stimuli.  Difficulty with problem solving and managing details. Complex daily tasks performed with inconsistency, difficulty, or error.     Safety:  Medications should be monitored, stove use may require supervision, and driving ability may be affected.  Impaired safety awareness with inability to anticipate potential problems.  May not recognize or respond to emergent situations. Requires frequent check-in support.   ADL:  Mild difficulty with simple everyday self-care tasks. Benefits from structured, routine activity.  Will likely need reminders to complete tasks outside of the routine. Requires assistance with planning and IADL tasks like shopping and finances. Learns concrete tasks through repetition, but performance may not generalize. Tends to be impulsive with poor insight. Self centered behavior or inability to consider the needs of others is common.    ___4.0  Moderate cognitive-functional disability; abstract to concrete thought processes. Working memory and executive function impairments are obvious. Difficulty with planning and problem solving.  Behavior is goal-directed, but unable to follow multi-step directions, is easily distracted, and may not recognize mistakes.  Inability to anticipate hazards or understand precautions.  Safety:  Recommend 24-hour supervision for safety. Supervision needed for medication management and for hazardous activities. May not be able to follow a restricted diet. Can get lost in unfamiliar surroundings. Generally, persons functioning at level 4 should not be driving.   ADL:  Some decline in quality or frequency of ADL.  [place] enhanced by use of a routine, simple concrete directions, and  caregiver set-up of needed items. Complex tasks such as money or home management typically requires assistance.  Relies heavily on vision to guide behavior; will ignore objects/hazards not in plain sight and can be distracted by irrelevant objects. Often has poor insight.  Able to carry out social conversation and may verbally  cover  for deficits leading caregivers to believe they are capable of functioning independently.       ___3.5  Moderate cognitive-functional disability; increased cues needed for task completion. Aware of concrete task steps but needs prompting or cues to initiate and complete simple tasks. Attention span is limited, simple directions may need to be repeated, and re-focus to a topic or task may be required.  Safety:  24-hour supervision required for safety and for assistance with daily tasks. Assistance required with medications, and access to medication should be limited. Meals, nutrition and dietary restrictions need to be monitored.  All hazardous activities should be restricted or supervised. Should not drive. Prone to wandering and can become lost.  ADL:  Moderate functional decline. Familiar tasks usually requires set-up of supplies and directions to complete steps. May need objects handed to them for task initiation. Function best with a set schedule in familiar surroundings with familiar people. All complex tasks must be done by others. Vocabulary is diminished and speech often unfocused.     Electronically signed by:  Rosie Pink OTR/L, ATP       Occupational Therapist, Assistive   [phone]      fax: [phone]      [email or web address], [address]  [address]  [place] [address]

## 2017-09-05 ENCOUNTER — HOSPITAL ENCOUNTER (OUTPATIENT)
Dept: REHABILITATION | Facility: CLINIC | Age: 42
End: 2017-09-05
Attending: FAMILY MEDICINE
Payer: COMMERCIAL

## 2017-09-05 PROCEDURE — 40000247 ZZH STATISTIC VISIT ADULT DAY PROGRAM

## 2017-09-05 PROCEDURE — S5102 ADULT DAY CARE PER DIEM: HCPCS

## 2017-09-05 NOTE — PROGRESS NOTES
"SELF-PRESERVATION FORM  [facility]    Member Name: Shanna Shanks; YOB: 1975  MRN: [id number]  9/5/2017    A. The person is ambulatory or mobile. Yes  B. The person has the combined physical and mental capacity to:  1. Recognize a danger, signal or alarm requiring evacuation from the center: Yes  2. Initiate and complete the evacuation without requiring more than sporadic assistance from another person, such as help in opening a door or getting into a wheelchair: No  3. Select an alternative means of escape or take another appropriate action if the primary escape route is blocked: No  4. Remain at a designated location outside the center until further instruction is given: No    \"Capable of taking appropriate action for self-preservation under emergency conditions\" is the designation applied in parts 9246.5276 to [phone] to an adult who meets the criteria in items A and B.    FAC Self-Preservation Status: Not capable of self-preservation    "

## 2017-09-05 NOTE — PROGRESS NOTES
[facility] Social History    Full Name: Shanna Shanks        MRN: [id number]    YOB: 1975  Nickname:TYRELL      Sex: F     Home Phone: [phone]      Cell Phone: [phone]  Address: [address] [place] [address]//Zip: [place]  [address]  County: [place]       E-mail:TYRELL        Transportation:    Family transport at this time  Language:English         needed? No       Ethnicity: Caucasion  Race: White      Country of Origin: [place]       Jewish: not noted  Marital Status:                   Spouse/Significant Other: Jama Petit   ______________________________________________________________________________________     : No    Branch of Service: NA      Education Level: High School  Job History: Self employeed       Organizations/Clubs: NA  Whom do you live with?  and daughter  Current living arrangement:  Home   Number of Children: 2  List: Michel and Tegan  Number of Siblings: 7     List: Narendra, Aleksandra, Daisha, Elida, Jesus, Iain, Kyle  Other Important People/Pets: 2 cats and 1 dog  What else should we know about you? Very Social.      Primary Care Provider: Dr. Beck        Neurologist: Dr. Talha Reese  Emergency Contacts:  1. Jama Petmark      Relationship: Spouse    Phone: [phone]  2. Aleksandra Shanks         Relationship: sister     Phone:[phone]

## 2017-09-05 NOTE — PROGRESS NOTES
VULNERABLE ADULT ASSESSMENT  [facility]     Assessment of Susceptibility to Abuse, Including Self Abuse, Neglect (Identification of characteristics, which make the individual susceptible to abuse, and how these characteristics cause the individual to be susceptible to abuse.)   Based on completed member needs assessment and interview, cognitive and/or physical limitations impair ability to meet basic needs and/or protect self from maltreatment.  [facility] staff has no previous knowledge of abuse to self or others.   INDIVIDUAL ABUSE PREVENTION PLAN-MEASURES TAKEN TO MINIMIZE RISK OF ABUSE   ADL:   Assist with toileting as needed.   Ambulation/Transfers/Wheelchairs:  Provide transfer belt and assist with transfers and ambulation as needed.  Currently uses a walking stick.    Behavior:   NA  Communication:  Encourage verbalization of needs/concerns  Encourage to voice needs and concerns to appropriate staff  Cognitive Issues:   Provider reminders due to confusion, forgetfulness  Give simple step-by-step direction  Diet:   Staff will prepare food to facilitate client to feed self  Exercise:   Encourage participation in wheelchair aerobics  Hearing:   Speak distinctly and use gestures  Isolation:   Encourage socialization due to isolation in home environment  Medical Monitoring:   Monitor number and types of activities and rest periods when appropriate  Mental Health:   Motivate client to join in activities that are beneficial to client  Encourage client to express feelings  Encourage regular attendance for socialization and stimulation  Offer emotional support  Encourage independent decision making  Provide client with choices of programming to encourage independent decision making  Provide activities in which client can be successful  Sensory:   Provide variety of sensory groups to maintain current cognitive level  Vision:   NA  Other: NA  Referral Indicated: PT OT (is currently  being seen by OT and PT)  Developed/Reviewed with Member: No  The Program Abuse Prevention Plan identifies the specific actions that minimize abuse to the [facility] participant.  Yes  Plan Reviewed:   Date VULNERABLE ADULT ASSESSMENT/INDIVIDUAL ABUSE PREVENTTION PLAN Remains Appropriate (Y=Yes, N=No)  If NO, see revised VULNERABLE ADULT ASSESSMENT/INDIVIDUAL ABUSE PREVENTTION PLAN document  Staff Name

## 2017-09-05 NOTE — PROGRESS NOTES
[facility] Note: Initial orientation paperwork was completed on this date.  Patient is currently being seen in OT and PT.  She scored 10/30 on the Johnny Cognitive Assessment when completed in OT.  She is SBA with functional ambulation with her walking stick.  We will provide SBA for toileting and monitor as she progresses.  No food allergies noted, so she will be on a regular diet.  She will attend the [facility] on Tuesday and Thursdays.

## 2017-09-05 NOTE — PROGRESS NOTES
INDIVIDUAL PLAN OF CARE   [facility]    Member Name: Shanna Shanks; YOB: 1975  MRN: [id number]  9/5/2017    Goals developed in collaboration with: member and family  Staff responsible for plan: [facility] staff  1. Long Term Goal (concrete, measurable, and time specific outcomes): Member will improve or maintain cognitive functioning while participating in cognitive stimulation, creative arts and support group 2 days per week.    Target Date: February 2018    2. Short Term Goal: (concrete, measurable, and time specific outcomes): Member will require min cuing to initiate  participation in cognitive stimulation group in order to work on her memory and problem solving skills.   Target Date: February 2018    3. Short Term Goal: (concrete, measurable, and time specific outcomes): Member will be oriented to person, place and time in 6 months.   Target Date: February 2008

## 2017-09-06 NOTE — PROGRESS NOTES
INDIVIDUAL PLAN OF CARE   [facility]    Member Name: Shanna Shanks; YOB: 1975  MRN: [id number]  9/6/2017    Goals developed in collaboration with: member  Staff responsible for plan: [facility] staff  1. Long Term Goal (concrete, measurable, and time specific outcomes): Member will improve or maintain cognitive functioning while maintaining dignity and respect.  Target Date: ***  Bi-Annual Review:  Goal reviewed on *** by ***  Goal status: {Goal status:052148}  Bi-Annual Review:  Goal reviewed on *** by ***  Goal status: {Goal status:205929}  Bi-Annual Review:  Goal reviewed on *** by ***  Goal status: {Goal status:136546}  Bi-Annual Review:  Goal reviewed on *** by ***  Goal status: {Goal status:073322}    2. Short Term Goal: (concrete, measurable, and time specific outcomes): Member will actively participate in aerobic exercises each day of program attendance.  Target Date: ***  Bi-Annual Review:  Goal reviewed on *** by ***  Goal status: {Goal status:921690}  Bi-Annual Review:  Goal reviewed on *** by ***  Goal status: {Goal status:701430}  Bi-Annual Review:  Goal reviewed on *** by ***  Goal status: {Goal status:515133}  Bi-Annual Review:  Goal reviewed on *** by ***  Goal status: {Goal status:865410}    3. Short Term Goal: (concrete, measurable, and time specific outcomes): Member will identify and use upper extremity adaptive equipment for fine motor tasks in the art room.  Target Date: ***  Bi-Annual Review:  Goal reviewed on *** by ***  Goal status: {Goal status:942008}  Bi-Annual Review:  Goal reviewed on *** by ***  Goal status: {Goal status:878327}  Bi-Annual Review:  Goal reviewed on *** by ***  Goal status: {Goal status:641302}  Bi-Annual Review:  Goal reviewed on *** by ***  Goal status: {Goal status:475207}    VULNERABLE ADULT ASSESSMENT  [facility]     Assessment of Susceptibility to Abuse, Including Self Abuse, Neglect  "(Identification of characteristics, which make the individual susceptible to abuse, and how these characteristics cause the individual to be susceptible to abuse.)   Based on completed member needs assessment and interview, cognitive and/or physical limitations impair ability to meet basic needs and/or protect self from maltreatment.  [facility] staff has no previous knowledge of abuse to self or others.   INDIVIDUAL ABUSE PREVENTION PLAN-MEASURES TAKEN TO MINIMIZE RISK OF ABUSE   ADL:   Assist with feeding  Assist with toileting  Ambulation/Transfers/Wheelchairs:  Provide assistance prn for propelling wheelchair   Behavior:   Monitor for wandering due to confusion and legally blind status.  Redirect when appropriate.  Communication:  Encourage verbalization of needs/concerns  Cognitive Issues:   Provider reminders due to confusion, forgetfulness  Give simple step-by-step direction  Diet:   Food will be cut into small pieces  Monitor client when eating and/or drinking fluids   Exercise:   Encourage participation in wheelchair aerobics  Hearing:   No present concerns.  Isolation:   Encourage socialization due to isolation in home environment  Medical Monitoring:   Monitor physical and emotional comfort  Mental Health:   Encourage client to express feelings  Sensory:   Provide and encourage participation in activities for stimulation  Vision:   Provide verbal cues  Other: ***  Referral Indicated: None at this time.  Developed/Reviewed with Member: No.  The Program Abuse Prevention Plan identifies the specific actions that minimize abuse to the [facility] participant.  {Yes/No:809015::\"Yes\"}  Plan Reviewed:   Date VULNERABLE ADULT ASSESSMENT/INDIVIDUAL ABUSE PREVENTTION PLAN Remains Appropriate (Y=Yes, N=No)  If NO, see revised VULNERABLE ADULT ASSESSMENT/INDIVIDUAL ABUSE PREVENTTION PLAN document  Staff Name                              "

## 2017-09-07 ENCOUNTER — HOSPITAL ENCOUNTER (OUTPATIENT)
Dept: PHYSICAL THERAPY | Facility: CLINIC | Age: 42
Setting detail: THERAPIES SERIES
End: 2017-09-07
Attending: PHYSICAL MEDICINE & REHABILITATION
Payer: COMMERCIAL

## 2017-09-07 ENCOUNTER — HOSPITAL ENCOUNTER (OUTPATIENT)
Dept: REHABILITATION | Facility: CLINIC | Age: 42
End: 2017-09-07
Attending: FAMILY MEDICINE
Payer: COMMERCIAL

## 2017-09-07 PROCEDURE — 97112 NEUROMUSCULAR REEDUCATION: CPT | Mod: GP | Performed by: PHYSICAL THERAPIST

## 2017-09-07 PROCEDURE — 40000247 ZZH STATISTIC VISIT ADULT DAY PROGRAM

## 2017-09-07 PROCEDURE — 40000719 ZZHC STATISTIC PT DEPARTMENT NEURO VISIT: Performed by: PHYSICAL THERAPIST

## 2017-09-07 PROCEDURE — S5102 ADULT DAY CARE PER DIEM: HCPCS

## 2017-09-07 PROCEDURE — 97110 THERAPEUTIC EXERCISES: CPT | Mod: GP | Performed by: PHYSICAL THERAPIST

## 2017-09-08 ENCOUNTER — HOSPITAL ENCOUNTER (OUTPATIENT)
Dept: PHYSICAL THERAPY | Facility: CLINIC | Age: 42
Setting detail: THERAPIES SERIES
End: 2017-09-08
Attending: PHYSICAL MEDICINE & REHABILITATION
Payer: COMMERCIAL

## 2017-09-08 PROCEDURE — 97110 THERAPEUTIC EXERCISES: CPT | Mod: GP | Performed by: PHYSICAL THERAPIST

## 2017-09-08 PROCEDURE — 40000719 ZZHC STATISTIC PT DEPARTMENT NEURO VISIT: Performed by: PHYSICAL THERAPIST

## 2017-09-08 PROCEDURE — 97116 GAIT TRAINING THERAPY: CPT | Mod: GP | Performed by: PHYSICAL THERAPIST

## 2017-09-11 LAB
FUNGUS SPEC CULT: NORMAL
SPECIMEN SOURCE: NORMAL

## 2017-09-11 NOTE — PROGRESS NOTES
[facility] RN Monthly Progress Note  Previous documentation reviewed.  No concerns reported by [facility] staff or member.

## 2017-09-11 NOTE — ADDENDUM NOTE
Encounter addended by: Savannah Lopez RN on: 9/11/2017  2:15 PM<BR>     Actions taken: Sign clinical note

## 2017-09-12 ENCOUNTER — HOSPITAL ENCOUNTER (OUTPATIENT)
Dept: REHABILITATION | Facility: CLINIC | Age: 42
End: 2017-09-12
Attending: FAMILY MEDICINE
Payer: COMMERCIAL

## 2017-09-12 ENCOUNTER — HOSPITAL ENCOUNTER (OUTPATIENT)
Dept: PHYSICAL THERAPY | Facility: CLINIC | Age: 42
Setting detail: THERAPIES SERIES
End: 2017-09-12
Attending: PHYSICAL MEDICINE & REHABILITATION
Payer: COMMERCIAL

## 2017-09-12 PROCEDURE — 40000247 ZZH STATISTIC VISIT ADULT DAY PROGRAM

## 2017-09-12 PROCEDURE — 97110 THERAPEUTIC EXERCISES: CPT | Mod: GP | Performed by: PHYSICAL THERAPIST

## 2017-09-12 PROCEDURE — 40000719 ZZHC STATISTIC PT DEPARTMENT NEURO VISIT: Performed by: PHYSICAL THERAPIST

## 2017-09-12 PROCEDURE — S5102 ADULT DAY CARE PER DIEM: HCPCS

## 2017-09-12 PROCEDURE — 97116 GAIT TRAINING THERAPY: CPT | Mod: GP | Performed by: PHYSICAL THERAPIST

## 2017-09-13 ENCOUNTER — HOSPITAL ENCOUNTER (OUTPATIENT)
Dept: OCCUPATIONAL THERAPY | Facility: CLINIC | Age: 42
Setting detail: THERAPIES SERIES
End: 2017-09-13
Attending: PHYSICAL MEDICINE & REHABILITATION
Payer: COMMERCIAL

## 2017-09-13 PROCEDURE — 97535 SELF CARE MNGMENT TRAINING: CPT | Mod: GO | Performed by: OCCUPATIONAL THERAPIST

## 2017-09-13 PROCEDURE — 40000125 ZZHC STATISTIC OT OUTPT VISIT: Performed by: OCCUPATIONAL THERAPIST

## 2017-09-14 ENCOUNTER — HOSPITAL ENCOUNTER (OUTPATIENT)
Dept: PHYSICAL THERAPY | Facility: CLINIC | Age: 42
Setting detail: THERAPIES SERIES
End: 2017-09-14
Attending: PHYSICAL MEDICINE & REHABILITATION
Payer: COMMERCIAL

## 2017-09-14 ENCOUNTER — HOSPITAL ENCOUNTER (OUTPATIENT)
Dept: REHABILITATION | Facility: CLINIC | Age: 42
End: 2017-09-14
Attending: FAMILY MEDICINE
Payer: COMMERCIAL

## 2017-09-14 PROCEDURE — 97116 GAIT TRAINING THERAPY: CPT | Mod: GP | Performed by: PHYSICAL THERAPIST

## 2017-09-14 PROCEDURE — 40000719 ZZHC STATISTIC PT DEPARTMENT NEURO VISIT: Performed by: PHYSICAL THERAPIST

## 2017-09-14 PROCEDURE — S5102 ADULT DAY CARE PER DIEM: HCPCS

## 2017-09-14 PROCEDURE — 40000247 ZZH STATISTIC VISIT ADULT DAY PROGRAM

## 2017-09-14 PROCEDURE — 97110 THERAPEUTIC EXERCISES: CPT | Mod: GP | Performed by: PHYSICAL THERAPIST

## 2017-09-15 NOTE — ADDENDUM NOTE
Encounter addended by: Janessa Scott OT on: 9/15/2017  3:16 PM<BR>     Actions taken: Sign clinical note

## 2017-09-19 ENCOUNTER — HOSPITAL ENCOUNTER (OUTPATIENT)
Dept: REHABILITATION | Facility: CLINIC | Age: 42
End: 2017-09-19
Attending: FAMILY MEDICINE
Payer: COMMERCIAL

## 2017-09-19 ENCOUNTER — HOSPITAL ENCOUNTER (OUTPATIENT)
Dept: OCCUPATIONAL THERAPY | Facility: CLINIC | Age: 42
Setting detail: THERAPIES SERIES
End: 2017-09-19
Attending: PHYSICAL MEDICINE & REHABILITATION
Payer: COMMERCIAL

## 2017-09-19 PROCEDURE — 40000247 ZZH STATISTIC VISIT ADULT DAY PROGRAM

## 2017-09-19 PROCEDURE — S5102 ADULT DAY CARE PER DIEM: HCPCS

## 2017-09-19 PROCEDURE — 97535 SELF CARE MNGMENT TRAINING: CPT | Mod: GO | Performed by: OCCUPATIONAL THERAPIST

## 2017-09-19 PROCEDURE — 40000125 ZZHC STATISTIC OT OUTPT VISIT: Performed by: OCCUPATIONAL THERAPIST

## 2017-09-21 ENCOUNTER — HOSPITAL ENCOUNTER (OUTPATIENT)
Dept: PHYSICAL THERAPY | Facility: CLINIC | Age: 42
Setting detail: THERAPIES SERIES
End: 2017-09-21
Attending: PHYSICAL MEDICINE & REHABILITATION
Payer: COMMERCIAL

## 2017-09-21 ENCOUNTER — HOSPITAL ENCOUNTER (OUTPATIENT)
Dept: REHABILITATION | Facility: CLINIC | Age: 42
End: 2017-09-21
Attending: FAMILY MEDICINE
Payer: COMMERCIAL

## 2017-09-21 PROCEDURE — 40000719 ZZHC STATISTIC PT DEPARTMENT NEURO VISIT: Performed by: PHYSICAL THERAPIST

## 2017-09-21 PROCEDURE — 97116 GAIT TRAINING THERAPY: CPT | Mod: GP | Performed by: PHYSICAL THERAPIST

## 2017-09-21 PROCEDURE — 40000247 ZZH STATISTIC VISIT ADULT DAY PROGRAM

## 2017-09-21 PROCEDURE — 97112 NEUROMUSCULAR REEDUCATION: CPT | Mod: GP | Performed by: PHYSICAL THERAPIST

## 2017-09-21 PROCEDURE — S5102 ADULT DAY CARE PER DIEM: HCPCS

## 2017-09-25 ENCOUNTER — HOSPITAL ENCOUNTER (OUTPATIENT)
Dept: OCCUPATIONAL THERAPY | Facility: CLINIC | Age: 42
Setting detail: THERAPIES SERIES
End: 2017-09-25
Attending: PHYSICAL MEDICINE & REHABILITATION
Payer: COMMERCIAL

## 2017-09-25 PROCEDURE — 97535 SELF CARE MNGMENT TRAINING: CPT | Mod: GO | Performed by: OCCUPATIONAL THERAPIST

## 2017-09-25 PROCEDURE — 40000125 ZZHC STATISTIC OT OUTPT VISIT: Performed by: OCCUPATIONAL THERAPIST

## 2017-09-25 NOTE — ADDENDUM NOTE
Encounter addended by: Analisa Gonzalez on: 9/25/2017 11:12 AM<BR>     Actions taken: Episode edited

## 2017-09-26 ENCOUNTER — OFFICE VISIT (OUTPATIENT)
Dept: FAMILY MEDICINE | Facility: CLINIC | Age: 42
End: 2017-09-26
Payer: COMMERCIAL

## 2017-09-26 ENCOUNTER — HOSPITAL ENCOUNTER (OUTPATIENT)
Dept: REHABILITATION | Facility: CLINIC | Age: 42
End: 2017-09-26
Attending: FAMILY MEDICINE
Payer: COMMERCIAL

## 2017-09-26 VITALS
BODY MASS INDEX: 22.52 KG/M2 | DIASTOLIC BLOOD PRESSURE: 50 MMHG | OXYGEN SATURATION: 98 % | HEART RATE: 69 BPM | RESPIRATION RATE: 16 BRPM | HEIGHT: 67 IN | WEIGHT: 143.5 LBS | SYSTOLIC BLOOD PRESSURE: 105 MMHG | TEMPERATURE: 98.2 F

## 2017-09-26 DIAGNOSIS — Z23 NEED FOR PROPHYLACTIC VACCINATION AND INOCULATION AGAINST INFLUENZA: ICD-10-CM

## 2017-09-26 DIAGNOSIS — R82.90 NONSPECIFIC FINDING ON EXAMINATION OF URINE: ICD-10-CM

## 2017-09-26 DIAGNOSIS — R30.0 DYSURIA: Primary | ICD-10-CM

## 2017-09-26 LAB
ALBUMIN UR-MCNC: NEGATIVE MG/DL
APPEARANCE UR: ABNORMAL
BACTERIA #/AREA URNS HPF: ABNORMAL /HPF
BILIRUB UR QL STRIP: NEGATIVE
COLOR UR AUTO: YELLOW
GLUCOSE UR STRIP-MCNC: NEGATIVE MG/DL
HGB UR QL STRIP: ABNORMAL
KETONES UR STRIP-MCNC: NEGATIVE MG/DL
LEUKOCYTE ESTERASE UR QL STRIP: ABNORMAL
NITRATE UR QL: NEGATIVE
NON-SQ EPI CELLS #/AREA URNS LPF: ABNORMAL /LPF
PH UR STRIP: 6.5 PH (ref 5–7)
RBC #/AREA URNS AUTO: ABNORMAL /HPF
SOURCE: ABNORMAL
SP GR UR STRIP: 1.01 (ref 1–1.03)
UROBILINOGEN UR STRIP-ACNC: 0.2 EU/DL (ref 0.2–1)
WBC #/AREA URNS AUTO: ABNORMAL /HPF

## 2017-09-26 PROCEDURE — 87086 URINE CULTURE/COLONY COUNT: CPT | Performed by: FAMILY MEDICINE

## 2017-09-26 PROCEDURE — 90686 IIV4 VACC NO PRSV 0.5 ML IM: CPT | Performed by: FAMILY MEDICINE

## 2017-09-26 PROCEDURE — 81001 URINALYSIS AUTO W/SCOPE: CPT | Performed by: FAMILY MEDICINE

## 2017-09-26 PROCEDURE — 99213 OFFICE O/P EST LOW 20 MIN: CPT | Mod: 25 | Performed by: FAMILY MEDICINE

## 2017-09-26 PROCEDURE — 87186 SC STD MICRODIL/AGAR DIL: CPT | Performed by: FAMILY MEDICINE

## 2017-09-26 PROCEDURE — 90471 IMMUNIZATION ADMIN: CPT | Performed by: FAMILY MEDICINE

## 2017-09-26 PROCEDURE — 40000247 ZZH STATISTIC VISIT ADULT DAY PROGRAM

## 2017-09-26 PROCEDURE — S5102 ADULT DAY CARE PER DIEM: HCPCS

## 2017-09-26 PROCEDURE — 87088 URINE BACTERIA CULTURE: CPT | Performed by: FAMILY MEDICINE

## 2017-09-26 RX ORDER — SULFAMETHOXAZOLE/TRIMETHOPRIM 800-160 MG
1 TABLET ORAL 2 TIMES DAILY
Qty: 14 TABLET | Refills: 0 | Status: SHIPPED | OUTPATIENT
Start: 2017-09-26 | End: 2018-03-07

## 2017-09-26 NOTE — MR AVS SNAPSHOT
After Visit Summary   9/26/2017    Shanna Shanks    MRN: [id number]           Patient Information     Date Of Birth          1975        Visit Information        Provider Department      9/26/2017 9:20 AM Malvin Beck MD [facility]        Today's Diagnoses     Dysuria    -  1    Need for prophylactic vaccination and inoculation against influenza        Nonspecific finding on examination of urine           Follow-ups after your visit        Your next 10 appointments already scheduled     Sep 27, 2017 10:45 AM CDT   Neuro Treatment with Kassandra Dale, PT   [facility])    [address], [address]  [place] [address]   [phone]            Sep 28, 2017 10:00 AM CDT   Treatment with [facility] RAD[facility] ([facility])    [address]  [facility] [address]               Sep 29, 2017 10:45 AM CDT   Neuro Treatment with Kassandra Dale, PT   [facility])    [address]  [place] [address]   [phone]            Oct 02, 2017 11:45 AM CDT   Neuro Treatment with Rosie Pink, OT   [facility] ([facility])    [address]  [place] [address]   [phone]            Oct 03, 2017 10:00 AM CDT   Neuro Treatment with Kassandra Dale, PT   [facility])    [address]  [place] [address]   [phone]            Oct 03, 2017 10:00 AM CDT   Treatment with [facility] ([facility])    [address]  [place] [address]               Oct 05, 2017 10:00 AM CDT   Neuro Treatment with Kassandra Dale PT   [facility] [place], Physical Therapy - Outpatient ([facility])    [address]  [place] [address]   [phone]            Oct 05, 2017 10:00 AM CDT   Treatment with MSAC PER JEAN   [facility] ([facility])    [address]  [place] [address]               Oct 10, 2017 10:00 AM CDT   Treatment with MSAC PER JEAN   [facility] ([facility])    [address]  [place] [address]               Oct 12, 2017 10:00 AM CDT   Treatment with MSAC PER JEAN   [facility] ([facility])    [address]  [place] [address]                 Who to contact     If you have questions or need follow up information about today's clinic visit or your schedule please contact [facility] directly at [phone].  Normal or non-critical lab and imaging results will be communicated to you by [email or web address], letter or phone within 4 business days after the clinic has received the results. If you do not hear from us within 7 days, please contact the clinic through [email or web address] or phone. If you have a critical or abnormal lab result, we will notify you by phone as soon as possible.  Submit refill requests through [employer] or call your pharmacy and they will forward the refill request to us. Please allow 3 business days for your refill to be completed.          Additional Information About Your Visit        SkyBitz Information     [employer]itz gives you secure access to your electronic health record. If you see a primary care provider, you can also send  "messages to your care team and make appointments. If you have questions, please call your primary care clinic.  If you do not have a primary care provider, please call [phone] and they will assist you.        Care EveryWhere ID     This is your Care EveryWhere ID. This could be used by other organizations to access your [place] medical records  [id number]        Your Vitals Were     Pulse Temperature Respirations Height Last Period Pulse Oximetry    69 98.2  F (36.8  C) (Oral) 16 5' 6.5\" (1.689 m) 09/25/2017 (Exact Date) 98%    BMI (Body Mass Index)                   22.81 kg/m2            Blood Pressure from Last 3 Encounters:   09/26/17 105/50   08/16/17 116/64   08/15/17 105/60    Weight from Last 3 Encounters:   09/26/17 143 lb 8 oz (65.1 kg)   08/16/17 134 lb 8 oz (61 kg)   08/14/17 140 lb 3.4 oz (63.6 kg)              We Performed the Following     FLU VAC, SPLIT VIRUS IM > 3 YO (QUADRIVALENT) [89117]     UA reflex to Microscopic and Culture     Urine Culture Aerobic Bacterial     Urine Microscopic     Vaccine Administration, Initial [[id number]]          Today's Medication Changes          These changes are accurate as of: 9/26/17  2:33 PM.  If you have any questions, ask your nurse or doctor.               Start taking these medicines.        Dose/Directions    sulfamethoxazole-trimethoprim 800-160 MG per tablet   Commonly known as:  BACTRIM DS/SEPTRA DS   Used for:  Dysuria   Started by:  Malvin Beck MD        Dose:  1 tablet   Take 1 tablet by mouth 2 times daily   Quantity:  14 tablet   Refills:  0            Where to get your medicines      These medications were sent to [facility] [place], [place] - [address]  [address], [facility] [address]     Phone:  [phone]     sulfamethoxazole-trimethoprim 800-160 MG per tablet                Primary Care Provider Office Phone # Fax #    Malvin Beck [phone]       [address] [facility] [address]        Equal Access to Services     [facility] MEREDITH : Hadii aad ku hadsloanchristina Reinarip, wamitchda luqadaha, qaybta kaalmada eh, liza caldwell. So [facility] [phone].    ATENCIÓN: Si habla español, tiene a gatica disposición servicios gratuitos de asistencia lingüística. Jeyson al [phone].    We comply with applicable federal civil rights laws and [place] laws. We do not discriminate on the basis of race, color, national origin, age, disability sex, sexual orientation or gender identity.            Thank you!     Thank you for choosing [facility]  for your care. Our goal is always to provide you with excellent care. Hearing back from our patients is one way we can continue to improve our services. Please take a few minutes to complete the written survey that you may receive in the mail after your visit with us. Thank you!             Your Updated Medication List - Protect others around you: Learn how to safely use, store and throw away your medicines at www.disposemymeds.org.          This list is accurate as of: 9/26/17  2:33 PM.  Always use your most recent med list.                   Brand Name Dispense Instructions for use Diagnosis    baclofen 10 MG tablet    LIORESAL     Take 10-20 mg by mouth 4 times daily as needed        CRANBERRY EXTRACT PO      Take 1 tablet by mouth daily        levETIRAcetam 750 MG tablet    KEPPRA    60 tablet    Take 1 tablet (750 mg) by mouth 2 times daily    Seizures (H)       norethindrone 0.35 MG per tablet    MICRONOR     Take 1 tablet by mouth daily        oxybutynin 10 MG 24 hr tablet    DITROPAN-XL    30 tablet    Take 1 tablet (10 mg) by mouth At Bedtime    Multiple sclerosis (H)       sulfamethoxazole-trimethoprim 800-160 MG per tablet    BACTRIM DS/SEPTRA DS    14 tablet    Take 1 tablet by mouth 2 times daily    Dysuria       venlafaxine 75 MG 24 hr capsule    EFFEXOR-XR     Take 75 mg by mouth daily         vitamin D 74606 UNIT capsule    ERGOCALCIFEROL     Take 50,000 Units by mouth once a week On Sundays

## 2017-09-27 ENCOUNTER — HOSPITAL ENCOUNTER (OUTPATIENT)
Dept: PHYSICAL THERAPY | Facility: CLINIC | Age: 42
Setting detail: THERAPIES SERIES
End: 2017-09-27
Attending: PHYSICAL MEDICINE & REHABILITATION
Payer: COMMERCIAL

## 2017-09-27 PROCEDURE — 97116 GAIT TRAINING THERAPY: CPT | Mod: GP | Performed by: PHYSICAL THERAPIST

## 2017-09-27 PROCEDURE — 97112 NEUROMUSCULAR REEDUCATION: CPT | Mod: GP | Performed by: PHYSICAL THERAPIST

## 2017-09-27 PROCEDURE — 40000719 ZZHC STATISTIC PT DEPARTMENT NEURO VISIT: Performed by: PHYSICAL THERAPIST

## 2017-09-28 ENCOUNTER — HOSPITAL ENCOUNTER (OUTPATIENT)
Dept: REHABILITATION | Facility: CLINIC | Age: 42
End: 2017-09-28
Attending: FAMILY MEDICINE
Payer: COMMERCIAL

## 2017-09-28 PROCEDURE — 40000247 ZZH STATISTIC VISIT ADULT DAY PROGRAM

## 2017-09-28 PROCEDURE — S5102 ADULT DAY CARE PER DIEM: HCPCS

## 2017-09-28 NOTE — PROGRESS NOTES
MONTHLY PROGRESS NOTE AND PARTICIPATION REPORT   [facility]    Shanna Shanks, 1975  Attendance: Please see Epic for attendance record.    Communication:   Does communicate   Hearing:   No impairment  Vision:   No impairment  Orientation/Cognition:   Minor forgetfulness  Partial disorientation  Short term memory loss  Behavior:   Requires redirection.   Self-Preservation Skills:   Not capable of self-preservation  Eating:   Independent  Assist with set up  Ambulation Walking:   SBA with ambulation  Transferring:   Independent  Wheelchair:   NA  Toileting:   Needs assistance  Maintenance Program:     None  Living Arrangements:   Lives with family  Spiritual Needs: Needs are being met through support group  Medication Assistance:   Medication not taken during program hours  Participation Report:   Aerobics/Exercise  Support Group  Cognitive Stimulation  Creative Arts/Crafts  Games  Gardening  Speakers/Entertainment  Socialization  Current Events/News  Education/Health Topic  Level of Participation:   Active and the best of her ability.

## 2017-09-29 ENCOUNTER — TELEPHONE (OUTPATIENT)
Dept: FAMILY MEDICINE | Facility: CLINIC | Age: 42
End: 2017-09-29

## 2017-09-29 ENCOUNTER — HOSPITAL ENCOUNTER (OUTPATIENT)
Dept: PHYSICAL THERAPY | Facility: CLINIC | Age: 42
Setting detail: THERAPIES SERIES
End: 2017-09-29
Attending: PHYSICAL MEDICINE & REHABILITATION
Payer: COMMERCIAL

## 2017-09-29 DIAGNOSIS — N39.0 URINARY TRACT INFECTION WITHOUT HEMATURIA, SITE UNSPECIFIED: Primary | ICD-10-CM

## 2017-09-29 LAB
BACTERIA SPEC CULT: ABNORMAL
SPECIMEN SOURCE: ABNORMAL

## 2017-09-29 PROCEDURE — 97110 THERAPEUTIC EXERCISES: CPT | Mod: GP | Performed by: PHYSICAL THERAPIST

## 2017-09-29 PROCEDURE — 40000719 ZZHC STATISTIC PT DEPARTMENT NEURO VISIT: Performed by: PHYSICAL THERAPIST

## 2017-09-29 PROCEDURE — 97116 GAIT TRAINING THERAPY: CPT | Mod: GP | Performed by: PHYSICAL THERAPIST

## 2017-09-29 PROCEDURE — 97112 NEUROMUSCULAR REEDUCATION: CPT | Mod: GP | Performed by: PHYSICAL THERAPIST

## 2017-09-29 RX ORDER — CIPROFLOXACIN 500 MG/1
500 TABLET, FILM COATED ORAL 2 TIMES DAILY
Qty: 14 TABLET | Refills: 0 | Status: SHIPPED | OUTPATIENT
Start: 2017-09-29 | End: 2018-03-07

## 2017-09-29 NOTE — TELEPHONE ENCOUNTER
Urine culture showed >100,000 colonies/mLCoagulase negative Staphylococcus.   No sensitivity report on bactirm.  Switch to cipro. Called and talked to  (patient present) and side effects discussed.

## 2017-10-02 ENCOUNTER — HOSPITAL ENCOUNTER (OUTPATIENT)
Dept: OCCUPATIONAL THERAPY | Facility: CLINIC | Age: 42
Setting detail: THERAPIES SERIES
End: 2017-10-02
Attending: PHYSICAL MEDICINE & REHABILITATION
Payer: COMMERCIAL

## 2017-10-02 PROCEDURE — 40000125 ZZHC STATISTIC OT OUTPT VISIT: Performed by: OCCUPATIONAL THERAPIST

## 2017-10-02 PROCEDURE — 97535 SELF CARE MNGMENT TRAINING: CPT | Mod: GO,59 | Performed by: OCCUPATIONAL THERAPIST

## 2017-10-02 PROCEDURE — 97530 THERAPEUTIC ACTIVITIES: CPT | Mod: GO | Performed by: OCCUPATIONAL THERAPIST

## 2017-10-03 ENCOUNTER — HOSPITAL ENCOUNTER (OUTPATIENT)
Dept: REHABILITATION | Facility: CLINIC | Age: 42
End: 2017-10-03
Attending: FAMILY MEDICINE
Payer: COMMERCIAL

## 2017-10-03 ENCOUNTER — HOSPITAL ENCOUNTER (OUTPATIENT)
Dept: PHYSICAL THERAPY | Facility: CLINIC | Age: 42
Setting detail: THERAPIES SERIES
End: 2017-10-03
Attending: PHYSICAL MEDICINE & REHABILITATION
Payer: COMMERCIAL

## 2017-10-03 PROCEDURE — 40000247 ZZH STATISTIC VISIT ADULT DAY PROGRAM

## 2017-10-03 PROCEDURE — S5102 ADULT DAY CARE PER DIEM: HCPCS

## 2017-10-03 PROCEDURE — 97116 GAIT TRAINING THERAPY: CPT | Mod: GP | Performed by: PHYSICAL THERAPIST

## 2017-10-03 PROCEDURE — 97110 THERAPEUTIC EXERCISES: CPT | Mod: GP | Performed by: PHYSICAL THERAPIST

## 2017-10-03 PROCEDURE — 40000719 ZZHC STATISTIC PT DEPARTMENT NEURO VISIT: Performed by: PHYSICAL THERAPIST

## 2017-10-05 ENCOUNTER — HOSPITAL ENCOUNTER (OUTPATIENT)
Dept: REHABILITATION | Facility: CLINIC | Age: 42
End: 2017-10-05
Attending: FAMILY MEDICINE
Payer: COMMERCIAL

## 2017-10-05 ENCOUNTER — HOSPITAL ENCOUNTER (OUTPATIENT)
Dept: PHYSICAL THERAPY | Facility: CLINIC | Age: 42
Setting detail: THERAPIES SERIES
End: 2017-10-05
Attending: PHYSICAL MEDICINE & REHABILITATION
Payer: COMMERCIAL

## 2017-10-05 PROCEDURE — 40000247 ZZH STATISTIC VISIT ADULT DAY PROGRAM

## 2017-10-05 PROCEDURE — 40000719 ZZHC STATISTIC PT DEPARTMENT NEURO VISIT: Performed by: PHYSICAL THERAPIST

## 2017-10-05 PROCEDURE — S5102 ADULT DAY CARE PER DIEM: HCPCS

## 2017-10-05 PROCEDURE — 97110 THERAPEUTIC EXERCISES: CPT | Mod: GP | Performed by: PHYSICAL THERAPIST

## 2017-10-05 PROCEDURE — 97116 GAIT TRAINING THERAPY: CPT | Mod: GP | Performed by: PHYSICAL THERAPIST

## 2017-10-10 ENCOUNTER — HOSPITAL ENCOUNTER (OUTPATIENT)
Dept: REHABILITATION | Facility: CLINIC | Age: 42
End: 2017-10-10
Attending: FAMILY MEDICINE
Payer: COMMERCIAL

## 2017-10-10 PROCEDURE — 40000247 ZZH STATISTIC VISIT ADULT DAY PROGRAM

## 2017-10-10 PROCEDURE — S5102 ADULT DAY CARE PER DIEM: HCPCS

## 2017-10-11 NOTE — PROGRESS NOTES
MONTHLY PROGRESS NOTE AND PARTICIPATION REPORT   [facility]    Shanna Shanks, 1975  Attendance: Please see Epic for attendance record.    Communication:   Does communicate   Hearing:   No impairment  Vision:   No impairment  Orientation/Cognition:   Minor forgetfulness  Partial disorientation  Short term memory loss  Behavior:   Requires redirection  Self-Preservation Skills:   Not capable of self-preservation  Eating:   Independent  Assist with set up  Ambulation Walking:   SBA with ambulation  Transferring:   Independent  Wheelchair:   NA  Toileting:   Needs assistance  Maintenance Program:     None  Living Arrangements:   Lives with family  Spiritual Needs: Needs are being met through support group  Medication Assistance:   Medication not taken during program hours  Participation Report:   Aerobics/Exercise  Support Group  Cognitive Stimulation  Fire Drill  Creative Arts/Crafts  Games  Gardening  Speakers/Entertainment  Socialization  Current Events/News  Education/Health Topic  Meditation, prayer group, gratitude in the moment, Oktoberfest with root beer, water week, group games, music meditation, world series with popcorn, football pool, yoga relaxation with Nyla  Level of Participation:   Active  To the best of their ability

## 2017-10-12 ENCOUNTER — HOSPITAL ENCOUNTER (OUTPATIENT)
Dept: REHABILITATION | Facility: CLINIC | Age: 42
End: 2017-10-12
Attending: FAMILY MEDICINE
Payer: COMMERCIAL

## 2017-10-12 PROCEDURE — S5102 ADULT DAY CARE PER DIEM: HCPCS

## 2017-10-12 PROCEDURE — 40000247 ZZH STATISTIC VISIT ADULT DAY PROGRAM

## 2017-10-17 ENCOUNTER — HOSPITAL ENCOUNTER (OUTPATIENT)
Dept: REHABILITATION | Facility: CLINIC | Age: 42
End: 2017-10-17
Attending: FAMILY MEDICINE
Payer: COMMERCIAL

## 2017-10-17 PROCEDURE — S5102 ADULT DAY CARE PER DIEM: HCPCS

## 2017-10-17 PROCEDURE — 40000247 ZZH STATISTIC VISIT ADULT DAY PROGRAM

## 2017-10-19 ENCOUNTER — HOSPITAL ENCOUNTER (OUTPATIENT)
Dept: REHABILITATION | Facility: CLINIC | Age: 42
End: 2017-10-19
Attending: FAMILY MEDICINE
Payer: COMMERCIAL

## 2017-10-19 PROCEDURE — S5102 ADULT DAY CARE PER DIEM: HCPCS

## 2017-10-19 PROCEDURE — 40000247 ZZH STATISTIC VISIT ADULT DAY PROGRAM

## 2017-10-19 NOTE — ADDENDUM NOTE
Encounter addended by: Alexus Faye, SLP on: 10/19/2017  1:43 PM<BR>     Actions taken: Episode resolved

## 2017-10-19 NOTE — PROGRESS NOTES
MSAC RN Monthly Progress Note  Previous documentation reviewed. No concerns reported by [facility] staff or member.

## 2017-10-24 ENCOUNTER — HOSPITAL ENCOUNTER (OUTPATIENT)
Dept: REHABILITATION | Facility: CLINIC | Age: 42
End: 2017-10-24
Attending: FAMILY MEDICINE
Payer: COMMERCIAL

## 2017-10-24 PROCEDURE — 40000247 ZZH STATISTIC VISIT ADULT DAY PROGRAM

## 2017-10-24 PROCEDURE — S5102 ADULT DAY CARE PER DIEM: HCPCS

## 2017-10-26 ENCOUNTER — HOSPITAL ENCOUNTER (OUTPATIENT)
Dept: OCCUPATIONAL THERAPY | Facility: CLINIC | Age: 42
Setting detail: THERAPIES SERIES
End: 2017-10-26
Attending: PHYSICAL MEDICINE & REHABILITATION
Payer: COMMERCIAL

## 2017-10-26 ENCOUNTER — HOSPITAL ENCOUNTER (OUTPATIENT)
Dept: REHABILITATION | Facility: CLINIC | Age: 42
End: 2017-10-26
Attending: FAMILY MEDICINE
Payer: COMMERCIAL

## 2017-10-26 PROCEDURE — 97530 THERAPEUTIC ACTIVITIES: CPT | Mod: GO | Performed by: OCCUPATIONAL THERAPIST

## 2017-10-26 PROCEDURE — 40000247 ZZH STATISTIC VISIT ADULT DAY PROGRAM

## 2017-10-26 PROCEDURE — S5102 ADULT DAY CARE PER DIEM: HCPCS

## 2017-10-26 PROCEDURE — 40000125 ZZHC STATISTIC OT OUTPT VISIT: Performed by: OCCUPATIONAL THERAPIST

## 2017-10-31 ENCOUNTER — HOSPITAL ENCOUNTER (OUTPATIENT)
Dept: OCCUPATIONAL THERAPY | Facility: CLINIC | Age: 42
Setting detail: THERAPIES SERIES
End: 2017-10-31
Attending: PHYSICAL MEDICINE & REHABILITATION
Payer: COMMERCIAL

## 2017-10-31 ENCOUNTER — HOSPITAL ENCOUNTER (OUTPATIENT)
Dept: REHABILITATION | Facility: CLINIC | Age: 42
End: 2017-10-31
Attending: FAMILY MEDICINE
Payer: COMMERCIAL

## 2017-10-31 PROCEDURE — S5102 ADULT DAY CARE PER DIEM: HCPCS

## 2017-10-31 PROCEDURE — 40000125 ZZHC STATISTIC OT OUTPT VISIT: Performed by: OCCUPATIONAL THERAPIST

## 2017-10-31 PROCEDURE — 97535 SELF CARE MNGMENT TRAINING: CPT | Mod: GO | Performed by: OCCUPATIONAL THERAPIST

## 2017-10-31 PROCEDURE — 40000247 ZZH STATISTIC VISIT ADULT DAY PROGRAM

## 2017-11-02 ENCOUNTER — HOSPITAL ENCOUNTER (OUTPATIENT)
Dept: REHABILITATION | Facility: CLINIC | Age: 42
End: 2017-11-02
Attending: FAMILY MEDICINE
Payer: COMMERCIAL

## 2017-11-02 PROCEDURE — 40000247 ZZH STATISTIC VISIT ADULT DAY PROGRAM

## 2017-11-02 PROCEDURE — S5102 ADULT DAY CARE PER DIEM: HCPCS

## 2017-11-07 ENCOUNTER — HOSPITAL ENCOUNTER (OUTPATIENT)
Dept: OCCUPATIONAL THERAPY | Facility: CLINIC | Age: 42
Setting detail: THERAPIES SERIES
End: 2017-11-07
Attending: PHYSICAL MEDICINE & REHABILITATION
Payer: COMMERCIAL

## 2017-11-07 ENCOUNTER — HOSPITAL ENCOUNTER (OUTPATIENT)
Dept: REHABILITATION | Facility: CLINIC | Age: 42
End: 2017-11-07
Attending: FAMILY MEDICINE
Payer: COMMERCIAL

## 2017-11-07 PROCEDURE — 97532 ZZHC OT COGNITIVE SKILLS EA 15 MIN: CPT | Mod: GO | Performed by: OCCUPATIONAL THERAPIST

## 2017-11-07 PROCEDURE — 40000247 ZZH STATISTIC VISIT ADULT DAY PROGRAM

## 2017-11-07 PROCEDURE — S5102 ADULT DAY CARE PER DIEM: HCPCS

## 2017-11-07 PROCEDURE — 40000125 ZZHC STATISTIC OT OUTPT VISIT: Performed by: OCCUPATIONAL THERAPIST

## 2017-11-07 NOTE — PROGRESS NOTES
11/07/17 1100   Signing Clinician's Name / Credentials   Signing clinician's name / credentials Rosie Pink OTR/L,ATP   Session Number   Session Number 8- Discharge Summary   Progress/Recertification   Recertification Due 11/23/17   Adult OT Eval Goals   OT Eval Goals (Adult) 1;2;3   OT Goal 1   Goal Identifier Memory and problem soliving   Goal Description Patient will demo/report use of 3-4 new strategies in order to be oriented to time and place and to be able to be safely left alone for a few hours.   Target Date 11/23/17   Date Met 11/07/17   OT Goal 3   Goal Identifier HEP   Goal Description Patient will be independent with UE HEP for strength and coordination in order to increase functional participation in ADL's and manage spasticity.   Target Date 11/23/17   Date Met 11/07/17   Subjective Report   Subjective Report Patient reports not remembering things from last time but able to remember once reviewed.     Objective Measure 1   Objective Measure SDMT   Details 15 in 90 sec.  Below norm for her age group   Therapeutic Interventions   Therapeutic Interventions Cognitive Skills   Cognitive Skills   Minutes 45 Minutes   Skilled Intervention Functional problem solving and STM/Delayed recal   Patient Response Patient reports liking using her brain although its hard she likes to be busy.  Demo's increase ease of participation in cognitive and problem sovling tasks.   Treatment Detail Review of how to complete soduko 4 # activity.  Patient able to complete 1/2 of puzzle right 2x then cues needed to fix errors made. Patietn able to notice errors but needing a littel more assistance to fix.  STM actvities.  100% accurate with fuctional memory from details on a scheduling card.  80% accurate with y/n and multiple choice questions about a paragraph and 89% accurate with category questions about a given word list.    Progress goals met, no further OT warrented at this time.   Education   Learner Patient   Readiness  Acceptance   Method Demonstration;Explanation   Response Verbalizes understanding;Demonstrates understanding   Communication with other professionals   Communication with other professionals f/u with [facility] day program staff.  Patient completeing nu step bike for UE and LE exercise program while at day program.   Comments   Comments Discharge Summary- Patient seen for 8 Ot visits focusing on Memory, problem solivng, and HEP s/p hospital stay.  Patient has made significant gains since first apt. She is now attending the day program and getting social, art, and cognitive internventions as well as a maintenence HEP program.  She is now able to follow along in coversations, stay on task, and complete higher level problem solving tasks with cues.  No further OT needed at this time as goals have been met.   Total Session Time   Total Treatment Time (sum of timed and untimed services) 45   Electronically signed by:  Rosie Pink OTR/L, ATP       Occupational Therapist, Assistive   [phone]      fax: [phone]      [email or web address], [address]  [place]   [address]

## 2017-11-09 ENCOUNTER — HOSPITAL ENCOUNTER (OUTPATIENT)
Dept: REHABILITATION | Facility: CLINIC | Age: 42
End: 2017-11-09
Attending: FAMILY MEDICINE
Payer: COMMERCIAL

## 2017-11-09 PROCEDURE — 40000247 ZZH STATISTIC VISIT ADULT DAY PROGRAM

## 2017-11-09 PROCEDURE — S5102 ADULT DAY CARE PER DIEM: HCPCS

## 2017-11-13 NOTE — ADDENDUM NOTE
Encounter addended by: Analisa Gonzalez on: 11/13/2017  4:04 PM<BR>     Actions taken: Episode edited

## 2017-11-14 ENCOUNTER — HOSPITAL ENCOUNTER (OUTPATIENT)
Dept: REHABILITATION | Facility: CLINIC | Age: 42
End: 2017-11-14
Attending: FAMILY MEDICINE
Payer: COMMERCIAL

## 2017-11-14 PROCEDURE — S5102 ADULT DAY CARE PER DIEM: HCPCS

## 2017-11-14 PROCEDURE — 40000247 ZZH STATISTIC VISIT ADULT DAY PROGRAM

## 2017-11-16 ENCOUNTER — HOSPITAL ENCOUNTER (OUTPATIENT)
Dept: REHABILITATION | Facility: CLINIC | Age: 42
End: 2017-11-16
Attending: FAMILY MEDICINE
Payer: COMMERCIAL

## 2017-11-16 PROCEDURE — S5102 ADULT DAY CARE PER DIEM: HCPCS

## 2017-11-16 PROCEDURE — 40000247 ZZH STATISTIC VISIT ADULT DAY PROGRAM

## 2017-11-20 NOTE — PROGRESS NOTES
MONTHLY PROGRESS NOTE AND PARTICIPATION REPORT   [facility]    Shanna Shanks, 1975  Attendance: Please see Epic for attendance record.    Communication:   Does communicate   Hearing:   No impairment  Vision:   No impairment  Orientation/Cognition:   Minor forgetfulness  Partial disorientation  Short term memory loss  Behavior:   Requires redirection  Self-Preservation Skills:   Not capable of self-preservation  Eating:   Independent  Assist with set up  Ambulation Walking:   SBA with ambulation  Transferring:   Independent  Wheelchair:   NA  Toileting:   Needs assistance  Maintenance Program:     [facility]  Living Arrangements:   Lives with family  Spiritual Needs: Needs are being met through support group  Medication Assistance:   Medication not taken during program hours  Participation Report:   Aerobics/Exercise  Support Group  Cognitive Stimulation  Fire Drill  Creative Arts/Crafts  Games  Speakers/Entertainment  Socialization  Current Events/News  Education/Health Topic  Veterans week, music meditation, animal ambassadors, meditation group, exercise week, prayer time, Thanksgiving history, pies, group games, communion, yoga relaxation, flannel week  Level of Participation:   Active  To the best of their ability

## 2017-11-20 NOTE — ADDENDUM NOTE
Encounter addended by: Analisa Gonzalez on: 11/20/2017  2:02 PM<BR>     Actions taken: Episode edited, Sign clinical note

## 2017-11-21 ENCOUNTER — HOSPITAL ENCOUNTER (OUTPATIENT)
Dept: REHABILITATION | Facility: CLINIC | Age: 42
End: 2017-11-21
Attending: FAMILY MEDICINE
Payer: COMMERCIAL

## 2017-11-21 PROCEDURE — S5102 ADULT DAY CARE PER DIEM: HCPCS

## 2017-11-21 PROCEDURE — 40000247 ZZH STATISTIC VISIT ADULT DAY PROGRAM

## 2017-11-21 NOTE — PROGRESS NOTES
MSAC RN Monthly Progress Note  Previous documentation reviewed. No concerns reported by [facility] staff or member.

## 2017-11-28 ENCOUNTER — HOSPITAL ENCOUNTER (OUTPATIENT)
Dept: REHABILITATION | Facility: CLINIC | Age: 42
End: 2017-11-28
Attending: FAMILY MEDICINE
Payer: COMMERCIAL

## 2017-11-28 PROCEDURE — 40000247 ZZH STATISTIC VISIT ADULT DAY PROGRAM

## 2017-11-28 PROCEDURE — S5102 ADULT DAY CARE PER DIEM: HCPCS

## 2017-11-28 NOTE — PROGRESS NOTES
Outpatient Physical Therapy Discharge Note     Patient: Shanna Shanks  : 1975    Beginning/End Dates of Reporting Period:  17 to 2017    Referring Provider: Lucian Orr Diagnosis: MS with imbalance     Client Self Report:  Am good    Objective Measurements:  Objective Measure: FGA  Details: 13  Objective Measure: 25' walk  Details: 11.5 sec, 18 steps  Objective Measure: TUG  Details: 15.8 sec   Objective Measure: nustep  Details: WL 4, seat 10  arms 12.  time:  10 min        Goals:  Goal Identifier FGA, not met but progress made to goal   Goal Description Pt demo FGA >15/30 for improved balance with dynamic activities to facilitate improved saftey at home, in community, and with Day Program.   Target Date 17   Date Met      Progress:     Goal Identifier TUG- goal nearly met (0.7sec off)   Goal Description Pt demo decreased risk for falls with household mobility with AAD via TUG <15.1 secs.   Target Date 17   Date Met      Progress:     Goal Identifier 25' walk   Goal Description Pt demo improved gait quality and efficiency via 25' walk <14 sec with AAD.   Target Date 17   Date Met  17   Progress:     Goal Identifier 3MWT - not met   Goal Description Pt demo improved activity tolerance via 20% improvement in 3MWT from baseline testing with AAD for improved community accessibility.   Target Date 17   Date Met      Progress:     Goal Identifier HEP   Goal Description Pt demo ability to complete HEP with handouts, min verbal cues from family for balance, LE strength, and endurance for ongoing wellness.   Target Date 17   Date Met  10/05/17   Progress:     Progress Toward Goals:   Progress this reporting period: good progress / see above goals.  Gait improved but needing supervision while at day program w/out AD.        Plan:  Discharge from therapy.    Discharge: yes    Reason for Discharge:  Improvement noted, pt now in day program here and will  be observed by staff for any changes, also is participating in nustep for maintenance program    Equipment Issued: none    Discharge Plan: Patient to continue home program.

## 2017-11-28 NOTE — PROGRESS NOTES
10/05/17 1000   Signing Clinician's Name / Credentials   Signing clinician's name / credentials Kassandra Chiangchristina PT   Session Number   Session Number 11   Goal 1   Goal Identifier FGA, not met but progress made to goal   Goal Description Pt demo FGA >15/30 for improved balance with dynamic activities to facilitate improved saftey at home, in community, and with Day Program.   Target Date 11/08/17   Goal 2   Goal Identifier TUG- goal nearly met (0.7sec off)   Goal Description Pt demo decreased risk for falls with household mobility with AAD via TUG <15.1 secs.   Target Date 11/08/17   Goal 3   Goal Identifier 25' walk   Goal Description Pt demo improved gait quality and efficiency via 25' walk <14 sec with AAD.   Target Date 11/08/17   Date Met 09/27/17   Goal 4   Goal Identifier 3MWT - not met   Goal Description Pt demo improved activity tolerance via 20% improvement in 3MWT from baseline testing with AAD for improved community accessibility.   Target Date 11/08/17   Goal 5   Goal Identifier HEP   Goal Description Pt demo ability to complete HEP with handouts, min verbal cues from family for balance, LE strength, and endurance for ongoing wellness.   Target Date 11/08/17   Date Met 10/05/17   Objective Measure 1   Objective Measure FGA   Details 13   Objective Measure 2   Objective Measure 25' walk   Details 11.5 sec, 18 steps   Objective Measure 3   Objective Measure TUG   Details 15.8 sec   Objective Measure 5   Objective Measure nustep   Details WL 4, seat 10  arms 12.  time:  10 min   Therapeutic Procedure/exercise   Minutes 12   Skilled Intervention nustep w/ educ/cues   Patient Response did wl 5 for 3 of 10 min   Treatment Detail nustep above, educ re; how to do it.   educ re; maint program /do next week.     Gait Training   Minutes 28   Skilled Intervention gait tests/train   Patient Response trena well   Treatment Detail trial w/ SEC- pt feels she/does carry it.  does not find it helpful.  see gait tests  "above, improving.  will \"hold\" PT for 2 wk to observe her gait when she is here Tu thurs for day program.  will observe for safety   Progress above   Education   Learner Patient   Readiness Acceptance   Method Explanation   Response Verbalizes Understanding   Education Comments tests today and plan for PT, nustep w/ maint program   Plan   Homework above   Updates to plan of care see goals above.    Plan for next session see above, will observe pt in day prog tue/thurs.  will det if need future session or d/c.  Late entry - will d/c pt from PT due to doing well in day program, mobility w/ supervision (ambulation)   Comments   Comments pt here tu/th for day program.   Total Session Time   Timed Code Treatment Minutes 40   Total Treatment Time (sum of timed and untimed services) 40     "

## 2017-11-28 NOTE — ADDENDUM NOTE
Encounter addended by: Kassandra Dale, PT on: 11/28/2017 11:49 AM<BR>     Actions taken: Flowsheet data copied forward, Flowsheet accepted, Sign clinical note, Episode resolved

## 2017-11-30 ENCOUNTER — HOSPITAL ENCOUNTER (OUTPATIENT)
Dept: REHABILITATION | Facility: CLINIC | Age: 42
End: 2017-11-30
Attending: FAMILY MEDICINE
Payer: COMMERCIAL

## 2017-11-30 PROCEDURE — S5102 ADULT DAY CARE PER DIEM: HCPCS

## 2017-11-30 PROCEDURE — 40000247 ZZH STATISTIC VISIT ADULT DAY PROGRAM

## 2017-12-05 ENCOUNTER — HOSPITAL ENCOUNTER (OUTPATIENT)
Dept: REHABILITATION | Facility: CLINIC | Age: 42
End: 2017-12-05
Attending: FAMILY MEDICINE
Payer: COMMERCIAL

## 2017-12-05 PROCEDURE — S5102 ADULT DAY CARE PER DIEM: HCPCS

## 2017-12-05 PROCEDURE — 40000247 ZZH STATISTIC VISIT ADULT DAY PROGRAM

## 2017-12-07 ENCOUNTER — HOSPITAL ENCOUNTER (OUTPATIENT)
Dept: REHABILITATION | Facility: CLINIC | Age: 42
End: 2017-12-07
Attending: FAMILY MEDICINE
Payer: COMMERCIAL

## 2017-12-07 PROCEDURE — S5102 ADULT DAY CARE PER DIEM: HCPCS

## 2017-12-07 PROCEDURE — 40000247 ZZH STATISTIC VISIT ADULT DAY PROGRAM

## 2017-12-12 ENCOUNTER — HOSPITAL ENCOUNTER (OUTPATIENT)
Dept: REHABILITATION | Facility: CLINIC | Age: 42
End: 2017-12-12
Attending: FAMILY MEDICINE
Payer: COMMERCIAL

## 2017-12-12 PROCEDURE — S5102 ADULT DAY CARE PER DIEM: HCPCS

## 2017-12-12 PROCEDURE — 40000247 ZZH STATISTIC VISIT ADULT DAY PROGRAM

## 2017-12-12 NOTE — PROGRESS NOTES
MSAC RN Monthly Progress Note  Previous documentation reviewed.  No concerns reported by [facility] staff or member.

## 2017-12-14 ENCOUNTER — HOSPITAL ENCOUNTER (OUTPATIENT)
Dept: REHABILITATION | Facility: CLINIC | Age: 42
End: 2017-12-14
Attending: FAMILY MEDICINE
Payer: COMMERCIAL

## 2017-12-14 PROCEDURE — S5102 ADULT DAY CARE PER DIEM: HCPCS

## 2017-12-14 PROCEDURE — 40000247 ZZH STATISTIC VISIT ADULT DAY PROGRAM

## 2017-12-19 ENCOUNTER — HOSPITAL ENCOUNTER (OUTPATIENT)
Dept: REHABILITATION | Facility: CLINIC | Age: 42
End: 2017-12-19
Attending: FAMILY MEDICINE
Payer: COMMERCIAL

## 2017-12-19 PROCEDURE — S5102 ADULT DAY CARE PER DIEM: HCPCS

## 2017-12-19 PROCEDURE — 40000247 ZZH STATISTIC VISIT ADULT DAY PROGRAM

## 2017-12-21 ENCOUNTER — HOSPITAL ENCOUNTER (OUTPATIENT)
Dept: REHABILITATION | Facility: CLINIC | Age: 42
End: 2017-12-21
Attending: FAMILY MEDICINE
Payer: COMMERCIAL

## 2017-12-21 PROCEDURE — S5102 ADULT DAY CARE PER DIEM: HCPCS

## 2017-12-21 PROCEDURE — 40000247 ZZH STATISTIC VISIT ADULT DAY PROGRAM

## 2017-12-26 NOTE — PROGRESS NOTES
MONTHLY PROGRESS NOTE AND PARTICIPATION REPORT   [facility]    Shanna Shanks, 1975  Attendance: Please see Epic for attendance record.    Communication:   Does communicate   Hearing:   No impairment  Vision:   No impairment  Orientation/Cognition:   Minor forgetfulness  Partial disorientation  Short term memory loss  Behavior:   Requires redirection  Self-Preservation Skills:   Not capable of self-preservation  Eating:   Independent  Assist with set up  Ambulation Walking:   SBA with ambulation  Transferring:   Independent  Wheelchair:   NA  Toileting:   Needs assistance  Maintenance Program:     [facility]  Living Arrangements:   Lives with family  Spiritual Needs: Needs are being met through support group  Medication Assistance:   Medication not taken during program hours  Participation Report:   Aerobics/Exercise  Support Group  Cognitive Stimulation  Creative Arts/Crafts  Games  Speakers/Entertainment  Socialization  Current Events/News  Education/Health Topic  Meditation group, toys for tots, holiday boutique, prayer time, ugly sweater week, communion, white elephant game, yoga relaxation  Level of Participation:   Active  To the best of their ability

## 2017-12-26 NOTE — ADDENDUM NOTE
Encounter addended by: Anailsa Gonzalez on: 12/26/2017  3:17 PM<BR>     Actions taken: Episode edited, Sign clinical note

## 2018-01-02 ENCOUNTER — HOSPITAL ENCOUNTER (OUTPATIENT)
Dept: REHABILITATION | Facility: CLINIC | Age: 43
End: 2018-01-02
Attending: FAMILY MEDICINE
Payer: COMMERCIAL

## 2018-01-02 PROCEDURE — S5102 ADULT DAY CARE PER DIEM: HCPCS

## 2018-01-02 PROCEDURE — 40000247 ZZH STATISTIC VISIT ADULT DAY PROGRAM

## 2018-01-02 NOTE — PROGRESS NOTES
MSAC RN Monthly Progress Note  Previous documentation reviewed.  No concerns reported by [facility] staff or member.

## 2018-01-04 ENCOUNTER — HOSPITAL ENCOUNTER (OUTPATIENT)
Dept: REHABILITATION | Facility: CLINIC | Age: 43
End: 2018-01-04
Attending: FAMILY MEDICINE
Payer: COMMERCIAL

## 2018-01-04 PROCEDURE — 40000247 ZZH STATISTIC VISIT ADULT DAY PROGRAM

## 2018-01-04 PROCEDURE — S5102 ADULT DAY CARE PER DIEM: HCPCS

## 2018-01-09 ENCOUNTER — HOSPITAL ENCOUNTER (OUTPATIENT)
Dept: REHABILITATION | Facility: CLINIC | Age: 43
End: 2018-01-09
Attending: FAMILY MEDICINE
Payer: COMMERCIAL

## 2018-01-09 PROCEDURE — 40000247 ZZH STATISTIC VISIT ADULT DAY PROGRAM

## 2018-01-09 PROCEDURE — S5102 ADULT DAY CARE PER DIEM: HCPCS

## 2018-01-11 ENCOUNTER — HOSPITAL ENCOUNTER (OUTPATIENT)
Dept: REHABILITATION | Facility: CLINIC | Age: 43
End: 2018-01-11
Attending: FAMILY MEDICINE
Payer: COMMERCIAL

## 2018-01-11 PROCEDURE — 40000247 ZZH STATISTIC VISIT ADULT DAY PROGRAM

## 2018-01-11 PROCEDURE — S5102 ADULT DAY CARE PER DIEM: HCPCS

## 2018-01-16 ENCOUNTER — HOSPITAL ENCOUNTER (OUTPATIENT)
Dept: REHABILITATION | Facility: CLINIC | Age: 43
End: 2018-01-16
Attending: FAMILY MEDICINE
Payer: COMMERCIAL

## 2018-01-16 PROCEDURE — S5102 ADULT DAY CARE PER DIEM: HCPCS

## 2018-01-16 PROCEDURE — 40000247 ZZH STATISTIC VISIT ADULT DAY PROGRAM

## 2018-01-16 NOTE — PROGRESS NOTES
MONTHLY PROGRESS NOTE AND PARTICIPATION REPORT   [facility]    Shanna Shanks, 1975  Attendance: Please see Epic for attendance record.    Communication:   Does communicate   Hearing:   No impairment  Vision:   No impairment  Orientation/Cognition:   Minor forgetfulness  Partial disorientation  Short term memory loss  Behavior:   Requires redirection  Self-Preservation Skills:   Not capable of self-preservation  Eating:   Independent  Assist with set up  Ambulation Walking:   SBA with ambulation  Transferring:   Independent  Wheelchair:   NA  Toileting:   Needs assistance  Maintenance Program:     [facility]  Living Arrangements:   Lives with family  Spiritual Needs: Needs are being met through support group  Medication Assistance:   Medication not taken during program hours  Participation Report:   Aerobics/Exercise  Support Group  Cognitive Stimulation  Creative Arts/Crafts  Games  Speakers/Entertainment  Socialization  Current Events/News  Education/Health Topic  2018 Goals, prayer time, adaptive yoga, cello meditation, guided metitation, Explore the [place] week, MLK week, winter carnival, yoga relaxation, super bowl week  Level of Participation:   Active  To the best of their ability

## 2018-01-18 ENCOUNTER — HOSPITAL ENCOUNTER (OUTPATIENT)
Dept: REHABILITATION | Facility: CLINIC | Age: 43
End: 2018-01-18
Attending: FAMILY MEDICINE
Payer: COMMERCIAL

## 2018-01-18 PROCEDURE — S5102 ADULT DAY CARE PER DIEM: HCPCS

## 2018-01-18 PROCEDURE — 40000247 ZZH STATISTIC VISIT ADULT DAY PROGRAM

## 2018-01-25 ENCOUNTER — HOSPITAL ENCOUNTER (OUTPATIENT)
Dept: REHABILITATION | Facility: CLINIC | Age: 43
End: 2018-01-25
Attending: FAMILY MEDICINE
Payer: COMMERCIAL

## 2018-01-25 PROCEDURE — S5102 ADULT DAY CARE PER DIEM: HCPCS

## 2018-01-25 PROCEDURE — 40000247 ZZH STATISTIC VISIT ADULT DAY PROGRAM

## 2018-01-30 ENCOUNTER — HOSPITAL ENCOUNTER (OUTPATIENT)
Dept: REHABILITATION | Facility: CLINIC | Age: 43
End: 2018-01-30
Attending: FAMILY MEDICINE
Payer: COMMERCIAL

## 2018-01-30 PROCEDURE — 40000247 ZZH STATISTIC VISIT ADULT DAY PROGRAM

## 2018-01-30 PROCEDURE — S5102 ADULT DAY CARE PER DIEM: HCPCS

## 2018-02-01 ENCOUNTER — HOSPITAL ENCOUNTER (OUTPATIENT)
Dept: REHABILITATION | Facility: CLINIC | Age: 43
End: 2018-02-01
Attending: FAMILY MEDICINE
Payer: COMMERCIAL

## 2018-02-01 PROCEDURE — S5102 ADULT DAY CARE PER DIEM: HCPCS

## 2018-02-01 PROCEDURE — 40000247 ZZH STATISTIC VISIT ADULT DAY PROGRAM

## 2018-02-06 ENCOUNTER — HOSPITAL ENCOUNTER (OUTPATIENT)
Dept: REHABILITATION | Facility: CLINIC | Age: 43
End: 2018-02-06
Attending: FAMILY MEDICINE
Payer: COMMERCIAL

## 2018-02-06 PROCEDURE — S5102 ADULT DAY CARE PER DIEM: HCPCS

## 2018-02-06 PROCEDURE — 40000247 ZZH STATISTIC VISIT ADULT DAY PROGRAM

## 2018-02-06 NOTE — PROGRESS NOTES
MSAC RN Monthly Progress Note  Previous documentation reviewed.  No concerns reported by [facility] staff or member.

## 2018-02-08 ENCOUNTER — HOSPITAL ENCOUNTER (OUTPATIENT)
Dept: REHABILITATION | Facility: CLINIC | Age: 43
End: 2018-02-08
Attending: FAMILY MEDICINE
Payer: COMMERCIAL

## 2018-02-08 PROCEDURE — 40000247 ZZH STATISTIC VISIT ADULT DAY PROGRAM

## 2018-02-08 PROCEDURE — S5102 ADULT DAY CARE PER DIEM: HCPCS

## 2018-02-13 ENCOUNTER — HOSPITAL ENCOUNTER (OUTPATIENT)
Dept: REHABILITATION | Facility: CLINIC | Age: 43
End: 2018-02-13
Attending: FAMILY MEDICINE
Payer: COMMERCIAL

## 2018-02-13 PROCEDURE — 40000247 ZZH STATISTIC VISIT ADULT DAY PROGRAM

## 2018-02-13 PROCEDURE — S5102 ADULT DAY CARE PER DIEM: HCPCS

## 2018-02-15 ENCOUNTER — HOSPITAL ENCOUNTER (OUTPATIENT)
Dept: REHABILITATION | Facility: CLINIC | Age: 43
End: 2018-02-15
Attending: FAMILY MEDICINE
Payer: COMMERCIAL

## 2018-02-15 PROCEDURE — S5102 ADULT DAY CARE PER DIEM: HCPCS

## 2018-02-15 PROCEDURE — 40000247 ZZH STATISTIC VISIT ADULT DAY PROGRAM

## 2018-02-20 ENCOUNTER — HOSPITAL ENCOUNTER (OUTPATIENT)
Dept: REHABILITATION | Facility: CLINIC | Age: 43
End: 2018-02-20
Attending: FAMILY MEDICINE
Payer: COMMERCIAL

## 2018-02-20 DIAGNOSIS — Z30.41 SURVEILLANCE OF PREVIOUSLY PRESCRIBED CONTRACEPTIVE PILL: ICD-10-CM

## 2018-02-20 PROCEDURE — S5102 ADULT DAY CARE PER DIEM: HCPCS

## 2018-02-20 PROCEDURE — 40000247 ZZH STATISTIC VISIT ADULT DAY PROGRAM

## 2018-02-20 RX ORDER — ACETAMINOPHEN AND CODEINE PHOSPHATE 120; 12 MG/5ML; MG/5ML
SOLUTION ORAL
Qty: 84 TABLET | Refills: 0 | Status: SHIPPED | OUTPATIENT
Start: 2018-02-20 | End: 2018-03-07

## 2018-02-20 NOTE — TELEPHONE ENCOUNTER
Refilled for 3 months.   She should come in for PAP as she is due for it and refills can be done at that time.

## 2018-02-20 NOTE — TELEPHONE ENCOUNTER
"Requested Prescriptions   Pending Prescriptions Disp Refills     norethindrone (MICRONOR) 0.35 MG per tablet [Pharmacy Med Name: NORETHINDRONE 0.35MG TABLETS 28'S]  HISTORICAL.  Last Written Prescription Date:  NA  Last Fill Quantity: NA,  # refills: NA   Last office visit: 9/26/2017 with prescribing provider:  9/26/17   Future Office Visit:     84 tablet 0     Sig: TAKE 1 TABLET(0.35 MG) BY MOUTH DAILY    Contraceptives Protocol Passed    2/20/2018  9:08 AM       Passed - Patient is not a current smoker if age is 35 or older       Passed - Recent or future visit with authorizing provider's specialty    Patient had office visit in the last year or has a visit in the next 30 days with authorizing provider.  See \"Patient Info\" tab in inbasket, or \"Choose Columns\" in Meds & Orders section of the refill encounter.            Passed - No active pregnancy on record       Passed - No positive pregnancy test in past 12 months          "

## 2018-02-22 ENCOUNTER — HOSPITAL ENCOUNTER (OUTPATIENT)
Dept: REHABILITATION | Facility: CLINIC | Age: 43
End: 2018-02-22
Attending: FAMILY MEDICINE
Payer: COMMERCIAL

## 2018-02-22 PROCEDURE — 40000247 ZZH STATISTIC VISIT ADULT DAY PROGRAM

## 2018-02-22 PROCEDURE — S5102 ADULT DAY CARE PER DIEM: HCPCS

## 2018-02-27 ENCOUNTER — HOSPITAL ENCOUNTER (OUTPATIENT)
Dept: REHABILITATION | Facility: CLINIC | Age: 43
End: 2018-02-27
Attending: FAMILY MEDICINE
Payer: COMMERCIAL

## 2018-02-27 PROCEDURE — S5102 ADULT DAY CARE PER DIEM: HCPCS

## 2018-02-27 PROCEDURE — 40000247 ZZH STATISTIC VISIT ADULT DAY PROGRAM

## 2018-03-01 ENCOUNTER — HOSPITAL ENCOUNTER (OUTPATIENT)
Dept: REHABILITATION | Facility: CLINIC | Age: 43
End: 2018-03-01
Attending: FAMILY MEDICINE
Payer: COMMERCIAL

## 2018-03-01 PROCEDURE — 40000247 ZZH STATISTIC VISIT ADULT DAY PROGRAM

## 2018-03-01 PROCEDURE — S5102 ADULT DAY CARE PER DIEM: HCPCS

## 2018-03-05 NOTE — PROGRESS NOTES
SUBJECTIVE:   CC: Shanna Shanks is an 42 year old woman who presents for preventive health visit.     Answers for HPI/ROS submitted by the patient on 3/7/2018   Annual Exam:  Getting at least 3 servings of Calcium per day:: Yes  Bi-annual eye exam:: NO  Dental care twice a year:: Yes  Sleep apnea or symptoms of sleep apnea:: None  Diet:: Regular (no restrictions)  Frequency of exercise:: 2-3 days/week  Taking medications regularly:: Yes  Medication side effects:: Not applicable  Additional concerns today:: No  PHQ-2 Score: 0  Duration of exercise:: Less than 15 minutes    She is having urinary frequency, no dysuria.      Treated for UTI 9/2017 with bactrim.  cx grew staph, she was switched to cipro.  Hx of neurogenic bladder, on oxybutynin.  She does see urology Dr. Franz at [facility].      Hx of MS, on baclofen, sees neurology at [facility], physical therapy, and OT.  She lives at home with her , uses wheelchair for going out and about.  She is independent around the house in dressing, toileting, and bathing.      Hospitalized 8/2017 for rash and fever, elevated LFTs, workup revealed abnormal signal, underwent extensive ID workup which was ultimately negative.  Symptoms improved and felt to be possibly related to drug reaction (macrobid).      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  Started on keppra, this was discontinued by her neurologist and no additional seizures.      MMD- on effexor 75mg daily.    On POP, menses regular.      Never had mammo.    Today's PHQ-2 Score:   PHQ-2 ( 1999 Pf[employer]) 3/7/2018 9/26/2017   Q1: Little interest or pleasure in doing things 0 0   Q2: Feeling down, depressed or hopeless 0 0   PHQ-2 Score 0 0   Q1: Little interest or pleasure in doing things Not at all -   Q2: Feeling down, depressed or hopeless Not at all -   PHQ-2 Score 0 -       Abuse: Current or Past(Physical, Sexual or Emotional)- No  Do you feel safe in your  environment - Yes    Social History   Substance Use Topics     Smoking status: Former Smoker     Packs/day: 0.25     Types: Cigarettes     Smokeless tobacco: Never Used      Comment: quit a few weeks ago     Alcohol use 3.5 oz/week     7 Standard drinks or equivalent per week      Comment: social drinker     If you drink alcohol do you typically have >3 drinks per day or >7 drinks per week? No                     Reviewed orders with patient.  Reviewed health maintenance and updated orders accordingly - Yes  Labs reviewed in EPIC    Patient under age 50, mutual decision reflected in health maintenance.      Pertinent mammograms are reviewed under the imaging tab.  History of abnormal Pap smear:   Last 3 Pap and HPV Results:   PAP / HPV 3/16/2015 2/20/2012 12/22/2010   PAP NIL NIL NIL       Reviewed and updated as needed this visit by clinical staff  Tobacco  Allergies  Meds  Med Hx  Surg Hx  Fam Hx  Soc Hx        Reviewed and updated as needed this visit by Provider        Past Medical History:   Diagnosis Date     Multiple sclerosis (H) 3/2/96    last exacerbation 3yrs ago, no meds x 6 months     Tobacco dependency       Past Surgical History:   Procedure Laterality Date     HC DILATION/CURETTAGE DIAG/THER NON OB  1/1/05    D & C, missed AB       ROS:  C: NEGATIVE for fever, chills, change in weight  I: NEGATIVE for worrisome rashes, moles or lesions  E: NEGATIVE for vision changes or irritation  ENT: NEGATIVE for ear, mouth and throat problems  R: NEGATIVE for significant cough or SOB  B: NEGATIVE for masses, tenderness or discharge  CV: NEGATIVE for chest pain, palpitations or peripheral edema  GI: NEGATIVE for nausea, abdominal pain, heartburn, or change in bowel habits   female: as above   M: NEGATIVE for significant arthralgias or myalgia  NEURO: as above   PSYCHIATRIC: as above     OBJECTIVE:   /78 (BP Location: Left arm, Patient Position: Chair, Cuff Size: Adult Regular)  Pulse 78  Temp 98.2   F (36.8  C) (Oral)  Resp 12  Wt 144 lb (65.3 kg)  SpO2 98%  BMI 22.89 kg/m2  EXAM:  GENERAL: healthy, alert and no distress  EYES: Eyes grossly normal to inspection, PERRL and conjunctivae and sclerae normal  HENT: ear canals and TM's normal, nose and mouth without ulcers or lesions  NECK: no adenopathy, no asymmetry, masses, or scars and thyroid normal to palpation  RESP: lungs clear to auscultation - no rales, rhonchi or wheezes  BREAST: normal without masses, tenderness or nipple discharge and no palpable axillary masses or adenopathy  CV: regular rate and rhythm, normal S1 S2, no S3 or S4, no murmur, click or rub, no peripheral edema and peripheral pulses strong  ABDOMEN: soft, nontender, no hepatosplenomegaly, no masses and bowel sounds normal   (female): normal female external genitalia, normal urethral meatus, vaginal mucosa pink, moist, well rugated, and normal cervix/adnexa/uterus without masses or discharge  MS: no gross musculoskeletal defects noted, no edema  SKIN: no suspicious lesions or rashes  NEURO: Normal strength and tone, mentation intact and speech normal  PSYCH: mentation appears normal, affect normal/bright    ASSESSMENT/PLAN:   (Z01.411) Encounter for gynecological examination with abnormal finding  (primary encounter diagnosis)  Plan: Pap imaged thin layer screen with HPV -         recommended age 30 - 65 years (select HPV order        below), HPV High Risk Types DNA Cervical, Urine        Microscopic        routine    (G35) Multiple sclerosis (H)  Plan: sees neurology, functioning independently in home with , seeing physical therapy and OT     (R30.0) Dysuria  Comment: UA looks neg for infection  Plan: UA reflex to Microscopic and Culture, Urine         Culture Aerobic Bacterial        Await final culture, if neg follow up with urology      (N39.0) Recurrent UTI  Plan: UA reflex to Microscopic and Culture            (Z13.6) CARDIOVASCULAR SCREENING; LDL GOAL LESS THAN  "160  Plan: Lipid panel reflex to direct LDL Fasting            (R73.9) Elevated blood sugar  Plan: Hemoglobin A1c            (R79.89) Elevated LFTs  Comment: during hospitalization  Plan: Comprehensive metabolic panel            (N31.9) Neurogenic bladder  Plan: sulfamethoxazole-trimethoprim (BACTRIM         DS/SEPTRA DS) 800-160 MG per tablet        Follows with urology    (Z30.41) Surveillance of previously prescribed contraceptive pill  Plan: norethindrone (MICRONOR) 0.35 MG per tablet        Refilled, discussed decreased efficacy compared to OCP, they are aware        COUNSELING:   Reviewed preventive health counseling, as reflected in patient instructions         reports that she has quit smoking. Her smoking use included Cigarettes. She smoked 0.25 packs per day. She has never used smokeless tobacco.    Estimated body mass index is 22.89 kg/(m^2) as calculated from the following:    Height as of 9/26/17: 5' 6.5\" (1.689 m).    Weight as of this encounter: 144 lb (65.3 kg).       Counseling Resources:  ATP IV Guidelines  Pooled Cohorts Equation Calculator  Breast Cancer Risk Calculator  FRAX Risk Assessment  ICSI Preventive Guidelines  Dietary Guidelines for Americans, 2010  USDA's MyPlate  ASA Prophylaxis  Lung CA Screening    MOHAN Hernandes [facility]  "

## 2018-03-05 NOTE — PATIENT INSTRUCTIONS
1.  Pap updated today  2.  Follow up lab work  3.  Refilled birth control  4. Urine does not look like infection but will send for final culture.  Follow up with urology regarding the frequency    Preventive Health Recommendations  Female Ages 40 to 49    Yearly exam:     See your health care provider every year in order to  1. Review health changes.   2. Discuss preventive care.    3. Review your medicines if your doctor prescribed any.      Get a Pap test every three years (unless you have an abnormal result and your provider advises testing more often).      If you get Pap tests with HPV test, you only need to test every 5 years, unless you have an abnormal result. You do not need a Pap test if your uterus was removed (hysterectomy) and you have not had cancer.      You should be tested each year for STDs (sexually transmitted diseases), if you're at risk.       Ask your doctor if you should have a mammogram.      Have a colonoscopy (test for colon cancer) if someone in your family has had colon cancer or polyps before age 50.       Have a cholesterol test every 5 years.       Have a diabetes test (fasting glucose) after age 45. If you are at risk for diabetes, you should have this test every 3 years.    Shots: Get a flu shot each year. Get a tetanus shot every 10 years.     Nutrition:     Eat at least 5 servings of fruits and vegetables each day.    Eat whole-grain bread, whole-wheat pasta and brown rice instead of white grains and rice.    Talk to your provider about Calcium and Vitamin D.     Lifestyle    Exercise at least 150 minutes a week (an average of 30 minutes a day, 5 days a week). This will help you control your weight and prevent disease.    Limit alcohol to one drink per day.    No smoking.     Wear sunscreen to prevent skin cancer.    See your dentist every six months for an exam and cleaning.

## 2018-03-06 ENCOUNTER — HOSPITAL ENCOUNTER (OUTPATIENT)
Dept: REHABILITATION | Facility: CLINIC | Age: 43
End: 2018-03-06
Attending: FAMILY MEDICINE
Payer: COMMERCIAL

## 2018-03-06 PROCEDURE — 40000247 ZZH STATISTIC VISIT ADULT DAY PROGRAM

## 2018-03-06 PROCEDURE — S5102 ADULT DAY CARE PER DIEM: HCPCS

## 2018-03-06 NOTE — PROGRESS NOTES
MSAC RN Monthly Progress Note  Previous documentation reviewed.  No concerns reported by [facility] staff or member.

## 2018-03-07 ENCOUNTER — OFFICE VISIT (OUTPATIENT)
Dept: FAMILY MEDICINE | Facility: CLINIC | Age: 43
End: 2018-03-07
Payer: COMMERCIAL

## 2018-03-07 VITALS
DIASTOLIC BLOOD PRESSURE: 78 MMHG | TEMPERATURE: 98.2 F | RESPIRATION RATE: 12 BRPM | BODY MASS INDEX: 22.89 KG/M2 | HEART RATE: 78 BPM | OXYGEN SATURATION: 98 % | WEIGHT: 144 LBS | SYSTOLIC BLOOD PRESSURE: 117 MMHG

## 2018-03-07 DIAGNOSIS — R73.9 ELEVATED BLOOD SUGAR: ICD-10-CM

## 2018-03-07 DIAGNOSIS — N31.9 NEUROGENIC BLADDER: ICD-10-CM

## 2018-03-07 DIAGNOSIS — N39.0 RECURRENT UTI: ICD-10-CM

## 2018-03-07 DIAGNOSIS — N94.6 DYSMENORRHEA: ICD-10-CM

## 2018-03-07 DIAGNOSIS — Z13.6 CARDIOVASCULAR SCREENING; LDL GOAL LESS THAN 160: ICD-10-CM

## 2018-03-07 DIAGNOSIS — Z30.41 SURVEILLANCE OF PREVIOUSLY PRESCRIBED CONTRACEPTIVE PILL: ICD-10-CM

## 2018-03-07 DIAGNOSIS — R30.0 DYSURIA: ICD-10-CM

## 2018-03-07 DIAGNOSIS — G35 MULTIPLE SCLEROSIS (H): ICD-10-CM

## 2018-03-07 DIAGNOSIS — R79.89 ELEVATED LFTS: ICD-10-CM

## 2018-03-07 DIAGNOSIS — Z01.411 ENCOUNTER FOR GYNECOLOGICAL EXAMINATION WITH ABNORMAL FINDING: Primary | ICD-10-CM

## 2018-03-07 LAB
ALBUMIN UR-MCNC: NEGATIVE MG/DL
APPEARANCE UR: CLEAR
BACTERIA #/AREA URNS HPF: ABNORMAL /HPF
BILIRUB UR QL STRIP: NEGATIVE
COLOR UR AUTO: YELLOW
GLUCOSE UR STRIP-MCNC: NEGATIVE MG/DL
HBA1C MFR BLD: 5.2 % (ref 4.3–6)
HGB UR QL STRIP: ABNORMAL
KETONES UR STRIP-MCNC: NEGATIVE MG/DL
LEUKOCYTE ESTERASE UR QL STRIP: ABNORMAL
NITRATE UR QL: NEGATIVE
PH UR STRIP: 7.5 PH (ref 5–7)
RBC #/AREA URNS AUTO: ABNORMAL /HPF
SOURCE: ABNORMAL
SP GR UR STRIP: 1.01 (ref 1–1.03)
UROBILINOGEN UR STRIP-ACNC: 0.2 EU/DL (ref 0.2–1)
WBC #/AREA URNS AUTO: ABNORMAL /HPF

## 2018-03-07 PROCEDURE — 99396 PREV VISIT EST AGE 40-64: CPT | Performed by: NURSE PRACTITIONER

## 2018-03-07 PROCEDURE — 83036 HEMOGLOBIN GLYCOSYLATED A1C: CPT | Performed by: NURSE PRACTITIONER

## 2018-03-07 PROCEDURE — 99213 OFFICE O/P EST LOW 20 MIN: CPT | Mod: 25 | Performed by: NURSE PRACTITIONER

## 2018-03-07 PROCEDURE — G0145 SCR C/V CYTO,THINLAYER,RESCR: HCPCS | Performed by: NURSE PRACTITIONER

## 2018-03-07 PROCEDURE — 36415 COLL VENOUS BLD VENIPUNCTURE: CPT | Performed by: NURSE PRACTITIONER

## 2018-03-07 PROCEDURE — 87086 URINE CULTURE/COLONY COUNT: CPT | Performed by: NURSE PRACTITIONER

## 2018-03-07 PROCEDURE — 80061 LIPID PANEL: CPT | Performed by: NURSE PRACTITIONER

## 2018-03-07 PROCEDURE — 80053 COMPREHEN METABOLIC PANEL: CPT | Performed by: NURSE PRACTITIONER

## 2018-03-07 PROCEDURE — 81001 URINALYSIS AUTO W/SCOPE: CPT | Performed by: NURSE PRACTITIONER

## 2018-03-07 PROCEDURE — 87624 HPV HI-RISK TYP POOLED RSLT: CPT | Performed by: NURSE PRACTITIONER

## 2018-03-07 RX ORDER — ACETAMINOPHEN AND CODEINE PHOSPHATE 120; 12 MG/5ML; MG/5ML
SOLUTION ORAL
Qty: 84 TABLET | Refills: 3 | Status: SHIPPED | OUTPATIENT
Start: 2018-03-07 | End: 2019-03-11

## 2018-03-07 RX ORDER — SULFAMETHOXAZOLE/TRIMETHOPRIM 800-160 MG
1 TABLET ORAL ONCE
Qty: 1 TABLET | Refills: 0 | Status: ON HOLD | COMMUNITY
Start: 2018-03-07 | End: 2018-06-05

## 2018-03-07 NOTE — MR AVS SNAPSHOT
After Visit Summary   3/7/2018    Shanna Shanks    MRN: [id number]           Patient Information     Date Of Birth          1975        Visit Information        Provider Department      3/7/2018 4:00 PM Lola Wahl APRN [facility]        Today's Diagnoses     Encounter for gynecological examination with abnormal finding    -  1    Multiple sclerosis (H)        Dysmenorrhea        Dysuria        Recurrent UTI        CARDIOVASCULAR SCREENING; LDL GOAL LESS THAN 160        Tobacco dependency        Elevated blood sugar        Elevated LFTs        Neurogenic bladder        Surveillance of previously prescribed contraceptive pill          Care Instructions    1.  Pap updated today  2.  Follow up lab work  3.  Refilled birth control  4. Urine does not look like infection but will send for final culture.  Follow up with urology regarding the frequency    Preventive Health Recommendations  Female Ages 40 to 49    Yearly exam:     See your health care provider every year in order to  1. Review health changes.   2. Discuss preventive care.    3. Review your medicines if your doctor prescribed any.      Get a Pap test every three years (unless you have an abnormal result and your provider advises testing more often).      If you get Pap tests with HPV test, you only need to test every 5 years, unless you have an abnormal result. You do not need a Pap test if your uterus was removed (hysterectomy) and you have not had cancer.      You should be tested each year for STDs (sexually transmitted diseases), if you're at risk.       Ask your doctor if you should have a mammogram.      Have a colonoscopy (test for colon cancer) if someone in your family has had colon cancer or polyps before age 50.       Have a cholesterol test every 5 years.       Have a diabetes test (fasting glucose) after age 45. If you are at risk for diabetes, you should have this test every 3  years.    Shots: Get a flu shot each year. Get a tetanus shot every 10 years.     Nutrition:     Eat at least 5 servings of fruits and vegetables each day.    Eat whole-grain bread, whole-wheat pasta and brown rice instead of white grains and rice.    Talk to your provider about Calcium and Vitamin D.     Lifestyle    Exercise at least 150 minutes a week (an average of 30 minutes a day, 5 days a week). This will help you control your weight and prevent disease.    Limit alcohol to one drink per day.    No smoking.     Wear sunscreen to prevent skin cancer.    See your dentist every six months for an exam and cleaning.          Follow-ups after your visit        Your next 10 appointments already scheduled     Mar 08, 2018 10:00 AM CST   Treatment with [facility] PER [facility] ([facility])    [address]  [facility] [address]   [phone]            Mar 13, 2018 10:00 AM CDT   Treatment with MS[facility] PER [facility] ([facility])    [address]  [facility] [address]   [phone]            Mar 15, 2018 10:00 AM CDT   Treatment with MS[facility] PER [facility] ([facility])    [address]  [facility] [address]   [phone]            Mar 20, 2018 10:00 AM CDT   [facility]    [address]  [facility] [address]   [phone]            Mar 22, 2018 10:00 AM CDT   Treatment with MS[facility] PER [facility] ([facility])    [address]  [facility] [address]   [phone]            Mar 27, 2018 10:00 AM CDT    [facility]    [address]  [place] [address]   [phone]            Mar 29, 2018 10:00 AM CDT   [facility]    [address]  [place] [address]   [phone]            Apr 03, 2018 10:00 AM CDT   [facility]    [address]  [place] [address]   [phone]            Apr 05, 2018 10:00 AM CDT   Treatment with MSAC PER [facility] ([facility])    [address]  [place] [address]   [phone]            Apr 10, 2018 10:00 AM CDT   Treatment with MS[facility] PER [facility] ([facility])    [address]  [place] [address]   [phone]              Who to contact     If you have questions or need follow up information about today's clinic visit or your schedule please contact [facility] directly at [phone].  Normal or non-critical lab and imaging results will be communicated to you by MyChart, letter or phone within 4 business days after the clinic has received the results. If you do not hear from us within 7 days, please contact the clinic through MyChart or phone. If you have a critical or abnormal lab result, we will notify you by phone as soon as possible.  Submit refill requests through ethology or call your pharmacy and they will forward the refill request to us. Please allow 3 business days for your refill to be completed.          Additional Information About Your Visit         [employer] Information     [employer] gives you secure access to your electronic health record. If you see a primary care provider, you can also send messages to your care team and make appointments. If you have questions, please call your primary care clinic.  If you do not have a primary care provider, please call [phone] and they will assist you.        Care EveryWhere ID     This is your Care EveryWhere ID. This could be used by other organizations to access your [place] medical records  [id number]        Your Vitals Were     Pulse Temperature Respirations Pulse Oximetry BMI (Body Mass Index)       78 98.2  F (36.8  C) (Oral) 12 98% 22.89 kg/m2        Blood Pressure from Last 3 Encounters:   03/07/18 117/78   09/26/17 105/50   08/16/17 116/64    Weight from Last 3 Encounters:   03/07/18 144 lb (65.3 kg)   09/26/17 143 lb 8 oz (65.1 kg)   08/16/17 134 lb 8 oz (61 kg)              We Performed the Following     Comprehensive metabolic panel     Hemoglobin A1c     HPV High Risk Types DNA Cervical     Lipid panel reflex to direct LDL Fasting     Pap imaged thin layer screen with HPV - recommended age 30 - 65 years (select HPV order below)     UA reflex to Microscopic and Culture     Urine Culture Aerobic Bacterial     Urine Microscopic          Today's Medication Changes          These changes are accurate as of 3/7/18  5:03 PM.  If you have any questions, ask your nurse or doctor.               These medicines have changed or have updated prescriptions.        Dose/Directions    norethindrone 0.35 MG per tablet   Commonly known as:  MICRONOR   This may have changed:  See the new instructions.   Used for:  Surveillance of previously prescribed contraceptive pill   Changed by:  Lola Wahl APRN CNP        TAKE 1 TABLET(0.35 MG) BY MOUTH DAILY   Quantity:  84 tablet   Refills:  3       sulfamethoxazole-trimethoprim 800-160 MG per tablet   Commonly known as:  BACTRIM DS/SEPTRA DS   This may have  changed:  Another medication with the same name was removed. Continue taking this medication, and follow the directions you see here.   Used for:  Neurogenic bladder   Changed by:  Lola Wahl APRN CNP        Dose:  1 tablet   Take 1 tablet by mouth once for 1 dose   Quantity:  1 tablet   Refills:  0         Stop taking these medicines if you haven't already. Please contact your care team if you have questions.     ciprofloxacin 500 MG tablet   Commonly known as:  CIPRO   Stopped by:  Lola Wahl APRN CNP           levETIRAcetam 750 MG tablet   Commonly known as:  KEPPRA   Stopped by:  Lola Wahl APRN CNP                Where to get your medicines      These medications were sent to [facility] [address] [place] [address] & [address], [place] [address]     Phone:  [phone]     norethindrone 0.35 MG per tablet                Primary Care Provider Office Phone # Fax #    Malvin Cesario Beck [phone] [address] [place] [address]        Equal Access to Services     [facility]: Hadii marcio lockhart hadasho Sojhonatanali, waaxda luqadaha, qaybta kaalmada adesaundrayadana, liza patrick . So [facility] [phone].    ATENCIÓN: Si habla español, tiene a gatica disposición servicios gratuitos de asistencia lingüística. [facility] [phone].    We comply with applicable federal civil rights laws and [place] laws. We do not discriminate on the basis of race, color, national origin, age, disability, sex, sexual orientation, or gender identity.            Thank you!     Thank you for choosing [facility]  for your care. Our goal is always to provide you with excellent care. Hearing back from our patients is one way we can continue to improve our services. Please take a few minutes to complete the written survey that you may receive in the mail after your visit with us.  Thank you!             Your Updated Medication List - Protect others around you: Learn how to safely use, store and throw away your medicines at www.disposemymeds.org.          This list is accurate as of 3/7/18  5:03 PM.  Always use your most recent med list.                   Brand Name Dispense Instructions for use Diagnosis    baclofen 10 MG tablet    LIORESAL     Take 10-20 mg by mouth 4 times daily as needed        CRANBERRY EXTRACT PO      Take 1 tablet by mouth daily        norethindrone 0.35 MG per tablet    MICRONOR    84 tablet    TAKE 1 TABLET(0.35 MG) BY MOUTH DAILY    Surveillance of previously prescribed contraceptive pill       oxybutynin 10 MG 24 hr tablet    DITROPAN-XL    30 tablet    Take 1 tablet (10 mg) by mouth At Bedtime    Multiple sclerosis (H)       sulfamethoxazole-trimethoprim 800-160 MG per tablet    BACTRIM DS/SEPTRA DS    1 tablet    Take 1 tablet by mouth once for 1 dose    Neurogenic bladder       venlafaxine 75 MG 24 hr capsule    EFFEXOR-XR     Take 75 mg by mouth daily        vitamin D 77390 UNIT capsule    ERGOCALCIFEROL     Take 50,000 Units by mouth once a week On Sundays

## 2018-03-08 ENCOUNTER — HOSPITAL ENCOUNTER (OUTPATIENT)
Dept: REHABILITATION | Facility: CLINIC | Age: 43
End: 2018-03-08
Attending: FAMILY MEDICINE
Payer: COMMERCIAL

## 2018-03-08 LAB
ALBUMIN SERPL-MCNC: 4.1 G/DL (ref 3.4–5)
ALP SERPL-CCNC: 39 U/L (ref 40–150)
ALT SERPL W P-5'-P-CCNC: 15 U/L (ref 0–50)
ANION GAP SERPL CALCULATED.3IONS-SCNC: 5 MMOL/L (ref 3–14)
AST SERPL W P-5'-P-CCNC: 27 U/L (ref 0–45)
BILIRUB SERPL-MCNC: 0.4 MG/DL (ref 0.2–1.3)
BUN SERPL-MCNC: 16 MG/DL (ref 7–30)
CALCIUM SERPL-MCNC: 8.9 MG/DL (ref 8.5–10.1)
CHLORIDE SERPL-SCNC: 106 MMOL/L (ref 94–109)
CHOLEST SERPL-MCNC: 240 MG/DL
CO2 SERPL-SCNC: 26 MMOL/L (ref 20–32)
CREAT SERPL-MCNC: 0.74 MG/DL (ref 0.52–1.04)
GFR SERPL CREATININE-BSD FRML MDRD: 85 ML/MIN/1.7M2
GLUCOSE SERPL-MCNC: 82 MG/DL (ref 70–99)
HDLC SERPL-MCNC: 61 MG/DL
LDLC SERPL CALC-MCNC: 143 MG/DL
NONHDLC SERPL-MCNC: 179 MG/DL
POTASSIUM SERPL-SCNC: 4.5 MMOL/L (ref 3.4–5.3)
PROT SERPL-MCNC: 7.5 G/DL (ref 6.8–8.8)
SODIUM SERPL-SCNC: 137 MMOL/L (ref 133–144)
TRIGL SERPL-MCNC: 180 MG/DL

## 2018-03-08 PROCEDURE — 40000247 ZZH STATISTIC VISIT ADULT DAY PROGRAM

## 2018-03-08 PROCEDURE — S5102 ADULT DAY CARE PER DIEM: HCPCS

## 2018-03-09 LAB
BACTERIA SPEC CULT: NORMAL
COPATH REPORT: NORMAL
Lab: NORMAL
PAP: NORMAL
SPECIMEN SOURCE: NORMAL

## 2018-03-09 NOTE — PROGRESS NOTES
Shanna,    Final urine culture was negative for infection.    Liver function tests are back to normal, normal kidney test as well.    A1C was normal meaning you do not have diabetes.    Cholesterol is stable.    Lola Wahl, CNP

## 2018-03-12 LAB
FINAL DIAGNOSIS: NORMAL
HPV HR 12 DNA CVX QL NAA+PROBE: NEGATIVE
HPV16 DNA SPEC QL NAA+PROBE: NEGATIVE
HPV18 DNA SPEC QL NAA+PROBE: NEGATIVE
SPECIMEN DESCRIPTION: NORMAL
SPECIMEN SOURCE CVX/VAG CYTO: NORMAL

## 2018-03-13 ENCOUNTER — HOSPITAL ENCOUNTER (OUTPATIENT)
Dept: REHABILITATION | Facility: CLINIC | Age: 43
End: 2018-03-13
Attending: FAMILY MEDICINE
Payer: COMMERCIAL

## 2018-03-13 PROCEDURE — S5102 ADULT DAY CARE PER DIEM: HCPCS

## 2018-03-13 PROCEDURE — 40000247 ZZH STATISTIC VISIT ADULT DAY PROGRAM

## 2018-03-15 ENCOUNTER — HOSPITAL ENCOUNTER (OUTPATIENT)
Dept: REHABILITATION | Facility: CLINIC | Age: 43
End: 2018-03-15
Attending: FAMILY MEDICINE
Payer: COMMERCIAL

## 2018-03-15 PROCEDURE — S5102 ADULT DAY CARE PER DIEM: HCPCS

## 2018-03-15 PROCEDURE — 40000247 ZZH STATISTIC VISIT ADULT DAY PROGRAM

## 2018-03-20 ENCOUNTER — HOSPITAL ENCOUNTER (OUTPATIENT)
Dept: REHABILITATION | Facility: CLINIC | Age: 43
End: 2018-03-20
Attending: FAMILY MEDICINE
Payer: COMMERCIAL

## 2018-03-20 PROCEDURE — 40000247 ZZH STATISTIC VISIT ADULT DAY PROGRAM

## 2018-03-20 PROCEDURE — S5102 ADULT DAY CARE PER DIEM: HCPCS

## 2018-03-22 ENCOUNTER — HOSPITAL ENCOUNTER (OUTPATIENT)
Dept: REHABILITATION | Facility: CLINIC | Age: 43
End: 2018-03-22
Attending: FAMILY MEDICINE
Payer: COMMERCIAL

## 2018-03-22 PROCEDURE — S5102 ADULT DAY CARE PER DIEM: HCPCS

## 2018-03-22 PROCEDURE — 40000247 ZZH STATISTIC VISIT ADULT DAY PROGRAM

## 2018-03-22 NOTE — PROGRESS NOTES
MONTHLY PROGRESS NOTE AND PARTICIPATION REPORT   [facility]    Shanna Shanks, 1975  Attendance: Please see Epic for attendance record.    Communication:   Does communicate   Hearing:   No impairment  Vision:   No impairment  Orientation/Cognition:   Minor forgetfulness  Partial disorientation  Short term memory loss  Behavior:   Requires redirection  Self-Preservation Skills:   Capable of self-preservation  Eating:   Independent  Assist with set up  Ambulation Walking:   SBA with ambulation  Transferring:   Independent  Wheelchair:   N/A  Toileting:   Needs assistance  Maintenance Program:     [facility]  Living Arrangements:   Lives with family  Spiritual Needs: Needs are being met through support group  Medication Assistance:   Medication not taken during program hours  Participation Report:   Aerobics/Exercise  Support Group  Cognitive Stimulation  Fire Drill  Creative Arts/Crafts  Games  Speakers/Entertainment  Socialization  Current Events/News  Education/Health Topic  Meditation, prayer time, communion, Geography Bee, [place] Week, tilt time, [place] Day Week, March Madness Week, Special Class Week, yoga relaxation, Easter egg week  Level of Participation:   Active  To the best of their ability

## 2018-03-27 ENCOUNTER — HOSPITAL ENCOUNTER (OUTPATIENT)
Dept: REHABILITATION | Facility: CLINIC | Age: 43
End: 2018-03-27
Attending: FAMILY MEDICINE
Payer: COMMERCIAL

## 2018-03-27 PROCEDURE — 40000247 ZZH STATISTIC VISIT ADULT DAY PROGRAM

## 2018-03-27 PROCEDURE — S5102 ADULT DAY CARE PER DIEM: HCPCS

## 2018-03-29 ENCOUNTER — HOSPITAL ENCOUNTER (OUTPATIENT)
Dept: REHABILITATION | Facility: CLINIC | Age: 43
End: 2018-03-29
Attending: FAMILY MEDICINE
Payer: COMMERCIAL

## 2018-03-29 PROCEDURE — 40000247 ZZH STATISTIC VISIT ADULT DAY PROGRAM

## 2018-03-29 PROCEDURE — S5102 ADULT DAY CARE PER DIEM: HCPCS

## 2018-04-03 ENCOUNTER — HOSPITAL ENCOUNTER (OUTPATIENT)
Dept: REHABILITATION | Facility: CLINIC | Age: 43
End: 2018-04-03
Attending: FAMILY MEDICINE
Payer: COMMERCIAL

## 2018-04-03 PROCEDURE — 40000247 ZZH STATISTIC VISIT ADULT DAY PROGRAM

## 2018-04-03 PROCEDURE — S5102 ADULT DAY CARE PER DIEM: HCPCS

## 2018-04-05 ENCOUNTER — HOSPITAL ENCOUNTER (OUTPATIENT)
Dept: REHABILITATION | Facility: CLINIC | Age: 43
End: 2018-04-05
Attending: FAMILY MEDICINE
Payer: COMMERCIAL

## 2018-04-05 PROCEDURE — S5102 ADULT DAY CARE PER DIEM: HCPCS

## 2018-04-05 PROCEDURE — 40000247 ZZH STATISTIC VISIT ADULT DAY PROGRAM

## 2018-04-10 ENCOUNTER — HOSPITAL ENCOUNTER (OUTPATIENT)
Dept: REHABILITATION | Facility: CLINIC | Age: 43
End: 2018-04-10
Attending: FAMILY MEDICINE
Payer: COMMERCIAL

## 2018-04-10 PROCEDURE — S5102 ADULT DAY CARE PER DIEM: HCPCS

## 2018-04-10 PROCEDURE — 40000247 ZZH STATISTIC VISIT ADULT DAY PROGRAM

## 2018-04-12 ENCOUNTER — HOSPITAL ENCOUNTER (OUTPATIENT)
Dept: REHABILITATION | Facility: CLINIC | Age: 43
End: 2018-04-12
Attending: FAMILY MEDICINE
Payer: COMMERCIAL

## 2018-04-12 PROCEDURE — 40000247 ZZH STATISTIC VISIT ADULT DAY PROGRAM

## 2018-04-12 PROCEDURE — S5102 ADULT DAY CARE PER DIEM: HCPCS

## 2018-04-17 ENCOUNTER — OFFICE VISIT (OUTPATIENT)
Dept: URGENT CARE | Facility: URGENT CARE | Age: 43
End: 2018-04-17
Payer: COMMERCIAL

## 2018-04-17 VITALS
OXYGEN SATURATION: 98 % | DIASTOLIC BLOOD PRESSURE: 82 MMHG | RESPIRATION RATE: 16 BRPM | SYSTOLIC BLOOD PRESSURE: 110 MMHG | BODY MASS INDEX: 22.51 KG/M2 | TEMPERATURE: 99.2 F | HEART RATE: 63 BPM | HEIGHT: 68 IN | WEIGHT: 148.5 LBS

## 2018-04-17 DIAGNOSIS — N39.0 ACUTE UTI: Primary | ICD-10-CM

## 2018-04-17 DIAGNOSIS — R50.9 FEVER AND CHILLS: ICD-10-CM

## 2018-04-17 LAB
ALBUMIN UR-MCNC: NEGATIVE MG/DL
APPEARANCE UR: CLEAR
BACTERIA #/AREA URNS HPF: ABNORMAL /HPF
BILIRUB UR QL STRIP: NEGATIVE
COLOR UR AUTO: YELLOW
GLUCOSE UR STRIP-MCNC: NEGATIVE MG/DL
HGB UR QL STRIP: NEGATIVE
KETONES UR STRIP-MCNC: NEGATIVE MG/DL
LEUKOCYTE ESTERASE UR QL STRIP: NEGATIVE
MUCOUS THREADS #/AREA URNS LPF: PRESENT /LPF
NITRATE UR QL: NEGATIVE
NON-SQ EPI CELLS #/AREA URNS LPF: ABNORMAL /LPF
PH UR STRIP: 7 PH (ref 5–7)
RBC #/AREA URNS AUTO: ABNORMAL /HPF
SOURCE: ABNORMAL
SP GR UR STRIP: 1.02 (ref 1–1.03)
UROBILINOGEN UR STRIP-ACNC: 0.2 EU/DL (ref 0.2–1)
WBC #/AREA URNS AUTO: ABNORMAL /HPF

## 2018-04-17 PROCEDURE — 87086 URINE CULTURE/COLONY COUNT: CPT | Performed by: PHYSICIAN ASSISTANT

## 2018-04-17 PROCEDURE — 81001 URINALYSIS AUTO W/SCOPE: CPT | Performed by: PHYSICIAN ASSISTANT

## 2018-04-17 PROCEDURE — 99213 OFFICE O/P EST LOW 20 MIN: CPT | Performed by: PHYSICIAN ASSISTANT

## 2018-04-17 RX ORDER — CEFDINIR 300 MG/1
300 CAPSULE ORAL 2 TIMES DAILY
Qty: 14 CAPSULE | Refills: 0 | Status: SHIPPED | OUTPATIENT
Start: 2018-04-17 | End: 2018-04-24

## 2018-04-17 NOTE — MR AVS SNAPSHOT
After Visit Summary   4/17/2018    Shanna Shanks    MRN: [id number]           Patient Information     Date Of Birth          1975        Visit Information        Provider Department      4/17/2018 9:40 AM Inna Dennison PA-C [facility]        Today's Diagnoses     Acute UTI    -  1    Fever and chills          Care Instructions    (N39.0) Acute UTI  (primary encounter diagnosis)  Comment:   Plan: cefdinir (OMNICEF) 300 MG capsule            (R50.9) Fever and chills  Comment:   Plan: UA with Microscopic reflex to Culture, Urine         Culture Aerobic Bacterial                      Follow-ups after your visit        Your next 10 appointments already scheduled     Apr 19, 2018 10:00 AM CDT   Treatment with MSAC PER [facility] ([facility])    [address]  [facility] [address]   [phone]            Apr 24, 2018 10:00 AM CDT   Treatment with MSAC PER [facility] ([facility])    [address]  [facility] [address]   [phone]            Apr 26, 2018 10:00 AM CDT   [facility]    [address]  [facility] [address]   [phone]            May 01, 2018 10:00 AM CDT   [facility]    [address]  [facility] [address]   [phone]            May 03, 2018 10:00 AM CDT   [facility]    [address]  [facility] [address]   [phone]            May  08, 2018 10:00 AM CDT   Treatment with MSAC PER JEAN   [facility] ([facility])    [address]  [place] [address]   [phone]            May 10, 2018 10:00 AM CDT   Treatment with MSAC PER JEAN   [facility] ([facility])    [address]  [place] [address]   [phone]            May 15, 2018 10:00 AM CDT   Treatment with MSAC PER JEAN   [facility] ([facility])    [address]  [place] [address]   [phone]            May 17, 2018 10:00 AM CDT   [facility]    [address]  [place] [address]   [phone]            May 22, 2018 10:00 AM CDT   [facility]    [address]  [place] [address]   [phone]              Who to contact     If you have questions or need follow up information about today's clinic visit or your schedule please contact [facility] directly at [phone].  Normal or non-critical lab and imaging results will be communicated to you by MyChart, letter or phone within 4 business days after the clinic has received the results. If you do not hear from us within 7 days, please contact the clinic through MyChart or phone. If you have a critical or abnormal lab result, we will notify you by phone as soon as possible.  Submit refill requests through [employer] or call your pharmacy and they will forward the refill request to us. Please allow 3 business days for your refill to be completed.           "Additional Information About Your Visit        MyChart Information     [employer] gives you secure access to your electronic health record. If you see a primary care provider, you can also send messages to your care team and make appointments. If you have questions, please call your primary care clinic.  If you do not have a primary care provider, please call [phone] and they will assist you.        Care EveryWhere ID     This is your Care EveryWhere ID. This could be used by other organizations to access your [place] medical records  [id number]        Your Vitals Were     Pulse Temperature Respirations Height Pulse Oximetry BMI (Body Mass Index)    63 99.2  F (37.3  C) (Oral) 16 5' 8\" (1.727 m) 98% 22.58 kg/m2       Blood Pressure from Last 3 Encounters:   04/17/18 110/82   03/07/18 117/78   09/26/17 105/50    Weight from Last 3 Encounters:   04/17/18 148 lb 8 oz (67.4 kg)   03/07/18 144 lb (65.3 kg)   09/26/17 143 lb 8 oz (65.1 kg)              We Performed the Following     UA with Microscopic reflex to Culture     Urine Culture Aerobic Bacterial          Today's Medication Changes          These changes are accurate as of 4/17/18 10:58 AM.  If you have any questions, ask your nurse or doctor.               Start taking these medicines.        Dose/Directions    cefdinir 300 MG capsule   Commonly known as:  OMNICEF   Used for:  Acute UTI   Started by:  Inna Dennison PA-C        Dose:  300 mg   Take 1 capsule (300 mg) by mouth 2 times daily for 7 days   Quantity:  14 capsule   Refills:  0            Where to get your medicines      These medications were sent to [facility] [address] [address], [place] [address]     Phone:  [phone]     cefdinir 300 MG capsule                Primary Care Provider Office Phone # Fax #    Malvin Beck [phone] [address] [place] [address] " [phone]        Equal Access to Services     [facility] : Hadii marcio lockhart fela Lyon, wamitchda luqadaha, qaybta kadeandreda eh, liza caldwell. So [facility] [phone].    ATENCIÓN: Si habla español, tiene a gatica disposición servicios gratuitos de asistencia lingüística. Carlos Aame al [phone].    We comply with applicable federal civil rights laws and [place] laws. We do not discriminate on the basis of race, color, national origin, age, disability, sex, sexual orientation, or gender identity.            Thank you!     Thank you for choosing [facility]  for your care. Our goal is always to provide you with excellent care. Hearing back from our patients is one way we can continue to improve our services. Please take a few minutes to complete the written survey that you may receive in the mail after your visit with us. Thank you!             Your Updated Medication List - Protect others around you: Learn how to safely use, store and throw away your medicines at www.disposemymeds.org.          This list is accurate as of 4/17/18 10:58 AM.  Always use your most recent med list.                   Brand Name Dispense Instructions for use Diagnosis    baclofen 10 MG tablet    LIORESAL     Take 10-20 mg by mouth 4 times daily as needed        cefdinir 300 MG capsule    OMNICEF    14 capsule    Take 1 capsule (300 mg) by mouth 2 times daily for 7 days    Acute UTI       CRANBERRY EXTRACT PO      Take 1 tablet by mouth daily        norethindrone 0.35 MG per tablet    MICRONOR    84 tablet    TAKE 1 TABLET(0.35 MG) BY MOUTH DAILY    Surveillance of previously prescribed contraceptive pill       oxybutynin 10 MG 24 hr tablet    DITROPAN-XL    30 tablet    Take 1 tablet (10 mg) by mouth At Bedtime    Multiple sclerosis (H)       sulfamethoxazole-trimethoprim 800-160 MG per tablet    BACTRIM DS/SEPTRA DS    1 tablet    Take 1 tablet by mouth once for 1 dose    Neurogenic  bladder       venlafaxine 75 MG 24 hr capsule    EFFEXOR-XR     Take 75 mg by mouth daily        vitamin D 40806 UNIT capsule    ERGOCALCIFEROL     Take 50,000 Units by mouth once a week On Sundays

## 2018-04-17 NOTE — PROGRESS NOTES
SUBJECTIVE:   Shanna Shanks is a 42 year old female presenting with a chief complaint of:  1) not processing as well this morning, per her   2) low grade fever this morning.     No other symptoms.  Denies any issues with movement or balance.  Denies any pain.    Last UTI was September 2017.  She currently takes Septra one table daily prophylactically.     Chief Complaint   Patient presents with     Urgent Care     Pt states low grade fever, maybe some confusion on following directions, pt has MS sxs        She is an established patient of [facility].    Severity mild  Current and Associated symptoms: fever  Treatment measures tried include None tried.  Predisposing factors include Diagnosed with MS in 1996.    SH: here with her  who provides details/history.      Review of Systems    Past Medical History:   Diagnosis Date     Multiple sclerosis (H) 3/2/96    last exacerbation 3yrs ago, no meds x 6 months     Tobacco dependency      Family History   Problem Relation Age of Onset     Family History Negative Mother      DIABETES Father      Cancer - colorectal Paternal Grandmother      HEART DISEASE Paternal Grandfather      MI     Current Outpatient Prescriptions   Medication Sig Dispense Refill     sulfamethoxazole-trimethoprim (BACTRIM DS/SEPTRA DS) 800-160 MG per tablet Take 1 tablet by mouth once for 1 dose 1 tablet 0     norethindrone (MICRONOR) 0.35 MG per tablet TAKE 1 TABLET(0.35 MG) BY MOUTH DAILY 84 tablet 3     CRANBERRY EXTRACT PO Take 1 tablet by mouth daily       oxybutynin (DITROPAN-XL) 10 MG 24 hr tablet Take 1 tablet (10 mg) by mouth At Bedtime 30 tablet      venlafaxine (EFFEXOR-XR) 75 MG 24 hr capsule Take 75 mg by mouth daily       baclofen (LIORESAL) 10 MG tablet Take 10-20 mg by mouth 4 times daily as needed   3     vitamin D (ERGOCALCIFEROL) 34008 UNIT capsule Take 50,000 Units by mouth once a week On Sundays  1     Social History   Substance Use Topics     Smoking status:  "Former Smoker     Packs/day: 0.25     Types: Cigarettes     Smokeless tobacco: Never Used      Comment: quit a few weeks ago     Alcohol use 3.5 oz/week     7 Standard drinks or equivalent per week      Comment: social drinker       OBJECTIVE  /82  Pulse 63  Temp 99.2  F (37.3  C) (Oral)  Resp 16  Ht 5' 8\" (1.727 m)  Wt 148 lb 8 oz (67.4 kg)  SpO2 98%  BMI 22.58 kg/m2    Physical Exam   Constitutional: She appears well-developed and well-nourished.   HENT:   Head: Normocephalic and atraumatic.   Neck: Normal range of motion.   Cardiovascular: Normal rate.    Pulmonary/Chest: Effort normal.   Abdominal: Soft. Bowel sounds are normal.   Neurological: She is alert.   Skin: Skin is warm and dry.       Labs:  Results for orders placed or performed in visit on 04/17/18   UA with Microscopic reflex to Culture   Result Value Ref Range    Color Urine Yellow     Appearance Urine Clear     Glucose Urine Negative NEG^Negative mg/dL    Bilirubin Urine Negative NEG^Negative    Ketones Urine Negative NEG^Negative mg/dL    Specific Gravity Urine 1.020 1.003 - 1.035    pH Urine 7.0 5.0 - 7.0 pH    Protein Albumin Urine Negative NEG^Negative mg/dL    Urobilinogen Urine 0.2 0.2 - 1.0 EU/dL    Nitrite Urine Negative NEG^Negative    Blood Urine Negative NEG^Negative    Leukocyte Esterase Urine Negative NEG^Negative    Source Midstream Urine     WBC Urine 0 - 5 OTO5^0 - 5 /HPF    RBC Urine O - 2 OTO2^O - 2 /HPF    Squamous Epithelial /LPF Urine Few FEW^Few /LPF    Bacteria Urine Moderate (A) NEG^Negative /HPF    Mucous Urine Present (A) NEG^Negative /LPF   Urine Culture Aerobic Bacterial   Result Value Ref Range    Specimen Description Midstream Urine     Culture Micro >100,000 colonies/mL  mixed urogenital jen          (N39.0) Acute UTI  (primary encounter diagnosis)  Comment:   Plan: cefdinir (OMNICEF) 300 MG capsule          TO ED should symptoms worsen as discussed.  F/U with PCP.    (R50.9) Fever and chills  Comment: "   Plan: UA with Microscopic reflex to Culture, Urine         Culture Aerobic Bacterial            ADDENDUM:  Urine culture was negative  Discussed results with patient's , her caregiver.  She is doing better on the antibiotic, no further symptoms.  Will have her complete 5 days of the Omnicef and then stop.    F/U with PCP.      Patient's  expresses understanding and agreement with the assessment and plan as above.

## 2018-04-17 NOTE — PATIENT INSTRUCTIONS
(N39.0) Acute UTI  (primary encounter diagnosis)  Comment:   Plan: cefdinir (OMNICEF) 300 MG capsule            (R50.9) Fever and chills  Comment:   Plan: UA with Microscopic reflex to Culture, Urine         Culture Aerobic Bacterial

## 2018-04-18 LAB
BACTERIA SPEC CULT: NORMAL
SPECIMEN SOURCE: NORMAL

## 2018-04-18 NOTE — ADDENDUM NOTE
Encounter addended by: Analisa Gonzalez on: 4/18/2018  2:22 PM<BR>     Actions taken: Episode edited, Sign clinical note

## 2018-04-18 NOTE — PROGRESS NOTES
MONTHLY PROGRESS NOTE AND PARTICIPATION REPORT   [facility]    Shanna Shanks, 1975  Attendance: Please see Epic for attendance record.    Communication:   Does communicate   Hearing:   No impairment  Vision:   No impairment  Orientation/Cognition:   Minor forgetfulness  Partial disorientation  Short term memory loss  Behavior:   Requires redirection  Self-Preservation Skills:   Capable of self-preservation  Eating:   Independent  Assist with set up  Ambulation Walking:   SBA with ambulation  Transferring:   Independent  Wheelchair:   N/A  Toileting:   Needs assistance   Needs bathroom reminders  Maintenance Program:     [employer]  Living Arrangements:   Lives with family  Spiritual Needs: Needs are being met through support group  Medication Assistance:   Medication not taken during program hours  Participation Report:   Aerobics/Exercise  Support Group  Cognitive Stimulation  Creative Arts/Crafts  Games  Speakers/Entertainment  Socialization  Current Events/News  Education/Health Topic  Meditation/prayer/communion with Melina, baseball week, tilt time, flower power week, yoga relaxation with Nyla, OT month  Level of Participation:   Active  To the best of their ability

## 2018-04-18 NOTE — PROGRESS NOTES
INDIVIDUAL PLAN OF CARE   [facility]    Member Name: Shanna Shanks; YOB: 1975  MRN: [id number]  4/18/2018    Goals developed in collaboration with: member  Staff responsible for plan: Melina Chao  1. Long Term Goal (concrete, measurable, and time specific outcomes): Member will maintain present level of function, preventing or delaying further deterioration.    Target Date: 10/31/18  Bi-Annual Review:  10/31/18.  Member participates in support group, cognitive stimulation programming, and physical maintenance programming 2x/week.    2. Short Term Goal: (concrete, measurable, and time specific outcomes): Member will actively participate in aerobic exercises each day of program attendance.  Target Date: 10/31/18  Bi-Annual Review:  10/31/18.  Member participates in aerobic exercises ~ 75% of opportunities and requires verbal cues to participate when not engaged due to fatigue and cognitive decline 2/2 MS diagnosis.    3. Short Term Goal: (concrete, measurable, and time specific outcomes): Member will participate in prescribed maintenance program 1x/week.  Target Date: 10/31/18  Bi-Annual Review:  10/31/18.  Member participates in prescribed maintenance programming ~ 75% of opportunities.  Staff will continue to encourage member to participate in maintenance programming and explain benefits to ensure member understanding.    VULNERABLE ADULT ASSESSMENT  [facility]     Assessment of Susceptibility to Abuse, Including Self Abuse, Neglect (Identification of characteristics, which make the individual susceptible to abuse, and how these characteristics cause the individual to be susceptible to abuse.)   Based on completed member needs assessment and interview, cognitive and/or physical limitations impair ability to meet basic needs and/or protect self from maltreatment.  [facility] staff has no previous knowledge of abuse to self or others.    INDIVIDUAL ABUSE PREVENTION PLAN-MEASURES TAKEN TO MINIMIZE RISK OF ABUSE   ADL:   Assist with ambulation  Ambulation/Transfers/Wheelchairs:  Assist with all transfers and/or ambulation   Behavior:   Monitor for wandering due to confusion and urinary urgency due to MS  Communication:  Encourage verbalization of needs/concerns  Cognitive Issues:   Require aide to be with member if wandering  Provider reminders due to confusion, forgetfulness  Give simple step-by-step direction  Diet:   No concerns at this time.  Exercise:   Member will participate in seated aerobics each day of attendance  Hearing:   No concerns at this time  Isolation:   Encourage socialization due to isolation in home environment  Medical Monitoring:   Monitor physical and emotional comfort  Mental Health:   Encourage client to express feelings  Sensory:   Provide and encourage participation in activities for stimulation  Vision:   No concerns at this time.  Other:   Referral Indicated:   Developed/Reviewed with Member: Yes  The Program Abuse Prevention Plan identifies the specific actions that minimize abuse to the [facility] participant.  Yes

## 2018-04-19 ENCOUNTER — HOSPITAL ENCOUNTER (OUTPATIENT)
Dept: REHABILITATION | Facility: CLINIC | Age: 43
End: 2018-04-19
Attending: FAMILY MEDICINE
Payer: COMMERCIAL

## 2018-04-19 PROCEDURE — 40000247 ZZH STATISTIC VISIT ADULT DAY PROGRAM

## 2018-04-19 PROCEDURE — S5102 ADULT DAY CARE PER DIEM: HCPCS

## 2018-04-19 NOTE — PROGRESS NOTES
AC RN Monthly Progress Note  Previous documentation reviewed. Noted visit to urgent care for possible UTI. No concerns reported by AC staff or member.

## 2018-05-01 ENCOUNTER — HOSPITAL ENCOUNTER (OUTPATIENT)
Dept: REHABILITATION | Facility: CLINIC | Age: 43
End: 2018-05-01
Attending: FAMILY MEDICINE
Payer: COMMERCIAL

## 2018-05-01 PROCEDURE — 40000247 ZZH STATISTIC VISIT ADULT DAY PROGRAM

## 2018-05-01 PROCEDURE — S5102 ADULT DAY CARE PER DIEM: HCPCS

## 2018-05-03 ENCOUNTER — HOSPITAL ENCOUNTER (OUTPATIENT)
Dept: REHABILITATION | Facility: CLINIC | Age: 43
End: 2018-05-03
Attending: FAMILY MEDICINE
Payer: COMMERCIAL

## 2018-05-03 PROCEDURE — 40000247 ZZH STATISTIC VISIT ADULT DAY PROGRAM

## 2018-05-03 PROCEDURE — S5102 ADULT DAY CARE PER DIEM: HCPCS

## 2018-05-08 ENCOUNTER — HOSPITAL ENCOUNTER (OUTPATIENT)
Dept: REHABILITATION | Facility: CLINIC | Age: 43
End: 2018-05-08
Attending: FAMILY MEDICINE
Payer: COMMERCIAL

## 2018-05-08 PROCEDURE — S5102 ADULT DAY CARE PER DIEM: HCPCS

## 2018-05-08 PROCEDURE — 40000247 ZZH STATISTIC VISIT ADULT DAY PROGRAM

## 2018-05-10 ENCOUNTER — HOSPITAL ENCOUNTER (OUTPATIENT)
Dept: REHABILITATION | Facility: CLINIC | Age: 43
End: 2018-05-10
Attending: FAMILY MEDICINE
Payer: COMMERCIAL

## 2018-05-10 PROCEDURE — S5102 ADULT DAY CARE PER DIEM: HCPCS

## 2018-05-10 PROCEDURE — 40000247 ZZH STATISTIC VISIT ADULT DAY PROGRAM

## 2018-05-15 ENCOUNTER — HOSPITAL ENCOUNTER (OUTPATIENT)
Dept: REHABILITATION | Facility: CLINIC | Age: 43
End: 2018-05-15
Attending: FAMILY MEDICINE
Payer: COMMERCIAL

## 2018-05-15 ENCOUNTER — TELEPHONE (OUTPATIENT)
Dept: FAMILY MEDICINE | Facility: CLINIC | Age: 43
End: 2018-05-15

## 2018-05-15 PROCEDURE — S5102 ADULT DAY CARE PER DIEM: HCPCS

## 2018-05-15 PROCEDURE — 40000247 ZZH STATISTIC VISIT ADULT DAY PROGRAM

## 2018-05-15 NOTE — TELEPHONE ENCOUNTER
Adult  Program  Health Care Summary Report need a face to face encounter to complete forms.    Please set an appointment for OV visit.  I have attempted to contact this patient by phone with the following results: left message to return my call on answering machine.  1st attempt.        Dickson Alvarado MA

## 2018-05-16 NOTE — PROGRESS NOTES
[facility] Center Note:  The Jonhny Cognitive Assessment (MoCa) was completed on this day program member on this date. The patient scored 9/30. The MOCA is a 30 point cognitive screening assessment. Normal score is considered ? 26/30. The following ranges may be used to grade severity: 18-26 = mild cognitive impairment, 10-17= moderate cognitive impairment (MCI) and less than 10= severe cognitive impairment. The MoCA assesses different cognitive domains including attention and concentration, executive functions, memory, language, visuoconstructional skills, conceptual thinking, calculations and orientation.   Patient was easily distracted during the assessment.

## 2018-05-17 ENCOUNTER — HOSPITAL ENCOUNTER (OUTPATIENT)
Dept: REHABILITATION | Facility: CLINIC | Age: 43
End: 2018-05-17
Attending: FAMILY MEDICINE
Payer: COMMERCIAL

## 2018-05-17 PROCEDURE — S5102 ADULT DAY CARE PER DIEM: HCPCS

## 2018-05-17 PROCEDURE — 40000247 ZZH STATISTIC VISIT ADULT DAY PROGRAM

## 2018-05-17 NOTE — TELEPHONE ENCOUNTER
Called and spoke with patient and her  and made an appointment for Friday May 25th at 120pm.    Brandy Yang MA

## 2018-05-22 ENCOUNTER — HOSPITAL ENCOUNTER (OUTPATIENT)
Dept: REHABILITATION | Facility: CLINIC | Age: 43
End: 2018-05-22
Attending: FAMILY MEDICINE
Payer: COMMERCIAL

## 2018-05-22 PROCEDURE — 40000247 ZZH STATISTIC VISIT ADULT DAY PROGRAM

## 2018-05-22 PROCEDURE — S5102 ADULT DAY CARE PER DIEM: HCPCS

## 2018-05-23 NOTE — ADDENDUM NOTE
Encounter addended by: Analisa Gonzalez on: 5/23/2018  9:24 AM<BR>     Actions taken: Sign clinical note

## 2018-05-23 NOTE — PROGRESS NOTES
MONTHLY PROGRESS NOTE AND PARTICIPATION REPORT   [facility]    Shanna Shanks, 1975  Attendance: Please see Epic for attendance record.    Communication:   Does communicate   Hearing:   No impairment  Vision:   No impairment  Orientation/Cognition:   Minor forgetfulness  Partial disorientation  Short term memory loss  Behavior:   Requires redirection  Self-Preservation Skills:   Not capable of self-preservation  Eating:   Independent  Assist with set up  Ambulation Walking:   SBA with ambulation  Transferring:   Independent  Wheelchair:   N/A  Toileting:   Needs assistance  Needs bathroom reminders  Maintenance Program:     [employer]  Living Arrangements:   Lives with family  Spiritual Needs: Needs are being met through support group  Medication Assistance:   Medication not taken during program hours  Participation Report:   Aerobics/Exercise  Support Group  Cognitive Stimulation  Creative Arts/Crafts  Games  Gardening  Speakers/Entertainment  Socialization  Current Events/News  Education/Health Topic  Meditation/prayer time/communion with Alla Summers week, [facility] week, outdoor fun, town rojas week, tilt time, yoga relaxation with Nyla, table top games, track and field week, Anurag Talks  Level of Participation:   Active  To the best of their ability

## 2018-05-24 ENCOUNTER — HOSPITAL ENCOUNTER (OUTPATIENT)
Dept: REHABILITATION | Facility: CLINIC | Age: 43
End: 2018-05-24
Attending: FAMILY MEDICINE
Payer: COMMERCIAL

## 2018-05-24 PROCEDURE — S5102 ADULT DAY CARE PER DIEM: HCPCS

## 2018-05-24 PROCEDURE — 40000247 ZZH STATISTIC VISIT ADULT DAY PROGRAM

## 2018-05-29 ENCOUNTER — HOSPITAL ENCOUNTER (OUTPATIENT)
Dept: REHABILITATION | Facility: CLINIC | Age: 43
End: 2018-05-29
Attending: FAMILY MEDICINE
Payer: COMMERCIAL

## 2018-05-29 PROCEDURE — 40000247 ZZH STATISTIC VISIT ADULT DAY PROGRAM

## 2018-05-29 PROCEDURE — S5102 ADULT DAY CARE PER DIEM: HCPCS

## 2018-05-29 NOTE — TELEPHONE ENCOUNTER
Called and talked to  and reschedule appt tomorrow at 4:40pm. Form is in the MA basket in [place].       Gely Alvarado MA

## 2018-05-30 ENCOUNTER — OFFICE VISIT (OUTPATIENT)
Dept: FAMILY MEDICINE | Facility: CLINIC | Age: 43
End: 2018-05-30
Payer: COMMERCIAL

## 2018-05-30 VITALS
SYSTOLIC BLOOD PRESSURE: 123 MMHG | TEMPERATURE: 98.5 F | DIASTOLIC BLOOD PRESSURE: 69 MMHG | OXYGEN SATURATION: 99 % | HEIGHT: 68 IN | BODY MASS INDEX: 23.53 KG/M2 | WEIGHT: 155.25 LBS | RESPIRATION RATE: 16 BRPM | HEART RATE: 66 BPM

## 2018-05-30 DIAGNOSIS — G93.40 ENCEPHALOPATHY: ICD-10-CM

## 2018-05-30 DIAGNOSIS — Z11.1 SCREENING EXAMINATION FOR PULMONARY TUBERCULOSIS: ICD-10-CM

## 2018-05-30 DIAGNOSIS — G35 MULTIPLE SCLEROSIS (H): Primary | ICD-10-CM

## 2018-05-30 PROCEDURE — 99213 OFFICE O/P EST LOW 20 MIN: CPT | Performed by: FAMILY MEDICINE

## 2018-05-30 NOTE — MR AVS SNAPSHOT
After Visit Summary   5/30/2018    Shanna Shanks    MRN: [id number]           Patient Information     Date Of Birth          1975        Visit Information        Provider Department      5/30/2018 4:40 PM Malvin Beck MD [facility]        Today's Diagnoses     Multiple sclerosis (H)    -  1    Encephalopathy        Screening examination for pulmonary tuberculosis           Follow-ups after your visit        Your next 10 appointments already scheduled     Jun 05, 2018 10:00 AM CDT   Treatment with MS[facility] PER [facility] ([facility])    [address]  [facility] [address]   [phone]            Jun 07, 2018 10:00 AM CDT   Treatment with MSAC PER [facility] ([facility])    [address]  [facility] [address]   [phone]            Jun 12, 2018 10:00 AM CDT   Treatment with MSAC PER [facility] ([facility])    [address]  [place] [address]   [phone]            Jun 14, 2018 10:00 AM CDT   Treatment with MSAC PER [facility] ([facility])    [address]  [facility] [address]   [phone]            Jun 19, 2018 10:00 AM CDT   Treatment with MSAC PER [facility] ([facility])    [address]  [facility] [address]   [phone]            Jun 21, 2018 10:00 AM CDT   Treatment with MSAC PER [facility] ([facility])    [address]  [facility] [address]   [phone]             Jun 26, 2018 10:00 AM CDT   Treatment with MSAC PER JEAN   [facility] ([facility])    [address]  [place] [address]   [phone]            Jun 28, 2018 10:00 AM CDT   Treatment with MSAC PER JEAN   [facility] ([facility])    [address]  [place] [address]   [phone]            Jul 03, 2018 10:00 AM CDT   Treatment with MSAC PER JEAN   [facility] ([facility])    [address]  [place] [address]   [phone]            Jul 05, 2018 10:00 AM CDT   Treatment with MSAC PER JEAN   [facility] ([facility])    [address]  [place] [address]   [phone]              Future tests that were ordered for you today     Open Future Orders        Priority Expected Expires Ordered    M Tuberculosis by Quantiferon Routine 5/30/2018 5/30/2019 5/30/2018            Who to contact     If you have questions or need follow up information about today's clinic visit or your schedule please contact [facility] directly at [phone].  Normal or non-critical lab and imaging results will be communicated to you by MyChart, letter or phone within 4 business days after the clinic has received the results. If you do not hear from us within 7 days, please contact the clinic through MyChart or phone. If you have a critical or abnormal lab result, we will notify you by phone as soon as possible.  Submit refill requests through "[employer] or call your pharmacy and they will forward the refill request to us. Please allow 3 business days for your refill to be completed.          Additional Information About Your Visit        [facility]  "Information     [employer] gives you secure access to your electronic health record. If you see a primary care provider, you can also send messages to your care team and make appointments. If you have questions, please call your primary care clinic.  If you do not have a primary care provider, please call [phone] and they will assist you.        Care EveryWhere ID     This is your Care EveryWhere ID. This could be used by other organizations to access your [place] medical records  [id number]        Your Vitals Were     Pulse Temperature Respirations Height Pulse Oximetry BMI (Body Mass Index)    66 98.5  F (36.9  C) (Oral) 16 5' 8\" (1.727 m) 99% 23.61 kg/m2       Blood Pressure from Last 3 Encounters:   05/30/18 123/69   04/17/18 110/82   03/07/18 117/78    Weight from Last 3 Encounters:   05/30/18 155 lb 4 oz (70.4 kg)   04/17/18 148 lb 8 oz (67.4 kg)   03/07/18 144 lb (65.3 kg)               Primary Care Provider Office Phone # Fax #    Malvin Cesario Beck [phone]       [facility] [address] [place], [place] [id number]        Equal Access to Services     DUANE HARPER AH: Hadii marcio ku hadasho Soomaali, waaxda luqadaha, qaybta kaalmada adeegyada, liza lun louie caldwell. So [facility] [phone].    ATENCIÓN: Si habla español, tiene a gatica disposición servicios gratuitos de asistencia lingüística. Llame al [phone].    We comply with applicable federal civil rights laws and [place] laws. We do not discriminate on the basis of race, color, national origin, age, disability, sex, sexual orientation, or gender identity.            Thank you!     Thank you for choosing [facility]  for your care. Our goal is always to provide you with excellent care. Hearing back from our patients is one way we can continue to improve our services. Please take a few minutes to complete the written survey that you may receive in the mail after your visit with us. Thank you!           "   Your Updated Medication List - Protect others around you: Learn how to safely use, store and throw away your medicines at www.disposemymeds.org.          This list is accurate as of 5/30/18 11:59 PM.  Always use your most recent med list.                   Brand Name Dispense Instructions for use Diagnosis    baclofen 10 MG tablet    LIORESAL     Take 10-20 mg by mouth 4 times daily as needed        CRANBERRY EXTRACT PO      Take 1 tablet by mouth daily        dimethyl fumarate delayed release capsule    TECFIDERA     Take 120 mg by mouth 2 times daily        norethindrone 0.35 MG per tablet    MICRONOR    84 tablet    TAKE 1 TABLET(0.35 MG) BY MOUTH DAILY    Surveillance of previously prescribed contraceptive pill       oxybutynin 10 MG 24 hr tablet    DITROPAN-XL    30 tablet    Take 1 tablet (10 mg) by mouth At Bedtime    Multiple sclerosis (H)       sulfamethoxazole-trimethoprim 800-160 MG per tablet    BACTRIM DS/SEPTRA DS    1 tablet    Take 1 tablet by mouth once for 1 dose    Neurogenic bladder       venlafaxine 75 MG 24 hr capsule    EFFEXOR-XR     Take 75 mg by mouth daily        vitamin D 88705 UNIT capsule    ERGOCALCIFEROL     Take 50,000 Units by mouth once a week On Sundays

## 2018-05-30 NOTE — PROGRESS NOTES
SUBJECTIVE:   Shanna Shanks is a 42 year old female who presents to clinic today for the following health issues:    Pt would like forms filled out     Goes to adult day program - twice per week. 10 am to 3 pm.    Feeling well. Here with her .     Day to day stuff - able to do everything well.     No TB exposure.     Problem list and histories reviewed & adjusted, as indicated.  Additional history: as documented    Labs reviewed in EPIC    Reviewed and updated as needed this visit by clinical staff  Tobacco  Allergies  Meds  Med Hx  Surg Hx  Fam Hx  Soc Hx      Reviewed and updated as needed this visit by Provider      Social History     Social History     Marital status:      Spouse name: Jama     Number of children: 2     Years of education: N/A     Occupational History     computer graphic design Self     Social History Main Topics     Smoking status: Former Smoker     Packs/day: 0.25     Types: Cigarettes     Smokeless tobacco: Never Used      Comment: quit a few weeks ago     Alcohol use 3.5 oz/week     7 Standard drinks or equivalent per week      Comment: social drinker     Drug use: No     Sexual activity: Not Currently     Partners: Male     Birth control/ protection: Pill, Rhythm     Other Topics Concern     Parent/Sibling W/ Cabg, Mi Or Angioplasty Before 65f 55m? No     Social History Narrative    Caffeine intake/servings daily - 2    Calcium intake/servings daily - 2-3    Exercise 7 times weekly - describe runs on treadmill for 5 minutes    Sunscreen used - Yes    Seatbelts used - Yes    Guns stored in the home - No    Self Breast Exam - Yes    Pap test up to date -  Yes    Eye exam up to date -  Yes    Dental exam up to date -  Yes    DEXA scan up to date -  Yes    Flex Sig/Colonoscopy up to date -  Not Applicable    Mammography up to date -  Not Applicable    Immunizations reviewed and up to date - Yes    Abuse: Current or Past (Physical, Sexual or Emotional) - No    Do  "you feel safe in your environment - Yes    Do you cope well with stress - Yes    Do you suffer from insomnia - No    Last updated by: Michelle Sanchez  12/22/2010                     Allergies   Allergen Reactions     Diphenhydramine Rash     benadryl cream     Nickel      Nitrofurantoin Rash     Patient Active Problem List   Diagnosis     Multiple sclerosis (H)     UTI (urinary tract infection)     CARDIOVASCULAR SCREENING; LDL GOAL LESS THAN 160     Dysmenorrhea     Tobacco dependency     Encephalopathy     Herpes encephalitis     Fever of unknown origin     Reviewed medications, social history and  past medical and surgical history.    Review of system: for general, respiratory, CVS, GI and psychiatry negative except for noted above.     EXAM:  /69 (Cuff Size: Adult Regular)  Pulse 66  Temp 98.5  F (36.9  C) (Oral)  Resp 16  Ht 5' 8\" (1.727 m)  Wt 155 lb 4 oz (70.4 kg)  SpO2 99%  BMI 23.61 kg/m2  Constitutional: healthy, alert and no distress   Psychiatric: mentation appears normal and affect normal/bright  NEURO: Speech is slightly slurred.  Gait is slightly antalgic.  Shuffling gait.    ASSESSMENT / PLAN:  (G35) Multiple sclerosis (H)  (primary encounter diagnosis)  Comment: Here with her .  Goes to adult  2 days per week.  Needs a form to be filled out.  Filled out the form and will fax it for her.  No any concern for tuberculosis exposure what we will obtain QuantiFERON for her.  She is fairly independent with most of the activities and currently not having any major complaints.  Plan:       (G93.40) Encephalopathy  Comment:    Plan:      (Z11.1) Screening examination for pulmonary tuberculosis  Comment:    Plan: M Tuberculosis by Quantiferon,   "

## 2018-05-31 ENCOUNTER — HOSPITAL ENCOUNTER (OUTPATIENT)
Dept: REHABILITATION | Facility: CLINIC | Age: 43
End: 2018-05-31
Attending: FAMILY MEDICINE
Payer: COMMERCIAL

## 2018-05-31 PROCEDURE — 40000247 ZZH STATISTIC VISIT ADULT DAY PROGRAM

## 2018-05-31 PROCEDURE — S5102 ADULT DAY CARE PER DIEM: HCPCS

## 2018-06-01 DIAGNOSIS — Z11.1 SCREENING EXAMINATION FOR PULMONARY TUBERCULOSIS: ICD-10-CM

## 2018-06-01 PROCEDURE — 86480 TB TEST CELL IMMUN MEASURE: CPT | Performed by: FAMILY MEDICINE

## 2018-06-01 PROCEDURE — 36415 COLL VENOUS BLD VENIPUNCTURE: CPT | Performed by: FAMILY MEDICINE

## 2018-06-04 ENCOUNTER — APPOINTMENT (OUTPATIENT)
Dept: CT IMAGING | Facility: CLINIC | Age: 43
DRG: 100 | End: 2018-06-04
Attending: EMERGENCY MEDICINE
Payer: COMMERCIAL

## 2018-06-04 ENCOUNTER — APPOINTMENT (OUTPATIENT)
Dept: GENERAL RADIOLOGY | Facility: CLINIC | Age: 43
DRG: 100 | End: 2018-06-04
Attending: EMERGENCY MEDICINE
Payer: COMMERCIAL

## 2018-06-04 ENCOUNTER — HOSPITAL ENCOUNTER (INPATIENT)
Facility: CLINIC | Age: 43
LOS: 3 days | Discharge: HOME | DRG: 100 | End: 2018-06-07
Attending: EMERGENCY MEDICINE | Admitting: HOSPITALIST
Payer: COMMERCIAL

## 2018-06-04 DIAGNOSIS — R56.9 SEIZURE (H): Primary | ICD-10-CM

## 2018-06-04 DIAGNOSIS — G35 MULTIPLE SCLEROSIS (H): ICD-10-CM

## 2018-06-04 LAB
ALBUMIN SERPL-MCNC: 3.8 G/DL (ref 3.4–5)
ALBUMIN UR-MCNC: 10 MG/DL
ALP SERPL-CCNC: 39 U/L (ref 40–150)
ALT SERPL W P-5'-P-CCNC: 19 U/L (ref 0–50)
AMPHETAMINES UR QL SCN: NEGATIVE
ANION GAP SERPL CALCULATED.3IONS-SCNC: 10 MMOL/L (ref 3–14)
APAP SERPL-MCNC: <2 MG/L (ref 10–20)
APPEARANCE UR: CLEAR
APTT PPP: 28 SEC (ref 22–37)
AST SERPL W P-5'-P-CCNC: 20 U/L (ref 0–45)
BARBITURATES UR QL: NEGATIVE
BASE DEFICIT BLDV-SCNC: 2.1 MMOL/L
BASOPHILS # BLD AUTO: 0 10E9/L (ref 0–0.2)
BASOPHILS NFR BLD AUTO: 0.2 %
BENZODIAZ UR QL: POSITIVE
BILIRUB SERPL-MCNC: 0.4 MG/DL (ref 0.2–1.3)
BILIRUB UR QL STRIP: NEGATIVE
BUN SERPL-MCNC: 11 MG/DL (ref 7–30)
CALCIUM SERPL-MCNC: 8 MG/DL (ref 8.5–10.1)
CANNABINOIDS UR QL SCN: NEGATIVE
CHLORIDE SERPL-SCNC: 106 MMOL/L (ref 94–109)
CO2 SERPL-SCNC: 22 MMOL/L (ref 20–32)
COCAINE UR QL: NEGATIVE
COLOR UR AUTO: YELLOW
CREAT SERPL-MCNC: 0.84 MG/DL (ref 0.52–1.04)
DIFFERENTIAL METHOD BLD: NORMAL
EOSINOPHIL # BLD AUTO: 0 10E9/L (ref 0–0.7)
EOSINOPHIL NFR BLD AUTO: 0 %
ERYTHROCYTE [DISTWIDTH] IN BLOOD BY AUTOMATED COUNT: 13.2 % (ref 10–15)
ETHANOL SERPL-MCNC: <0.01 G/DL
GFR SERPL CREATININE-BSD FRML MDRD: 73 ML/MIN/1.7M2
GLUCOSE SERPL-MCNC: 132 MG/DL (ref 70–99)
GLUCOSE UR STRIP-MCNC: NEGATIVE MG/DL
HCG SERPL QL: ABNORMAL
HCG SERPL QL: NEGATIVE
HCO3 BLDV-SCNC: 23 MMOL/L (ref 21–28)
HCT VFR BLD AUTO: 36.4 % (ref 35–47)
HGB BLD-MCNC: 12.1 G/DL (ref 11.7–15.7)
HGB UR QL STRIP: NEGATIVE
IMM GRANULOCYTES # BLD: 0 10E9/L (ref 0–0.4)
IMM GRANULOCYTES NFR BLD: 0.2 %
INR PPP: 1.05 (ref 0.86–1.14)
INTERPRETATION ECG - MUSE: NORMAL
KETONES UR STRIP-MCNC: 10 MG/DL
LACTATE BLD-SCNC: 2 MMOL/L (ref 0.7–2)
LEUKOCYTE ESTERASE UR QL STRIP: ABNORMAL
LYMPHOCYTES # BLD AUTO: 1.1 10E9/L (ref 0.8–5.3)
LYMPHOCYTES NFR BLD AUTO: 21.5 %
M TB TUBERC IFN-G BLD QL: NEGATIVE
M TB TUBERC IFN-G/MITOGEN IGNF BLD: 0.08 IU/ML
MAGNESIUM SERPL-MCNC: 2 MG/DL (ref 1.6–2.3)
MCH RBC QN AUTO: 30.3 PG (ref 26.5–33)
MCHC RBC AUTO-ENTMCNC: 33.2 G/DL (ref 31.5–36.5)
MCV RBC AUTO: 91 FL (ref 78–100)
MONOCYTES # BLD AUTO: 0.5 10E9/L (ref 0–1.3)
MONOCYTES NFR BLD AUTO: 9.2 %
NEUTROPHILS # BLD AUTO: 3.5 10E9/L (ref 1.6–8.3)
NEUTROPHILS NFR BLD AUTO: 68.9 %
NITRATE UR QL: NEGATIVE
NRBC # BLD AUTO: 0 10*3/UL
NRBC BLD AUTO-RTO: 0 /100
OPIATES UR QL SCN: NEGATIVE
PCO2 BLDV: 42 MM HG (ref 40–50)
PCP UR QL SCN: NEGATIVE
PH BLDV: 7.35 PH (ref 7.32–7.43)
PH UR STRIP: 6.5 PH (ref 5–7)
PHOSPHATE SERPL-MCNC: 3 MG/DL (ref 2.5–4.5)
PLATELET # BLD AUTO: 208 10E9/L (ref 150–450)
PO2 BLDV: 56 MM HG (ref 25–47)
POTASSIUM SERPL-SCNC: 4 MMOL/L (ref 3.4–5.3)
PROT SERPL-MCNC: 7.3 G/DL (ref 6.8–8.8)
RBC # BLD AUTO: 3.99 10E12/L (ref 3.8–5.2)
RBC #/AREA URNS AUTO: 1 /HPF (ref 0–2)
SALICYLATES SERPL-MCNC: <2 MG/DL
SODIUM SERPL-SCNC: 138 MMOL/L (ref 133–144)
SOURCE: ABNORMAL
SP GR UR STRIP: 1.01 (ref 1–1.03)
SQUAMOUS #/AREA URNS AUTO: <1 /HPF (ref 0–1)
TSH SERPL DL<=0.005 MIU/L-ACNC: 1.77 MU/L (ref 0.4–4)
UROBILINOGEN UR STRIP-MCNC: NORMAL MG/DL (ref 0–2)
WBC # BLD AUTO: 5.1 10E9/L (ref 4–11)
WBC #/AREA URNS AUTO: 1 /HPF (ref 0–5)

## 2018-06-04 PROCEDURE — 40000986 XR CHEST PORT 1 VW

## 2018-06-04 PROCEDURE — 80053 COMPREHEN METABOLIC PANEL: CPT | Performed by: EMERGENCY MEDICINE

## 2018-06-04 PROCEDURE — 81001 URINALYSIS AUTO W/SCOPE: CPT | Performed by: EMERGENCY MEDICINE

## 2018-06-04 PROCEDURE — 12000000 ZZH R&B MED SURG/OB

## 2018-06-04 PROCEDURE — 25000132 ZZH RX MED GY IP 250 OP 250 PS 637: Performed by: EMERGENCY MEDICINE

## 2018-06-04 PROCEDURE — 96361 HYDRATE IV INFUSION ADD-ON: CPT

## 2018-06-04 PROCEDURE — 84100 ASSAY OF PHOSPHORUS: CPT | Performed by: EMERGENCY MEDICINE

## 2018-06-04 PROCEDURE — 85025 COMPLETE CBC W/AUTO DIFF WBC: CPT | Performed by: EMERGENCY MEDICINE

## 2018-06-04 PROCEDURE — 72125 CT NECK SPINE W/O DYE: CPT

## 2018-06-04 PROCEDURE — 80320 DRUG SCREEN QUANTALCOHOLS: CPT | Performed by: EMERGENCY MEDICINE

## 2018-06-04 PROCEDURE — 83605 ASSAY OF LACTIC ACID: CPT | Performed by: EMERGENCY MEDICINE

## 2018-06-04 PROCEDURE — 84443 ASSAY THYROID STIM HORMONE: CPT | Performed by: EMERGENCY MEDICINE

## 2018-06-04 PROCEDURE — 80329 ANALGESICS NON-OPIOID 1 OR 2: CPT | Performed by: EMERGENCY MEDICINE

## 2018-06-04 PROCEDURE — 93005 ELECTROCARDIOGRAM TRACING: CPT

## 2018-06-04 PROCEDURE — 82803 BLOOD GASES ANY COMBINATION: CPT | Performed by: EMERGENCY MEDICINE

## 2018-06-04 PROCEDURE — 96360 HYDRATION IV INFUSION INIT: CPT

## 2018-06-04 PROCEDURE — 85730 THROMBOPLASTIN TIME PARTIAL: CPT | Performed by: EMERGENCY MEDICINE

## 2018-06-04 PROCEDURE — 70450 CT HEAD/BRAIN W/O DYE: CPT

## 2018-06-04 PROCEDURE — 84703 CHORIONIC GONADOTROPIN ASSAY: CPT | Performed by: EMERGENCY MEDICINE

## 2018-06-04 PROCEDURE — 99215 OFFICE O/P EST HI 40 MIN: CPT | Performed by: HOSPITALIST

## 2018-06-04 PROCEDURE — 99285 EMERGENCY DEPT VISIT HI MDM: CPT | Mod: 25

## 2018-06-04 PROCEDURE — 25000128 H RX IP 250 OP 636: Performed by: EMERGENCY MEDICINE

## 2018-06-04 PROCEDURE — 83735 ASSAY OF MAGNESIUM: CPT | Performed by: EMERGENCY MEDICINE

## 2018-06-04 PROCEDURE — 80307 DRUG TEST PRSMV CHEM ANLYZR: CPT | Performed by: EMERGENCY MEDICINE

## 2018-06-04 PROCEDURE — 85610 PROTHROMBIN TIME: CPT | Performed by: EMERGENCY MEDICINE

## 2018-06-04 RX ORDER — LEVETIRACETAM 100 MG/ML
1500 SOLUTION ORAL ONCE
Status: COMPLETED | OUTPATIENT
Start: 2018-06-04 | End: 2018-06-04

## 2018-06-04 RX ORDER — LORAZEPAM 2 MG/ML
2 INJECTION INTRAMUSCULAR
Status: DISCONTINUED | OUTPATIENT
Start: 2018-06-04 | End: 2018-06-07 | Stop reason: HOSPADM

## 2018-06-04 RX ORDER — LEVETIRACETAM 750 MG/1
1500 TABLET ORAL ONCE
Status: DISCONTINUED | OUTPATIENT
Start: 2018-06-04 | End: 2018-06-04

## 2018-06-04 RX ADMIN — SODIUM CHLORIDE, POTASSIUM CHLORIDE, SODIUM LACTATE AND CALCIUM CHLORIDE 1000 ML: 600; 310; 30; 20 INJECTION, SOLUTION INTRAVENOUS at 19:23

## 2018-06-04 RX ADMIN — LEVETIRACETAM 1500 MG: 100 SOLUTION ORAL at 23:51

## 2018-06-04 ASSESSMENT — ENCOUNTER SYMPTOMS: SEIZURES: 1

## 2018-06-04 NOTE — ED PROVIDER NOTES
History     Chief Complaint:  Seizure    The history is provided by the EMS personnel. The history is limited by the condition of the patient.      Shanna Shanks is a 42 year old female with a history of multiple sclerosis who presents via EMS for evaluation after a seizure. Per EMS, the patient was at home with her  when she began to act strange just prior to exhibiting a grand mal seizure. Patient's  had placed her in to a chair when she began to act strange, and was able to ease her onto the ground as the seizure began so that she did not hit her head. The seizure lasted 30 minutes according to EMS based on the time of the call and their arrival and transport time. No trauma to the head is reported. Patient has no history of epilepsy, and does not take seizure medication.  does report an unwitnessed seizure 1 year ago for which she was admitted and no clear cause of her seizure was found. En route, the patient was administered 10 of midazolam IM by EMS with termination of the seizure following this. Glucose 134 en route. No other complaint from  or patient is verbalized from EMS at this time. Patient has remained somnolent following the seizure. Her  denies any recent illness/fevers or infections. She was reportedly at her baseline state of health prior to the events tonight.     Allergies:  Diphenhydramine  Nickel  Nitrofurantoin      Medications:    Lioresal  Cranberry extract  Tecfidera  Micronor  Ditropan  Bactrim  Effexor  Vitamin D    Past Medical History:    Herpes encephalitis  Dysmenorrhea  Tobacco dependency  Multiple sclerosis  UTI    Past Surgical History:    D & C    Family History:    Diabetes  Colorectal cancer  MI    Social History:  Presents via EMS   Tobacco use: former smoker (0.25 ppd, quit recently)  Alcohol use: 7 drinks / week  PCP: Malvin Beck MD    Marital Status:       Review of Systems   Unable to perform ROS: Mental  status change   Neurological: Positive for seizures.     Physical Exam     Patient Vitals for the past 24 hrs:   BP Temp Temp src Heart Rate Resp SpO2   06/04/18 2300 100/72 - - 80 - 98 %   06/04/18 2230 - - - 78 - 99 %   06/04/18 2220 108/84 - - 92 - 98 %   06/04/18 2215 - - - 82 - 99 %   06/04/18 2200 106/76 - - 77 - 98 %   06/04/18 2145 - - - 81 - 98 %   06/04/18 2130 116/87 - - - - -   06/04/18 2124 - - - 88 12 98 %   06/04/18 2117 - - - 101 17 99 %   06/04/18 2030 115/75 - - 96 25 98 %   06/04/18 2020 112/78 - - 81 13 98 %   06/04/18 2000 113/76 - - 93 10 97 %   06/04/18 1950 112/77 - - 93 26 97 %   06/04/18 1930 113/70 - - 89 16 93 %   06/04/18 1920 106/77 - - - - -   06/04/18 1910 - - - 101 16 95 %   06/04/18 1900 116/71 - - 107 16 95 %   06/04/18 1857 120/75 99.4  F (37.4  C) Temporal 110 17 95 %      Physical Exam  General: Somonlent, arousible to tactile stimuli. Nontoxic appearing.  Head:  Scalp, face, and head appear normal, atraumatic  Eyes:  Pupils are equal, round, and reactive to light. No nystagmus    Conjunctivae non-injected and sclerae white  ENT:    The external nose is normal    Pinnae are normal    Dried blood on the lips. The oropharynx is normal, mucous membranes moist. Superficial abrasions to tip of tongue and superficial bite wound to right posterior buccal mucosa. No active bleeding.     Uvula is in the midline  Neck:  Normal range of motion    There is no rigidity noted    Trachea is in the midline  CV:  Tachycardic rate, regular rhythm      Normal S1/S2, no S3/S4    No murmur or rub  Resp:  Lungs are clear and equal bilaterally    There is no tachypnea    No increased work of breathing    No rales, wheezing, or rhonchi  GI:  Abdomen is soft, no rigidity or guarding    No distension, or mass    No tenderness or rebound tenderness   MS:  Normal muscular tone    Symmetric motor strength    No lower extremity edema  Skin:  No rash or acute skin lesions noted  Neuro: Somnolent. Arousible to  tactile stimuli. Does not follow commands. Localizes noxious stimuli bilaterally. Nonverbal. No facial droop. Strength 4/5 right upper and lower extremities. 5/5 strength left upper and lower extremities. Sensation intact to tactile stimuli in all extremities.   Psych:  Unable to assess    Emergency Department Course     ECG (19:49:54):  Rate 88 bpm. SC interval 192. QRS duration 82. QT/QTc 376/454. P-R-T axes 61 39 60. Normal sinus rhythm. Septal infarct, age undetermined. Abnormal ECG. Agree with computer interpretation. No significant change when compared to EKG dated 07/26/2017.  Interpreted at 2103 by Siddhartha Rojas MD.     Imaging:  Radiographic findings were communicated with the patient and family who voiced understanding of the findings.    CT Head without contrast:  IMPRESSION: Diffuse cerebral volume loss and cerebral white matter changes consistent with chronic small vessel ischemic disease. No evidence for acute intracranial pathology.    XR Chest, 2 views:  IMPRESSION: There is questionable minimal patchy airspace opacity in the retrocardiac left lower lung that could represent aspiration pneumonia. The lungs are otherwise clear. There is no pleural effusion or pneumothorax. Heart size is normal.     CT Cervical spine without contrast:  IMPRESSION: No evidence for fracture or traumatic malalignment of the cervical spine.    Imaging independently reviewed and agree with radiologist interpretation.       Laboratory:  CBC: AWNL (WBC 5.1, HGB 12.1, )  CMP:  (H), Ca 8.0 (L), Alkphos 39 (L) o/w WNL (Creatinine 0.84)  Lactic Acid: 2.0  TSH with free T4 reflex: 1.77   INR: 1.05  EtOH: < 0.01  Salicylate: <2  Acetaminophen: <2  Magnesium: 2.0  Phosphorous: 3.0   Blood gas venous: pH 7.35, pCO2 42, pO2 56 (H), Bicarb 23, Base deficit 2.1    UA with micro: ketones 10, albumin 10, LE trace o/w negative  Drug abuse screen 77 urine: Benzodiazepine positive o/w negative     Interventions:  1923: NS 1L  IV Bolus  2243: 1500 mg Keppra    Emergency Department Course:  Past medical records, nursing notes, and vitals reviewed.  1856: I performed an exam of the patient and obtained history, as documented above.    1858: Blood pressure 120/75    Above interventions provided.  The patient was sent for a head, cervical CT, chest x-ray while in the emergency department, findings above.  Blood drawn. This was sent to the lab for further testing, results above.  The patient provided a urine sample here in the emergency department. This was sent for laboratory testing, findings above.     2215: I rechecked the patient. Explained findings to the patient's family.     2237: I discussed the patient with Dr. White of Neurology.     Findings and plan explained to the Patient and spouse who consents to admission.     2318: Discussed the patient with Dr. Malloy, who will admit the patient to a neurology bed for further monitoring, evaluation, and treatment.      Impression & Plan      Medical Decision Making:  Shanna Shanks is a 42 year old female with a history of multiple sclerosis and prior single episode of seizure, August of last year, presents as above with recurrent generalized tonic clonic seizures today, terminated prior to arrival by EMS with 10 mg of midazolam PTA. Patient is somnolent and post-ictal, withdraws on all extremities with no definite focal or neurologic deficits at this time. Broad ED workup was undertaken to evaluate for possible etiologies of seizures. CT scan of the head and neck were negative for injury or other acute findings. There is no evidence of infection on chest x-ray, urinalysis and patient had a normal WBC count. Chest x-ray was significant for possible minimal patchy air space opacity which may represent aspiration from the event. I would not cover with antibiotics at this time as she is otherwise afebrile and not hypoxic. No evidence for acute metabolic or toxic encephalopathy at  this time. Given her history, I discussed the case with neurology who recommended loading her with keppra. 1500 mg was given in the ED. Patient was monitored and had no recurrent seizures. Her mental status improved during her ED course but she remained disoriented. Given her complex history and concern for new onset seizure disorder, I discussed the case with the hospitalist who will admit the patient for further evaluation and treatment. She was admitted in improved condition.    Critical Care time:  none    Diagnosis:    ICD-10-CM   1. Grand mal seizure (H) G40.409   2. Multiple sclerosis (H) G35       Disposition:  Admitted to hospitalist care.      I, Rubio Zaidi, am serving as a scribe at 6:59 PM on 6/4/2018 to document services personally performed by Siddhartha Rojas MD based on my observations and the provider's statements to me.    6/4/2018    EMERGENCY DEPARTMENT     Siddhartha Rojas MD  06/05/18 0115

## 2018-06-04 NOTE — LETTER
Transition Communication Hand-off for Care Transitions to Next Level of Care Provider    Name: Shanna Shanks  : 1975  MRN #: [id number]  Primary Care Provider: Malvin Beck MD     Primary Clinic: [address] [place] [address]     Reason for Hospitalization:  Multiple sclerosis (H) [G35]  Grand mal seizure (H) [G40.409]  Admit Date/Time: 2018  6:56 PM  Discharge Date: 2018  Payor Source: Payor: [employer] / Plan: [employer] / Product Type: Indemnity /     Readmission Assessment Measure (AYDEN) Risk Score/category: Low    Plan of Care Goals/Milestone Events:      Medical Goals   Short-term DC home   Long-term Increasing medical needs as disease progresses.         Reason for Communication Hand-off Referral: Other Ongoing medical needs.     Discharge Plan:       Concern for non-adherence with plan of care:   Y/N No  Discharge Needs Assessment:  Needs       Most Recent Value    Anticipated Changes Related to Illness inability to care for self    Transportation Available family or friend will provide          Already enrolled in Tele-monitoring program and name of program:  NA  Follow-up specialty is recommended: No    Follow-up plan:  Future Appointments  Date Time Provider Department Center                        2018 10:00 AM MS[facility] PER [facility] [place]   2018 10:00 AM [facility] PER [facility] [place]   2018 4:20 PM Malvin Beck MD [facility]   2018 10:00 AM [facility] PER [facility] [place]   2018 10:00 AM [facility] PER [facility] [place]   2018 10:00 AM MS[facility] PER [facility] [place]   2018 10:00 AM MS[facility] PER [facility] [place]   7/3/2018 10:00 AM MS[facility] PER [facility] [place]   2018 10:00 AM MS[facility] PER [facility] [place]   7/10/2018 10:00 AM MS[facility] PER [facility] [place]   2018 10:00 AM MS[facility] PER [facility] [place]   2018 10:00 AM MS[facility] PER [facility] [place]   2018 10:00 AM [facility] PER [facility]  [place]   7/24/2018 10:00 AM MS[facility] PER [facility] [place]   7/26/2018 10:00 AM MS[facility] PER [facility] [place]   7/31/2018 10:00 AM MS[facility] PER [facility] [place]       Any outstanding tests or procedures:  NA            Key Recommendations:   Need to assess for home needs, possible therapies.   Pt returned to baseline cognition, has processing issues. Patient has strong support system.  Goes to all day/every day MS program. May need more services in future.     Kerry Lang RN, BSN  AVS/Discharge Summary is the source of truth; this is a helpful guide for improved communication of patient story

## 2018-06-04 NOTE — IP AVS SNAPSHOT
MRN:[id number]                      After Visit Summary   6/4/2018    Shanna Shanks    MRN: [id number]           Thank you!     Thank you for choosing [facility] for your care. Our goal is always to provide you with excellent care. Hearing back from our patients is one way we can continue to improve our services. Please take a few minutes to complete the written survey that you may receive in the mail after you visit with us. Thank you!        Patient Information     Date Of Birth          1975        Designated Caregiver       Most Recent Value    Caregiver    Will someone help with your care after discharge? yes    Name of designated caregiver Liliana fernandes    Phone number of caregiver [phone]    Caregiver address [address] [place], [place]      About your hospital stay     You were admitted on:  June 4, 2018 You last received care in the:  [facility] [address] [facility]    You were discharged on:  June 7, 2018        Reason for your hospital stay       Seizure - you have been resumed on Keppra which you have taken in the past                  Who to Call     For medical emergencies, please call 911.  For non-urgent questions about your medical care, please call your primary care provider or clinic, [phone]          Attending Provider     Provider Specialty    Siddhartha Rojas MD Emergency Medicine    [facility]       Primary Care Provider Office Phone # Fax #    Malvin Beck [phone]      After Care Instructions     Activity       Your activity upon discharge: activity as tolerated            Diet       Follow this diet upon discharge: Regular diet                  Follow-up Appointments     Follow-up and recommended labs and tests        Follow up with primary care provider, Malvin Beck MD, within 10 days for hospital follow-up.  No follow up labs or test are needed.    Follow up with your  primary neurologist for next available appointment                  Your next 10 appointments already scheduled     Jun 12, 2018 10:00 AM CDT   Treatment with [facility] PER [facility] ([facility])    [address]  [place] [address]   [phone]            Jun 14, 2018 10:00 AM CDT   Treatment with MS[facility] PER [facility] ([facility])    [address]  [place] [address]   [phone]            Jun 18, 2018  4:20 PM CDT   Office Visit with Malvin Beck MD   [facility] ([facility])    [address] [place] [address] [phone]           Bring a current list of meds and any records pertaining to this visit. For Physicals, please bring immunization records and any forms needing to be filled out. Please arrive 10 minutes early to complete paperwork.            Jun 19, 2018 10:00 AM CDT   Treatment with [facility] PER [facility] ([facility])    [address]  [place] [address]   [phone]            Jun 21, 2018 10:00 AM CDT   Treatment with MS[facility] PER [facility] ([facility])    [address]  [place] [address]   [phone]            Jun 26, 2018 10:00 AM CDT   Treatment with [facility] PER [facility] ([facility])    [address]  [place] [address]   [phone]            Jun 28, 2018 10:00 AM CDT   Treatment with [facility] ([facility])    [address]  [place] [address]   [phone]            Jul 03, 2018 10:00 AM CDT   Treatment with MSAC PER JEAN   [facility] ([facility])    [address]  [place] [address]   [phone]            Jul 05, 2018 10:00 AM CDT   Treatment with MSAC PER JEAN   [facility] ([facility])    [address]  [place] [address]   [phone]            Jul 10, 2018 10:00 AM CDT   Treatment with MS[facility] PER JEAN   [facility] ([facility])    [address]  [place] [address]   [phone]              Further instructions from your care team           ***Pt's own [employer] locked in [employer]--please return to pt***    Pending Results     No orders found from 6/2/2018 to 6/5/2018.            Statement of Approval     Ordered          06/07/18 1000  I have reviewed and agree with all the recommendations and orders detailed in this document.  EFFECTIVE NOW     Approved and electronically signed by:  Yuliya Aldrich MD             Admission Information     Date & Time Provider Department Dept. Phone    6/4/2018 Maciej Gaming MD [address] [facility] [phone]      Your Vitals Were     Blood Pressure Temperature Respirations Weight Pulse Oximetry BMI (Body Mass Index)    106/67 (BP Location: Left arm) 98  F (36.7  C) (Oral) 16 72.4 kg (159 lb 11.2 oz) 97% 24.28 kg/m2      MyChart Information     [employer] gives you secure access to your electronic health record. If you see a primary care provider, you can also send messages to your care team and make appointments. If you have questions, please call your primary care clinic.  If you do not have a primary care provider, please call [phone] and they will assist you.         Care EveryWhere ID     This is your Care EveryWhere ID. This could be used by other organizations to access your [facility] medical records  [id number]        Equal Access to Services     DUANE HARPER : Mihir Lyon, kandy penny, ashleerafael adamsdeandredana kingyuliana, waxfrancois ryanin hayaarobbie kingsaundra prado darnell caldwell. So [facility] [phone].    ATENCIÓN: Si habla español, tiene a gatica disposición servicios gratuitos de asistencia lingüística. Llame al [phone].    We comply with applicable federal civil rights laws and [place] laws. We do not discriminate on the basis of race, color, national origin, age, disability, sex, sexual orientation, or gender identity.               Review of your medicines      START taking        Dose / Directions    levETIRAcetam 1000 MG Tabs   Used for:  Multiple sclerosis (H)        Dose:  1000 mg   Take 1,000 mg by mouth 2 times daily   Quantity:  60 tablet   Refills:  1         CONTINUE these medicines which have NOT CHANGED        Dose / Directions    baclofen 10 MG tablet   Commonly known as:  LIORESAL        Dose:  10-20 mg   Take 10-20 mg by mouth 4 times daily as needed   Refills:  3       CRANBERRY EXTRACT PO        Dose:  1 tablet   Take 1 tablet by mouth daily   Refills:  0       dimethyl fumarate delayed release capsule   Commonly known as:  TECFIDERA        Dose:  240 mg   Take 240 mg by mouth 2 times daily   Refills:  0       norethindrone 0.35 MG per tablet   Commonly known as:  MICRONOR   Used for:  Surveillance of previously prescribed contraceptive pill        TAKE 1 TABLET(0.35 MG) BY MOUTH DAILY   Quantity:  84 tablet   Refills:  3       oxybutynin 5 MG 24 hr tablet   Commonly known as:  DITROPAN-XL        Dose:  5 mg   Take 5 mg by mouth daily   Refills:  0       sulfamethoxazole-trimethoprim 400-80 MG per tablet   Commonly known as:  BACTRIM/SEPTRA        Dose:  1 tablet   Take 1 tablet by mouth daily   Refills:  0       venlafaxine 75 MG Tb24 24 hr tablet    Commonly known as:  EFFEXOR-ER        Dose:  75 mg   Take 75 mg by mouth daily (2  X 37.5 mg)   Refills:  0       VITAMIN D (CHOLECALCIFEROL) PO        Dose:  1000 Units   Take 1,000 Units by mouth daily   Refills:  0            Where to get your medicines      These medications were sent to [facility] - [address]  [address], [place] [address]     Phone:  [phone]     levETIRAcetam 1000 MG Tabs                Protect others around you: Learn how to safely use, store and throw away your medicines at www.disposemymeds.org.        ANTIBIOTIC INSTRUCTION     You've Been Prescribed an Antibiotic - Now What?  Your healthcare team thinks that you or your loved one might have an infection. Some infections can be treated with antibiotics, which are powerful, life-saving drugs. Like all medications, antibiotics have side effects and should only be used when necessary. There are some important things you should know about your antibiotic treatment.      Your healthcare team may run tests before you start taking an antibiotic.    Your team may take samples (e.g., from your blood, urine or other areas) to run tests to look for bacteria. These test can be important to determine if you need an antibiotic at all and, if you do, which antibiotic will work best.      Within a few days, your healthcare team might change or even stop your antibiotic.    Your team may start you on an antibiotic while they are working to find out what is making you sick.    Your team might change your antibiotic because test results show that a different antibiotic would be better to treat your infection.    In some cases, once your team has more information, they learn that you do not need an antibiotic at all. They may find out that you don't have an infection, or that the antibiotic you're taking won't work against your infection. For example, an infection caused by a virus can't be treated with  antibiotics. Staying on an antibiotic when you don't need it is more likely to be harmful than helpful.      You may experience side effects from your antibiotic.    Like all medications, antibiotics have side effects. Some of these can be serious.    Let you healthcare team know if you have any known allergies when you are admitted to the hospital.    One significant side effect of nearly all antibiotics is the risk of severe and sometimes deadly diarrhea caused by Clostridium difficile (C. Difficile). This occurs when a person takes antibiotics because some good germs are destroyed. Antibiotic use allows C. diificile to take over, putting patients at high risk for this serious infection.    As a patient or caregiver, it is important to understand your or your loved one's antibiotic treatment. It is especially important for caregivers to speak up when patients can't speak for themselves. Here are some important questions to ask your healthcare team.    What infection is this antibiotic treating and how do you know I have that infection?    What side effects might occur from this antibiotic?    How long will I need to take this antibiotic?    Is it safe to take this antibiotic with other medications or supplements (e.g., vitamins) that I am taking?     Are there any special directions I need to know about taking this antibiotic? For example, should I take it with food?    How will I be monitored to know whether my infection is responding to the antibiotic?    What tests may help to make sure the right antibiotic is prescribed for me?      Information provided by:  www.cdc.gov/getsmart  U.S. Department of Health and Human Services  Centers for disease Control and Prevention  National Center for Emerging and Zoonotic Infectious Diseases  Division of Healthcare Quality Promotion             Medication List: This is a list of all your medications and when to take them. Check marks below indicate your daily home  schedule. Keep this list as a reference.      Medications           Morning Afternoon Evening Bedtime As Needed    baclofen 10 MG tablet   Commonly known as:  LIORESAL   Take 10-20 mg by mouth 4 times daily as needed                                CRANBERRY EXTRACT PO   Take 1 tablet by mouth daily                                dimethyl fumarate delayed release capsule   Commonly known as:  TECFIDERA   Take 240 mg by mouth 2 times daily   Last time this was given:  240 mg on 6/7/2018  9:38 AM   Next Dose Due:  06/07/2018                                   levETIRAcetam 1000 MG Tabs   Take 1,000 mg by mouth 2 times daily   Last time this was given:  1,000 mg on 6/7/2018  5:13 AM   Next Dose Due:  006/07/2018                    1800               norethindrone 0.35 MG per tablet   Commonly known as:  MICRONOR   TAKE 1 TABLET(0.35 MG) BY MOUTH DAILY                                oxybutynin 5 MG 24 hr tablet   Commonly known as:  DITROPAN-XL   Take 5 mg by mouth daily   Last time this was given:  5 mg on 6/7/2018  8:48 AM   Next Dose Due:  06/08/2018                                sulfamethoxazole-trimethoprim 400-80 MG per tablet   Commonly known as:  BACTRIM/SEPTRA   Take 1 tablet by mouth daily   Last time this was given:  1 tablet on 6/7/2018  8:48 AM   Next Dose Due:  06/08/2018                                venlafaxine 75 MG Tb24 24 hr tablet   Commonly known as:  EFFEXOR-ER   Take 75 mg by mouth daily (2  X 37.5 mg)   Next Dose Due:  06/08/2018                                VITAMIN D (CHOLECALCIFEROL) PO   Take 1,000 Units by mouth daily

## 2018-06-04 NOTE — IP AVS SNAPSHOT
[address] [facility]    [address]    [place] [address]    Phone:  [phone]                                       After Visit Summary   6/4/2018    Shanna Shanks    MRN: [id number]           After Visit Summary Signature Page     I have received my discharge instructions, and my questions have been answered. I have discussed any challenges I see with this plan with the nurse or doctor.    ..........................................................................................................................................  Patient/Patient Representative Signature      ..........................................................................................................................................  Patient Representative Print Name and Relationship to Patient    ..................................................               ................................................  Date                                            Time    ..........................................................................................................................................  Reviewed by Signature/Title    ...................................................              ..............................................  Date                                                            Time

## 2018-06-05 ENCOUNTER — APPOINTMENT (OUTPATIENT)
Dept: SPEECH THERAPY | Facility: CLINIC | Age: 43
DRG: 100 | End: 2018-06-05
Attending: INTERNAL MEDICINE
Payer: COMMERCIAL

## 2018-06-05 ENCOUNTER — APPOINTMENT (OUTPATIENT)
Dept: MRI IMAGING | Facility: CLINIC | Age: 43
DRG: 100 | End: 2018-06-05
Attending: HOSPITALIST
Payer: COMMERCIAL

## 2018-06-05 ENCOUNTER — APPOINTMENT (OUTPATIENT)
Dept: PHYSICAL THERAPY | Facility: CLINIC | Age: 43
DRG: 100 | End: 2018-06-05
Attending: INTERNAL MEDICINE
Payer: COMMERCIAL

## 2018-06-05 LAB
ALBUMIN SERPL-MCNC: 3.4 G/DL (ref 3.4–5)
ALP SERPL-CCNC: 36 U/L (ref 40–150)
ALT SERPL W P-5'-P-CCNC: 17 U/L (ref 0–50)
ANION GAP SERPL CALCULATED.3IONS-SCNC: 6 MMOL/L (ref 3–14)
AST SERPL W P-5'-P-CCNC: 25 U/L (ref 0–45)
BASOPHILS # BLD AUTO: 0 10E9/L (ref 0–0.2)
BASOPHILS NFR BLD AUTO: 0.1 %
BILIRUB DIRECT SERPL-MCNC: 0.1 MG/DL (ref 0–0.2)
BILIRUB SERPL-MCNC: 0.6 MG/DL (ref 0.2–1.3)
BUN SERPL-MCNC: 10 MG/DL (ref 7–30)
CALCIUM SERPL-MCNC: 8.1 MG/DL (ref 8.5–10.1)
CHLORIDE SERPL-SCNC: 109 MMOL/L (ref 94–109)
CO2 SERPL-SCNC: 25 MMOL/L (ref 20–32)
CREAT SERPL-MCNC: 0.61 MG/DL (ref 0.52–1.04)
DIFFERENTIAL METHOD BLD: NORMAL
EOSINOPHIL # BLD AUTO: 0 10E9/L (ref 0–0.7)
EOSINOPHIL NFR BLD AUTO: 0.3 %
ERYTHROCYTE [DISTWIDTH] IN BLOOD BY AUTOMATED COUNT: 13.4 % (ref 10–15)
GFR SERPL CREATININE-BSD FRML MDRD: >90 ML/MIN/1.7M2
GLUCOSE BLDC GLUCOMTR-MCNC: 112 MG/DL (ref 70–99)
GLUCOSE SERPL-MCNC: 99 MG/DL (ref 70–99)
HCT VFR BLD AUTO: 35.6 % (ref 35–47)
HGB BLD-MCNC: 11.8 G/DL (ref 11.7–15.7)
IMM GRANULOCYTES # BLD: 0 10E9/L (ref 0–0.4)
IMM GRANULOCYTES NFR BLD: 0.3 %
LYMPHOCYTES # BLD AUTO: 1.9 10E9/L (ref 0.8–5.3)
LYMPHOCYTES NFR BLD AUTO: 26.1 %
MCH RBC QN AUTO: 30.3 PG (ref 26.5–33)
MCHC RBC AUTO-ENTMCNC: 33.1 G/DL (ref 31.5–36.5)
MCV RBC AUTO: 92 FL (ref 78–100)
MONOCYTES # BLD AUTO: 0.9 10E9/L (ref 0–1.3)
MONOCYTES NFR BLD AUTO: 11.8 %
NEUTROPHILS # BLD AUTO: 4.4 10E9/L (ref 1.6–8.3)
NEUTROPHILS NFR BLD AUTO: 61.4 %
NRBC # BLD AUTO: 0 10*3/UL
NRBC BLD AUTO-RTO: 0 /100
PLATELET # BLD AUTO: 165 10E9/L (ref 150–450)
POTASSIUM SERPL-SCNC: 4 MMOL/L (ref 3.4–5.3)
PROCALCITONIN SERPL-MCNC: <0.05 NG/ML
PROT SERPL-MCNC: 6.5 G/DL (ref 6.8–8.8)
RBC # BLD AUTO: 3.89 10E12/L (ref 3.8–5.2)
SODIUM SERPL-SCNC: 140 MMOL/L (ref 133–144)
WBC # BLD AUTO: 7.2 10E9/L (ref 4–11)

## 2018-06-05 PROCEDURE — 40000061 ZZH STATISTIC EEG TIME EA 10 MIN

## 2018-06-05 PROCEDURE — 00000146 ZZHCL STATISTIC GLUCOSE BY METER IP

## 2018-06-05 PROCEDURE — 97116 GAIT TRAINING THERAPY: CPT | Mod: GP

## 2018-06-05 PROCEDURE — 95816 EEG AWAKE AND DROWSY: CPT

## 2018-06-05 PROCEDURE — 36415 COLL VENOUS BLD VENIPUNCTURE: CPT | Performed by: HOSPITALIST

## 2018-06-05 PROCEDURE — 40000193 ZZH STATISTIC PT WARD VISIT

## 2018-06-05 PROCEDURE — 92610 EVALUATE SWALLOWING FUNCTION: CPT | Mod: GN | Performed by: SPEECH-LANGUAGE PATHOLOGIST

## 2018-06-05 PROCEDURE — 99232 SBSQ HOSP IP/OBS MODERATE 35: CPT | Performed by: INTERNAL MEDICINE

## 2018-06-05 PROCEDURE — 85025 COMPLETE CBC W/AUTO DIFF WBC: CPT | Performed by: HOSPITALIST

## 2018-06-05 PROCEDURE — 12000000 ZZH R&B MED SURG/OB

## 2018-06-05 PROCEDURE — 70553 MRI BRAIN STEM W/O & W/DYE: CPT

## 2018-06-05 PROCEDURE — 99222 1ST HOSP IP/OBS MODERATE 55: CPT | Performed by: NURSE PRACTITIONER

## 2018-06-05 PROCEDURE — 25000128 H RX IP 250 OP 636: Performed by: HOSPITALIST

## 2018-06-05 PROCEDURE — 80048 BASIC METABOLIC PNL TOTAL CA: CPT | Performed by: HOSPITALIST

## 2018-06-05 PROCEDURE — 80076 HEPATIC FUNCTION PANEL: CPT | Performed by: HOSPITALIST

## 2018-06-05 PROCEDURE — 40000225 ZZH STATISTIC SLP WARD VISIT: Performed by: SPEECH-LANGUAGE PATHOLOGIST

## 2018-06-05 PROCEDURE — A9585 GADOBUTROL INJECTION: HCPCS | Performed by: HOSPITALIST

## 2018-06-05 PROCEDURE — 92526 ORAL FUNCTION THERAPY: CPT | Mod: GN | Performed by: SPEECH-LANGUAGE PATHOLOGIST

## 2018-06-05 PROCEDURE — 97162 PT EVAL MOD COMPLEX 30 MIN: CPT | Mod: GP

## 2018-06-05 PROCEDURE — 84145 PROCALCITONIN (PCT): CPT | Performed by: HOSPITALIST

## 2018-06-05 RX ORDER — POLYETHYLENE GLYCOL 3350 17 G/17G
17 POWDER, FOR SOLUTION ORAL DAILY PRN
Status: DISCONTINUED | OUTPATIENT
Start: 2018-06-05 | End: 2018-06-07 | Stop reason: HOSPADM

## 2018-06-05 RX ORDER — SULFAMETHOXAZOLE AND TRIMETHOPRIM 400; 80 MG/1; MG/1
1 TABLET ORAL DAILY
COMMUNITY
End: 2020-08-16

## 2018-06-05 RX ORDER — GADOBUTROL 604.72 MG/ML
7 INJECTION INTRAVENOUS ONCE
Status: COMPLETED | OUTPATIENT
Start: 2018-06-05 | End: 2018-06-05

## 2018-06-05 RX ORDER — BISACODYL 10 MG
10 SUPPOSITORY, RECTAL RECTAL DAILY PRN
Status: DISCONTINUED | OUTPATIENT
Start: 2018-06-05 | End: 2018-06-07 | Stop reason: HOSPADM

## 2018-06-05 RX ORDER — ONDANSETRON 4 MG/1
4 TABLET, ORALLY DISINTEGRATING ORAL EVERY 6 HOURS PRN
Status: DISCONTINUED | OUTPATIENT
Start: 2018-06-05 | End: 2018-06-07 | Stop reason: HOSPADM

## 2018-06-05 RX ORDER — AMOXICILLIN 250 MG
2 CAPSULE ORAL 2 TIMES DAILY PRN
Status: DISCONTINUED | OUTPATIENT
Start: 2018-06-05 | End: 2018-06-07 | Stop reason: HOSPADM

## 2018-06-05 RX ORDER — LEVETIRACETAM 10 MG/ML
1000 INJECTION INTRAVASCULAR EVERY 12 HOURS
Status: DISCONTINUED | OUTPATIENT
Start: 2018-06-05 | End: 2018-06-06

## 2018-06-05 RX ORDER — VENLAFAXINE HYDROCHLORIDE 75 MG/1
75 TABLET, EXTENDED RELEASE ORAL DAILY
COMMUNITY
End: 2020-08-16

## 2018-06-05 RX ORDER — LIDOCAINE 40 MG/G
CREAM TOPICAL
Status: DISCONTINUED | OUTPATIENT
Start: 2018-06-05 | End: 2018-06-07 | Stop reason: HOSPADM

## 2018-06-05 RX ORDER — NALOXONE HYDROCHLORIDE 0.4 MG/ML
.1-.4 INJECTION, SOLUTION INTRAMUSCULAR; INTRAVENOUS; SUBCUTANEOUS
Status: DISCONTINUED | OUTPATIENT
Start: 2018-06-05 | End: 2018-06-07 | Stop reason: HOSPADM

## 2018-06-05 RX ORDER — LORAZEPAM 2 MG/ML
2 INJECTION INTRAMUSCULAR
Status: DISCONTINUED | OUTPATIENT
Start: 2018-06-05 | End: 2018-06-07 | Stop reason: HOSPADM

## 2018-06-05 RX ORDER — SODIUM CHLORIDE 9 MG/ML
INJECTION, SOLUTION INTRAVENOUS CONTINUOUS
Status: DISCONTINUED | OUTPATIENT
Start: 2018-06-05 | End: 2018-06-07 | Stop reason: HOSPADM

## 2018-06-05 RX ORDER — AMOXICILLIN 250 MG
1 CAPSULE ORAL 2 TIMES DAILY PRN
Status: DISCONTINUED | OUTPATIENT
Start: 2018-06-05 | End: 2018-06-07 | Stop reason: HOSPADM

## 2018-06-05 RX ORDER — ACETAMINOPHEN 325 MG/1
650 TABLET ORAL EVERY 4 HOURS PRN
Status: DISCONTINUED | OUTPATIENT
Start: 2018-06-05 | End: 2018-06-07 | Stop reason: HOSPADM

## 2018-06-05 RX ORDER — ACETAMINOPHEN 650 MG/1
650 SUPPOSITORY RECTAL EVERY 4 HOURS PRN
Status: DISCONTINUED | OUTPATIENT
Start: 2018-06-05 | End: 2018-06-07 | Stop reason: HOSPADM

## 2018-06-05 RX ORDER — OXYBUTYNIN CHLORIDE 5 MG/1
5 TABLET, EXTENDED RELEASE ORAL DAILY
COMMUNITY
End: 2024-02-06

## 2018-06-05 RX ORDER — ONDANSETRON 2 MG/ML
4 INJECTION INTRAMUSCULAR; INTRAVENOUS EVERY 6 HOURS PRN
Status: DISCONTINUED | OUTPATIENT
Start: 2018-06-05 | End: 2018-06-07 | Stop reason: HOSPADM

## 2018-06-05 RX ADMIN — SODIUM CHLORIDE: 9 INJECTION, SOLUTION INTRAVENOUS at 23:25

## 2018-06-05 RX ADMIN — SODIUM CHLORIDE: 9 INJECTION, SOLUTION INTRAVENOUS at 13:16

## 2018-06-05 RX ADMIN — LEVETIRACETAM 1000 MG: 10 INJECTION INTRAVENOUS at 17:36

## 2018-06-05 RX ADMIN — GADOBUTROL 7 ML: 604.72 INJECTION INTRAVENOUS at 03:07

## 2018-06-05 RX ADMIN — LEVETIRACETAM 1000 MG: 10 INJECTION INTRAVENOUS at 06:08

## 2018-06-05 RX ADMIN — SODIUM CHLORIDE: 9 INJECTION, SOLUTION INTRAVENOUS at 00:38

## 2018-06-05 ASSESSMENT — VISUAL ACUITY
OU: NORMAL ACUITY

## 2018-06-05 ASSESSMENT — ACTIVITIES OF DAILY LIVING (ADL)
ADLS_ACUITY_SCORE: 15

## 2018-06-05 NOTE — PLAN OF CARE
Problem: Patient Care Overview  Goal: Plan of Care/Patient Progress Review  Outcome: No Change  Disoriented to place, time, situation. Neuros word finding, difficulty following commands but improvement when more alert, generalized weakness post seizure. VSS. Tele NS. NPO diet. Bedrest all day due to sleepienss. Denies pain. Plan continue to monitor.      3-7pm more awake, up walking in the halls, speech cleared for regular diet. Patient unable to void earlier today and was straight cathed for 600cc

## 2018-06-05 NOTE — PROCEDURES
PORTABLE INPATIENT ELECTROENCEPHALOGRAM         ORDERING PROVIDER:  Dr. Gaming        EEG #:       DATE OF RECORDIN2018      HISTORY:  A 42-year-old with a history of epilepsy who presented following a breakthrough seizure.  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  Previous imaging has found left hippocampal abnormalities, but these have not been seen during MRI done earlier today.        MEDICATIONS:  She is being treated with levetiracetam.      FINDINGS:  During quiet wakefulness, 8-9 Hz of posterior dominant rhythm.  There is excessive irregular theta, even when patient appears to be fully awake.  Polymorphic delta range slowing is seen over the left temporal region.  There is attenuation of the posterior dominant rhythm on the left as well.  The patient descends into N1 and eventually N2 sleep with occasional sleep spindles.  These are somewhat attenuated over the left hemisphere as well.        Hyperventilation and photic stimulation are not performed.        OTHER INTERICTAL ABNORMALITIES:  No epileptiform discharges.      ICTAL ABNORMALITIES:  No electrographic seizures.      IMPRESSION:  Abnormal.  There is evidence of mild diffuse encephalopathy.  There is evidence of significant left hemispheric focal cerebral dysfunction.  This could be related to a structural lesion or it could be a postictal finding.  Epileptiform discharges or overt seizures were not seen in this study.      CLINICAL CORRELATION:  Given that MRI today reportedly did not show a structural abnormality, it is likely that the left hemispheric slowing is postictal.  This suggests that patient's seizure may have emerged from the left hemisphere.  More detailed clinical correlation could be helpful.         FESTUS BROWN MD             D: 2018   T: 2018   MT: KELL      Name:     PAPI KING   MRN:      [id number]        Account:        [id number]   :      1975            Procedure Date: 06/05/2018      Document: [id number]       cc: Maciej Gaming MD

## 2018-06-05 NOTE — PLAN OF CARE
Problem: Patient Care Overview  Goal: Plan of Care/Patient Progress Review    Discharge Planner SLP   Patient plan for discharge: Unable to state  Current status: A bedside swallow evaluation was completed this pm after RN consult.  Patient presents with mild oral-pharyngeal dysphagia due to confusion and mild decreased oral coordination.  Recommend a regular diet and thin liquids with assist/supervision and cues to use precautions including: sit up at 90 degrees, no straw, small bites/sips, slow rate.  Plan to continue swallow Tx x 1 visit to assess diet tolerance and train use of precautions.  Will monitor improvement in language/cognitive function to determine need for further cognitive-linguistic eval and Tx.  Barriers to return to prior living situation: To be determined, language/cognitive deficits at present  Recommendations for discharge: Anticipate SLP swallow Tx goal to be met prior to discharge; will determine additional SLP needs  Rationale for recommendations: To be determined       Entered by: Ellie Villegas 06/05/2018 4:30 PM

## 2018-06-05 NOTE — PROGRESS NOTES
06/05/18 1635   General Information   Onset Date 06/04/18   Start of Care Date 06/05/18   Referring Physician Dr. Yuliya Aldrich   Patient Profile Review/OT: Additional Occupational Profile Info See Profile for full history and prior level of function   Patient/Family Goals Statement Unable to state due to confusion.   Swallowing Evaluation Bedside swallow evaluation   Behaviorial Observations Confused  (Aphasia noted)   Mode of current nutrition Oral diet   Type of oral diet Regular;Thin liquid   Respiratory Status Room air   Comments Per MD note: Shanna Shanks is a 42 year old female with hx of MS and prior seizures attributed to HSV encephalitis no longer on AEDs who was admitted on 6/4/2018 for recurrent seizure.  History of seizures in July and Aug 2017, ultimately attributed to HSV encephalitis despite negative LP/serologies vs drug reaction to nitrofurantoin. She was temporarily on levetiracetam, and has not had recurrent seizures until she presented on this admission with witnessed GTC seizure that lasted until she received IV midazolam by EMS. Head CT on arrival negative for acute intracranial pathology. Brain MRI (6/5) also negative. Metabolic and basic infectious work-up was also negative. On admission, she was loaded with levetiracetam.      Patient was seen in 8/2017 at [facility] by SLP.  Diet advanced to a regular diet and thin liquids during that hospital stay.   Clinical Swallow Evaluation   Oral Musculature (mild decreased coordination)   Dentition present and adequate   Laryngeal Function Cough;Throat clear;Swallow;Voicing initiated;Dry swallow palpated   Clinical Swallow Eval: Thin Liquid Texture Trial   Mode of Presentation, Thin Liquids cup;straw   Volume of Liquid or Food Presented sips by cup x 10, sip by straw x 1   Oral Phase of Swallow (holding)   Pharyngeal Phase of Swallow repeated swallows  (delay)   Diagnostic Statement large bolus sizes, decreased oral awareness, holding, very  large amount by straw, no overt signs of aspiration, burping at times   Clinical Swallow Eval: Puree Solid Texture Trial   Mode of Presentation, Puree spoon   Volume of Puree Presented tsps x 5   Oral Phase, Puree (slow oral phase)   Pharyngeal Phase, Puree (delay/holding)   Diagnostic Statement no signs of aspiration after swallows   Clinical Swallow Eval: Solid Food Texture Trial   Mode of Presentation, Solid self-fed   Volume of Solid Food Presented bites x 5   Oral Phase, Solid (slow oral phase)   Pharyngeal Phase, Solid (delay)   Diagnostic Statement no signs of aspiration , decreased oral awareness   Swallow Compensations   Swallow Compensations Pacing;Reduce amounts;Alternate viscosity of consistencies   Esophageal Phase of Swallow   Patient reports or presents with symptoms of esophageal dysphagia Yes   Esophageal comments Burping   Swallow Eval: Clinical Impressions   Skilled Criteria for Therapy Intervention Skilled criteria met.  Treatment indicated.   Functional Assessment Scale (FAS) 5   Treatment Diagnosis mild oral-pharyngeal deficits   Diet texture recommendations Regular diet;Thin liquids   Recommended Feeding/Eating Techniques (see below)   Therapy Frequency (2x's)   Predicted Duration of Therapy Intervention (days/wks) 3 days   Anticipated Discharge Disposition (to be determined)   Risks and Benefits of Treatment have been explained. Yes   Patient, family and/or staff in agreement with Plan of Care Yes   Clinical Impression Comments A bedside swallow evaluation was completed this pm after RN consult.  Patient presents with mild oral-pharyngeal dysphagia due to confusion and mild decreased oral coordination.  Recommend a regular diet and thin liquids with assist/supervision and cues to use precautions including: sit up at 90 degrees, no straw, small bites/sips, slow rate.  Plan to continue swallow Tx x 1 visit to assess diet tolerance and train use of precautions.  Will monitor improvement in  language/cognitive function to determine need for further cognitive-linguistic eval and Tx.   Total Evaluation Time   Total Evaluation Time (Minutes) 15

## 2018-06-05 NOTE — PLAN OF CARE
Problem: Patient Care Overview  Goal: Plan of Care/Patient Progress Review  Discharge Planner PT   Patient plan for discharge: Not stated  Current status: Eval complete, treatment initiated. Pt educated on PT role. Pt confused throughout session. Pt poor historian. Pt is not oriented to person, place, time. Initally pt is lethargic however pt demonstrated improved alertness with changes in environmental stimulus and position change. SBA for bed mobility. CGA x 2 for gait with HHA, no overt LOB however pt demonstrates gross instability. Pt left supine in bed with bed alarm on  RN notified of pt progress ad encouraged to walk with pt in PM as well as get pt up in chair if  is present to be with pt as pt would nto be safe to leave alone on chair alarm at this time     Barriers to return to prior living situation: cognition, impaired balance, gait instability  Recommendations for discharge: ARU  Rationale for recommendations: Currently pt is not safe to return home, will update recs appropriately based on progress. Pt would benefit from ARU stay to improve balance, gait, endurance, strength        Entered by: Vikki Calvo 06/05/2018 3:49 PM

## 2018-06-05 NOTE — PROGRESS NOTES
RECEIVING UNIT ED HANDOFF REVIEW    ED Nurse Handoff Report was reviewed by: Boston Delacruz on June 4, 2018 at 11:47 PM

## 2018-06-05 NOTE — PLAN OF CARE
Problem: Patient Care Overview  Goal: Plan of Care/Patient Progress Review  Outcome: Improving  New ED Admission overnight  Arouse to voice DANIA orintation patient answer to yes or no questions only not following all commands Neuros with mild Rt side weakness but able to move all extremities.On seizure precautions no seizure activity this shift.On  bed rest for this shift .MRI Completed this shift result pending.Unable to void bladder scan done for 450 straight cathed for 600 at 0645amHusband at the bed side supportive.

## 2018-06-05 NOTE — CONSULTS
[facility]    Neurology Consultation Note     Shanna Shanks MRN# [id number]   YOB: 1975 Age: 42 year old    Code Status:Full Code   Date of Admission: 6/4/2018  Date of Consult: 06/05/2018    _________________________________   Primary Care Physician   Malvin Beck MD      ______________________________________________         Assessment & Plan     Reason for consult: I was asked by Dr. Gaming to evaluate this patient for seizures         ______________________________________________  #. (R56.9) Seizure (H)  (primary encounter diagnosis)  --history of single seizure, not on AED  --loaded with keppra 1500 mg in the ED  -----keppra 1000 mg BID maintenance ordered  --CT/MRI brain without acute abnormality  --EEG pending  #. (G35) Multiple sclerosis (H)  --on tecfidera as outpatient for 1.5 years  -----prior on copaxone  --follows with [facility] as outpatient  #. DVT Prophylaxis  --per primary service  #. PT/OT/Speech  --Start evaluations  #. Nutrition / GI Prophylaxis  --Per recommendations of speech therapy      #. Code Status: Full Code      Chief Complaint   ______________________________________________  Recurrent seizure   History is obtained from the electronic health record and patient's spouse    History of Present Illness   ______________________________________________  Shanna Yazmin Shanks is a 42 year old female who presented with recurrent seizure.  reported that patient developed confusion associated with erratic behavior and then had full body seizure with tongue bite. She did not strike her head or lose bowel/bladder function. EMS administered 10 mg versed which resolved convulsions. Convulsions reportedly lasted approximately 30 minutes. In the ED patient was awake and following commands but with limited speech still.  reported an unwitnessed seizure 1 year prior with no clear cause. Patient is not on seizure  medications. She was loaded with keppra 1500 mg in the ED. EEG ordered. History of MS on tecfidera.     On exam, patient is minimally arousable.  notes she was more awake a while ago and was talking better than last night. Unable to complete most of assessment currently - will assess further upon return with EEG results later. EEG completed this morning, results pending. Imaging negative for acute abnormality.  reports that patient had a smaller episode back in mid-April where she wasn't really herself, didn't respond quickly to answers, and took about half a day to get back to her normal self. Did not lose consciousness with that episode. He reports she had been on keppra for a couple months after the seizure last July that was thought to be due to encephalitis. She had no further seizures after stopping keppra. She was off tecfidera for a couple months, but has had no new MS symptoms or new lesions on MRI.     Past Medical History    ______________________________________________  Past Medical History:   Diagnosis Date     Multiple sclerosis (H) 3/2/96    last exacerbation 3yrs ago, no meds x 6 months     Tobacco dependency      Past Surgical History   ______________________________________________  Past Surgical History:   Procedure Laterality Date     HC DILATION/CURETTAGE DIAG/THER NON OB  1/1/05    D & C, missed AB     Prior to Admission Medications   ______________________________________________  Prior to Admission Medications   Prescriptions Last Dose Informant Patient Reported? Taking?   CRANBERRY EXTRACT PO  Care Giver Yes No   Sig: Take 1 tablet by mouth daily   baclofen (LIORESAL) 10 MG tablet  Care Giver Yes No   Sig: Take 10-20 mg by mouth 4 times daily as needed    dimethyl fumarate (TECFIDERA) delayed release capsule   Yes No   Sig: Take 120 mg by mouth 2 times daily   norethindrone (MICRONOR) 0.35 MG per tablet   No No   Sig: TAKE 1 TABLET(0.35 MG) BY MOUTH DAILY   oxybutynin  (DITROPAN-XL) 10 MG 24 hr tablet  Care Giver No No   Sig: Take 1 tablet (10 mg) by mouth At Bedtime   sulfamethoxazole-trimethoprim (BACTRIM DS/SEPTRA DS) 800-160 MG per tablet   Yes No   Sig: Take 1 tablet by mouth once for 1 dose   venlafaxine (EFFEXOR-XR) 75 MG 24 hr capsule  Care Giver Yes No   Sig: Take 75 mg by mouth daily   vitamin D (ERGOCALCIFEROL) 07732 UNIT capsule  Care Giver Yes No   Sig: Take 50,000 Units by mouth once a week On Sundays      Facility-Administered Medications: None     Allergies   Allergies   Allergen Reactions     Diphenhydramine Rash     benadryl cream     Nickel      Nitrofurantoin Rash       Social History   ______________________________________________  Social History     Social History     Marital status:      Spouse name: Jama     Number of children: 2     Years of education: N/A     Occupational History     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 No     Social History Narrative    Caffeine intake/servings daily - 2    Calcium intake/servings daily - 2-3    Exercise 7 times weekly - describe runs on treadmill for 5 minutes    Sunscreen used - Yes    Seatbelts used - Yes    Guns stored in the home - No    Self Breast Exam - Yes    Pap test up to date -  Yes    Eye exam up to date -  Yes    Dental exam up to date -  Yes    DEXA scan up to date -  Yes    Flex Sig/Colonoscopy up to date -  Not Applicable    Mammography up to date -  Not Applicable    Immunizations reviewed and up to date - Yes    Abuse: Current or  Past (Physical, Sexual or Emotional) - No    Do you feel safe in your environment - Yes    Do you cope well with stress - Yes    Do you suffer from insomnia - No    Last updated by: Michelle Sanchez  12/22/2010                       Family History   ______________________________________________  Family History   Problem Relation Age of Onset     Family History Negative Mother      DIABETES Father      Cancer - colorectal Paternal Grandmother      HEART DISEASE Paternal Grandfather      MI       Review of Systems   ______________________________________________  Review of systems is limited by patient factors - mental status      Physical Exam   ______________________________________________  Weight:159 lbs 13.34 oz; Height:Data Unavailable  Temp: 98.5  F (36.9  C) Temp src: Oral BP: 99/61   Heart Rate: 87 Resp: 16 SpO2: 97 % O2 Device: None (Room air)    General Appearance:  No acute distress  Neuro:       Mental Status Exam:   Lethargic, minimally arousable, unable to assess orientation. Minimal speech - nods/shakes head occasionally to answer questions. Mental status is decreased       Cranial Nerves:  Pupils 3 mm, reactive. EOM grossly intact. Face is symmetric. Tongue and uvula are midline. Other CN are normal           Motor:  Moves all extremities, intermittently following some commands. Tone and bulk are normal           Reflexes:  Normal DTR. Toes equivocal.        Sensory:  Unable to assess            Coordination:   Unable to assess       Gait:  Unable to assess  Neck: no nuchal rigidity, normal thyroid. No carotid bruits.    Cardiovascular: Regular rate and rhythm, no m/r/g  Lungs: Clear to auscultation  Abdomen: Soft, not tender, not distended  Extremities: No clubbing, no cyanosis, no edema    Data   ______________________________________________  All Data personally reviewed:       Labs:   CBC RESULTS:     Recent Labs  Lab 06/05/18  0755 06/04/18  1904   WBC 7.2 5.1   RBC 3.89 3.99   HGB 11.8 12.1    HCT 35.6 36.4    208     Basic Metabolic Panel:   Recent Labs   Lab Test  06/05/18   0755  06/04/18   1904 03/07/18   1725   NA  140  138  137   POTASSIUM  4.0  4.0  4.5   CHLORIDE  109  106  106   CO2  25  22  26   BUN  10  11  16   CR  0.61  0.84  0.74   GLC  99  132*  82   JULIET  8.1*  8.0*  8.9     Liver panel:  Recent Labs   Lab Test  06/05/18   0755 06/04/18   1904 03/07/18   1725  08/14/17   0910  08/13/17   0910   PROTTOTAL  6.5*  7.3  7.5  6.8  6.6*   ALBUMIN  3.4  3.8  4.1  2.9*  2.7*   BILITOTAL  0.6  0.4  0.4  0.2  0.3   ALKPHOS  36*  39*  39*  177*  189*   AST  25  20  27  84*  166*   ALT  17  19  15  205*  255*     INR:  Recent Labs   Lab Test  06/04/18   1904 07/24/17   1808   INR  1.05  1.05      Lipid Profile:  Recent Labs   Lab Test  03/07/18   1725  02/20/12   0938   CHOL  240*  242*   HDL  61  72   LDL  143*  140*   TRIG  180*  147   CHOLHDLRATIO   --   3.4     A1C:   Recent Labs   Lab Test  03/07/18   1725   A1C  5.2       Drug Screen: Recent Labs   Lab Test  06/04/18   2130 07/24/17   1912   UAMP  Negative  Negative   Cutoff for a negative amphetamine is 500 ng/mL or less.     UBARB  Negative  Negative   Cutoff for a negative barbiturate is 200 ng/mL or less.     BENZODIAZEUR  Positive*  Negative   Cutoff for a negative benzodiazepine is 200 ng/mL or less.     UCANN  Negative  Negative   Cutoff for a negative cannabinoid is 50 ng/mL or less.     UCOC  Negative  Negative   Cutoff for a negative cocaine is 300 ng/mL or less.     OPIT  Negative  Negative   Cutoff for a negative opiate is 300 ng/mL or less.     UPCP  Negative  Negative   Cutoff for a negative PCP is 25 ng/mL or less.       Alcohol level:  Recent Labs   Lab Test  06/04/18   1904   ETOH  <0.01     UA Results:  Recent Labs   Lab Test  06/04/18   2130 04/17/18   1021   COLOR  Yellow  Yellow   APPEARANCE  Clear  Clear   URINEGLC  Negative  Negative   URINEBILI  Negative  Negative   URINEKETONE  10*  Negative   SG  1.012   1.020   UBLD  Negative  Negative   URINEPH  6.5  7.0   PROTEIN  10*  Negative   UROBILINOGEN   --   0.2   NITRITE  Negative  Negative   LEUKEST  Trace*  Negative   RBCU  1  O - 2   WBCU  1  0 - 5     Most Recent 6 Bacteria Isolates From Any Culture (See EPIC Reports for Culture Details):  Recent Labs   Lab Test  04/17/18   1059  03/07/18   1702  09/26/17   1040  08/12/17   2250  08/12/17   2053  08/12/17   2045   CULT  >100,000 colonies/mL  mixed urogenital jen    >100,000 colonies/mL  urogenital jen  Susceptibility testing not routinely done    >100,000 colonies/mL  Coagulase negative Staphylococcus  *  Culture negative after 29 days  No anaerobes isolated  No growth  No growth  No growth        Cardiac US:   --       Neurophysiology:   EEG pending        Imaging:   All imaging studies were reviewed personally  CT head 6/4/18:   Diffuse cerebral volume loss and cerebral white matter changes consistent with chronic small vessel ischemic disease. No evidence for acute intracranial pathology.    MRI brain 6/5/18:   1. No acute pathology is identified. No bleed, mass, or acute infarcts are seen.  2. No temporal lobe lesions are identified on today's study. The abnormal signal in the left temporal lobe seen on the patient's previous MR scan is no longer identified.  3. There is generalized atrophy of the brain.  White matter changes are present in the cerebral hemispheres.        Text Page    Sherrie Angel, MSN, FNP-BC, RN CNRN

## 2018-06-05 NOTE — ED NOTES
[facility]  ED Nurse Handoff Report    ED Chief complaint: Seizures (no known history, acting strange per  then went into full body seizure that lasted about 30 minutes)      ED Diagnosis:   Final diagnoses:   Grand mal seizure (H)   Multiple sclerosis (H)       Code Status: Full Code    Allergies:   Allergies   Allergen Reactions     Diphenhydramine Rash     benadryl cream     Nickel      Nitrofurantoin Rash       Activity level - Baseline/Home:  Independent    Activity Level - Current:   Stand with Assist of 2     Needed?: No    Isolation: No  Infection: Not Applicable    Bariatric?: No    Vital Signs:   Vitals:    06/04/18 2215 06/04/18 2220 06/04/18 2230 06/04/18 2300   BP:  108/84  100/72   Resp:       Temp:       TempSrc:       SpO2: 99% 98% 99% 98%       Cardiac Rhythm: ,        Pain level:      Is this patient confused?: Yes   Suicidality: Screened negative    Patient Report: Initial Complaint: Seizure, AMS   Focused Assessment: Pt presents from home with family for a seizure. Family reports pt has had one seizure a year or two ago, was on a short time daily regimen of keppra but then taken off. Pt has hx of MS and family report pt is most weak in lower extremities d/t MS and has some R sided weakness d/t the MS. Pt had seizure tonight that prompted them to call EMS, pt was given 10 mg IM versed by EMS so pt has been altered throughout stay. Pt unable to speak in sentences or answer orientation questions but will follow commands and will nod appropriately. Pt will nod yes to being in a hospital and shakes head no to other appropriate questions. When RN asks pt her name or birthday pt will just mumble and not answer. All head imaging normal. Pt to receive 1500 mg oral keppra in ED. Family in and out of room, vital signs stable.   Tests Performed: Blood work, ekg, head ct, ua, urine drug screen   Abnormal Results: Nothing remarkable   Treatments provided: 1 liter LR, oral  keppra     Family Comments: At bedside    OBS brochure/video discussed/provided to patient: N/A    ED Medications:   Medications   LORazepam (ATIVAN) injection 2 mg (not administered)   levETIRAcetam (KEPPRA) solution 1,500 mg (not administered)   lactated ringers BOLUS 1,000 mL (0 mLs Intravenous Stopped 6/4/18 1578)       Drips infusing?:  No    For the majority of the shift this patient was Green.   Interventions performed were meds, repo, updates.    Severe Sepsis OR Septic Shock Diagnosis Present: No      ED NURSE PHONE NUMBER: *[phone]

## 2018-06-05 NOTE — PROGRESS NOTES
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 She was temporarily on levetiracetam, and has not had recurrent seizures until she presented on this admission with witnessed GTC seizure that lasted until she received IV midazolam by EMS. Head CT on arrival negative for acute intracranial pathology. Brain MRI (6/5) also negative. Metabolic and basic infectious work-up was also negative. On admission, she was loaded with levetiracetam.   - No recurrent seizures since admission. Mental status improved, but not back at baseline  - EEG today negative for epileptiform discharges, but left temporal slowing suggestive of post-ictal state  - Continues on levetiracetam 1000 mg BID  - PT/OT/SLP  - Neurology following    Possible aspiration pneumonitis  CXR on arrival showed minimal infiltrate in LLL - suspect aspiration from seizure, but she has otherwise been asymptomatic    History of recurrent UTIs  - Holding PTA Bactrim until mental status improves    Multiple sclerosis  - Holding PTA Baclofen until mental status improves    Major recurrent depressive disorder with anxiety  - Holding PTA venlafaxine until mental status improves    # Pain Assessment:  Current Pain Score 6/5/2018   Patient currently in pain? denies   Pain score (0-10) -   Pain location -   Pain descriptors -   CPOT pain score -   Autumn s pain level was assessed and she currently denies pain.      DVT Prophylaxis: Pneumatic Compression Devices  Code Status: Full  Code    Disposition: Expected discharge once cleared by Neurology, as early as tomorrow    Yuliya Aldrich    Interval History   Admitted last night. Discussed with patient's  at bedside. Patient's mental status is improved, but not back at baseline. Patient denies pain or nausea.   - PT/OT/SLP  - Continues on Keppra    -Data reviewed today: I reviewed all new labs and imaging results over the last 24 hours. I personally reviewed the brain MRI image(s) showing no acute intracranial pathology.    Physical Exam   Temp: 98.5  F (36.9  C) Temp src: Oral BP: 99/61   Heart Rate: 87 Resp: 16 SpO2: 97 % O2 Device: None (Room air)    Vitals:    06/05/18 0500   Weight: 72.5 kg (159 lb 13.3 oz)     Vital Signs with Ranges  Temp:  [97.2  F (36.2  C)-99.4  F (37.4  C)] 98.5  F (36.9  C)  Heart Rate:  [] 87  Resp:  [10-26] 16  BP: ()/(61-87) 99/61  SpO2:  [93 %-99 %] 97 %  I/O last 3 completed shifts:  In: 700 [I.V.:700]  Out: 600 [Urine:600]    Constitutional: Appears comfortable, NAD  Respiratory: Breathing non-labored. Lungs CTAB - no wheezes, crackles, or rhonchi  Cardiovascular: Heart RRR, no m/r/g. No pedal edema  GI: +BS, abd soft/NT  Skin/Integumen: No rash  Neuro: Somewhat drowsy and confused. AWAN. Follows simple commands    Medications     - MEDICATION INSTRUCTIONS -       sodium chloride 100 mL/hr at 06/05/18 0038       levETIRAcetam  1,000 mg Intravenous Q12H     sodium chloride (PF)  3 mL Intracatheter Q8H     sodium chloride (PF)  3 mL Intracatheter Q8H     Data     Recent Labs  Lab 06/05/18  0755 06/04/18  1904   WBC 7.2 5.1   HGB 11.8 12.1   MCV 92 91    208   INR  --  1.05    138   POTASSIUM 4.0 4.0   CHLORIDE 109 106   CO2 25 22   BUN 10 11   CR 0.61 0.84   ANIONGAP 6 10   JULIET 8.1* 8.0*   GLC 99 132*   ALBUMIN 3.4 3.8   PROTTOTAL 6.5* 7.3   BILITOTAL 0.6 0.4   ALKPHOS 36* 39*   ALT 17 19   AST 25 20       Recent Results (from the past 24 hour(s))   CT Head w/o Contrast     Narrative    CT OF THE HEAD WITHOUT CONTRAST 6/4/2018 7:22 PM     COMPARISON: Brain MRI 8/13/2017.    HISTORY: New onset seizure, altered mental status.     TECHNIQUE: 5 mm thick axial CT images of the head were acquired  without IV contrast material.    FINDINGS: There is moderate diffuse cerebral volume loss. There are  subtle patchy areas of decreased density in the cerebral white matter  bilaterally that are consistent with sequela of chronic small vessel  ischemic disease.    The ventricles and basal cisterns are within normal limits in  configuration given the degree of cerebral volume loss.  There is no  midline shift. There are no extra-axial fluid collections.    No intracranial hemorrhage, mass or recent infarct.    The visualized paranasal sinuses are well-aerated. There is no  mastoiditis. There are no fractures of the visualized bones.      Impression    IMPRESSION: Diffuse cerebral volume loss and cerebral white matter  changes consistent with chronic small vessel ischemic disease. No  evidence for acute intracranial pathology.    Radiation dose for this scan was reduced using automated exposure  control, adjustment of the mA and/or kV according to patient size, or  iterative reconstruction technique.      GEOFFREY GILL MD   Cervical spine CT w/o contrast    Narrative    CT OF THE CERVICAL SPINE WITHOUT CONTRAST   6/4/2018 7:23 PM     COMPARISON: None.    HISTORY: Seizure, fall, right arm weakness.       TECHNIQUE: Axial images of the cervical spine were acquired without  intravenous contrast. Multiplanar reformations were created.      FINDINGS: There is normal alignment of the cervical vertebrae.  Vertebral body heights of the cervical spine are normal.  Craniocervical alignment is normal. There are no fractures of the  cervical spine.  There is no spinal canal narrowing of the cervical  spine. There is no prevertebral soft tissue swelling.      Impression    IMPRESSION: No evidence for fracture or  traumatic malalignment of the  cervical spine.    Radiation dose for this scan was reduced using automated exposure  control, adjustment of the mA and/or kV according to patient size, or  iterative reconstruction technique.      GEOFFREY GILL MD   XR Chest Port 1 View    Narrative    CHEST PORTABLE ONE VIEW   6/4/2018 7:33 PM     COMPARISON: Two view chest x-ray 8/12/2017.    HISTORY: Altered mental status, seizure.      Impression    IMPRESSION: There is questionable minimal patchy airspace opacity in  the retrocardiac left lower lung that could represent aspiration  pneumonia. The lungs are otherwise clear. There is no pleural effusion  or pneumothorax. Heart size is normal.     GEOFFREY GILL MD   MR Brain w/o & w Contrast    Narrative    MRI BRAIN WITHOUT AND WITH CONTRAST  6/5/2018 3:22 AM    HISTORY:  Seizures;      TECHNIQUE:  Multiplanar, multisequence MRI of the brain without and  with 7mL Gadavist    COMPARISON: CT dated 6/4/2018, MR scan dated 8/13/2017.    FINDINGS:  There is generalized atrophy of the brain.  White matter  changes are present in the cerebral hemispheres. The temporal lobes  are unremarkable. The area of restricted diffusion seen on the  previous MR scan from 8/13/2017 in the anterior aspect of the left  temporal lobe is no longer identified.  There is no evidence of  hemorrhage, mass, acute infarct, or anomaly.  There are no gadolinium  enhancing lesions.   The facial structures appear normal. The arteries  at the base of the brain and the dural venous sinuses appear patent.       Impression    IMPRESSION:  1. No acute pathology is identified. No bleed, mass, or acute infarcts  are seen.  2. No temporal lobe lesions are identified on today's study. The  abnormal signal in the left temporal lobe seen on the patient's  previous MR scan is no longer identified.  3. There is generalized atrophy of the brain.  White matter changes  are present in the cerebral hemispheres.      DELANO SALINAS MD

## 2018-06-05 NOTE — PHARMACY-ADMISSION MEDICATION HISTORY
Admission medication history interview status for the 6/4/2018  admission is complete. See EPIC admission navigator for prior to admission medications     Medication history source reliability:Moderate    Actions taken by pharmacist (provider contacted, etc):Called [facility] and Liudmila  Strength change on Oxybutynin XL, Septra, and Effexor XR, Vitamin D     Additional medication history information not noted on PTA med list :     Medication reconciliation/reorder completed by provider prior to medication history? Yes    Time spent in this activity: 45 min    Prior to Admission medications    Medication Sig Last Dose Taking? Auth Provider   baclofen (LIORESAL) 10 MG tablet Take 10-20 mg by mouth 4 times daily as needed  Past Month at Unknown time Yes Reported, Patient   CRANBERRY EXTRACT PO Take 1 tablet by mouth daily 6/4/2018 at am Yes Unknown, Entered By History   dimethyl fumarate (TECFIDERA) delayed release capsule Take 120 mg by mouth 2 times daily 6/4/2018 at am Yes Reported, Patient   norethindrone (MICRONOR) 0.35 MG per tablet TAKE 1 TABLET(0.35 MG) BY MOUTH DAILY 6/4/2018 at am Yes Lola Wahl APRN CNP   oxybutynin (DITROPAN-XL) 5 MG 24 hr tablet Take 5 mg by mouth daily 6/4/2018 at am Yes Unknown, Entered By History   sulfamethoxazole-trimethoprim (BACTRIM/SEPTRA) 400-80 MG per tablet Take 1 tablet by mouth daily 6/4/2018 at am Yes Unknown, Entered By History   venlafaxine (EFFEXOR-ER) 75 MG TB24 24 hr tablet Take 75 mg by mouth daily (2  X 37.5 mg) 6/4/2018 at am Yes Unknown, Entered By History   VITAMIN D, CHOLECALCIFEROL, PO Take 1,000 Units by mouth daily 6/4/2018 at am Yes Unknown, Entered By History

## 2018-06-05 NOTE — H&P
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    1) Recurrent seizure: Ms. Shanks was hospitalized 7/2017 and again 8/2017 for seizures. The second hospitalization was also for fever and rash. ID and Neurology were consulted. EEG confirmed seizure activity and MRI scans showed a left temporal lesion; HSV encephalitis was considered, though she had LP that was negative and negative serologies (also negative DHARMESH virus, CSF cultures, cryptococcus, acid fast, enterovirus, fungus, lyme and TB studies, VDRL, Hepatitis viruses, EBV, [place] and toxoplasma all negative; CMV IGG, Mumps IgG, and hep A antibody were reactive). Another source of her fever and rash was thought to be a drug reaction to Nitrofurantoin which was stopped. Keppra was started empirically, continued for a time after discharge, and then reportedly stopped due to no further seizures, until tonight.        Now she presents for witnessed tonic clonic seizure that lasted until she received 10 mg Versed by EMS. Now she is awake but confused.           In the ED she is afebrile. /72, pulse 80, not hypoxic and somnolent, confused, able however to follow commands. She appears comfortable. CBC shows WBC 5, HGB 12, . Lactate 2.1. CMP and INR are within normal limits. TSH 1.77. HCG negative. UA negative. Urine drug screen positive only for benzodiazepines (given by EMS). Ethanol and Salicylate and Tylenol levels negative. CXR shows a small left lower lobe opacity. EKG shows NSR and CT Head and Spine without contrast are negative for acute pathology.      Overall we are concerned for recurrent seizure  of unknown etiology. She has no clear signs or symptoms of infection or SIRS at the present time. The case was discussed with Neurology in the ED and Keppra 1500 mg was given.      -- Inpatient. Seizure precautions with PRN Ativan ordered     -- Bedrest, NPO    -- Neurology consulted     -- Keppra 1000 mg IV BID ordered for now    -- MRI Brain and EEG ordered      -- Q4 neuro checks     -- IV  cc/hour    2) Possible left lower lobe opacity: Aspiration pneumonitis caused by her seizure is possible, though at present she has no signs or symptoms of this.      -- Hold antibiotics for now, check Pro-calcitonin     -- For any fever tonight we will start empiric IV Unasyn    3) MS: Resume Baclofen when post-ictal confusion has resolved    4) Chronic depression: Resume Effexor at the discretion of neurology    5) Recurrent UTI infections: She takes a daily suppressive Septra tablet (as above, Nitrofurantoin stopped after a possible drug allergic reaction 8/2017). Currently UA appears bland.      -- Resume when verified     # Pain Assessment:  Current Pain Score 8/15/2017   Patient currently in pain? -   Pain score (0-10) 3   Pain location -   Pain descriptors -   CPOT pain score -   Autumn s pain level was assessed and she currently denies pain.      DVT Prophylaxis: Pneumatic Compression Devices  Code Status: Full Code, presumed    Disposition: Expected discharge in 2-3 days.    Maciej Gaming MD    Primary Care Physician **Malvin Beck MD    Chief Complaint   Seizure    History is obtained from the patient; history obtained from the ED physician whom I have spoken with    History of Present Illness   Shanna Shanks is a pleasant 42 year old woman who presents with convulsions that were witnessed tonight by her  (who is no longer available to speak with; he provided history to the ED physician Dr. Rojas with whom I have spoken); he reported that she was at home with him this evening  when she developed confusion associated with erratic behavior and then had a full body seizure with tongue biting; she did not strike her head reportedly or lose bowel or bladder function but the convulsions were said to last 30 minutes until EMS administered 10 mg versed which resolved them. Now she is awake and alert, follows commands, but confused and unable to speak more than a word or two at a time. She denies pain, dyspnea, nausea or any other acute complaints.    Past Medical History    I have reviewed this patient's medical history and updated it with pertinent information if needed.   Past Medical History:   Diagnosis Date     Multiple sclerosis (H) 3/2/96    last exacerbation 3yrs ago, no meds x 6 months     Tobacco dependency        Past Surgical History   I have reviewed this patient's surgical history and updated it with pertinent information if needed.  Past Surgical History:   Procedure Laterality Date     HC DILATION/CURETTAGE DIAG/THER NON OB  1/1/05    D & C, missed AB       Prior to Admission Medications   Prior to Admission Medications   Prescriptions Last Dose Informant Patient Reported? Taking?   CRANBERRY EXTRACT PO  Care Giver Yes No   Sig: Take 1 tablet by mouth daily   baclofen (LIORESAL) 10 MG tablet  Care Giver Yes No   Sig: Take 10-20 mg by mouth 4 times daily as needed    dimethyl fumarate (TECFIDERA) delayed release capsule   Yes No   Sig: Take 120 mg by mouth 2 times daily   norethindrone (MICRONOR) 0.35 MG per tablet   No No   Sig: TAKE 1 TABLET(0.35 MG) BY MOUTH DAILY   oxybutynin (DITROPAN-XL) 10 MG 24 hr tablet  Care Giver No No   Sig: Take 1 tablet (10 mg) by mouth At Bedtime   sulfamethoxazole-trimethoprim (BACTRIM DS/SEPTRA DS) 800-160 MG per tablet   Yes No   Sig: Take 1 tablet by mouth once for 1 dose   venlafaxine (EFFEXOR-XR) 75 MG 24 hr capsule  Care Giver Yes No   Sig: Take 75 mg by mouth daily   vitamin D (ERGOCALCIFEROL) 95530 UNIT capsule  Care Giver Yes No   Sig: Take  50,000 Units by mouth once a week On Sundays      Facility-Administered Medications: None     Allergies   Allergies   Allergen Reactions     Diphenhydramine Rash     benadryl cream     Nickel      Nitrofurantoin Rash       Social History   I have reviewed this patient's social history and updated it with pertinent information if needed. Shanna Shanks  reports that she has quit smoking. Her smoking use included Cigarettes. She smoked 0.25 packs per day. She has never used smokeless tobacco. She reports that she drinks about 3.5 oz of alcohol per week  She reports that she does not use illicit drugs.    Family History   Family history assessed and, except as above, is non-contributory.    Family History   Problem Relation Age of Onset     Family History Negative Mother      DIABETES Father      Cancer - colorectal Paternal Grandmother      HEART DISEASE Paternal Grandfather      MI       Review of Systems   The 10 point Review of Systems is negative other than noted in the HPI or here.     Physical Exam   Temp: 99.4  F (37.4  C) Temp src: Temporal BP: 100/72   Heart Rate: 80 Resp: 12 SpO2: 98 % O2 Device: None (Room air)    Vital Signs with Ranges  Temp:  [99.4  F (37.4  C)] 99.4  F (37.4  C)  Heart Rate:  [] 80  Resp:  [10-26] 12  BP: (100-120)/(70-87) 100/72  SpO2:  [93 %-99 %] 98 %  0 lbs 0 oz    Constitutional: Somnolent but arousable; no apparent distress  HEENT: normocephalic moist mucus membranes  Respiratory: lungs clear to auscultation bilaterally  Cardiovascular: regular S1 S2  GI: abdomen soft non tender non distended bowel sounds positive  Lymph/Hematologic: no pallor, no cervical lymphadenopathy  Skin: no rash, good turgor  Musculoskeletal: no clubbing, cyanosis or edema  Neurologic: extra-ocular muscles intact; moves all four extremities  Psychiatric: GCS 12    Data   Data reviewed today:  I personally reviewed the EKG tracing showing NSR, 88.    Recent Labs  Lab 06/04/18  1904   WBC 5.1    HGB 12.1   MCV 91      INR 1.05      POTASSIUM 4.0   CHLORIDE 106   CO2 22   BUN 11   CR 0.84   ANIONGAP 10   JULIET 8.0*   *   ALBUMIN 3.8   PROTTOTAL 7.3   BILITOTAL 0.4   ALKPHOS 39*   ALT 19   AST 20       Recent Results (from the past 24 hour(s))   CT Head w/o Contrast    Narrative    CT OF THE HEAD WITHOUT CONTRAST 6/4/2018 7:22 PM     COMPARISON: Brain MRI 8/13/2017.    HISTORY: New onset seizure, altered mental status.     TECHNIQUE: 5 mm thick axial CT images of the head were acquired  without IV contrast material.    FINDINGS: There is moderate diffuse cerebral volume loss. There are  subtle patchy areas of decreased density in the cerebral white matter  bilaterally that are consistent with sequela of chronic small vessel  ischemic disease.    The ventricles and basal cisterns are within normal limits in  configuration given the degree of cerebral volume loss.  There is no  midline shift. There are no extra-axial fluid collections.    No intracranial hemorrhage, mass or recent infarct.    The visualized paranasal sinuses are well-aerated. There is no  mastoiditis. There are no fractures of the visualized bones.      Impression    IMPRESSION: Diffuse cerebral volume loss and cerebral white matter  changes consistent with chronic small vessel ischemic disease. No  evidence for acute intracranial pathology.    Radiation dose for this scan was reduced using automated exposure  control, adjustment of the mA and/or kV according to patient size, or  iterative reconstruction technique.      GEOFFREY GILL MD   Cervical spine CT w/o contrast    Narrative    CT OF THE CERVICAL SPINE WITHOUT CONTRAST   6/4/2018 7:23 PM     COMPARISON: None.    HISTORY: Seizure, fall, right arm weakness.       TECHNIQUE: Axial images of the cervical spine were acquired without  intravenous contrast. Multiplanar reformations were created.      FINDINGS: There is normal alignment of the cervical vertebrae.  Vertebral  body heights of the cervical spine are normal.  Craniocervical alignment is normal. There are no fractures of the  cervical spine.  There is no spinal canal narrowing of the cervical  spine. There is no prevertebral soft tissue swelling.      Impression    IMPRESSION: No evidence for fracture or traumatic malalignment of the  cervical spine.    Radiation dose for this scan was reduced using automated exposure  control, adjustment of the mA and/or kV according to patient size, or  iterative reconstruction technique.      GEOFFREY GILL MD   XR Chest Port 1 View    Narrative    CHEST PORTABLE ONE VIEW   6/4/2018 7:33 PM     COMPARISON: Two view chest x-ray 8/12/2017.    HISTORY: Altered mental status, seizure.      Impression    IMPRESSION: There is questionable minimal patchy airspace opacity in  the retrocardiac left lower lung that could represent aspiration  pneumonia. The lungs are otherwise clear. There is no pleural effusion  or pneumothorax. Heart size is normal.     GEOFFREY GILL MD

## 2018-06-05 NOTE — ED NOTES
RN at bedside, pt is able to answer yes/no questions but is unable to answer full questions. Pt is unable to tell RN her name, location or any other detailed questions. Pt does nod yes when asked if she is in the hospital. Pt shook her head no to any pain or anything that was bothering her. VSS

## 2018-06-05 NOTE — PROGRESS NOTES
06/05/18 1400   Quick Adds   Type of Visit Initial PT Evaluation   Living Environment   Lives With spouse   Living Arrangements house   Home Accessibility stairs to enter home;stairs within home;tub/shower is not walk in   Number of Stairs to Enter Home 8   Number of Stairs Within Home 10   Living Environment Comment Information above from chart from previous admission. Pt unable to provide accurate history 2/2 confusion   Self-Care   Dominant Hand right   Usual Activity Tolerance good   Current Activity Tolerance fair   Activity/Exercise/Self-Care Comment Pt poor historian, unable to accurately determine.    Functional Level Prior   Prior Functional Level Comment Per chart review pt IND however pt currently unable to provide PLOF information as pt poor historian.    General Information   Onset of Illness/Injury or Date of Surgery - Date 06/04/18   Referring Physician Yuliya Aldrich MD   Pertinent History of Current Problem (include personal factors and/or comorbidities that impact the POC) Shanna Shanks is a 42 year old female with hx of MS and prior seizures attributed to HSV encephalitis no longer on AEDs who was admitted on 6/4/2018 for recurrent seizure.   Precautions/Limitations fall precautions;seizure precautions   Cognitive Status Examination   Orientation not oriented to person, place or time   Level of Consciousness confused;lethargic/somnolent   Follows Commands and Answers Questions able to follow single-step instructions   Personal Safety and Judgment impulsive   Memory impaired   Cognitive Comment Pt able to follow simple commands related to performing functional tasks i.e. sit up, stand up. Pt continues to demonstrate confusion. Pt unable to recall daughters name   Posture    Posture Forward head position;Protracted shoulders   Strength   Strength Comments Based on functional mobility BLE > 3/5   Bed Mobility   Bed Mobility Comments SBA supine > sit with HOB slightly elevated, no  "use of hand rail   Transfer Skills   Transfer Comments STS from EOB w/ cga x 2 and HHA   Gait   Gait Comments Pt ambulates with HHA x 2, providing CGA for safety. Pt ambualtes with short step length and NBOS    Balance   Balance Comments Good static sitting balance, fair dynamic sitting balance. Fair standing balance.    Clinical Impression   Criteria for Skilled Therapeutic Intervention yes, treatment indicated   PT Diagnosis Decreased functional mobility   Influenced by the following impairments PMH, recent seizure, impaired cognition, impaired balance, unsteady gait   Functional limitations due to impairments Gait instability, balance impairments   Clinical Presentation Evolving/Changing   Clinical Presentation Rationale PMH, cognition, requires assist for all mobility   Clinical Decision Making (Complexity) Moderate complexity   Therapy Frequency` daily   Predicted Duration of Therapy Intervention (days/wks) 1 week   Anticipated Discharge Disposition Transitional Care Facility   Risk & Benefits of therapy have been explained Yes   Patient, Family & other staff in agreement with plan of care Yes   [facility] AM-PAC  \"6 Clicks\" V.2 Basic Mobility Inpatient Short Form   1. Turning from your back to your side while in a flat bed without using bedrails? 4 - None   2. Moving from lying on your back to sitting on the side of a flat bed without using bedrails? 4 - None   3. Moving to and from a bed to a chair (including a wheelchair)? 3 - A Little   4. Standing up from a chair using your arms (e.g., wheelchair, or bedside chair)? 3 - A Little   5. To walk in hospital room? 3 - A Little   6. Climbing 3-5 steps with a railing? 3 - A Little   Basic Mobility Raw Score (Score out of 24.Lower scores equate to lower levels of function) 20   Total Evaluation Time   Total Evaluation Time (Minutes) 15     "

## 2018-06-06 ENCOUNTER — APPOINTMENT (OUTPATIENT)
Dept: OCCUPATIONAL THERAPY | Facility: CLINIC | Age: 43
DRG: 100 | End: 2018-06-06
Attending: INTERNAL MEDICINE
Payer: COMMERCIAL

## 2018-06-06 ENCOUNTER — APPOINTMENT (OUTPATIENT)
Dept: SPEECH THERAPY | Facility: CLINIC | Age: 43
DRG: 100 | End: 2018-06-06
Attending: INTERNAL MEDICINE
Payer: COMMERCIAL

## 2018-06-06 ENCOUNTER — APPOINTMENT (OUTPATIENT)
Dept: PHYSICAL THERAPY | Facility: CLINIC | Age: 43
DRG: 100 | End: 2018-06-06
Payer: COMMERCIAL

## 2018-06-06 PROCEDURE — 99232 SBSQ HOSP IP/OBS MODERATE 35: CPT | Performed by: NURSE PRACTITIONER

## 2018-06-06 PROCEDURE — 25000132 ZZH RX MED GY IP 250 OP 250 PS 637: Performed by: INTERNAL MEDICINE

## 2018-06-06 PROCEDURE — 25000128 H RX IP 250 OP 636: Performed by: HOSPITALIST

## 2018-06-06 PROCEDURE — 12000000 ZZH R&B MED SURG/OB

## 2018-06-06 PROCEDURE — 99207 ZZC CDG-MDM COMPONENT: MEETS LOW - DOWN CODED: CPT | Performed by: INTERNAL MEDICINE

## 2018-06-06 PROCEDURE — 92526 ORAL FUNCTION THERAPY: CPT | Mod: GN | Performed by: SPEECH-LANGUAGE PATHOLOGIST

## 2018-06-06 PROCEDURE — 97535 SELF CARE MNGMENT TRAINING: CPT | Mod: GO

## 2018-06-06 PROCEDURE — 97166 OT EVAL MOD COMPLEX 45 MIN: CPT | Mod: GO

## 2018-06-06 PROCEDURE — 99232 SBSQ HOSP IP/OBS MODERATE 35: CPT | Performed by: INTERNAL MEDICINE

## 2018-06-06 PROCEDURE — 97530 THERAPEUTIC ACTIVITIES: CPT | Mod: GO

## 2018-06-06 PROCEDURE — 25000132 ZZH RX MED GY IP 250 OP 250 PS 637: Performed by: HOSPITALIST

## 2018-06-06 PROCEDURE — 40000225 ZZH STATISTIC SLP WARD VISIT: Performed by: SPEECH-LANGUAGE PATHOLOGIST

## 2018-06-06 PROCEDURE — 97116 GAIT TRAINING THERAPY: CPT | Mod: GP | Performed by: PHYSICAL THERAPIST

## 2018-06-06 PROCEDURE — 40000193 ZZH STATISTIC PT WARD VISIT: Performed by: PHYSICAL THERAPIST

## 2018-06-06 PROCEDURE — 97530 THERAPEUTIC ACTIVITIES: CPT | Mod: GP | Performed by: PHYSICAL THERAPIST

## 2018-06-06 PROCEDURE — 40000133 ZZH STATISTIC OT WARD VISIT

## 2018-06-06 RX ORDER — VENLAFAXINE HYDROCHLORIDE 75 MG/1
75 TABLET, EXTENDED RELEASE ORAL DAILY
Status: DISCONTINUED | OUTPATIENT
Start: 2018-06-06 | End: 2018-06-06

## 2018-06-06 RX ORDER — SULFAMETHOXAZOLE AND TRIMETHOPRIM 400; 80 MG/1; MG/1
1 TABLET ORAL DAILY
Status: DISCONTINUED | OUTPATIENT
Start: 2018-06-06 | End: 2018-06-07 | Stop reason: HOSPADM

## 2018-06-06 RX ORDER — BACLOFEN 10 MG/1
10-20 TABLET ORAL 4 TIMES DAILY PRN
Status: DISCONTINUED | OUTPATIENT
Start: 2018-06-06 | End: 2018-06-07 | Stop reason: HOSPADM

## 2018-06-06 RX ORDER — OXYBUTYNIN CHLORIDE 5 MG/1
5 TABLET, EXTENDED RELEASE ORAL DAILY
Status: DISCONTINUED | OUTPATIENT
Start: 2018-06-06 | End: 2018-06-07 | Stop reason: HOSPADM

## 2018-06-06 RX ORDER — LEVETIRACETAM 500 MG/1
1000 TABLET ORAL 2 TIMES DAILY
Status: DISCONTINUED | OUTPATIENT
Start: 2018-06-06 | End: 2018-06-07 | Stop reason: HOSPADM

## 2018-06-06 RX ORDER — VENLAFAXINE HYDROCHLORIDE 75 MG/1
75 CAPSULE, EXTENDED RELEASE ORAL
Status: DISCONTINUED | OUTPATIENT
Start: 2018-06-06 | End: 2018-06-07 | Stop reason: HOSPADM

## 2018-06-06 RX ADMIN — LEVETIRACETAM 1000 MG: 500 TABLET, FILM COATED ORAL at 18:04

## 2018-06-06 RX ADMIN — SODIUM CHLORIDE: 9 INJECTION, SOLUTION INTRAVENOUS at 09:33

## 2018-06-06 RX ADMIN — OXYBUTYNIN CHLORIDE 5 MG: 5 TABLET, EXTENDED RELEASE ORAL at 16:54

## 2018-06-06 RX ADMIN — LEVETIRACETAM 1000 MG: 10 INJECTION INTRAVENOUS at 06:09

## 2018-06-06 RX ADMIN — VENLAFAXINE HYDROCHLORIDE 75 MG: 75 CAPSULE, EXTENDED RELEASE ORAL at 16:54

## 2018-06-06 RX ADMIN — SULFAMETHOXAZOLE AND TRIMETHOPRIM 1 TABLET: 400; 80 TABLET ORAL at 16:54

## 2018-06-06 RX ADMIN — SODIUM CHLORIDE: 9 INJECTION, SOLUTION INTRAVENOUS at 19:42

## 2018-06-06 ASSESSMENT — ACTIVITIES OF DAILY LIVING (ADL)
ADLS_ACUITY_SCORE: 15

## 2018-06-06 ASSESSMENT — VISUAL ACUITY
OU: NORMAL ACUITY
OU: NORMAL ACUITY;BASELINE
OU: NORMAL ACUITY

## 2018-06-06 NOTE — PLAN OF CARE
Problem: Patient Care Overview  Goal: Plan of Care/Patient Progress Review  Outcome: No Change  Disoriented to Place, Time, and Situation. Unable to complete full neuro assessment, pt able to follow some commands w/ demonstration. VSS, on RA. Tele NSR. Regular diet. Up with A1 w/ GB. Denies pain. Continue POC. D/C to ARU once cleared by neurology.

## 2018-06-06 NOTE — PLAN OF CARE
Problem: Patient Care Overview  Goal: Plan of Care/Patient Progress Review  Discharge Planner PT   Patient plan for discharge: Not stated  Current status: Supine > sit with SBA, sit <> stand with min A initially d/t increased difficulty coming to full stand, amb x150' with 1 UE support on IV pole and additional x120' without UE support, requires CGA-min A with ambulation 2/2 frequent path deviations, needs single step commands throughout session and demos very poor motor planning  Barriers to return to prior living situation: cognition, impaired balance, gait instability  Recommendations for discharge: ARU  Rationale for recommendations: Currently pt is not safe to return home, will update recs appropriately based on progress. Pt would benefit from ARU stay to improve balance, gait, endurance, strength        Entered by: Мария Salgado 06/06/2018 9:04 AM

## 2018-06-06 NOTE — PLAN OF CARE
"Problem: Patient Care Overview  Goal: Plan of Care/Patient Progress Review  OT: Order received, eval completed and treatment initiated. is a 42 year old female with hx of MS and prior seizures attributed to HSV encephalitis no longer on AEDs who was admitted on 6/4/2018 for recurrent seizure. Pt lives with spouse in house, stairs to enter, all needs can be met on main level, tub/shower, standard toilet. Spouse provided PLOF as pt is poor historian. Spouse assists with bathing, pt is mod I/sup for dressing, toileting, grooming. Family provides all IADL assist. Family provides nearly 24/7 supervision, prior to recent admission, pt would be home alone for \"a couple of hours\" per day.     Discharge Planner OT   Patient plan for discharge: Spouse prefers home with OP therapies   Current status: Pt completed transfers/mobility with CGA for safety, pt unsafely reaching out for furniture, vcs to self correct. Pt completed tub transfer with CGA, unsafe technique demonstrated, vcs to correct. Pt completed LE dressing with CGA, toileting with CGA and environmental cues, CGA for hand washing in stance. Pt oriented to self, place, mild word finding and perseverance noted.  stating pt near baseline with cognition and mobility.   Barriers to return to prior living situation: Impaired balance, impaired safety, decreased functional activity tolerance  Recommendations for discharge: Home with 24/7 supervision and assist with all ADLs and mobility, resume previous level of assist for IADLs and HH OT followed by OP OT  Rationale for recommendations: Pt is below baseline level of functioning and would benefit from ongoing skilled OT to maximize safety and indep in all ADLs and mobility tasks. Pt has strong support system in place and home. Pt would benefit from being in home environment as long as 24/7 supervision in place.       Entered by: Caitlyn Charles 06/06/2018 2:21 PM           "

## 2018-06-06 NOTE — PROGRESS NOTES
"CM    I:  SW received update patient has been recommended for ARU. Staff reports patient and spouse are aware of recommendation and agrees with plan for ARU. SW paged liaison and placed referral thru DOD for their review.  Patient may not meet ARU criteria.    P:  Continue to assist as needed.    BOB Sharif    UPDATE@1220: Per [facility] liaison, patient does not meet criteria for their ARU program as there is \"nothing to suggest anything beyond postictal changes\"; deficits appear as cognitive in nature.  SW will discuss with staff to see if TCU would be appropriate.     "

## 2018-06-06 NOTE — PROGRESS NOTES
" 06/06/18 1330   Quick Adds   Type of Visit Initial Occupational Therapy Evaluation   Living Environment   Lives With spouse   Living Arrangements house   Home Accessibility stairs to enter home;stairs within home;tub/shower is not walk in   Number of Stairs to Enter Home 8   Number of Stairs Within Home 10   Transportation Available family or friend will provide   Living Environment Comment Pt lives with spouse in house, stairs to enter, within, tub/shower, standard height toilet.    Self-Care   Dominant Hand right   Usual Activity Tolerance good   Current Activity Tolerance moderate   Regular Exercise no   Functional Level Prior   Ambulation 0-->independent   Transferring 0-->independent   Toileting 0-->independent   Bathing 0-->independent   Dressing 0-->independent   Eating 0-->independent   Communication 0-->understands/communicates without difficulty   Swallowing 0-->swallows foods/liquids without difficulty   Cognition 0 - no cognition issues reported   Fall history within last six months no   Which of the above functional risks had a recent onset or change? transferring;toileting;bathing;dressing;cognition   Prior Functional Level Comment Info received from pt's spouse as pt is poor historian.  reports pt is mod independent with dressing, receives assist for bathing, mod I supervision grooming, independent toileting. Pt receives assist for all IADLs, including med mgmt.  reports pt has baseline cognitive deficits. [facility] 2x/[facility] (10-3), is only home alone for \"a couple hours\" at a time, otherwise spouse/daughter provide assist/supervision. Spouse states 24/7 assist/supervision is available. Per spouse, pt's mother is able to provide assist/supervision as needed.    General Information   Onset of Illness/Injury or Date of Surgery - Date 06/04/18   Referring Physician Yuliya Aldrich MD   Patient/Family Goals Statement Pt/spouse's goal is to return home with " outpatient therapy.    Precautions/Limitations fall precautions;seizure precautions   Cognitive Status Examination   Orientation person;place   Level of Consciousness alert;confused   Able to Follow Commands moderate impairment   Personal Safety (Cognitive) moderate impairment;decreased awareness, need for safety   Memory impaired   Cognitive Comment Per spouse, pt has baseline cognitive deficits. Spouse stating he feels near baseline, mild word finding difficulties.    Visual Perception   Visual Perception No deficits were identified   Sensory Examination   Sensory Comments Pt denies numbness/tingling in BUEs   Pain Assessment   Patient Currently in Pain No   Range of Motion (ROM)   ROM Comment WFL   Strength   Strength Comments WFL   Hand Strength   Hand Strength Comments WFL   Coordination   Upper Extremity Coordination No deficits were identified   Bed Mobility Skill: Sit to Supine   Level of [place]: Sit/Supine stand-by assist   Physical Assist/Nonphysical Assist: Sit/Supine 1 person assist   Bed Mobility Skill: Supine to Sit   Level of [place]: Supine/Sit stand-by assist   Physical Assist/Nonphysical Assist: Supine/Sit 1 person assist   Transfer Skill: Bed to Chair/Chair to Bed   Level of [place]: Bed to Chair contact guard   Physical Assist/Nonphysical Assist: Bed to Chair 1 person assist   Transfer Skill: Sit to Stand   Level of [place]: Sit/Stand contact guard   Physical Assist/Nonphysical Assist: Sit/Stand 1 person assist   Transfer Skill: Toilet Transfer   Level of [place]: Toilet contact guard   Physical Assist/Nonphysical Assist: Toilet 1 person assist   Balance   Balance Comments CGA/min A due to unsteadiness - reaching out for furniture for steadying support   Upper Body Dressing   Level of [place]: Dress Upper Body stand-by assist   Physical Assist/Nonphysical Assist: Dress Upper Body 1 person assist   Lower Body Dressing   Level of [place]: Dress Lower Body  "stand-by assist   Physical Assist/Nonphysical Assist: Dress Lower Body 1 person assist   Toileting   Level of [place]: Toilet contact guard   Physical Assist/Nonphysical Assist: Toilet 1 person assist   Grooming   Level of [place]: Grooming minimum assist (75% patients effort)   Physical Assist/Nonphysical Assist: Grooming 1 person assist   Instrumental Activities of Daily Living (IADL)   IADL Comments Family provides all IADL assist   Activities of Daily Living Analysis   Impairments Contributing to Impaired Activities of Daily Living balance impaired;cognition impaired;strength decreased   ADL Comments Pt presents to OT below baseline level of functioning in all areas of self cares, however has assist at home.    General Therapy Interventions   Planned Therapy Interventions ADL retraining;IADL retraining;cognition;transfer training;strengthening   Clinical Impression   Criteria for Skilled Therapeutic Interventions Met yes, treatment indicated   OT Diagnosis Impaired ADLs, mobility   Influenced by the following impairments Decreased strength and functional activity tolerance, impaired balance, impaired safety/cognition   Assessment of Occupational Performance 5 or more Performance Deficits   Identified Performance Deficits Dressing, grooming, toileting, transfers   Clinical Decision Making (Complexity) Moderate complexity   Therapy Frequency daily   Predicted Duration of Therapy Intervention (days/wks) 3 days   Anticipated Discharge Disposition Home with Assist;Home with Home Therapy   Risks and Benefits of Treatment have been explained. Yes   Patient, Family & other staff in agreement with plan of care Yes   Clinical Impression Comments Pt presents to OT below baseline level of functioning in all areas of ADLs and mobility tasks.    [facility] AM-PAC  \"6 Clicks\" Daily Activity Inpatient Short Form   1. Putting on and taking off regular lower body clothing? 3 - A Little   2. Bathing (including " washing, rinsing, drying)? 3 - A Little   3. Toileting, which includes using toilet, bedpan or urinal? 3 - A Little   4. Putting on and taking off regular upper body clothing? 3 - A Little   5. Taking care of personal grooming such as brushing teeth? 3 - A Little   6. Eating meals? 4 - None   Daily Activity Raw Score (Score out of 24.Lower scores equate to lower levels of function) 19   Total Evaluation Time   Total Evaluation Time (Minutes) 10

## 2018-06-06 NOTE — PROGRESS NOTES
[facility]  Neurology Daily Note      Admission Date:6/4/2018   Date of service: 06/06/2018   Hospital Day: 3      Assessment & Plan   _______________________________  #. (R56.9) Seizure (H)  (primary encounter diagnosis)  --history of single seizure, not on AED  --loaded with keppra 1500 mg in the ED  -----continues with keppra 1000 mg BID  --CT/MRI brain without acute abnormality  --EEG with left temporal slowing, likely postictal  #. (G35) Multiple sclerosis (H)  --on tecfidera as outpatient for 1.5 years  -----prior on copaxone  --follows with [facility] as outpatient  #. DVT Prophylaxis  --per primary service  #. PT/OT/Speech  --continue evaluations  #. Nutrition / GI Prophylaxis  --Per recommendations of speech therapy    Code Status: Full Code    Disposition: pending.  No further neurological workup recommended at this time. Will sign off. Follow up with primary neurologist after DC.    Interval History   _______________________________  Patient presented with recurrent seizure. Developed confusion associated with erratic behavior and then had full body seizure with tongue bite. EMS was called and she received versed. Loaded with keppra in the ED and started on maintenance dose. Had a seizure in July 2017 thought to be related to encephalopathy and was on keppra for a short time. History of MS, on tecfidera. Follows with [facility]. EEG postictal, no seizures.   Today she is nearly back to baseline.  feels she is very close. Still some perseverating speech and word substitution, but significantly improved.  notes she has some confusion with the date at baseline. She does not use a walker or cane at baseline to ambulate, but will sometimes hold onto someone else for support and uses a wheelchair for long distances. She denies any new symptoms.     Review of Systems   _______________________________  The Review of Systems is negative other than noted in the  HPI  Physical Exam   _______________________________  Vitals: Temp: 97.9  F (36.6  C) Temp src: Oral BP: 112/72   Heart Rate: 75 Resp: 16 SpO2: 97 % O2 Device: None (Room air)    Vital Signs with Ranges: Temp:  [97.9  F (36.6  C)-98.8  F (37.1  C)] 97.9  F (36.6  C)  Heart Rate:  [75-97] 75  Resp:  [16-18] 16  BP: (102-112)/(64-72) 112/72  SpO2:  [96 %-99 %] 97 %    General Appearance:  No acute distress  Neuro:       Mental Status Exam:   Awake, alert, oriented X2. Mild perseveration and word substitution. Mental status is slightly confused       Cranial Nerves:  Pupils 3 mm, reactive. EOMI. Face sensation is normal. Face is symmetric. Tongue and uvula are midline. Other CN are normal           Motor:  5-/5 BUE, 4/5 BLE. Tone and bulk are normal           Reflexes:  Normal DTR. Toes downgoing.        Sensory:  Normal to light touch               Coordination:   Intact finger-to-nose        Gait:  Up with assistance  Abdomen: Soft, not tender, not distended  Extremities: No clubbing, no cyanosis, no edema    Medications   _______________________________    - MEDICATION INSTRUCTIONS -       sodium chloride 100 mL/hr at 06/06/18 0933       levETIRAcetam  1,000 mg Intravenous Q12H     sodium chloride (PF)  3 mL Intracatheter Q8H       Data   _______________________________      Lab Data:   All data was reviewed by me personally  CBC RESULTS:  Recent Labs   Lab Test  06/05/18   0755  06/04/18   1904  08/13/17   0910   WBC  7.2  5.1  7.2   RBC  3.89  3.99  3.61*   HGB  11.8  12.1  11.2*   HCT  35.6  36.4  33.4*   PLT  165  208  228     Basic Metabolic Panel:  Recent Labs   Lab Test  06/05/18   0755  06/04/18   1904  03/07/18   1725   NA  140  138  137   POTASSIUM  4.0  4.0  4.5   CHLORIDE  109  106  106   CO2  25  22  26   BUN  10  11  16   CR  0.61  0.84  0.74   GLC  99  132*  82   JULIET  8.1*  8.0*  8.9     Liver panel:  Recent Labs   Lab Test  06/05/18   0755  06/04/18   1904  03/07/18   1725  08/14/17   0910   08/13/17   0910   PROTTOTAL  6.5*  7.3  7.5  6.8  6.6*   ALBUMIN  3.4  3.8  4.1  2.9*  2.7*   BILITOTAL  0.6  0.4  0.4  0.2  0.3   ALKPHOS  36*  39*  39*  177*  189*   AST  25  20  27  84*  166*   ALT  17  19  15  205*  255*     Coagulation  Recent Labs   Lab Test  06/04/18   1904  07/24/17   1808   INR  1.05  1.05   PTT  28  29      Lipid Profile:  Recent Labs   Lab Test  03/07/18   1725  02/20/12   0938   CHOL  240*  242*   HDL  61  72   LDL  143*  140*   TRIG  180*  147   CHOLHDLRATIO   --   3.4     Thyroid Panel:  Recent Labs   Lab Test  06/04/18   1904   TSH  1.77      A1C:   Recent Labs   Lab Test  03/07/18   1725   A1C  5.2     UA Results:  Recent Labs   Lab Test  06/04/18   2130  04/17/18   1021   COLOR  Yellow  Yellow   APPEARANCE  Clear  Clear   URINEGLC  Negative  Negative   URINEBILI  Negative  Negative   URINEKETONE  10*  Negative   SG  1.012  1.020   UBLD  Negative  Negative   URINEPH  6.5  7.0   PROTEIN  10*  Negative   UROBILINOGEN   --   0.2   NITRITE  Negative  Negative   LEUKEST  Trace*  Negative   RBCU  1  O - 2   WBCU  1  0 - 5        Cardiac US:   --       Neurophysiology:   EEG 6/5/18:  Given that MRI today reportedly did not show a structural abnormality, it is likely that the left hemispheric slowing is postictal.  This suggests that patient's seizure may have emerged from the left hemisphere.  More detailed clinical correlation could be helpful. Abnormal.  There is evidence of mild diffuse encephalopathy.  There is evidence of significant left hemispheric focal cerebral dysfunction.  This could be related to a structural lesion or it could be a postictal finding.  Epileptiform discharges or overt seizures were not seen in this study. Given that MRI today reportedly did not show a structural abnormality, it is likely that the left hemispheric slowing is postictal.  This suggests that patient's seizure may have emerged from the left hemisphere.  More detailed clinical correlation could be  helpful.        Imaging:   All imaging studies were reviewed personally  CT head 6/4/18:   Diffuse cerebral volume loss and cerebral white matter changes consistent with chronic small vessel ischemic disease. No evidence for acute intracranial pathology.     MRI brain 6/5/18:   1. No acute pathology is identified. No bleed, mass, or acute infarcts are seen.  2. No temporal lobe lesions are identified on today's study. The abnormal signal in the left temporal lobe seen on the patient's previous MR scan is no longer identified.  3. There is generalized atrophy of the brain.  White matter changes are present in the cerebral hemispheres.      Text Page    Sherrie Angel, ROSA, FNP-BC, RN CNRN

## 2018-06-06 NOTE — PROGRESS NOTES
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 She was temporarily on levetiracetam, and has not had recurrent seizures until she presented on this admission with witnessed GTC seizure that lasted until she received IV midazolam by EMS. Head CT on arrival negative for acute intracranial pathology. Brain MRI (6/5) also negative. Metabolic and basic infectious work-up was also negative. On admission, she was loaded with levetiracetam. EEG was negative for epileptiform discharges, but showed left temporal slowing suggestive of post-ictal state  - No recurrent seizures since admission. Mental status improving  - Continues on levetiracetam 1000 mg BID  - PT/OT/SLP recommending ARU, but not felt to be a candidate per ARU.     Possible aspiration pneumonitis  CXR on arrival showed minimal infiltrate in LLL - suspect aspiration from seizure, but she has otherwise been asymptomatic    History of recurrent UTIs  - On Bactrim for suppressive therapy. Resume today    Multiple sclerosis  - Resume PTA Baclofen prn today    Major recurrent depressive disorder with anxiety  - Resume PTA venlafaxine today    # Pain Assessment:  Current Pain Score 6/6/2018   Patient currently in pain? denies   Pain score (0-10) -   Pain location -   Pain descriptors -   CPOT pain score -   Autumn s pain level was assessed and she currently denies pain.      DVT Prophylaxis: Pneumatic Compression Devices  Code Status: Full Code    Disposition: Expected  discharge tomorrow. TCU vs home pending progress with therapies    Yuliya Aldrich    Interval History   No events overnight. No recurrent seizures. She denies pain or nausea. ARU recs reviewed with patient's  who is somewhat skeptical because he believes she may be at or close to her baseline. In any case, she was declined by ARU.   - Change Keppra to PO  - Continue PT/OT,  was encouraged to be present with PT/OT during their assessments    -Data reviewed today: I reviewed all new labs and imaging results over the last 24 hours. I personally reviewed no new images or EKGs today.    Physical Exam   Temp: 97.9  F (36.6  C) Temp src: Oral BP: 111/72   Heart Rate: 98 Resp: 16 SpO2: 99 % O2 Device: None (Room air)    Vitals:    06/05/18 0500 06/06/18 0453   Weight: 72.5 kg (159 lb 13.3 oz) 71.9 kg (158 lb 9.6 oz)     Vital Signs with Ranges  Temp:  [97.9  F (36.6  C)-98.8  F (37.1  C)] 97.9  F (36.6  C)  Heart Rate:  [75-98] 98  Resp:  [16-18] 16  BP: (102-112)/(64-72) 111/72  SpO2:  [96 %-99 %] 99 %  I/O last 3 completed shifts:  In: 2479 [P.O.:90; I.V.:2389]  Out: 600 [Urine:600]    Constitutional: Appears comfortable, NAD  Respiratory: Breathing non-labored. Lungs CTAB - no wheezes, crackles, or rhonchi  Cardiovascular: Heart RRR, no m/r/g. No pedal edema  GI: +BS, abd soft/NT  Skin/Integumen: No rash  Neuro: Alert and appropriate, but somewhat delayed. AWAN. Follows commands    Medications     - MEDICATION INSTRUCTIONS -       sodium chloride 100 mL/hr at 06/06/18 0933       levETIRAcetam  1,000 mg Oral BID     sodium chloride (PF)  3 mL Intracatheter Q8H     Data     Recent Labs  Lab 06/05/18  0755 06/04/18  1904   WBC 7.2 5.1   HGB 11.8 12.1   MCV 92 91    208   INR  --  1.05    138   POTASSIUM 4.0 4.0   CHLORIDE 109 106   CO2 25 22   BUN 10 11   CR 0.61 0.84   ANIONGAP 6 10   JULIET 8.1* 8.0*   GLC 99 132*   ALBUMIN 3.4 3.8   PROTTOTAL 6.5* 7.3   BILITOTAL 0.6 0.4   ALKPHOS 36* 39*    ALT 17 19   AST 25 20       No results found for this or any previous visit (from the past 24 hour(s)).

## 2018-06-06 NOTE — PLAN OF CARE
Problem: Patient Care Overview  Goal: Plan of Care/Patient Progress Review  Discharge Planner SLP   Patient plan for discharge: Patient did not state.  Current status:  Patient seen for swallow treatment during a late breakfast with her  present. She had premature entry of thin liquids but no overt Sx of aspiration via the cup. Patient needed verbal cues to take smaller bite sizes and clear oral cavity before the next bite. Mastication was slow but sufficient for a muffin and fresh fruit. Mild burping noted while swallowing juice. Per  has baseline STM issues due to MS and assists with medications and reminders. He stated she is nearing her baseline but still has some word finding issues, slower processing time noted as well. Recommend: 1. Continue on a regular diet and thin liquids. 2. Upright, no straws, small bites/sips, alternate liquids solids every couple of bites. 3. SLP will f/u for 1 more session for swallow strategy training.    Barriers to return to prior living situation: Cognition/safety  Recommendations for discharge: ARU   Rationale for recommendations: Patient will likely meet swallow goals as an IP. Complete a cognitive linguistic evaluation at the next level of care.        Entered by: Tawny Pulliam 06/06/2018 10:32 AM

## 2018-06-06 NOTE — PLAN OF CARE
"Problem: Patient Care Overview  Goal: Plan of Care/Patient Progress Review  Outcome: Improving  Neuro exam exhibits confusion (knows birthday and that she's in the hospital for a \"spell\"), WFD and inappropriate answers to questions (states day of the week instead of month). BLE ataxia.  Strains to void, restarted on Oxybutin, VSS, slightly tachy at times.  Up with assist of one and GB, needs repeated verbal cues on how to perform daily tasks such as wiping after using restroom and how to use soap dispenser and turn on water.  Transitioned to [facility], neurology has signed off.  Plan for discharge to TCU vs home pending progress, not an ARU candidate.       "

## 2018-06-06 NOTE — PLAN OF CARE
Problem: Patient Care Overview  Goal: Plan of Care/Patient Progress Review  Outcome: Improving  Alert to self. Intermittent confusion. Generalized weakness. VSS. Tele NSR. Regular diet. Up with 1 with belt. Voiding using the bathroom. Seizure precaution. No witnessed or seizure activity. Denies pain. Plan continue to monitor.

## 2018-06-06 NOTE — PLAN OF CARE
Problem: Patient Care Overview  Goal: Plan of Care/Patient Progress Review  Outcome: Improving  A&O to self only. Neuros word finding, slow but deliberate movements, difficulty following commands, improves with demonstration. VSS. Tele NS. regular diet. Up with assist of 1 with gait belt. Denies pain. Plan to discharge to ARU vs TCU tomorrow.

## 2018-06-07 ENCOUNTER — PATIENT OUTREACH (OUTPATIENT)
Dept: CARE COORDINATION | Facility: CLINIC | Age: 43
End: 2018-06-07

## 2018-06-07 ENCOUNTER — APPOINTMENT (OUTPATIENT)
Dept: PHYSICAL THERAPY | Facility: CLINIC | Age: 43
DRG: 100 | End: 2018-06-07
Payer: COMMERCIAL

## 2018-06-07 ENCOUNTER — APPOINTMENT (OUTPATIENT)
Dept: SPEECH THERAPY | Facility: CLINIC | Age: 43
DRG: 100 | End: 2018-06-07
Payer: COMMERCIAL

## 2018-06-07 VITALS
SYSTOLIC BLOOD PRESSURE: 106 MMHG | TEMPERATURE: 98 F | DIASTOLIC BLOOD PRESSURE: 67 MMHG | WEIGHT: 159.7 LBS | BODY MASS INDEX: 24.28 KG/M2 | RESPIRATION RATE: 16 BRPM | OXYGEN SATURATION: 97 %

## 2018-06-07 DIAGNOSIS — G35 MULTIPLE SCLEROSIS (H): Primary | ICD-10-CM

## 2018-06-07 PROCEDURE — 40000225 ZZH STATISTIC SLP WARD VISIT: Performed by: SPEECH-LANGUAGE PATHOLOGIST

## 2018-06-07 PROCEDURE — 40000193 ZZH STATISTIC PT WARD VISIT

## 2018-06-07 PROCEDURE — 25000132 ZZH RX MED GY IP 250 OP 250 PS 637: Performed by: HOSPITALIST

## 2018-06-07 PROCEDURE — 92526 ORAL FUNCTION THERAPY: CPT | Mod: GN | Performed by: SPEECH-LANGUAGE PATHOLOGIST

## 2018-06-07 PROCEDURE — 25000132 ZZH RX MED GY IP 250 OP 250 PS 637: Performed by: INTERNAL MEDICINE

## 2018-06-07 PROCEDURE — 97530 THERAPEUTIC ACTIVITIES: CPT | Mod: GP

## 2018-06-07 PROCEDURE — 25000128 H RX IP 250 OP 636: Performed by: HOSPITALIST

## 2018-06-07 PROCEDURE — 99239 HOSP IP/OBS DSCHRG MGMT >30: CPT | Performed by: INTERNAL MEDICINE

## 2018-06-07 PROCEDURE — 97116 GAIT TRAINING THERAPY: CPT | Mod: GP

## 2018-06-07 RX ORDER — LEVETIRACETAM 1000 MG/1
1000 TABLET ORAL 2 TIMES DAILY
Qty: 60 TABLET | Refills: 1 | Status: SHIPPED | OUTPATIENT
Start: 2018-06-07

## 2018-06-07 RX ADMIN — SODIUM CHLORIDE: 9 INJECTION, SOLUTION INTRAVENOUS at 05:18

## 2018-06-07 RX ADMIN — OXYBUTYNIN CHLORIDE 5 MG: 5 TABLET, EXTENDED RELEASE ORAL at 08:48

## 2018-06-07 RX ADMIN — LEVETIRACETAM 1000 MG: 500 TABLET, FILM COATED ORAL at 05:13

## 2018-06-07 RX ADMIN — SULFAMETHOXAZOLE AND TRIMETHOPRIM 1 TABLET: 400; 80 TABLET ORAL at 08:48

## 2018-06-07 RX ADMIN — VENLAFAXINE HYDROCHLORIDE 75 MG: 75 CAPSULE, EXTENDED RELEASE ORAL at 08:48

## 2018-06-07 ASSESSMENT — ACTIVITIES OF DAILY LIVING (ADL)
ADLS_ACUITY_SCORE: 15

## 2018-06-07 ASSESSMENT — VISUAL ACUITY
OU: NORMAL ACUITY

## 2018-06-07 NOTE — PLAN OF CARE
Problem: Patient Care Overview  Goal: Plan of Care/Patient Progress Review  Outcome: Adequate for Discharge Date Met: 06/07/18  Patient alert to self and place, up in room with 1 assist and GB, patient calls appropriate, will discharge home later today with .

## 2018-06-07 NOTE — PLAN OF CARE
Problem: Patient Care Overview  Goal: Plan of Care/Patient Progress Review  Discharge Planner PT   Patient plan for discharge: not stated  Current status: Pt demo improved alertness during session. Flat bed mobility with SBA. STS w/o AD with CGA. Pt ambulated with LUE on IV pole and R ', progressed to no AD for additional 180', no overt LOB. Up/down 12 steps, reciprocal pattern, CGA > SBA, unilateral HR. Left up in chair with alarm to eat breakfast  Barriers to return to prior living situation: Instability with gait however pt seems near baseline  Recommendations for discharge: Home with 24/7 assist  Rationale for recommendations: Pt progressing well, is CGA-SBA for all mobility, attends MS day program 5x/week for the full day        Entered by: Vikki Calvo 06/07/2018 8:31 AM

## 2018-06-07 NOTE — LETTER
[facility]  Complex Care Plan  About Me  Patient Name:  Shanna Shanks    YOB: 1975  Age:     42 year old   [place] MRN:   [id number] Telephone Information:    Home Phone [phone]   Mobile [phone]       Address:    [address] [address] Email address:  [email or web address]      Emergency Contact(s)  Name Relationship Lgl Grd Work Phone Home Phone Mobile Phone   KACY ROSAS Spouse   [phone]           Primary language:  English     needed? No   [place] [employer]:  [phone] op. 1  Other communication barriers:    Preferred Method of Communication:  Sachin  Current living arrangement:    Mobility Status/ Medical Equipment: Independent w/Device    Health Maintenance  Health Maintenance Reviewed: Not assessed    My Access Plan  Medical Emergency 911   Primary Clinic Line [facility] [phone]   24 Hour Appointment Line [phone] or  [phone]) (toll-free)   24 Hour Nurse Line [phone] (toll-free)   Preferred Urgent Care     Preferred Hospital     Preferred Pharmacy [facility] [address] -  [address]     Behavioral Health Crisis Line The National Suicide Prevention Lifeline at 1-721.831.1076 or 911     My Care Team Members    Patient Care Team       Relationship Specialty Notifications Start End    Malvin Beck MD PCP - General Family Practice  10/15/14     Phone: [phone] Fax: [phone]         [facility] [address] [place] [id number]    Akilah Velarde, RN Lead Care Coordinator Primary Care - CC  6/7/18     Phone: [phone] Fax: [phone]                My Care Plans  Self Management and Treatment Plan  Goals and (Comments)  Goals        General    # 1Medical (pt-stated)     Notes - Note created  6/8/2018 10:18 AM by Akilah Velarde, RN    Goal Statement: I will seek medical help if any  signs of seizure activity  Measure of Success: Decreased seizure activity   Supportive Steps to Achieve:   I will keep future Neurology appointments  I will take Keppra as directed   Barriers: Recent seizure   Strengths: Supportive  who can watch for any changes in behavior   Date to Achieve By: Ongoing              Action Plans on File: None                      Advance Care Plans/Directives Type: None       My Medical and Care Information  Problem List   Patient Active Problem List   Diagnosis     Multiple sclerosis (H)     UTI (urinary tract infection)     CARDIOVASCULAR SCREENING; LDL GOAL LESS THAN 160     Dysmenorrhea     Tobacco dependency     Encephalopathy     Herpes encephalitis     Fever of unknown origin     Seizure (H)      Current Medications and Allergies:  See printed Medication Report.    Care Coordination Start Date: 6/7/2018   Frequency of Care Coordination: 2 weeks   Form Last Updated: 06/08/2018

## 2018-06-07 NOTE — PROGRESS NOTES
CM    I:  SW will close consult for discharge planning, as it has been confirmed by staff RN that patient attends MS day program 5 days per week, all day.  Patient does appear to have a safe discharge plan in place, where supervision is provided 24/7.  (Spouse is with patient each evening and on weekends).    P:  No SW interventions anticipated at this time.    BOB Sharif

## 2018-06-07 NOTE — DISCHARGE SUMMARY
[facility]    Discharge Summary  Hospitalist    Date of Admission:  6/4/2018  Date of Discharge:  6/7/2018  Discharging Provider: Yuliya Aldrich  Date of Service (when I saw the patient): 06/07/18    Discharge Diagnoses     1. Recurrent seizure  2. Acute metabolic encephalopathy  3. Possible aspiration pneumonitis  4. History of recurrent UTIs  5. Multiple sclerosis  6. Major recurrent depressive disorder with anxiety    History of Present Illness   Please see H&P by Dr. 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 She has been started on Keppra, and has not had recurrent seizures. 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 She was temporarily on levetiracetam, and has not had recurrent seizures until she presented on this admission with witnessed GTC seizure that lasted until she received IV midazolam by EMS. Head CT on arrival negative for acute intracranial pathology. Brain MRI (6/5) also negative. Metabolic and basic infectious work-up was also negative. On admission, she was loaded with levetiracetam. EEG was negative for epileptiform discharges, but showed left temporal slowing suggestive of post-ictal state.   - She has not had recurrent seizures since admission and she appears to be at her baseline  - She will be discharged on levetiracetam 1000 mg BID  - She will follow up with her primary neurologist after discharge  - PT/OT have recommended home PT/OT, but patient's  believes she is at or close to her baseline, and prefers that she go to her MS day  program where therapies are offered. If her day program is unable to meet her needs, he will coordinate home cares with her PCP     Possible aspiration pneumonitis  CXR on arrival showed minimal infiltrate in LLL - suspect aspiration from seizure, but she has otherwise been asymptomatic. She has been breathing comfortably and her respiratory status has remained stable without antibiotics.      History of recurrent UTIs  Maintained on Bactrim for suppressive therapy which she continues     Multiple sclerosis  Maintained on Baclofen prn which she continues     Major recurrent depressive disorder with anxiety  Maintained on venlafaxine which she continues    # Discharge Pain Plan  - Patient currently has NO PAIN and is not being prescribed pain medications on discharge.    Yuliya Aldrich    Significant Results and Procedures   As noted above    Pending Results   None    Code Status   Full Code       Primary Care Physician   Malvin Beck MD    Physical Exam   Temp: 98  F (36.7  C) Temp src: Oral BP: 106/67   Heart Rate: 83 Resp: 16 SpO2: 97 % O2 Device: None (Room air)    Vitals:    06/05/18 0500 06/06/18 0453 06/07/18 0507   Weight: 72.5 kg (159 lb 13.3 oz) 71.9 kg (158 lb 9.6 oz) 72.4 kg (159 lb 11.2 oz)     Vital Signs with Ranges  Temp:  [98  F (36.7  C)-98.8  F (37.1  C)] 98  F (36.7  C)  Heart Rate:  [] 83  Resp:  [16-18] 16  BP: ()/(57-74) 106/67  SpO2:  [96 %-99 %] 97 %  I/O last 3 completed shifts:  In: -   Out: 200 [Urine:200]    Constitutional: Appears comfortable, NAD  Respiratory: Breathing non-labored. Lungs CTAB - no wheezes/crackles/rhonchi  Cardiovascular: Heart RRR, no m/r/g. No pedal edema.   GI: +BS. Abd soft/NT  Skin: Warm and dry. No rash.  Musculoskeletal: Normal muscle bulk and tone  Neurologic: Alert and appropriate, occasionally confused and with word-finding difficulty (baseline). AWAN  Psychiatric: Calm and cooperative    Discharge Disposition   Discharged to  home  Condition at discharge: Satisfactory    Consultations This Hospital Stay   PHARMACY IP CONSULT  NEUROLOGY IP CONSULT  PHYSICAL THERAPY ADULT IP CONSULT  OCCUPATIONAL THERAPY ADULT IP CONSULT  SPEECH LANGUAGE PATH ADULT IP CONSULT  SOCIAL WORK IP CONSULT    Time Spent on this Encounter   I, Yuliya Aldrich, personally saw the patient today and spent 35 minutes discharging this patient.    Discharge Orders     Reason for your hospital stay   Seizure - you have been resumed on Keppra which you have taken in the past     Follow-up and recommended labs and tests    Follow up with primary care provider, Malvin Beck MD, within 10 days for hospital follow-up.  No follow up labs or test are needed.    Follow up with your primary neurologist for next available appointment     Activity   Your activity upon discharge: activity as tolerated     Full Code     Diet   Follow this diet upon discharge: Regular diet       Discharge Medications   Discharge Medication List as of 6/7/2018  1:53 PM      START taking these medications    Details   levETIRAcetam 1000 MG TABS Take 1,000 mg by mouth 2 times daily, Disp-60 tablet, R-1, E-Prescribe         CONTINUE these medications which have NOT CHANGED    Details   baclofen (LIORESAL) 10 MG tablet Take 10-20 mg by mouth 4 times daily as needed , R-3, Historical      CRANBERRY EXTRACT PO Take 1 tablet by mouth daily, Historical      dimethyl fumarate (TECFIDERA) delayed release capsule Take 240 mg by mouth 2 times daily , Historical      norethindrone (MICRONOR) 0.35 MG per tablet TAKE 1 TABLET(0.35 MG) BY MOUTH DAILY, Disp-84 tablet, R-3, E-PrescribeProfile Rx: patient will contact pharmacy when needed      oxybutynin (DITROPAN-XL) 5 MG 24 hr tablet Take 5 mg by mouth daily, Historical      sulfamethoxazole-trimethoprim (BACTRIM/SEPTRA) 400-80 MG per tablet Take 1 tablet by mouth daily, Historical      venlafaxine (EFFEXOR-ER) 75 MG TB24 24 hr tablet Take 75 mg by  mouth daily (2  X 37.5 mg), Historical      VITAMIN D, CHOLECALCIFEROL, PO Take 1,000 Units by mouth daily, Historical           Allergies   Allergies   Allergen Reactions     Diphenhydramine Rash     benadryl cream     Nickel      Nitrofurantoin Rash     Data   Most Recent 3 CBC's:  Recent Labs   Lab Test  06/05/18   0755  06/04/18   1904  08/13/17   0910   WBC  7.2  5.1  7.2   HGB  11.8  12.1  11.2*   MCV  92  91  93   PLT  165  208  228      Most Recent 3 BMP's:  Recent Labs   Lab Test  06/05/18   0755  06/04/18   1904  03/07/18   1725   NA  140  138  137   POTASSIUM  4.0  4.0  4.5   CHLORIDE  109  106  106   CO2  25  22  26   BUN  10  11  16   CR  0.61  0.84  0.74   ANIONGAP  6  10  5   JULIET  8.1*  8.0*  8.9   GLC  99  132*  82     Most Recent 2 LFT's:  Recent Labs   Lab Test  06/05/18   0755  06/04/18   1904   AST  25  20   ALT  17  19   ALKPHOS  36*  39*   BILITOTAL  0.6  0.4     Most Recent INR's and Anticoagulation Dosing History:  Anticoagulation Dose History     Recent Dosing and Labs Latest Ref Rng & Units 7/24/2017 6/4/2018    INR 0.86 - 1.14 1.05 1.05        Most Recent 3 Troponin's:No lab results found.  Most Recent Cholesterol Panel:  Recent Labs   Lab Test  03/07/18   1725   CHOL  240*   LDL  143*   HDL  61   TRIG  180*     Most Recent 6 Bacteria Isolates From Any Culture (See EPIC Reports for Culture Details):  Recent Labs   Lab Test  04/17/18   1059  03/07/18   1702  09/26/17   1040  08/12/17   2250  08/12/17   2053  08/12/17   2045   CULT  >100,000 colonies/mL  mixed urogenital jen    >100,000 colonies/mL  urogenital jen  Susceptibility testing not routinely done    >100,000 colonies/mL  Coagulase negative Staphylococcus  *  Culture negative after 29 days  No anaerobes isolated  No growth  No growth  No growth     Most Recent TSH, T4 and A1c Labs:  Recent Labs   Lab Test  06/04/18   1904  03/07/18   1725   TSH  1.77   --    A1C   --   5.2     Results for orders placed or performed during the  hospital encounter of 06/04/18   CT Head w/o Contrast    Narrative    CT OF THE HEAD WITHOUT CONTRAST 6/4/2018 7:22 PM     COMPARISON: Brain MRI 8/13/2017.    HISTORY: New onset seizure, altered mental status.     TECHNIQUE: 5 mm thick axial CT images of the head were acquired  without IV contrast material.    FINDINGS: There is moderate diffuse cerebral volume loss. There are  subtle patchy areas of decreased density in the cerebral white matter  bilaterally that are consistent with sequela of chronic small vessel  ischemic disease.    The ventricles and basal cisterns are within normal limits in  configuration given the degree of cerebral volume loss.  There is no  midline shift. There are no extra-axial fluid collections.    No intracranial hemorrhage, mass or recent infarct.    The visualized paranasal sinuses are well-aerated. There is no  mastoiditis. There are no fractures of the visualized bones.      Impression    IMPRESSION: Diffuse cerebral volume loss and cerebral white matter  changes consistent with chronic small vessel ischemic disease. No  evidence for acute intracranial pathology.    Radiation dose for this scan was reduced using automated exposure  control, adjustment of the mA and/or kV according to patient size, or  iterative reconstruction technique.      GEOFFREY GILL MD   Cervical spine CT w/o contrast    Narrative    CT OF THE CERVICAL SPINE WITHOUT CONTRAST   6/4/2018 7:23 PM     COMPARISON: None.    HISTORY: Seizure, fall, right arm weakness.       TECHNIQUE: Axial images of the cervical spine were acquired without  intravenous contrast. Multiplanar reformations were created.      FINDINGS: There is normal alignment of the cervical vertebrae.  Vertebral body heights of the cervical spine are normal.  Craniocervical alignment is normal. There are no fractures of the  cervical spine.  There is no spinal canal narrowing of the cervical  spine. There is no prevertebral soft tissue swelling.       Impression    IMPRESSION: No evidence for fracture or traumatic malalignment of the  cervical spine.    Radiation dose for this scan was reduced using automated exposure  control, adjustment of the mA and/or kV according to patient size, or  iterative reconstruction technique.      GEOFFREY GILL MD   XR Chest Port 1 View    Narrative    CHEST PORTABLE ONE VIEW   6/4/2018 7:33 PM     COMPARISON: Two view chest x-ray 8/12/2017.    HISTORY: Altered mental status, seizure.      Impression    IMPRESSION: There is questionable minimal patchy airspace opacity in  the retrocardiac left lower lung that could represent aspiration  pneumonia. The lungs are otherwise clear. There is no pleural effusion  or pneumothorax. Heart size is normal.     GEOFFREY GILL MD   MR Brain w/o & w Contrast    Narrative    MRI BRAIN WITHOUT AND WITH CONTRAST  6/5/2018 3:22 AM    HISTORY:  Seizures;      TECHNIQUE:  Multiplanar, multisequence MRI of the brain without and  with 7mL Gadavist    COMPARISON: CT dated 6/4/2018, MR scan dated 8/13/2017.    FINDINGS:  There is generalized atrophy of the brain.  White matter  changes are present in the cerebral hemispheres. The temporal lobes  are unremarkable. The area of restricted diffusion seen on the  previous MR scan from 8/13/2017 in the anterior aspect of the left  temporal lobe is no longer identified.  There is no evidence of  hemorrhage, mass, acute infarct, or anomaly.  There are no gadolinium  enhancing lesions.   The facial structures appear normal. The arteries  at the base of the brain and the dural venous sinuses appear patent.       Impression    IMPRESSION:  1. No acute pathology is identified. No bleed, mass, or acute infarcts  are seen.  2. No temporal lobe lesions are identified on today's study. The  abnormal signal in the left temporal lobe seen on the patient's  previous MR scan is no longer identified.  3. There is generalized atrophy of the brain.  White matter  changes  are present in the cerebral hemispheres.      DELANO SALINAS MD

## 2018-06-07 NOTE — PLAN OF CARE
Problem: Patient Care Overview  Goal: Plan of Care/Patient Progress Review  Occupational Therapy Discharge Summary    Reason for therapy discharge:    Discharged to home.    Progress towards therapy goal(s). See goals on Care Plan in [facility] electronic [facility] record for goal details.  Goals partially met.  Barriers to achieving goals:   discharge from facility.    Therapy recommendation(s):    Continued therapy is recommended.  Rationale/Recommendations:  Home with 24o assist and  OT, pt will also attend MS clinic program 5x.wk.

## 2018-06-07 NOTE — LETTER
[facility]  6/8/2018       Shanna Yazmin Shanks  [address] [facility] [address]        Dear Shanna,    It was a pleasure to speak with your  over the phone on Friday .  I would like to provide you with the enclosed emergency contact  information for your records. I also have enclosed a consent to communicate form for you to complete so we can talk/share information with your  in the future .     As part of care coordination, we are developing care plans to assist in accomplishing your health care goals.  When we speak next, please feel free to let me know if you want to add or change any information on your care plans.    As always, feel free to contact me if you have any questions or concerns.  I look forward to working with you in the effort to achieve your health care and wellness goals .        Sincerely,      Akilah Velarde RN / Clinical Care Coordinator     [facility]   [email or web address]  Office :  [phone] / Fax :  [phone]

## 2018-06-07 NOTE — PLAN OF CARE
Problem: Patient Care Overview  Goal: Plan of Care/Patient Progress Review  Discharge Planner SLP   Patient plan for discharge: Patient did not state.  Current status: SLP: Patient was seen while up in the chair with breakfast. She needed verbal cues to take small bites/sips. Mild holding of the bolus with thin liquids, but no overt Sx of aspiration vi athe cup. Mastication was adequate for solids with mildly reduced AP movement of the bolus and minimal to no oral residue. No overt Sx of aspiration. Met swallow goals. Recommend: 1. May continue on a regular diet and thin liquids. Upright, no straws,small bites and sips.   Barriers to return to prior living situation: Cognition/safety.  Recommendations for discharge: Home with 24 hour supervision and Home care, if not available may need TCU.   Rationale for recommendations: Met swallow goals as an IP. Defer speech/language/cognitive evaluation to the next level of care.        Entered by: Tawny Pulliam 06/07/2018 9:48 AM       Speech Language Therapy Discharge Summary    Reason for therapy discharge:    Patient has met her swallow goals and language evaluation is defered to the next level of care.     Progress towards therapy goal(s). See goals on Care Plan in [facility] electronic health record for goal details.  Goals met    Therapy recommendation(s):    Continued therapy is recommended.  Rationale/Recommendations:  Complete a speech/language/cognitive evaluation at the next level of care. .

## 2018-06-07 NOTE — PLAN OF CARE
Problem: Patient Care Overview  Goal: Plan of Care/Patient Progress Review  Occupational Therapy Discharge Summary    Reason for therapy discharge:    Discharged to home with outpatient therapy.    Progress towards therapy goal(s). See goals on Care Plan in [facility] electronic health record for goal details.  Goals partially met.  Barriers to achieving goals:   discharge from facility.    Therapy recommendation(s):    Continued therapy is recommended.  Rationale/Recommendations:  OT recommended dc to ARU however pt declined and returning home with family's 24o supv and OP therapies..

## 2018-06-07 NOTE — PLAN OF CARE
Problem: Patient Care Overview  Goal: Plan of Care/Patient Progress Review  Outcome: No Change  Alert to self and place, disorientated to time and situation. Neuros BLE ataxia, illogical speech at times, confused, forgetful. VSS. Tele SB. Regular diet. Up with assist x1 and GB. Denies pain. Seizure precautions, no seizure activity. Plan possible DC to TCU.

## 2018-06-08 NOTE — PROGRESS NOTES
"Clinic Care Coordination Contact    Clinic Care Coordination Contact  OUTREACH    Referral Information:  Referral Source: IP Handoff         Chief Complaint   Patient presents with     Clinic Care Coordination - Post Hospital     Clinic Care Coordination RN         [place] Utilization: [facility] admission  6/4-6/7/2018--[facility] [place] seizure   Clinic Utilization  Difficulty keeping appointments:: No  Utilization    Last refreshed: 6/8/2018  7:18 AM:  No Show Count (past year) 1       Last refreshed: 6/8/2018  7:18 AM:  ED visits 0       Last refreshed: 6/8/2018  7:18 AM:  Hospital admissions 4          Current as of: 6/8/2018  7:18 AM             Clinical Concerns:  Current Medical Concerns:  Patient is not available.  CC spoke to patients  Jama( consent to communicate form mailed to patient today )   reports no further seizure activity since hospital discharge.   states he brought the patient to the  April 17 because she was \"a little out of it\" and she might have a bladder infection.  Urine clear that day and he is wondering if she had a small seizure that day.   states he plans to call the Neurologists office and give them this information.  Patient has a follow up with the Neurologist in 2 weeks .   Patient was started on Keppra .   reports the patient walks independently but times uses a walking stick if rough terrene    assists patient with medication set up .  Patient is in a day program 5 days a week      Health Maintenance Reviewed: Not assessed  Clinical Pathway: None    Medication Management:  Reviewed      Functional Status:  Dependent ADLs:: Ambulation-cane  Bed or wheelchair confined:: No  Mobility Status: Independent w/Device    Living Situation:       Diet/Exercise/Sleep:  Food Insecurity: No  Tube Feeding: No         Equipment Currently Used at Home: other (see comments) (Walking stick)        Goals:   Goals        General    # " 1Medical (pt-stated)     Notes - Note created  6/8/2018 10:18 AM by Akilah Velarde RN    Goal Statement: I will seek medical help if any signs of seizure activity  Measure of Success: Decreased seizure activity   Supportive Steps to Achieve:   I will keep future Neurology appointments  I will take Keppra as directed   Barriers: Recent seizure   Strengths: Supportive  who can watch for any changes in behavior   Date to Achieve By: Ongoing               Patient/Caregiver understanding:  expresses good understanding of discharge instructions     Outreach Frequency: 2 weeks  Future Appointments              In 4 days [facility] [place]    In 6 days [facility] [place]    In 1 week Malvin Beck MD [facility]    In 1 week [facility] [place] [place]    In 1 week [facility] [place] [place]    In 2 weeks [facility] [place] [place]    In 2 weeks [facility] [place] [facility] [place]    In 3 weeks [facility] [place] [facility] [place]    In 3 weeks [facility] [place] [facility] [place]    In 1 month [facility] [place]    In 1 month [facility] [place]    In 1 month [facility] [place] [facility] [place]    In 1 month [facility] [place] [facility] [place]    In 1 month [facility] [facility] [place]    In 1 month [facility] [place]    In 1 month [facility] [place]          Plan:   Patient  will keep hospital follow up with Dr Beck 6/12  CC will follow up in 1-2 weeks     Akilah Velarde RN / Clinical Care Coordinator     [facility]   [email or web address]  Office :  [phone] / Fax :  [phone]

## 2018-06-19 ENCOUNTER — HOSPITAL ENCOUNTER (OUTPATIENT)
Dept: REHABILITATION | Facility: CLINIC | Age: 43
End: 2018-06-19
Attending: FAMILY MEDICINE
Payer: COMMERCIAL

## 2018-06-19 PROCEDURE — S5102 ADULT DAY CARE PER DIEM: HCPCS

## 2018-06-19 PROCEDURE — 40000247 ZZH STATISTIC VISIT ADULT DAY PROGRAM

## 2018-06-19 NOTE — PROGRESS NOTES
AC RN Monthly Progress Note  Previous documentation reviewed. No concerns reported by AC staff or member.   Noted recent hospital stay due to seizure.

## 2018-06-21 ENCOUNTER — HOSPITAL ENCOUNTER (OUTPATIENT)
Dept: REHABILITATION | Facility: CLINIC | Age: 43
End: 2018-06-21
Attending: FAMILY MEDICINE
Payer: COMMERCIAL

## 2018-06-21 PROCEDURE — S5102 ADULT DAY CARE PER DIEM: HCPCS

## 2018-06-21 PROCEDURE — 40000247 ZZH STATISTIC VISIT ADULT DAY PROGRAM

## 2018-06-22 ENCOUNTER — PATIENT OUTREACH (OUTPATIENT)
Dept: CARE COORDINATION | Facility: CLINIC | Age: 43
End: 2018-06-22

## 2018-06-22 DIAGNOSIS — R56.9 SEIZURE (H): Primary | ICD-10-CM

## 2018-06-22 NOTE — PROGRESS NOTES
Clinic Care Coordination Contact    Clinic Care Coordination Contact  OUTREACH    Referral Information:       Chief Complaint   Patient presents with     Clinic Care Coordination - Follow-up     Clinic CareCoordination RN         [place] Utilization: [facility] admission  6/4-6/7/2018--[facility] [place]      Utilization    Last refreshed: 6/22/2018  2:17 AM:  No Show Count (past year) 2       Last refreshed: 6/22/2018  2:17 AM:  ED visits 0       Last refreshed: 6/22/2018  2:17 AM:  Hospital admissions 4          Current as of: 6/22/2018  2:17 AM             Clinical Concerns:  Current Medical Concerns:  Patient is not home .   CC spoke to Jama / and he reports the patient is doing well and no further episodes of seizure like activity ,  Patient saw the Neurologist   Last Wednesday and reviewed all medication doses and instructions .          Health Maintenance Reviewed:    Clinical Pathway: None    Medication Management:  Not discussed         Goals:   Goals        General    # 1Medical (pt-stated)     Notes - Note created  6/8/2018 10:18 AM by Akilah Velarde, RN    Goal Statement: I will seek medical help if any signs of seizure activity  Measure of Success: Decreased seizure activity   Supportive Steps to Achieve:   I will keep future Neurology appointments  I will take Keppra as directed   Barriers: Recent seizure   Strengths: Supportive  who can watch for any changes in behavior   Date to Achieve By: Ongoing              Future Appointments              In 4 days [facility] [place] [facility] [place]    In 6 days [facility] [place]    In 1 week [facility] [place] [facility] [place]    In 1 week [facility] [place] [facility] [place]    In 2 weeks [facility] [place] [facility] [place]    In 2 weeks [facility] PER  JEAN [place] [facility] [place]    In 3 weeks [facility] PER JEAN [place] [facility] [place]    In 3 weeks [facility] PER JEAN [place] [facility] [place]    In 1 month [facility] PER JEAN [place] [facility] [place]    In 1 month [facility] PER JEAN [place] [facility] [place]    In 1 month [facility] JE[facility] [place] [facility] [place]          Plan:    No unmet needs, no further care coordination  Patient has CC contact for future questions or concerns     Akilah Velarde RN / Clinical Care Coordinator     [facility] [place] [facility]    [facility]   [email or web address]  Office :  [phone] / Fax :  [phone]

## 2018-06-26 ENCOUNTER — HOSPITAL ENCOUNTER (OUTPATIENT)
Dept: REHABILITATION | Facility: CLINIC | Age: 43
End: 2018-06-26
Attending: FAMILY MEDICINE
Payer: COMMERCIAL

## 2018-06-26 PROCEDURE — S5102 ADULT DAY CARE PER DIEM: HCPCS

## 2018-06-26 PROCEDURE — 40000247 ZZH STATISTIC VISIT ADULT DAY PROGRAM

## 2018-06-27 NOTE — ADDENDUM NOTE
Encounter addended by: Analisa Gonzalez on: 6/27/2018 11:18 AM<BR>     Actions taken: Episode edited, Sign clinical note

## 2018-06-27 NOTE — PROGRESS NOTES
MONTHLY PROGRESS NOTE AND PARTICIPATION REPORT   [facility]    Shanna Shanks, 1975  Attendance: Please see Epic for attendance record.    Communication:   Does communicate   Hearing:   No impairment  Vision:   No impairment  Orientation/Cognition:   Minor forgetfulness  Partial disorientation  Short term memory loss  Behavior:   Requires redirection  Self-Preservation Skills:   Not capable of self-preservation  Eating:   Independent  Assist with set up  Ambulation Walking:   SBA with ambulation  Transferring:   Independent  Wheelchair:   N/A  Toileting:   Needs assistance  Needs bathroom reminders  Maintenance Program:     NuStep  Living Arrangements:   Lives with family  Spiritual Needs: Needs are being met through support group  Medication Assistance:   Medication not taken during program hours  Participation Report:   Aerobics/Exercise  Support Group  Cognitive Stimulation  Creative Arts/Crafts  Games  Gardening  Speakers/Entertainment  Socialization  Current Events/News  Education/Health Topic  Meditation/prayer time/communion with Melina, Back to the 70s Week, garden party, water week, special classes week, Bike the [employer] for MS week, yoga relaxation with Nyla  Level of Participation:   Active  To the best of their ability

## 2018-06-28 ENCOUNTER — HOSPITAL ENCOUNTER (OUTPATIENT)
Dept: REHABILITATION | Facility: CLINIC | Age: 43
End: 2018-06-28
Attending: FAMILY MEDICINE
Payer: COMMERCIAL

## 2018-06-28 PROCEDURE — 40000247 ZZH STATISTIC VISIT ADULT DAY PROGRAM

## 2018-06-28 PROCEDURE — S5102 ADULT DAY CARE PER DIEM: HCPCS

## 2018-07-03 ENCOUNTER — HOSPITAL ENCOUNTER (OUTPATIENT)
Dept: REHABILITATION | Facility: CLINIC | Age: 43
End: 2018-07-03
Attending: FAMILY MEDICINE
Payer: COMMERCIAL

## 2018-07-03 PROCEDURE — 40000247 ZZH STATISTIC VISIT ADULT DAY PROGRAM

## 2018-07-03 PROCEDURE — S5102 ADULT DAY CARE PER DIEM: HCPCS

## 2018-07-09 ENCOUNTER — OFFICE VISIT (OUTPATIENT)
Dept: FAMILY MEDICINE | Facility: CLINIC | Age: 43
End: 2018-07-09
Payer: COMMERCIAL

## 2018-07-09 VITALS
BODY MASS INDEX: 23.53 KG/M2 | RESPIRATION RATE: 16 BRPM | OXYGEN SATURATION: 100 % | HEART RATE: 66 BPM | TEMPERATURE: 98.9 F | SYSTOLIC BLOOD PRESSURE: 114 MMHG | DIASTOLIC BLOOD PRESSURE: 65 MMHG | WEIGHT: 155.25 LBS | HEIGHT: 68 IN

## 2018-07-09 DIAGNOSIS — R56.9 SEIZURE (H): ICD-10-CM

## 2018-07-09 DIAGNOSIS — G35 MULTIPLE SCLEROSIS (H): Primary | ICD-10-CM

## 2018-07-09 PROCEDURE — 99213 OFFICE O/P EST LOW 20 MIN: CPT | Performed by: FAMILY MEDICINE

## 2018-07-09 NOTE — PROGRESS NOTES
SUBJECTIVE:   Shanna Shanks is a 43 year old female who presents to clinic today for the following health issues:          Hospital Follow-up Visit:    Hospital/Nursing Home/IP Rehab Facility: [facility]  Date of Admission: 06/04/2018  Date of Discharge: 06/07/2018  Reason(s) for Admission: seizure            Problems taking medications regularly:  None       Medication changes since discharge: None       Problems adhering to non-medication therapy:  None    Summary of hospitalization:  [facility] discharge summary reviewed  Diagnostic Tests/Treatments reviewed.  Follow up needed: none  Other Healthcare Providers Involved in Patient s Care:         neurology  Update since discharge: improved.      Post Discharge Medication Reconciliation: discharge medications reconciled, continue medications without change.  Plan of care communicated with patient and family     Coding guidelines for this visit:  Type of Medical   Decision Making Face-to-Face Visit       within 7 Days of discharge Face-to-Face Visit        within 14 days of discharge   Moderate Complexity [id number]   High Complexity [id number]          Here with her .   Had seizure and was hospitalized. On keppra.   Walking - not back to her baseline yet. Feels it is moving in right direction. Not using walker or cane.   When on feet - she is stable but cant move as quickly.   Wondering about focused physical therapy. Was advised in the hospital but they wishes to stick to MS therapy. Now they are wondering about specific therapy.  On keppra 1000mg twice per day.  No smoking for a year or so.   Neurologist - was seen 2 weeks ago. No specific concerns from neurologist. Planning to continue keppra indefinitely for now.     Problem list and histories reviewed & adjusted, as indicated.  Additional history: as documented    Labs reviewed in EPIC    Reviewed and updated as needed this visit by clinical staff    Reviewed and  updated as needed this visit by Provider      Social History     Social History     Marital status:      Spouse name: Jama     Number of children: 2     Years of education: N/A     Occupational History     computer graphic design Self     Social History Main Topics     Smoking status: Former Smoker     Packs/day: 0.25     Types: Cigarettes     Smokeless tobacco: Never Used      Comment: quit a few weeks ago     Alcohol use 3.5 oz/week     7 Standard drinks or equivalent per week      Comment: social drinker     Drug use: No     Sexual activity: Yes     Partners: Male     Birth control/ protection: Pill, Rhythm     Other Topics Concern     Parent/Sibling W/ Cabg, Mi Or Angioplasty Before 65f 55m? No     Social History Narrative    Caffeine intake/servings daily - 2    Calcium intake/servings daily - 2-3    Exercise 7 times weekly - describe runs on treadmill for 5 minutes    Sunscreen used - Yes    Seatbelts used - Yes    Guns stored in the home - No    Self Breast Exam - Yes    Pap test up to date -  Yes    Eye exam up to date -  Yes    Dental exam up to date -  Yes    DEXA scan up to date -  Yes    Flex Sig/Colonoscopy up to date -  Not Applicable    Mammography up to date -  Not Applicable    Immunizations reviewed and up to date - Yes    Abuse: Current or Past (Physical, Sexual or Emotional) - No    Do you feel safe in your environment - Yes    Do you cope well with stress - Yes    Do you suffer from insomnia - No    Last updated by: Michelle Sanchez  12/22/2010                     Allergies   Allergen Reactions     Diphenhydramine Rash     benadryl cream     Nickel      Nitrofurantoin Rash     Patient Active Problem List   Diagnosis     Multiple sclerosis (H)     UTI (urinary tract infection)     CARDIOVASCULAR SCREENING; LDL GOAL LESS THAN 160     Dysmenorrhea     Nicotine dependence in remission     Encephalopathy     Herpes encephalitis     Fever of unknown origin     Seizure (H)     Reviewed  "medications, social history and  past medical and surgical history.    Review of system: for general, respiratory, CVS, GI and psychiatry negative except for noted above.     EXAM:  /65 (BP Location: Right arm, Patient Position: Sitting, Cuff Size: Adult Regular)  Pulse 66  Temp 98.9  F (37.2  C) (Oral)  Resp 16  Ht 5' 8\" (1.727 m)  Wt 155 lb 4 oz (70.4 kg)  SpO2 100%  BMI 23.61 kg/m2  Constitutional: , alert and no distress   Psychiatric: mentation appears normal and affect normal/bright  Gait is unsteady and using her  since a support.  Her speech is slightly slurred but overall very clear.        ASSESSMENT / PLAN:  (G35) Multiple sclerosis (H)  (primary encounter diagnosis)  Comment: With recent seizures and was recently hospitalized.  Seeing a neurologist at [facility].    Plan: She is also seeing Physical therapies through her MS program and she would benefit from dedicated therapy for her lower extremity weakness.  Her therapist can send me an order and I can send it.    (R56.9) Seizure (H)  Comment:    Plan:  See above. rx as per neurologist.     Follow up:  With neurology and PT     "

## 2018-07-09 NOTE — MR AVS SNAPSHOT
After Visit Summary   7/9/2018    Shanna Shanks    MRN: [id number]           Patient Information     Date Of Birth          1975        Visit Information        Provider Department      7/9/2018 3:40 PM Malvin Beck MD [facility]        Today's Diagnoses     Multiple sclerosis (H)    -  1    Seizure (H)           Follow-ups after your visit        Your next 10 appointments already scheduled     Jul 12, 2018 10:00 AM CDT   Treatment with MSAC PER [facility] ([facility])    [address]  [place] [address]   [phone]            Jul 17, 2018 10:00 AM CDT   Treatment with MSAC PER [facility] ([facility])    [address]  [place] [address]   [phone]            Jul 19, 2018 10:00 AM CDT   Treatment with MSAC PER [facility] ([facility])    [address]  [place] [address]   [phone]            Jul 24, 2018 10:00 AM CDT   Treatment with MSAC PER [facility] ([facility])    [address]  [place] [address]   [phone]            Jul 26, 2018 10:00 AM CDT   Treatment with MSAC PER [facility] ([facility])    [address]  [place] [address]   [phone]            Jul 31, 2018 10:00 AM CDT   Treatment with MSAC PER [facility] ([facility])    [address]  [place] [address]   [phone]              Who to contact     If you have questions or need  "follow up information about today's clinic visit or your schedule please contact [facility] directly at [phone].  Normal or non-critical lab and imaging results will be communicated to you by MyChart, letter or phone within 4 business days after the clinic has received the results. If you do not hear from us within 7 days, please contact the clinic through [employer] or phone. If you have a critical or abnormal lab result, we will notify you by phone as soon as possible.  Submit refill requests through [employer] or call your pharmacy and they will forward the refill request to us. Please allow 3 business days for your refill to be completed.          Additional Information About Your Visit        [employer] Information     [employer] gives you secure access to your electronic health record. If you see a primary care provider, you can also send messages to your care team and make appointments. If you have questions, please call your primary care clinic.  If you do not have a primary care provider, please call [phone] and they will assist you.        Care EveryWhere ID     This is your Care EveryWhere ID. This could be used by other organizations to access your [facility] medical records  [id number]        Your Vitals Were     Pulse Temperature Respirations Height Pulse Oximetry BMI (Body Mass Index)    66 98.9  F (37.2  C) (Oral) 16 5' 8\" (1.727 m) 100% 23.61 kg/m2       Blood Pressure from Last 3 Encounters:   07/09/18 114/65   06/07/18 106/67   05/30/18 123/69    Weight from Last 3 Encounters:   07/09/18 155 lb 4 oz (70.4 kg)   06/07/18 159 lb 11.2 oz (72.4 kg)   05/30/18 155 lb 4 oz (70.4 kg)              Today, you had the following     No orders found for display       Primary Care Provider Office Phone # Fax #    Malvin Beck [phone] [address] [place] [address]        Goals        General    # 1Medical (pt-stated)     Notes - Note created  6/8/2018 " 10:18 AM by Akilah Velarde, MAZIN    Goal Statement: I will seek medical help if any signs of seizure activity  Measure of Success: Decreased seizure activity   Supportive Steps to Achieve:   I will keep future Neurology appointments  I will take Keppra as directed   Barriers: Recent seizure   Strengths: Supportive  who can watch for any changes in behavior   Date to Achieve By: Ongoing         Equal Access to Services     DUANE HARPER AH: Hadii aad ku hadasho Soomaali, waaxda luqadaha, qaybta kaalmada adeegyada, liza delgadodarielalea patrick . So [facility] [phone].    ATENCIÓN: Si habla español, tiene a gatica disposición servicios gratuitos de asistencia lingüística. Llame al [phone].    We comply with applicable federal civil rights laws and [place] laws. We do not discriminate on the basis of race, color, national origin, age, disability, sex, sexual orientation, or gender identity.            Thank you!     Thank you for choosing [facility]  for your care. Our goal is always to provide you with excellent care. Hearing back from our patients is one way we can continue to improve our services. Please take a few minutes to complete the written survey that you may receive in the mail after your visit with us. Thank you!             Your Updated Medication List - Protect others around you: Learn how to safely use, store and throw away your medicines at www.disposemymeds.org.          This list is accurate as of 7/9/18 11:59 PM.  Always use your most recent med list.                   Brand Name Dispense Instructions for use Diagnosis    baclofen 10 MG tablet    LIORESAL     Take 10-20 mg by mouth 4 times daily as needed        CRANBERRY EXTRACT PO      Take 1 tablet by mouth daily        dimethyl fumarate delayed release capsule    TECFIDERA     Take 240 mg by mouth 2 times daily        levETIRAcetam 1000 MG Tabs     60 tablet    Take 1,000 mg by mouth 2 times daily    Multiple  sclerosis (H)       norethindrone 0.35 MG per tablet    MICRONOR    84 tablet    TAKE 1 TABLET(0.35 MG) BY MOUTH DAILY    Surveillance of previously prescribed contraceptive pill       oxybutynin 5 MG 24 hr tablet    DITROPAN-XL     Take 5 mg by mouth daily        sulfamethoxazole-trimethoprim 400-80 MG per tablet    BACTRIM/SEPTRA     Take 1 tablet by mouth daily        venlafaxine 75 MG Tb24 24 hr tablet    EFFEXOR-ER     Take 75 mg by mouth daily (2  X 37.5 mg)        VITAMIN D (CHOLECALCIFEROL) PO      Take 1,000 Units by mouth daily

## 2018-07-09 NOTE — LETTER
[facility]  [address] [place] [address] [phone]        July 9, 2018    Regarding:  Shanna Shanks  [address] [address]              To Whom It May Concern;  Above mentioned patient was seen in the clinic.   Patient has multiple recent hospitalizations with following dates:  6/4/18,8/12/18, 7/30/17 and 7/24/17 in last one year.  It may be best for her to avoid major travel if possible for now.           Sincerely,        Malvin Beck MD

## 2018-07-10 ENCOUNTER — HOSPITAL ENCOUNTER (OUTPATIENT)
Dept: REHABILITATION | Facility: CLINIC | Age: 43
End: 2018-07-10
Attending: FAMILY MEDICINE
Payer: COMMERCIAL

## 2018-07-10 PROCEDURE — 40000247 ZZH STATISTIC VISIT ADULT DAY PROGRAM

## 2018-07-10 PROCEDURE — S5102 ADULT DAY CARE PER DIEM: HCPCS

## 2018-07-12 ENCOUNTER — HOSPITAL ENCOUNTER (OUTPATIENT)
Dept: REHABILITATION | Facility: CLINIC | Age: 43
End: 2018-07-12
Attending: FAMILY MEDICINE
Payer: COMMERCIAL

## 2018-07-12 PROCEDURE — S5102 ADULT DAY CARE PER DIEM: HCPCS

## 2018-07-12 PROCEDURE — 40000247 ZZH STATISTIC VISIT ADULT DAY PROGRAM

## 2018-07-19 ENCOUNTER — HOSPITAL ENCOUNTER (OUTPATIENT)
Dept: REHABILITATION | Facility: CLINIC | Age: 43
End: 2018-07-19
Attending: FAMILY MEDICINE
Payer: COMMERCIAL

## 2018-07-19 PROCEDURE — 40000247 ZZH STATISTIC VISIT ADULT DAY PROGRAM

## 2018-07-19 PROCEDURE — S5102 ADULT DAY CARE PER DIEM: HCPCS

## 2018-07-25 NOTE — PROGRESS NOTES
MONTHLY PROGRESS NOTE AND PARTICIPATION REPORT   [facility]    Shanna Shanks, 1975  Attendance: Please see Epic for attendance record.    Communication:   Does communicate   Hearing:   No impairment  Vision:   No impairment  Orientation/Cognition:   Minor forgetfulness  Partial disorientation  Short term memory loss  Behavior:   Requires redirection  Self-Preservation Skills:   Not capable of self-preservation  Eating:   Independent  Assist with set up  Ambulation Walking:   SBA with ambulation  Transferring:   Independent  Wheelchair:   N/A  Toileting:   Needs assistance   Needs bathroom reminders  Maintenance Program:     NuStep  Living Arrangements:   Lives with family  Spiritual Needs: Needs are being met through support group  Medication Assistance:   Medication not taken during program hours  Participation Report:   Aerobics/Exercise  Support Group  Cognitive Stimulation  Fire Drill  Creative Arts/Crafts  Games  Gardening  Speakers/Entertainment  Socialization  Current Events/News  Education/Health Topic  Meditation/prayer time/communion with Quin Summers the Beautiful week, Special Olympics week, Vacation week, Roadside Attractions week, Pathways (meditation/grateful class), mosaic tile group, Yoga Relaxation with Nyla  Level of Participation:   Active  To the best of their ability

## 2018-07-26 ENCOUNTER — HOSPITAL ENCOUNTER (OUTPATIENT)
Dept: REHABILITATION | Facility: CLINIC | Age: 43
End: 2018-07-26
Attending: FAMILY MEDICINE
Payer: COMMERCIAL

## 2018-07-26 PROCEDURE — 40000247 ZZH STATISTIC VISIT ADULT DAY PROGRAM

## 2018-07-26 PROCEDURE — S5102 ADULT DAY CARE PER DIEM: HCPCS

## 2018-07-31 ENCOUNTER — HOSPITAL ENCOUNTER (OUTPATIENT)
Dept: REHABILITATION | Facility: CLINIC | Age: 43
End: 2018-07-31
Attending: FAMILY MEDICINE
Payer: COMMERCIAL

## 2018-07-31 PROCEDURE — 40000247 ZZH STATISTIC VISIT ADULT DAY PROGRAM

## 2018-07-31 PROCEDURE — S5102 ADULT DAY CARE PER DIEM: HCPCS

## 2018-07-31 NOTE — PROGRESS NOTES
[facility] Social History    Full Name: Shanna Shanks        MRN: [id number]    YOB: 1975  Nickname:TYRELL      Sex: F     Home Phone: [phone]      Cell Phone: [phone]  Address: [address] [place] [address]//Zip: [place]  [address]  County: [place]       E-mail:TYRELL        Transportation:    [employer]  Language:English         needed? No       Ethnicity: Caucasion  Race: White      Country of Origin: [place]       Samaritan: [employer]  Marital Status:                   Spouse/Significant Other: Jama Petit   ______________________________________________________________________________________     : No    Branch of Service: NA      Education Level: High School- [employer]   Job History: Self employeed       Organizations/Clubs: NA  Whom do you live with?  and daughter  Current living arrangement:  Home   Number of Children: 2  List: Michel and Tegan  Number of Siblings: 7     List: Narendra, Aleksandra, Daisha, Elida, Jesus, Iain, Kyle  Other Important People/Pets: 2 cats and 1 dog  What else should we know about you? Very Social.      Primary Care Provider: Dr. Beck        Neurologist: Dr. Talha Reese  Emergency Contacts:  1. Jama Ford      Relationship: Spouse    Phone: [phone]  2. Aleksandra Camposhardt         Relationship: sister     Phone:[phone]    Updated on 7/30/2018

## 2018-08-02 ENCOUNTER — HOSPITAL ENCOUNTER (OUTPATIENT)
Dept: REHABILITATION | Facility: CLINIC | Age: 43
End: 2018-08-02
Attending: NURSE PRACTITIONER
Payer: COMMERCIAL

## 2018-08-02 PROCEDURE — S5102 ADULT DAY CARE PER DIEM: HCPCS

## 2018-08-02 PROCEDURE — 40000247 ZZH STATISTIC VISIT ADULT DAY PROGRAM

## 2018-08-07 ENCOUNTER — HOSPITAL ENCOUNTER (OUTPATIENT)
Dept: REHABILITATION | Facility: CLINIC | Age: 43
End: 2018-08-07
Attending: NURSE PRACTITIONER
Payer: COMMERCIAL

## 2018-08-07 PROCEDURE — 40000247 ZZH STATISTIC VISIT ADULT DAY PROGRAM

## 2018-08-07 PROCEDURE — S5102 ADULT DAY CARE PER DIEM: HCPCS

## 2018-08-09 ENCOUNTER — HOSPITAL ENCOUNTER (OUTPATIENT)
Dept: REHABILITATION | Facility: CLINIC | Age: 43
End: 2018-08-09
Attending: NURSE PRACTITIONER
Payer: COMMERCIAL

## 2018-08-09 PROCEDURE — S5102 ADULT DAY CARE PER DIEM: HCPCS

## 2018-08-09 PROCEDURE — 40000247 ZZH STATISTIC VISIT ADULT DAY PROGRAM

## 2018-08-21 ENCOUNTER — HOSPITAL ENCOUNTER (OUTPATIENT)
Dept: PHYSICAL THERAPY | Facility: CLINIC | Age: 43
Setting detail: THERAPIES SERIES
End: 2018-08-21
Attending: FAMILY MEDICINE
Payer: COMMERCIAL

## 2018-08-21 ENCOUNTER — HOSPITAL ENCOUNTER (OUTPATIENT)
Dept: REHABILITATION | Facility: CLINIC | Age: 43
End: 2018-08-21
Attending: NURSE PRACTITIONER
Payer: COMMERCIAL

## 2018-08-21 PROCEDURE — 40000247 ZZH STATISTIC VISIT ADULT DAY PROGRAM

## 2018-08-21 PROCEDURE — S5102 ADULT DAY CARE PER DIEM: HCPCS

## 2018-08-21 PROCEDURE — 40000719 ZZHC STATISTIC PT DEPARTMENT NEURO VISIT

## 2018-08-21 PROCEDURE — 97116 GAIT TRAINING THERAPY: CPT | Mod: GP

## 2018-08-21 PROCEDURE — 97162 PT EVAL MOD COMPLEX 30 MIN: CPT | Mod: GP

## 2018-08-21 PROCEDURE — 97112 NEUROMUSCULAR REEDUCATION: CPT | Mod: GP

## 2018-08-21 NOTE — PROGRESS NOTES
08/21/18 1127   Quick Adds   Type of Visit Initial OP PT Evaluation   General Information   Start of Care Date 08/21/18   Referring Physician Malvin Beck MD   Orders Evaluate and Treat as Indicated   Order Date 08/14/18   Medical Diagnosis MS   Onset of illness/injury or Date of Surgery 08/14/18   Precautions/Limitations fall precautions   Surgical/Medical history reviewed Yes   Pertinent history of current problem (include personal factors and/or comorbidities that impact the POC) PT: Pt diagnosed with MS about 1 year ago. currently going to MS day program a couple days a week.   Prior level of function comment Pt amb and transfers with SBA and no AD-sometimes requires HHA in crowded areas d/t pt is easily distracted   Previous/Current Treatment Physical Therapy   Improvement after PT Moderate   Current Community Support Family/friend caregiver   [facility]   Home/Community Accessibility Comments 2 levels with basement - family room upstairs, bedroom and bathroom main floor, laundry in basement ( is doing). Notes she wall/furniture walks a lot.   Assistive Devices Comments no AD - resistant to use.   Patient/Family Goals Statement Improve balance and gait safety   General Information Comments pt goes to day program 2x/week   Fall Risk Screen   Fall screen completed by PT   Have you fallen 2 or more times in the past year? No   Have you fallen and had an injury in the past year? No   Timed Up and Go score (seconds) 22.8 seconds   Is patient a fall risk? Yes   Fall screen comments pt initially reports no falls however later in session point to cut on arm and reports it is from a fall going down stairs   Pain   Patient currently in pain No   Cognitive Status Examination   Orientation person;place   Level of Consciousness alert   Follows Commands and Answers Questions 100% of the time;able to follow single-step instructions   Personal Safety and Judgment impulsive;at risk  behaviors demonstrated   Memory impaired   Cognitive Comment pt cannot remember how many times she comes to day program a week, unable to recall falls   Integumentary   Integumentary No deficits were identified   Posture   Posture Comments forward flexed posture in standing, head down, shoulders rounded foward   Range of Motion (ROM)   ROM Quick Adds no deficits were identified   Strength   Strength Comments not formally tested- pt demonstrates core weakness with STS transfers- slow and required BUE support   Gait   Gait Comments pt amb with no AD demonstrating short, shuffling steps, decreased foot clearance d/t decreased hip/knee flexion, flat foot   Gait Special Tests   Gait Special Tests 25 FOOT TIMED WALK   Gait Special Tests 25 Foot Timed Walk   Seconds 18   Comments no AD   Balance Special Tests   Balance Special Tests Romberg;Timed up and go;Sit to stand reps;Sharpened Romberg   Balance Special Tests Timed Up and Go   Seconds 22.8 Seconds   Comments no AD   Balance Special Tests Romberg   Seconds 60 Seconds   Comments difficulty placing feet- no AD or UE support used   Balance Special Tests Sharpened Romberg   Seconds 0 Seconds   Comments unable to place feet into position or hold without BUE support   Balance Special Tests Sit to Stand Reps in 30 Seconds   Reps in 30 seconds 6   Height standard chair height    Comments pt used BUE from arm rests-pt stopped after 15 seconds d/t distraction and need VC to continue   Sensory Examination   Sensory Perception Comments not formally tested-pt reports no n/t in BUEs/BLEs   Planned Therapy Interventions   Planned Therapy Interventions balance training;gait training;strengthening;transfer training;neuromuscular re-education   Clinical Impression   Criteria for Skilled Therapeutic Interventions Met yes, treatment indicated   PT Diagnosis impaired balance and gait   Influenced by the following impairments impaired balance, impaired cognition-easily distracted,  imspulsive, poor self awareness, BLE weakness   Functional limitations due to impairments gait, transfers, stairs, squatting down   Clinical Presentation Evolving/Changing   Clinical Presentation Rationale Per pts PMHx and current medical and functional status   Clinical Decision Making (Complexity) Moderate complexity   Therapy Frequency 2 times/Week   Predicted Duration of Therapy Intervention (days/wks) 60 days   Risk & Benefits of therapy have been explained Yes   Patient, Family & other staff in agreement with plan of care Yes   Goal 1   Goal Identifier TUG   Goal Description Pt will perfomr TUG in less than 16 sconds to demonstrate improved balance and strength and decrease risk for falls   Target Date 10/19/18   Goal 2   Goal Identifier repeated STS   Goal Description Pt will perform 10 STS in 30 seconds to demonstrate improve BLE strength and dynamic balance   Target Date 10/19/18   Goal 3   Goal Identifier Modified Rhomberg   Goal Description Pt will be able to hold modified rhomberg position for 30 seconds on BLEs to demonstrate improve static balance for safey with functional mobility    Target Date 10/19/18   Goal 4   Goal Identifier HEP   Goal Description Pt will be able to perform HEP with SBA for balance and strength for ongoing wellness   Target Date 10/19/18   Total Evaluation Time   Total Evaluation Time (Minutes) 20

## 2018-08-23 ENCOUNTER — HOSPITAL ENCOUNTER (OUTPATIENT)
Dept: REHABILITATION | Facility: CLINIC | Age: 43
End: 2018-08-23
Attending: NURSE PRACTITIONER
Payer: COMMERCIAL

## 2018-08-23 ENCOUNTER — HOSPITAL ENCOUNTER (OUTPATIENT)
Dept: PHYSICAL THERAPY | Facility: CLINIC | Age: 43
Setting detail: THERAPIES SERIES
End: 2018-08-23
Attending: FAMILY MEDICINE
Payer: COMMERCIAL

## 2018-08-23 PROCEDURE — 40000719 ZZHC STATISTIC PT DEPARTMENT NEURO VISIT

## 2018-08-23 PROCEDURE — S5102 ADULT DAY CARE PER DIEM: HCPCS

## 2018-08-23 PROCEDURE — 40000247 ZZH STATISTIC VISIT ADULT DAY PROGRAM

## 2018-08-23 PROCEDURE — 97112 NEUROMUSCULAR REEDUCATION: CPT | Mod: GP

## 2018-08-28 ENCOUNTER — HOSPITAL ENCOUNTER (OUTPATIENT)
Dept: OCCUPATIONAL THERAPY | Facility: CLINIC | Age: 43
Setting detail: THERAPIES SERIES
End: 2018-08-28
Attending: FAMILY MEDICINE
Payer: COMMERCIAL

## 2018-08-28 ENCOUNTER — HOSPITAL ENCOUNTER (OUTPATIENT)
Dept: REHABILITATION | Facility: CLINIC | Age: 43
End: 2018-08-28
Attending: NURSE PRACTITIONER
Payer: COMMERCIAL

## 2018-08-28 ENCOUNTER — HOSPITAL ENCOUNTER (OUTPATIENT)
Dept: PHYSICAL THERAPY | Facility: CLINIC | Age: 43
Setting detail: THERAPIES SERIES
End: 2018-08-28
Attending: FAMILY MEDICINE
Payer: COMMERCIAL

## 2018-08-28 PROCEDURE — 97535 SELF CARE MNGMENT TRAINING: CPT | Mod: GO | Performed by: OCCUPATIONAL THERAPIST

## 2018-08-28 PROCEDURE — S5102 ADULT DAY CARE PER DIEM: HCPCS

## 2018-08-28 PROCEDURE — 40000247 ZZH STATISTIC VISIT ADULT DAY PROGRAM

## 2018-08-28 PROCEDURE — 40000125 ZZHC STATISTIC OT OUTPT VISIT: Performed by: OCCUPATIONAL THERAPIST

## 2018-08-28 PROCEDURE — 40000719 ZZHC STATISTIC PT DEPARTMENT NEURO VISIT

## 2018-08-28 PROCEDURE — 97166 OT EVAL MOD COMPLEX 45 MIN: CPT | Mod: GO | Performed by: OCCUPATIONAL THERAPIST

## 2018-08-28 PROCEDURE — 97112 NEUROMUSCULAR REEDUCATION: CPT | Mod: GP

## 2018-08-29 NOTE — PROGRESS NOTES
MONTHLY PROGRESS NOTE AND PARTICIPATION REPORT   [facility]    Shanna Shanks, 1975  Attendance: Please see Epic for attendance record.    Communication:   Does communicate  Difficulty with conversation   Hearing:   No impairment  Vision:   No impairment  Orientation/Cognition:   Minor forgetfulness  Partial disorientation  Short term memory loss  Behavior:   No behavioral concerns  Self-Preservation Skills:   Not capable of self-preservation  Eating:   Independent  Ambulation Walking:   Needs assistance  Transferring:   Aide of one person/two  Wheelchair:   ambulatory  Toileting:   Needs assistance  Maintenance Program:     [facility]  Living Arrangements:   Lives with family  Spiritual Needs: Needs are being met through support group  Medication Assistance:   Medication not taken during program hours  Participation Report:   Aerobics/Exercise  Support Group  Cognitive Stimulation  Fire Drill  Creative Arts/Crafts  Games  Gardening  Speakers/Entertainment  Socialization  Current Events/News  Education/Health Topic  Meditation, prayer, communicaiton, 80s week, grateful class, meditation, yoga and relaxation, guitair, State fair week, out and  about week with nature educaiton group  Level of Participation:   Active  To the best of their ability

## 2018-08-30 ENCOUNTER — HOSPITAL ENCOUNTER (OUTPATIENT)
Dept: PHYSICAL THERAPY | Facility: CLINIC | Age: 43
Setting detail: THERAPIES SERIES
End: 2018-08-30
Attending: FAMILY MEDICINE
Payer: COMMERCIAL

## 2018-08-30 ENCOUNTER — HOSPITAL ENCOUNTER (OUTPATIENT)
Dept: REHABILITATION | Facility: CLINIC | Age: 43
End: 2018-08-30
Attending: NURSE PRACTITIONER
Payer: COMMERCIAL

## 2018-08-30 PROCEDURE — 97110 THERAPEUTIC EXERCISES: CPT | Mod: GP

## 2018-08-30 PROCEDURE — 40000719 ZZHC STATISTIC PT DEPARTMENT NEURO VISIT

## 2018-08-30 PROCEDURE — 40000247 ZZH STATISTIC VISIT ADULT DAY PROGRAM

## 2018-08-30 PROCEDURE — S5102 ADULT DAY CARE PER DIEM: HCPCS

## 2018-09-01 NOTE — PROGRESS NOTES
08/28/18 1500   Quick Adds   Type of Visit Initial Outpatient Occupational Therapy Evaluation   General Information   Start Of Care Date 08/28/18   Referring Physician Dr. Malvin Beck   Orders Evaluate and treat as indicated   Orders Date 08/14/18   Medical Diagnosis MS  (dx 1996)   Onset of Illness/Injury or Date of Surgery 08/14/17   Precautions/Limitations Fall precautions  (Someone needs to be with at all times-day program)   Surgical/Medical History Reviewed Yes   Additional Occupational Profile Info/Pertinent History of Current Problem Pt is a 43 y.o.  mother of 2 daughters who has been living with MS since 1996 [facility] neuropsychological impairment identified in 2011. She was admitted from 6/4-6/7/18 to [facility] due to seizure [facility] acute metabloic encephalopathy and pneumonia thought to be from seizure. Spouse felt she was at baseline cognitvely when she left the hospital. [facility] day program staff where she attends 2x/week notes she has declined cognitvely overall in the past year and she is referred for OT for further assessment. 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   Role/Living Environment   Current Community Support Family/friend caregiver  (, 7th grade daughter; has another growndtr, cats, dog)   Patient role/Employment history (spouse works during day; Pt-[employer])   Current Living Environment House  (2 story)   Number of Stairs to Enter Home front steps x 6    Primary Bathroom Location/Comments tub/shower; holds built-in soap orourke; regular height toilet with sink/vanity   Home/Community Accessibility Comments bedroom on main; 12 steps to 2nd level and steps to basement   Prior Level - Transfers Assistive person   Prior Level - Ambulation Assitive person   Prior Level - ADLS Assistive person    Prior Responsibilities - IADL (spouse helps with IADL)   Patient/family Goals Statement none stated, other than memory   Fall Risk Screen   Fall screen completed by PT;Refer to documentation;Department fall risk interventions implemented   Cognitive Status Examination   Cognitive Comment reduced motor planning in L UE with strength testing and ROM testing; Johnny Cognitive Assessment Version 7.2 given today: score: 14/30-impaired (norm is 26 or >/30)   Visual Perception   Visual Perception Comments denies problems; does not wear glasses   Posture   Posture Comments WFL   Range of Motion (ROM)   ROM Comments AROM is WFL   Strength   Strength Comments UE's 5/5   Hand Strength   Hand Dominance Right   Left Hand  (pounds) 50 pounds   Right Hand  (pounds) 66 pounds   Left Lateral Pinch (pounds) 13 pounds   Right Lateral Pinch (pounds) 15 pounds   Left Three Point Pinch (pounds) 12 pounds   Right Three Point Pinch (pounds) 14 pounds   Hand Strength Comments WFL but less than average   Coordination   Left Hand, Nine Hole Peg Test (seconds) 46.7   Right Hand, Nine [place] Peg Test (seconds) 36.3   Coordination Comments bilateral FM skill is BNL   Balance   Balance Comments Reduced dynamic balance   Functional Mobility   Ambulation min assist-wide ataxic gait   Transfer Skill   Level of [place]: Transfers minimum assist (75% patients effort)   Bathing   Level of [place] - Bathing minimum assist (75% patients effort)   Bathing Comments takes baths   Upper Body Dressing   Level of [place]: Dress Upper Body minimum assist (75% patients effort)   Lower Body Dressing   Level of [place]: Dress Lower Body minimum assist (75% patients effort)   Toileting   Level of [place]: Toilet minimum assist (75% patients effort)   Grooming   Level of [place]: Grooming minimum assist (75% patients effort)   Eating/Self-Feeding   Level of [place]: Eating minimum assist (75% patients effort)   Planned  Therapy Interventions   Planned Therapy Interventions ADL training;IADL training;Cognitive skills;Cognitive performance testing;Self care/Home management;Strengthening;Therapeutic activities;Visual perception   OT Goal 1   Goal Identifier cognition   Goal Description Pt will participate in further cognitive assessment and pt with family support will verbalize understanding of degree of supervision/assist pt requires at home for safety   Target Date 11/20/18   OT Goal 2   Goal Identifier UE function   Goal Description Pt will participate in hand strengthening and coordination HEP with family assist to optimize hand function for ADL and leisure tasks   Target Date 11/20/18   OT Goal 3   Goal Description Pt's family will verbalize understanding of activities to encouragement patient's engagement for cognitive and visual perceptual stimulation and ADL/IADL supervised participation.   Target Date 11/20/18   Clinical Impression   Criteria for Skilled Therapeutic Interventions Met Yes, treatment indicated   OT Diagnosis Impaired ADL and IADL and functional mobility   Influenced by the following impairments impaired cognition, visual perception, weakness, impaired balance, fatigue   Assessment of Occupational Performance 3-5 Performance Deficits   Identified Performance Deficits dressing, toileting, showering, eating, leisure   Clinical Decision Making (Complexity) Moderate complexity   Therapy Frequency 1x/week   Predicted Duration of Therapy Intervention (days/wks) up to 6 visits in 12 weeks   Risks and Benefits of Treatment have been explained. Yes   Patient, Family & other staff in agreement with plan of care Yes   Education Assessment   Barriers To Learning Cognitive   Preferred Learning Style Pictures/video;Demonstration   Total Evaluation Time   Total Evaluation Time 55

## 2018-09-01 NOTE — ADDENDUM NOTE
Encounter addended by: Emiliano Mitchell OT on: 9/1/2018 12:35 PM<BR>     Actions taken: Charge Capture section accepted, Sign clinical note, Flowsheet accepted

## 2018-09-04 ENCOUNTER — HOSPITAL ENCOUNTER (OUTPATIENT)
Dept: PHYSICAL THERAPY | Facility: CLINIC | Age: 43
Setting detail: THERAPIES SERIES
End: 2018-09-04
Attending: FAMILY MEDICINE
Payer: COMMERCIAL

## 2018-09-04 ENCOUNTER — TELEPHONE (OUTPATIENT)
Dept: OCCUPATIONAL THERAPY | Facility: CLINIC | Age: 43
End: 2018-09-04

## 2018-09-04 ENCOUNTER — HOSPITAL ENCOUNTER (OUTPATIENT)
Dept: OCCUPATIONAL THERAPY | Facility: CLINIC | Age: 43
Setting detail: THERAPIES SERIES
End: 2018-09-04
Attending: FAMILY MEDICINE
Payer: COMMERCIAL

## 2018-09-04 ENCOUNTER — HOSPITAL ENCOUNTER (OUTPATIENT)
Dept: REHABILITATION | Facility: CLINIC | Age: 43
End: 2018-09-04
Attending: NURSE PRACTITIONER
Payer: COMMERCIAL

## 2018-09-04 PROCEDURE — S5102 ADULT DAY CARE PER DIEM: HCPCS

## 2018-09-04 PROCEDURE — 40000125 ZZHC STATISTIC OT OUTPT VISIT: Performed by: OCCUPATIONAL THERAPIST

## 2018-09-04 PROCEDURE — 40000719 ZZHC STATISTIC PT DEPARTMENT NEURO VISIT: Performed by: PHYSICAL THERAPIST

## 2018-09-04 PROCEDURE — 40000247 ZZH STATISTIC VISIT ADULT DAY PROGRAM

## 2018-09-04 PROCEDURE — 97110 THERAPEUTIC EXERCISES: CPT | Mod: GP | Performed by: PHYSICAL THERAPIST

## 2018-09-04 PROCEDURE — 96125 COGNITIVE TEST BY HC PRO: CPT | Mod: GO | Performed by: OCCUPATIONAL THERAPIST

## 2018-09-04 NOTE — TELEPHONE ENCOUNTER
Return call received from patient's , Jama to discuss Cognitive Performance Tests including decline in test score from 1 year ago to 4.0 with recommendation for 24 hour care given fall risk and cognitive status. Spouse notes that currently pt is left home for up to 2 hours at a time where she may watch TV, play with cats, read a book or attempt art. He denies that she is falling, that she moves very carefully around the home and when they are out of the home, they will use a wheelchair for distance due to fatigue or encourage her to use her walking sticks or provide hand held assist. Spouse notes that pt's cognition was worse at the beginning of this year and through June when last hospitalized for seizure but now in July and Aug has cleared more to baseline. He is receptive to providing more support and supervision to patient as needed. He will talk to her neurologist, Dr. Reese, in light of cognitive changes with assessment and her CPT results will be forwarded to Dr. Reese by this therapist. Encouraged ongoing support for cognitive and visual perceptual stimulation at home and will provide activity ideas for home as well as get patient involved in aerobic conditioning for day program for brain health.  Spouse in agreement.  Relayed that pt having menstrual cramps today and that day program will ask for MD orders for PRN medication and he recommends ibuprofen for this. Will convey this to day program staff.    Emiliano Mitchell OTR/NEETU, MSCS  Occupational Therapy  [facility]  [address], [address]  [place] [address]  Voice mail:[phone]  Fax: [phone]

## 2018-09-04 NOTE — PROGRESS NOTES
Cognitive Performance Test    SUMMARY OF TEST:    The Cognitive Performance Test (CPT) is a standardized performance-based assessment to measure working memory/executive function processing capacities that underlie functional performance. Subtasks include common basic and instrumental activities of daily living (ADL/IADL) which are rated based on the manner in which patients respond to task demands of varying complexity. The total CPT score describes a level of functioning that indicates how information is processed, implications for functional activities, potential safety risks and a recommended level of supervision or assist based on cognitive function. The highest total score on this test is in the range of 5.6 to 5.8.    DATE OF TESTIN18    RESULTS OF TESTING:                                                                                         CPT Subtest Results    MEDBOX: 4.5/6 SHOP/GLOVES: 5.0/6 PHONE: 4.0/6   WASH:  4.5/5 TRAVEL: 4.0/6 TOAST: 4.0/5   DRESS: 4.0/5   TOTAL CPT SCORE:  30/39     Average CPT Score  4.3/5.6    INTERPRETATION OF TEST RESULTS:    Based on the Cognitive Performance Test, this patient scored at CPT Level 4.0.  See CPT Levels reference below.    Summary of functional cognitive status:   Pt scores in moderate cognitive-functional disability level. Examples of impaired attention, working memory and executive function were forgetting goal of travel task-getting to destination and distracted by what was in room and not pointing out designated structure she was to point out. Also, patient forgot goal of toast task and left toast on counter, needing cue to finish up task and take it to apply butter. This was despite repetitious initial cues on tasks.     *Of note patient's score one year ago on this test was 4.5 and so patient has  And so patient has had a decline in her cognitive functioning which has also been noted by day program staff familiar with patient at the [facility]. Medically she has had seizure activity in the past year with hospitalizations for this and medication adjustments, has had involvement of encephalopathy and per her spouse has had a stable brain MRI on last MRI 2018 for MS. At present, spouse notes pt is left alone for periods of up to 2 hours at time where she may watch TV, read, pet cats or may attempt art.      Factors affecting performance:  Limited Endurance  Impaired mobility   Balance impairment  Required min assist for amb and seated rests between different aspects of assessment due to fatigue and close SBA while standing. She ambulates slowly with a wide based ataxic gait and requires hand held assist to rise from chair without armrests.        Recommendations:    Supervision in living settin hour due to current cognitive and mobility status                                                   TIME ADMINISTERING TEST: 70 min    TIME FOR INTERPRETATION AND PREPARATION OF REPORT: 10 min    TOTAL TIME: 80 min      CPT Levels Reference:    Patient's Average CPT Score:  4.0                                                                                                                                                  Individual scores range along a continuum as outlined below.  In addition to cognitive status, other factors may affect safety in a home environment.  Please refer to specific recommendations for this patient.    ___5.6-5.8  Normal functioning (absence of cognitive-functional disability).  Independent in managing personal affairs, monitors and directs own behavior.  Uses complex information to carry out daily activities with safety and accuracy.    Proficient with instrumental activities of daily living (IADL) and learning new activity.  Problems are anticipated, errors are avoided, and consequences of actions are considered.      ___5.0   Mild cognitive-functional disability; deficits in working memory and executive thought processes.  "Difficulty using complex information. Problems may be observed with recent memory, judgment, reasoning and planning ahead. May be impulsive or have difficulty anticipating consequences.  Safety:  May require assistance to plan ahead; or to manage complex medication schedules, appointments or finances.  Hazardous activities may need to be monitored or limited.  ADL:  Mild functional decline.  Able to complete basic self-care and routine household tasks.  May have difficulty with complex daily tasks such as reading, writing, meal preparation, shopping or driving.   Learns through hands on teaching. Self-centered behavior or difficulty considering the needs of others may be seen related to trouble seeing the  whole picture\". Can appear disorganized or uninhibited.    ___4.5  Mild to moderate cognitive-functional disability. Significant deficits in working memory and executive thought processes. Judgment, reasoning and planning show obvious impairment.  Distractible with inability to shift attention/actions given competing stimuli.  Difficulty with problem solving and managing details. Complex daily tasks performed with inconsistency, difficulty, or error.     Safety:  Medications should be monitored, stove use may require supervision, and driving ability may be affected.  Impaired safety awareness with inability to anticipate potential problems.  May not recognize or respond to emergent situations. Requires frequent check-in support.   ADL:  Mild difficulty with simple everyday self-care tasks. Benefits from structured, routine activity.  Will likely need reminders to complete tasks outside of the routine. Requires assistance with planning and IADL tasks like shopping and finances. Learns concrete tasks through repetition, but performance may not generalize. Tends to be impulsive with poor insight. Self centered behavior or inability to consider the needs of others is common.    __X_4.0  Moderate cognitive-functional " disability; abstract to concrete thought processes. Working memory and executive function impairments are obvious. Difficulty with planning and problem solving.  Behavior is goal-directed, but unable to follow multi-step directions, is easily distracted, and may not recognize mistakes.  Inability to anticipate hazards or understand precautions.  Safety:  Recommend 24-hour supervision for safety. Supervision needed for medication management and for hazardous activities. May not be able to follow a restricted diet. Can get lost in unfamiliar surroundings. Generally, persons functioning at level 4 should not be driving.   ADL:  Some decline in quality or frequency of ADL.  [place] enhanced by use of a routine, simple concrete directions, and caregiver set-up of needed items. Complex tasks such as money or home management typically requires assistance.  Relies heavily on vision to guide behavior; will ignore objects/hazards not in plain sight and can be distracted by irrelevant objects. Often has poor insight.  Able to carry out social conversation and may verbally  cover  for deficits leading caregivers to believe they are capable of functioning independently.       ___3.5  Moderate cognitive-functional disability; increased cues needed for task completion. Aware of concrete task steps but needs prompting or cues to initiate and complete simple tasks. Attention span is limited, simple directions may need to be repeated, and re-focus to a topic or task may be required.  Safety:  24-hour supervision required for safety and for assistance with daily tasks. Assistance required with medications, and access to medication should be limited. Meals, nutrition and dietary restrictions need to be monitored.  All hazardous activities should be restricted or supervised. Should not drive. Prone to wandering and can become lost.  ADL:  Moderate functional decline. Familiar tasks usually requires set-up of supplies and directions  to complete steps. May need objects handed to them for task initiation. Function best with a set schedule in familiar surroundings with familiar people. All complex tasks must be done by others. Vocabulary is diminished and speech often unfocused.         FAIZAN Manrique/NEETU, [facility]  Occupational Therapy  [facility]  [facility] [address], [address]  [place] [address]  Voice mail:[phone]  Fax: [phone]

## 2018-09-05 NOTE — PROGRESS NOTES
[facility] Note:  Patient was seen by Occupational Therapy.  Per evaluating OT:    Return call received from patient's , Jama to discuss Cognitive Performance Tests including decline in test score from 1 year ago to 4.0 with recommendation for 24 hour care given fall risk and cognitive status. Spouse notes that currently pt is left home for up to 2 hours at a time where she may watch TV, play with cats, read a book or attempt art. He denies that she is falling, that she moves very carefully around the home and when they are out of the home, they will use a wheelchair for distance due to fatigue or encourage her to use her walking sticks or provide hand held assist. Spouse notes that pt's cognition was worse at the beginning of this year and through June when last hospitalized for seizure but now in July and Aug has cleared more to baseline. He is receptive to providing more support and supervision to patient as needed. He will talk to her neurologist, Dr. Reese, in light of cognitive changes with assessment and her CPT results will be forwarded to Dr. Reese by this therapist. Encouraged ongoing support for cognitive and visual perceptual stimulation at home and will provide activity ideas for home as well as get patient involved in aerobic conditioning for day program for brain health.  Spouse in agreement. Relayed that pt having menstrual cramps today and that day program will ask for MD orders for PRN medication and he recommends ibuprofen for this. Will convey this to day program staff.    Will continue to provide encouragement to participate in aerobic exercises and other cognitive activities.  Will watch for MD order for ibuprofen.

## 2018-09-06 ENCOUNTER — HOSPITAL ENCOUNTER (OUTPATIENT)
Dept: PHYSICAL THERAPY | Facility: CLINIC | Age: 43
Setting detail: THERAPIES SERIES
End: 2018-09-06
Attending: FAMILY MEDICINE
Payer: COMMERCIAL

## 2018-09-06 ENCOUNTER — HOSPITAL ENCOUNTER (OUTPATIENT)
Dept: REHABILITATION | Facility: CLINIC | Age: 43
End: 2018-09-06
Attending: NURSE PRACTITIONER
Payer: COMMERCIAL

## 2018-09-06 PROCEDURE — 40000719 ZZHC STATISTIC PT DEPARTMENT NEURO VISIT: Performed by: PHYSICAL THERAPIST

## 2018-09-06 PROCEDURE — S5102 ADULT DAY CARE PER DIEM: HCPCS

## 2018-09-06 PROCEDURE — 97530 THERAPEUTIC ACTIVITIES: CPT | Mod: GP | Performed by: PHYSICAL THERAPIST

## 2018-09-06 PROCEDURE — 40000247 ZZH STATISTIC VISIT ADULT DAY PROGRAM

## 2018-09-11 ENCOUNTER — HOSPITAL ENCOUNTER (OUTPATIENT)
Dept: REHABILITATION | Facility: CLINIC | Age: 43
End: 2018-09-11
Attending: NURSE PRACTITIONER
Payer: COMMERCIAL

## 2018-09-11 ENCOUNTER — HOSPITAL ENCOUNTER (OUTPATIENT)
Dept: OCCUPATIONAL THERAPY | Facility: CLINIC | Age: 43
Setting detail: THERAPIES SERIES
End: 2018-09-11
Attending: FAMILY MEDICINE
Payer: COMMERCIAL

## 2018-09-11 ENCOUNTER — HOSPITAL ENCOUNTER (OUTPATIENT)
Dept: PHYSICAL THERAPY | Facility: CLINIC | Age: 43
Setting detail: THERAPIES SERIES
End: 2018-09-11
Attending: FAMILY MEDICINE
Payer: COMMERCIAL

## 2018-09-11 PROCEDURE — 40000247 ZZH STATISTIC VISIT ADULT DAY PROGRAM

## 2018-09-11 PROCEDURE — 97116 GAIT TRAINING THERAPY: CPT | Mod: GP | Performed by: PHYSICAL THERAPIST

## 2018-09-11 PROCEDURE — S5102 ADULT DAY CARE PER DIEM: HCPCS

## 2018-09-11 PROCEDURE — 40000125 ZZHC STATISTIC OT OUTPT VISIT: Performed by: OCCUPATIONAL THERAPIST

## 2018-09-11 PROCEDURE — 97110 THERAPEUTIC EXERCISES: CPT | Mod: GO | Performed by: OCCUPATIONAL THERAPIST

## 2018-09-11 PROCEDURE — 40000719 ZZHC STATISTIC PT DEPARTMENT NEURO VISIT: Performed by: PHYSICAL THERAPIST

## 2018-09-13 ENCOUNTER — HOSPITAL ENCOUNTER (OUTPATIENT)
Dept: REHABILITATION | Facility: CLINIC | Age: 43
End: 2018-09-13
Attending: NURSE PRACTITIONER
Payer: COMMERCIAL

## 2018-09-13 ENCOUNTER — HOSPITAL ENCOUNTER (OUTPATIENT)
Dept: PHYSICAL THERAPY | Facility: CLINIC | Age: 43
Setting detail: THERAPIES SERIES
End: 2018-09-13
Attending: FAMILY MEDICINE
Payer: COMMERCIAL

## 2018-09-13 PROCEDURE — 40000719 ZZHC STATISTIC PT DEPARTMENT NEURO VISIT: Performed by: PHYSICAL THERAPIST

## 2018-09-13 PROCEDURE — 40000247 ZZH STATISTIC VISIT ADULT DAY PROGRAM

## 2018-09-13 PROCEDURE — S5102 ADULT DAY CARE PER DIEM: HCPCS

## 2018-09-13 PROCEDURE — 97110 THERAPEUTIC EXERCISES: CPT | Mod: GP | Performed by: PHYSICAL THERAPIST

## 2018-09-13 PROCEDURE — 97116 GAIT TRAINING THERAPY: CPT | Mod: GP | Performed by: PHYSICAL THERAPIST

## 2018-09-13 NOTE — PROGRESS NOTES
MONTHLY PROGRESS NOTE AND PARTICIPATION REPORT   [facility]    Shanna Shanks, 1975  Attendance: Please see Epic for attendance record.    Communication:   Does communicate   Hearing:   No impairment  Vision:   No impairment  Orientation/Cognition:   Partial disorientation  Behavior:   No behavioral concerns  Self-Preservation Skills:   Not capable of self-preservation  Eating:   Independent  Ambulation Walking:   Needs assistance  Transferring:   Aide of one person/two  Wheelchair:   walker  Toileting:   Needs assistance  SBA  Maintenance Program:     [employer]  Living Arrangements:   Lives with family  Spiritual Needs: Needs are being met through support group  Medication Assistance:   Medication not taken during program hours  Participation Report:   Aerobics/Exercise  Support Group  Cognitive Stimulation  Fire Drill  Creative Arts/Crafts  Games  Gardening  Speakers/Entertainment  Socialization  Current Events/News  Education/Health Topic  Meditation and prayer with Melina, nutrition with Nyla, Pathways kindness meditation and seated yoga, learn about apples and accessibilty, football week, renissance week, and oktoberfest week  Level of Participation:   Active  To the best of their ability

## 2018-09-24 ENCOUNTER — OFFICE VISIT (OUTPATIENT)
Dept: URGENT CARE | Facility: URGENT CARE | Age: 43
End: 2018-09-24
Payer: COMMERCIAL

## 2018-09-24 VITALS
DIASTOLIC BLOOD PRESSURE: 58 MMHG | HEART RATE: 68 BPM | TEMPERATURE: 98.4 F | SYSTOLIC BLOOD PRESSURE: 102 MMHG | RESPIRATION RATE: 16 BRPM

## 2018-09-24 DIAGNOSIS — R35.0 URINARY FREQUENCY: Primary | ICD-10-CM

## 2018-09-24 DIAGNOSIS — A49.8 ENTEROCOCCUS FAECALIS INFECTION: ICD-10-CM

## 2018-09-24 LAB
ALBUMIN UR-MCNC: NEGATIVE MG/DL
APPEARANCE UR: CLEAR
BILIRUB UR QL STRIP: NEGATIVE
COLOR UR AUTO: YELLOW
GLUCOSE UR STRIP-MCNC: NEGATIVE MG/DL
HGB UR QL STRIP: NEGATIVE
KETONES UR STRIP-MCNC: NEGATIVE MG/DL
LEUKOCYTE ESTERASE UR QL STRIP: NEGATIVE
NITRATE UR QL: NEGATIVE
PH UR STRIP: 7 PH (ref 5–7)
SOURCE: NORMAL
SP GR UR STRIP: 1.01 (ref 1–1.03)
UROBILINOGEN UR STRIP-ACNC: 0.2 EU/DL (ref 0.2–1)

## 2018-09-24 PROCEDURE — 87086 URINE CULTURE/COLONY COUNT: CPT | Performed by: PHYSICIAN ASSISTANT

## 2018-09-24 PROCEDURE — 87088 URINE BACTERIA CULTURE: CPT | Performed by: PHYSICIAN ASSISTANT

## 2018-09-24 PROCEDURE — 99213 OFFICE O/P EST LOW 20 MIN: CPT | Performed by: PHYSICIAN ASSISTANT

## 2018-09-24 PROCEDURE — 81003 URINALYSIS AUTO W/O SCOPE: CPT | Performed by: PHYSICIAN ASSISTANT

## 2018-09-24 PROCEDURE — 87186 SC STD MICRODIL/AGAR DIL: CPT | Performed by: PHYSICIAN ASSISTANT

## 2018-09-24 NOTE — PROGRESS NOTES
SUBJECTIVE:   Shanna Shanks is a 43 year old female who  presents today for a possible UTI. Symptoms of frequency have been going on for 1-2 days.  Hematuria no.  still presentand mild.  There is no history of fever, chills, nausea or vomiting.   This patient does  have a history of urinary tract infections. Patient denies long duration, rigors, flank pain, temperature > 101 degrees F. and Vomiting, significant nausea or diarrhea or vaginal discharge   She has MS and is unable to tell UTI symptoms sometimes    Past Medical History:   Diagnosis Date     Multiple sclerosis (H) 3/2/96    last exacerbation 3yrs ago, no meds x 6 months     Tobacco dependency      Allergies   Allergen Reactions     Diphenhydramine Rash     benadryl cream     Nickel      Macrobid [Nitrofurantoin] Rash     Social History   Substance Use Topics     Smoking status: Former Smoker     Packs/day: 0.25     Types: Cigarettes     Smokeless tobacco: Never Used      Comment: quit a few weeks ago     Alcohol use 3.5 oz/week     7 Standard drinks or equivalent per week      Comment: social drinker       ROS:   CONSTITUTIONAL:NEGATIVE for fever, chills, change in weight  INTEGUMENTARY/SKIN: NEGATIVE for worrisome rashes, moles or lesions  CV: NEGATIVE for chest pain, palpitations or peripheral edema  GI: NEGATIVE for nausea, abdominal pain, heartburn, or change in bowel habits  : frequency of urination  MUSCULOSKELETAL: NEGATIVE for significant arthralgias or myalgia  NEURO: NEGATIVE for weakness, dizziness or paresthesias    OBJECTIVE:  /58  Pulse 68  Temp 98.4  F (36.9  C) (Oral)  Resp 16  GENERAL APPEARANCE: healthy, alert and no distress  ABDOMEN:  soft, nontender, no HSM or masses and bowel sounds normal  BACK: No CVA tenderness  SKIN: no suspicious lesions or rashes    Results for orders placed or performed in visit on 09/24/18   *UA reflex to Microscopic and Culture ([place] [facility] (except [place] and [place])    Result Value Ref Range    Color Urine Yellow     Appearance Urine Clear     Glucose Urine Negative NEG^Negative mg/dL    Bilirubin Urine Negative NEG^Negative    Ketones Urine Negative NEG^Negative mg/dL    Specific Gravity Urine 1.010 1.003 - 1.035    Blood Urine Negative NEG^Negative    pH Urine 7.0 5.0 - 7.0 pH    Protein Albumin Urine Negative NEG^Negative mg/dL    Urobilinogen Urine 0.2 0.2 - 1.0 EU/dL    Nitrite Urine Negative NEG^Negative    Leukocyte Esterase Urine Negative NEG^Negative    Source Midstream Urine        ASSESSMENT/PLAN      ICD-10-CM    1. Urinary frequency R35.0      Orders Placed This Encounter     *UA reflex to Microscopic and Culture ([place] [facility] (except [place] and [place])       Urine culture pending  Drink plenty of fluids.  Prevention and treatment of UTI's discussed.Signs and symptoms of pyelonephritis mentioned.  Follow up with primary care physician if not improving

## 2018-09-24 NOTE — MR AVS SNAPSHOT
After Visit Summary   9/24/2018    Shanna Shanks    MRN: [id number]           Patient Information     Date Of Birth          1975        Visit Information        Provider Department      9/24/2018 3:25 PM Nigel Lee, GHANSHYAM [facility]        Today's Diagnoses     Urinary frequency    -  1       Follow-ups after your visit        Your next 10 appointments already scheduled     Sep 25, 2018 10:00 AM CDT   Day Program with MSAC PER JEANELLIE [facility] ([facility])    [address]  [facility] [address]   [phone]            Sep 25, 2018 10:45 AM CDT   Neuro Treatment with Emiliano Mitchell, OT   [facility], Occupational Therapy - Outpatient ([facility])    [address]  [place] [address]   [phone]            Sep 25, 2018 11:00 AM CDT   Neuro Treatment with Milagro Becerril, PT   [facility], Physical Therapy - Outpatient ([facility])    [address], [address]  [place] [address]   [phone]            Sep 27, 2018 10:00 AM CDT   Day Program with MSAC PER JEAN   [facility] ([facility])    [address]  [facility] [address]   [phone]            Sep 27, 2018  1:45 PM CDT   Neuro Treatment with Kassandra Dael, PT   [facility], Physical Therapy - Outpatient ([facility])    [address]  [place] [address]   [phone]            Oct 02, 2018 10:00 AM CDT   Day Program with MSAC PER JEAN   [facility] ([facility])    [address]  [facility] [address]   [phone]            Oct 02, 2018  11:00 AM CDT   Neuro Treatment with Emiliano Mitchell, OT   [facility] [place], Occupational Therapy - Outpatient ([facility])    [address]  [place] [address]   [phone]            Oct 02, 2018  2:30 PM CDT   Neuro Treatment with Kassandra Dale, PT   [facility] [place], Physical Therapy - Outpatient ([facility])    [address]  [place] [address]   [phone]            Oct 04, 2018 10:00 AM CDT   Day Program with MSAC RADHA PENA   [facility] Day Program ([facility])    [address]  [facility] [address] [phone]            Oct 04, 2018 11:45 AM CDT   Neuro Treatment with Milagro Becerril, PT   [facility], Physical Therapy - Outpatient ([facility])    [address]  [place] [address]   [phone]              Who to contact     If you have questions or need follow up information about today's clinic visit or your schedule please contact [facility] directly at [phone].  Normal or non-critical lab and imaging results will be communicated to you by CleanBeeBabyhart, letter or phone within 4 business days after the clinic has received the results. If you do not hear from us within 7 days, please contact the clinic through CleanBeeBabyhart or phone. If you have a critical or abnormal lab result, we will notify you by phone as soon as possible.  Submit refill requests through [employer] or call your pharmacy and they will forward the refill request to us. Please allow 3 business days for your refill to be completed.          Additional Information About Your Visit        C[employer] Information     [employer] gives you secure access to your electronic health record. If you see a primary care provider, you can also send messages to your care  team and make appointments. If you have questions, please call your primary care clinic.  If you do not have a primary care provider, please call [phone] and they will assist you.        Care EveryWhere ID     This is your Care EveryWhere ID. This could be used by other organizations to access your [place] medical records  [id number]        Your Vitals Were     Pulse Temperature Respirations             68 98.4  F (36.9  C) (Oral) 16          Blood Pressure from Last 3 Encounters:   09/24/18 102/58   07/09/18 114/65   06/07/18 106/67    Weight from Last 3 Encounters:   07/09/18 155 lb 4 oz (70.4 kg)   06/07/18 159 lb 11.2 oz (72.4 kg)   05/30/18 155 lb 4 oz (70.4 kg)              We Performed the Following     *UA reflex to Microscopic and Culture ([facility] [place] [facility] (except [place] and [place])     Urine Culture Aerobic Bacterial        Primary Care Provider Office Phone # Fax #    Malvin Cesario Beck [phone] [address] [place] [address]        Goals        General    # 1Medical (pt-stated)     Notes - Note created  6/8/2018 10:18 AM by Akilah Velarde RN    Goal Statement: I will seek medical help if any signs of seizure activity  Measure of Success: Decreased seizure activity   Supportive Steps to Achieve:   I will keep future Neurology appointments  I will take Keppra as directed   Barriers: Recent seizure   Strengths: Supportive  who can watch for any changes in behavior   Date to Achieve By: Ongoing         Equal Access to Services     DUANE HRAPER AH: Hadii aad ku hadasho Soomaali, waaxda luqadaha, qaybta kaalmada adeegyada, waxay gio patrick . So [facility] [phone].    ATENCIÓN: Si habla español, tiene a gatica disposición servicios gratuitos de asistencia lingüística. Llame al [phone].    We comply with applicable federal civil rights laws and [place] laws. We do not discriminate on the basis of race, color, national  origin, age, disability, sex, sexual orientation, or gender identity.            Thank you!     Thank you for choosing [facility]  for your care. Our goal is always to provide you with excellent care. Hearing back from our patients is one way we can continue to improve our services. Please take a few minutes to complete the written survey that you may receive in the mail after your visit with us. Thank you!             Your Updated Medication List - Protect others around you: Learn how to safely use, store and throw away your medicines at www.disposemymeds.org.          This list is accurate as of 9/24/18  4:33 PM.  Always use your most recent med list.                   Brand Name Dispense Instructions for use Diagnosis    baclofen 10 MG tablet    LIORESAL     Take 10-20 mg by mouth 4 times daily as needed        CRANBERRY EXTRACT PO      Take 1 tablet by mouth daily        dimethyl fumarate delayed release capsule    TECFIDERA     Take 240 mg by mouth 2 times daily        levETIRAcetam 1000 MG Tabs     60 tablet    Take 1,000 mg by mouth 2 times daily    Multiple sclerosis (H)       norethindrone 0.35 MG per tablet    MICRONOR    84 tablet    TAKE 1 TABLET(0.35 MG) BY MOUTH DAILY    Surveillance of previously prescribed contraceptive pill       oxybutynin 5 MG 24 hr tablet    DITROPAN-XL     Take 5 mg by mouth daily        sulfamethoxazole-trimethoprim 400-80 MG per tablet    BACTRIM/SEPTRA     Take 1 tablet by mouth daily        venlafaxine 75 MG Tb24 24 hr tablet    EFFEXOR-ER     Take 75 mg by mouth daily (2  X 37.5 mg)        VITAMIN D (CHOLECALCIFEROL) PO      Take 1,000 Units by mouth daily

## 2018-09-25 ENCOUNTER — HOSPITAL ENCOUNTER (OUTPATIENT)
Dept: OCCUPATIONAL THERAPY | Facility: CLINIC | Age: 43
Setting detail: THERAPIES SERIES
End: 2018-09-25
Attending: FAMILY MEDICINE
Payer: COMMERCIAL

## 2018-09-25 ENCOUNTER — HOSPITAL ENCOUNTER (OUTPATIENT)
Dept: REHABILITATION | Facility: CLINIC | Age: 43
End: 2018-09-25
Attending: NURSE PRACTITIONER
Payer: COMMERCIAL

## 2018-09-25 ENCOUNTER — HOSPITAL ENCOUNTER (OUTPATIENT)
Dept: PHYSICAL THERAPY | Facility: CLINIC | Age: 43
Setting detail: THERAPIES SERIES
End: 2018-09-25
Attending: FAMILY MEDICINE
Payer: COMMERCIAL

## 2018-09-25 PROCEDURE — 40000719 ZZHC STATISTIC PT DEPARTMENT NEURO VISIT: Performed by: PHYSICAL THERAPIST

## 2018-09-25 PROCEDURE — 97110 THERAPEUTIC EXERCISES: CPT | Mod: GP | Performed by: PHYSICAL THERAPIST

## 2018-09-25 PROCEDURE — 40000125 ZZHC STATISTIC OT OUTPT VISIT: Performed by: OCCUPATIONAL THERAPIST

## 2018-09-25 PROCEDURE — S5102 ADULT DAY CARE PER DIEM: HCPCS

## 2018-09-25 PROCEDURE — 97535 SELF CARE MNGMENT TRAINING: CPT | Mod: GO | Performed by: OCCUPATIONAL THERAPIST

## 2018-09-25 PROCEDURE — 97116 GAIT TRAINING THERAPY: CPT | Mod: GP | Performed by: PHYSICAL THERAPIST

## 2018-09-25 PROCEDURE — 40000247 ZZH STATISTIC VISIT ADULT DAY PROGRAM

## 2018-09-27 ENCOUNTER — HOSPITAL ENCOUNTER (OUTPATIENT)
Dept: REHABILITATION | Facility: CLINIC | Age: 43
End: 2018-09-27
Attending: NURSE PRACTITIONER
Payer: COMMERCIAL

## 2018-09-27 ENCOUNTER — HOSPITAL ENCOUNTER (OUTPATIENT)
Dept: PHYSICAL THERAPY | Facility: CLINIC | Age: 43
Setting detail: THERAPIES SERIES
End: 2018-09-27
Attending: FAMILY MEDICINE
Payer: COMMERCIAL

## 2018-09-27 LAB
BACTERIA SPEC CULT: ABNORMAL
BACTERIA SPEC CULT: ABNORMAL
SPECIMEN SOURCE: ABNORMAL

## 2018-09-27 PROCEDURE — 40000719 ZZHC STATISTIC PT DEPARTMENT NEURO VISIT: Performed by: PHYSICAL THERAPIST

## 2018-09-27 PROCEDURE — S5102 ADULT DAY CARE PER DIEM: HCPCS

## 2018-09-27 PROCEDURE — 97116 GAIT TRAINING THERAPY: CPT | Mod: GP | Performed by: PHYSICAL THERAPIST

## 2018-09-27 PROCEDURE — 97110 THERAPEUTIC EXERCISES: CPT | Mod: GP | Performed by: PHYSICAL THERAPIST

## 2018-09-27 PROCEDURE — 40000247 ZZH STATISTIC VISIT ADULT DAY PROGRAM

## 2018-09-27 PROCEDURE — 97112 NEUROMUSCULAR REEDUCATION: CPT | Mod: GP | Performed by: PHYSICAL THERAPIST

## 2018-09-28 ENCOUNTER — TELEPHONE (OUTPATIENT)
Dept: FAMILY MEDICINE | Facility: CLINIC | Age: 43
End: 2018-09-28

## 2018-09-28 DIAGNOSIS — G93.40 ENCEPHALOPATHY: Primary | ICD-10-CM

## 2018-09-28 NOTE — TELEPHONE ENCOUNTER
"----- Message from Kassandra Dale PT sent at 9/28/2018  1:47 PM CDT -----  Regarding: walker order (4 wheeled walker)  Dr Beck    I am seeing Shanna for PT along w/ Milagro PT.      She needs:    A four wheeled walker with seat and brakes for gait safety.    Please will you put this in an order under \"medications\" and DME?    thanks  Kassandra Dale PT  [email or web address]  [phone]  Pager [phone]    "

## 2018-09-28 NOTE — TELEPHONE ENCOUNTER
I have attempted to contact this patient by phone with the following results:  Call Kassandra from PT and left message to return my call on answering machine.     Need to know if there is a preferred Medical supplies place to send this script to.     1s attempt    Dickson Alvarado MA

## 2018-10-02 ENCOUNTER — HOSPITAL ENCOUNTER (OUTPATIENT)
Dept: OCCUPATIONAL THERAPY | Facility: CLINIC | Age: 43
Setting detail: THERAPIES SERIES
End: 2018-10-02
Attending: FAMILY MEDICINE
Payer: COMMERCIAL

## 2018-10-02 ENCOUNTER — HOSPITAL ENCOUNTER (OUTPATIENT)
Dept: REHABILITATION | Facility: CLINIC | Age: 43
End: 2018-10-02
Attending: NURSE PRACTITIONER
Payer: COMMERCIAL

## 2018-10-02 ENCOUNTER — TELEPHONE (OUTPATIENT)
Dept: URGENT CARE | Facility: URGENT CARE | Age: 43
End: 2018-10-02

## 2018-10-02 ENCOUNTER — HOSPITAL ENCOUNTER (OUTPATIENT)
Dept: PHYSICAL THERAPY | Facility: CLINIC | Age: 43
Setting detail: THERAPIES SERIES
End: 2018-10-02
Attending: FAMILY MEDICINE
Payer: COMMERCIAL

## 2018-10-02 PROCEDURE — 97112 NEUROMUSCULAR REEDUCATION: CPT | Mod: GP | Performed by: PHYSICAL THERAPIST

## 2018-10-02 PROCEDURE — 97110 THERAPEUTIC EXERCISES: CPT | Mod: GO | Performed by: OCCUPATIONAL THERAPIST

## 2018-10-02 PROCEDURE — 40000125 ZZHC STATISTIC OT OUTPT VISIT: Performed by: OCCUPATIONAL THERAPIST

## 2018-10-02 PROCEDURE — 40000247 ZZH STATISTIC VISIT ADULT DAY PROGRAM

## 2018-10-02 PROCEDURE — 40000719 ZZHC STATISTIC PT DEPARTMENT NEURO VISIT: Performed by: PHYSICAL THERAPIST

## 2018-10-02 PROCEDURE — S5102 ADULT DAY CARE PER DIEM: HCPCS

## 2018-10-02 PROCEDURE — 97116 GAIT TRAINING THERAPY: CPT | Mod: GP | Performed by: PHYSICAL THERAPIST

## 2018-10-02 NOTE — TELEPHONE ENCOUNTER
Inform patient that her urine culture is positive.  Antibiotics for her infection were sent to her pharmacy.  Follow up with PCP if symptoms fail to improve fully.    Thank you  LEIGHA CasanovaC

## 2018-10-04 ENCOUNTER — HOSPITAL ENCOUNTER (OUTPATIENT)
Dept: PHYSICAL THERAPY | Facility: CLINIC | Age: 43
Setting detail: THERAPIES SERIES
End: 2018-10-04
Attending: FAMILY MEDICINE
Payer: COMMERCIAL

## 2018-10-04 ENCOUNTER — HOSPITAL ENCOUNTER (OUTPATIENT)
Dept: REHABILITATION | Facility: CLINIC | Age: 43
End: 2018-10-04
Attending: NURSE PRACTITIONER
Payer: COMMERCIAL

## 2018-10-04 PROCEDURE — 40000247 ZZH STATISTIC VISIT ADULT DAY PROGRAM

## 2018-10-04 PROCEDURE — 97116 GAIT TRAINING THERAPY: CPT | Mod: GP | Performed by: PHYSICAL THERAPIST

## 2018-10-04 PROCEDURE — 40000719 ZZHC STATISTIC PT DEPARTMENT NEURO VISIT: Performed by: PHYSICAL THERAPIST

## 2018-10-04 PROCEDURE — 97112 NEUROMUSCULAR REEDUCATION: CPT | Mod: GP | Performed by: PHYSICAL THERAPIST

## 2018-10-04 PROCEDURE — S5102 ADULT DAY CARE PER DIEM: HCPCS

## 2018-10-04 PROCEDURE — 97110 THERAPEUTIC EXERCISES: CPT | Mod: GP | Performed by: PHYSICAL THERAPIST

## 2018-10-04 NOTE — TELEPHONE ENCOUNTER
I have attempted to contact this patient by phone with the following results:  Spouse state that will want the script to me mail in. Mailed out today.      Shanna Shanks  [address] [facility] [address]      Dickson Alvarado MA

## 2018-10-09 ENCOUNTER — HOSPITAL ENCOUNTER (OUTPATIENT)
Dept: OCCUPATIONAL THERAPY | Facility: CLINIC | Age: 43
Setting detail: THERAPIES SERIES
End: 2018-10-09
Attending: FAMILY MEDICINE
Payer: COMMERCIAL

## 2018-10-09 ENCOUNTER — HOSPITAL ENCOUNTER (OUTPATIENT)
Dept: REHABILITATION | Facility: CLINIC | Age: 43
End: 2018-10-09
Attending: NURSE PRACTITIONER
Payer: COMMERCIAL

## 2018-10-09 PROCEDURE — 40000247 ZZH STATISTIC VISIT ADULT DAY PROGRAM

## 2018-10-09 PROCEDURE — 40000125 ZZHC STATISTIC OT OUTPT VISIT: Performed by: OCCUPATIONAL THERAPIST

## 2018-10-09 PROCEDURE — 97535 SELF CARE MNGMENT TRAINING: CPT | Mod: GO | Performed by: OCCUPATIONAL THERAPIST

## 2018-10-09 PROCEDURE — S5102 ADULT DAY CARE PER DIEM: HCPCS

## 2018-10-09 NOTE — PROGRESS NOTES
10/09/18 1200   Signing Clinician's Name / Credentials   Signing clinician's name / credentials FAIZAN Manrique/NEETU, MSCS   Session Number   Session Number 5/6; OT Outpatient Discharge Summary   Authorization status [employer]   Progress/Recertification   Progress Note Due 11/20/18   OT Goal 1   Goal Identifier cognition   Goal Description Pt will participate in further cognitive assessment and pt with family support will verbalize understanding of degree of supervision/assist pt requires at home for safety   Target Date 11/20/18   Date Met 09/04/18   OT Goal 2   Goal Identifier UE function   Goal Description Pt will participate in hand strengthening and coordination HEP with family assist to optimize hand function for ADL and leisure tasks   Target Date 11/20/18   Date Met 10/09/18   OT Goal 3   Goal Description Pt's family will verbalize understanding of activities to encouragement patient's engagement for cognitive and visual perceptual stimulation and ADL/IADL supervised participation.   Target Date 11/20/18   Date Met 10/09/18   Subjective Report   Subjective Report Has a cold today, I feel crummy.   Objective Measure 2   Objective Measure hand strength   Details key pinch: R: 12#, L 12#; palmar pinch: R= 12# and L = 14#   Objective Measure 3   Objective Measure Motor Free Visual perceptual TEst-vertical   Details 33/36-score is WNL but processing time was lengthy   Self Care/Home Management   Minutes 45 Minutes   Skilled Intervention Visual perceptual assessment and reassessment hand strength. Toileting: incontinent of urine with urgency not making it to toilet on time. Max assist pullups change and LE clothing doff/soy from toilet while seated. Dependent to toilet with poor toileting hygiene attempts despite cues attempts to wipe stool from front-needs assist with wipes for adequate hygiene. Assist for thorrough hand hygiene with adequate soap. Did recognize water too warm to change this  rashid.   Treatment Detail Amb today with SBA wtih 4ww with seat-cues to  handles securely. SBA transfer to/from chair.   Progress goals met   Education   Learner Patient   Readiness Acceptance   Method Demonstration;Booklet/handout;Explanation   Response Verbalizes understanding;Demonstrates understanding;Needs reinforcement   Plan   Updates to plan of care Pt seen for 5 total sessions in OT. Family has not been present with patient seen for OT on days when at center for day program. Pt is not able to recall any follow through with HEP with hand strengthening with putty issued but this was sent home with pt with note and handout in her backpack for family to assist with. Pt was also sent with simple reasoing and working memory task that pt could do with assist of family for cognitive stimulation. Pt did not make gains in hand strength but was ill on day of re-assessment which may have influenced her hand strength. Given her ADL/toileting status, cognitive evaluation, and ongoing mobility needs with reduced balance and fall risk it is recommended pt continue with 24 hour supervision at home. DC OT at this time with goals met. Pt is active in doing a maintenance program of Nu-step bike while in day program for fitness so nothing was added to this.   Total Session Time   Timed Code Treatment Minutes 45   Total Treatment Time (sum of timed and untimed services) 45

## 2018-10-11 ENCOUNTER — HOSPITAL ENCOUNTER (OUTPATIENT)
Dept: REHABILITATION | Facility: CLINIC | Age: 43
End: 2018-10-11
Attending: NURSE PRACTITIONER
Payer: COMMERCIAL

## 2018-10-11 ENCOUNTER — HOSPITAL ENCOUNTER (OUTPATIENT)
Dept: PHYSICAL THERAPY | Facility: CLINIC | Age: 43
Setting detail: THERAPIES SERIES
End: 2018-10-11
Attending: FAMILY MEDICINE
Payer: COMMERCIAL

## 2018-10-11 PROCEDURE — 40000247 ZZH STATISTIC VISIT ADULT DAY PROGRAM

## 2018-10-11 PROCEDURE — 97112 NEUROMUSCULAR REEDUCATION: CPT | Mod: GP | Performed by: PHYSICAL THERAPIST

## 2018-10-11 PROCEDURE — 97116 GAIT TRAINING THERAPY: CPT | Mod: GP | Performed by: PHYSICAL THERAPIST

## 2018-10-11 PROCEDURE — S5102 ADULT DAY CARE PER DIEM: HCPCS

## 2018-10-11 PROCEDURE — 97110 THERAPEUTIC EXERCISES: CPT | Mod: GP | Performed by: PHYSICAL THERAPIST

## 2018-10-11 PROCEDURE — 40000719 ZZHC STATISTIC PT DEPARTMENT NEURO VISIT: Performed by: PHYSICAL THERAPIST

## 2018-10-11 NOTE — PROGRESS NOTES
AC RN Monthly Progress Note  Previous documentation reviewed. No concerns reported by AC staff or member.   Noted recent positive UA f/u by PMD, see previous MD notes

## 2018-10-23 ENCOUNTER — HOSPITAL ENCOUNTER (OUTPATIENT)
Dept: REHABILITATION | Facility: CLINIC | Age: 43
End: 2018-10-23
Attending: NURSE PRACTITIONER
Payer: COMMERCIAL

## 2018-10-23 PROCEDURE — S5102 ADULT DAY CARE PER DIEM: HCPCS

## 2018-10-23 PROCEDURE — 40000247 ZZH STATISTIC VISIT ADULT DAY PROGRAM

## 2018-10-25 ENCOUNTER — HOSPITAL ENCOUNTER (OUTPATIENT)
Dept: REHABILITATION | Facility: CLINIC | Age: 43
End: 2018-10-25
Attending: NURSE PRACTITIONER
Payer: COMMERCIAL

## 2018-10-25 PROCEDURE — S5102 ADULT DAY CARE PER DIEM: HCPCS

## 2018-10-25 PROCEDURE — 40000247 ZZH STATISTIC VISIT ADULT DAY PROGRAM

## 2018-10-27 ENCOUNTER — OFFICE VISIT (OUTPATIENT)
Dept: URGENT CARE | Facility: URGENT CARE | Age: 43
End: 2018-10-27
Payer: COMMERCIAL

## 2018-10-27 VITALS
SYSTOLIC BLOOD PRESSURE: 108 MMHG | RESPIRATION RATE: 16 BRPM | BODY MASS INDEX: 24.63 KG/M2 | DIASTOLIC BLOOD PRESSURE: 70 MMHG | TEMPERATURE: 97.8 F | WEIGHT: 162 LBS | HEART RATE: 72 BPM

## 2018-10-27 DIAGNOSIS — L03.115 CELLULITIS OF RIGHT LOWER EXTREMITY: Primary | ICD-10-CM

## 2018-10-27 DIAGNOSIS — N39.0 ENTEROCOCCUS UTI: ICD-10-CM

## 2018-10-27 DIAGNOSIS — B95.2 ENTEROCOCCUS UTI: ICD-10-CM

## 2018-10-27 DIAGNOSIS — R35.0 URINARY FREQUENCY: ICD-10-CM

## 2018-10-27 DIAGNOSIS — R30.0 DYSURIA: ICD-10-CM

## 2018-10-27 LAB
ALBUMIN UR-MCNC: NEGATIVE MG/DL
APPEARANCE UR: CLEAR
BACTERIA #/AREA URNS HPF: ABNORMAL /HPF
BILIRUB UR QL STRIP: NEGATIVE
COLOR UR AUTO: YELLOW
GLUCOSE UR STRIP-MCNC: NEGATIVE MG/DL
HGB UR QL STRIP: NEGATIVE
KETONES UR STRIP-MCNC: NEGATIVE MG/DL
LEUKOCYTE ESTERASE UR QL STRIP: NEGATIVE
NITRATE UR QL: NEGATIVE
NON-SQ EPI CELLS #/AREA URNS LPF: ABNORMAL /LPF
PH UR STRIP: 6.5 PH (ref 5–7)
RBC #/AREA URNS AUTO: ABNORMAL /HPF
SOURCE: ABNORMAL
SP GR UR STRIP: <=1.005 (ref 1–1.03)
UROBILINOGEN UR STRIP-ACNC: 0.2 EU/DL (ref 0.2–1)
WBC #/AREA URNS AUTO: ABNORMAL /HPF

## 2018-10-27 PROCEDURE — 87186 SC STD MICRODIL/AGAR DIL: CPT | Performed by: FAMILY MEDICINE

## 2018-10-27 PROCEDURE — 87086 URINE CULTURE/COLONY COUNT: CPT | Performed by: FAMILY MEDICINE

## 2018-10-27 PROCEDURE — 99214 OFFICE O/P EST MOD 30 MIN: CPT | Performed by: FAMILY MEDICINE

## 2018-10-27 PROCEDURE — 87088 URINE BACTERIA CULTURE: CPT | Performed by: FAMILY MEDICINE

## 2018-10-27 PROCEDURE — 81001 URINALYSIS AUTO W/SCOPE: CPT | Performed by: FAMILY MEDICINE

## 2018-10-27 RX ORDER — CLINDAMYCIN HCL 300 MG
300 CAPSULE ORAL 4 TIMES DAILY
Qty: 40 CAPSULE | Refills: 0 | Status: SHIPPED | OUTPATIENT
Start: 2018-10-27 | End: 2019-03-22

## 2018-10-27 NOTE — MR AVS SNAPSHOT
After Visit Summary   10/27/2018    Shanna Shanks    MRN: [id number]           Patient Information     Date Of Birth          1975        Visit Information        Provider Department      10/27/2018 1:35 PM Sarthak Fonseca,  [facility]        Today's Diagnoses     Cellulitis of right lower extremity    -  1    Dysuria        Urinary frequency           Follow-ups after your visit        Your next 10 appointments already scheduled     Oct 30, 2018 10:00 AM CDT   Day Program with MSAC RADHA [facility] ([facility])    [address]  [facility] [address]   [phone]            Oct 30, 2018  2:15 PM CDT   Neuro Treatment with Kassandra Dale, KARMEN   [facility] [place], Physical Therapy - Outpatient ([facility])    [address]  [place] [address]   [phone]            Nov 01, 2018 10:00 AM CDT   Day Program with MSAC PER JEAN [place] [facility] ([facility])    [address]  [facility] [address]   [phone]            Nov 06, 2018 10:00 AM CST   Day Program with MSAC PER JEAN [place] [facility] ([facility])    [address]  [facility] [address]   [phone]            Nov 08, 2018 10:00 AM CST   Day Program with MSAC PER JEAN [place] [facility] ([facility])    [address]  [facility] [address]   [phone]            Nov 13, 2018 10:00 AM CST   Day Program with MSAC PER JEAN [place] [facility] ([facility])    [address]  [facility] [address]    [phone]            Nov 15, 2018 10:00 AM CST   Day Program with MSAC PER JEAN   [facility] Day [facility] ([facility])    [address]  [place] [address]   [phone]            Nov 20, 2018 10:00 AM CST   Day Program with MSAC PER JEAN   [facility] Day [facility] ([facility])    [address]  [facility] [address]   [phone]            Nov 27, 2018 10:00 AM CST   Day Program with MSAC PER JEAN   [facility] Day [facility] ([facility])    [address]  [place] [address]   [phone]            Nov 29, 2018 10:00 AM CST   Day Program with MSAC PER JEAN   [facility] Day [facility] ([facility])    [address]  [facility] [address]   [phone]              Who to contact     If you have questions or need follow up information about today's clinic visit or your schedule please contact [facility] directly at [phone].  Normal or non-critical lab and imaging results will be communicated to you by Gigamonhart, letter or phone within 4 business days after the clinic has received the results. If you do not hear from us within 7 days, please contact the clinic through MyChart or phone. If you have a critical or abnormal lab result, we will notify you by phone as soon as possible.  Submit refill requests through [employer] or call your pharmacy and they will forward the refill request to us. Please allow 3 business days for your refill to be completed.          Additional Information About Your Visit        [employer] Information     [employer] gives you secure access to your electronic health record. If you see a primary care provider, you can also send messages to your care  team and make appointments. If you have questions, please call your primary care clinic.  If you do not have a primary care provider, please call [phone] and they will assist you.        Care EveryWhere ID     This is your Care EveryWhere ID. This could be used by other organizations to access your [place] medical records  [id number]        Your Vitals Were     Pulse Temperature Respirations BMI (Body Mass Index)          72 97.8  F (36.6  C) (Oral) 16 24.63 kg/m2         Blood Pressure from Last 3 Encounters:   10/27/18 108/70   09/24/18 102/58   07/09/18 114/65    Weight from Last 3 Encounters:   10/27/18 162 lb (73.5 kg)   07/09/18 155 lb 4 oz (70.4 kg)   06/07/18 159 lb 11.2 oz (72.4 kg)              We Performed the Following     UA with Microscopic reflex to Culture     Urine Culture Aerobic Bacterial          Today's Medication Changes          These changes are accurate as of 10/27/18  2:21 PM.  If you have any questions, ask your nurse or doctor.               Start taking these medicines.        Dose/Directions    clindamycin 300 MG capsule   Commonly known as:  CLEOCIN   Used for:  Cellulitis of right lower extremity   Started by:  Sarthak Fonseca DO        Dose:  300 mg   Take 1 capsule (300 mg) by mouth 4 times daily for 10 days   Quantity:  40 capsule   Refills:  0            Where to get your medicines      These medications were sent to [facility] [address] [place] [address] & [address], [place] [address]     Phone:  [phone]     clindamycin 300 MG capsule                Primary Care Provider Office Phone # Fax #    Malvin Cesario Beck [phone] [address] [place] [address]        Goals        General    # 1Medical (pt-stated)     Notes - Note created  6/8/2018 10:18 AM by Akilah Velarde, RN    Goal Statement: I will seek medical help if any signs of seizure activity  Measure of Success:  Decreased seizure activity   Supportive Steps to Achieve:   I will keep future Neurology appointments  I will take Keppra as directed   Barriers: Recent seizure   Strengths: Supportive  who can watch for any changes in behavior   Date to Achieve By: Ongoing         Equal Access to Services     DUANE HARPER : Hadii aad ku hadsloano Sorip, waaxda luqadaha, qaybta kaalmada eh, liza tejada christinesaundra prado darnell caldwell. So [facility] [phone].    ATENCIÓN: Si habla español, tiene a gatica disposición servicios gratuitos de asistencia lingüística. Llame al [phone].    We comply with applicable federal civil rights laws and [place] laws. We do not discriminate on the basis of race, color, national origin, age, disability, sex, sexual orientation, or gender identity.            Thank you!     Thank you for choosing [facility]  for your care. Our goal is always to provide you with excellent care. Hearing back from our patients is one way we can continue to improve our services. Please take a few minutes to complete the written survey that you may receive in the mail after your visit with us. Thank you!             Your Updated Medication List - Protect others around you: Learn how to safely use, store and throw away your medicines at www.disposemymeds.org.          This list is accurate as of 10/27/18  2:21 PM.  Always use your most recent med list.                   Brand Name Dispense Instructions for use Diagnosis    amoxicillin-clavulanate 875-125 MG per tablet    AUGMENTIN    14 tablet    Take 1 tablet by mouth 2 times daily    Enterococcus faecalis infection       baclofen 10 MG tablet    LIORESAL     Take 10-20 mg by mouth 4 times daily as needed        clindamycin 300 MG capsule    CLEOCIN    40 capsule    Take 1 capsule (300 mg) by mouth 4 times daily for 10 days    Cellulitis of right lower extremity       CRANBERRY EXTRACT PO      Take 1 tablet by mouth daily         dimethyl fumarate delayed release capsule    TECFIDERA     Take 240 mg by mouth 2 times daily        levETIRAcetam 1000 MG Tabs     60 tablet    Take 1,000 mg by mouth 2 times daily    Multiple sclerosis (H)       norethindrone 0.35 MG per tablet    MICRONOR    84 tablet    TAKE 1 TABLET(0.35 MG) BY MOUTH DAILY    Surveillance of previously prescribed contraceptive pill       order for DME     1 Units    Equipment being ordered: four wheeled walker with seat and brakes    Encephalopathy       oxybutynin 5 MG 24 hr tablet    DITROPAN-XL     Take 5 mg by mouth daily        sulfamethoxazole-trimethoprim 400-80 MG per tablet    BACTRIM/SEPTRA     Take 1 tablet by mouth daily        venlafaxine 75 MG Tb24 24 hr tablet    EFFEXOR-ER     Take 75 mg by mouth daily (2  X 37.5 mg)        VITAMIN D (CHOLECALCIFEROL) PO      Take 1,000 Units by mouth daily

## 2018-10-29 NOTE — PROGRESS NOTES
SUBJECTIVE: Shanna Shanks is a 43 year old female presenting with a chief complaint of red tender rash on calf and possible UTI with urinary frequency.  Onset of symptoms was day(s) ago.  Course of illness is worsening.    Severity moderate  Current and Associated symptoms: none  Treatment measures tried include None tried.  Predisposing factors include None.    Past Medical History:   Diagnosis Date     Multiple sclerosis (H) 3/2/96    last exacerbation 3yrs ago, no meds x 6 months     Tobacco dependency        Past Surgical History:   Procedure Laterality Date     HC DILATION/CURETTAGE DIAG/THER NON OB  1/1/05    D & C, missed AB       Family History   Problem Relation Age of Onset     Family History Negative Mother      Diabetes Father      Cancer - colorectal Paternal Grandmother      HEART DISEASE Paternal Grandfather      MI       Social History   Substance Use Topics     Smoking status: Former Smoker     Packs/day: 0.25     Types: Cigarettes     Smokeless tobacco: Never Used      Comment: quit a few weeks ago     Alcohol use 3.5 oz/week     7 Standard drinks or equivalent per week      Comment: social drinker     ROS:  SKIN: no rash  GI: no vomiting    OBJECTIVE:  /70 (Cuff Size: Adult Regular)  Pulse 72  Temp 97.8  F (36.6  C) (Oral)  Resp 16  Wt 162 lb (73.5 kg)  BMI 24.63 kg/u8YMYSZOU APPEARANCE: healthy, alert and no distress  ABDOMEN:  soft, nontender, no HSM or masses and bowel sounds normal  SKIN: rt calf with redness/tenderness      ICD-10-CM    1. Cellulitis of right lower extremity L03.115 clindamycin (CLEOCIN) 300 MG capsule   2. Dysuria R30.0 UA with Microscopic reflex to Culture     Urine Culture Aerobic Bacterial   3. Urinary frequency R35.0 Urine Culture Aerobic Bacterial     cc UC  Fluids/Rest, f/u if worse/not any better

## 2018-10-30 ENCOUNTER — HOSPITAL ENCOUNTER (OUTPATIENT)
Dept: REHABILITATION | Facility: CLINIC | Age: 43
End: 2018-10-30
Attending: NURSE PRACTITIONER
Payer: COMMERCIAL

## 2018-10-30 ENCOUNTER — TELEPHONE (OUTPATIENT)
Dept: URGENT CARE | Facility: URGENT CARE | Age: 43
End: 2018-10-30

## 2018-10-30 ENCOUNTER — TELEPHONE (OUTPATIENT)
Dept: PHYSICAL THERAPY | Facility: CLINIC | Age: 43
End: 2018-10-30

## 2018-10-30 ENCOUNTER — HOSPITAL ENCOUNTER (OUTPATIENT)
Dept: PHYSICAL THERAPY | Facility: CLINIC | Age: 43
Setting detail: THERAPIES SERIES
End: 2018-10-30
Attending: FAMILY MEDICINE
Payer: COMMERCIAL

## 2018-10-30 LAB
BACTERIA SPEC CULT: ABNORMAL
SPECIMEN SOURCE: ABNORMAL

## 2018-10-30 PROCEDURE — 40000247 ZZH STATISTIC VISIT ADULT DAY PROGRAM

## 2018-10-30 PROCEDURE — 40000719 ZZHC STATISTIC PT DEPARTMENT NEURO VISIT: Performed by: PHYSICAL THERAPIST

## 2018-10-30 PROCEDURE — 97530 THERAPEUTIC ACTIVITIES: CPT | Mod: GP | Performed by: PHYSICAL THERAPIST

## 2018-10-30 PROCEDURE — S5102 ADULT DAY CARE PER DIEM: HCPCS

## 2018-10-30 RX ORDER — AMOXICILLIN 500 MG/1
500 CAPSULE ORAL 3 TIMES DAILY
Qty: 30 CAPSULE | Refills: 0 | Status: SHIPPED | OUTPATIENT
Start: 2018-10-30 | End: 2019-03-22

## 2018-10-30 NOTE — PROGRESS NOTES
Outpatient Physical Therapy Discharge Note     Patient: Shanna Shanks  : 1975    Beginning/End Dates of Reporting Period:  18 to 10/30/2018    Referring Provider: SONYA Orr Diagnosis: MS weakness     Client Self Report: pt agrees she has a walker at home but has not used it.  she pointed out a red area on left calf.  per husb phone call pt is being followed by urgent care and will see PCP tomorrow.    Objective Measurements:        Objective Measure: 25 ft walk test  Details: 13 sec  Objective Measure: TUG  Details: 16 sec w/ 4ww.   19 sec w/out AD.   Objective Measure: nustep/ 30 sec STS  Details: 8x for 30 sec sit/stand      Goals:  Goal Identifier TUG   Goal Description Pt will perfomr TUG in less than 16 sconds to demonstrate improved balance and strength and decrease risk for falls   Target Date 10/19/18   Date Met  10/30/18   Progress:     Goal Identifier repeated STS  -nearly met at 8x.   Goal Description Pt will perform 10 STS in 30 seconds to demonstrate improve BLE strength and dynamic balance   Target Date 10/19/18   Date Met      Progress:     Goal Identifier Modified Rhomberg   Goal Description Pt will be able to hold modified rhomberg position for 30 seconds on BLEs to demonstrate improve static balance for safey with functional mobility    Target Date 10/19/18   Date Met  17   Progress:     Goal Identifier HEP   Goal Description Pt will be able to perform HEP with SBA for balance and strength for ongoing wellness   Target Date 10/19/18   Date Met  10/30/18   Progress:       Progress Toward Goals:   Progress this reporting period: see above. Pt has a new 4ww     Plan:  Discharge from therapy.    Discharge:      yes    Reason for Discharge: Patient has met all goals as above.    Equipment Issued: 4ww    Discharge Plan: Patient to continue home program.

## 2018-10-30 NOTE — PROGRESS NOTES
10/30/18 1400   Signing Clinician's Name / Credentials   Signing clinician's name / credentials Kassandra Dale PT   Session Number   Session Number 13.   Goal 1   Goal Identifier TUG   Goal Description Pt will perfomr TUG in less than 16 sconds to demonstrate improved balance and strength and decrease risk for falls   Target Date 10/19/18   Date Met 10/30/18   Goal 2   Goal Identifier repeated STS  -nearly met at 8x.   Goal Description Pt will perform 10 STS in 30 seconds to demonstrate improve BLE strength and dynamic balance   Target Date 10/19/18   Goal 3   Goal Identifier Modified Rhomberg   Goal Description Pt will be able to hold modified rhomberg position for 30 seconds on BLEs to demonstrate improve static balance for safey with functional mobility    Target Date 10/19/18   Date Met 09/27/17   Goal 4   Goal Identifier HEP   Goal Description Pt will be able to perform HEP with SBA for balance and strength for ongoing wellness   Target Date 10/19/18   Date Met 10/30/18   Subjective Report   Subjective Report pt agrees she has a walker at home but has not used it.  she pointed out a red area on left calf.  per husb phone call pt is being followed by urgent care and will see PCP tomorrow.   Objective Measure 2   Objective Measure 25 ft walk test   Details 13 sec   Objective Measure 3   Objective Measure TUG   Details 16 sec w/ 4ww.   19 sec w/out AD.    Objective Measure 5   Objective Measure nustep/ 30 sec STS   Details 8x for 30 sec sit/stand   Therapeutic Activity   Minutes 20   Skilled Intervention toilet tx, gait, sit/stand   Patient Response trena well except for non warm red area L calf, being followed by [facility]   Treatment Detail toilet tx indep,  gait tests and STS above   Progress improved.  has 4ww at home.  on nustep at day program.   Education   Learner Patient;Significant Other   Readiness Acceptance   Method Explanation   Response Verbalizes Understanding   Education Comments husb will take  pt to doc tomorrow for L calf   Communication with other professionals   Communication with other professionals husb via phone call   Plan   Homework above   Plan for next session d/c/ cont Nustep at [facility] day program   Total Session Time   Timed Code Treatment Minutes 20   Total Treatment Time (sum of timed and untimed services) 20

## 2018-10-31 ENCOUNTER — HOSPITAL ENCOUNTER (EMERGENCY)
Facility: CLINIC | Age: 43
Discharge: HOME | End: 2018-10-31
Attending: EMERGENCY MEDICINE | Admitting: EMERGENCY MEDICINE
Payer: COMMERCIAL

## 2018-10-31 ENCOUNTER — APPOINTMENT (OUTPATIENT)
Dept: ULTRASOUND IMAGING | Facility: CLINIC | Age: 43
End: 2018-10-31
Attending: EMERGENCY MEDICINE
Payer: COMMERCIAL

## 2018-10-31 ENCOUNTER — OFFICE VISIT (OUTPATIENT)
Dept: FAMILY MEDICINE | Facility: CLINIC | Age: 43
End: 2018-10-31
Payer: COMMERCIAL

## 2018-10-31 VITALS
BODY MASS INDEX: 24.08 KG/M2 | SYSTOLIC BLOOD PRESSURE: 108 MMHG | DIASTOLIC BLOOD PRESSURE: 64 MMHG | TEMPERATURE: 97.9 F | OXYGEN SATURATION: 99 % | WEIGHT: 158.4 LBS | RESPIRATION RATE: 14 BRPM

## 2018-10-31 VITALS
OXYGEN SATURATION: 98 % | DIASTOLIC BLOOD PRESSURE: 69 MMHG | HEART RATE: 74 BPM | HEIGHT: 68 IN | SYSTOLIC BLOOD PRESSURE: 122 MMHG | RESPIRATION RATE: 16 BRPM | WEIGHT: 160.5 LBS | BODY MASS INDEX: 24.32 KG/M2 | TEMPERATURE: 98.4 F

## 2018-10-31 DIAGNOSIS — T14.8XXA INFECTION OF WOUND HEMATOMA: ICD-10-CM

## 2018-10-31 DIAGNOSIS — L08.9 INFECTION OF WOUND HEMATOMA: ICD-10-CM

## 2018-10-31 DIAGNOSIS — L03.119 CELLULITIS OF CALF: Primary | ICD-10-CM

## 2018-10-31 DIAGNOSIS — S80.12XA HEMATOMA OF LEG, LEFT, INITIAL ENCOUNTER: ICD-10-CM

## 2018-10-31 LAB
ANION GAP SERPL CALCULATED.3IONS-SCNC: 7 MMOL/L (ref 3–14)
BASOPHILS # BLD AUTO: 0 10E9/L (ref 0–0.2)
BASOPHILS NFR BLD AUTO: 0.4 %
BUN SERPL-MCNC: 10 MG/DL (ref 7–30)
CALCIUM SERPL-MCNC: 9 MG/DL (ref 8.5–10.1)
CHLORIDE SERPL-SCNC: 102 MMOL/L (ref 94–109)
CO2 SERPL-SCNC: 27 MMOL/L (ref 20–32)
CREAT SERPL-MCNC: 0.79 MG/DL (ref 0.52–1.04)
CRP SERPL-MCNC: 9 MG/L (ref 0–8)
DIFFERENTIAL METHOD BLD: NORMAL
EOSINOPHIL # BLD AUTO: 0.2 10E9/L (ref 0–0.7)
EOSINOPHIL NFR BLD AUTO: 3.7 %
ERYTHROCYTE [DISTWIDTH] IN BLOOD BY AUTOMATED COUNT: 13.2 % (ref 10–15)
ERYTHROCYTE [SEDIMENTATION RATE] IN BLOOD BY WESTERGREN METHOD: 35 MM/H (ref 0–20)
GFR SERPL CREATININE-BSD FRML MDRD: 79 ML/MIN/1.7M2
GLUCOSE SERPL-MCNC: 93 MG/DL (ref 70–99)
HCT VFR BLD AUTO: 35.7 % (ref 35–47)
HGB BLD-MCNC: 11.8 G/DL (ref 11.7–15.7)
IMM GRANULOCYTES # BLD: 0 10E9/L (ref 0–0.4)
IMM GRANULOCYTES NFR BLD: 0.2 %
LYMPHOCYTES # BLD AUTO: 1.9 10E9/L (ref 0.8–5.3)
LYMPHOCYTES NFR BLD AUTO: 41 %
MCH RBC QN AUTO: 30.6 PG (ref 26.5–33)
MCHC RBC AUTO-ENTMCNC: 33.1 G/DL (ref 31.5–36.5)
MCV RBC AUTO: 93 FL (ref 78–100)
MONOCYTES # BLD AUTO: 0.6 10E9/L (ref 0–1.3)
MONOCYTES NFR BLD AUTO: 12.8 %
NEUTROPHILS # BLD AUTO: 1.9 10E9/L (ref 1.6–8.3)
NEUTROPHILS NFR BLD AUTO: 41.9 %
NRBC # BLD AUTO: 0 10*3/UL
NRBC BLD AUTO-RTO: 0 /100
PLATELET # BLD AUTO: 307 10E9/L (ref 150–450)
POTASSIUM SERPL-SCNC: 3.7 MMOL/L (ref 3.4–5.3)
RBC # BLD AUTO: 3.86 10E12/L (ref 3.8–5.2)
SODIUM SERPL-SCNC: 136 MMOL/L (ref 133–144)
WBC # BLD AUTO: 4.6 10E9/L (ref 4–11)

## 2018-10-31 PROCEDURE — 85025 COMPLETE CBC W/AUTO DIFF WBC: CPT | Performed by: EMERGENCY MEDICINE

## 2018-10-31 PROCEDURE — 85652 RBC SED RATE AUTOMATED: CPT | Performed by: EMERGENCY MEDICINE

## 2018-10-31 PROCEDURE — 99214 OFFICE O/P EST MOD 30 MIN: CPT | Performed by: FAMILY MEDICINE

## 2018-10-31 PROCEDURE — 99285 EMERGENCY DEPT VISIT HI MDM: CPT | Mod: 25

## 2018-10-31 PROCEDURE — 87070 CULTURE OTHR SPECIMN AEROBIC: CPT | Performed by: EMERGENCY MEDICINE

## 2018-10-31 PROCEDURE — 80048 BASIC METABOLIC PNL TOTAL CA: CPT | Performed by: EMERGENCY MEDICINE

## 2018-10-31 PROCEDURE — 96365 THER/PROPH/DIAG IV INF INIT: CPT | Mod: 59

## 2018-10-31 PROCEDURE — 76882 US LMTD JT/FCL EVL NVASC XTR: CPT | Mod: LT

## 2018-10-31 PROCEDURE — 93971 EXTREMITY STUDY: CPT | Mod: LT

## 2018-10-31 PROCEDURE — 96366 THER/PROPH/DIAG IV INF ADDON: CPT | Mod: 59

## 2018-10-31 PROCEDURE — 10060 I&D ABSCESS SIMPLE/SINGLE: CPT

## 2018-10-31 PROCEDURE — 25000132 ZZH RX MED GY IP 250 OP 250 PS 637: Performed by: EMERGENCY MEDICINE

## 2018-10-31 PROCEDURE — 25000128 H RX IP 250 OP 636: Performed by: EMERGENCY MEDICINE

## 2018-10-31 PROCEDURE — 96375 TX/PRO/DX INJ NEW DRUG ADDON: CPT

## 2018-10-31 PROCEDURE — 86140 C-REACTIVE PROTEIN: CPT | Performed by: EMERGENCY MEDICINE

## 2018-10-31 RX ORDER — DOXYCYCLINE 100 MG/1
100 CAPSULE ORAL 2 TIMES DAILY
Qty: 20 CAPSULE | Refills: 0 | Status: SHIPPED | OUTPATIENT
Start: 2018-10-31 | End: 2019-03-22

## 2018-10-31 RX ORDER — IBUPROFEN 600 MG/1
600 TABLET, FILM COATED ORAL ONCE
Status: COMPLETED | OUTPATIENT
Start: 2018-10-31 | End: 2018-10-31

## 2018-10-31 RX ORDER — HYDROMORPHONE HYDROCHLORIDE 1 MG/ML
0.5 INJECTION, SOLUTION INTRAMUSCULAR; INTRAVENOUS; SUBCUTANEOUS
Status: DISCONTINUED | OUTPATIENT
Start: 2018-10-31 | End: 2018-10-31 | Stop reason: HOSPADM

## 2018-10-31 RX ADMIN — IBUPROFEN 600 MG: 600 TABLET, FILM COATED ORAL at 18:12

## 2018-10-31 RX ADMIN — Medication 0.5 MG: at 20:11

## 2018-10-31 RX ADMIN — VANCOMYCIN HYDROCHLORIDE 1500 MG: 5 INJECTION, POWDER, LYOPHILIZED, FOR SOLUTION INTRAVENOUS at 19:01

## 2018-10-31 ASSESSMENT — ENCOUNTER SYMPTOMS
VOMITING: 0
FATIGUE: 0
CHILLS: 0
FEVER: 0
NAUSEA: 0

## 2018-10-31 NOTE — ED AVS SNAPSHOT
Emergency Department    [address] [facility] [address]    Phone:  [phone]    Fax:  [phone]                                       Shanna Shanks   MRN: [id number]    Department:   Emergency Department   Date of Visit:  10/31/2018           Patient Information     Date Of Birth          1975        Your diagnoses for this visit were:     Hematoma of leg, left, initial encounter     Infection of wound hematoma        You were seen by Herrera Mattson MD.      Follow-up Information     Follow up with  Emergency Department.    Specialty:  EMERGENCY MEDICINE    Why:  As needed    Contact information:    [address] [facility] [address] [phone]        Follow up with Malvin Beck MD In 2 days.    Specialty:  Family Practice    Contact information:    [address] [facility] [address] [phone]          Discharge Instructions       Take the below medications as prescribed.  Please do not miss any doses.    New Prescriptions    DOXYCYCLINE (VIBRAMYCIN) 100 MG CAPSULE    Take 1 capsule (100 mg) by mouth 2 times daily for 10 days     Stop clindamycin.  Continue amoxicillin.    1. -Take acetaminophen 500 to 1000 mg by mouth every 4 to 6 hours as needed for pain or fever.  Do not take more than 4000 mg in 24 hours.  Do not take within 6 hours of another acetaminophen containing medication such as norco (vicodin) or percocet.  - Take ibuprofen 600 to 800 mg by mouth every 6 to 8 hours as needed for pain or fever  2. Use ice as needed for swelling and/or pain  3. Elevate extremity to help with swelling.  4. Follow-up with your primary doctor in 2 days for recheck and packing removal.  After the packing is removed, please soak the area in a bathtub in warm water for approximately 20 minutes/day until healed.  5. You may return to the ED as needed for new or worsening symptoms such as fever greater than 100.4  F, increased pain,  worsening redness, vomiting and unable to keep anything down, reinjury, severe and uncontrollable pain, focal weakness, severe numbness and tingling, any other concerning symptoms.            Discharge Instructions  Cellulitis    Cellulitis is an infection of the skin that occurs when bacteria enter the skin.   Symptoms are generally redness, swelling, warmth and pain.  Your infection appeared to be appropriate to treat at home with antibiotics.  However, sometimes your infection may be worse than it seemed at first, or may worsen with time. If you have new or worse symptoms, you may need to be seen again in the Emergency Department or by your primary provider.    Generally, every Emergency Department visit should have a follow-up clinic visit with either a primary or a specialty clinic/provider. Please follow-up as instructed by your emergency provider today.    Return to the Emergency Department if:    The redness, pain, or swelling gets a lot worse.  If the red area was marked, return if it is red significantly beyond the marked area.    You are unable to get your antibiotics, or are vomiting (throwing up) these pills, or you cannot take them.    You are feeling more ill, weak or lightheaded.    You start to run a new fever (temperature >101 F).    Anything else about the infection worries or concerns you.  Treatment:    Start your antibiotics right away, and take them as prescribed. Be sure to finish the whole prescription, even if you are better.    Apply a heating pad, warm packs, or warm water soaks to the infected area for 15 minutes at a time, at least 3 times a day. Do not use a heating pad on your feet or legs if you have diabetes. Do not sleep with a heating pad on, since this can cause burns or skin injury.    Rest your injured area for at least 1-2 days. After that you may start using your extremity again as long as there is not too much pain.     Raise the injured area above the level of your heart as  much as possible in the first 1-2 days.    Tylenol  (acetaminophen), Motrin  (ibuprofen), or Advil  (ibuprofen) may help may help reduce pain and fever and may help you feel more comfortable. Be sure to read and follow the package directions, and ask your provider if you have questions.    If you were given a prescription for medicine here today, be sure to read all of the information (including the package insert) that comes with your prescription.  This will include important information about the medicine, its side effects, and any warnings that you need to know about.  The pharmacist who fills the prescription can provide more information and answer questions you may have about the medicine.  If you have questions or concerns that the pharmacist cannot address, please call or return to the Emergency Department.     Remember that you can always come back to the Emergency Department if you are not able to see your regular provider in the amount of time listed above, if you get any new symptoms, or if there is anything that worries you.      Your next 10 appointments already scheduled     Nov 01, 2018 10:00 AM CDT   Day Program with [facility] ([facility])    [address]  [facility] [place] [address]   [phone]            Nov 06, 2018 10:00 AM CST   Day Program with [facility] ([facility])    [address]  [place] [place] [address]   [phone]            Nov 08, 2018 10:00 AM CST   Day Program with [facility] ([facility])    [address]  [place] [place] [address]   [phone]            Nov 13, 2018 10:00 AM CST   Day Program with [facility] Day [facility]  ([facility])    [address]  [place] [address]   [phone]            Nov 15, 2018 10:00 AM CST   Day Program with MS[facility] PER JEAN   [facility] ([facility])    [address]  [place] [address]   [phone]            Nov 20, 2018 10:00 AM CST   Day Program with MS[facility] PER JEAN   [facility] ([facility])    [address]  [place] [address]   [phone]            Nov 27, 2018 10:00 AM CST   Day Program with MS[facility] PER JEAN   [facility] ([facility])    [address]  [place] [address]   [phone]            Nov 29, 2018 10:00 AM CST   Day Program with MS[facility] PER JEAN   [facility] ([facility])    [address]  [place] [address]   [phone]            Dec 04, 2018 10:00 AM CST   Day Program with MS[facility] PER JEAN   [facility] ([facility])    [address]  [place] [address]   [phone]            Dec 06, 2018 10:00 AM CST   Day Program with MS[facility] PER JEAN   [facility] ([facility])    [address]  [place] [address]   [phone]              24 Hour Appointment Hotline       To make an appointment at any [facility] clinic, call [phone] ([phone]). If you don't have a family doctor or clinic, we will help you find one. [facility] clinics are conveniently located to serve the needs of you and your family.             Review of your medicines      START taking        Dose /  Directions Last dose taken    doxycycline 100 MG capsule   Commonly known as:  VIBRAMYCIN   Dose:  100 mg   Quantity:  20 capsule        Take 1 capsule (100 mg) by mouth 2 times daily for 10 days   Refills:  0          Our records show that you are taking the medicines listed below. If these are incorrect, please call your family doctor or clinic.        Dose / Directions Last dose taken    amoxicillin 500 MG capsule   Commonly known as:  AMOXIL   Dose:  500 mg   Quantity:  30 capsule        Take 1 capsule (500 mg) by mouth 3 times daily for 10 days   Refills:  0        baclofen 10 MG tablet   Commonly known as:  LIORESAL   Dose:  10-20 mg        Take 10-20 mg by mouth 4 times daily as needed   Refills:  3        clindamycin 300 MG capsule   Commonly known as:  CLEOCIN   Dose:  300 mg   Quantity:  40 capsule        Take 1 capsule (300 mg) by mouth 4 times daily for 10 days   Refills:  0        CRANBERRY EXTRACT PO   Dose:  1 tablet        Take 1 tablet by mouth daily   Refills:  0        dimethyl fumarate delayed release capsule   Commonly known as:  TECFIDERA   Dose:  240 mg        Take 240 mg by mouth 2 times daily   Refills:  0        levETIRAcetam 1000 MG Tabs   Dose:  1000 mg   Quantity:  60 tablet        Take 1,000 mg by mouth 2 times daily   Refills:  1        norethindrone 0.35 MG per tablet   Commonly known as:  MICRONOR   Quantity:  84 tablet        TAKE 1 TABLET(0.35 MG) BY MOUTH DAILY   Refills:  3        order for DME   Quantity:  1 Units        Equipment being ordered: four wheeled walker with seat and brakes   Refills:  0        oxybutynin 5 MG 24 hr tablet   Commonly known as:  DITROPAN-XL   Dose:  5 mg        Take 5 mg by mouth daily   Refills:  0        sulfamethoxazole-trimethoprim 400-80 MG per tablet   Commonly known as:  BACTRIM/SEPTRA   Dose:  1 tablet        Take 1 tablet by mouth daily   Refills:  0        venlafaxine 75 MG Tb24 24 hr tablet   Commonly known as:  EFFEXOR-ER   Dose:  75 mg         Take 75 mg by mouth daily (2  X 37.5 mg)   Refills:  0        VITAMIN D (CHOLECALCIFEROL) PO   Dose:  1000 Units        Take 1,000 Units by mouth daily   Refills:  0                Prescriptions were sent or printed at these locations (1 Prescription)                   Other Prescriptions                Printed at Department/Unit printer (1 of 1)         doxycycline (VIBRAMYCIN) 100 MG capsule                Procedures and tests performed during your visit     Basic metabolic panel    CBC with platelets differential    CRP inflammation    Erythrocyte sedimentation rate auto    POC US SOFT TISSUE    Pharmacy to dose vancomycin    US Extremity Non Vascular Left    US Lower Extremity Venous Duplex Left      Orders Needing Specimen Collection     Ordered          10/31/18 2052  Wound Culture Aerobic Bacterial - STAT, [facility]: STAT, Needs to be Collected     Scheduled Task Status   10/31/18 2053 Collect Wound Culture Aerobic Bacterial Open   Order Class:  PCU Collect                  Pending Results     Date and Time Order Name Status Description    10/31/2018 8150 POC US SOFT TISSUE In process             Pending Culture Results     No orders found from 10/29/2018 to 11/1/2018.            Pending Results Instructions     If you had any lab results that were not finalized at the time of your Discharge, you can call the ED Lab Result RN at [phone]. You will be contacted by this team for any positive Lab results or changes in treatment. The nurses are available 7 days a week from 10A to 6:30P.  You can leave a message 24 hours per day and they will return your call.        Test Results From Your Hospital Stay        10/31/2018  5:45 PM      Component Results     Component Value Ref Range & Units Status    WBC 4.6 4.0 - 11.0 10e9/L Final    RBC Count 3.86 3.8 - 5.2 10e12/L Final    Hemoglobin 11.8 11.7 - 15.7 g/dL Final    Hematocrit 35.7 35.0 - 47.0 % Final    MCV 93 78 - 100 fl Final    MCH 30.6 26.5 - 33.0 pg Final     MCHC 33.1 31.5 - 36.5 g/dL Final    RDW 13.2 10.0 - 15.0 % Final    Platelet Count 307 150 - 450 10e9/L Final    Diff Method Automated Method  Final    % Neutrophils 41.9 % Final    % Lymphocytes 41.0 % Final    % Monocytes 12.8 % Final    % Eosinophils 3.7 % Final    % Basophils 0.4 % Final    % Immature Granulocytes 0.2 % Final    Nucleated RBCs 0 0 /100 Final    Absolute Neutrophil 1.9 1.6 - 8.3 10e9/L Final    Absolute Lymphocytes 1.9 0.8 - 5.3 10e9/L Final    Absolute Monocytes 0.6 0.0 - 1.3 10e9/L Final    Absolute Eosinophils 0.2 0.0 - 0.7 10e9/L Final    Absolute Basophils 0.0 0.0 - 0.2 10e9/L Final    Abs Immature Granulocytes 0.0 0 - 0.4 10e9/L Final    Absolute Nucleated RBC 0.0  Final         10/31/2018  6:07 PM      Component Results     Component Value Ref Range & Units Status    Sodium 136 133 - 144 mmol/L Final    Potassium 3.7 3.4 - 5.3 mmol/L Final    Chloride 102 94 - 109 mmol/L Final    Carbon Dioxide 27 20 - 32 mmol/L Final    Anion Gap 7 3 - 14 mmol/L Final    Glucose 93 70 - 99 mg/dL Final    Urea Nitrogen 10 7 - 30 mg/dL Final    Creatinine 0.79 0.52 - 1.04 mg/dL Final    GFR Estimate 79 >60 mL/min/1.7m2 Final    Non  GFR Calc    GFR Estimate If Black >90 >60 mL/min/1.7m2 Final    African American GFR Calc    Calcium 9.0 8.5 - 10.1 mg/dL Final         10/31/2018  6:12 PM      Component Results     Component Value Ref Range & Units Status    Sed Rate 35 (H) 0 - 20 mm/h Final         10/31/2018  6:07 PM      Component Results     Component Value Ref Range & Units Status    CRP Inflammation 9.0 (H) 0.0 - 8.0 mg/L Final         10/31/2018  5:59 PM      Narrative     VENOUS ULTRASOUND LEFT LEG  10/31/2018 5:56 PM     HISTORY: Leg redness and swelling. Cellulitis.    COMPARISON: None.    FINDINGS:  Examination of the deep veins with graded compression and  color flow Doppler with spectral wave form analysis shows no evidence  of thrombus in the common femoral vein, femoral vein,  popliteal vein  or calf veins.  There is no venous insufficiency.        Impression     IMPRESSION: No evidence of deep venous thrombosis.    LACHO ONEAL MD         10/31/2018  8:31 PM      Narrative     US EXTREMITY NON VASCULAR LEFT  10/31/2018 5:57 PM     HISTORY: Redness and swelling to posterior left leg, evaluate abscess.      COMPARISON: None.    FINDINGS: In the region of swelling in the calf there is a focal  poorly defined fluid collection with internal debris measuring about  3.4 x 1.0 x 2.0 cm. There is adjacent edema.        Impression     IMPRESSION: Focal irregularly shaped fluid collection in the calf in  the region of the redness and swelling. Differential diagnosis  includes abscess, seroma and hematoma.    LACHO ONEAL MD         10/31/2018  6:55 PM      Result not yet available     Exam Begun                Clinical Quality Measure: Blood Pressure Screening     Your blood pressure was checked while you were in the emergency department today. The last reading we obtained was  BP: 113/64 . Please read the guidelines below about what these numbers mean and what you should do about them.  If your systolic blood pressure (the top number) is less than 120 and your diastolic blood pressure (the bottom number) is less than 80, then your blood pressure is normal. There is nothing more that you need to do about it.  If your systolic blood pressure (the top number) is 120-139 or your diastolic blood pressure (the bottom number) is 80-89, your blood pressure may be higher than it should be. You should have your blood pressure rechecked within a year by a primary care provider.  If your systolic blood pressure (the top number) is 140 or greater or your diastolic blood pressure (the bottom number) is 90 or greater, you may have high blood pressure. High blood pressure is treatable, but if left untreated over time it can put you at risk for heart attack, stroke, or kidney failure. You should have your  blood pressure rechecked by a primary care provider within the next 4 weeks.  If your provider in the emergency department today gave you specific instructions to follow-up with your doctor or provider even sooner than that, you should follow that instruction and not wait for up to 4 weeks for your follow-up visit.        Thank you for choosing [facility]       Thank you for choosing [facility] for your care. Our goal is always to provide you with excellent care. Hearing back from our patients is one way we can continue to improve our services. Please take a few minutes to complete the written survey that you may receive in the mail after you visit with us. Thank you!        [employer] Information     [facility] gives you secure access to your electronic health record. If you see a primary care provider, you can also send messages to your care team and make appointments. If you have questions, please call your primary care clinic.  If you do not have a primary care provider, please call [phone] and they will assist you.        Care EveryWhere ID     This is your Care EveryWhere ID. This could be used by other organizations to access your [facility] medical records  [id number]        Equal Access to Services     DUANE HARPER : Hadii marcio Lyon, waaxda luqadaha, qaybta kaalchristopher stauffer, liza patrick . So [facility] [phone].    ATENCIÓN: Si habla español, tiene a gatica disposición servicios gratuitos de asistencia lingüística. Llame al [phone].    We comply with applicable federal civil rights laws and [place] laws. We do not discriminate on the basis of race, color, national origin, age, disability, sex, sexual orientation, or gender identity.            After Visit Summary       This is your record. Keep this with you and show to your community pharmacist(s) and doctor(s) at your next visit.

## 2018-10-31 NOTE — ED AVS SNAPSHOT
Emergency Department    [address] [place] [address]    Phone:  [phone]    Fax:  [phone]                                       Shanna Shanks   MRN: [id number]    Department:   Emergency Department   Date of Visit:  10/31/2018           After Visit Summary Signature Page     I have received my discharge instructions, and my questions have been answered. I have discussed any challenges I see with this plan with the nurse or doctor.    ..........................................................................................................................................  Patient/Patient Representative Signature      ..........................................................................................................................................  Patient Representative Print Name and Relationship to Patient    ..................................................               ................................................  Date                                   Time    ..........................................................................................................................................  Reviewed by Signature/Title    ...................................................              ..............................................  Date                                               Time          22EPIC Rev 08/18

## 2018-10-31 NOTE — ED PROVIDER NOTES
History     Chief Complaint:    Wound Check       HPI   Shanna Shanks is a 43 year old female who presents to the ED for evaluation of a wound. The patient states that she developed an erythematous rash on her posterior left lower leg early last week. She states that the rash was increasing in size so she presented to urgent care for evaluation on 10/27. She was started on clindamycin that evening for treatment of cellulitis. Last night, she states that she was started on amoxicillin as well for treatment of a UTI. She returned to her primary care provider today as she states that the rash, while not getting bigger, was not improving despite treatment with antibiotics. She was subsequently referred to the ED for further workup and evaluation of her symptoms. She otherwise denies fevers, chills, nausea, vomit, fatigue, trauma, or bug bites. The patient has no other complaints.     Allergies:  Diphenhydramine  Nickel  Macrobid     Medications:    Amoxil   Lioresaal  Cleocin  Tecfidera  Micronor  Ditropan-XL    Past Medical History:    MS  Seizure  Herpes encephalitis  Encephalopathy    Past Surgical History:    Dilation and curettage diagnostic/therapeutic non OB    Family History:    Diabetes    Social History:  Former smoker.   Negative for alcohol use.  Marital Status:   [2]     Review of Systems   Constitutional: Negative for chills, fatigue and fever.   Gastrointestinal: Negative for nausea and vomiting.   Skin: Positive for rash.   All other systems reviewed and are negative.      Physical Exam   First Vitals:  BP: 133/50  Heart Rate: 68  Temp: 98.1  F (36.7  C)  Resp: 14  Weight: 71.8 kg (158 lb 6.4 oz)  SpO2: 100 %      Physical Exam  Constitutional: Well developed, nontox appearance  Head: Atraumatic.   Mouth/Throat: Oropharynx is clear and moist.   Neck:  no stridor  Eyes: no scleral icterus  Cardiovascular: RRR, 2+ bilat radial pulses  Pulmonary/Chest: nml resp effort, Clear BS  bilat  Abdominal: ND, +BS, soft, NT, no rebound or guarding   : no CVA tenderness bilat  Ext: Warm, well perfused, no edema  Neurological: A&O, symmetric facies, moves ext x4  Skin: Skin is warm and dry.   Psychiatric: Behavior is normal. Thought content normal.   Nursing note and vitals reviewed.        Emergency Department Course   Imaging:  Radiographic findings were communicated with the patient who voiced understanding of the findings.  US Lower Extremity Venous Duplex, left:  No evidence of deep venous thrombosis as per radiology.     US Extremity Non Vascular, left:  Focal irregularly shaped fluid collection in the calf in  the region of the redness and swelling. Differential diagnosis  includes abscess, seroma and hematoma as per radiology.      POC US SOFT TISSUE   Final Result   Limited Bedside ED Ultrasound of Soft Tissue:      PROCEDURE: PERFORMED BY: Dr. Herrera Mattson   INDICATIONS/SYMPTOM: Skin redness, evaluate for abscess, cellulitis or foreign body   PROBE: High frequency linear probe   BODY LOCATION: Soft tissue located on L calf       FINDINGS: Cobblestoning of soft tissue: present    Hypoechoic fluid (ie abscess) identified: present    US utilized to access fluid pocket with sterile blade   INTERPRETATION:  The soft tissue and muscle layers were evaluated.       Findings indicate cellulitis and abscess      IMAGE DOCUMENTATION: Images were archived to PACs system.        Laboratory:  CBC: WBC: 4.6, HGB: 11.8, PLT: 307  BMP: Glucose 93, o/w WNL (Creatinine: 0.79)    ESR: 35 (H)  CRP: 9.0 (H)  Wound culture: pending      Procedures:    Procedure: Incision and Drainage   LOCATIONS:  L calf     ANESTHESIA:  Local field block using Lidocaine 1% with epinephrine, total of 10 mLs     PREPARATION:  Cleansed with chlora-prep     PROCEDURE:  Area was incised with # 11 Blade ([place]) with a Single Straight incision.  Wound treatment included deloculation, expression of hematoma.  Packing  consisted of Iodoform Gauze.  Appropriate dressing was applied to cover the area.    Patient Status:        Patient tolerated the procedure well. There were no complications.        Interventions:  1812 Advil 600 mg PO  1901 Vancomycin 1,500 mg IV  2011 Dilaudid 0.5 mg IV      Emergency Department Course:  Nursing notes and vitals reviewed. (1706) I performed an exam of the patient as documented above.     IV inserted. Medicine administered as documented above. Blood drawn. This was sent to the lab for further testing, results above.     The patient was sent for a lower extremity venous duplex US and left lower extremity non vascular US while in the emergency department, findings above.     1904 I rechecked the patient and discussed the results of her workup thus far.     1958 I performed and incision and drainage as noted above.     Findings and plan explained to the Patient. Patient discharged home with instructions regarding supportive care, medications, and reasons to return. The importance of close follow-up was reviewed. The patient was prescribed Vibramycin    I personally reviewed the laboratory results with the Patient and answered all related questions prior to discharge.     Impression & Plan    Medical Decision Making:  Shanna Shanks is a 43 year old female who presents with left calf pain, redness and tenderness.    Pt has signs of an abscess/complex fluid collection on US. I&D performed and successfully expressed a hematoma likely indicating an infected hematoma; see above procedure note. No signs of serious infx like necrotizing fascitis or rapid cellulitis given fever curve, spread of erythema over past 24 hours, no crepitance to tissues, no sensation change to tissues.  The pt does have an assoc cellulitis.  Labs as noted above and reassuring.   Given the patient had an abscess/infected hematoma it is not surprising that she had no improvement with outpatient oral antibiotics.  I do not  consider this a failure of appropriate management given she had not had an I&D, therefore I think it would be reasonable to attempt another outpatient trial of antibiotics.  At this time I feel she is safe for discharge.  Recommendations given regarding follow up with primary care doctor in 2 days for recheck and return to the emergency department as needed for new or worsening symptoms.  Counseled on all results, diagnosis and disposition.  Pt understanding and agreeable to plan. Patient discharged in stable condition.          Diagnosis:    ICD-10-CM    1. Hematoma of leg, left, initial encounter S80.12XA Wound Culture Aerobic Bacterial   2. Infection of wound hematoma T14.8XXA     L08.9        Disposition:  discharged to home    Discharge Medications:  Discharge Medication List as of 10/31/2018  8:56 PM      START taking these medications    Details   doxycycline (VIBRAMYCIN) 100 MG capsule Take 1 capsule (100 mg) by mouth 2 times daily for 10 days, Disp-20 capsule, R-0, Local Print           Scribe Disclosure:  I,  Anish Lainez, am serving as a scribe on 10/31/2018 at 5:06 PM to personally document services performed by Herrera Mattson MD based on my observations and the provider's statements to me.          Anish Lainez  10/31/2018    EMERGENCY DEPARTMENT       Herrera Mattson MD  11/01/18 0232

## 2018-10-31 NOTE — TELEPHONE ENCOUNTER
Pt spouse called in regarding Autumn lab results. Pt spouse was informed and medication was already sent to the pharmacy. Pt spouse didn't have any other questions or concerns. Seda Scanlon CMA

## 2018-10-31 NOTE — PROGRESS NOTES
SUBJECTIVE:   Shanna Shanks is a 43 year old female who presents to clinic today for the following health issues:      ED/UC Followup:    Facility:  [facility]  Date of visit: 10/27/2018  Reason for visit: Cellulitis of left lower extremity   Current Status: red, swelling, mild pain, no drainage     Was in urgent care for cellulitis and UTI.   Was started on clinda - 4 times per day. They waited for her UC and was started on amoxicillin yesterday.     She is not missing any dose. No diarrhea.     Per  size is not bigger but not getting better either.     Per patient - it is very painful and hard to walk. Touching is painful too.     Left calf      Problem list and histories reviewed & adjusted, as indicated.  Additional history: as documented    Labs reviewed in EPIC    Reviewed and updated as needed this visit by clinical staff       Reviewed and updated as needed this visit by Provider           Social History     Social History     Marital status:      Spouse name: Jama     Number of children: 2     Years of education: N/A     Occupational History     computer graphic design Self     Social History Main Topics     Smoking status: Former Smoker     Packs/day: 0.25     Types: Cigarettes     Smokeless tobacco: Never Used      Comment: quit a few weeks ago     Alcohol use 3.5 oz/week     7 Standard drinks or equivalent per week      Comment: social drinker     Drug use: No     Sexual activity: Yes     Partners: Male     Birth control/ protection: Pill, Rhythm     Other Topics Concern     Parent/Sibling W/ Cabg, Mi Or Angioplasty Before 65f 55m? No     Social History Narrative    Caffeine intake/servings daily - 2    Calcium intake/servings daily - 2-3    Exercise 7 times weekly - describe runs on treadmill for 5 minutes    Sunscreen used - Yes    Seatbelts used - Yes    Guns stored in the home - No    Self Breast Exam - Yes    Pap test up to date -  Yes    Eye exam up to date -   "Yes    Dental exam up to date -  Yes    DEXA scan up to date -  Yes    Flex Sig/Colonoscopy up to date -  Not Applicable    Mammography up to date -  Not Applicable    Immunizations reviewed and up to date - Yes    Abuse: Current or Past (Physical, Sexual or Emotional) - No    Do you feel safe in your environment - Yes    Do you cope well with stress - Yes    Do you suffer from insomnia - No    Last updated by: Michelle Sanchez  12/22/2010                     Allergies   Allergen Reactions     Diphenhydramine Rash     benadryl cream     Nickel      Macrobid [Nitrofurantoin] Rash     Patient Active Problem List   Diagnosis     Multiple sclerosis (H)     UTI (urinary tract infection)     CARDIOVASCULAR SCREENING; LDL GOAL LESS THAN 160     Dysmenorrhea     Nicotine dependence in remission     Encephalopathy     Herpes encephalitis     Fever of unknown origin     Seizure (H)     Reviewed medications, social history and  past medical and surgical history.    Review of system: for general, respiratory, CVS, GI and psychiatry negative except for noted above.     EXAM:  /69 (BP Location: Right arm, Patient Position: Sitting, Cuff Size: Adult Regular)  Pulse 74  Temp 98.4  F (36.9  C) (Oral)  Resp 16  Ht 5' 8\" (1.727 m)  Wt 160 lb 8 oz (72.8 kg)  SpO2 98%  BMI 24.4 kg/m2  Constitutional: , alert and no distress   JOINT/EXTREMITIES: left posterior calf - around 11 cm x 12 cm erythematous swollen lesion with fluctuation in border and harder center, raised temp, tender to touch, antalgic gait.     ASSESSMENT / PLAN:  (L03.119) Cellulitis of calf  (primary encounter diagnosis)  Comment: left. I also worry about possible abscess formation and no response to antibiotics after 4 days of antibiotics   Plan: I talked to patient and her  that it would be best for them to go to emergency room for further evaluation.  She may need additional imaging and possibly blood test.   is going to drive her to " [facility].    Follow up: as per ER visit from [facility].     The above note was dictated using voice recognition. Although reviewed after completion, some word and grammatical error may remain .

## 2018-10-31 NOTE — MR AVS SNAPSHOT
After Visit Summary   10/31/2018    Shanna Shanks    MRN: [id number]           Patient Information     Date Of Birth          1975        Visit Information        Provider Department      10/31/2018 1:20 PM Malvin Beck MD [facility]        Today's Diagnoses     Cellulitis of calf    -  1       Follow-ups after your visit        Your next 10 appointments already scheduled     Nov 01, 2018 10:00 AM CDT   Day Program with [facility] PER JEAN   [facility] ([facility])    [address]  [facility] [address]   [phone]            Nov 06, 2018 10:00 AM CST   Day Program with [facility] PER JEAN   [facility] ([facility])    [address]  [facility] [address]   [phone]            Nov 08, 2018 10:00 AM CST   Day Program with [facility] PER JEAN   [facility] ([facility])    [address]  [facility] [address]   [phone]            Nov 13, 2018 10:00 AM CST   Day Program with [facility] PER JEAN   [facility] ([facility])    [address]  [facility] [address]   [phone]            Nov 15, 2018 10:00 AM CST   Day Program with [facility] PER JEAN   [facility] ([facility])    [address]  [facility] [address]   [phone]            Nov 20, 2018 10:00 AM CST   Day Program with [facility] PER JEAN   [facility] ([facility])    [address]  [facility] [address]   [phone]            Nov 27, 2018 10:00 AM CST   Day Program with MS[facility] PER  JEAN   [facility] ([facility])    [address]  [facility] [address]   [phone]            Nov 29, 2018 10:00 AM CST   Day Program with [facility] PER JEAN   [facility] ([facility])    [address]  [facility] [address]   [phone]            Dec 04, 2018 10:00 AM CST   Day Program with [facility] PER JEAN   [facility] ([facility])    [address]  [facility] [address]   [phone]            Dec 06, 2018 10:00 AM CST   Day Program with [facility] PER [facility] ([facility])    [address]  [facility] [address]   [phone]              Who to contact     If you have questions or need follow up information about today's clinic visit or your schedule please contact [facility] directly at [phone].  Normal or non-critical lab and imaging results will be communicated to you by MyChart, letter or phone within 4 business days after the clinic has received the results. If you do not hear from us within 7 days, please contact the clinic through [employer] or phone. If you have a critical or abnormal lab result, we will notify you by phone as soon as possible.  Submit refill requests through [employer] or call your pharmacy and they will forward the refill request to us. Please allow 3 business days for your refill to be completed.          Additional Information About Your Visit        [employer] Information     [employer] gives you secure access to your electronic health record. If you see a primary care provider, you can also send messages to your care team and make appointments. If you have questions, please call your primary care clinic.  If  "you do not have a primary care provider, please call [phone] and they will assist you.        Care EveryWhere ID     This is your Care EveryWhere ID. This could be used by other organizations to access your [facility] medical records  [id number]        Your Vitals Were     Pulse Temperature Respirations Height Pulse Oximetry BMI (Body Mass Index)    74 98.4  F (36.9  C) (Oral) 16 5' 8\" (1.727 m) 98% 24.4 kg/m2       Blood Pressure from Last 3 Encounters:   10/31/18 122/69   10/27/18 108/70   09/24/18 102/58    Weight from Last 3 Encounters:   10/31/18 160 lb 8 oz (72.8 kg)   10/27/18 162 lb (73.5 kg)   07/09/18 155 lb 4 oz (70.4 kg)              Today, you had the following     No orders found for display         Today's Medication Changes          These changes are accurate as of 10/31/18  3:26 PM.  If you have any questions, ask your nurse or doctor.               Stop taking these medicines if you haven't already. Please contact your care team if you have questions.     amoxicillin-clavulanate 875-125 MG per tablet   Commonly known as:  AUGMENTIN   Stopped by:  Malvin Beck MD                    Primary Care Provider Office Phone # Fax #    Malvin Beck [phone] [address] [place] [id number]        Goals        General    # 1Medical (pt-stated)     Notes - Note created  6/8/2018 10:18 AM by Akilah Velarde RN    Goal Statement: I will seek medical help if any signs of seizure activity  Measure of Success: Decreased seizure activity   Supportive Steps to Achieve:   I will keep future Neurology appointments  I will take Keppra as directed   Barriers: Recent seizure   Strengths: Supportive  who can watch for any changes in behavior   Date to Achieve By: Ongoing         Equal Access to Services     DUANE HARPER AH: Mihir pengo Sorip, waaxda luqadaha, qaybta kaalmada adeegyada, waxay gio caldwell. So wa " [phone].    ATENCIÓN: Si mariam delgado, tiene a gatica disposición servicios gratuitos de asistencia lingüística. Jeyson ba [phone].    We comply with applicable federal civil rights laws and [place] laws. We do not discriminate on the basis of race, color, national origin, age, disability, sex, sexual orientation, or gender identity.            Thank you!     Thank you for choosing [facility]  for your care. Our goal is always to provide you with excellent care. Hearing back from our patients is one way we can continue to improve our services. Please take a few minutes to complete the written survey that you may receive in the mail after your visit with us. Thank you!             Your Updated Medication List - Protect others around you: Learn how to safely use, store and throw away your medicines at www.disposemymeds.org.          This list is accurate as of 10/31/18  3:26 PM.  Always use your most recent med list.                   Brand Name Dispense Instructions for use Diagnosis    amoxicillin 500 MG capsule    AMOXIL    30 capsule    Take 1 capsule (500 mg) by mouth 3 times daily for 10 days    Enterococcus UTI       baclofen 10 MG tablet    LIORESAL     Take 10-20 mg by mouth 4 times daily as needed        clindamycin 300 MG capsule    CLEOCIN    40 capsule    Take 1 capsule (300 mg) by mouth 4 times daily for 10 days    Cellulitis of right lower extremity       CRANBERRY EXTRACT PO      Take 1 tablet by mouth daily        dimethyl fumarate delayed release capsule    TECFIDERA     Take 240 mg by mouth 2 times daily        levETIRAcetam 1000 MG Tabs     60 tablet    Take 1,000 mg by mouth 2 times daily    Multiple sclerosis (H)       norethindrone 0.35 MG per tablet    MICRONOR    84 tablet    TAKE 1 TABLET(0.35 MG) BY MOUTH DAILY    Surveillance of previously prescribed contraceptive pill       order for DME     1 Units    Equipment being ordered: four wheeled walker with seat and brakes     Encephalopathy       oxybutynin 5 MG 24 hr tablet    DITROPAN-XL     Take 5 mg by mouth daily        sulfamethoxazole-trimethoprim 400-80 MG per tablet    BACTRIM/SEPTRA     Take 1 tablet by mouth daily        venlafaxine 75 MG Tb24 24 hr tablet    EFFEXOR-ER     Take 75 mg by mouth daily (2  X 37.5 mg)        VITAMIN D (CHOLECALCIFEROL) PO      Take 1,000 Units by mouth daily

## 2018-11-01 NOTE — DISCHARGE INSTRUCTIONS
Take the below medications as prescribed.  Please do not miss any doses.    New Prescriptions    DOXYCYCLINE (VIBRAMYCIN) 100 MG CAPSULE    Take 1 capsule (100 mg) by mouth 2 times daily for 10 days     Stop clindamycin.  Continue amoxicillin.    1. -Take acetaminophen 500 to 1000 mg by mouth every 4 to 6 hours as needed for pain or fever.  Do not take more than 4000 mg in 24 hours.  Do not take within 6 hours of another acetaminophen containing medication such as norco (vicodin) or percocet.  - Take ibuprofen 600 to 800 mg by mouth every 6 to 8 hours as needed for pain or fever  2. Use ice as needed for swelling and/or pain  3. Elevate extremity to help with swelling.  4. Follow-up with your primary doctor in 2 days for recheck and packing removal.  After the packing is removed, please soak the area in a bathtub in warm water for approximately 20 minutes/day until healed.  5. You may return to the ED as needed for new or worsening symptoms such as fever greater than 100.4  F, increased pain, worsening redness, vomiting and unable to keep anything down, reinjury, severe and uncontrollable pain, focal weakness, severe numbness and tingling, any other concerning symptoms.            Discharge Instructions  Cellulitis    Cellulitis is an infection of the skin that occurs when bacteria enter the skin.   Symptoms are generally redness, swelling, warmth and pain.  Your infection appeared to be appropriate to treat at home with antibiotics.  However, sometimes your infection may be worse than it seemed at first, or may worsen with time. If you have new or worse symptoms, you may need to be seen again in the Emergency Department or by your primary provider.    Generally, every Emergency Department visit should have a follow-up clinic visit with either a primary or a specialty clinic/provider. Please follow-up as instructed by your emergency provider today.    Return to the Emergency Department if:    The redness, pain, or  swelling gets a lot worse.  If the red area was marked, return if it is red significantly beyond the marked area.    You are unable to get your antibiotics, or are vomiting (throwing up) these pills, or you cannot take them.    You are feeling more ill, weak or lightheaded.    You start to run a new fever (temperature >101 F).    Anything else about the infection worries or concerns you.  Treatment:    Start your antibiotics right away, and take them as prescribed. Be sure to finish the whole prescription, even if you are better.    Apply a heating pad, warm packs, or warm water soaks to the infected area for 15 minutes at a time, at least 3 times a day. Do not use a heating pad on your feet or legs if you have diabetes. Do not sleep with a heating pad on, since this can cause burns or skin injury.    Rest your injured area for at least 1-2 days. After that you may start using your extremity again as long as there is not too much pain.     Raise the injured area above the level of your heart as much as possible in the first 1-2 days.    Tylenol  (acetaminophen), Motrin  (ibuprofen), or Advil  (ibuprofen) may help may help reduce pain and fever and may help you feel more comfortable. Be sure to read and follow the package directions, and ask your provider if you have questions.    If you were given a prescription for medicine here today, be sure to read all of the information (including the package insert) that comes with your prescription.  This will include important information about the medicine, its side effects, and any warnings that you need to know about.  The pharmacist who fills the prescription can provide more information and answer questions you may have about the medicine.  If you have questions or concerns that the pharmacist cannot address, please call or return to the Emergency Department.     Remember that you can always come back to the Emergency Department if you are not able to see your regular  provider in the amount of time listed above, if you get any new symptoms, or if there is anything that worries you.

## 2018-11-01 NOTE — ADDENDUM NOTE
Encounter addended by: Spencer Stevenson on: 11/1/2018 12:56 PM<BR>     Actions taken: Sign clinical note

## 2018-11-01 NOTE — PROGRESS NOTES
INDIVIDUAL PLAN OF CARE   [facility]    Member Name: Shanna Shanks; YOB: 1975  MRN: [id number]  11/1/2018    Goals developed in collaboration with: member  Staff responsible for plan: Spencer Stevenson  1. Long Term Goal (concrete, measurable, and time specific outcomes): Member will maintain present level of function, preventing or delaying further deterioration.    Target Date: 04/31/19  Bi-Annual Review:    2. Short Term Goal: (concrete, measurable, and time specific outcomes): Member will utilize one assistive technology tool to aid memory loss w/ set up and moderate assistance at least 1x/month.  Target Date: 04/31/19  Bi-Annual Review:    3. Short Term Goal: (concrete, measurable, and time specific outcomes): Member will participate in prescribed physical maintenance programming for muscle strength and endurance w/ set up and supervision, at least 6x/month.  Target Date: 04/31/19  Bi-Annual Review:

## 2018-11-02 ENCOUNTER — OFFICE VISIT (OUTPATIENT)
Dept: FAMILY MEDICINE | Facility: CLINIC | Age: 43
End: 2018-11-02
Payer: COMMERCIAL

## 2018-11-02 VITALS
DIASTOLIC BLOOD PRESSURE: 65 MMHG | WEIGHT: 159.25 LBS | TEMPERATURE: 98.9 F | RESPIRATION RATE: 14 BRPM | HEART RATE: 89 BPM | OXYGEN SATURATION: 97 % | BODY MASS INDEX: 24.21 KG/M2 | SYSTOLIC BLOOD PRESSURE: 113 MMHG

## 2018-11-02 DIAGNOSIS — L03.119 CELLULITIS OF CALF: Primary | ICD-10-CM

## 2018-11-02 DIAGNOSIS — S80.12XD HEMATOMA OF LEFT LOWER EXTREMITY, SUBSEQUENT ENCOUNTER: ICD-10-CM

## 2018-11-02 PROCEDURE — 99213 OFFICE O/P EST LOW 20 MIN: CPT | Performed by: FAMILY MEDICINE

## 2018-11-02 ASSESSMENT — PATIENT HEALTH QUESTIONNAIRE - PHQ9: SUM OF ALL RESPONSES TO PHQ QUESTIONS 1-9: 0

## 2018-11-02 NOTE — MR AVS SNAPSHOT
After Visit Summary   11/2/2018    Shanna Shanks    MRN: [id number]           Patient Information     Date Of Birth          1975        Visit Information        Provider Department      11/2/2018 2:00 PM Malvin Beck MD [facility]        Today's Diagnoses     Cellulitis of calf    -  1    Hematoma of left lower extremity, subsequent encounter           Follow-ups after your visit        Your next 10 appointments already scheduled     Nov 05, 2018  2:30 PM CST   SHORT with HW RN/TRIAGE NURSE ONLY   [facility] ([facility])    [address] [place] [address]   [phone]            Nov 06, 2018 10:00 AM CST   Day Program with MSAC PER JEAN   [facility] ([facility])    [address]  [facility] [address]   [phone]            Nov 08, 2018 10:00 AM CST   Day Program with MSAC PER JEAN   [facility] ([facility])    [address]  [facility] [address]   [phone]            Nov 13, 2018 10:00 AM CST   Day Program with MSAC PER JEAN   [facility] ([facility])    [address]  [facility] [address]   [phone]            Nov 15, 2018 10:00 AM CST   Day Program with MSAC PER JEAN   [facility] ([facility])    [address]  [facility] [address]   [phone]            Nov 20, 2018 10:00 AM CST   Day Program with MSAC PER JEAN   [facility] ([facility])    [address]  [facility] [address]   [phone]            Nov 27, 2018 10:00 AM CST   Day Program  with MSAC PER JEAN   [facility] ([facility])    [address]  [facility] [address]   [phone]            Nov 29, 2018 10:00 AM CST   Day Program with MSAC PER JEAN   [facility] ([facility])    [address]  [facility] [address]   [phone]            Dec 04, 2018 10:00 AM CST   Day Program with MSAC PER JEAN   [facility] ([facility])    [address]  [facility] [address]   [phone]            Dec 06, 2018 10:00 AM CST   [facility]    [address]  [facility] [address]   [phone]              Who to contact     If you have questions or need follow up information about today's clinic visit or your schedule please contact [facility] directly at [phone].  Normal or non-critical lab and imaging results will be communicated to you by Cuponzotehart, letter or phone within 4 business days after the clinic has received the results. If you do not hear from us within 7 days, please contact the clinic through Cuponzotehart or phone. If you have a critical or abnormal lab result, we will notify you by phone as soon as possible.  Submit refill requests through Happify or call your pharmacy and they will forward the refill request to us. Please allow 3 business days for your refill to be completed.          Additional Information About Your Visit        Happify Information     Happify gives you secure access to your electronic health record. If you see a primary care provider, you can also send messages to your care team and make appointments. If you have questions, please call your primary  care clinic.  If you do not have a primary care provider, please call [phone] and they will assist you.        Care EveryWhere ID     This is your Care EveryWhere ID. This could be used by other organizations to access your [place] medical records  [id number]        Your Vitals Were     Pulse Temperature Respirations Last Period Pulse Oximetry BMI (Body Mass Index)    89 98.9  F (37.2  C) (Oral) 14 10/30/2018 97% 24.21 kg/m2       Blood Pressure from Last 3 Encounters:   11/02/18 113/65   10/31/18 108/64   10/31/18 122/69    Weight from Last 3 Encounters:   11/02/18 159 lb 4 oz (72.2 kg)   10/31/18 158 lb 6.4 oz (71.8 kg)   10/31/18 160 lb 8 oz (72.8 kg)              Today, you had the following     No orders found for display       Primary Care Provider Office Phone # Fax #    Malvin Cesario Beck [phone] [address] [place] [id number]        Goals        General    # 1Medical (pt-stated)     Notes - Note created  6/8/2018 10:18 AM by Akilah Velarde RN    Goal Statement: I will seek medical help if any signs of seizure activity  Measure of Success: Decreased seizure activity   Supportive Steps to Achieve:   I will keep future Neurology appointments  I will take Keppra as directed   Barriers: Recent seizure   Strengths: Supportive  who can watch for any changes in behavior   Date to Achieve By: Ongoing         Equal Access to Services     DUANE HARPER AH: Hadii marcio lockhart hadasho Sorip, waaxda luqadaha, qaybta kaalmada liza stauffer adesaundra patrick . So [facility] [phone].    ATENCIÓN: Si habla español, tiene a gatica disposición servicios gratuitos de asistencia lingüística. Llame al [phone].    We comply with applicable federal civil rights laws and [place] laws. We do not discriminate on the basis of race, color, national origin, age, disability, sex, sexual orientation, or gender identity.            Thank you!     Thank you for choosing  [facility]  for your care. Our goal is always to provide you with excellent care. Hearing back from our patients is one way we can continue to improve our services. Please take a few minutes to complete the written survey that you may receive in the mail after your visit with us. Thank you!             Your Updated Medication List - Protect others around you: Learn how to safely use, store and throw away your medicines at www.disposemymeds.org.          This list is accurate as of 11/2/18 11:59 PM.  Always use your most recent med list.                   Brand Name Dispense Instructions for use Diagnosis    amoxicillin 500 MG capsule    AMOXIL    30 capsule    Take 1 capsule (500 mg) by mouth 3 times daily for 10 days    Enterococcus UTI       baclofen 10 MG tablet    LIORESAL     Take 10-20 mg by mouth 4 times daily as needed        clindamycin 300 MG capsule    CLEOCIN    40 capsule    Take 1 capsule (300 mg) by mouth 4 times daily for 10 days    Cellulitis of right lower extremity       CRANBERRY EXTRACT PO      Take 1 tablet by mouth daily        dimethyl fumarate delayed release capsule    TECFIDERA     Take 240 mg by mouth 2 times daily        doxycycline 100 MG capsule    VIBRAMYCIN    20 capsule    Take 1 capsule (100 mg) by mouth 2 times daily for 10 days        levETIRAcetam 1000 MG Tabs     60 tablet    Take 1,000 mg by mouth 2 times daily    Multiple sclerosis (H)       norethindrone 0.35 MG per tablet    MICRONOR    84 tablet    TAKE 1 TABLET(0.35 MG) BY MOUTH DAILY    Surveillance of previously prescribed contraceptive pill       order for DME     1 Units    Equipment being ordered: four wheeled walker with seat and brakes    Encephalopathy       oxybutynin 5 MG 24 hr tablet    DITROPAN-XL     Take 5 mg by mouth daily        sulfamethoxazole-trimethoprim 400-80 MG per tablet    BACTRIM/SEPTRA     Take 1 tablet by mouth daily        venlafaxine 75 MG Tb24 24 hr tablet    EFFEXOR-ER      Take 75 mg by mouth daily (2  X 37.5 mg)        VITAMIN D (CHOLECALCIFEROL) PO      Take 1,000 Units by mouth daily

## 2018-11-02 NOTE — PROGRESS NOTES
SUBJECTIVE:   Shanna Shanks is a 43 year old female who presents to clinic today for the following health issues:      ED/UC Followup:    Facility:  [facility] ED  Date of visit: 10/31/2018  Reason for visit: Hematoma of leg, left, initial encounter +1 more   Current Status:  Pt state feeling good, still sore to walk, and there is still bandage on it her leg.      Was in the ER - had hematoma and had I&D.     Stop clindamycin and started doxycycline. Still on amoxicillin for UTI.     Problem list and histories reviewed & adjusted, as indicated.  Additional history: as documented    Labs reviewed in EPIC    Reviewed and updated as needed this visit by clinical staff       Reviewed and updated as needed this visit by Provider           Social History     Social History     Marital status:      Spouse name: Jama     Number of children: 2     Years of education: N/A     Occupational History     computer graphic design Self     Social History Main Topics     Smoking status: Former Smoker     Packs/day: 0.25     Types: Cigarettes     Smokeless tobacco: Never Used      Comment: quit a few weeks ago     Alcohol use No     Drug use: No     Sexual activity: Yes     Partners: Male     Birth control/ protection: Pill, Rhythm     Other Topics Concern     Parent/Sibling W/ Cabg, Mi Or Angioplasty Before 65f 55m? No     Social History Narrative    Caffeine intake/servings daily - 2    Calcium intake/servings daily - 2-3    Exercise 7 times weekly - describe runs on treadmill for 5 minutes    Sunscreen used - Yes    Seatbelts used - Yes    Guns stored in the home - No    Self Breast Exam - Yes    Pap test up to date -  Yes    Eye exam up to date -  Yes    Dental exam up to date -  Yes    DEXA scan up to date -  Yes    Flex Sig/Colonoscopy up to date -  Not Applicable    Mammography up to date -  Not Applicable    Immunizations reviewed and up to date - Yes    Abuse: Current or Past (Physical, Sexual or Emotional)  - No    Do you feel safe in your environment - Yes    Do you cope well with stress - Yes    Do you suffer from insomnia - No    Last updated by: Michelle Sanchez  12/22/2010                     Allergies   Allergen Reactions     Diphenhydramine Rash     benadryl cream     Nickel      Macrobid [Nitrofurantoin] Rash     Patient Active Problem List   Diagnosis     Multiple sclerosis (H)     UTI (urinary tract infection)     CARDIOVASCULAR SCREENING; LDL GOAL LESS THAN 160     Dysmenorrhea     Nicotine dependence in remission     Encephalopathy     Herpes encephalitis     Fever of unknown origin     Seizure (H)     Reviewed medications, social history and  past medical and surgical history.    Review of system: for general, respiratory, CVS, GI and psychiatry negative except for noted above.     EXAM:  /65 (BP Location: Left arm, Patient Position: Chair, Cuff Size: Adult Regular)  Pulse 89  Temp 98.9  F (37.2  C) (Oral)  Resp 14  Wt 159 lb 4 oz (72.2 kg)  LMP 10/30/2018  SpO2 97%  BMI 24.21 kg/m2  Constitutional: healthy, alert and no distress   Psychiatric: mentation appears normal and affect normal/bright  Left calf - dressing is mildly soaked with bloody serous discharge. Dressing changed.   Packing material removed and replaced - large packing material with iodofoam. Currently no abscess formation.     ASSESSMENT / PLAN:  (L03.119) Cellulitis of calf  (primary encounter diagnosis)  Comment:  Change in antibiotics.  Plan: on antibiotics. Was not responding to antibiotics due to hematoma - it was drained in the ER    (S80.12XD) Hematoma of left lower extremity, subsequent encounter  Comment:    Plan: deep wound and has packing material. I suspect it will take weeks before it resolve. We will do 2-3 times per week dressing and packing change at our clinic. Patient and  understood.          Follow up:  If getting worse    The above note was dictated using voice recognition. Although reviewed after  completion, some word and grammatical error may remain .

## 2018-11-05 ENCOUNTER — OFFICE VISIT (OUTPATIENT)
Dept: NURSING | Facility: CLINIC | Age: 43
End: 2018-11-05
Payer: COMMERCIAL

## 2018-11-05 DIAGNOSIS — Z51.89 ENCOUNTER FOR WOUND CARE: Primary | ICD-10-CM

## 2018-11-05 LAB
BACTERIA SPEC CULT: NO GROWTH
Lab: NORMAL
SPECIMEN SOURCE: NORMAL

## 2018-11-05 PROCEDURE — 99213 OFFICE O/P EST LOW 20 MIN: CPT

## 2018-11-05 NOTE — NURSING NOTE
"Patient walked to procedure room with assistance of spouse, Jama.    Patient is still on antibiotic medication.    Patient positioned on procedure table with assistance from writer, GURPREET Fonseca RN, and patient's spouse.    Pillows placed beneath patient's head and BLE for comfort/support.    GURPREET Fonseca, RN, assisted writer with wound packing replacement.    Outer gauze and coban wrap removed.    4x4 and 2x2 gauze had small sanguinous drainage present and needed to be loosened with normal saline solution.      Patient reported no dressing change occurred since 11/2/18 office visit.    Approximately 15 inches of packing removed.  No active drainage nor oozing from wound noted.  Pink color to skin surrounding wound.    Skin surrounding wound area cleansed with alcohol swab and area allowed to dry.    Sterile 1/2\" width packing gauze placed into wound.  Approximately 14 inches of packing gauze placed into wound.    Wound covered with 4x4 gauze, gauze wrap and coban to secure in place.    Per Dr. Beck, patient to return to clinic twice per week for wound packing replacement.    Patient will return on Friday, 11/9/18.  Patient and  in agreement with plan.  Patient's  assisted patient to lobby.      CIRA Castaneda, RN        "

## 2018-11-05 NOTE — MR AVS SNAPSHOT
After Visit Summary   11/5/2018    Shanna Shanks    MRN: [id number]           Patient Information     Date Of Birth          1975        Visit Information        Provider Department      11/5/2018 2:30 PM HW RN/TRIAGE NURSE ONLY [facility]        Today's Diagnoses     Encounter for wound care    -  1       Follow-ups after your visit        Follow-up notes from your care team     Return in about 4 days (around 11/9/2018).      Your next 10 appointments already scheduled     Nov 06, 2018 10:00 AM CST   Day Program with MSAC PER JEAN   [facility] Day [facility] ([facility])    [address]  [facility] [id number] [phone]            Nov 08, 2018 10:00 AM CST   Day Program with MSAC PER JEAN   [facility] ([facility])    [address]  [facility] [address] [phone]            Nov 09, 2018  2:15 PM CST   Office Visit with HW PROC RM 1   [facility] ([facility])    [address] [place] [id number] [phone]           Bring a current list of meds and any records pertaining to this visit. For Physicals, please bring immunization records and any forms needing to be filled out. Please arrive 10 minutes early to complete paperwork.            Nov 09, 2018  2:30 PM CST   SHORT with HW RN/TRIAGE NURSE ONLY   [facility] ([facility])    [address] [place] [id number] [phone]            Nov 13, 2018 10:00 AM CST   Day Program with MSAC PER JEAN   [facility] ([facility])    [address]  [facility] [address] [phone]            Nov 15, 2018 10:00 AM CST   Day Program with MSAC PER JEAN   [facility] ([facility])    [address]  [facility] [address]   [phone]            Nov 20, 2018 10:00 AM CST   Day Program with MSAC PER JEAN   [facility] ([facility])    [address]  [facility] [address]   [phone]            Nov 27, 2018 10:00 AM CST   Day Program with MSAC PER JEAN   [facility] ([facility])    [address]  [facility] [address]   [phone]            Nov 29, 2018 10:00 AM CST   Day Program with MSAC PER JEAN   [facility] ([facility])    [address]  [facility] [address]   [phone]            Dec 04, 2018 10:00 AM CST   Day Program with MSAC PER JEAN   [facility] ([facility])    [address]  [facility] [address]   [phone]              Who to contact     If you have questions or need follow up information about today's clinic visit or your schedule please contact [facility] directly at [phone].  Normal or non-critical lab and imaging results will be communicated to you by MyChart, letter or phone within 4 business days after the clinic has received the results. If you do not hear from us within 7 days, please contact the clinic through MyChart or phone. If you have a critical or abnormal lab result, we will notify you by phone as soon as possible.  Submit refill requests through Rollerwall or call your pharmacy and they will forward the refill request to us. Please allow 3 business days for your refill to be completed.          Additional Information About Your Visit        MyChart Information     Miralupat  gives you secure access to your electronic health record. If you see a primary care provider, you can also send messages to your care team and make appointments. If you have questions, please call your primary care clinic.  If you do not have a primary care provider, please call [phone] and they will assist you.        Care EveryWhere ID     This is your Care EveryWhere ID. This could be used by other organizations to access your [place] medical records  [id number]        Your Vitals Were     Last Period                   10/30/2018            Blood Pressure from Last 3 Encounters:   11/02/18 113/65   10/31/18 108/64   10/31/18 122/69    Weight from Last 3 Encounters:   11/02/18 159 lb 4 oz (72.2 kg)   10/31/18 158 lb 6.4 oz (71.8 kg)   10/31/18 160 lb 8 oz (72.8 kg)              Today, you had the following     No orders found for display       Primary Care Provider Office Phone # Fax #    Malvin Cesario Beck [phone] [address] [place] [address]        Goals        General    # 1Medical (pt-stated)     Notes - Note created  6/8/2018 10:18 AM by Akilah Velarde RN    Goal Statement: I will seek medical help if any signs of seizure activity  Measure of Success: Decreased seizure activity   Supportive Steps to Achieve:   I will keep future Neurology appointments  I will take Keppra as directed   Barriers: Recent seizure   Strengths: Supportive  who can watch for any changes in behavior   Date to Achieve By: Ongoing         Equal Access to Services     DUANE HARPER AH: Hadii marcio ku hadsloano Sorip, waaxda luqadaha, qaybta kaalmada louieyadaliza . So [facility] [place] [phone].    ATENCIÓN: Si habla español, tiene a gatica disposición servicios gratuitos de asistencia lingüística. Llame al [phone].    We comply with applicable federal civil rights laws and [place] laws. We do not discriminate on the basis of race, color, national  origin, age, disability, sex, sexual orientation, or gender identity.            Thank you!     Thank you for choosing [facility]  for your care. Our goal is always to provide you with excellent care. Hearing back from our patients is one way we can continue to improve our services. Please take a few minutes to complete the written survey that you may receive in the mail after your visit with us. Thank you!             Your Updated Medication List - Protect others around you: Learn how to safely use, store and throw away your medicines at www.disposemymeds.org.          This list is accurate as of 11/5/18  3:34 PM.  Always use your most recent med list.                   Brand Name Dispense Instructions for use Diagnosis    amoxicillin 500 MG capsule    AMOXIL    30 capsule    Take 1 capsule (500 mg) by mouth 3 times daily for 10 days    Enterococcus UTI       baclofen 10 MG tablet    LIORESAL     Take 10-20 mg by mouth 4 times daily as needed        clindamycin 300 MG capsule    CLEOCIN    40 capsule    Take 1 capsule (300 mg) by mouth 4 times daily for 10 days    Cellulitis of right lower extremity       CRANBERRY EXTRACT PO      Take 1 tablet by mouth daily        dimethyl fumarate delayed release capsule    TECFIDERA     Take 240 mg by mouth 2 times daily        doxycycline 100 MG capsule    VIBRAMYCIN    20 capsule    Take 1 capsule (100 mg) by mouth 2 times daily for 10 days        levETIRAcetam 1000 MG Tabs     60 tablet    Take 1,000 mg by mouth 2 times daily    Multiple sclerosis (H)       norethindrone 0.35 MG per tablet    MICRONOR    84 tablet    TAKE 1 TABLET(0.35 MG) BY MOUTH DAILY    Surveillance of previously prescribed contraceptive pill       order for DME     1 Units    Equipment being ordered: four wheeled walker with seat and brakes    Encephalopathy       oxybutynin 5 MG 24 hr tablet    DITROPAN-XL     Take 5 mg by mouth daily        sulfamethoxazole-trimethoprim 400-80 MG per  tablet    BACTRIM/SEPTRA     Take 1 tablet by mouth daily        venlafaxine 75 MG Tb24 24 hr tablet    EFFEXOR-ER     Take 75 mg by mouth daily (2  X 37.5 mg)        VITAMIN D (CHOLECALCIFEROL) PO      Take 1,000 Units by mouth daily

## 2018-11-06 ENCOUNTER — HOSPITAL ENCOUNTER (OUTPATIENT)
Dept: REHABILITATION | Facility: CLINIC | Age: 43
End: 2018-11-06
Attending: NURSE PRACTITIONER
Payer: COMMERCIAL

## 2018-11-06 PROCEDURE — 40000247 ZZH STATISTIC VISIT ADULT DAY PROGRAM

## 2018-11-06 PROCEDURE — S5102 ADULT DAY CARE PER DIEM: HCPCS

## 2018-11-06 NOTE — PROGRESS NOTES
AC RN Monthly Progress Note  Previous documentation reviewed. No concerns reported by AC staff or member.   Noted cellulitis/abcess currently being followed by PMD office for wound care.

## 2018-11-08 ENCOUNTER — HOSPITAL ENCOUNTER (OUTPATIENT)
Dept: REHABILITATION | Facility: CLINIC | Age: 43
End: 2018-11-08
Attending: NURSE PRACTITIONER
Payer: COMMERCIAL

## 2018-11-08 PROCEDURE — S5102 ADULT DAY CARE PER DIEM: HCPCS

## 2018-11-08 PROCEDURE — 40000247 ZZH STATISTIC VISIT ADULT DAY PROGRAM

## 2018-11-09 ENCOUNTER — OFFICE VISIT (OUTPATIENT)
Dept: NURSING | Facility: CLINIC | Age: 43
End: 2018-11-09
Payer: COMMERCIAL

## 2018-11-09 ENCOUNTER — APPOINTMENT (OUTPATIENT)
Dept: FAMILY MEDICINE | Facility: CLINIC | Age: 43
End: 2018-11-09
Payer: COMMERCIAL

## 2018-11-09 DIAGNOSIS — Z48.01 ENCOUNTER FOR CHANGE OR REMOVAL OF SURGICAL WOUND DRESSING: Primary | ICD-10-CM

## 2018-11-09 PROCEDURE — 99207 ZZC NO CHARGE NURSE ONLY: CPT

## 2018-11-09 NOTE — NURSING NOTE
"    Patient walked to procedure room with assistance of spouse, Jama.     Patient is still on antibiotic medication. Has 2 more days of doxycycline for this infection      Patient positioned on procedure table with assistance from writer, Italia Sims RN and patient's spouse.     Pillows placed beneath patient's head and BLE for comfort/support.     Italia Sims RN, assisted writer with wound packing replacement.     Outer gauze and coban wrap removed.     4x4 and 2x2 gauze had small sanguinous drainage present and removed easily.     Patient reported dressing was rewrapped by her . Pt showered with the dressing covered with plastic.     Approximately 8 inches of packing removed.  No active drainage nor oozing from wound noted.  Pink color to skin surrounding wound.  Skin around the wound had some hard swelling. Pink color to skin. Writer had Dr. Beck assess. He says to continue the 2-3 times per week dressing change. He felt the wound was looking good.  We will want to see how pt does once off the antibiotic.     Skin surrounding wound area cleansed with alcohol swab and area allowed to dry.     Sterile 1/2\" width packing gauze placed into wound.  Approximately 6 inches of packing gauze placed into wound.     Wound covered with 4x4 gauze, gauze wrap and coban to secure in place.     Per Dr. Beck, patient to return to clinic twice per week for wound packing replacement. Dr. Beck was open to pt's  changing the dressing. Jama is open to doing this. He watched the dressing change today     Patient will return on Monday 11/12/18. We will come up with a list of dressing supplies needed.  Patient and  in agreement with plan.  Patient's  assisted patient to lobby.    MAZIN Sun    List-  1/2 inch plain packing strip  Cotton tip applicators  kerlix bandage roll- 4-1./2x4/1/8  4x4 gauze- 12 ply  coban  Sterile forcep and scissors.  "

## 2018-11-09 NOTE — MR AVS SNAPSHOT
After Visit Summary   11/9/2018    Shanna Shanks    MRN: [id number]           Patient Information     Date Of Birth          1975        Visit Information        Provider Department      11/9/2018 9:00 AM HW RN/TRIAGE NURSE ONLY [facility]        Today's Diagnoses     Encounter for change or removal of surgical wound dressing    -  1       Follow-ups after your visit        Your next 10 appointments already scheduled     Nov 13, 2018 10:00 AM CST   Day Program with MSAC PER JEAN   [facility] ([facility])    [address]  [place] [address]   [phone]            Nov 15, 2018 10:00 AM CST   Day Program with MSAC PER JEAN   [facility] ([facility])    [address]  [place] [address]   [phone]            Nov 20, 2018 10:00 AM CST   Day Program with MSAC PER JEAN   [facility] ([facility])    [address]  [place] [address]   [phone]            Nov 27, 2018 10:00 AM CST   Day Program with MSAC PER JEAN   [facility] ([facility])    [address]  [place] [address]   [phone]            Nov 29, 2018 10:00 AM CST   Day Program with MSAC PER JEAN   [facility] ([facility])    [address]  [place] [address]   [phone]            Dec 04, 2018 10:00 AM CST   Day Program with MSAC PER JEAN   [facility] ([facility])    [address]  [place] [address]   [phone]            Dec 06, 2018 10:00 AM  CST   Day Program with MSAC PER JEAN   [facility] ([facility])    [address]  [place] [address]   [phone]            Dec 11, 2018 10:00 AM CST   Day Program with MSAC PER JEAN   [facility] ([facility])    [address]  [place] [facility] [address]   [phone]            Dec 13, 2018 10:00 AM CST   Day Program with MSAC PER JEAN   [facility] ([facility])    [address]  [place] [facility] [address]   [phone]            Dec 18, 2018 10:00 AM CST   [facility]    [address]  [place] [facility] [address]   [phone]              Who to contact     If you have questions or need follow up information about today's clinic visit or your schedule please contact [facility] directly at [phone].  Normal or non-critical lab and imaging results will be communicated to you by Veezeonhart, letter or phone within 4 business days after the clinic has received the results. If you do not hear from us within 7 days, please contact the clinic through Veezeonhart or phone. If you have a critical or abnormal lab result, we will notify you by phone as soon as possible.  Submit refill requests through 9+ or call your pharmacy and they will forward the refill request to us. Please allow 3 business days for your refill to be completed.          Additional Information About Your Visit        9+ Information     9+ gives you secure access to your electronic health record. If you see a primary care provider, you can also send messages to your care team and make appointments. If you have questions, please  call your primary care clinic.  If you do not have a primary care provider, please call [phone] and they will assist you.        Care EveryWhere ID     This is your Care EveryWhere ID. This could be used by other organizations to access your [place] medical records  [id number]        Your Vitals Were     Last Period                   10/30/2018            Blood Pressure from Last 3 Encounters:   11/02/18 113/65   10/31/18 108/64   10/31/18 122/69    Weight from Last 3 Encounters:   11/02/18 159 lb 4 oz (72.2 kg)   10/31/18 158 lb 6.4 oz (71.8 kg)   10/31/18 160 lb 8 oz (72.8 kg)              Today, you had the following     No orders found for display       Primary Care Provider Office Phone # Fax #    Malvin Cesario Beck [phone]       [facility] [address] [place] [address]        Goals        General    # 1Medical (pt-stated)     Notes - Note created  6/8/2018 10:18 AM by Akilah Velarde, RN    Goal Statement: I will seek medical help if any signs of seizure activity  Measure of Success: Decreased seizure activity   Supportive Steps to Achieve:   I will keep future Neurology appointments  I will take Keppra as directed   Barriers: Recent seizure   Strengths: Supportive  who can watch for any changes in behavior   Date to Achieve By: Ongoing         Equal Access to Services     DUANE HARPER AH: Hadii marcio ku hadasho Soomaali, waaxda luqadaha, qaybta kaalmada adeeglynnette, liza caldwell. So [facility] [phone].    ATENCIÓN: Si habla español, tiene a gatica disposición servicios gratuitos de asistencia lingüística. Llame al [phone].    We comply with applicable federal civil rights laws and [place] laws. We do not discriminate on the basis of race, color, national origin, age, disability, sex, sexual orientation, or gender identity.            Thank you!     Thank you for choosing [facility]  for your care. Our goal is always to provide you  with excellent care. Hearing back from our patients is one way we can continue to improve our services. Please take a few minutes to complete the written survey that you may receive in the mail after your visit with us. Thank you!             Your Updated Medication List - Protect others around you: Learn how to safely use, store and throw away your medicines at www.disposemymeds.org.          This list is accurate as of 11/9/18 10:22 AM.  Always use your most recent med list.                   Brand Name Dispense Instructions for use Diagnosis    amoxicillin 500 MG capsule    AMOXIL    30 capsule    Take 1 capsule (500 mg) by mouth 3 times daily for 10 days    Enterococcus UTI       baclofen 10 MG tablet    LIORESAL     Take 10-20 mg by mouth 4 times daily as needed        CRANBERRY EXTRACT PO      Take 1 tablet by mouth daily        dimethyl fumarate delayed release capsule    TECFIDERA     Take 240 mg by mouth 2 times daily        doxycycline 100 MG capsule    VIBRAMYCIN    20 capsule    Take 1 capsule (100 mg) by mouth 2 times daily for 10 days        levETIRAcetam 1000 MG Tabs     60 tablet    Take 1,000 mg by mouth 2 times daily    Multiple sclerosis (H)       norethindrone 0.35 MG per tablet    MICRONOR    84 tablet    TAKE 1 TABLET(0.35 MG) BY MOUTH DAILY    Surveillance of previously prescribed contraceptive pill       order for DME     1 Units    Equipment being ordered: four wheeled walker with seat and brakes    Encephalopathy       oxybutynin 5 MG 24 hr tablet    DITROPAN-XL     Take 5 mg by mouth daily        sulfamethoxazole-trimethoprim 400-80 MG per tablet    BACTRIM/SEPTRA     Take 1 tablet by mouth daily        venlafaxine 75 MG Tb24 24 hr tablet    EFFEXOR-ER     Take 75 mg by mouth daily (2  X 37.5 mg)        VITAMIN D (CHOLECALCIFEROL) PO      Take 1,000 Units by mouth daily

## 2018-11-12 ENCOUNTER — ALLIED HEALTH/NURSE VISIT (OUTPATIENT)
Dept: NURSING | Facility: CLINIC | Age: 43
End: 2018-11-12
Payer: COMMERCIAL

## 2018-11-12 DIAGNOSIS — L02.419 ABSCESS OF LEG, EXCEPT FOOT: Primary | ICD-10-CM

## 2018-11-12 PROCEDURE — 99207 ZZC NO CHARGE NURSE ONLY: CPT

## 2018-11-12 NOTE — MR AVS SNAPSHOT
After Visit Summary   11/12/2018    Shanna Shanks    MRN: [id number]           Patient Information     Date Of Birth          1975        Visit Information        Provider Department      11/12/2018 12:30 PM HW RN/TRIAGE NURSE ONLY [facility]        Today's Diagnoses     Abscess of leg, except foot    -  1       Follow-ups after your visit        Your next 10 appointments already scheduled     Nov 13, 2018 10:00 AM CST   Day Program with MSAC PER JEAN   [facility] ([facility])    [address]  [place] [address]   [phone]            Nov 15, 2018 10:00 AM CST   Day Program with MSAC PER JEAN   [facility] ([facility])    [address]  [place] [address]   [phone]            Nov 20, 2018 10:00 AM CST   Day Program with MSAC PER JEAN   [facility] ([facility])    [address]  [facility] [address]   [phone]            Nov 27, 2018 10:00 AM CST   Day Program with MSAC PER JEAN   [facility] ([facility])    [address]  [facility] [address]   [phone]            Nov 29, 2018 10:00 AM CST   Day Program with MSAC PER JEAN   [facility] ([facility])    [address]  [facility] [address]   [phone]            Dec 04, 2018 10:00 AM CST   Day Program with MSAC PER JEAN   [facility] ([facility])    [address]  [facility] [address]   [phone]            Dec 06, 2018 10:00 AM CST   Day Program with MSAC  PER JEAN   [facility] ([facility])    [address]  [facility] [address]   [phone]            Dec 11, 2018 10:00 AM CST   Day Program with MSAC PER JEAN   [facility] ([facility])    [address]  [facility] [address]   [phone]            Dec 13, 2018 10:00 AM CST   [facility]    [address]  [facility] [address]   [phone]            Dec 18, 2018 10:00 AM CST   [facility]    [address]  [facility] [address]   [phone]              Who to contact     If you have questions or need follow up information about today's clinic visit or your schedule please contact [facility] directly at [phone].  Normal or non-critical lab and imaging results will be communicated to you by MyChart, letter or phone within 4 business days after the clinic has received the results. If you do not hear from us within 7 days, please contact the clinic through The DelFin Projecthart or phone. If you have a critical or abnormal lab result, we will notify you by phone as soon as possible.  Submit refill requests through [employer] or call your pharmacy and they will forward the refill request to us. Please allow 3 business days for your refill to be completed.          Additional Information About Your Visit        Contracts and [employer]ants Information     [employer] gives you secure access to your electronic health record. If you see a primary care provider, you can also send messages to your care team and make appointments. If you have questions, please call your primary care clinic.   If you do not have a primary care provider, please call [phone] and they will assist you.        Care EveryWhere ID     This is your Care EveryWhere ID. This could be used by other organizations to access your [place] medical records  [id number]        Your Vitals Were     Last Period                   10/30/2018            Blood Pressure from Last 3 Encounters:   11/02/18 113/65   10/31/18 108/64   10/31/18 122/69    Weight from Last 3 Encounters:   11/02/18 159 lb 4 oz (72.2 kg)   10/31/18 158 lb 6.4 oz (71.8 kg)   10/31/18 160 lb 8 oz (72.8 kg)              Today, you had the following     No orders found for display         Today's Medication Changes          These changes are accurate as of 11/12/18  1:38 PM.  If you have any questions, ask your nurse or doctor.               Start taking these medicines.        Dose/Directions    order for DME   Used for:  Abscess of leg, except foot        Equipment being ordered: Dressing Wound #1 lower leg, L 1/2 cm x, W 1/2 cm x, tunneling depth, Drainage slight. Serosanguinous from abscess site.  This for abcess wound on left calf of let. Pack with plain packing strip 1/2x15' Use cotton tipped applicator to pack wound. 4x4 gauze on site. Use 4x4 gauze. Wrap with 4x4 kerlix bandage wrap. Wrap with coban to keep bandage on leg.  Change: 2-3 times per week.  Tape/Wrap: coban   Quantity:  6 Container   Refills:  1            Where to get your medicines      Some of these will need a paper prescription and others can be bought over the counter.  Ask your nurse if you have questions.     Bring a paper prescription for each of these medications     order for DME                Primary Care Provider Office Phone # Fax #    Malvin Beck [phone] [address] [place] [address]        Goals        General    # 1Medical (pt-stated)     Notes - Note created  6/8/2018 10:18 AM by Akilah Velarde, RN    Goal Statement: I will seek  medical help if any signs of seizure activity  Measure of Success: Decreased seizure activity   Supportive Steps to Achieve:   I will keep future Neurology appointments  I will take Keppra as directed   Barriers: Recent seizure   Strengths: Supportive  who can watch for any changes in behavior   Date to Achieve By: Ongoing         Equal Access to Services     DUANE HARPER AH: Hadii aad ku hadsloanchristina Sorip, waaxda luqadaha, qaybta kaalmada christineeneidadana, waxay idiin haytomasrobbie delgadodarielalea caldwell. So [facility] [phone].    ATENCIÓN: Si habla español, tiene a gatica disposición servicios gratuitos de asistencia lingüística. Llame al [phone].    We comply with applicable federal civil rights laws and [place] laws. We do not discriminate on the basis of race, color, national origin, age, disability, sex, sexual orientation, or gender identity.            Thank you!     Thank you for choosing [facility]  for your care. Our goal is always to provide you with excellent care. Hearing back from our patients is one way we can continue to improve our services. Please take a few minutes to complete the written survey that you may receive in the mail after your visit with us. Thank you!             Your Updated Medication List - Protect others around you: Learn how to safely use, store and throw away your medicines at www.disposemymeds.org.          This list is accurate as of 11/12/18  1:38 PM.  Always use your most recent med list.                   Brand Name Dispense Instructions for use Diagnosis    baclofen 10 MG tablet    LIORESAL     Take 10-20 mg by mouth 4 times daily as needed        CRANBERRY EXTRACT PO      Take 1 tablet by mouth daily        dimethyl fumarate delayed release capsule    TECFIDERA     Take 240 mg by mouth 2 times daily        levETIRAcetam 1000 MG Tabs     60 tablet    Take 1,000 mg by mouth 2 times daily    Multiple sclerosis (H)       norethindrone 0.35 MG per tablet    MICRONOR     84 tablet    TAKE 1 TABLET(0.35 MG) BY MOUTH DAILY    Surveillance of previously prescribed contraceptive pill       order for DME     1 Units    Equipment being ordered: four wheeled walker with seat and brakes    Encephalopathy       order for DME     6 Container    Equipment being ordered: Dressing Wound #1 lower leg, L 1/2 cm x, W 1/2 cm x, tunneling depth, Drainage slight. Serosanguinous from abscess site.  This for abcess wound on left calf of let. Pack with plain packing strip 1/2x15' Use cotton tipped applicator to pack wound. 4x4 gauze on site. Use 4x4 gauze. Wrap with 4x4 kerlix bandage wrap. Wrap with coban to keep bandage on leg.  Change: 2-3 times per week.  Tape/Wrap: coban    Abscess of leg, except foot       oxybutynin 5 MG 24 hr tablet    DITROPAN-XL     Take 5 mg by mouth daily        sulfamethoxazole-trimethoprim 400-80 MG per tablet    BACTRIM/SEPTRA     Take 1 tablet by mouth daily        venlafaxine 75 MG Tb24 24 hr tablet    EFFEXOR-ER     Take 75 mg by mouth daily (2  X 37.5 mg)        VITAMIN D (CHOLECALCIFEROL) PO      Take 1,000 Units by mouth daily

## 2018-11-12 NOTE — NURSING NOTE
"Patient walked to procedure room with assistance of spouse, Jama.      Patient finished the doxycycline on 11/10/18.      Patient positioned on procedure table with assistance from writer.      Pillows placed beneath patient's head and BLE for comfort/support.      Suzi Ozuna RN and this writer taught and observed pt's  complete the wound packing and dressing change today.      Outer gauze and coban wrap removed.      4x4 and 2x2 gauze had small serous drainage present and removed easily.         Approximately 6  inches of packing removed.  No active drainage nor oozing from wound noted.  Pink color to skin surrounding wound.  Skin around the wound had some hard swelling. Pink color to skin. Leg is no different than provider assessment on 11/9/18.  We will want to see how pt does once off the antibiotic.      Skin surrounding wound area cleansed with alcohol swab and area allowed to dry.      Sterile 1/2\" width packing gauze placed into wound.  Approximately 6 inches of packing gauze placed into wound.      Wound covered with 4x4 gauze, gauze wrap and coban to secure in place.      Per Dr. Beck, patient to return to clinic 2-3 times per week for wound packing replacement. Dr. Beck was open to pt's  changing the dressing. Jama is open to doing this. Jama completed all aspects of the dressing packing and kerlix gauze and coban. Jama has been replacing the outer gauze and coban as needed at home.    Reviewed-  Wash hands before doing the dressing change.   Using alcohol cleaned forceps and scissors to perform the wound packing.   Keep the packing guaze from touching anything else before packing into wound.       Gave Jama a DME rx with dressing supplies.  Gave supplies to get thru this week.  The supplies included the list noted below.  Gave Jama a set of disposable suture scissors and forceps.    Plan is to do the dressing 2-3 times per week at home.  F/u and see pcp in 2 weeks.  If you see " a drastic drop in how much gauze you are packing, call clinic.  If you have any questions or concerns, don't hesitate to call clinic triage.    Patient and  in agreement with plan.  Patient's  assisted patient to lobby.     MAZIN Sun                List-  1/2 inch plain packing strip  Cotton tip applicators  4x4 gauze- 12 ply  kerlix bandage roll- 4-1./2x4/1/8  coban  Clean forcep and scissors.

## 2018-11-13 ENCOUNTER — HOSPITAL ENCOUNTER (OUTPATIENT)
Dept: REHABILITATION | Facility: CLINIC | Age: 43
End: 2018-11-13
Attending: NURSE PRACTITIONER
Payer: COMMERCIAL

## 2018-11-13 PROCEDURE — 40000247 ZZH STATISTIC VISIT ADULT DAY PROGRAM

## 2018-11-13 PROCEDURE — S5102 ADULT DAY CARE PER DIEM: HCPCS

## 2018-11-13 NOTE — PROGRESS NOTES
Individual abuse prevention plan (IAPP)  [facility]     Assessment of Susceptibility to Abuse, Including Self Abuse, Neglect (Identification of characteristics, which make the individual susceptible to abuse, and how these characteristics cause the individual to be susceptible to abuse.)     Is the person susceptible to abuse in each area?  Sexual Abuse:   No  Referrals made when the person is susceptible to abuse outside the scope or control of this program (Identify the referral and date it occurred): No additional risk reduction means needed at this time    Physical Abuse:   No  Referrals made when the person is susceptible to abuse outside the scope or control of this program (Identify the referral and date it occurred): No additional risk reduction means needed at this time    Self-Abuse:   No  Referrals made when the person is susceptible to abuse outside the scope or control of this program (Identify the referral and date it occurred): No additional risk reduction means needed at this time    Financial  Exploitation  No  Referrals made when the person is susceptible to abuse outside the scope or control of this program (Identify the referral and date it occurred): No additional risk reduction means needed at this time    Is the program aware of this person committing a violent crime or act of physical aggression towards others?  No  Referrals made when the person is susceptible to abuse outside the scope or control of this program (Identify the referral and date it occurred): No additional risk reduction means needed at this time    INDIVIDUAL ABUSE PREVENTION PLAN-MEASURES TAKEN TO MINIMIZE RISK OF ABUSE   ADL:   Assist with clothing management  Assist with toileting  Ambulation/Transfers/Wheelchairs:  Assist with all transfers and/or ambulation   Behavior:   Monitor client when wandering.  Communication:  Encourage verbalization of needs/concerns  Cognitive Issues:   Require aide to be with  member if wandering  Provider reminders due to confusion, forgetfulness  Give simple step-by-step direction  Diet:   Client requires minimal set-up for meals.  Exercise:   Encourage participation in maintenance program  Hearing:   No current concerns.  Isolation:   Encourage socialization due to isolation in home environment  Medical Monitoring:   Monitor physical and emotional comfort  Mental Health:   Encourage client to express feelings  Offer emotional support  Sensory:   Provide and encourage participation in activities for stimulation  Vision:   No concerns at this time.  Other: N/A    Developed in consultation with:  [facility] and the [facility] Staff.  The Program Abuse Prevention Plan/IAPP identifies the specific actions that minimize abuse to the [facility] participant.  Yes

## 2018-11-13 NOTE — PROGRESS NOTES
"SELF-PRESERVATION FORM  [facility]    Member Name: Shanna Shanks; YOB: 1975  MRN: [id number]  11/13/2018    A. The person is ambulatory or mobile. Yes  B. The person has the combined physical and mental capacity to:  1. Recognize a danger, signal or alarm requiring evacuation from the center: Yes  2. Initiate and complete the evacuation without requiring more than sporadic assistance from another person, such as help in opening a door or getting into a wheelchair: No  3. Select an alternative means of escape or take another appropriate action if the primary escape route is blocked: No  4. Remain at a designated location outside the center until further instruction is given: No    \"Capable of taking appropriate action for self-preservation under emergency conditions\" is the designation applied in parts 2902.3919 to 1984.2613 to an adult who meets the criteria in items A and B.    FAC Self-Preservation Status: Not capable of self-preservation    "

## 2018-11-13 NOTE — ADDENDUM NOTE
Encounter addended by: Melina Chao on: 11/13/2018  2:38 PM<BR>     Actions taken: Sign clinical note

## 2018-11-14 NOTE — PROGRESS NOTES
MONTHLY PROGRESS NOTE AND PARTICIPATION REPORT   [facility]    Shanna Shanks, 1975  Attendance: Please see Epic for attendance record.    Communication:   Does communicate   Hearing:   No impairment  Vision:   No impairment  Orientation/Cognition:   Partial disorientation  Short term memory loss  Behavior:   No behavioral concerns  Self-Preservation Skills:   Not capable of self-preservation  Eating:   Independent  Ambulation Walking:   Independent  Needs assistance  Transferring:   Aide of one person/two  Wheelchair:   Uses walker at program  Toileting:   Needs assistance  Maintenance Program:     NuStep  Living Arrangements:   Lives with family  Spiritual Needs: Needs are being met through support group  Medication Assistance:   Medication not taken during program hours  Participation Report:   Aerobics/Exercise  Support Group  Cognitive Stimulation  Fire Drill  Creative Arts/Crafts  Games  Speakers/Entertainment  Socialization  Current Events/News  Education/Health Topic  Animal shelter week, geography bee, cello meditation, Thanksgiving education, games and puzzle week, healthy eating with Nyla.   Level of Participation:   Active  To the best of their ability

## 2018-11-15 ENCOUNTER — HOSPITAL ENCOUNTER (OUTPATIENT)
Dept: REHABILITATION | Facility: CLINIC | Age: 43
End: 2018-11-15
Attending: NURSE PRACTITIONER
Payer: COMMERCIAL

## 2018-11-15 PROCEDURE — S5102 ADULT DAY CARE PER DIEM: HCPCS

## 2018-11-15 PROCEDURE — 40000247 ZZH STATISTIC VISIT ADULT DAY PROGRAM

## 2018-11-20 ENCOUNTER — HOSPITAL ENCOUNTER (OUTPATIENT)
Dept: REHABILITATION | Facility: CLINIC | Age: 43
End: 2018-11-20
Attending: NURSE PRACTITIONER
Payer: COMMERCIAL

## 2018-11-20 PROCEDURE — S5102 ADULT DAY CARE PER DIEM: HCPCS

## 2018-11-20 PROCEDURE — 40000247 ZZH STATISTIC VISIT ADULT DAY PROGRAM

## 2018-11-21 ENCOUNTER — OFFICE VISIT (OUTPATIENT)
Dept: FAMILY MEDICINE | Facility: CLINIC | Age: 43
End: 2018-11-21
Payer: COMMERCIAL

## 2018-11-21 VITALS
OXYGEN SATURATION: 100 % | RESPIRATION RATE: 16 BRPM | TEMPERATURE: 98 F | BODY MASS INDEX: 23.87 KG/M2 | HEART RATE: 66 BPM | WEIGHT: 157 LBS

## 2018-11-21 DIAGNOSIS — Z48.01 ENCOUNTER FOR SURGICAL WOUND DRESSING CHANGE: Primary | ICD-10-CM

## 2018-11-21 PROCEDURE — 99213 OFFICE O/P EST LOW 20 MIN: CPT | Performed by: FAMILY MEDICINE

## 2018-11-21 NOTE — PROGRESS NOTES
SUBJECTIVE:   Shanna Shanks is a 43 year old female who presents to clinic today for the following health issues:      Follow up left leg injury and wound check.     is changing her dressing and he noticed is the size of packing material is getting smaller.  Patient is tolerating dressing change very well.  He is doing it twice a week.  She is not having any fever.  No any drainage. She is done with antibiotics.     Problem list and histories reviewed & adjusted, as indicated.  Additional history: as documented    Labs reviewed in EPIC    Reviewed and updated as needed this visit by clinical staff  Tobacco  Allergies  Med Hx  Surg Hx  Fam Hx  Soc Hx      Reviewed and updated as needed this visit by Provider           Social History     Social History     Marital status:      Spouse name: Jama     Number of children: 2     Years of education: N/A     Occupational History     computer graphic design Self     Social History Main Topics     Smoking status: Former Smoker     Packs/day: 0.25     Types: Cigarettes     Smokeless tobacco: Never Used      Comment: quit a few weeks ago     Alcohol use No     Drug use: No     Sexual activity: Yes     Partners: Male     Birth control/ protection: Pill, Rhythm     Other Topics Concern     Parent/Sibling W/ Cabg, Mi Or Angioplasty Before 65f 55m? No     Social History Narrative    Caffeine intake/servings daily - 2    Calcium intake/servings daily - 2-3    Exercise 7 times weekly - describe runs on treadmill for 5 minutes    Sunscreen used - Yes    Seatbelts used - Yes    Guns stored in the home - No    Self Breast Exam - Yes    Pap test up to date -  Yes    Eye exam up to date -  Yes    Dental exam up to date -  Yes    DEXA scan up to date -  Yes    Flex Sig/Colonoscopy up to date -  Not Applicable    Mammography up to date -  Not Applicable    Immunizations reviewed and up to date - Yes    Abuse: Current or Past (Physical, Sexual or Emotional) - No     Do you feel safe in your environment - Yes    Do you cope well with stress - Yes    Do you suffer from insomnia - No    Last updated by: Michelle Sanchez  12/22/2010                     Allergies   Allergen Reactions     Diphenhydramine Rash     benadryl cream     Nickel      Macrobid [Nitrofurantoin] Rash     Patient Active Problem List   Diagnosis     Multiple sclerosis (H)     UTI (urinary tract infection)     CARDIOVASCULAR SCREENING; LDL GOAL LESS THAN 160     Dysmenorrhea     Nicotine dependence in remission     Encephalopathy     Herpes encephalitis     Fever of unknown origin     Seizure (H)     Reviewed medications, social history and  past medical and surgical history.    Review of system: for general, respiratory, CVS, GI and psychiatry negative except for noted above.     EXAM:  Pulse 66  Temp 98  F (36.7  C) (Oral)  Resp 16  Wt 157 lb (71.2 kg)  LMP 10/30/2018  SpO2 100%  Breastfeeding? No  BMI 23.87 kg/m2  Constitutional:  , alert and no distress   Psychiatric: mentation appears normal and affect normal/bright, mildly anxious but pleasant  Skin - left posterior calf - dressing removed. Around 5 inches packing material removed. Around 1 cm incision. Surrounding skin is with minimal erythema, non tender, changed packing material with aseptic technique around 6 inches in size. Dressing done.        ASSESSMENT / PLAN:  (Z48.01) Encounter for surgical wound dressing change  (primary encounter diagnosis)  Comment: from I&D.  is doing really well with home dressing change and packing change.   Plan: continue same. No need for further antibiotics.     Follow up: at this point as needed for the wound as she is having good recovery. If getting worse - follow up.   Routine follow up in 3 months.     The above note was dictated using voice recognition. Although reviewed after completion, some word and grammatical error may remain .

## 2018-11-21 NOTE — MR AVS SNAPSHOT
After Visit Summary   11/21/2018    Shanna Shanks    MRN: [id number]           Patient Information     Date Of Birth          1975        Visit Information        Provider Department      11/21/2018 11:00 AM Malvin Beck MD [facility]        Today's Diagnoses     Encounter for surgical wound dressing change    -  1       Follow-ups after your visit        Your next 10 appointments already scheduled     Nov 27, 2018 10:00 AM CST   Day Program with [facility] PER JEAN   [facility] ([facility])    [address]  [facility] [address]   [phone]            Nov 29, 2018 10:00 AM CST   Day Program with [facility] PER JEAN   [facility] ([facility])    [address]  [facility] [address]   [phone]            Dec 04, 2018 10:00 AM CST   Day Program with MS[facility] PER JEAN   [facility] ([facility])    [address]  [facility] [address]   [phone]            Dec 06, 2018 10:00 AM CST   Day Program with MS[facility] PER JEAN   [facility] ([facility])    [address]  [facility] [address]   [phone]            Dec 11, 2018 10:00 AM CST   Day Program with MS[facility] PER JEAN   [facility] ([facility])    [address]  [facility] [address]   [phone]            Dec 13, 2018 10:00 AM CST   Day Program with MSAC PER JEAN   [facility] ([facility])    [address]  [facility] [address]   [phone]            Dec 18, 2018 10:00 AM CST    Day Program with MSAC PER JAEN   [facility] ([facility])    [address]  [place] [address]   [phone]            Dec 20, 2018 10:00 AM CST   Day Program with MSAC PER JEAN   [facility] ([facility])    [address]  [place] [address]   [phone]            Dec 27, 2018 10:00 AM CST   Day Program with MSAC PER JEAN   [facility] ([facility])    [address]  [facility] [address]   [phone]            Jan 03, 2019 10:00 AM CST   [facility]    [address]  [facility] [address]   [phone]              Who to contact     If you have questions or need follow up information about today's clinic visit or your schedule please contact [facility] directly at [phone].  Normal or non-critical lab and imaging results will be communicated to you by [employer], letter or phone within 4 business days after the clinic has received the results. If you do not hear from us within 7 days, please contact the clinic through [employer] or phone. If you have a critical or abnormal lab result, we will notify you by phone as soon as possible.  Submit refill requests through [employer] or call your pharmacy and they will forward the refill request to us. Please allow 3 business days for your refill to be completed.          Additional Information About Your Visit        [employer] Information     [employer] gives you secure access to your electronic health record. If you see a primary care provider, you can also send messages to your care team and make appointments. If you have questions, please call  your primary care clinic.  If you do not have a primary care provider, please call [phone] and they will assist you.        Care EveryWhere ID     This is your Care EveryWhere ID. This could be used by other organizations to access your [facility] medical records  [id number]        Your Vitals Were     Pulse Temperature Respirations Last Period Pulse Oximetry Breastfeeding?    66 98  F (36.7  C) (Oral) 16 10/30/2018 100% No    BMI (Body Mass Index)                   23.87 kg/m2            Blood Pressure from Last 3 Encounters:   11/02/18 113/65   10/31/18 108/64   10/31/18 122/69    Weight from Last 3 Encounters:   11/21/18 157 lb (71.2 kg)   11/02/18 159 lb 4 oz (72.2 kg)   10/31/18 158 lb 6.4 oz (71.8 kg)              Today, you had the following     No orders found for display       Primary Care Provider Office Phone # Fax #    Malvin Cesario Beck [phone] [address] [place] [id number]        Goals        General    # 1Medical (pt-stated)     Notes - Note created  6/8/2018 10:18 AM by Akilah Velarde RN    Goal Statement: I will seek medical help if any signs of seizure activity  Measure of Success: Decreased seizure activity   Supportive Steps to Achieve:   I will keep future Neurology appointments  I will take Keppra as directed   Barriers: Recent seizure   Strengths: Supportive  who can watch for any changes in behavior   Date to Achieve By: Ongoing         Equal Access to Services     DUANE HARPER AH: Hadii marcio pengo Sorip, waaxda luqadaha, qaybta kaalmada liza stauffer . So [facility] [phone].    ATENCIÓN: Si habla español, tiene a gaitca disposición servicios gratuitos de asistencia lingüística. Llame al [phone].    We comply with applicable federal civil rights laws and [place] laws. We do not discriminate on the basis of race, color, national origin, age, disability, sex, sexual orientation, or gender  identity.            Thank you!     Thank you for choosing [facility]  for your care. Our goal is always to provide you with excellent care. Hearing back from our patients is one way we can continue to improve our services. Please take a few minutes to complete the written survey that you may receive in the mail after your visit with us. Thank you!             Your Updated Medication List - Protect others around you: Learn how to safely use, store and throw away your medicines at www.disposemymeds.org.          This list is accurate as of 11/21/18  2:40 PM.  Always use your most recent med list.                   Brand Name Dispense Instructions for use Diagnosis    baclofen 10 MG tablet    LIORESAL     Take 10-20 mg by mouth 4 times daily as needed        CRANBERRY EXTRACT PO      Take 1 tablet by mouth daily        dimethyl fumarate delayed release capsule    TECFIDERA     Take 240 mg by mouth 2 times daily        levETIRAcetam 1000 MG tablet    KEPPRA    60 tablet    Take 1,000 mg by mouth 2 times daily    Multiple sclerosis (H)       norethindrone 0.35 MG per tablet    MICRONOR    84 tablet    TAKE 1 TABLET(0.35 MG) BY MOUTH DAILY    Surveillance of previously prescribed contraceptive pill       order for DME     1 Units    Equipment being ordered: four wheeled walker with seat and brakes    Encephalopathy       order for DME     6 Container    Equipment being ordered: Dressing Wound #1 lower leg, L 1/2 cm x, W 1/2 cm x, tunneling depth, Drainage slight. Serosanguinous from abscess site.  This for abcess wound on left calf of let. Pack with plain packing strip 1/2x15' Use cotton tipped applicator to pack wound. 4x4 gauze on site. Use 4x4 gauze. Wrap with 4x4 kerlix bandage wrap. Wrap with coban to keep bandage on leg.  Change: 2-3 times per week.  Tape/Wrap: coban    Abscess of leg, except foot       oxybutynin 5 MG 24 hr tablet    DITROPAN-XL     Take 5 mg by mouth daily         sulfamethoxazole-trimethoprim 400-80 MG per tablet    BACTRIM/SEPTRA     Take 1 tablet by mouth daily        venlafaxine 75 MG Tb24 24 hr tablet    EFFEXOR-ER     Take 75 mg by mouth daily (2  X 37.5 mg)        VITAMIN D (CHOLECALCIFEROL) PO      Take 1,000 Units by mouth daily

## 2018-11-27 ENCOUNTER — HOSPITAL ENCOUNTER (OUTPATIENT)
Dept: REHABILITATION | Facility: CLINIC | Age: 43
End: 2018-11-27
Attending: NURSE PRACTITIONER
Payer: COMMERCIAL

## 2018-11-27 PROCEDURE — 40000247 ZZH STATISTIC VISIT ADULT DAY PROGRAM

## 2018-11-27 PROCEDURE — S5102 ADULT DAY CARE PER DIEM: HCPCS

## 2018-11-29 ENCOUNTER — HOSPITAL ENCOUNTER (OUTPATIENT)
Dept: REHABILITATION | Facility: CLINIC | Age: 43
End: 2018-11-29
Attending: NURSE PRACTITIONER
Payer: COMMERCIAL

## 2018-11-29 PROCEDURE — S5102 ADULT DAY CARE PER DIEM: HCPCS

## 2018-11-29 PROCEDURE — 40000247 ZZH STATISTIC VISIT ADULT DAY PROGRAM

## 2018-12-04 ENCOUNTER — HOSPITAL ENCOUNTER (OUTPATIENT)
Dept: REHABILITATION | Facility: CLINIC | Age: 43
End: 2018-12-04
Attending: NURSE PRACTITIONER
Payer: COMMERCIAL

## 2018-12-04 PROCEDURE — 40000247 ZZH STATISTIC VISIT ADULT DAY PROGRAM

## 2018-12-04 PROCEDURE — S5102 ADULT DAY CARE PER DIEM: HCPCS

## 2018-12-06 ENCOUNTER — HOSPITAL ENCOUNTER (OUTPATIENT)
Dept: REHABILITATION | Facility: CLINIC | Age: 43
End: 2018-12-06
Attending: NURSE PRACTITIONER
Payer: COMMERCIAL

## 2018-12-06 PROCEDURE — 40000247 ZZH STATISTIC VISIT ADULT DAY PROGRAM

## 2018-12-06 PROCEDURE — S5102 ADULT DAY CARE PER DIEM: HCPCS

## 2018-12-11 ENCOUNTER — OFFICE VISIT (OUTPATIENT)
Dept: FAMILY MEDICINE | Facility: CLINIC | Age: 43
End: 2018-12-11
Payer: COMMERCIAL

## 2018-12-11 VITALS
TEMPERATURE: 98.2 F | DIASTOLIC BLOOD PRESSURE: 62 MMHG | SYSTOLIC BLOOD PRESSURE: 116 MMHG | BODY MASS INDEX: 24.59 KG/M2 | RESPIRATION RATE: 16 BRPM | WEIGHT: 161.75 LBS | OXYGEN SATURATION: 99 % | HEART RATE: 60 BPM

## 2018-12-11 DIAGNOSIS — S81.802D WOUND OF LEFT LOWER EXTREMITY, SUBSEQUENT ENCOUNTER: Primary | ICD-10-CM

## 2018-12-11 PROCEDURE — 99213 OFFICE O/P EST LOW 20 MIN: CPT | Performed by: FAMILY MEDICINE

## 2018-12-11 NOTE — PROGRESS NOTES
SUBJECTIVE:   Shanna Shanks is a 43 year old female who presents to clinic today for the following health issues:      Leg Infection      Duration: noticed 24 hours    Description  Location: lower left leg  Itching: no    Intensity:  mild    Accompanying signs and symptoms: redness above packing, blood on packing, no other drainage, slight swelling,     History (similar episodes/previous evaluation): None    Precipitating or alleviating factors:  New exposures:  None  Recent travel: no      Therapies tried and outcome: used packing and wrap    Just above incision - yesterday pain.    is doing dressing. Still needing to use packing material.   No fever.   Last appt 3 weeks ago and wound was healing OK.     Problem list and histories reviewed & adjusted, as indicated.  Additional history: as documented    Labs reviewed in EPIC    Reviewed and updated as needed this visit by clinical staff    Reviewed and updated as needed this visit by Provider      Social History     Socioeconomic History     Marital status:      Spouse name: Jama     Number of children: 2     Years of education: Not on file     Highest education level: Not on file   Social Needs     Financial resource strain: Not on file     Food insecurity - worry: Not on file     Food insecurity - inability: Not on file     Transportation needs - medical: Not on file     Transportation needs - non-medical: Not on file   Occupational History     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 No   Social History Narrative    Caffeine intake/servings daily - 2    Calcium intake/servings daily - 2-3    Exercise 7 times weekly - describe runs on treadmill for 5 minutes    Sunscreen used - Yes    Seatbelts used - Yes    Guns stored in the home - No    Self Breast Exam - Yes    Pap test up to date -  Yes    Eye exam up to date -  Yes    Dental exam up to date -  Yes    DEXA scan up to date -  Yes    Flex Sig/Colonoscopy up to date -  Not Applicable    Mammography up to date -  Not Applicable    Immunizations reviewed and up to date - Yes    Abuse: Current or Past (Physical, Sexual or Emotional) - No    Do you feel safe in your environment - Yes    Do you cope well with stress - Yes    Do you suffer from insomnia - No    Last updated by: Michelle Sanchez  12/22/2010                 Allergies   Allergen Reactions     Diphenhydramine Rash     benadryl cream     Nickel      Macrobid [Nitrofurantoin] Rash     Patient Active Problem List   Diagnosis     Multiple sclerosis (H)     UTI (urinary tract infection)     CARDIOVASCULAR SCREENING; LDL GOAL LESS THAN 160     Dysmenorrhea     Nicotine dependence in remission     Encephalopathy     Herpes encephalitis     Fever of unknown origin     Seizure (H)     Reviewed medications, social history and  past medical and surgical history.    Review of system: for general, respiratory, CVS, GI and psychiatry negative except for noted above.     EXAM:  /62 (BP Location: Left arm, Patient Position: Sitting, Cuff Size: Adult Regular)   Pulse 60   Temp 98.2  F (36.8  C) (Oral)   Resp 16   Wt 73.4 kg (161 lb 12 oz)   SpO2 99%   BMI 24.59 kg/m    Constitutional: , alert and no distress   Psychiatric: mentation appears normal and affect normal/bright  Gait - wide based  Left lower limb posterior calf - dressing removed. Mild serous drainage on dressing, surrounding skin is looking normal except on top of wound mild erythema and  tenderness. Packing material removed. No pus collection. Around 6- 7 inches     ASSESSMENT / PLAN:  (A20.473Y) Wound of left lower extremity, subsequent encounter  (primary encounter diagnosis)  Comment: I do worry about her delayed healing as her surrounding skin is looking well and per  packing material size is not going down. I recommended him not to pack with full force and I will see her back in a week if she is unable to see wound specialist in a week. They agreed. I do not see any signs of infection.s   Plan: WOUND CARE REFERRAL       Follow up:  1 week.     The above note was dictated using voice recognition. Although reviewed after completion, some word and grammatical error may remain .

## 2018-12-13 ENCOUNTER — HOSPITAL ENCOUNTER (OUTPATIENT)
Dept: REHABILITATION | Facility: CLINIC | Age: 43
End: 2018-12-13
Attending: NURSE PRACTITIONER
Payer: COMMERCIAL

## 2018-12-13 PROCEDURE — S5102 ADULT DAY CARE PER DIEM: HCPCS

## 2018-12-18 ENCOUNTER — OFFICE VISIT (OUTPATIENT)
Dept: FAMILY MEDICINE | Facility: CLINIC | Age: 43
End: 2018-12-18
Payer: COMMERCIAL

## 2018-12-18 ENCOUNTER — HOSPITAL ENCOUNTER (OUTPATIENT)
Dept: REHABILITATION | Facility: CLINIC | Age: 43
End: 2018-12-18
Attending: NURSE PRACTITIONER
Payer: COMMERCIAL

## 2018-12-18 VITALS
SYSTOLIC BLOOD PRESSURE: 126 MMHG | TEMPERATURE: 98.2 F | BODY MASS INDEX: 24.63 KG/M2 | DIASTOLIC BLOOD PRESSURE: 68 MMHG | WEIGHT: 162 LBS | HEART RATE: 65 BPM | OXYGEN SATURATION: 100 %

## 2018-12-18 DIAGNOSIS — G35 MULTIPLE SCLEROSIS (H): ICD-10-CM

## 2018-12-18 DIAGNOSIS — R39.9 UTI SYMPTOMS: Primary | ICD-10-CM

## 2018-12-18 LAB

## 2018-12-18 PROCEDURE — 81001 URINALYSIS AUTO W/SCOPE: CPT | Performed by: FAMILY MEDICINE

## 2018-12-18 PROCEDURE — 99214 OFFICE O/P EST MOD 30 MIN: CPT | Performed by: FAMILY MEDICINE

## 2018-12-18 PROCEDURE — S5102 ADULT DAY CARE PER DIEM: HCPCS

## 2018-12-18 PROCEDURE — 87086 URINE CULTURE/COLONY COUNT: CPT | Performed by: FAMILY MEDICINE

## 2018-12-18 RX ORDER — AMOXICILLIN 875 MG
875 TABLET ORAL 2 TIMES DAILY
Qty: 10 TABLET | Refills: 0 | Status: SHIPPED | OUTPATIENT
Start: 2018-12-18 | End: 2019-03-22

## 2018-12-18 NOTE — PATIENT INSTRUCTIONS
Amoxacillin 875 mg twice daily for 5 days   Pending urine culture, I'll change antibiotics if needed based on the urine culture  Follow if symptoms worsen or fail to improve.

## 2018-12-18 NOTE — PROGRESS NOTES
SUBJECTIVE:   Shanna Shanks is a 43 year old female who presents to clinic today for the following health issues:    H/o MS - With UTI she notes weakness of lower extremities, muscle fatigue and increased urinary frequency.   URINARY TRACT SYMPTOMS  Onset: 2 days ago     Description:   Painful urination (Dysuria): no   Blood in urine (Hematuria): no   Delay in urine (Hesitency): no     Progression of Symptoms:  same    Accompanying Signs & Symptoms:  Fever/chills: no   Flank pain no   Nausea and vomiting: no   Any vaginal symptoms: none  Abdominal/Pelvic Pain: no     History:   History of frequent UTI's: YES  History of kidney stones: no   Sexually Active: YES  Possibility of pregnancy: No    Precipitating factors:   Extra weakness in legs, and frequency           Problem list and histories reviewed & adjusted, as indicated.  Additional history: as documented    Labs reviewed in EPIC    Reviewed and updated as needed this visit by clinical staff  Tobacco  Allergies  Meds  Med Hx  Surg Hx  Fam Hx  Soc Hx      Reviewed and updated as needed this visit by Provider         ROS:  Constitutional, HEENT, cardiovascular, pulmonary, gi and gu systems are negative, except as otherwise noted.    OBJECTIVE:     /68   Pulse 65   Temp 98.2  F (36.8  C) (Oral)   Wt 73.5 kg (162 lb)   LMP  (LMP Unknown)   SpO2 100%   BMI 24.63 kg/m    Body mass index is 24.63 kg/m .  GENERAL: healthy, alert and no distress  EYES: Eyes grossly normal to inspection  HENT: nose and mouth without ulcers or lesions  PSYCH: mentation appears normal, affect normal    Diagnostic Test Results:  Results for orders placed or performed in visit on 12/18/18 (from the past 24 hour(s))   *UA reflex to Microscopic and Culture ([place] and [facility] (except [place] and [place])   Result Value Ref Range    Color Urine Yellow     Appearance Urine Clear     Glucose Urine Negative NEG^Negative mg/dL    Bilirubin Urine Negative  NEG^Negative    Ketones Urine Negative NEG^Negative mg/dL    Specific Gravity Urine 1.015 1.003 - 1.035    Blood Urine Trace (A) NEG^Negative    pH Urine 7.0 5.0 - 7.0 pH    Protein Albumin Urine Negative NEG^Negative mg/dL    Urobilinogen Urine 0.2 0.2 - 1.0 EU/dL    Nitrite Urine Negative NEG^Negative    Leukocyte Esterase Urine Small (A) NEG^Negative    Source Midstream Urine    Urine Microscopic   Result Value Ref Range    WBC Urine 5-10 (A) OTO5^0 - 5 /HPF    RBC Urine 2-5 (A) OTO2^O - 2 /HPF    Squamous Epithelial /LPF Urine Few FEW^Few /LPF    Bacteria Urine Many (A) NEG^Negative /HPF       ASSESSMENT/PLAN:     ## UTI symptoms  - H/o MS with frequent UTIs, reviewed previous culture   - *UA reflex to Microscopic and Culture ([place] and [facility] (except [place] and [place])  - Urine Microscopic  - amoxicillin (AMOXIL) 875 MG tablet; Take 1 tablet (875 mg) by mouth 2 times daily for 5 days  Dispense: 10 tablet; Refill: 0  - Urine Culture Aerobic Bacterial pending, change abx if needed base on the cultuer  - Follow if symptoms worsen or fail to improve.      Alfredo Becker MD  [facility]

## 2018-12-20 ENCOUNTER — HOSPITAL ENCOUNTER (OUTPATIENT)
Dept: REHABILITATION | Facility: CLINIC | Age: 43
End: 2018-12-20
Attending: NURSE PRACTITIONER
Payer: COMMERCIAL

## 2018-12-20 LAB
BACTERIA SPEC CULT: NORMAL
SPECIMEN SOURCE: NORMAL

## 2018-12-20 PROCEDURE — S5102 ADULT DAY CARE PER DIEM: HCPCS

## 2018-12-26 NOTE — PROGRESS NOTES
MONTHLY PROGRESS NOTE AND PARTICIPATION REPORT   [facility]    Shanna Shanks, 1975  Attendance: Please see Epic for attendance record.    Communication:   Does communicate   Hearing:   No impairment  Vision:   No impairment  Orientation/Cognition:   Partial disorientation  Short term memory loss  Behavior:   No behavioral concerns  Self-Preservation Skills:   Not capable of self-preservation  Eating:   Independent  Ambulation Walking:   Independent  Needs assistance  Transferring:   Aide of one person/two  Wheelchair:   Uses walker at program  Toileting:   Needs assistance  Maintenance Program:     NuStep  Living Arrangements:   Lives with family  Spiritual Needs: Needs are being met through support group  Medication Assistance:   Medication not taken during program hours  Participation Report:   Aerobics/Exercise  Support Group  Cognitive Stimulation  Fire Drill  Creative Arts/Crafts  Games  Speakers/Entertainment  Socialization  Current Events/News  Education/Health Topic  Arctic education week, holiday boutique, DIY ornaments, winter holiday culuture, guitar party, healthy eating with Nyla, white elephant games, art of [place] book club, animal ambassadors.   Level of Participation:   Active  To the best of their ability

## 2018-12-26 NOTE — ADDENDUM NOTE
Encounter addended by: Rhianna Tom on: 12/26/2018 9:13 AM   Actions taken: Episode edited, Sign clinical note

## 2018-12-31 ENCOUNTER — HOSPITAL ENCOUNTER (OUTPATIENT)
Dept: WOUND CARE | Facility: CLINIC | Age: 43
Discharge: HOME | End: 2018-12-31
Attending: FAMILY MEDICINE | Admitting: FAMILY MEDICINE
Payer: COMMERCIAL

## 2018-12-31 VITALS
HEIGHT: 68 IN | SYSTOLIC BLOOD PRESSURE: 118 MMHG | WEIGHT: 155 LBS | TEMPERATURE: 98.7 F | DIASTOLIC BLOOD PRESSURE: 79 MMHG | BODY MASS INDEX: 23.49 KG/M2 | HEART RATE: 84 BPM

## 2018-12-31 DIAGNOSIS — S80.12XD TRAUMATIC HEMATOMA OF LEFT LOWER LEG, SUBSEQUENT ENCOUNTER: ICD-10-CM

## 2018-12-31 PROBLEM — S80.12XA TRAUMATIC HEMATOMA OF LEFT LOWER LEG: Status: ACTIVE | Noted: 2018-12-31

## 2018-12-31 PROCEDURE — G0463 HOSPITAL OUTPT CLINIC VISIT: HCPCS | Mod: 25

## 2018-12-31 PROCEDURE — 99203 OFFICE O/P NEW LOW 30 MIN: CPT | Mod: 25 | Performed by: SURGERY

## 2018-12-31 PROCEDURE — 11042 DBRDMT SUBQ TIS 1ST 20SQCM/<: CPT | Performed by: SURGERY

## 2018-12-31 PROCEDURE — 11042 DBRDMT SUBQ TIS 1ST 20SQCM/<: CPT

## 2018-12-31 PROCEDURE — A6235 HYDROCOLLD DRG >16<=48 W/O B: HCPCS

## 2018-12-31 ASSESSMENT — MIFFLIN-ST. JEOR: SCORE: 1401.58

## 2018-12-31 NOTE — DISCHARGE INSTRUCTIONS
[facility]  [address] [facility] [address] [address]  Appointment Phone [phone] Nurse Advisors [phone]    Shanna Yazmin Longoriat      1975    Wound Dressing Change to left posterior lower leg  Cleanse wound with soap and water   Cover wound with Aquacel AG cut to fit the size of the wound (moisten with saline if dry wound)  Wrap or cover wound with gauze, secure with roll gauze  Change dressing daily     A diet high in protein is important for wound healing, we recommend getting 60 grams of protein per day. Taking protein shakes or bars are a good way to get extra protein in your diet.     Please raise your legs above your heart for 30 mins 3 times a day to promote wound healing.     Kj Valentin M.D.. December 31, 2018    Call us at [phone] if you have any questions about your wounds, have redness or swelling around your wound, have a fever of 101 or greater or if you have any other problems or concerns. We answer the phone Monday through Friday 8 am to 4 pm, please leave a message as we check the voicemail frequently throughout the day.     Follow up with Provider - 2 weeks

## 2018-12-31 NOTE — PROGRESS NOTES
Patient arrived for wound care visit. Certified Wound Care Nurse time spent evaluating patient record, completed a full evaluation and documented wound(s) & ondina-wound skin; provided recommendation based on treatment plan. Applied dressing, reviewed discharge instructions, patient education, and discussed plan of care with appropriate medical team staff members and patient and/or family members.

## 2018-12-31 NOTE — PROGRESS NOTES
"[facility] [place] Progress Note    Subject: Shanna Shanks her  come to see us today for evaluation at the wound clinic.  This 43-year-old patient with a history of MS but no significant weakness or issues associated with this traumatized her left posterior calf approximately 2 weeks before the Thanksgiving holiday.  She had a bruise in the area that started to increase in size and causing her some discomfort.    Was seen in the emergency department on 10/31/2018.  Ultrasound revealed no DVT venous thrombosis.  Subcutaneous fluid collection was noted and this was I &D.  Cultures were taken which were negative however she had been on antibiotics for UTI.  She was seen 2 times weekly in her physician's office for dressing changes.  Her  is doing the dressing changes every since using a gauze tape.  However, they have noticed the wound is not healed and thus she comes to see us today for evaluation.    PMH:   Current Outpatient Medications   Medication     baclofen (LIORESAL) 10 MG tablet     CRANBERRY EXTRACT PO     dimethyl fumarate (TECFIDERA) delayed release capsule     levETIRAcetam 1000 MG TABS     norethindrone (MICRONOR) 0.35 MG per tablet     order for DME     order for DME     oxybutynin (DITROPAN-XL) 5 MG 24 hr tablet     sulfamethoxazole-trimethoprim (BACTRIM/SEPTRA) 400-80 MG per tablet     venlafaxine (EFFEXOR-ER) 75 MG TB24 24 hr tablet     VITAMIN D, CHOLECALCIFEROL, PO     No current facility-administered medications for this encounter.      Non-smoker.  Lives independently with her .  History of multiple recurrent UTIs with last positive culture on 12/18/2018      Exam:  /79 (BP Location: Left arm)   Pulse 84   Temp 98.7  F (37.1  C)   Ht 1.727 m (5' 8\")   Wt 70.3 kg (155 lb)   LMP  (LMP Unknown)   BMI 23.57 kg/m    Alert and appropriate.  HEENT= normal.  Chest=clear  Cardiovascular= regular rate  Extremities= open mid left posterior calf ulcer " measuring 0.7 x 0.4 x 0.5 cm.  Has 2.4 cm of tunneling at the 12 o'clock position.  Very small amount of purulent drainage.  No surrounding erythema or swelling.    +3 palpable dorsalis pedis and posterior tibial pulses bilaterally.  No evidence of varicose veins.  No ankle swelling.  Procedure:   Patient was determined to be capable of making their own medical decisions and informed consent was obtained. Topical anesthetic of 4% lidocaine was applied, debridement was performed using a #15 blade down to and including subcutaneous tissue.  Initially I used a curette and noticed that there was the tunneling as described above.     Area was prepped with Betadine.  6 mL of 0.5% Marcaine field block was performed.  The using the #15 blade scalpel I opened up the undermined area superiorly for a length of 2.5 cm.  Wound was then debrided including the base and edges.  This is still superficial in the subcutaneous tissue.  Good bleeding was noted throughout.  No tunneling was appreciated following the unroofing.  Bleeding controlled with light pressure. Patient tolerated procedure well.    Discussed with the patient and her  during the procedure the importance of unroofing this tunnel so we can debride the wound appropriately and also apply appropriate dressings to the entire wound bed.    Impression: Traumatic left posterior calf ulceration in a patient with no underlying arterial venous insufficiency.  Wound is been unroofed today to allow for appropriate debridement and dressing changes.  Will use Aquacel Ag which will be changed daily by the patient's .    Plan: We will dress the wounds with Aquacel Ag daily.  Patient will return to the clinic in 2 weeks time.    Spent 30 minutes with the patient today in evaluation and treatment.    Kj Valentin MD on 12/31/2018 at 9:26 AM

## 2019-01-03 ENCOUNTER — HOSPITAL ENCOUNTER (OUTPATIENT)
Dept: REHABILITATION | Facility: CLINIC | Age: 44
End: 2019-01-03
Attending: NURSE PRACTITIONER
Payer: COMMERCIAL

## 2019-01-03 PROCEDURE — S5102 ADULT DAY CARE PER DIEM: HCPCS

## 2019-01-03 NOTE — PROGRESS NOTES
Called home to inform family of high frequency and urgency of bowel movements experienced at the program (7 total today; 3 within one hour). Jama informed us that Autumn just finished a round of antibiotics and that there was a cold going around at the house. At this time, we will continue to note BM frequency and urgency and continue to monitor to determine if something else is going on both at home and at the day program.

## 2019-01-08 ENCOUNTER — HOSPITAL ENCOUNTER (OUTPATIENT)
Dept: REHABILITATION | Facility: CLINIC | Age: 44
End: 2019-01-08
Attending: NURSE PRACTITIONER
Payer: COMMERCIAL

## 2019-01-08 PROCEDURE — S5102 ADULT DAY CARE PER DIEM: HCPCS

## 2019-01-10 ENCOUNTER — HOSPITAL ENCOUNTER (OUTPATIENT)
Dept: REHABILITATION | Facility: CLINIC | Age: 44
End: 2019-01-10
Attending: NURSE PRACTITIONER
Payer: COMMERCIAL

## 2019-01-10 PROCEDURE — S5102 ADULT DAY CARE PER DIEM: HCPCS

## 2019-01-10 NOTE — PROGRESS NOTES
AC RN Monthly Progress Note  Previous documentation reviewed. No concerns reported by AC staff or member.   Noted increase in BMs about a week ago. F/u with AC staff to always use universal precautions and be aware of possibility of C.Diff after a round of abx. Education provided to staff.

## 2019-01-14 ENCOUNTER — HOSPITAL ENCOUNTER (OUTPATIENT)
Dept: WOUND CARE | Facility: CLINIC | Age: 44
Discharge: HOME | End: 2019-01-14
Attending: SURGERY | Admitting: SURGERY
Payer: COMMERCIAL

## 2019-01-14 VITALS — RESPIRATION RATE: 18 BRPM | SYSTOLIC BLOOD PRESSURE: 134 MMHG | DIASTOLIC BLOOD PRESSURE: 93 MMHG | HEART RATE: 81 BPM

## 2019-01-14 DIAGNOSIS — S80.12XD TRAUMATIC HEMATOMA OF LEFT LOWER LEG, SUBSEQUENT ENCOUNTER: ICD-10-CM

## 2019-01-14 PROCEDURE — 97602 WOUND(S) CARE NON-SELECTIVE: CPT

## 2019-01-14 PROCEDURE — A6235 HYDROCOLLD DRG >16<=48 W/O B: HCPCS

## 2019-01-14 NOTE — PROGRESS NOTES
[facility] [place] Nurse Note    Subject: Shanna Shanks presents for nurse only visit for dressing change left lower posterior leg.      Exam:  BP (!) 134/93 (BP Location: Left arm)   Pulse 81   Resp 18   LMP  (LMP Unknown)     Procedure:  Topical anesthetic of 4% lidocaine was applied,mechanical debridement was performed, no bleeding occurred. Patient tolerated procedure well.  Wound was redressed with Aquacel AG and gauze.     Plan: Patient will return to the clinic in 2 weeks time

## 2019-01-15 ENCOUNTER — HOSPITAL ENCOUNTER (OUTPATIENT)
Dept: REHABILITATION | Facility: CLINIC | Age: 44
End: 2019-01-15
Attending: NURSE PRACTITIONER
Payer: COMMERCIAL

## 2019-01-15 PROCEDURE — S5102 ADULT DAY CARE PER DIEM: HCPCS

## 2019-01-15 NOTE — PROGRESS NOTES
MONTHLY PROGRESS NOTE AND PARTICIPATION REPORT   [facility]    Shanna Shanks, 1975  Attendance: Please see Epic for attendance record.    Communication:   Does communicate   Hearing:   No impairment  Vision:   No impairment  Orientation/Cognition:   Partial disorientation  Short term memory loss  Behavior:   No behavioral concerns  Self-Preservation Skills:   Not capable of self-preservation  Eating:   Independent  Ambulation Walking:   Independent  Needs assistance  Transferring:   Aide of one person/two  Wheelchair:   Uses walker at program  Toileting:   Needs assistance  Maintenance Program:     [employer]  Living Arrangements:   Lives with family  Spiritual Needs: Needs are being met through support group  Medication Assistance:   Medication not taken during program hours  Participation Report:   Aerobics/Exercise  Support Group  Cognitive Stimulation  Creative Arts/Crafts  Games  Speakers/Entertainment  Socialization  Current Events/News  Education/Health Topic  Year in review, indoor fun education, fencing education, spa week, winter carnival, healthy eating with Nyla  Level of Participation:   Active  To the best of their ability

## 2019-01-17 ENCOUNTER — HOSPITAL ENCOUNTER (OUTPATIENT)
Dept: REHABILITATION | Facility: CLINIC | Age: 44
End: 2019-01-17
Attending: NURSE PRACTITIONER
Payer: COMMERCIAL

## 2019-01-17 PROCEDURE — S5102 ADULT DAY CARE PER DIEM: HCPCS

## 2019-01-22 ENCOUNTER — HOSPITAL ENCOUNTER (OUTPATIENT)
Dept: REHABILITATION | Facility: CLINIC | Age: 44
End: 2019-01-22
Attending: NURSE PRACTITIONER
Payer: COMMERCIAL

## 2019-01-22 PROCEDURE — S5102 ADULT DAY CARE PER DIEM: HCPCS

## 2019-01-24 ENCOUNTER — HOSPITAL ENCOUNTER (OUTPATIENT)
Dept: REHABILITATION | Facility: CLINIC | Age: 44
End: 2019-01-24
Attending: NURSE PRACTITIONER
Payer: COMMERCIAL

## 2019-01-24 PROCEDURE — S5102 ADULT DAY CARE PER DIEM: HCPCS

## 2019-01-26 ENCOUNTER — OFFICE VISIT (OUTPATIENT)
Dept: URGENT CARE | Facility: URGENT CARE | Age: 44
End: 2019-01-26
Payer: COMMERCIAL

## 2019-01-26 VITALS
BODY MASS INDEX: 23.57 KG/M2 | SYSTOLIC BLOOD PRESSURE: 100 MMHG | WEIGHT: 155 LBS | HEART RATE: 66 BPM | OXYGEN SATURATION: 99 % | RESPIRATION RATE: 16 BRPM | TEMPERATURE: 97 F | DIASTOLIC BLOOD PRESSURE: 68 MMHG

## 2019-01-26 DIAGNOSIS — N39.0 URINARY TRACT INFECTION WITHOUT HEMATURIA, SITE UNSPECIFIED: ICD-10-CM

## 2019-01-26 DIAGNOSIS — R35.0 URINARY FREQUENCY: Primary | ICD-10-CM

## 2019-01-26 DIAGNOSIS — R82.90 NONSPECIFIC FINDING ON EXAMINATION OF URINE: ICD-10-CM

## 2019-01-26 LAB
ALBUMIN UR-MCNC: NEGATIVE MG/DL
APPEARANCE UR: CLEAR
BACTERIA #/AREA URNS HPF: ABNORMAL /HPF
BILIRUB UR QL STRIP: NEGATIVE
COLOR UR AUTO: YELLOW
GLUCOSE UR STRIP-MCNC: NEGATIVE MG/DL
HGB UR QL STRIP: NEGATIVE
KETONES UR STRIP-MCNC: NEGATIVE MG/DL
LEUKOCYTE ESTERASE UR QL STRIP: NEGATIVE
NITRATE UR QL: POSITIVE
PH UR STRIP: 7.5 PH (ref 5–7)
RBC #/AREA URNS AUTO: ABNORMAL /HPF
SOURCE: ABNORMAL
SP GR UR STRIP: 1.01 (ref 1–1.03)
UROBILINOGEN UR STRIP-ACNC: 0.2 EU/DL (ref 0.2–1)
WBC #/AREA URNS AUTO: ABNORMAL /HPF

## 2019-01-26 PROCEDURE — 99213 OFFICE O/P EST LOW 20 MIN: CPT | Performed by: PHYSICIAN ASSISTANT

## 2019-01-26 PROCEDURE — 81001 URINALYSIS AUTO W/SCOPE: CPT | Performed by: FAMILY MEDICINE

## 2019-01-26 PROCEDURE — 87086 URINE CULTURE/COLONY COUNT: CPT | Performed by: FAMILY MEDICINE

## 2019-01-26 RX ORDER — DIMETHYL FUMARATE 240 MG/1
CAPSULE ORAL
COMMUNITY
Start: 2018-12-24 | End: 2019-03-22

## 2019-01-26 RX ORDER — CEFDINIR 300 MG/1
300 CAPSULE ORAL 2 TIMES DAILY
Qty: 20 CAPSULE | Refills: 0 | Status: SHIPPED | OUTPATIENT
Start: 2019-01-26 | End: 2019-03-22

## 2019-01-26 NOTE — PROGRESS NOTES
SUBJECTIVE:   Shanna Shanks is a 43 year old female presenting with a chief complaint of   Chief Complaint   Patient presents with     Urinary Problem     frequency x several days. No burning, pelvic pain, itching, odor.       She is an established patient of [facility].    UTI    Onset of symptoms was 5day(s).  Course of illness is same  Severity mild  Current and associated symptoms frequency and urgency.  Denied any dysuria, burning, hematuria, back pain, fever, chills, nausea, vomiting.  Treatment and measures tried None  Predisposing factors include history of frequent UTI's. Last UTI was in 2018 ( per )   Patient denies rigors, flank pain, temperature > 101 degrees F., vomiting, taking Coumadin, GFR less than 30 within the last year, vaginal discharge, vaginal odor and vaginal itching . Currently menstruating.           Review of Systems: 10 point review of systems performed and is negative except as above and in HPI.      Past Medical History:   Diagnosis Date     Multiple sclerosis (H) 3/2/96    last exacerbation 3yrs ago, no meds x 6 months     Tobacco dependency      Family History   Problem Relation Age of Onset     Family History Negative Mother      Diabetes Father      Cancer - colorectal Paternal Grandmother      Heart Disease Paternal Grandfather         MI     Current Outpatient Medications   Medication Sig Dispense Refill     baclofen (LIORESAL) 10 MG tablet Take 10-20 mg by mouth 4 times daily as needed   3     CRANBERRY EXTRACT PO Take 1 tablet by mouth daily       dimethyl fumarate (TECFIDERA) delayed release capsule Take 240 mg by mouth 2 times daily        levETIRAcetam 1000 MG TABS Take 1,000 mg by mouth 2 times daily 60 tablet 1     norethindrone (MICRONOR) 0.35 MG per tablet TAKE 1 TABLET(0.35 MG) BY MOUTH DAILY 84 tablet 3     order for DME Equipment being ordered: Dressing  Wound #1 lower leg, L 1/2 cm x, W 1/2 cm x, tunneling depth, Drainage slight. Serosanguinous from  abscess site.    This for abcess wound on left calf of let.  Pack with plain packing strip 1/2x15'  Use cotton tipped applicator to pack wound.  4x4 gauze on site. Use 4x4 gauze.  Wrap with 4x4 kerlix bandage wrap.  Wrap with coban to keep bandage on leg.    Change: 2-3 times per week.    Tape/Wrap: coban 6 Container 1     order for DME Equipment being ordered: four wheeled walker with seat and brakes 1 Units 0     oxybutynin (DITROPAN-XL) 5 MG 24 hr tablet Take 5 mg by mouth daily       sulfamethoxazole-trimethoprim (BACTRIM/SEPTRA) 400-80 MG per tablet Take 1 tablet by mouth daily       TECFIDERA 240 MG CPDR        venlafaxine (EFFEXOR-ER) 75 MG TB24 24 hr tablet Take 75 mg by mouth daily (2  X 37.5 mg)       VITAMIN D, CHOLECALCIFEROL, PO Take 1,000 Units by mouth daily       Social History     Tobacco Use     Smoking status: Former Smoker     Packs/day: 0.25     Types: Cigarettes     Smokeless tobacco: Never Used     Tobacco comment: quit in 2017   Substance Use Topics     Alcohol use: No     Alcohol/week: 3.5 oz     Types: 7 Standard drinks or equivalent per week       OBJECTIVE  /68 (BP Location: Left arm, Patient Position: Sitting, Cuff Size: Adult Regular)   Pulse 66   Temp 97  F (36.1  C) (Tympanic)   Resp 16   Wt 70.3 kg (155 lb)   SpO2 99%   BMI 23.57 kg/m      Physical Exam   Constitutional: She appears well-developed and well-nourished.   HENT:   Head: Normocephalic.   Right Ear: External ear normal.   Left Ear: External ear normal.   Eyes: Pupils are equal, round, and reactive to light. Right eye exhibits no discharge. Left eye exhibits no discharge.   Neck: Normal range of motion.   Cardiovascular: Normal rate and regular rhythm.   Pulmonary/Chest: Effort normal and breath sounds normal.   Abdominal: Soft. Bowel sounds are normal. She exhibits no distension. There is no tenderness.   No CVA tenderness   Psychiatric: Her mood appears anxious. Her speech is rapid and/or pressured. She is  hyperactive.   :     Labs:  Results for orders placed or performed in visit on 01/26/19 (from the past 24 hour(s))   UA with Microscopic reflex to Culture   Result Value Ref Range    Color Urine Yellow     Appearance Urine Clear     Glucose Urine Negative NEG^Negative mg/dL    Bilirubin Urine Negative NEG^Negative    Ketones Urine Negative NEG^Negative mg/dL    Specific Gravity Urine 1.010 1.003 - 1.035    pH Urine 7.5 (H) 5.0 - 7.0 pH    Protein Albumin Urine Negative NEG^Negative mg/dL    Urobilinogen Urine 0.2 0.2 - 1.0 EU/dL    Nitrite Urine Positive (A) NEG^Negative    Blood Urine Negative NEG^Negative    Leukocyte Esterase Urine Negative NEG^Negative    Source Midstream Urine     WBC Urine 0 - 5 OTO5^0 - 5 /HPF    RBC Urine O - 2 OTO2^O - 2 /HPF    Bacteria Urine Moderate (A) NEG^Negative /HPF       X-Ray was not done.    ASSESSMENT:      ICD-10-CM    1. Urinary frequency R35.0 UA with Microscopic reflex to Culture   2. Nonspecific finding on examination of urine R82.90 Urine Culture Aerobic Bacterial        Medical Decision Making:    Differential Diagnosis:  UTI: UTI, Dysuria, Pyelonephritis, Kidney Stone and Urethritis    Serious Comorbid Conditions:  Adult:  None    PLAN:    UTI Adult:      1. Push plain, non-carbonated water.       2.  Avoid acidic fluids and bladder irritants      3.  Take the  full course of antibiotic as prescribed.      4. Please see your primary care provider after you finish the antibiotics to assure your infection has fully cleared.     I have discussed the patient's diagnosis and my plan of treatment with the patient. We went over any labs or imaging. Patient is aware to come back in with worsening symptoms or if no relief despite treatment plan.  Patient verbalizes understanding. All questions were addressed and answered.     Followup:    If not improving or if condition worsens, follow up with your Primary Care Provider    There are no Patient Instructions on file for this  visit.

## 2019-01-26 NOTE — PATIENT INSTRUCTIONS
Patient Education     Understanding Urinary Tract Infections (UTIs)  Most UTIs are caused by bacteria, although they may also be caused by viruses or fungi. Bacteria from the bowel are the most common source of infection. The infection may start because of any of the following:    Sexual activity. During sex, bacteria can travel from the penis, vagina, or rectum into the urethra.     Bacteria on the skin outside the rectum may travel into the urethra. This is more common in women since the rectum and urethra are closer to each other than in men. Wiping from front to back after using the toilet and keeping the area clean can help prevent germs from getting to the urethra.    Blockage of urine flow through the urinary tract. If urine sits too long, germs may start to grow out of control.      Parts of the urinary tract  The infection can occur in any part of the urinary tract.    The kidneys collect and store urine.    The ureters carry urine from the kidneys to the bladder.    The bladder holds urine until you are ready to let it out.    The urethra carries urine from the bladder out of the body. It is shorter in women, so bacteria can move through it more easily. The urethra is longer in men, so a UTI is less likely to reach the bladder or kidneys in men.  Date Last Reviewed: 1/1/2017 2000-2018 [employer]. [address] [place] [address], [place] [address]. All rights reserved. This information is not intended as a substitute for professional medical care. Always follow your healthcare professional's instructions.

## 2019-01-28 ENCOUNTER — HOSPITAL ENCOUNTER (OUTPATIENT)
Dept: WOUND CARE | Facility: CLINIC | Age: 44
Discharge: HOME | End: 2019-01-28
Attending: SURGERY | Admitting: SURGERY
Payer: COMMERCIAL

## 2019-01-28 VITALS
TEMPERATURE: 97.3 F | HEART RATE: 83 BPM | RESPIRATION RATE: 16 BRPM | SYSTOLIC BLOOD PRESSURE: 101 MMHG | DIASTOLIC BLOOD PRESSURE: 69 MMHG

## 2019-01-28 DIAGNOSIS — S80.12XD TRAUMATIC HEMATOMA OF LEFT LOWER LEG, SUBSEQUENT ENCOUNTER: ICD-10-CM

## 2019-01-28 LAB
BACTERIA SPEC CULT: NORMAL
SPECIMEN SOURCE: NORMAL

## 2019-01-28 PROCEDURE — 97597 DBRDMT OPN WND 1ST 20 CM/<: CPT | Performed by: SURGERY

## 2019-01-28 PROCEDURE — 97597 DBRDMT OPN WND 1ST 20 CM/<: CPT

## 2019-01-28 NOTE — PROGRESS NOTES
[facility] [place] Progress Note    Subject: Shanna Shanks and her  returns to see us today at the wound clinic.  She has paraplegia secondary to multiple sclerosis.  She developed a traumatic ulceration on her left posterior calf.  These are in the wound clinic on 12/31/2018.  She had no underlying arterial or venous insufficiency problems.  Wound was debrided at that time measuring 2.5 cm in length after opening the undermined area with a depth of approximately 0.5 cm with 0.5 cm.  They have been treating this with Aquacel Ag since that time.  Is become more more difficult to use the Aquacel Ag due to the subsequent healing of the ulcerated area.  They have no complaints.  No evidence of infection.      Exam:  /69   Pulse 83   Temp 97.3  F (36.3  C) (Temporal)   Resp 16   Alert and appropriate.  Here with her .  There is a scab overlying the ulcerated area in the posterior calf with no induration or infection or swelling.  Good distal pulses persist.    Procedure:   Patient was determined to be capable of making their own medical decisions and informed consent was obtained. Topical anesthetic of 4% lidocaine was applied, debridement was performed using a #15 blade and a selective debridement fashion removing the overlying scab.  Underlying ulcer measured 0.5 cm length of the width of 0.2 cm a depth of 0.17 with excellent granulation tissue no undermining and good bleeding bleeding controlled with light pressure. Patient tolerated procedure well.    Impression: Significant improvement of the left posterior calf traumatic ulcer with opening the undermined area allowing to heal by secondary intent.  Her  will apply a small amount of wound gel to the site followed by protective Band-Aid until it is completely epithelialized.    Plan: We will dress the wounds with wound gel and protective Band-Aid.  I suspect this should heal over completely within the next week.   We will discontinue dressings at that time..  Patient will return to the clinic as needed.    Kj Valentin MD on 1/28/2019 at 8:38 AM

## 2019-01-28 NOTE — DISCHARGE INSTRUCTIONS
[facility]  [address] [facility] [address], [place] [address]  Appointment Phone [phone] Nurse Advisors [phone]    Shanna Shanks      1975    Wound Dressing Change:Left Posterior Lower Leg  Cleanse wound with:soap and water  Cover wound with woundres gel   Cover wound with bandaid   Change dressing daily.     Kj Valentin M.D.. January 28, 2019    Call us at [phone] if you have any questions about your wounds, have redness or swelling around your wound, have a fever of 101 or greater or if you have any other problems or concerns. We answer the phone Monday through Friday 8 am to 4 pm, please leave a message as we check the voicemail frequently throughout the day.     Follow up with Provider - if needed

## 2019-02-05 ENCOUNTER — HOSPITAL ENCOUNTER (OUTPATIENT)
Dept: REHABILITATION | Facility: CLINIC | Age: 44
End: 2019-02-05
Attending: NURSE PRACTITIONER
Payer: COMMERCIAL

## 2019-02-05 PROCEDURE — S5102 ADULT DAY CARE PER DIEM: HCPCS

## 2019-02-07 ENCOUNTER — HOSPITAL ENCOUNTER (OUTPATIENT)
Dept: REHABILITATION | Facility: CLINIC | Age: 44
End: 2019-02-07
Attending: NURSE PRACTITIONER
Payer: COMMERCIAL

## 2019-02-07 PROCEDURE — S5102 ADULT DAY CARE PER DIEM: HCPCS

## 2019-02-12 ENCOUNTER — HOSPITAL ENCOUNTER (OUTPATIENT)
Dept: REHABILITATION | Facility: CLINIC | Age: 44
End: 2019-02-12
Attending: NURSE PRACTITIONER
Payer: COMMERCIAL

## 2019-02-12 PROCEDURE — S5102 ADULT DAY CARE PER DIEM: HCPCS

## 2019-02-14 ENCOUNTER — HOSPITAL ENCOUNTER (OUTPATIENT)
Dept: REHABILITATION | Facility: CLINIC | Age: 44
End: 2019-02-14
Attending: NURSE PRACTITIONER
Payer: COMMERCIAL

## 2019-02-14 PROCEDURE — S5102 ADULT DAY CARE PER DIEM: HCPCS

## 2019-02-14 NOTE — PROGRESS NOTES
AC RN Monthly Progress Note  Previous documentation reviewed. No concerns reported by AC staff or member.   Noted tx for recent UTI and being followed by wound clinic for leg wound.

## 2019-02-14 NOTE — ADDENDUM NOTE
Encounter addended by: Brandy Fonseca RN on: 2/14/2019 11:56 AM   Actions taken: Episode edited, Sign clinical note

## 2019-02-19 ENCOUNTER — HOSPITAL ENCOUNTER (OUTPATIENT)
Dept: REHABILITATION | Facility: CLINIC | Age: 44
End: 2019-02-19
Attending: NURSE PRACTITIONER
Payer: COMMERCIAL

## 2019-02-19 PROCEDURE — S5102 ADULT DAY CARE PER DIEM: HCPCS

## 2019-02-19 NOTE — PROGRESS NOTES
MONTHLY PROGRESS NOTE AND PARTICIPATION REPORT   [facility]    Shanna Shanks, 1975  Attendance: Please see Epic for attendance record.    Communication:   Does communicate   Hearing:   No impairment  Vision:   No impairment  Orientation/Cognition:   Partial disorientation  Short term memory loss  Behavior:   No behavioral concerns  Self-Preservation Skills:   Not capable of self-preservation  Eating:   Independent  Ambulation Walking:   Independent  Needs assistance  Uses walker at Day Program  Transferring:   Aide of one person/two  Wheelchair:   Uses walker at program  Toileting:   Needs assistance  Maintenance Program:     [employer]  Living Arrangements:   Lives with family  Spiritual Needs: Needs are being met through support group  Medication Assistance:   Medication not taken during program hours  Participation Report:   Aerobics/Exercise  Support Group  Cognitive Stimulation  Fire Drill  Creative Arts/Crafts  Games  Gardening  Speakers/Entertainment  Socialization  Current Events/News  Education/Health Topic  Meditation, prayer and communion with Melina, healthy eating education, laughter yoga, town [place]jas event, black history education, aerial arts education, academy awards party, virtual spring break party.   Level of Participation:   Active  To the best of their ability  Goals:   Long Term: Member will maintain present level of function, preventing or delaying further deterioration.    Short Term: Member will utilize one assistive technology tool to aid memory loss w/ set up and moderate assistance at least 1x/month.  Short Term: Member will participate in prescribed physical maintenance programming for muscle strength and endurance w/ set up and supervision, at least 6x/month.

## 2019-02-21 ENCOUNTER — HOSPITAL ENCOUNTER (OUTPATIENT)
Dept: REHABILITATION | Facility: CLINIC | Age: 44
End: 2019-02-21
Attending: NURSE PRACTITIONER
Payer: COMMERCIAL

## 2019-02-21 PROCEDURE — S5102 ADULT DAY CARE PER DIEM: HCPCS

## 2019-02-26 ENCOUNTER — HOSPITAL ENCOUNTER (OUTPATIENT)
Dept: REHABILITATION | Facility: CLINIC | Age: 44
End: 2019-02-26
Attending: NURSE PRACTITIONER
Payer: COMMERCIAL

## 2019-02-26 PROCEDURE — S5102 ADULT DAY CARE PER DIEM: HCPCS

## 2019-02-28 ENCOUNTER — HOSPITAL ENCOUNTER (OUTPATIENT)
Dept: REHABILITATION | Facility: CLINIC | Age: 44
End: 2019-02-28
Attending: NURSE PRACTITIONER
Payer: COMMERCIAL

## 2019-02-28 PROCEDURE — S5102 ADULT DAY CARE PER DIEM: HCPCS

## 2019-03-07 ENCOUNTER — HOSPITAL ENCOUNTER (OUTPATIENT)
Dept: REHABILITATION | Facility: CLINIC | Age: 44
End: 2019-03-07
Attending: NURSE PRACTITIONER
Payer: COMMERCIAL

## 2019-03-07 PROCEDURE — S5102 ADULT DAY CARE PER DIEM: HCPCS

## 2019-03-11 DIAGNOSIS — Z30.41 SURVEILLANCE OF PREVIOUSLY PRESCRIBED CONTRACEPTIVE PILL: ICD-10-CM

## 2019-03-11 RX ORDER — ACETAMINOPHEN AND CODEINE PHOSPHATE 120; 12 MG/5ML; MG/5ML
SOLUTION ORAL
Qty: 84 TABLET | Refills: 3 | Status: SHIPPED | OUTPATIENT
Start: 2019-03-11 | End: 2020-04-19

## 2019-03-12 ENCOUNTER — HOSPITAL ENCOUNTER (OUTPATIENT)
Dept: REHABILITATION | Facility: CLINIC | Age: 44
End: 2019-03-12
Attending: NURSE PRACTITIONER
Payer: COMMERCIAL

## 2019-03-12 PROCEDURE — S5102 ADULT DAY CARE PER DIEM: HCPCS

## 2019-03-12 NOTE — TELEPHONE ENCOUNTER
"Requested Prescriptions   Pending Prescriptions Disp Refills     norethindrone (MICRONOR) 0.35 MG tablet [Pharmacy Med Name: NORETHINDRONE 0.35MG TABLETS 28S] 84 tablet 0     Sig: TAKE 1 TABLET BY MOUTH EVERY DAY    Contraceptives Protocol Passed - 3/11/2019  1:21 PM       Passed - Patient is not a current smoker if age is 35 or older       Passed - Recent (12 mo) or future (30 days) visit within the authorizing provider's specialty    Patient had office visit in the last 12 months or has a visit in the next 30 days with authorizing provider or within the authorizing provider's specialty.  See \"Patient Info\" tab in inbasket, or \"Choose Columns\" in Meds & Orders section of the refill encounter.             Passed - Medication is active on med list       Passed - No active pregnancy on record       Passed - No positive pregnancy test in past 12 months        Prescription approved per [facility] Refill Protocol.    Signed Prescriptions:                        Disp   Refills    norethindrone (MICRONOR) 0.35 MG tablet    84 tab*3        Sig: TAKE 1 TABLET BY MOUTH EVERY DAY  Authorizing Provider: MICHELE MCNULTY  Ordering User: COSTA TURK      Closing encounter - no further actions needed at this time    Costa Turk RN    "

## 2019-03-13 NOTE — PROGRESS NOTES
INDIVIDUAL PLAN OF CARE   [facility]    Member Name: Shanna Shanks; YOB: 1975  MRN: [id number]  03/13/2019    Goals developed in collaboration with: member  Staff responsible for plan: Spencer Stevenson  1. Long Term Goal (concrete, measurable, and time specific outcomes): Member will maintain current level of physical and cognitive function, preventing or delaying further deterioration through active participation in [facility] programming, with staff set up and supervision provided, every day of program attendance.  Target Date: 09/13/19  Quarterly Review:  On 5/15/18, member scored 9/30 on the Reva Cognitive Assessment 7.1.  During the time of the next review, staff will administer the MoCA 7.2 to track progress of cognition.       2. Short Term Goal: (concrete, measurable, and time specific outcomes):  To maintain current cognitive function, member will actively participate in cognitive activities such as lunch bin sorting and stocking, and IPad games with staff set up and supervision provided, at least 1x/week while attending AC programming.   Target Date: 06/13/19  Quarterly Review:    3. Short Term Goal: (concrete, measurable, and time specific outcomes):  To maintain or improve BL LE and BL UE strength and endurance for increased activity tolerance, member will actively participate in prescribed Nu-Step maintenance program at least 1x/week while attending AC programming.  Target Date: 06/13/19  Quarterly Review:

## 2019-03-14 ENCOUNTER — HOSPITAL ENCOUNTER (OUTPATIENT)
Dept: REHABILITATION | Facility: CLINIC | Age: 44
End: 2019-03-14
Attending: NURSE PRACTITIONER
Payer: COMMERCIAL

## 2019-03-14 PROCEDURE — S5102 ADULT DAY CARE PER DIEM: HCPCS

## 2019-03-19 ENCOUNTER — HOSPITAL ENCOUNTER (OUTPATIENT)
Dept: REHABILITATION | Facility: CLINIC | Age: 44
End: 2019-03-19
Attending: NURSE PRACTITIONER
Payer: COMMERCIAL

## 2019-03-19 PROCEDURE — S5102 ADULT DAY CARE PER DIEM: HCPCS

## 2019-03-19 NOTE — PROGRESS NOTES
MONTHLY PROGRESS NOTE AND PARTICIPATION REPORT   [facility]    Shanna Shanks, 1975  Attendance: Please see Epic for attendance record.    Communication:   Does communicate   Hearing:   No impairment  Vision:   No impairment  Orientation/Cognition:   Partial disorientation  Short term memory loss  Behavior:   No behavioral concerns  Self-Preservation Skills:   Not capable of self-preservation  Eating:   Independent  Ambulation Walking:   Independent  Needs assistance  Uses walker at day program  Transferring:   Aide of one person/two  Wheelchair:   Uses walker at program  Toileting:   Needs assistance  Maintenance Program:     NuStep  Living Arrangements:   Lives with family  Spiritual Needs: Needs are being met through support group  Medication Assistance:   Medication not taken during program hours  Participation Report:   Aerobics/Exercise  Support Group  Cognitive Stimulation  Fire Drill  Creative Arts/Crafts  Games  Gardening  Speakers/Entertainment  Socialization  Current Events/News  Education/Health Topic  Meditation, prayer and communion with Melina; Healthy eating with Nyla; Laughter yoga, Themed education on [place], [employer] [place] Day, Spring and MS Awareness.   Level of Participation:   Active  To the best of their ability   Goals:  Long Term: Member will maintain current level of physical and cognitive function, preventing or delaying further deterioration through active participation in [facility] programming, with staff set up and supervision provided, every day of program attendance. Target Date: 09/13/19  Short Term: To maintain current cognitive function, member will actively participate in cognitive activities such as lunch bin sorting and stocking, and IPad games with staff set up and supervision provided, at least 1x/week while attending AC programming. Target Date: 06/13/19  Short Term: To maintain or improve BL LE and BL UE strength and endurance for  increased activity tolerance, member will actively participate in prescribed Nu-Step maintenance program at least 1x/week while attending AC programming. Target Date: 06/13/19

## 2019-03-21 ENCOUNTER — HOSPITAL ENCOUNTER (OUTPATIENT)
Dept: REHABILITATION | Facility: CLINIC | Age: 44
End: 2019-03-21
Attending: NURSE PRACTITIONER
Payer: COMMERCIAL

## 2019-03-21 PROCEDURE — S5102 ADULT DAY CARE PER DIEM: HCPCS

## 2019-03-21 NOTE — PROGRESS NOTES
[facility] Note:  Member continues to have increased urinary incontinence and is asking to use the bathroom 6-7 times a day. Call was placed to the member's .  He is going to take her in for a possible UTI tomorrow.  Shanna is also scheduled to see a urologist the first week in April.

## 2019-03-22 ENCOUNTER — OFFICE VISIT (OUTPATIENT)
Dept: FAMILY MEDICINE | Facility: CLINIC | Age: 44
End: 2019-03-22
Payer: COMMERCIAL

## 2019-03-22 VITALS
BODY MASS INDEX: 24.25 KG/M2 | HEART RATE: 66 BPM | DIASTOLIC BLOOD PRESSURE: 59 MMHG | WEIGHT: 159.5 LBS | TEMPERATURE: 98.1 F | SYSTOLIC BLOOD PRESSURE: 118 MMHG | OXYGEN SATURATION: 100 %

## 2019-03-22 DIAGNOSIS — G35 MULTIPLE SCLEROSIS (H): ICD-10-CM

## 2019-03-22 DIAGNOSIS — R82.90 NONSPECIFIC FINDING ON EXAMINATION OF URINE: ICD-10-CM

## 2019-03-22 DIAGNOSIS — R39.9 UTI SYMPTOMS: Primary | ICD-10-CM

## 2019-03-22 DIAGNOSIS — N30.01 ACUTE CYSTITIS WITH HEMATURIA: ICD-10-CM

## 2019-03-22 LAB
ALBUMIN UR-MCNC: NEGATIVE MG/DL
APPEARANCE UR: ABNORMAL
BACTERIA #/AREA URNS HPF: ABNORMAL /HPF
BILIRUB UR QL STRIP: NEGATIVE
COLOR UR AUTO: YELLOW
GLUCOSE UR STRIP-MCNC: NEGATIVE MG/DL
HGB UR QL STRIP: ABNORMAL
KETONES UR STRIP-MCNC: NEGATIVE MG/DL
LEUKOCYTE ESTERASE UR QL STRIP: ABNORMAL
NITRATE UR QL: POSITIVE
NON-SQ EPI CELLS #/AREA URNS LPF: ABNORMAL /LPF
PH UR STRIP: 7 PH (ref 5–7)
RBC #/AREA URNS AUTO: ABNORMAL /HPF
SOURCE: ABNORMAL
SP GR UR STRIP: 1.02 (ref 1–1.03)
UROBILINOGEN UR STRIP-ACNC: 0.2 EU/DL (ref 0.2–1)
WBC #/AREA URNS AUTO: ABNORMAL /HPF

## 2019-03-22 PROCEDURE — 87086 URINE CULTURE/COLONY COUNT: CPT | Performed by: FAMILY MEDICINE

## 2019-03-22 PROCEDURE — 81001 URINALYSIS AUTO W/SCOPE: CPT | Performed by: FAMILY MEDICINE

## 2019-03-22 PROCEDURE — 99214 OFFICE O/P EST MOD 30 MIN: CPT | Performed by: FAMILY MEDICINE

## 2019-03-22 RX ORDER — AMOXICILLIN 875 MG
875 TABLET ORAL 2 TIMES DAILY
Qty: 10 TABLET | Refills: 0 | Status: SHIPPED | OUTPATIENT
Start: 2019-03-22 | End: 2019-06-17

## 2019-03-22 NOTE — PATIENT INSTRUCTIONS
- amoxicillin (AMOXIL) 875 MG tablet; Take 1 tablet (875 mg) by mouth 2 times daily for 5 days   - lots of fluid  - urine culture is pending   - follow up within next 2 days if you are experiencing new abdominal or back pain, fever, nausea/vomiting or worsening urinary symptoms

## 2019-03-22 NOTE — PROGRESS NOTES
SUBJECTIVE:   Shanna Shanks is a 43 year old female who presents to clinic today for the following health issues:      URINARY TRACT SYMPTOMS  Onset:weekend       Description:   Painful urination (Dysuria): no   Blood in urine (Hematuria): no   Delay in urine (Hesitency): no   Pt endorses increased urinary frequency     Progression of Symptoms:  same    Accompanying Signs & Symptoms:  Fever/chills: no   Flank pain no   Nausea and vomiting: no   Any vaginal symptoms: none  Abdominal/Pelvic Pain: no     History:   History of frequent UTI's: YES  History of kidney stones: no   Sexually Active: YES  Possibility of pregnancy: No    Precipitating factors:   No   Therapies Tried and outcome: none      Problem list and histories reviewed & adjusted, as indicated.  Additional history: as documented    Labs reviewed in EPIC    Reviewed and updated as needed this visit by clinical staff       Reviewed and updated as needed this visit by Provider         ROS:  Constitutional, HEENT, cardiovascular, pulmonary, gi and gu systems are negative, except as otherwise noted.    OBJECTIVE:     /59   Pulse 66   Temp 98.1  F (36.7  C) (Oral)   Wt 72.3 kg (159 lb 8 oz)   LMP  (LMP Unknown)   SpO2 100%   BMI 24.25 kg/m    Body mass index is 24.25 kg/m .  GENERAL: healthy, alert and no distress  EYES: Eyes grossly normal to inspection  HENT:  nose and mouth without ulcers or lesions  PSYCH: mentation appears normal, affect normal    Diagnostic Test Results:  Results for orders placed or performed in visit on 03/22/19 (from the past 24 hour(s))   *UA reflex to Microscopic and Culture ([place] and [facility] (except [place] and [place])   Result Value Ref Range    Color Urine Yellow     Appearance Urine Slightly Cloudy     Glucose Urine Negative NEG^Negative mg/dL    Bilirubin Urine Negative NEG^Negative    Ketones Urine Negative NEG^Negative mg/dL    Specific Gravity Urine 1.020 1.003 - 1.035    Blood Urine  Trace (A) NEG^Negative    pH Urine 7.0 5.0 - 7.0 pH    Protein Albumin Urine Negative NEG^Negative mg/dL    Urobilinogen Urine 0.2 0.2 - 1.0 EU/dL    Nitrite Urine Positive (A) NEG^Negative    Leukocyte Esterase Urine Moderate (A) NEG^Negative    Source Midstream Urine    Urine Microscopic   Result Value Ref Range    WBC Urine  (A) OTO5^0 - 5 /HPF    RBC Urine 2-5 (A) OTO2^O - 2 /HPF    Squamous Epithelial /LPF Urine Moderate (A) FEW^Few /LPF    Bacteria Urine Many (A) NEG^Negative /HPF       ASSESSMENT/PLAN:     1. UTI symptoms  - *UA reflex to Microscopic and Culture ([place] and [facility] (except [place] and [place])  - Urine Microscopic    2. Nonspecific finding on examination of urine  - Urine Culture Aerobic Bacterial    3. Acute cystitis with hematuria  - H/o MS with recurrent UTI  - reviewed previous UC  - amoxicillin (AMOXIL) 875 MG tablet; Take 1 tablet (875 mg) by mouth 2 times daily for 5 days  Dispense: 10 tablet; Refill: 0  - advised below   - amoxicillin (AMOXIL) 875 MG tablet; Take 1 tablet (875 mg) by mouth 2 times daily for 5 days   - lots of fluid  - urine culture is pending   - follow up within next 2 days if you are experiencing new abdominal or back pain, fever, nausea/vomiting or worsening urinary symptoms     4. Multiple sclerosis (H)  - see above     Alfredo Becker MD  [facility]

## 2019-03-24 LAB
BACTERIA SPEC CULT: NORMAL
SPECIMEN SOURCE: NORMAL

## 2019-03-26 ENCOUNTER — HOSPITAL ENCOUNTER (OUTPATIENT)
Dept: REHABILITATION | Facility: CLINIC | Age: 44
End: 2019-03-26
Attending: NURSE PRACTITIONER
Payer: COMMERCIAL

## 2019-03-26 PROCEDURE — S5102 ADULT DAY CARE PER DIEM: HCPCS

## 2019-03-28 ENCOUNTER — HOSPITAL ENCOUNTER (OUTPATIENT)
Dept: REHABILITATION | Facility: CLINIC | Age: 44
End: 2019-03-28
Attending: NURSE PRACTITIONER
Payer: COMMERCIAL

## 2019-03-28 PROCEDURE — S5102 ADULT DAY CARE PER DIEM: HCPCS

## 2019-04-02 ENCOUNTER — HOSPITAL ENCOUNTER (OUTPATIENT)
Dept: REHABILITATION | Facility: CLINIC | Age: 44
End: 2019-04-02
Attending: NURSE PRACTITIONER
Payer: COMMERCIAL

## 2019-04-02 PROCEDURE — S5102 ADULT DAY CARE PER DIEM: HCPCS

## 2019-04-09 ENCOUNTER — HOSPITAL ENCOUNTER (OUTPATIENT)
Dept: REHABILITATION | Facility: CLINIC | Age: 44
End: 2019-04-09
Attending: NURSE PRACTITIONER
Payer: COMMERCIAL

## 2019-04-09 PROCEDURE — S5102 ADULT DAY CARE PER DIEM: HCPCS

## 2019-04-16 ENCOUNTER — HOSPITAL ENCOUNTER (OUTPATIENT)
Dept: REHABILITATION | Facility: CLINIC | Age: 44
End: 2019-04-16
Attending: NURSE PRACTITIONER
Payer: COMMERCIAL

## 2019-04-16 PROCEDURE — S5102 ADULT DAY CARE PER DIEM: HCPCS

## 2019-04-18 ENCOUNTER — HOSPITAL ENCOUNTER (OUTPATIENT)
Dept: REHABILITATION | Facility: CLINIC | Age: 44
End: 2019-04-18
Attending: NURSE PRACTITIONER
Payer: COMMERCIAL

## 2019-04-18 PROCEDURE — S5102 ADULT DAY CARE PER DIEM: HCPCS

## 2019-04-18 NOTE — PROGRESS NOTES
[facility] Note:  The [place] Cognitive Assessment 7.2 (MoCa) was completed on this day program member on 4/18/19. The patient scored 15. The MOCA 7.2 is a 30 point cognitive screening assessment. Normal score is considered ? 26/30. The following ranges may be used to grade severity: 18-26 = mild cognitive impairment, 10-17= moderate cognitive impairment (MCI) and less than 10= severe cognitive impairment. The MoCA assesses different cognitive domains including attention and concentration, executive functions, memory, language, visuoconstructional skills, conceptual thinking, calculations and orientation.   Member demonstrated cognitive concerns in the areas of orientation, fluency, executive function, and delayed recall.  Member scored 9/30 on 5/15/18 on MoCA 7.1.

## 2019-04-29 NOTE — PROGRESS NOTES
MONTHLY PROGRESS NOTE AND PARTICIPATION REPORT   [facility]    Shanna Shanks, 1975  Attendance: Please see Epic for attendance record.    Communication:   Does communicate   Hearing:   No impairment  Vision:   No impairment  Orientation/Cognition:   Partial disorientation  Short term memory loss  Behavior:   No behavioral concerns  Self-Preservation Skills:   Not capable of self-preservation  Eating:   Independent  Ambulation Walking:   Independent  Needs assistance  Uses walker at day program  Transferring:   Aide of one person/two  Wheelchair:   Uses walker at program  Toileting:   Needs assistance  Maintenance Program:     NuStep  Living Arrangements:   Lives with family  Spiritual Needs: Needs are being met through support group  Medication Assistance:   Medication not taken during program hours  Participation Report:   Aerobics/Exercise  Support Group  Cognitive Stimulation  Fire Drill  Creative Arts/Crafts  Games  Gardening  Speakers/Entertainment  Socialization  Current Events/News  Education/Health Topic  Meditation, prayer and communion with Melina; Healthy eating with Nyla; Themed education on planes, trains and automobiles, poetry, baseball, [place] and [place].   Level of Participation:   Active  To the best of their ability   Goals:  Long Term: Member will maintain current level of physical and cognitive function, preventing or delaying further deterioration through active participation in [facility] programming, with staff set up and supervision provided, every day of program attendance. Target Date: 09/13/19  Short Term: To maintain current cognitive function, member will actively participate in cognitive activities such as lunch bin sorting and stocking, and IPad games with staff set up and supervision provided, at least 1x/week while attending AC programming. Target Date: 06/13/19  Short Term: To maintain or improve BL LE and BL UE strength and  endurance for increased activity tolerance, member will actively participate in prescribed Nu-Step maintenance program at least 1x/week while attending AC programming. Target Date: 06/13/19

## 2019-04-30 ENCOUNTER — HOSPITAL ENCOUNTER (OUTPATIENT)
Dept: REHABILITATION | Facility: CLINIC | Age: 44
End: 2019-04-30
Attending: NURSE PRACTITIONER
Payer: COMMERCIAL

## 2019-04-30 PROCEDURE — S5102 ADULT DAY CARE PER DIEM: HCPCS

## 2019-04-30 NOTE — PROGRESS NOTES
Individual abuse prevention plan (IAPP)  [facility]     Assessment of Susceptibility to Abuse, Including Self Abuse, Neglect (Identification of characteristics, which make the individual susceptible to abuse, and how these characteristics cause the individual to be susceptible to abuse.)     Is the person susceptible to abuse in each area?  Sexual Abuse:   No  Referrals made when the person is susceptible to abuse outside the scope or control of this program (Identify the referral and date it occurred): No additional risk reduction means needed at this time    Physical Abuse:   No  Referrals made when the person is susceptible to abuse outside the scope or control of this program (Identify the referral and date it occurred): No additional risk reduction means needed at this time    Self-Abuse:   No  Referrals made when the person is susceptible to abuse outside the scope or control of this program (Identify the referral and date it occurred): No additional risk reduction means needed at this time    Financial  Exploitation  No  Referrals made when the person is susceptible to abuse outside the scope or control of this program (Identify the referral and date it occurred): No additional risk reduction means needed at this time    Is the program aware of this person committing a violent crime or act of physical aggression towards others?  No  Referrals made when the person is susceptible to abuse outside the scope or control of this program (Identify the referral and date it occurred): No additional risk reduction means needed at this time    INDIVIDUAL ABUSE PREVENTION PLAN-MEASURES TAKEN TO MINIMIZE RISK OF ABUSE   ADL:   Assist with clothing management  Assist with toileting  Ambulation/Transfers/Wheelchairs:  Assist with all transfers and/or ambulation   Behavior:   No concerns  Communication:  Encourage verbalization of needs/concerns  Cognitive Issues:   Give simple step-by-step direction  Diet:    No present concerns  Exercise:   Encourage participation in maintenance program  Hearing:   No present concerns  Isolation:   Encourage socialization due to isolation in home environment  Medical Monitoring:   Monitor physical and emotional comfort  Mental Health:   Encourage client to express feelings  Sensory:   Provide and encourage participation in activities for stimulation  Vision:   No present concerns  Other:     Developed in consultation with:   Staff  The Program Abuse Prevention Plan/IAPP identifies the specific actions that minimize abuse to the [facility] participant.  Yes

## 2019-05-01 ENCOUNTER — TELEPHONE (OUTPATIENT)
Dept: FAMILY MEDICINE | Facility: CLINIC | Age: 44
End: 2019-05-01

## 2019-05-01 NOTE — TELEPHONE ENCOUNTER
Received fax 05/01/2019, placed in [place] forms folder.    Allyson Begum MA on 5/1/2019 at 4:31 PM

## 2019-05-02 ENCOUNTER — HOSPITAL ENCOUNTER (OUTPATIENT)
Dept: REHABILITATION | Facility: CLINIC | Age: 44
End: 2019-05-02
Attending: NURSE PRACTITIONER
Payer: COMMERCIAL

## 2019-05-02 PROCEDURE — S5102 ADULT DAY CARE PER DIEM: HCPCS

## 2019-05-07 ENCOUNTER — HOSPITAL ENCOUNTER (OUTPATIENT)
Dept: REHABILITATION | Facility: CLINIC | Age: 44
End: 2019-05-07
Attending: NURSE PRACTITIONER
Payer: COMMERCIAL

## 2019-05-07 PROCEDURE — S5102 ADULT DAY CARE PER DIEM: HCPCS

## 2019-05-09 ENCOUNTER — HOSPITAL ENCOUNTER (OUTPATIENT)
Dept: REHABILITATION | Facility: CLINIC | Age: 44
End: 2019-05-09
Attending: NURSE PRACTITIONER
Payer: COMMERCIAL

## 2019-05-09 PROCEDURE — S5102 ADULT DAY CARE PER DIEM: HCPCS

## 2019-05-14 ENCOUNTER — HOSPITAL ENCOUNTER (OUTPATIENT)
Dept: REHABILITATION | Facility: CLINIC | Age: 44
End: 2019-05-14
Attending: NURSE PRACTITIONER
Payer: COMMERCIAL

## 2019-05-14 PROCEDURE — S5102 ADULT DAY CARE PER DIEM: HCPCS

## 2019-05-16 ENCOUNTER — HOSPITAL ENCOUNTER (OUTPATIENT)
Dept: REHABILITATION | Facility: CLINIC | Age: 44
End: 2019-05-16
Attending: NURSE PRACTITIONER
Payer: COMMERCIAL

## 2019-05-16 PROCEDURE — S5102 ADULT DAY CARE PER DIEM: HCPCS

## 2019-05-23 ENCOUNTER — HOSPITAL ENCOUNTER (OUTPATIENT)
Dept: REHABILITATION | Facility: CLINIC | Age: 44
End: 2019-05-23
Attending: NURSE PRACTITIONER
Payer: COMMERCIAL

## 2019-05-23 PROCEDURE — S5102 ADULT DAY CARE PER DIEM: HCPCS

## 2019-05-28 ENCOUNTER — HOSPITAL ENCOUNTER (OUTPATIENT)
Dept: REHABILITATION | Facility: CLINIC | Age: 44
End: 2019-05-28
Attending: NURSE PRACTITIONER
Payer: COMMERCIAL

## 2019-05-28 PROCEDURE — S5102 ADULT DAY CARE PER DIEM: HCPCS

## 2019-05-28 NOTE — PROGRESS NOTES
MONTHLY PROGRESS NOTE AND PARTICIPATION REPORT   [facility]    Shanna Shanks, 1975  Attendance: Please see Epic for attendance record.    Communication:   Does communicate   Hearing:   No impairment  Vision:   No impairment  Orientation/Cognition:   Partial disorientation  Short term memory loss  Behavior:   No behavioral concerns  Self-Preservation Skills:   Not capable of self-preservation  Eating:   Independent  Ambulation Walking:   Independent  Needs assistance  Uses walker at day program  Transferring:   Aide of one person/two  Wheelchair:   Uses walker at day program  Toileting:   Needs assistance  Maintenance Program:     NuStep  Living Arrangements:   Lives with family  Spiritual Needs: Needs are being met through support group  Medication Assistance:   Medication not taken during program hours  Participation Report:   Aerobics/Exercise  Support Group  Cognitive Stimulation  Fire Drill  Creative Arts/Crafts  Games  Gardening  Speakers/Entertainment  Socialization  Current Events/News  Education/Health Topic  Meditation, prayer and communion with Melina; Yoga with Nyla; Themed education on birds and fish, meterology, casinos, and D-Day history.   Level of Participation:   Active  To the best of their ability   Goals:  Long Term: Member will maintain current level of physical and cognitive function, preventing or delaying further deterioration through active participation in [facility] programming, with staff set up and supervision provided, every day of program attendance. Target Date: 09/13/19  Short Term: To maintain current cognitive function, member will actively participate in cognitive activities such as lunch bin sorting and stocking, and IPad games with staff set up and supervision provided, at least 1x/week while attending AC programming. Target Date: 06/13/19  Short Term: To maintain or improve BL LE and BL UE strength and endurance for increased activity  tolerance, member will actively participate in prescribed Nu-Step maintenance program at least 1x/week while attending AC programming. Target Date: 06/13/19

## 2019-05-30 ENCOUNTER — HOSPITAL ENCOUNTER (OUTPATIENT)
Dept: REHABILITATION | Facility: CLINIC | Age: 44
End: 2019-05-30
Attending: NURSE PRACTITIONER
Payer: COMMERCIAL

## 2019-05-30 PROCEDURE — S5102 ADULT DAY CARE PER DIEM: HCPCS

## 2019-06-11 ENCOUNTER — HOSPITAL ENCOUNTER (OUTPATIENT)
Dept: REHABILITATION | Facility: CLINIC | Age: 44
End: 2019-06-11
Attending: NURSE PRACTITIONER
Payer: COMMERCIAL

## 2019-06-11 PROCEDURE — S5102 ADULT DAY CARE PER DIEM: HCPCS

## 2019-06-12 ENCOUNTER — TELEPHONE (OUTPATIENT)
Dept: FAMILY MEDICINE | Facility: CLINIC | Age: 44
End: 2019-06-12

## 2019-06-12 NOTE — TELEPHONE ENCOUNTER
Reason for Call:  Form, our goal is to have forms completed with 72 hours, however, some forms may require a visit or additional information.    Type of letter, form or note:  [facility]    Who is the form from?: Patient    Where did the form come from: form was faxed in    What clinic location was the form placed at?: [facility]    Where the form was placed: Forms folder  Box/Folder    What number is listed as a contact on the form?: [phone]       Additional comments: Please complete and return fax to [phone]    Call taken on 6/12/2019 at 10:09 AM by Chalo Galvez

## 2019-06-13 ENCOUNTER — HOSPITAL ENCOUNTER (OUTPATIENT)
Dept: REHABILITATION | Facility: CLINIC | Age: 44
End: 2019-06-13
Attending: NURSE PRACTITIONER
Payer: COMMERCIAL

## 2019-06-13 PROCEDURE — S5102 ADULT DAY CARE PER DIEM: HCPCS

## 2019-06-13 NOTE — PROGRESS NOTES
MONTHLY PROGRESS NOTE AND PARTICIPATION REPORT   [facility]    Shanna Shanks, 1975  Attendance: Please see Epic for attendance record.    Communication:   Does communicate   Hearing:   No impairment  Vision:   No impairment  Orientation/Cognition:   Partial disorientation  Short term memory loss  Behavior:   No behavioral concerns  Self-Preservation Skills:   Not capable of self-preservation  Eating:   Independent  Ambulation Walking:   Independent  Needs assistance  Uses walker at day program  Transferring:   Aide of one person/two  Wheelchair:   Uses walker at day program  Toileting:   Needs assistance  Maintenance Program:     NuStep  Living Arrangements:   Lives with family  Spiritual Needs: Needs are being met through support group  Medication Assistance:   Medication not taken during program hours  Participation Report:   Aerobics/Exercise  Support Group  Cognitive Stimulation  Fire Drill  Creative Arts/Crafts  Games  Gardening  Speakers/Entertainment  Socialization  Current Events/News  Education/Health Topic  Meditation, prayer and communion with Melina; Kael chi with Nyla; Themed education on [employer], [employer], [employer], and [place] for MS  Level of Participation:   Active  To the best of their ability   Goals:   Long Term: Member will maintain current level of physical and cognitive function, preventing or delaying further deterioration through active participation in [facility] programming, with staff set up and supervision provided, every day of program attendance. Target Date: 09/13/19  Short Term: To maintain current cognitive function, member will actively participate in cognitive activities such as lunch bin sorting and stocking, and IPad games with staff set up and supervision provided, at least 1x/week while attending AC programming. Target Date: 06/13/19  Short Term: To maintain or improve BL LE and BL UE strength and endurance for increased activity  tolerance, member will actively participate in prescribed Nu-Step maintenance program at least 1x/week while attending AC programming. Target Date: 06/13/19

## 2019-06-14 NOTE — TELEPHONE ENCOUNTER
Left message for return phone call. Calling to let patient  know she needs a office visit. To have forms completed by provider.     Yaquelin Calderon on 6/14/2019 at 4:58 PM

## 2019-06-14 NOTE — TELEPHONE ENCOUNTER
Unfortunately form requires evaluation within 90 days for her general health.   She had a visit with Dr Becker during that period but it was for an acute issue and not for her general health.  She needs to be seen in the clinic.   Form back in MA inbox.   Please notify patient or spouse

## 2019-06-17 NOTE — PROGRESS NOTES
Subjective     Shanna Shanks is a 43 year old female who presents to clinic today for the following health issues:    HPI   Forms for day program      URINARY TRACT SYMPTOMS      Duration: 1 week i    Description  Weakness in legs     Intensity:  mild    Accompanying signs and symptoms:  Fever/chills: no   Flank pain no   Nausea and vomiting: no   Vaginal symptoms: none  Abdominal/Pelvic Pain: no     History  History of frequent UTI's: YES  History of kidney stones: no   Sexually Active: YES  Possibility of pregnancy: No    Precipitating or alleviating factors: None    Therapies tried and outcome: none   Outcome:      Needing to go to bathroom frequently.   No pain with passing urine.  No blood in urine.   No back pain.   Some leaking of urine is not new thing.   Using ditropan and bactrim once per day.   Seeing urologist outside of [place]. Follow up is in August.     Social History     Occupational History     Occupation: [employer]     Employer: SELF   Tobacco Use     Smoking status: Former Smoker     Packs/day: 0.25     Types: Cigarettes     Smokeless tobacco: Never Used     Tobacco comment: quit in 2017   Substance and Sexual Activity     Alcohol use: No     Alcohol/week: 3.5 oz     Types: 7 Standard drinks or equivalent per week     Drug use: No     Sexual activity: Yes     Partners: Male     Birth control/protection: Pill, Rhythm     Allergies   Allergen Reactions     Diphenhydramine Rash     benadryl cream     Nickel      Macrobid [Nitrofurantoin] Rash     Patient Active Problem List   Diagnosis     Multiple sclerosis (H)     UTI (urinary tract infection)     CARDIOVASCULAR SCREENING; LDL GOAL LESS THAN 160     Dysmenorrhea     Nicotine dependence in remission     Encephalopathy     Herpes encephalitis     Fever of unknown origin     Seizure (H)     Traumatic hematoma of left lower leg     Reviewed medications, social history and  past medical and surgical history.    Review of system:  for general, respiratory, CVS, GI and psychiatry negative except for noted above.     EXAM:  /62   Pulse 75   Temp 97.7  F (36.5  C) (Oral)   Resp 16   Wt 71.7 kg (158 lb)   SpO2 98%   BMI 24.02 kg/m    Constitutional: healthy, alert and no distress   Psychiatric: mentation appears normal and affect normal/bright  Abdomen: Abdomen soft, non-tender. BS normal. No masses, organomegaly  : Deferred    ASSESSMENT / PLAN:  (N39.0) Recurrent UTI  (primary encounter diagnosis)  Comment: Reviewed urology notes.  UA obtained.  We will hold off on intervention until urine culture is back due to her history.  On prophylactic Bactrim and Ditropan.  Plan: UA reflex to Microscopic and Culture, Urine         Microscopic              (G35) Multiple sclerosis (H)  Comment: Has some urinary incontinence due to MS which is unchanged.  Plan: UA reflex to Microscopic and Culture             (Z11.1) Screening examination for pulmonary tuberculosis  Comment: She goes to adult  and needing to have TB screening annually.  I have filled out her form.  Plan: Quantiferon TB Gold Plus             (R82.90) Nonspecific finding on examination of urine  Comment:    Plan: Urine Culture Aerobic Bacterial                Return in about 1 year (around 6/21/2020).      The above note was dictated using voice recognition. Although reviewed after completion, some word and grammatical error may remain .

## 2019-06-18 ENCOUNTER — HOSPITAL ENCOUNTER (OUTPATIENT)
Dept: REHABILITATION | Facility: CLINIC | Age: 44
End: 2019-06-18
Attending: NURSE PRACTITIONER
Payer: COMMERCIAL

## 2019-06-18 PROCEDURE — S5102 ADULT DAY CARE PER DIEM: HCPCS

## 2019-06-20 ENCOUNTER — HOSPITAL ENCOUNTER (OUTPATIENT)
Dept: REHABILITATION | Facility: CLINIC | Age: 44
End: 2019-06-20
Attending: NURSE PRACTITIONER
Payer: COMMERCIAL

## 2019-06-20 PROCEDURE — S5102 ADULT DAY CARE PER DIEM: HCPCS

## 2019-06-21 ENCOUNTER — OFFICE VISIT (OUTPATIENT)
Dept: FAMILY MEDICINE | Facility: CLINIC | Age: 44
End: 2019-06-21
Payer: COMMERCIAL

## 2019-06-21 VITALS
WEIGHT: 158 LBS | SYSTOLIC BLOOD PRESSURE: 111 MMHG | RESPIRATION RATE: 16 BRPM | BODY MASS INDEX: 24.02 KG/M2 | OXYGEN SATURATION: 98 % | DIASTOLIC BLOOD PRESSURE: 62 MMHG | TEMPERATURE: 97.7 F | HEART RATE: 75 BPM

## 2019-06-21 DIAGNOSIS — G35 MULTIPLE SCLEROSIS (H): ICD-10-CM

## 2019-06-21 DIAGNOSIS — N39.0 RECURRENT UTI: Primary | ICD-10-CM

## 2019-06-21 DIAGNOSIS — R82.90 NONSPECIFIC FINDING ON EXAMINATION OF URINE: ICD-10-CM

## 2019-06-21 DIAGNOSIS — Z11.1 SCREENING EXAMINATION FOR PULMONARY TUBERCULOSIS: ICD-10-CM

## 2019-06-21 LAB
ALBUMIN UR-MCNC: NEGATIVE MG/DL
APPEARANCE UR: ABNORMAL
BACTERIA #/AREA URNS HPF: ABNORMAL /HPF
BILIRUB UR QL STRIP: NEGATIVE
COLOR UR AUTO: YELLOW
GLUCOSE UR STRIP-MCNC: NEGATIVE MG/DL
HGB UR QL STRIP: ABNORMAL
KETONES UR STRIP-MCNC: NEGATIVE MG/DL
LEUKOCYTE ESTERASE UR QL STRIP: ABNORMAL
NITRATE UR QL: POSITIVE
NON-SQ EPI CELLS #/AREA URNS LPF: ABNORMAL /LPF
PH UR STRIP: 6.5 PH (ref 5–7)
RBC #/AREA URNS AUTO: ABNORMAL /HPF
SOURCE: ABNORMAL
SP GR UR STRIP: 1.02 (ref 1–1.03)
UROBILINOGEN UR STRIP-ACNC: 0.2 EU/DL (ref 0.2–1)
WBC #/AREA URNS AUTO: ABNORMAL /HPF

## 2019-06-21 PROCEDURE — 99214 OFFICE O/P EST MOD 30 MIN: CPT | Performed by: FAMILY MEDICINE

## 2019-06-21 PROCEDURE — 81001 URINALYSIS AUTO W/SCOPE: CPT | Performed by: FAMILY MEDICINE

## 2019-06-21 PROCEDURE — 86481 TB AG RESPONSE T-CELL SUSP: CPT | Performed by: FAMILY MEDICINE

## 2019-06-21 PROCEDURE — 87088 URINE BACTERIA CULTURE: CPT | Performed by: FAMILY MEDICINE

## 2019-06-21 PROCEDURE — 87086 URINE CULTURE/COLONY COUNT: CPT | Performed by: FAMILY MEDICINE

## 2019-06-21 PROCEDURE — 36415 COLL VENOUS BLD VENIPUNCTURE: CPT | Performed by: FAMILY MEDICINE

## 2019-06-21 NOTE — TELEPHONE ENCOUNTER
Patient has been seen, forms completed during visit     Yaquelin Calderon on 6/21/2019 at 2:36 PM

## 2019-06-23 LAB
BACTERIA SPEC CULT: NORMAL
SPECIMEN SOURCE: NORMAL

## 2019-06-24 LAB
GAMMA INTERFERON BACKGROUND BLD IA-ACNC: 0.04 IU/ML
M TB IFN-G BLD-IMP: NEGATIVE
M TB IFN-G CD4+ BCKGRND COR BLD-ACNC: >10 IU/ML
MITOGEN IGNF BCKGRD COR BLD-ACNC: 0.04 IU/ML
MITOGEN IGNF BCKGRD COR BLD-ACNC: 0.08 IU/ML

## 2019-06-24 NOTE — PROGRESS NOTES
INDIVIDUAL PLAN OF CARE   [facility]    Member Name: Shanna Shanks; YOB: 1975  MRN: [id number]  06/24/2019    Goals developed in collaboration with: member  Staff responsible for plan: Spencer Stevenson  1. Long Term Goal (concrete, measurable, and time specific outcomes): Member will maintain current level of physical and cognitive function, preventing or delaying further deterioration through active participation in [facility] programming, with staff set up and supervision provided, every day of program attendance.  Target Date: 12/31/19  Quarterly Review:  On 5/15/18, member scored 9/30 on the [place] Cognitive Assessment 7.1.  During the time of the next review, staff will administer the MoCA 7.2 to track progress of cognition.  On 4/18/19, member participated in MoCA 7.2 and scored 15/30 suggesting moderate cognitive impairment, but improvement over the score from the previous year.  Goal remains appropriate for cognitive and physical maintenance.         2. Short Term Goal: (concrete, measurable, and time specific outcomes):  To maintain current cognitive function, member will actively participate in cognitive activities such as lunch bin sorting and stocking, and IPad games with staff set up and supervision provided, at least 1x/week while attending AC programming.   Target Date: 9/30/19  Quarterly Review:  Member requires prompting and strong coaching of benefits of cognitive exercise due to her current cognition.  She is usually agreeable to active participation when prompted.  Goal remains appropriate.    3. Short Term Goal: (concrete, measurable, and time specific outcomes):  To maintain or improve BL LE and BL UE strength and endurance for increased activity tolerance and falls prevention, member will actively participate in prescribed Nu-Step maintenance program at least 1x/week while attending AC programming.  Target Date: 9/31/19  Quarterly Review:   Goal remains appropriate

## 2019-06-25 ENCOUNTER — HOSPITAL ENCOUNTER (OUTPATIENT)
Dept: REHABILITATION | Facility: CLINIC | Age: 44
End: 2019-06-25
Attending: NURSE PRACTITIONER
Payer: COMMERCIAL

## 2019-06-25 PROCEDURE — S5102 ADULT DAY CARE PER DIEM: HCPCS

## 2019-06-27 ENCOUNTER — HOSPITAL ENCOUNTER (OUTPATIENT)
Dept: REHABILITATION | Facility: CLINIC | Age: 44
End: 2019-06-27
Attending: NURSE PRACTITIONER
Payer: COMMERCIAL

## 2019-06-27 PROCEDURE — S5102 ADULT DAY CARE PER DIEM: HCPCS

## 2019-07-23 ENCOUNTER — HOSPITAL ENCOUNTER (OUTPATIENT)
Dept: REHABILITATION | Facility: CLINIC | Age: 44
End: 2019-07-23
Attending: NURSE PRACTITIONER
Payer: COMMERCIAL

## 2019-07-23 PROCEDURE — S5102 ADULT DAY CARE PER DIEM: HCPCS

## 2019-07-23 NOTE — PROGRESS NOTES
Individual abuse prevention plan (IAPP)  [facility]     Assessment of Susceptibility to Abuse, Including Self Abuse, Neglect (Identification of characteristics, which make the individual susceptible to abuse, and how these characteristics cause the individual to be susceptible to abuse.)     Is the person susceptible to abuse in each area?  Sexual Abuse:   No  Referrals made when the person is susceptible to abuse outside the scope or control of this program (Identify the referral and date it occurred): No additional risk reduction means needed at this time    Physical Abuse:   No  Referrals made when the person is susceptible to abuse outside the scope or control of this program (Identify the referral and date it occurred): No additional risk reduction means needed at this time    Self-Abuse:   No  Referrals made when the person is susceptible to abuse outside the scope or control of this program (Identify the referral and date it occurred): No additional risk reduction means needed at this time    Financial  Exploitation  No  Referrals made when the person is susceptible to abuse outside the scope or control of this program (Identify the referral and date it occurred): No additional risk reduction means needed at this time    Is the program aware of this person committing a violent crime or act of physical aggression towards others?  No  Referrals made when the person is susceptible to abuse outside the scope or control of this program (Identify the referral and date it occurred): No additional risk reduction means needed at this time    INDIVIDUAL ABUSE PREVENTION PLAN-MEASURES TAKEN TO MINIMIZE RISK OF ABUSE   ADL:   Assist with clothing management  Assist with toileting  Ambulation/Transfers/Wheelchairs:  Ensure client uses cane and/or walker   Behavior:   Redirect when wandering due to MS-related cognitive decline.  Communication:  Encourage verbalization of needs/concerns  Cognitive Issues:    Give simple step-by-step direction  Diet:   No concerns  Exercise:   Encourage participation in maintenance program  Hearing:   No concerns  Isolation:   Encourage socialization due to isolation in home environment  Medical Monitoring:   Monitor physical and emotional comfort  Mental Health:   Encourage client to express feelings  Sensory:   Provide and encourage participation in activities for stimulation  Vision:   No concerns  Other: N/A    Developed in consultation with:  AC staff  The Program Abuse Prevention Plan/IAPP identifies the specific actions that minimize abuse to the [facility] participant.  Yes

## 2019-07-25 ENCOUNTER — HOSPITAL ENCOUNTER (OUTPATIENT)
Dept: REHABILITATION | Facility: CLINIC | Age: 44
End: 2019-07-25
Attending: NURSE PRACTITIONER
Payer: COMMERCIAL

## 2019-07-25 PROCEDURE — S5102 ADULT DAY CARE PER DIEM: HCPCS

## 2019-07-29 NOTE — PROGRESS NOTES
"[facility] Social History    Full Name: Shanna Shanks        MRN: [id number]    YOB: 1975  Nickname:TYRELL      Sex: F     Home Phone: [phone]      Cell Phone: [phone]  Address: [address] [place] [address]//Zip: [place]  [address]  County: [place]       E-mail:TYRELL        Transportation:    [employer]  Language:English         needed? No       Ethnicity: Caucasion  Race: White      Country of Origin: [place]       Scientology: Buddhist Science  Marital Status:                   Spouse/Significant Other: Jama Petit   ______________________________________________________________________________________     [place]: No    Branch of Service: NA      Education Level: High School- Likes Animal Science   Job History: Self employeed       Organizations/Clubs: NA  Whom do you live with?  and daughter  Current living arrangement:  Home   Number of Children: 2  List: Michel and Tegan  Number of Siblings: 7     List: Narendra, Aleksandra, Daisha, Elida, Jesus, Iain, Kyle  Other Important People/Pets: 2 cats and 1 dog  What else should we know about you?  Loves watching juggling.  Favorite color is bably blue.  Geogre with her mother.  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 Beck        Neurologist: Dr. Talha Reese  Emergency Contacts:  1. Jama Petmark      Relationship: Spouse    Phone: [phone]  2. Aleksandra Shanks         Relationship: sister     Phone:[phone]    Updated on 7/30/2018,  7/29/2019        "

## 2019-07-30 ENCOUNTER — HOSPITAL ENCOUNTER (OUTPATIENT)
Dept: REHABILITATION | Facility: CLINIC | Age: 44
End: 2019-07-30
Attending: NURSE PRACTITIONER
Payer: COMMERCIAL

## 2019-07-30 PROCEDURE — S5102 ADULT DAY CARE PER DIEM: HCPCS

## 2019-07-30 NOTE — PROGRESS NOTES
MONTHLY PROGRESS NOTE AND PARTICIPATION REPORT   [facility]    Shanna Shanks, 1975  Attendance: Please see Epic for attendance record.    Communication:   Does communicate   Hearing:   No impairment  Vision:   No impairment  Orientation/Cognition:   Partial disorientation  Short term memory loss  Behavior:   No behavioral concerns  Self-Preservation Skills:   Not capable of self-preservation  Eating:   Independent  Ambulation Walking:   Independent  Needs assistance  Uses walker at day program  Transferring:   Aide of one person/two  Wheelchair:   Uses walker at day program  Toileting:   Needs assistance  Maintenance Program:     NuStep  Living Arrangements:   Lives with family  Spiritual Needs: Needs are being met through support group  Medication Assistance:   Medication not taken during program hours  Participation Report:   Aerobics/Exercise  Support Group  Cognitive Stimulation  Fire Drill  Creative Arts/Crafts  Games  Gardening  Speakers/Entertainment  Socialization  Current Events/News  Education/Health Topic  Themed weekly activities  Level of Participation:   Active  To the best of their ability

## 2019-08-01 ENCOUNTER — HOSPITAL ENCOUNTER (OUTPATIENT)
Dept: REHABILITATION | Facility: CLINIC | Age: 44
End: 2019-08-01
Attending: NURSE PRACTITIONER
Payer: COMMERCIAL

## 2019-08-01 PROCEDURE — S5102 ADULT DAY CARE PER DIEM: HCPCS

## 2019-08-06 ENCOUNTER — HOSPITAL ENCOUNTER (OUTPATIENT)
Dept: REHABILITATION | Facility: CLINIC | Age: 44
End: 2019-08-06
Attending: NURSE PRACTITIONER
Payer: COMMERCIAL

## 2019-08-06 PROCEDURE — S5102 ADULT DAY CARE PER DIEM: HCPCS

## 2019-08-08 ENCOUNTER — HOSPITAL ENCOUNTER (OUTPATIENT)
Dept: REHABILITATION | Facility: CLINIC | Age: 44
End: 2019-08-08
Attending: NURSE PRACTITIONER
Payer: COMMERCIAL

## 2019-08-08 PROCEDURE — S5102 ADULT DAY CARE PER DIEM: HCPCS

## 2019-08-20 ENCOUNTER — HOSPITAL ENCOUNTER (OUTPATIENT)
Dept: REHABILITATION | Facility: CLINIC | Age: 44
End: 2019-08-20
Attending: NURSE PRACTITIONER
Payer: COMMERCIAL

## 2019-08-20 PROCEDURE — S5102 ADULT DAY CARE PER DIEM: HCPCS

## 2019-08-20 NOTE — PROGRESS NOTES
MONTHLY PROGRESS NOTE AND PARTICIPATION REPORT   [facility]    Shanna Shanks, 1975  Attendance: Please see Epic for attendance record.    Communication:   Does communicate   Hearing:   No impairment  Vision:   No impairment  Orientation/Cognition:   Partial disorientation  Short term memory loss  Behavior:   No behavioral concerns  Self-Preservation Skills:   Not capable of self-preservation  Eating:   Independent  Ambulation Walking:   Independent  Needs assistance  Uses walker at day program  Transferring:   Aide of one person/two  Wheelchair:   Uses walker at day program  Toileting:   Needs assistance  Maintenance Program:     NuStep  Living Arrangements:   Lives with family  Spiritual Needs: Needs are being met through support group  Medication Assistance:   Medication not taken during program hours  Participation Report:   Aerobics/Exercise  Support Group  Cognitive Stimulation  Fire Drill  Creative Arts/Crafts  Games  Gardening  Speakers/Entertainment  Socialization  Current Events/News  Education/Health Topic  Themed weekly activities  Meditation, prayer time, communion, Kael Chi. Themed events include skin care, [place], [place], [place], community event: Improv.  Level of Participation:   Active  To the best of their ability

## 2019-08-22 ENCOUNTER — HOSPITAL ENCOUNTER (OUTPATIENT)
Dept: REHABILITATION | Facility: CLINIC | Age: 44
End: 2019-08-22
Attending: NURSE PRACTITIONER
Payer: COMMERCIAL

## 2019-08-22 PROCEDURE — S5102 ADULT DAY CARE PER DIEM: HCPCS

## 2019-08-29 ENCOUNTER — HOSPITAL ENCOUNTER (OUTPATIENT)
Dept: REHABILITATION | Facility: CLINIC | Age: 44
End: 2019-08-29
Attending: NURSE PRACTITIONER
Payer: COMMERCIAL

## 2019-08-29 PROCEDURE — S5102 ADULT DAY CARE PER DIEM: HCPCS

## 2019-09-03 ENCOUNTER — HOSPITAL ENCOUNTER (OUTPATIENT)
Dept: REHABILITATION | Facility: CLINIC | Age: 44
End: 2019-09-03
Attending: NURSE PRACTITIONER
Payer: COMMERCIAL

## 2019-09-03 PROCEDURE — S5102 ADULT DAY CARE PER DIEM: HCPCS

## 2019-09-05 ENCOUNTER — HOSPITAL ENCOUNTER (OUTPATIENT)
Dept: REHABILITATION | Facility: CLINIC | Age: 44
End: 2019-09-05
Attending: NURSE PRACTITIONER
Payer: COMMERCIAL

## 2019-09-05 PROCEDURE — S5102 ADULT DAY CARE PER DIEM: HCPCS

## 2019-09-16 NOTE — PROGRESS NOTES
INDIVIDUAL PLAN OF CARE   [facility]    Member Name: Shanna Shanks; YOB: 1975  MRN: [id number]  9/16/2019    Goals developed in collaboration with: member  Staff responsible for plan: Spencer Stevenson  1. Long Term Goal (concrete, measurable, and time specific outcomes): Member will maintain current level of physical and cognitive function, preventing or delaying further deterioration through active participation in [facility] programming, with staff set up and supervision provided, every day of program attendance.  Target Date: 3/31/19  Quarterly Review:  On 5/15/18, member scored 9/30 on the [place] Cognitive Assessment 7.1.  During the time of the next review, staff will administer the MoCA 7.2 to track progress of cognition.  On 4/18/19, member participated in MoCA 7.2 and scored 15/30 suggesting moderate cognitive impairment, but improvement over the score from the previous year.  Goal remains appropriate for cognitive and physical maintenance.      Goal Remains appropriate       2. Short Term Goal: (concrete, measurable, and time specific outcomes):  To maintain current cognitive function, member will actively participate in cognitive activities such as lunch bin sorting and stocking, and IPad games with staff set up and supervision provided, at least 1x/week while attending AC programming.   Target Date: 12/31/19  Quarterly Review:  Member requires prompting and strong coaching of benefits of cognitive exercise due to her current cognition.  She is usually agreeable to active participation when prompted.  Goal remains appropriate.    3. Short Term Goal: (concrete, measurable, and time specific outcomes):  To maintain or improve BL LE and BL UE strength and endurance for increased activity tolerance and falls prevention, member will actively participate in prescribed Nu-Step maintenance program, with prompting, education of benefits of exercise, and set up  provided by staff, least 1x/week while attending AC programming.  Target Date: 12/31/19  Quarterly Review:  Goal remains appropriate

## 2019-09-19 ENCOUNTER — HOSPITAL ENCOUNTER (OUTPATIENT)
Dept: REHABILITATION | Facility: CLINIC | Age: 44
End: 2019-09-19
Attending: NURSE PRACTITIONER
Payer: COMMERCIAL

## 2019-09-19 PROCEDURE — S5102 ADULT DAY CARE PER DIEM: HCPCS

## 2019-09-23 ENCOUNTER — MYC MEDICAL ADVICE (OUTPATIENT)
Dept: FAMILY MEDICINE | Facility: CLINIC | Age: 44
End: 2019-09-23

## 2019-09-23 NOTE — TELEPHONE ENCOUNTER
She can see me in the clinic and we can talk about oral meds risk and benefit.   They are more than welcome to see podiatry if they wish.

## 2019-09-24 ENCOUNTER — HOSPITAL ENCOUNTER (OUTPATIENT)
Dept: REHABILITATION | Facility: CLINIC | Age: 44
End: 2019-09-24
Attending: NURSE PRACTITIONER
Payer: COMMERCIAL

## 2019-09-24 PROCEDURE — S5102 ADULT DAY CARE PER DIEM: HCPCS

## 2019-09-24 NOTE — PROGRESS NOTES
MONTHLY PROGRESS NOTE AND PARTICIPATION REPORT   [facility]    Shanna Shanks, 1975  Attendance: Please see Epic for attendance record.    Communication:   Does communicate   Hearing:   No impairment  Vision:   No impairment  Orientation/Cognition:   Partial disorientation  Short term memory loss  Behavior:   No behavioral concerns  Self-Preservation Skills:   Not capable of self-preservation  Eating:   Independent  Ambulation Walking:   Independent  Needs assistance  Transferring:   Aide of one person/two  Wheelchair:   Uses a walker at program (not at home)  Toileting:   Needs assistance  Maintenance Program:     [facility]  Living Arrangements:   Lives with family  Spiritual Needs: Needs are being met through support group  Medication Assistance:   Medication not taken during program hours  Participation Report:   Aerobics/Exercise  Support Group  Cognitive Stimulation  Fire Drill  Creative Arts/Crafts  Games  Gardening  Speakers/Entertainment  Socialization  Current Events/News  Education/Health Topic  Meditation, prayer, communion, and wood chi; Improv with Huge Theater, Laughter Yoga, Befriending Your Body, hand massage, town rojas; Themed education on Vikings football, Renaissance, Farm Appreciation, Apple Tasting  Level of Participation:   Active  To the best of their ability

## 2019-09-26 ENCOUNTER — HOSPITAL ENCOUNTER (OUTPATIENT)
Dept: REHABILITATION | Facility: CLINIC | Age: 44
End: 2019-09-26
Attending: NURSE PRACTITIONER
Payer: COMMERCIAL

## 2019-09-26 PROCEDURE — S5102 ADULT DAY CARE PER DIEM: HCPCS

## 2019-09-30 ENCOUNTER — HEALTH MAINTENANCE LETTER (OUTPATIENT)
Age: 44
End: 2019-09-30

## 2019-10-01 ENCOUNTER — HOSPITAL ENCOUNTER (OUTPATIENT)
Dept: REHABILITATION | Facility: CLINIC | Age: 44
End: 2019-10-01
Attending: NURSE PRACTITIONER
Payer: COMMERCIAL

## 2019-10-01 PROCEDURE — S5102 ADULT DAY CARE PER DIEM: HCPCS

## 2019-10-03 ENCOUNTER — HOSPITAL ENCOUNTER (OUTPATIENT)
Dept: REHABILITATION | Facility: CLINIC | Age: 44
End: 2019-10-03
Attending: NURSE PRACTITIONER
Payer: COMMERCIAL

## 2019-10-03 ENCOUNTER — OFFICE VISIT (OUTPATIENT)
Dept: PODIATRY | Facility: CLINIC | Age: 44
End: 2019-10-03
Payer: COMMERCIAL

## 2019-10-03 VITALS — SYSTOLIC BLOOD PRESSURE: 90 MMHG | BODY MASS INDEX: 23.72 KG/M2 | DIASTOLIC BLOOD PRESSURE: 62 MMHG | WEIGHT: 156 LBS

## 2019-10-03 DIAGNOSIS — Z79.899 MEDICATION MANAGEMENT: ICD-10-CM

## 2019-10-03 DIAGNOSIS — B35.1 ONYCHOMYCOSIS: Primary | ICD-10-CM

## 2019-10-03 DIAGNOSIS — L60.8 CHANGE IN NAIL APPEARANCE: ICD-10-CM

## 2019-10-03 DIAGNOSIS — L60.8 NAIL DEFORMITY: ICD-10-CM

## 2019-10-03 LAB
ALBUMIN SERPL-MCNC: 4.2 G/DL (ref 3.4–5)
ALP SERPL-CCNC: 52 U/L (ref 40–150)
ALT SERPL W P-5'-P-CCNC: 22 U/L (ref 0–50)
AST SERPL W P-5'-P-CCNC: 16 U/L (ref 0–45)
BILIRUB DIRECT SERPL-MCNC: 0.1 MG/DL (ref 0–0.2)
BILIRUB SERPL-MCNC: 0.5 MG/DL (ref 0.2–1.3)
PROT SERPL-MCNC: 7.6 G/DL (ref 6.8–8.8)

## 2019-10-03 PROCEDURE — S5102 ADULT DAY CARE PER DIEM: HCPCS

## 2019-10-03 PROCEDURE — 36415 COLL VENOUS BLD VENIPUNCTURE: CPT | Performed by: PODIATRIST

## 2019-10-03 PROCEDURE — 80076 HEPATIC FUNCTION PANEL: CPT | Performed by: PODIATRIST

## 2019-10-03 PROCEDURE — 99203 OFFICE O/P NEW LOW 30 MIN: CPT | Performed by: PODIATRIST

## 2019-10-03 RX ORDER — TERBINAFINE HYDROCHLORIDE 250 MG/1
250 TABLET ORAL DAILY
Qty: 90 TABLET | Refills: 0 | Status: SHIPPED | OUTPATIENT
Start: 2019-10-03 | End: 2020-01-01

## 2019-10-03 NOTE — PROGRESS NOTES
ASSESSMENT/PLAN:    Encounter Diagnoses   Name Primary?     Onychomycosis Yes     Medication management      Nail deformity      I explained that toenail changes are often from injury to a nail unit, rather than from fungus.  Injury might be a one-time event or from repetitive irritation in foot wear and with certain types of activities.  Injured nails are likely more susceptible to fungal infection.  Change seen in multiple nails is more likely from fungus.  Treatment options are limited.     It was explained that many people opt to simply keep the involved nails trimmed and filed. [facility] does not offer this service.  Another option is a trial of a topical and/or oral antifungal.  Many times these are not successful nor provide a cure.  These medications do not correct a nail deformity.      Permanent removal of deformed toenails is an option.  Nail removal procedurs were discussed in detail.       The patient opted to try the oral terbinafine, if the hepatic panel is normal.      (B35.1) Onychomycosis  (primary encounter diagnosis)  Plan: Hepatic panel (Albumin, ALT, AST, Bili, Alk         Phos, TP)           Body mass index is 23.72 kg/m .          Talha Lee DPM, FACFAS, MS    [facility]      ____________________________________________________________________    HPI:         Chief Complaint: toenail fungus, toe pain  Onset of problem: 1 year  Pain/ discomfort is described as:  shooting  Rating:  3/10   Frequency:  perdiodic    The pain is made worse with contact, pressure  Previous treatment: topical antifungal, liquid kerasol      Patient Active Problem List   Diagnosis     Multiple sclerosis (H)     UTI (urinary tract infection)     CARDIOVASCULAR SCREENING; LDL GOAL LESS THAN 160     Dysmenorrhea     Nicotine dependence in remission     Encephalopathy     Herpes encephalitis     Fever of unknown origin     Seizure (H)     Traumatic hematoma of left  lower leg   *  *  Past Surgical History:   Procedure Laterality Date     HC DILATION/CURETTAGE DIAG/THER NON OB  1/1/05    D & C, missed AB   *  *  Current Outpatient Medications   Medication Sig Dispense Refill     baclofen (LIORESAL) 10 MG tablet Take 10-20 mg by mouth 4 times daily as needed   3     CRANBERRY EXTRACT PO Take 1 tablet by mouth daily       dimethyl fumarate (TECFIDERA) delayed release capsule Take 240 mg by mouth 2 times daily        levETIRAcetam 1000 MG TABS Take 1,000 mg by mouth 2 times daily 60 tablet 1     norethindrone (MICRONOR) 0.35 MG tablet TAKE 1 TABLET BY MOUTH EVERY DAY 84 tablet 3     order for DME Equipment being ordered: four wheeled walker with seat and brakes 1 Units 0     oxybutynin (DITROPAN-XL) 5 MG 24 hr tablet Take 5 mg by mouth daily       sulfamethoxazole-trimethoprim (BACTRIM/SEPTRA) 400-80 MG per tablet Take 1 tablet by mouth daily       venlafaxine (EFFEXOR-ER) 75 MG TB24 24 hr tablet Take 75 mg by mouth daily (2  X 37.5 mg)       VITAMIN D, CHOLECALCIFEROL, PO Take 1,000 Units by mouth daily         ROS:     A 10-point review of systems was performed and is positive for that noted above in the HPI and as seen below.  All other areas are negative.     Numbness in feet?  no   Calf pain with walking? MS related  Recent foot/ankle injury? no  Weight change over past 12 months? no  Self perception as overweight? no  Recent flu-like symptoms? no  Joint pain other than feet ? Knee and leg    Social History: Employment:  no;  Exercise/Physical activity:  walking;  Tobacco use:  no  Social History     Socioeconomic History     Marital status:      Spouse name: Jama     Number of children: 2     Years of education: Not on file     Highest education level: Not on file   Occupational History     Occupation: [employer]     Employer: SELF   Social Needs     Financial resource strain: Not on file     Food insecurity:     Worry: Not on file     Inability: Not on  file     Transportation needs:     Medical: Not on file     Non-medical: Not on file   Tobacco Use     Smoking status: Former Smoker     Packs/day: 0.25     Types: Cigarettes     Smokeless tobacco: Never Used     Tobacco comment: quit in 2017   Substance and Sexual Activity     Alcohol use: No     Alcohol/week: 5.8 standard drinks     Types: 7 Standard drinks or equivalent per week     Drug use: No     Sexual activity: Yes     Partners: Male     Birth control/protection: Pill, Rhythm   Lifestyle     Physical activity:     Days per week: Not on file     Minutes per session: Not on file     Stress: Not on file   Relationships     Social connections:     Talks on phone: Not on file     Gets together: Not on file     Attends Hoahaoism service: Not on file     Active member of club or organization: Not on file     Attends meetings of clubs or organizations: Not on file     Relationship status: Not on file     Intimate partner violence:     Fear of current or ex partner: Not on file     Emotionally abused: Not on file     Physically abused: Not on file     Forced sexual activity: Not on file   Other Topics Concern     Parent/sibling w/ CABG, MI or angioplasty before 65F 55M? No   Social History Narrative    Caffeine intake/servings daily - 2    Calcium intake/servings daily - 2-3    Exercise 7 times weekly - describe runs on treadmill for 5 minutes    Sunscreen used - Yes    Seatbelts used - Yes    Guns stored in the home - No    Self Breast Exam - Yes    Pap test up to date -  Yes    Eye exam up to date -  Yes    Dental exam up to date -  Yes    DEXA scan up to date -  Yes    Flex Sig/Colonoscopy up to date -  Not Applicable    Mammography up to date -  Not Applicable    Immunizations reviewed and up to date - Yes    Abuse: Current or Past (Physical, Sexual or Emotional) - No    Do you feel safe in your environment - Yes    Do you cope well with stress - Yes    Do you suffer from insomnia - No    Last updated by: Michelle  ELLIE Sanchez  12/22/2010                   Family history:  Family History   Problem Relation Age of Onset     Family History Negative Mother      Diabetes Father      Cancer - colorectal Paternal Grandmother      Heart Disease Paternal Grandfather         MI       Rheumatoid arthritis:  no  Foot Problems: no  Diabetes: parent    EXAM:    Vitals: BP 90/62   Wt 70.8 kg (156 lb)   BMI 23.72 kg/m    BMI: Body mass index is 23.72 kg/m .  Height: Data Unavailable    Constitutional/ general:  Pt is in no apparent distress, appears well-nourished.  Cooperative with history and physical exam.     Vascular:  Pedal pulses are palpable bilaterally for both the DP and PT arteries.  CFT < 3 sec.  No edema.  Pedal hair growth noted.     Neuro:  Alert and oriented x 3. Coordinated gait.  Light touch sensation is intact to the L4, L5, S1 distributions. No obvious deficits.  No evidence of neurological-based weakness, spasticity, or contracture in the lower extremities.     Derm: Normal texture and turgor.  No erythema, ecchymosis, or cyanosis.  No open lesions.     Bilateral hallux nails are thick, yellow, deformed.  The right hallux is elevating from the nail bed. Several other nails are mildly tickened.     Musculoskeletal:    Lower extremity muscle strength is normal.  Patient is ambulatory without an assistive device or brace .  Hallux abducto valgus deformity, bilateral foot.  Reducible in the transverse plane with preserved sagittal plane ROM.  No crepitus.    Decrease in medial longitudinal arch with weight bearing.

## 2019-10-03 NOTE — PATIENT INSTRUCTIONS
Thank you for choosing [facility]!    DR. LAY'S CLINIC LOCATIONS     MONDAY - [place] WEDNESDAY (AM ONLY) - [place]   [address] [place] [address] [address] [address]   [phone] / [phone] / [phone]       THURSDAY - [place] SCHEDULE SURGERY: [phone]   [address] APPOINTMENTS: [phone]   [place] [address] BILLING QUESTIONS: [phone] / [phone]       NAIL FUNGUS / ONYCHOMYCOSIS   Nail fungus is not a hygiene problem and will not likely lead to significant medical   problems. The nails may get thick causing pain and possibly local skin infection.   Treatments include debridement (trimming), oral antifungals, topical antifungals and complete removal of the nail. Most fungal nails are not treated.   Topicals such as tea tree oil can be helpful for surface fungus and may, at best, limit   progression. Over the counter creams (such as Lamisil) can also be used however, their effectiveness is also quite low.  Topical treatment with Pen lac is expensive and often not covered by insurance. Pen lac has an approximate 8% success rate. Topical therapy recommendations is to apply twice a day for at least 3-4 months as it takes 9 months for new nail to grow out.    Experts suggest soaking your feet for 15 to 20 minutes in a mixture of 1 cup vinegar to 4 cups warm water. Be sure to rinse well and pat your feet dry when you're done. You can soak your feet like this daily. But if your skin becomes irritated, try soaking only two to three times a week. Vicks VapoRub, as with vinegar, there have been no controlled clinical trials to assess the effectiveness of Vicks VapoRub on nail fungus, but there have been numerous anecdotal reports that it works. There's no consensus on how often to apply this product, so check with your doctor before using it on your nails.     Oral therapies include  Sporanox and Lamisil. Oral therapies are also expensive and not very effective. Side effects such as liver disease are the main concern. Return of fungus is common even if the treatment worked.     Other Tips:  - Penlac nail medication apply daily x 4 months; remove old polish first day of each week  - Antifungal cream/powder (Zeasorb) - apply daily to feet and shoes x 2 months  - Clean shoes with Lysol or in washing machine every few weeks  - Rotate shoe gear; give them 24 hours to dry out between days wearing them  - Clean pair of socks in morning, clean pair in afternoon if your feet sweat  - Shower shoes used in public showers/pools    ROUTINE FOOT CARE    [employer]   $55 nails - $10 calluses  [phone]  (Travels to your home) [employer] - $40  [phone]  [email or web address] for locations or they can come to your home   [employer]  [phone]  [facility] [employer]  [phone]  (Travels to your home)   Paula Mayorga, DPM  [address], [place] [id number] [phone] Jeramy Moeller, DPM  [address] [place] [address] [phone]   [facility]  [address] [place] [address] [phone] [facility]   [phone]  [facility]  [phone]  [facility]  (does not take BCBS) [facility]   [phone]           FYI: BODY WEIGHT AND YOUR FEET  The following information is included in the after visit summary for all patients. Body weight can be a sensitive issue to discuss in clinic, but we think the following information is very important. Although we focus on the feet and ankles, we do support the overall health of our patients.     Many things can cause foot and ankle problems. Foot structure, activity level, foot mechanics and injuries are common causes of pain. One very important issue that often goes unmentioned, is body weight. Extra weight can cause  increased stress on muscles, ligaments, bones and tendons. Sometimes just a few extra pounds is all it takes to put one over her/his threshold. Without reducing that stress, it can be difficult to alleviate pain. As Foot & Ankle specialists, our job is addressing the lower extremity problem and possible causes. Regarding extra body weight, we encourage patients to discuss diet and weight management plans with their primary care doctors. It is this team approach that gives you the best opportunity for pain relief and getting you back on your feet.      [facility] has a Comprehensive Weight Management Program. This program includes counseling, education, non-surgical and surgical approaches to weight loss. If you are interested in learning more either talk to you primary care provider or call [phone].

## 2019-10-03 NOTE — LETTER
10/3/2019         RE: Shanna Shanks  [address]  [facility] [address]        Dear Colleague,    Thank you for referring your patient, Shanna Shanks, to the [facility]. Please see a copy of my visit note below.      ASSESSMENT/PLAN:    Encounter Diagnoses   Name Primary?     Onychomycosis Yes     Medication management      Nail deformity      I explained that toenail changes are often from injury to a nail unit, rather than from fungus.  Injury might be a one-time event or from repetitive irritation in foot wear and with certain types of activities.  Injured nails are likely more susceptible to fungal infection.  Change seen in multiple nails is more likely from fungus.  Treatment options are limited.     It was explained that many people opt to simply keep the involved nails trimmed and filed. [facility] does not offer this service.  Another option is a trial of a topical and/or oral antifungal.  Many times these are not successful nor provide a cure.  These medications do not correct a nail deformity.      Permanent removal of deformed toenails is an option.  Nail removal procedurs were discussed in detail.       The patient opted to try the oral terbinafine, if the hepatic panel is normal.      (B35.1) Onychomycosis  (primary encounter diagnosis)  Plan: Hepatic panel (Albumin, ALT, AST, Bili, Alk         Phos, TP)           Body mass index is 23.72 kg/m .          Talha Lee DPM, FACFAS, MS    [facility]      ____________________________________________________________________    HPI:         Chief Complaint: toenail fungus, toe pain  Onset of problem: 1 year  Pain/ discomfort is described as:  shooting  Rating:  3/10   Frequency:  perdiodic    The pain is made worse with contact, pressure  Previous treatment: topical antifungal, liquid kerasol      Patient Active Problem List   Diagnosis     Multiple sclerosis (H)      UTI (urinary tract infection)     CARDIOVASCULAR SCREENING; LDL GOAL LESS THAN 160     Dysmenorrhea     Nicotine dependence in remission     Encephalopathy     Herpes encephalitis     Fever of unknown origin     Seizure (H)     Traumatic hematoma of left lower leg   *  *  Past Surgical History:   Procedure Laterality Date     HC DILATION/CURETTAGE DIAG/THER NON OB  1/1/05    D & C, missed AB   *  *  Current Outpatient Medications   Medication Sig Dispense Refill     baclofen (LIORESAL) 10 MG tablet Take 10-20 mg by mouth 4 times daily as needed   3     CRANBERRY EXTRACT PO Take 1 tablet by mouth daily       dimethyl fumarate (TECFIDERA) delayed release capsule Take 240 mg by mouth 2 times daily        levETIRAcetam 1000 MG TABS Take 1,000 mg by mouth 2 times daily 60 tablet 1     norethindrone (MICRONOR) 0.35 MG tablet TAKE 1 TABLET BY MOUTH EVERY DAY 84 tablet 3     order for DME Equipment being ordered: four wheeled walker with seat and brakes 1 Units 0     oxybutynin (DITROPAN-XL) 5 MG 24 hr tablet Take 5 mg by mouth daily       sulfamethoxazole-trimethoprim (BACTRIM/SEPTRA) 400-80 MG per tablet Take 1 tablet by mouth daily       venlafaxine (EFFEXOR-ER) 75 MG TB24 24 hr tablet Take 75 mg by mouth daily (2  X 37.5 mg)       VITAMIN D, CHOLECALCIFEROL, PO Take 1,000 Units by mouth daily         ROS:     A 10-point review of systems was performed and is positive for that noted above in the HPI and as seen below.  All other areas are negative.     Numbness in feet?  no   Calf pain with walking? MS related  Recent foot/ankle injury? no  Weight change over past 12 months? no  Self perception as overweight? no  Recent flu-like symptoms? no  Joint pain other than feet ? Knee and leg    Social History: Employment:  no;  Exercise/Physical activity:  walking;  Tobacco use:  no  Social History     Socioeconomic History     Marital status:      Spouse name: Jama     Number of children: 2     Years of education:  Not on file     Highest education level: Not on file   Occupational History     Occupation: [employer]     Employer: SELF   Social Needs     Financial resource strain: Not on file     Food insecurity:     Worry: Not on file     Inability: Not on file     Transportation needs:     Medical: Not on file     Non-medical: Not on file   Tobacco Use     Smoking status: Former Smoker     Packs/day: 0.25     Types: Cigarettes     Smokeless tobacco: Never Used     Tobacco comment: quit in 2017   Substance and Sexual Activity     Alcohol use: No     Alcohol/week: 5.8 standard drinks     Types: 7 Standard drinks or equivalent per week     Drug use: No     Sexual activity: Yes     Partners: Male     Birth control/protection: Pill, Rhythm   Lifestyle     Physical activity:     Days per week: Not on file     Minutes per session: Not on file     Stress: Not on file   Relationships     Social connections:     Talks on phone: Not on file     Gets together: Not on file     Attends [employer]ist service: Not on file     Active member of club or organization: Not on file     Attends meetings of clubs or organizations: Not on file     Relationship status: Not on file     Intimate partner violence:     Fear of current or ex partner: Not on file     Emotionally abused: Not on file     Physically abused: Not on file     Forced sexual activity: Not on file   Other Topics Concern     Parent/sibling w/ CABG, MI or angioplasty before 65F 55M? No   Social History Narrative    Caffeine intake/servings daily - 2    Calcium intake/servings daily - 2-3    Exercise 7 times weekly - describe runs on treadmill for 5 minutes    Sunscreen used - Yes    Seatbelts used - Yes    Guns stored in the home - No    Self Breast Exam - Yes    Pap test up to date -  Yes    Eye exam up to date -  Yes    Dental exam up to date -  Yes    DEXA scan up to date -  Yes    Flex Sig/Colonoscopy up to date -  Not Applicable    Mammography up to date -  Not  Applicable    Immunizations reviewed and up to date - Yes    Abuse: Current or Past (Physical, Sexual or Emotional) - No    Do you feel safe in your environment - Yes    Do you cope well with stress - Yes    Do you suffer from insomnia - No    Last updated by: Michelle Sanchez  12/22/2010                   Family history:  Family History   Problem Relation Age of Onset     Family History Negative Mother      Diabetes Father      Cancer - colorectal Paternal Grandmother      Heart Disease Paternal Grandfather         MI       Rheumatoid arthritis:  no  Foot Problems: no  Diabetes: parent    EXAM:    Vitals: BP 90/62   Wt 70.8 kg (156 lb)   BMI 23.72 kg/m     BMI: Body mass index is 23.72 kg/m .  Height: Data Unavailable    Constitutional/ general:  Pt is in no apparent distress, appears well-nourished.  Cooperative with history and physical exam.     Vascular:  Pedal pulses are palpable bilaterally for both the DP and PT arteries.  CFT < 3 sec.  No edema.  Pedal hair growth noted.     Neuro:  Alert and oriented x 3. Coordinated gait.  Light touch sensation is intact to the L4, L5, S1 distributions. No obvious deficits.  No evidence of neurological-based weakness, spasticity, or contracture in the lower extremities.     Derm: Normal texture and turgor.  No erythema, ecchymosis, or cyanosis.  No open lesions.     Bilateral hallux nails are thick, yellow, deformed.  The right hallux is elevating from the nail bed. Several other nails are mildly tickened.     Musculoskeletal:    Lower extremity muscle strength is normal.  Patient is ambulatory without an assistive device or brace .  Hallux abducto valgus deformity, bilateral foot.  Reducible in the transverse plane with preserved sagittal plane ROM.  No crepitus.    Decrease in medial longitudinal arch with weight bearing.         Again, thank you for allowing me to participate in the care of your patient.        Sincerely,        Talha Lee DPM

## 2019-10-22 ENCOUNTER — HOSPITAL ENCOUNTER (OUTPATIENT)
Dept: REHABILITATION | Facility: CLINIC | Age: 44
End: 2019-10-22
Attending: NURSE PRACTITIONER
Payer: COMMERCIAL

## 2019-10-22 PROCEDURE — S5102 ADULT DAY CARE PER DIEM: HCPCS

## 2019-10-22 NOTE — PROGRESS NOTES
"MONTHLY PROGRESS NOTE AND PARTICIPATION REPORT   [facility]    Shanna Shanks, 1975  Attendance: Please see Epic for attendance record.    Communication:   Does communicate   Hearing:   No impairment  Vision:   No impairment  Orientation/Cognition:   Partial disorientation  Short term memory loss  Behavior:   No behavioral concerns  Self-Preservation Skills:   Not capable of self-preservation  Eating:   Independent  Ambulation Walking:   Independent  Transferring:   Aide of one person/two  Wheelchair:   Uses a walker at program (not at home)  Toileting:   Needs assistance  Increasingly requires verbal cuing for hygiene in bathroom  Maintenance Program:     NuStep  Living Arrangements:   Lives with family  Spiritual Needs: Needs are being met through support group  Medication Assistance:   Medication not taken during program hours  Participation Report:   Aerobics/Exercise  Support Group  Cognitive Stimulation  Fire Drill  Creative Arts/Crafts  Games  Gardening  Speakers/Entertainment  Socialization  Current Events/News  Education/Health Topic  Meditation, Yoga, prayer, communion, meditation; Improv with [place]; absentee voter registration information speaker, \"Befriending Your Body\" MS author and speaker, hand massage, piano, mosaic art; Special class week: pumpkin decorating, costume contest; Themed education topics: mystery series, fall fun, [place] native animals, chemistry, halloween  Level of Participation:   Active  To the best of their ability      "

## 2019-10-24 ENCOUNTER — HOSPITAL ENCOUNTER (OUTPATIENT)
Dept: REHABILITATION | Facility: CLINIC | Age: 44
End: 2019-10-24
Attending: NURSE PRACTITIONER
Payer: COMMERCIAL

## 2019-10-24 PROCEDURE — S5102 ADULT DAY CARE PER DIEM: HCPCS

## 2019-10-29 ENCOUNTER — HOSPITAL ENCOUNTER (OUTPATIENT)
Dept: REHABILITATION | Facility: CLINIC | Age: 44
End: 2019-10-29
Attending: NURSE PRACTITIONER
Payer: COMMERCIAL

## 2019-10-29 PROCEDURE — S5102 ADULT DAY CARE PER DIEM: HCPCS

## 2019-10-31 ENCOUNTER — HOSPITAL ENCOUNTER (OUTPATIENT)
Dept: REHABILITATION | Facility: CLINIC | Age: 44
End: 2019-10-31
Attending: NURSE PRACTITIONER
Payer: COMMERCIAL

## 2019-10-31 PROCEDURE — S5102 ADULT DAY CARE PER DIEM: HCPCS

## 2019-11-05 ENCOUNTER — HOSPITAL ENCOUNTER (OUTPATIENT)
Dept: REHABILITATION | Facility: CLINIC | Age: 44
End: 2019-11-05
Attending: NURSE PRACTITIONER
Payer: COMMERCIAL

## 2019-11-05 PROCEDURE — S5102 ADULT DAY CARE PER DIEM: HCPCS

## 2019-11-07 ENCOUNTER — HOSPITAL ENCOUNTER (OUTPATIENT)
Dept: REHABILITATION | Facility: CLINIC | Age: 44
End: 2019-11-07
Attending: NURSE PRACTITIONER
Payer: COMMERCIAL

## 2019-11-07 PROCEDURE — S5102 ADULT DAY CARE PER DIEM: HCPCS

## 2019-11-12 ENCOUNTER — HOSPITAL ENCOUNTER (OUTPATIENT)
Dept: REHABILITATION | Facility: CLINIC | Age: 44
End: 2019-11-12
Attending: NURSE PRACTITIONER
Payer: COMMERCIAL

## 2019-11-12 PROCEDURE — S5102 ADULT DAY CARE PER DIEM: HCPCS

## 2019-11-12 NOTE — PROGRESS NOTES
Individual abuse prevention plan (IAPP)  [facility]     Assessment of Susceptibility to Abuse, Including Self Abuse, Neglect (Identification of characteristics, which make the individual susceptible to abuse, and how these characteristics cause the individual to be susceptible to abuse.)     Is the person susceptible to abuse in each area?  Sexual Abuse:   No  Referrals made when the person is susceptible to abuse outside the scope or control of this program (Identify the referral and date it occurred): No additional risk reduction means needed at this time    Physical Abuse:   No  Referrals made when the person is susceptible to abuse outside the scope or control of this program (Identify the referral and date it occurred): No additional risk reduction means needed at this time    Self-Abuse:   No  Referrals made when the person is susceptible to abuse outside the scope or control of this program (Identify the referral and date it occurred): No additional risk reduction means needed at this time    Financial  Exploitation  No  Referrals made when the person is susceptible to abuse outside the scope or control of this program (Identify the referral and date it occurred): No additional risk reduction means needed at this time    Is the program aware of this person committing a violent crime or act of physical aggression towards others?  No  Referrals made when the person is susceptible to abuse outside the scope or control of this program (Identify the referral and date it occurred): No additional risk reduction means needed at this time    INDIVIDUAL ABUSE PREVENTION PLAN-MEASURES TAKEN TO MINIMIZE RISK OF ABUSE   ADL:   Assist with clothing management  Assist with toileting  Ambulation/Transfers/Wheelchairs:  Assist with all transfers and/or ambulation   Behavior:   Redirect when wandering  Communication:  Encourage verbalization of needs/concerns  Cognitive Issues:   Provider reminders due to  confusion, forgetfulness  Give simple step-by-step direction  Diet:   No concerns  Exercise:   Encourage participation in maintenance program  Hearing:   No concerns  Isolation:   Encourage socialization due to isolation in home environment  Medical Monitoring:   Monitor physical and emotional comfort  Mental Health:   Encourage client to express feelings  Offer emotional support  Sensory:   Provide and encourage participation in activities for stimulation  Vision:   No concerns  Other: No concerns    Developed in consultation with:  AC staff  The Program Abuse Prevention Plan/IAPP identifies the specific actions that minimize abuse to the [facility] participant.  Yes

## 2019-11-12 NOTE — PROGRESS NOTES
"MONTHLY PROGRESS NOTE AND PARTICIPATION REPORT   [facility]    Shanna Shanks, 1975  Attendance: Please see Epic for attendance record.    Communication:   Does communicate   Hearing:   No impairment  Vision:   No impairment  Orientation/Cognition:   Partial disorientation  Short term memory loss  Behavior:   No behavioral concerns  Self-Preservation Skills:   Not capable of self-preservation  Eating:   Independent  Ambulation Walking:   Independent  SBA, uses [facility] walker when attending  Transferring:   Aide of one person/two  Wheelchair:   Does not use  Toileting:   Needs assistance  Requires verbal cues for hygeine in the bathroom; multiple trips to the bathroom necessary throughout the day  Maintenance Program:     NuStep  Living Arrangements:   Lives with family  Spiritual Needs: Needs are being met through support group  Medication Assistance:   Medication not taken during program hours  Participation Report:   Aerobics/Exercise  Support Group  Cognitive Stimulation  Fire Drill  Creative Arts/Crafts  Games  Gardening  Speakers/Entertainment  Socialization  Current Events/News  Education/Health Topic  Meditation, Yoga, prayer, communion, meditation; Improv with [place]; [place]; \"Befriending Your Body\" MS author and speaker, hand massage, piano, cello meditation, [employer]; blanket donation presentation; Themed education topics: autumn community fun, veterans day, gratitude/caregiver appreciation, Thanksgiving celebration; Special class week: Thanksgiving Spelling Bee, [place]   Level of Participation:   Active  To the best of their ability      "

## 2019-11-14 ENCOUNTER — HOSPITAL ENCOUNTER (OUTPATIENT)
Dept: REHABILITATION | Facility: CLINIC | Age: 44
End: 2019-11-14
Attending: NURSE PRACTITIONER
Payer: COMMERCIAL

## 2019-11-14 PROCEDURE — S5102 ADULT DAY CARE PER DIEM: HCPCS

## 2019-11-18 NOTE — CONSULTS
[facility]    Infectious Disease Consultation     Date of Admission:  7/24/2017  Date of Consult (When I saw the patient): 07/27/17    Assessment & Plan   Shanna Shanks is a 42 year old female who was admitted on 7/24/2017.     Impression:  42 y.o female with Multiple sclerosis on Tecfidera.   Admitted with acute onset encephalopathy which has been resolving.   MRI raises a possibility of HSV encephalitis given temporal lobe lesions.   LP has only 8 wbc and negative for HSV PCR. If encephalitis not unlikely to have negative finding in the CSF.   On Acyclovir. Seems to be improving.     Recommendations:   Agree with possibility of Herpes encephalitis especially given rapid improvement with therpay, and would recommend continuing treatment with Acyclovir IV.     Angela Newton MD    Reason for Consult   Reason for consult: I was asked by Dr. Mauro to evaluate this patient for possible HSV encephalitis.    Primary Care Physician   Malvin Beck MD    Chief Complaint   Found unresponsive     History is obtained from the patient and medical records    History of Present Illness   Shanna Shanks is a 42 year old female who was admitted on 7/24/2017 with significant history for MS, tobacco/EtOH use who was found unresponsive , slumped over chair, by her .  When she was found, she had dry blood around her nares/mouth and urine on the floor. She has no h/o seizures. She was brought to the ED by ambulance and intubated immediately for presumed CNS catastrophe. Head CT did not show any acute abnormalities. CSF was done with 8 wbc, MRI had temporal enhancement leading to possible diagnosis of HSV but the PCR on the CSF was negative.     Past Medical History   I have reviewed this patient's medical history and updated it with pertinent information if needed.   Past Medical History:   Diagnosis Date     Multiple sclerosis (H) 3/2/96    last exacerbation 3yrs ago, no meds x 6  months     Tobacco dependency        Past Surgical History   I have reviewed this patient's surgical history and updated it with pertinent information if needed.  Past Surgical History:   Procedure Laterality Date     HC DILATION/CURETTAGE DIAG/THER NON OB  1/1/05    D & C, missed AB       Prior to Admission Medications   Prior to Admission Medications   Prescriptions Last Dose Informant Patient Reported? Taking?   Dalfampridine (AMPYRA PO) 7/24/2017 at X1 Care Giver Yes Yes   Sig: Take 10 mg by mouth 2 times daily    Zolpidem Tartrate (AMBIEN PO)  Care Giver Yes Yes   Sig: Take 10 mg by mouth nightly as needed Pt is unsure of dose.  As needed   baclofen (LIORESAL) 10 MG tablet  Care Giver Yes Yes   Sig: Take 10-20 mg by mouth 4 times daily as needed    dimethyl fumarate (TECFIDERA) delayed release capsule 7/24/2017 at X1 Care Giver Yes Yes   Sig: Take 240 mg by mouth 2 times daily    norethindrone (MICRONOR) 0.35 MG per tablet 7/24/2017 at Unknown time Care Giver Yes Yes   Sig: Take 1 tablet by mouth daily   oxybutynin (DITROPAN-XL) 5 MG 24 hr tablet 7/24/2017 at Unknown time Care Giver Yes Yes   Sig: Take 5 mg by mouth daily   venlafaxine (EFFEXOR-XR) 75 MG 24 hr capsule 7/24/2017 at Unknown time Care Giver Yes Yes   Sig: Take 75 mg by mouth daily   vitamin D (ERGOCALCIFEROL) 28799 UNIT capsule 7/24/2017 Care Giver Yes Yes   Sig: Take 50,000 Units by mouth once a week       Facility-Administered Medications: None     Allergies   Allergies   Allergen Reactions     Diphenhydramine Rash     benadryl cream     Nickel        Immunization History   Immunization History   Administered Date(s) Administered     HepB-Peds 07/03/2008     Influenza (IIV3) 01/11/2008, 02/20/2012     Influenza Vaccine IM 3yrs+ 4 Valent IIV4 02/03/2014, 10/06/2014     Mantoux 01/16/2008     Pneumococcal 23 valent 02/20/2012     Rabies - IM Fibroblast Culture 01/16/2008, 01/23/2008, 02/06/2008     TD (ADULT, 7+) 04/06/2004     TDAP Vaccine  (Adacel) 02/20/2012     Twinrix A/B 02/06/2008, 03/05/2008       Social History   I have reviewed this patient's social history and updated it with pertinent information if needed. Shanna Shanks  reports that she has been smoking Cigarettes.  She has been smoking about 0.25 packs per day. She does not have any smokeless tobacco history on file. She reports that she drinks about 3.5 oz of alcohol per week  She reports that she does not use illicit drugs.    Family History   I have reviewed this patient's family history and updated it with pertinent information if needed.   Family History   Problem Relation Age of Onset     Family History Negative Mother      DIABETES Father      Cancer - colorectal Paternal Grandmother      HEART DISEASE Paternal Grandfather      MI       Review of Systems   The 10 point Review of Systems is negative other than noted in the HPI or here.     Physical Exam   Temp: 97.9  F (36.6  C) Temp src: Axillary BP: 126/79   Heart Rate: 76 Resp: 18 SpO2: 97 % O2 Device: None (Room air)    Vital Signs with Ranges  Temp:  [97.9  F (36.6  C)-99.5  F (37.5  C)] 97.9  F (36.6  C)  Heart Rate:  [65-90] 76  Resp:  [9-18] 18  BP: (117-131)/(79-88) 126/79  SpO2:  [97 %] 97 %  141 lbs 1.51 oz    GENERAL APPEARANCE:  Sleeping but wakes up   EYES: Eyes grossly normal to inspection, PERRL and conjunctivae and sclerae normal  HENT: ear canals and TM's normal and nose and mouth without ulcers or lesions  NECK: no adenopathy, no asymmetry, masses, or scars and thyroid normal to palpation  RESP: lungs clear to auscultation - no rales, rhonchi or wheezes  CV: regular rates and rhythm, normal S1 S2, no S3 or S4 and no murmur, click or rub  LYMPHATICS: normal ant/post cervical and supraclavicular nodes  ABDOMEN: soft, nontender, without hepatosplenomegaly or masses and bowel sounds normal  MS: extremities normal- no gross deformities noted  SKIN: no suspicious lesions or rashes      Data   Lab Results    Component Value Date    WBC 10.5 07/25/2017    HGB 11.2 (L) 07/25/2017    HCT 33.5 (L) 07/25/2017     07/27/2017     07/27/2017    POTASSIUM 3.4 07/27/2017    CHLORIDE 106 07/27/2017    CO2 22 07/27/2017    BUN 4 (L) 07/27/2017    CR 0.52 07/27/2017    GLC 93 07/27/2017    AST 36 07/26/2017    ALT 24 07/26/2017    ALKPHOS 35 (L) 07/26/2017    BILITOTAL 0.4 07/26/2017    INR 1.05 07/24/2017       Recent Labs  Lab 07/26/17  1025 07/24/17  1950 07/24/17  1932   CULT Culture negative monitoring continues No growth after 3 days No growth after 3 days     Recent Labs   Lab Test  07/26/17   1025  07/24/17   1950  07/24/17   1932  12/17/15   0949  03/16/15   0909  10/21/11   1049  09/18/09   1125   CULT  Culture negative monitoring continues  No growth after 3 days  No growth after 3 days  >100,000 colonies/mL Escherichia coli*  >100,000 colonies/mL mixed urogenital jen  >100,000 colonies/mL Coagulase negative Staphylococcus  >100,000 colonies/mL Staphylococcus aureus              [Nl] : Integumentary

## 2019-11-19 ENCOUNTER — HOSPITAL ENCOUNTER (OUTPATIENT)
Dept: REHABILITATION | Facility: CLINIC | Age: 44
End: 2019-11-19
Attending: NURSE PRACTITIONER
Payer: COMMERCIAL

## 2019-11-19 PROCEDURE — S5102 ADULT DAY CARE PER DIEM: HCPCS

## 2019-11-21 ENCOUNTER — HOSPITAL ENCOUNTER (OUTPATIENT)
Dept: REHABILITATION | Facility: CLINIC | Age: 44
End: 2019-11-21
Attending: NURSE PRACTITIONER
Payer: COMMERCIAL

## 2019-11-21 PROCEDURE — S5102 ADULT DAY CARE PER DIEM: HCPCS

## 2019-11-26 ENCOUNTER — HOSPITAL ENCOUNTER (OUTPATIENT)
Dept: REHABILITATION | Facility: CLINIC | Age: 44
End: 2019-11-26
Attending: NURSE PRACTITIONER
Payer: COMMERCIAL

## 2019-11-26 PROCEDURE — S5102 ADULT DAY CARE PER DIEM: HCPCS

## 2019-12-03 ENCOUNTER — HOSPITAL ENCOUNTER (OUTPATIENT)
Dept: REHABILITATION | Facility: CLINIC | Age: 44
End: 2019-12-03
Attending: NURSE PRACTITIONER
Payer: COMMERCIAL

## 2019-12-03 PROCEDURE — S5102 ADULT DAY CARE PER DIEM: HCPCS

## 2019-12-05 ENCOUNTER — HOSPITAL ENCOUNTER (OUTPATIENT)
Dept: REHABILITATION | Facility: CLINIC | Age: 44
End: 2019-12-05
Attending: NURSE PRACTITIONER
Payer: COMMERCIAL

## 2019-12-05 PROCEDURE — S5102 ADULT DAY CARE PER DIEM: HCPCS

## 2019-12-10 ENCOUNTER — HOSPITAL ENCOUNTER (OUTPATIENT)
Dept: REHABILITATION | Facility: CLINIC | Age: 44
End: 2019-12-10
Attending: NURSE PRACTITIONER
Payer: COMMERCIAL

## 2019-12-10 PROCEDURE — S5102 ADULT DAY CARE PER DIEM: HCPCS

## 2019-12-10 NOTE — PROGRESS NOTES
INDIVIDUAL PLAN OF CARE   [facility]    Member Name: Shanna Shanks; YOB: 1975  MRN: [id number]  12/10/2019    Goals developed in collaboration with: member  Staff responsible for plan: Spencer Stevenson  1. Long Term Goal (concrete, measurable, and time specific outcomes): Member will maintain current level of physical and cognitive function, preventing or delaying further deterioration through active participation in [facility] programming, with staff set up and supervision provided, every day of program attendance.  Target Date: 6/30/20  Quarterly Review:  On 5/15/18, member scored 9/30 on the Johnny Cognitive Assessment 7.1.  During the time of the next review, staff will administer the MoCA 7.2 to track progress of cognition.  On 4/18/19, member participated in MoCA 7.2 and scored 15/30 suggesting moderate cognitive impairment, but improvement over the score from the previous year.  Goal remains appropriate for cognitive and physical maintenance.      2. Short Term Goal: (concrete, measurable, and time specific outcomes):  To maintain current cognitive function, member will actively participate in cognitive activities such as lunch bin sorting and stocking, word finds, and IPad games with staff set up and supervision provided, at least 1x/week while attending AC programming.   Target Date: 3/31/20  Quarterly Review:  Member requires prompting and strong coaching of benefits of cognitive exercise due to her current cognition.  She is usually agreeable to active participation when prompted.  Goal remains appropriate.    3. Short Term Goal: (concrete, measurable, and time specific outcomes):  To maintain or improve BL LE and BL UE strength and endurance for increased activity tolerance and falls prevention, member will actively participate in prescribed Nu-Step maintenance program, with prompting, education of benefits of exercise, and set up provided by staff,  least 1x/week while attending AC programming.  Target Date: 3/31/20  Quarterly Review:  Goal remains appropriate

## 2019-12-12 ENCOUNTER — HOSPITAL ENCOUNTER (OUTPATIENT)
Dept: REHABILITATION | Facility: CLINIC | Age: 44
End: 2019-12-12
Attending: NURSE PRACTITIONER
Payer: COMMERCIAL

## 2019-12-12 PROCEDURE — S5102 ADULT DAY CARE PER DIEM: HCPCS

## 2019-12-12 NOTE — PROGRESS NOTES
MONTHLY PROGRESS NOTE AND PARTICIPATION REPORT   [facility]    Shanna Shanks, 1975  Attendance: Please see Epic for attendance record.    Communication:   Does communicate   Hearing:   No impairment  Vision:   No impairment  Orientation/Cognition:   Partial disorientation  Short term memory loss  Behavior:   Requires redirection  Self-Preservation Skills:   Not capable of self-preservation  Eating:   Independent  Ambulation Walking:   Needs assistance  SBA, uses walker while at [facility]  Transferring:   Aide of one person/two  Wheelchair:   does not use  Toileting:   Needs assistance  Maintenance Program:     [facility]  Living Arrangements:   Lives with family  Spiritual Needs: Needs are being met through support group  Medication Assistance:   Medication not taken during program hours  Participation Report:   Aerobics/Exercise  Support Group  Cognitive Stimulation  Fire Drill  Creative Arts/Crafts  Games  Gardening  Speakers/Entertainment  Socialization  Current Events/News  Education/Health Topic  Meditation, Yoga, prayer, communion, meditation; Improv with [employer], hand massage, piano, cello meditation, [employer] marching band; Themed education topics: Spa week and Winter holiday celebrations; Special class week: Boutique Week, Ornament making  Level of Participation:   Active  To the best of their ability

## 2019-12-17 ENCOUNTER — HOSPITAL ENCOUNTER (OUTPATIENT)
Dept: REHABILITATION | Facility: CLINIC | Age: 44
End: 2019-12-17
Attending: NURSE PRACTITIONER
Payer: COMMERCIAL

## 2019-12-17 PROCEDURE — S5102 ADULT DAY CARE PER DIEM: HCPCS

## 2019-12-19 ENCOUNTER — HOSPITAL ENCOUNTER (OUTPATIENT)
Dept: REHABILITATION | Facility: CLINIC | Age: 44
End: 2019-12-19
Attending: NURSE PRACTITIONER
Payer: COMMERCIAL

## 2019-12-19 PROCEDURE — S5102 ADULT DAY CARE PER DIEM: HCPCS

## 2020-01-02 ENCOUNTER — HOSPITAL ENCOUNTER (OUTPATIENT)
Dept: REHABILITATION | Facility: CLINIC | Age: 45
End: 2020-01-02
Attending: NURSE PRACTITIONER
Payer: COMMERCIAL

## 2020-01-02 PROCEDURE — S5102 ADULT DAY CARE PER DIEM: HCPCS

## 2020-01-07 ENCOUNTER — HOSPITAL ENCOUNTER (OUTPATIENT)
Dept: REHABILITATION | Facility: CLINIC | Age: 45
End: 2020-01-07
Attending: NURSE PRACTITIONER
Payer: COMMERCIAL

## 2020-01-07 PROCEDURE — S5102 ADULT DAY CARE PER DIEM: HCPCS

## 2020-01-09 ENCOUNTER — HOSPITAL ENCOUNTER (OUTPATIENT)
Dept: REHABILITATION | Facility: CLINIC | Age: 45
End: 2020-01-09
Attending: NURSE PRACTITIONER
Payer: COMMERCIAL

## 2020-01-09 PROCEDURE — S5102 ADULT DAY CARE PER DIEM: HCPCS

## 2020-01-14 ENCOUNTER — HOSPITAL ENCOUNTER (OUTPATIENT)
Dept: REHABILITATION | Facility: CLINIC | Age: 45
End: 2020-01-14
Attending: NURSE PRACTITIONER
Payer: COMMERCIAL

## 2020-01-14 PROCEDURE — S5102 ADULT DAY CARE PER DIEM: HCPCS

## 2020-01-16 ENCOUNTER — HOSPITAL ENCOUNTER (OUTPATIENT)
Dept: REHABILITATION | Facility: CLINIC | Age: 45
End: 2020-01-16
Attending: NURSE PRACTITIONER
Payer: COMMERCIAL

## 2020-01-16 PROCEDURE — S5102 ADULT DAY CARE PER DIEM: HCPCS

## 2020-01-21 NOTE — PROGRESS NOTES
"MONTHLY PROGRESS NOTE AND PARTICIPATION REPORT   [facility]    Shanna Shanks, 1975  Attendance: Please see Epic for attendance record.    Communication:   Does communicate   Hearing:   No impairment  Vision:   No impairment  Orientation/Cognition:   Oriented  Short term memory loss  Behavior:   No behavioral concerns  Requires redirection  Self-Preservation Skills:   Not capable of self-preservation  Eating:   Independent  Ambulation Walking:   Needs assistance  SBA, uses walker while at [facility]  Transferring:   Aide of one person/two  Wheelchair:   Does not use  Toileting:   Needs assistance  Maintenance Program:     [facility]  Living Arrangements:   Lives with family  Spiritual Needs: Needs are being met through support group  Medication Assistance:   Medication not taken during program hours  Participation Report:   Aerobics/Exercise  Support Group  Cognitive Stimulation  Fire Drill  Creative Arts/Crafts  Games  Gardening  Speakers/Entertainment  Socialization  Current Events/News  Education/Health Topic  Meditation, Yoga, prayer, wood chi, communion, meditation; Improv with [employer], hand massage, piano; Themed education topics: New Years Hanh, Virtual Sled Dog Fitness Challenge, MLK Day \"I have a dream\", Super Bowl  Level of Participation:   Active  To the best of their ability      "

## 2020-01-28 ENCOUNTER — HOSPITAL ENCOUNTER (OUTPATIENT)
Dept: REHABILITATION | Facility: CLINIC | Age: 45
End: 2020-01-28
Attending: NURSE PRACTITIONER
Payer: COMMERCIAL

## 2020-01-28 PROCEDURE — S5102 ADULT DAY CARE PER DIEM: HCPCS

## 2020-01-30 ENCOUNTER — HOSPITAL ENCOUNTER (OUTPATIENT)
Dept: REHABILITATION | Facility: CLINIC | Age: 45
End: 2020-01-30
Attending: NURSE PRACTITIONER
Payer: COMMERCIAL

## 2020-01-30 PROCEDURE — S5102 ADULT DAY CARE PER DIEM: HCPCS

## 2020-02-04 ENCOUNTER — HOSPITAL ENCOUNTER (OUTPATIENT)
Dept: REHABILITATION | Facility: CLINIC | Age: 45
End: 2020-02-04
Attending: NURSE PRACTITIONER
Payer: COMMERCIAL

## 2020-02-04 PROCEDURE — S5102 ADULT DAY CARE PER DIEM: HCPCS

## 2020-02-06 ENCOUNTER — HOSPITAL ENCOUNTER (OUTPATIENT)
Dept: REHABILITATION | Facility: CLINIC | Age: 45
End: 2020-02-06
Attending: NURSE PRACTITIONER
Payer: COMMERCIAL

## 2020-02-06 PROCEDURE — S5102 ADULT DAY CARE PER DIEM: HCPCS

## 2020-02-18 ENCOUNTER — HOSPITAL ENCOUNTER (OUTPATIENT)
Dept: REHABILITATION | Facility: CLINIC | Age: 45
End: 2020-02-18
Attending: NURSE PRACTITIONER
Payer: COMMERCIAL

## 2020-02-18 PROCEDURE — S5102 ADULT DAY CARE PER DIEM: HCPCS

## 2020-02-18 NOTE — PROGRESS NOTES
MONTHLY PROGRESS NOTE AND PARTICIPATION REPORT   [facility]    Shanna Shanks, 1975  Attendance: Please see Epic for attendance record.    Communication:   Does communicate   Hearing:   No impairment  Vision:   No impairment  Orientation/Cognition:   Oriented  Short term memory loss  Behavior:   No behavioral concerns  Requires redirection  Needs safety cues in bathroom and while ambulating  Self-Preservation Skills:   Not capable of self-preservation  Eating:   Independent  Ambulation Walking:   Needs assistance  SBA, uses walker at [facility]  Transferring:   Aide of one person/two  Wheelchair:   Does not use  Toileting:   Needs assistance  Maintenance Program:     [facility]  Living Arrangements:   Lives with family  Spiritual Needs: Needs are being met through support group  Medication Assistance:   Medication not taken during program hours  Participation Report:   Aerobics/Exercise  Support Group  Cognitive Stimulation  Fire Drill  Creative Arts/Crafts  Games  Gardening  Speakers/Entertainment  Socialization  Current Events/News  Education/Health Topic  Meditation, Yoga, prayer, wood chi, communion, meditation; Improv with [employer], hand massage, piano, class week (book club, noodle ball, Stephane Moser masks), Special arts project with the [employer]; Themed education topics: Academy Awards, Denia's Day, Black History Month, Biju Moser  Level of Participation:   Active  To the best of their ability

## 2020-02-20 ENCOUNTER — HOSPITAL ENCOUNTER (OUTPATIENT)
Dept: REHABILITATION | Facility: CLINIC | Age: 45
End: 2020-02-20
Attending: NURSE PRACTITIONER
Payer: COMMERCIAL

## 2020-02-20 PROCEDURE — S5102 ADULT DAY CARE PER DIEM: HCPCS

## 2020-02-27 NOTE — PROGRESS NOTES
Individual abuse prevention plan (IAPP)  [facility]     Assessment of Susceptibility to Abuse, Including Self Abuse, Neglect (Identification of characteristics, which make the individual susceptible to abuse, and how these characteristics cause the individual to be susceptible to abuse.)     Is the person susceptible to abuse in each area?  Sexual Abuse:   No  Referrals made when the person is susceptible to abuse outside the scope or control of this program (Identify the referral and date it occurred): No additional risk reduction means needed at this time    Physical Abuse:   No  Referrals made when the person is susceptible to abuse outside the scope or control of this program (Identify the referral and date it occurred): No additional risk reduction means needed at this time    Self-Abuse:   No  Referrals made when the person is susceptible to abuse outside the scope or control of this program (Identify the referral and date it occurred): No additional risk reduction means needed at this time    Financial  Exploitation  No  Referrals made when the person is susceptible to abuse outside the scope or control of this program (Identify the referral and date it occurred): No additional risk reduction means needed at this time    Is the program aware of this person committing a violent crime or act of physical aggression towards others?  No  Referrals made when the person is susceptible to abuse outside the scope or control of this program (Identify the referral and date it occurred): No additional risk reduction means needed at this time    INDIVIDUAL ABUSE PREVENTION PLAN-MEASURES TAKEN TO MINIMIZE RISK OF ABUSE   ADL:   Assist with clothing management  Assist with toileting  Ambulation/Transfers/Wheelchairs:  Assist with all transfers and/or ambulation   Behavior:   Redirect patient when wandering  Communication:  Encourage verbalization of needs/concerns  Cognitive Issues:   Provider reminders due  to confusion, forgetfulness  Give simple step-by-step direction  Diet:   No concerns  Exercise:   Encourage participation in maintenance program  Hearing:   No concerns  Isolation:   Encourage socialization due to isolation in home environment  Medical Monitoring:   Monitor physical and emotional comfort  Mental Health:   Encourage regular attendance for socialization and stimulation  Offer emotional support  Sensory:   Provide and encourage participation in activities for stimulation  Vision:   No concerns  Other: N/A    Developed in consultation with:  AC staff  The Program Abuse Prevention Plan/IAPP identifies the specific actions that minimize abuse to the [facility] participant.  Yes

## 2020-03-05 ENCOUNTER — HOSPITAL ENCOUNTER (OUTPATIENT)
Dept: REHABILITATION | Facility: CLINIC | Age: 45
End: 2020-03-05
Attending: NURSE PRACTITIONER
Payer: COMMERCIAL

## 2020-03-05 PROCEDURE — S5102 ADULT DAY CARE PER DIEM: HCPCS

## 2020-03-10 ENCOUNTER — HOSPITAL ENCOUNTER (OUTPATIENT)
Dept: REHABILITATION | Facility: CLINIC | Age: 45
End: 2020-03-10
Attending: NURSE PRACTITIONER
Payer: COMMERCIAL

## 2020-03-10 PROCEDURE — S5102 ADULT DAY CARE PER DIEM: HCPCS

## 2020-03-12 ENCOUNTER — HOSPITAL ENCOUNTER (OUTPATIENT)
Dept: REHABILITATION | Facility: CLINIC | Age: 45
End: 2020-03-12
Attending: NURSE PRACTITIONER
Payer: COMMERCIAL

## 2020-03-12 PROCEDURE — S5102 ADULT DAY CARE PER DIEM: HCPCS

## 2020-03-12 NOTE — PROGRESS NOTES
INDIVIDUAL PLAN OF CARE   [facility]    Member Name: Shanna Shanks; YOB: 1975  MRN: [id number]  3/12/2020    Goals developed in collaboration with: member  Staff responsible for plan: Spencer Stevenson  1. Long Term Goal (concrete, measurable, and time specific outcomes): Member will maintain current level of physical and cognitive function, preventing or delaying further deterioration through active participation in [facility] programming, with staff set up and supervision provided, every day of program attendance.  Target Date: 6/30/20  Quarterly Review:  On 3/12/20, member scored 10/30 on [place] 8.3.  On 5/15/18, member scored 9/30 on the Johnny Cognitive Assessment 7.1.  During the time of the next review, staff will administer the MoCA 7.2 to track progress of cognition.  On 4/18/19, member participated in MoCA 7.2 and scored 15/30 suggesting moderate cognitive impairment, but improvement over the score from the previous year.  Goal remains appropriate for cognitive and physical maintenance.      2. Short Term Goal: (concrete, measurable, and time specific outcomes):  To maintain current cognitive function, member will actively participate in cognitive activities such as lunch bin sorting and stocking, word finds, and IPad games with staff set up and supervision provided, at least 1x/week while attending AC programming.   Target Date: 3/31/20  Quarterly Review:  Member requires prompting and strong coaching of benefits of cognitive exercise due to her current cognition.  She is usually agreeable to active participation when prompted.  Goal remains appropriate.    3. Short Term Goal: (concrete, measurable, and time specific outcomes):  To maintain or improve BL LE and BL UE strength and endurance for increased activity tolerance and falls prevention, member will actively participate in prescribed Nu-Step maintenance program, with prompting, education of benefits  of exercise, and set up provided by staff, least 1x/week while attending AC programming.  Target Date: 3/31/20  Quarterly Review:  Goal remains appropriate

## 2020-03-16 NOTE — PROGRESS NOTES
INDIVIDUAL PLAN OF CARE   [facility]    Member Name: Shanna Shanks; YOB: 1975  MRN: [id number]  3/12/2020    Goals developed in collaboration with: member  Staff responsible for plan: Spencer Stevenson  1. Long Term Goal (concrete, measurable, and time specific outcomes): Member will maintain current level of physical and cognitive function, preventing or delaying further deterioration through active participation in [facility] programming, with staff set up and supervision provided, every day of program attendance.  Target Date: 9/30/20  Quarterly Review:  On 3/12/20, member scored 10/30 on [place] 8.3.  On 5/15/18, member scored 9/30 on the Johnny Cognitive Assessment 7.1.  During the time of the next review, staff will administer the MoCA 7.2 to track progress of cognition.  On 4/18/19, member participated in MoCA 7.2 and scored 15/30 suggesting moderate cognitive impairment, but improvement over the score from the previous year.  Goal remains appropriate for cognitive and physical maintenance.      2. Short Term Goal: (concrete, measurable, and time specific outcomes):  To maintain current cognitive function, member will actively participate in cognitive activities such as lunch bin sorting and stocking, word finds, and IPad games with staff set up and supervision provided, at least 1x/week while attending AC programming.   Target Date: 6/31/20  Quarterly Review:  Member requires prompting and strong coaching of benefits of cognitive exercise due to her current cognition.  She is usually agreeable to active participation when prompted.  Goal remains appropriate.    3. Short Term Goal: (concrete, measurable, and time specific outcomes):  To maintain or improve BL LE and BL UE strength and endurance for increased activity tolerance and falls prevention, member will actively participate in prescribed Nu-Step maintenance program, with prompting, education of benefits  of exercise, and set up provided by staff, least 1x/week while attending AC programming.  Target Date: 6/31/20  Quarterly Review:  Goal remains appropriate

## 2020-04-14 NOTE — PROGRESS NOTES
MONTHLY PROGRESS NOTE AND PARTICIPATION REPORT   [facility]    Shanna Shanks, 1975  Attendance: Please see Epic for attendance record.    Communication:   Does communicate   Hearing:   No impairment  Vision:   No impairment  Orientation/Cognition:   Oriented  Short term memory loss  Behavior:   No behavioral concerns  Requires redirection  Needs safety cues in bathroom and when abulating   Self-Preservation Skills:   Not capable of self-preservation  Eating:   Independent  Ambulation Walking:   Needs assistance  SBA, uses walker at [facility]  Transferring:   Aide of one person/two  Wheelchair:   N/A  Toileting:   Needs assistance  Maintenance Program:     [facility]  Living Arrangements:   Lives with family  Spiritual Needs: Needs are being met through support group  Medication Assistance:   Medication not taken during program hours  Participation Report:   Aerobics/Exercise  Support Group  Cognitive Stimulation  Creative Arts/Crafts  Games  Speakers/Entertainment  Socialization  Current Events/News  Education/Health Topic  Meditation, Prayer, Piano, Communion, and Yoga; finishing Improv with [employer] and helping them make a video for their website, as well as talking about fun events happening in the community for the month of March. We had themed Brain and Body activities including voting on whether we thought March was going to go out like a lion or a lamb, celebrating women's history month, and a themed week on St. Patrick's day with games and trivia.  Level of Participation:   Active  To the best of their ability     **March**

## 2020-04-16 DIAGNOSIS — Z30.41 SURVEILLANCE OF PREVIOUSLY PRESCRIBED CONTRACEPTIVE PILL: ICD-10-CM

## 2020-04-16 NOTE — TELEPHONE ENCOUNTER
"Requested Prescriptions   Pending Prescriptions Disp Refills     NORLYDA 0.35 MG tablet [Pharmacy Med Name: NORLYDA 0.35MG TABLETS 28S] 84 tablet 3     Sig: TAKE 1 TABLET BY MOUTH EVERY DAY  Last Written Prescription Date:  3/11/2019  Last Fill Quantity: 84 tablet,  # refills: 3   Last Office Visit: 6/21/2019   Future Office Visit:            Contraceptives Protocol Passed - 4/16/2020  1:17 PM        Passed - Patient is not a current smoker if age is 35 or older        Passed - Recent (12 mo) or future (30 days) visit within the authorizing provider's specialty     Patient has had an office visit with the authorizing provider or a provider within the authorizing providers department within the previous 12 mos or has a future within next 30 days. See \"Patient Info\" tab in inbasket, or \"Choose Columns\" in Meds & Orders section of the refill encounter.              Passed - Medication is active on med list        Passed - No active pregnancy on record        Passed - No positive pregnancy test in past 12 months             "

## 2020-04-19 RX ORDER — NORETHINDRONE
KIT
Qty: 84 TABLET | Refills: 3 | OUTPATIENT
Start: 2020-04-19

## 2020-04-19 RX ORDER — ACETAMINOPHEN AND CODEINE PHOSPHATE 120; 12 MG/5ML; MG/5ML
SOLUTION ORAL
Qty: 84 TABLET | Refills: 0 | Status: SHIPPED | OUTPATIENT
Start: 2020-04-19 | End: 2020-07-10

## 2020-04-19 NOTE — TELEPHONE ENCOUNTER
Prescription approved per [facility] Refill Protocol.  Pauline Garcia RN

## 2020-06-18 NOTE — PROGRESS NOTES
Unable to complete review at this time due to the COVID-19 pandemic.  Will continue to complete reviews once the day program has resumed services.  At this time, the member is not participating in alternative adult day services      INDIVIDUAL PLAN OF CARE   [facility]    Member Name: Shanna Shanks; YOB: 1975  MRN: [id number]  3/12/2020    Goals developed in collaboration with: member  Staff responsible for plan: Spencer Stevenson  1. Long Term Goal (concrete, measurable, and time specific outcomes): Member will maintain current level of physical and cognitive function, preventing or delaying further deterioration through active participation in [facility] programming, with staff set up and supervision provided, every day of program attendance.  Target Date: 6/30/20  Quarterly Review:  On 3/12/20, member scored 10/30 on [place] 8.3.  On 5/15/18, member scored 9/30 on the Johnny Cognitive Assessment 7.1.  During the time of the next review, staff will administer the MoCA 7.2 to track progress of cognition.  On 4/18/19, member participated in MoCA 7.2 and scored 15/30 suggesting moderate cognitive impairment, but improvement over the score from the previous year.  Goal remains appropriate for cognitive and physical maintenance.      2. Short Term Goal: (concrete, measurable, and time specific outcomes):  To maintain current cognitive function, member will actively participate in cognitive activities such as lunch bin sorting and stocking, word finds, and IPad games with staff set up and supervision provided, at least 1x/week while attending AC programming.   Target Date: 3/31/20  Quarterly Review:  Member requires prompting and strong coaching of benefits of cognitive exercise due to her current cognition.  She is usually agreeable to active participation when prompted.  Goal remains appropriate.    3. Short Term Goal: (concrete, measurable, and time specific outcomes):   To maintain or improve BL LE and BL UE strength and endurance for increased activity tolerance and falls prevention, member will actively participate in prescribed Nu-Step maintenance program, with prompting, education of benefits of exercise, and set up provided by staff, least 1x/week while attending AC programming.  Target Date: 3/31/20  Quarterly Review:  Goal remains appropriate

## 2020-06-18 NOTE — PROGRESS NOTES
"Unable to complete review at this time due to the COVID-19 pandemic.  Will continue to complete reviews once the day program has resumed services.  At this time, the member is not participating in alternative adult day services.  [facility] Social History    Full Name: Shanna Shanks        MRN: [id number]    YOB: 1975  Nickname:TYRELL      Sex: F     Home Phone: [phone]      Cell Phone: [phone]  Address: [address] [place], [place] [address]Zip: [place], [place]  [address]  County: [place]       E-mail:TYRELL        Transportation:    [employer]  Language:English         needed? No       Ethnicity: Caucasion  Race: White      Country of Origin: [place]       Orthodox: [employer]  Marital Status:                   Spouse/Significant Other: Jama Petit   ______________________________________________________________________________________     : No    Branch of Service: NA      Education Level: High School- [employer]   Job History: Self employeed       Organizations/Clubs: NA  Whom do you live with?  and daughter  Current living arrangement:  Home   Number of Children: 2  List: Michel and Tegan  Number of Siblings: 7     List: Narendra, Aleksandra, Daisha, Elida, Jesus, Timmons, Igna  Other Important People/Pets: 2 cats and 1 dog  What else should we know about you?  Loves watching juggling.  Favorite color is bably blue.  George with her mother.  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 Beck        Neurologist: Dr. Talha Reese  Emergency Contacts:  1. Jama Petmark      Relationship: Spouse    Phone: [phone]  2. Aleksandra Shanks         Relationship: sister     Phone:[phone]    Updated on 7/30/2018,  7/29/2019        "

## 2020-06-18 NOTE — PROGRESS NOTES
Unable to complete review at this time due to the COVID-19 pandemic.  Will continue to complete reviews once the day program has resumed services.  At this time, the member is not participating in alternative adult day services      Individual abuse prevention plan (IAPP)  [facility]     Assessment of Susceptibility to Abuse, Including Self Abuse, Neglect (Identification of characteristics, which make the individual susceptible to abuse, and how these characteristics cause the individual to be susceptible to abuse.)     Is the person susceptible to abuse in each area?  Sexual Abuse:   No  Referrals made when the person is susceptible to abuse outside the scope or control of this program (Identify the referral and date it occurred): No additional risk reduction means needed at this time    Physical Abuse:   No  Referrals made when the person is susceptible to abuse outside the scope or control of this program (Identify the referral and date it occurred): No additional risk reduction means needed at this time    Self-Abuse:   No  Referrals made when the person is susceptible to abuse outside the scope or control of this program (Identify the referral and date it occurred): No additional risk reduction means needed at this time    Financial  Exploitation  No  Referrals made when the person is susceptible to abuse outside the scope or control of this program (Identify the referral and date it occurred): No additional risk reduction means needed at this time    Is the program aware of this person committing a violent crime or act of physical aggression towards others?  No  Referrals made when the person is susceptible to abuse outside the scope or control of this program (Identify the referral and date it occurred): No additional risk reduction means needed at this time    INDIVIDUAL ABUSE PREVENTION PLAN-MEASURES TAKEN TO MINIMIZE RISK OF ABUSE   ADL:   Assist with clothing management  Assist with  toileting  Ambulation/Transfers/Wheelchairs:  Assist with all transfers and/or ambulation   Behavior:   Redirect patient when wandering  Communication:  Encourage verbalization of needs/concerns  Cognitive Issues:   Provider reminders due to confusion, forgetfulness  Give simple step-by-step direction  Diet:   No concerns  Exercise:   Encourage participation in maintenance program  Hearing:   No concerns  Isolation:   Encourage socialization due to isolation in home environment  Medical Monitoring:   Monitor physical and emotional comfort  Mental Health:   Encourage regular attendance for socialization and stimulation  Offer emotional support  Sensory:   Provide and encourage participation in activities for stimulation  Vision:   No concerns  Other: N/A    Developed in consultation with:  AC staff  The Program Abuse Prevention Plan/IAPP identifies the specific actions that minimize abuse to the [facility] participant.  Yes

## 2020-06-18 NOTE — PROGRESS NOTES
MONTHLY PROGRESS NOTE AND PARTICIPATION REPORT   [facility]    Shanna Shanks, 1975  Attendance: Please see Epic for attendance record.    Member is not participating in alternative adult day services due to the COVID-19 pandemic.  No changes noted at this time.  Will continue to update as able.

## 2020-07-07 ENCOUNTER — DOCUMENTATION ONLY (OUTPATIENT)
Dept: FAMILY MEDICINE | Facility: CLINIC | Age: 45
End: 2020-07-07

## 2020-07-07 NOTE — TELEPHONE ENCOUNTER
Most adult day care forms requires physical exam.   Please ask her to see me or any of my partners in the clinic and route form to whoever is seeing her in the clinic.

## 2020-07-22 ENCOUNTER — OFFICE VISIT (OUTPATIENT)
Dept: FAMILY MEDICINE | Facility: CLINIC | Age: 45
End: 2020-07-22
Payer: COMMERCIAL

## 2020-07-22 VITALS
WEIGHT: 145 LBS | HEIGHT: 68 IN | BODY MASS INDEX: 21.98 KG/M2 | TEMPERATURE: 98.2 F | RESPIRATION RATE: 16 BRPM | HEART RATE: 70 BPM | OXYGEN SATURATION: 98 % | DIASTOLIC BLOOD PRESSURE: 72 MMHG | SYSTOLIC BLOOD PRESSURE: 114 MMHG

## 2020-07-22 DIAGNOSIS — R56.9 SEIZURE (H): ICD-10-CM

## 2020-07-22 DIAGNOSIS — Z12.31 ENCOUNTER FOR SCREENING MAMMOGRAM FOR BREAST CANCER: ICD-10-CM

## 2020-07-22 DIAGNOSIS — Z00.00 ROUTINE GENERAL MEDICAL EXAMINATION AT A HEALTH CARE FACILITY: Primary | ICD-10-CM

## 2020-07-22 DIAGNOSIS — Z13.6 SCREENING FOR CARDIOVASCULAR CONDITION: ICD-10-CM

## 2020-07-22 DIAGNOSIS — Z87.440 HISTORY OF RECURRENT UTI (URINARY TRACT INFECTION): ICD-10-CM

## 2020-07-22 DIAGNOSIS — Z11.1 SCREENING EXAMINATION FOR PULMONARY TUBERCULOSIS: ICD-10-CM

## 2020-07-22 DIAGNOSIS — G35 MULTIPLE SCLEROSIS (H): ICD-10-CM

## 2020-07-22 DIAGNOSIS — Z13.1 SCREENING FOR DIABETES MELLITUS: ICD-10-CM

## 2020-07-22 DIAGNOSIS — R82.90 NONSPECIFIC FINDING ON EXAMINATION OF URINE: ICD-10-CM

## 2020-07-22 LAB
ALBUMIN UR-MCNC: NEGATIVE MG/DL
AMORPH CRY #/AREA URNS HPF: ABNORMAL /HPF
APPEARANCE UR: ABNORMAL
BACTERIA #/AREA URNS HPF: ABNORMAL /HPF
BILIRUB UR QL STRIP: NEGATIVE
COLOR UR AUTO: YELLOW
GLUCOSE UR STRIP-MCNC: NEGATIVE MG/DL
HGB UR QL STRIP: NEGATIVE
KETONES UR STRIP-MCNC: NEGATIVE MG/DL
LEUKOCYTE ESTERASE UR QL STRIP: ABNORMAL
NITRATE UR QL: NEGATIVE
PH UR STRIP: 7.5 PH (ref 5–7)
RBC #/AREA URNS AUTO: ABNORMAL /HPF
SOURCE: ABNORMAL
SP GR UR STRIP: 1.02 (ref 1–1.03)
UROBILINOGEN UR STRIP-ACNC: 0.2 EU/DL (ref 0.2–1)
WBC #/AREA URNS AUTO: ABNORMAL /HPF

## 2020-07-22 PROCEDURE — 80061 LIPID PANEL: CPT | Performed by: FAMILY MEDICINE

## 2020-07-22 PROCEDURE — 36415 COLL VENOUS BLD VENIPUNCTURE: CPT | Performed by: FAMILY MEDICINE

## 2020-07-22 PROCEDURE — 86481 TB AG RESPONSE T-CELL SUSP: CPT | Performed by: FAMILY MEDICINE

## 2020-07-22 PROCEDURE — 87086 URINE CULTURE/COLONY COUNT: CPT | Performed by: FAMILY MEDICINE

## 2020-07-22 PROCEDURE — 99396 PREV VISIT EST AGE 40-64: CPT | Performed by: FAMILY MEDICINE

## 2020-07-22 PROCEDURE — 82947 ASSAY GLUCOSE BLOOD QUANT: CPT | Performed by: FAMILY MEDICINE

## 2020-07-22 PROCEDURE — 81001 URINALYSIS AUTO W/SCOPE: CPT | Performed by: FAMILY MEDICINE

## 2020-07-22 PROCEDURE — 87186 SC STD MICRODIL/AGAR DIL: CPT | Performed by: FAMILY MEDICINE

## 2020-07-22 PROCEDURE — 99213 OFFICE O/P EST LOW 20 MIN: CPT | Mod: 25 | Performed by: FAMILY MEDICINE

## 2020-07-22 PROCEDURE — 87088 URINE BACTERIA CULTURE: CPT | Performed by: FAMILY MEDICINE

## 2020-07-22 ASSESSMENT — MIFFLIN-ST. JEOR: SCORE: 1346.22

## 2020-07-22 NOTE — PATIENT INSTRUCTIONS
Preventive Health Recommendations  Female Ages 40 to 49    Yearly exam:     See your health care provider every year in order to  1. Review health changes.   2. Discuss preventive care.    3. Review your medicines if your doctor prescribed any.      Get a Pap test every three years (unless you have an abnormal result and your provider advises testing more often).      If you get Pap tests with HPV test, you only need to test every 5 years, unless you have an abnormal result. You do not need a Pap test if your uterus was removed (hysterectomy) and you have not had cancer.      You should be tested each year for STDs (sexually transmitted diseases), if you're at risk.     Ask your doctor if you should have a mammogram.      Have a colonoscopy (test for colon cancer) if someone in your family has had colon cancer or polyps before age 50.       Have a cholesterol test every 5 years.       Have a diabetes test (fasting glucose) after age 45. If you are at risk for diabetes, you should have this test every 3 years.    Shots: Get a flu shot each year. Get a tetanus shot every 10 years.     Nutrition:     Eat at least 5 servings of fruits and vegetables each day.    Eat whole-grain bread, whole-wheat pasta and brown rice instead of white grains and rice.    Get adequate Calcium and Vitamin D.      Lifestyle    Exercise at least 150 minutes a week (an average of 30 minutes a day, 5 days a week). This will help you control your weight and prevent disease.    Limit alcohol to one drink per day.    No smoking.     Wear sunscreen to prevent skin cancer.    See your dentist every six months for an exam and cleaning.    Please call [phone] to schedule an appointment for mammogram.

## 2020-07-22 NOTE — PROGRESS NOTES
SUBJECTIVE:   CC: Shanna Shanks is an 45 year old woman who presents for preventive health visit.     Healthy Habits:    Do you get at least three servings of calcium containing foods daily (dairy, green leafy vegetables, etc.)? yes    Amount of exercise or daily activities, outside of work: 0 day(s) per week    Problems taking medications regularly No    Medication side effects: No    Have you had an eye exam in the past two years? no    Do you see a dentist twice per year? yes    Do you have sleep apnea, excessive snoring or daytime drowsiness?no      URINARY TRACT SYMPTOMS      Duration: 1 week     Description  frequency    Intensity:  mild    Accompanying signs and symptoms:  Fever/chills: no   Flank pain no   Nausea and vomiting: no   Vaginal symptoms: none  Abdominal/Pelvic Pain: no     History  History of frequent UTI's: YES  History of kidney stones: no   Sexually Active: YES  Possibility of pregnancy: No    Precipitating or alleviating factors: None    Therapies tried and outcome: cranberry juice   and increase fluid intake      Today's PHQ-2 Score:   PHQ-2 ( 1999 Pfizer) 7/22/2020 12/11/2018   Q1: Little interest or pleasure in doing things 0 0   Q2: Feeling down, depressed or hopeless 0 0   PHQ-2 Score 0 0   Q1: Little interest or pleasure in doing things - -   Q2: Feeling down, depressed or hopeless - -   PHQ-2 Score - -     Abuse: Current or Past(Physical, Sexual or Emotional)- No  Do you feel safe in your environment? Yes    Social History     Tobacco Use     Smoking status: Former Smoker     Packs/day: 0.25     Types: Cigarettes     Smokeless tobacco: Never Used     Tobacco comment: quit in 2017   Substance Use Topics     Alcohol use: No     Alcohol/week: 5.8 standard drinks     Types: 7 Standard drinks or equivalent per week     If you drink alcohol do you typically have >3 drinks per day or >7 drinks per week? No                     Reviewed orders with patient.  Reviewed health  "maintenance and updated orders accordingly - Yes       Mammogram Screening: Patient under age 50, mutual decision reflected in health maintenance.      Pertinent mammograms are reviewed under the imaging tab.  History of abnormal Pap smear: NO - age 30-65 PAP every 5 years with negative HPV co-testing recommended  PAP / HPV Latest Ref Rng & Units 3/7/2018 3/16/2015 2/20/2012   PAP - NIL NIL NIL   HPV 16 DNA NEG:Negative Negative - -   HPV 18 DNA NEG:Negative Negative - -   OTHER HR HPV NEG:Negative Negative - -     Reviewed and updated as needed this visit by clinical staff    Reviewed and updated as needed this visit by Provider    Per  - overall doing well.   Right dose seizure medication - no recent episode. Last episode 2 yrs ago.   On chronic sulfa.    Using cranberry and vitamin D. Melatonin at night time.   Due to see urologist.     ROS:  CONSTITUTIONAL: NEGATIVE for fever, chills, change in weight  INTEGUMENTARU/SKIN: NEGATIVE for worrisome rashes, moles or lesions  EYES: NEGATIVE for vision changes or irritation  ENT: NEGATIVE for ear, mouth and throat problems  RESP: NEGATIVE for significant cough or SOB  BREAST: NEGATIVE for masses, tenderness or discharge  CV: NEGATIVE for chest pain, palpitations or peripheral edema  GI: NEGATIVE for nausea, abdominal pain, heartburn, or change in bowel habits  :see above  MUSCULOSKELETAL: generalized weakness, see above.  NEURO: see above. No recent seizures.   PSYCHIATRIC: NEGATIVE for changes in mood or affect    OBJECTIVE:   /72 (BP Location: Left arm, Patient Position: Sitting, Cuff Size: Adult Regular)   Pulse 70   Temp 98.2  F (36.8  C) (Oral)   Resp 16   Ht 1.727 m (5' 8\")   Wt 65.8 kg (145 lb)   LMP 07/08/2020   SpO2 98%   BMI 22.05 kg/m    EXAM:  GENERAL: healthy, alert and no distress  EYES: Eyes grossly normal to inspection, PERRL and conjunctivae and sclerae normal  HENT: ear canals and TM's normal, nose and mouth without ulcers or " lesions  NECK: no adenopathy, no asymmetry, masses, or scars and thyroid normal to palpation  RESP: lungs clear to auscultation - no rales, rhonchi or wheezes  CV: regular rate and rhythm, normal S1 S2, no S3 or S4, no murmur, click or rub, no peripheral edema and peripheral pulses strong  ABDOMEN: soft, nontender, no hepatosplenomegaly, no masses and bowel sounds normal  MS: no gross musculoskeletal defects noted, no edema, see neuro  SKIN: no suspicious lesions or rashes on visible parts   NEURO: generalized weakness, left>right upper and lower extremities weakness  PSYCH: mentation appears normal, affect normal/bright    ASSESSMENT/PLAN:   1. Routine general medical examination at a health care facility   filled out day care forms.     2. Screening for diabetes mellitus     - Glucose    3. Screening for cardiovascular condition     - Lipid panel reflex to direct LDL Non-fasting    4. Screening examination for pulmonary tuberculosis     - Quantiferon TB Gold Plus    5. History of recurrent UTI (urinary tract infection)   on chronic prophylactic bactrim. Hold off on new rx until culture is back. They are fine with it. Currently no other red flag symptoms. Due for follow up with urologist. Patient and  agreed.   - *UA reflex to Microscopic and Culture ([facility] [facility] (except [place] and [place])    6. Encounter for screening mammogram for breast cancer   consider baseline mammogram.   - MA Screening Digital Bilateral; Future    7.Multiple sclerosis (H)  Comment:    Plan:  Stable. Goes to day care. Filled out forms.     8. Seizure (H)  Comment:    Plan:  Seizure free for last 2 yrs. Continue same rx as per neurology.        COUNSELING:   Reviewed preventive health counseling, as reflected in patient instructions  Special attention given to:        Regular exercise       Healthy diet/nutrition       Vision screening       Hearing screening       Colon cancer screening    Estimated body mass  "index is 22.05 kg/m  as calculated from the following:    Height as of this encounter: 1.727 m (5' 8\").    Weight as of this encounter: 65.8 kg (145 lb).         reports that she has quit smoking. Her smoking use included cigarettes. She smoked 0.25 packs per day. She has never used smokeless tobacco.      Counseling Resources:  ATP IV Guidelines  Pooled Cohorts Equation Calculator  Breast Cancer Risk Calculator  FRAX Risk Assessment  ICSI Preventive Guidelines  Dietary Guidelines for Americans, 2010  USDA's MyPlate  ASA Prophylaxis  Lung CA Screening    Malvin Beck MD, MD  [facility]  "

## 2020-07-23 LAB
CHOLEST SERPL-MCNC: 222 MG/DL
GLUCOSE SERPL-MCNC: 93 MG/DL (ref 70–99)
HDLC SERPL-MCNC: 53 MG/DL
LDLC SERPL CALC-MCNC: 146 MG/DL
NONHDLC SERPL-MCNC: 169 MG/DL
TRIGL SERPL-MCNC: 113 MG/DL

## 2020-07-24 ENCOUNTER — TELEPHONE (OUTPATIENT)
Dept: FAMILY MEDICINE | Facility: CLINIC | Age: 45
End: 2020-07-24

## 2020-07-24 DIAGNOSIS — N39.0 RECURRENT UTI: Primary | ICD-10-CM

## 2020-07-24 LAB
BACTERIA SPEC CULT: ABNORMAL
BACTERIA SPEC CULT: ABNORMAL
GAMMA INTERFERON BACKGROUND BLD IA-ACNC: 0.06 IU/ML
M TB IFN-G CD4+ BCKGRND COR BLD-ACNC: 9.94 IU/ML
M TB TUBERC IFN-G BLD QL: NEGATIVE
MITOGEN IGNF BCKGRD COR BLD-ACNC: 0.01 IU/ML
MITOGEN IGNF BCKGRD COR BLD-ACNC: 0.06 IU/ML
SPECIMEN SOURCE: ABNORMAL

## 2020-07-24 RX ORDER — CIPROFLOXACIN 500 MG/1
500 TABLET, FILM COATED ORAL 2 TIMES DAILY
Qty: 14 TABLET | Refills: 0 | Status: SHIPPED | OUTPATIENT
Start: 2020-07-24 | End: 2020-08-16

## 2020-07-24 NOTE — RESULT ENCOUNTER NOTE
I have filled out your paperwork and will fax it for you.  Your tuberculosis screening test is within normal limit which is reassuring.  Your diabetes test is within normal limit.  Your bad cholesterol (LDL) is somewhat high and puts you at higher risk for stroke and heart attacks. Active weight loss if you are overweight, regular cardio type exercise, cutting down on sugar, fat, carbohydrates in diet and adding more fruits and vegetable would improve your cholesterol.   Your urine culture shows e coli infection. You will get a call from our nurse to make sure you get this message - I sent prescription to your pharmacy for cipro.  Please follow up with urologist.     Malvin Beck MD  [facility]

## 2020-07-24 NOTE — TELEPHONE ENCOUNTER
Writer REJI with information about new medication needing to be picked up-- ciprofloxacin (CIPRO) 500 MG tablet and that patient should continue Bactrim. Please follow up with Urology. Dr. Beck has also filled out her paperwork and will fax it for patient. Patient made aware that [employer] message has all information in it as well. Patient encouraged to call on Monday 7/27 with any questions!    Thanks! Savannah Joyce RN

## 2020-07-24 NOTE — TELEPHONE ENCOUNTER
Please call patient/: Urine culture is positive for E. coli which is resistant to Bactrim which she is using chronically.  -She should continue Bactrim prophylaxis as she is using.  I will send cipro prescription which she should use twice per day for 7 days. She has used that in the past.   She should follow-up with the urologist.  I have also filled out her paperwork and will fax it for her.  I will release her of her results via my chart.

## 2020-07-24 NOTE — PROGRESS NOTES
Form completed in nurse basket. Fax back to [facility] at [phone]      Gely Alvarado MA

## 2020-08-03 ENCOUNTER — TRANSFERRED RECORDS (OUTPATIENT)
Dept: HEALTH INFORMATION MANAGEMENT | Facility: CLINIC | Age: 45
End: 2020-08-03

## 2020-08-16 ENCOUNTER — HOSPITAL ENCOUNTER (INPATIENT)
Facility: CLINIC | Age: 45
LOS: 3 days | Discharge: HOME | DRG: 101 | End: 2020-08-19
Attending: EMERGENCY MEDICINE | Admitting: HOSPITALIST
Payer: COMMERCIAL

## 2020-08-16 DIAGNOSIS — G35 MS (MULTIPLE SCLEROSIS) (H): ICD-10-CM

## 2020-08-16 DIAGNOSIS — G40.401 EPILEPTIC GRAND MAL STATUS (H): ICD-10-CM

## 2020-08-16 DIAGNOSIS — R56.9 SEIZURES (H): ICD-10-CM

## 2020-08-16 DIAGNOSIS — G35 MULTIPLE SCLEROSIS (H): Primary | ICD-10-CM

## 2020-08-16 LAB
ALBUMIN SERPL-MCNC: 3.9 G/DL (ref 3.4–5)
ALBUMIN UR-MCNC: NEGATIVE MG/DL
ALP SERPL-CCNC: 42 U/L (ref 40–150)
ALT SERPL W P-5'-P-CCNC: 16 U/L (ref 0–50)
ANION GAP SERPL CALCULATED.3IONS-SCNC: 4 MMOL/L (ref 3–14)
APPEARANCE UR: CLEAR
AST SERPL W P-5'-P-CCNC: 14 U/L (ref 0–45)
BASOPHILS # BLD AUTO: 0 10E9/L (ref 0–0.2)
BASOPHILS NFR BLD AUTO: 0.2 %
BILIRUB SERPL-MCNC: 0.6 MG/DL (ref 0.2–1.3)
BILIRUB UR QL STRIP: NEGATIVE
BUN SERPL-MCNC: 12 MG/DL (ref 7–30)
CALCIUM SERPL-MCNC: 8.5 MG/DL (ref 8.5–10.1)
CHLORIDE SERPL-SCNC: 105 MMOL/L (ref 94–109)
CO2 SERPL-SCNC: 26 MMOL/L (ref 20–32)
COLOR UR AUTO: YELLOW
CREAT SERPL-MCNC: 0.78 MG/DL (ref 0.52–1.04)
DIFFERENTIAL METHOD BLD: NORMAL
EOSINOPHIL # BLD AUTO: 0 10E9/L (ref 0–0.7)
EOSINOPHIL NFR BLD AUTO: 0.2 %
ERYTHROCYTE [DISTWIDTH] IN BLOOD BY AUTOMATED COUNT: 12.6 % (ref 10–15)
ETHANOL SERPL-MCNC: <0.01 G/DL
GFR SERPL CREATININE-BSD FRML MDRD: >90 ML/MIN/{1.73_M2}
GLUCOSE SERPL-MCNC: 103 MG/DL (ref 70–99)
GLUCOSE UR STRIP-MCNC: NEGATIVE MG/DL
HCT VFR BLD AUTO: 36.3 % (ref 35–47)
HGB BLD-MCNC: 12.1 G/DL (ref 11.7–15.7)
HGB UR QL STRIP: ABNORMAL
IMM GRANULOCYTES # BLD: 0 10E9/L (ref 0–0.4)
IMM GRANULOCYTES NFR BLD: 0.1 %
INTERPRETATION ECG - MUSE: NORMAL
KETONES UR STRIP-MCNC: 80 MG/DL
LEUKOCYTE ESTERASE UR QL STRIP: ABNORMAL
LYMPHOCYTES # BLD AUTO: 1.3 10E9/L (ref 0.8–5.3)
LYMPHOCYTES NFR BLD AUTO: 13.5 %
MAGNESIUM SERPL-MCNC: 1.8 MG/DL (ref 1.6–2.3)
MCH RBC QN AUTO: 30.3 PG (ref 26.5–33)
MCHC RBC AUTO-ENTMCNC: 33.3 G/DL (ref 31.5–36.5)
MCV RBC AUTO: 91 FL (ref 78–100)
MONOCYTES # BLD AUTO: 0.6 10E9/L (ref 0–1.3)
MONOCYTES NFR BLD AUTO: 6.2 %
MUCOUS THREADS #/AREA URNS LPF: PRESENT /LPF
NEUTROPHILS # BLD AUTO: 7.9 10E9/L (ref 1.6–8.3)
NEUTROPHILS NFR BLD AUTO: 79.8 %
NITRATE UR QL: NEGATIVE
PH UR STRIP: 6 PH (ref 5–7)
PLATELET # BLD AUTO: 229 10E9/L (ref 150–450)
POTASSIUM SERPL-SCNC: 4.2 MMOL/L (ref 3.4–5.3)
PROT SERPL-MCNC: 7.1 G/DL (ref 6.8–8.8)
RBC # BLD AUTO: 4 10E12/L (ref 3.8–5.2)
RBC #/AREA URNS AUTO: 3 /HPF (ref 0–2)
SODIUM SERPL-SCNC: 135 MMOL/L (ref 133–144)
SOURCE: ABNORMAL
SP GR UR STRIP: 1.02 (ref 1–1.03)
UROBILINOGEN UR STRIP-MCNC: NORMAL MG/DL (ref 0–2)
WBC # BLD AUTO: 9.9 10E9/L (ref 4–11)
WBC #/AREA URNS AUTO: 16 /HPF (ref 0–5)

## 2020-08-16 PROCEDURE — 96374 THER/PROPH/DIAG INJ IV PUSH: CPT

## 2020-08-16 PROCEDURE — 80320 DRUG SCREEN QUANTALCOHOLS: CPT | Performed by: EMERGENCY MEDICINE

## 2020-08-16 PROCEDURE — 99223 1ST HOSP IP/OBS HIGH 75: CPT | Mod: AI | Performed by: HOSPITALIST

## 2020-08-16 PROCEDURE — 25800030 ZZH RX IP 258 OP 636: Performed by: EMERGENCY MEDICINE

## 2020-08-16 PROCEDURE — 25000128 H RX IP 250 OP 636: Performed by: HOSPITALIST

## 2020-08-16 PROCEDURE — 83735 ASSAY OF MAGNESIUM: CPT | Performed by: EMERGENCY MEDICINE

## 2020-08-16 PROCEDURE — 85025 COMPLETE CBC W/AUTO DIFF WBC: CPT | Performed by: EMERGENCY MEDICINE

## 2020-08-16 PROCEDURE — C9803 HOPD COVID-19 SPEC COLLECT: HCPCS

## 2020-08-16 PROCEDURE — U0003 INFECTIOUS AGENT DETECTION BY NUCLEIC ACID (DNA OR RNA); SEVERE ACUTE RESPIRATORY SYNDROME CORONAVIRUS 2 (SARS-COV-2) (CORONAVIRUS DISEASE [COVID-19]), AMPLIFIED PROBE TECHNIQUE, MAKING USE OF HIGH THROUGHPUT TECHNOLOGIES AS DESCRIBED BY CMS-2020-01-R: HCPCS | Performed by: EMERGENCY MEDICINE

## 2020-08-16 PROCEDURE — 99285 EMERGENCY DEPT VISIT HI MDM: CPT

## 2020-08-16 PROCEDURE — 25000128 H RX IP 250 OP 636: Performed by: EMERGENCY MEDICINE

## 2020-08-16 PROCEDURE — 80177 DRUG SCRN QUAN LEVETIRACETAM: CPT | Performed by: EMERGENCY MEDICINE

## 2020-08-16 PROCEDURE — 87086 URINE CULTURE/COLONY COUNT: CPT | Performed by: HOSPITALIST

## 2020-08-16 PROCEDURE — 93005 ELECTROCARDIOGRAM TRACING: CPT

## 2020-08-16 PROCEDURE — 81001 URINALYSIS AUTO W/SCOPE: CPT | Performed by: EMERGENCY MEDICINE

## 2020-08-16 PROCEDURE — 25800030 ZZH RX IP 258 OP 636: Performed by: HOSPITALIST

## 2020-08-16 PROCEDURE — 80053 COMPREHEN METABOLIC PANEL: CPT | Performed by: EMERGENCY MEDICINE

## 2020-08-16 PROCEDURE — 12000000 ZZH R&B MED SURG/OB

## 2020-08-16 RX ORDER — NALOXONE HYDROCHLORIDE 0.4 MG/ML
.1-.4 INJECTION, SOLUTION INTRAMUSCULAR; INTRAVENOUS; SUBCUTANEOUS
Status: DISCONTINUED | OUTPATIENT
Start: 2020-08-16 | End: 2020-08-19 | Stop reason: HOSPADM

## 2020-08-16 RX ORDER — LEVETIRACETAM 10 MG/ML
1000 INJECTION INTRAVASCULAR ONCE
Status: COMPLETED | OUTPATIENT
Start: 2020-08-16 | End: 2020-08-16

## 2020-08-16 RX ORDER — OXYBUTYNIN CHLORIDE 5 MG/1
5 TABLET, EXTENDED RELEASE ORAL DAILY
Status: DISCONTINUED | OUTPATIENT
Start: 2020-08-16 | End: 2020-08-19 | Stop reason: HOSPADM

## 2020-08-16 RX ORDER — POTASSIUM CHLORIDE 1.5 G/1.58G
20-40 POWDER, FOR SOLUTION ORAL
Status: DISCONTINUED | OUTPATIENT
Start: 2020-08-16 | End: 2020-08-19 | Stop reason: HOSPADM

## 2020-08-16 RX ORDER — LEVETIRACETAM 5 MG/ML
500 INJECTION INTRAVASCULAR ONCE
Status: COMPLETED | OUTPATIENT
Start: 2020-08-16 | End: 2020-08-16

## 2020-08-16 RX ORDER — LIDOCAINE HYDROCHLORIDE 20 MG/ML
5 SOLUTION OROPHARYNGEAL
Status: DISCONTINUED | OUTPATIENT
Start: 2020-08-16 | End: 2020-08-19 | Stop reason: HOSPADM

## 2020-08-16 RX ORDER — VENLAFAXINE HYDROCHLORIDE 75 MG/1
75 TABLET, EXTENDED RELEASE ORAL DAILY
COMMUNITY
End: 2024-05-31

## 2020-08-16 RX ORDER — AMOXICILLIN 250 MG
2 CAPSULE ORAL 2 TIMES DAILY PRN
Status: DISCONTINUED | OUTPATIENT
Start: 2020-08-16 | End: 2020-08-19 | Stop reason: HOSPADM

## 2020-08-16 RX ORDER — LEVETIRACETAM 10 MG/ML
1000 INJECTION INTRAVASCULAR EVERY 12 HOURS
Status: DISCONTINUED | OUTPATIENT
Start: 2020-08-17 | End: 2020-08-17

## 2020-08-16 RX ORDER — CEFTRIAXONE 1 G/1
1 INJECTION, POWDER, FOR SOLUTION INTRAMUSCULAR; INTRAVENOUS EVERY 24 HOURS
Status: DISCONTINUED | OUTPATIENT
Start: 2020-08-16 | End: 2020-08-18

## 2020-08-16 RX ORDER — SODIUM CHLORIDE 9 MG/ML
INJECTION, SOLUTION INTRAVENOUS CONTINUOUS
Status: DISCONTINUED | OUTPATIENT
Start: 2020-08-16 | End: 2020-08-16

## 2020-08-16 RX ORDER — ONDANSETRON 4 MG/1
4 TABLET, ORALLY DISINTEGRATING ORAL EVERY 6 HOURS PRN
Status: DISCONTINUED | OUTPATIENT
Start: 2020-08-16 | End: 2020-08-19 | Stop reason: HOSPADM

## 2020-08-16 RX ORDER — DIMETHYL FUMARATE 240 MG/1
240 CAPSULE ORAL 2 TIMES DAILY
Status: DISCONTINUED | OUTPATIENT
Start: 2020-08-16 | End: 2020-08-19 | Stop reason: HOSPADM

## 2020-08-16 RX ORDER — LORAZEPAM 2 MG/ML
2 INJECTION INTRAMUSCULAR
Status: DISCONTINUED | OUTPATIENT
Start: 2020-08-16 | End: 2020-08-19 | Stop reason: HOSPADM

## 2020-08-16 RX ORDER — LIDOCAINE 40 MG/G
CREAM TOPICAL
Status: DISCONTINUED | OUTPATIENT
Start: 2020-08-16 | End: 2020-08-19 | Stop reason: HOSPADM

## 2020-08-16 RX ORDER — SODIUM CHLORIDE 9 MG/ML
INJECTION, SOLUTION INTRAVENOUS CONTINUOUS
Status: DISCONTINUED | OUTPATIENT
Start: 2020-08-16 | End: 2020-08-18

## 2020-08-16 RX ORDER — POTASSIUM CHLORIDE 29.8 MG/ML
20 INJECTION INTRAVENOUS
Status: DISCONTINUED | OUTPATIENT
Start: 2020-08-16 | End: 2020-08-19 | Stop reason: HOSPADM

## 2020-08-16 RX ORDER — AMOXICILLIN 250 MG
1 CAPSULE ORAL 2 TIMES DAILY PRN
Status: DISCONTINUED | OUTPATIENT
Start: 2020-08-16 | End: 2020-08-19 | Stop reason: HOSPADM

## 2020-08-16 RX ORDER — VENLAFAXINE HYDROCHLORIDE 75 MG/1
75 CAPSULE, EXTENDED RELEASE ORAL DAILY
Status: DISCONTINUED | OUTPATIENT
Start: 2020-08-17 | End: 2020-08-19 | Stop reason: HOSPADM

## 2020-08-16 RX ORDER — ONDANSETRON 2 MG/ML
4 INJECTION INTRAMUSCULAR; INTRAVENOUS EVERY 6 HOURS PRN
Status: DISCONTINUED | OUTPATIENT
Start: 2020-08-16 | End: 2020-08-19 | Stop reason: HOSPADM

## 2020-08-16 RX ORDER — PROCHLORPERAZINE 25 MG
25 SUPPOSITORY, RECTAL RECTAL EVERY 12 HOURS PRN
Status: DISCONTINUED | OUTPATIENT
Start: 2020-08-16 | End: 2020-08-19 | Stop reason: HOSPADM

## 2020-08-16 RX ORDER — POTASSIUM CHLORIDE 1500 MG/1
20-40 TABLET, EXTENDED RELEASE ORAL
Status: DISCONTINUED | OUTPATIENT
Start: 2020-08-16 | End: 2020-08-19 | Stop reason: HOSPADM

## 2020-08-16 RX ORDER — POLYETHYLENE GLYCOL 3350 17 G/17G
17 POWDER, FOR SOLUTION ORAL DAILY PRN
Status: DISCONTINUED | OUTPATIENT
Start: 2020-08-16 | End: 2020-08-19 | Stop reason: HOSPADM

## 2020-08-16 RX ORDER — BACLOFEN 10 MG/1
10-20 TABLET ORAL 4 TIMES DAILY PRN
Status: DISCONTINUED | OUTPATIENT
Start: 2020-08-16 | End: 2020-08-19 | Stop reason: HOSPADM

## 2020-08-16 RX ORDER — POTASSIUM CHLORIDE 7.45 MG/ML
10 INJECTION INTRAVENOUS
Status: DISCONTINUED | OUTPATIENT
Start: 2020-08-16 | End: 2020-08-19 | Stop reason: HOSPADM

## 2020-08-16 RX ORDER — PROCHLORPERAZINE MALEATE 5 MG
10 TABLET ORAL EVERY 6 HOURS PRN
Status: DISCONTINUED | OUTPATIENT
Start: 2020-08-16 | End: 2020-08-19 | Stop reason: HOSPADM

## 2020-08-16 RX ORDER — ACETAMINOPHEN 325 MG/1
650 TABLET ORAL EVERY 4 HOURS PRN
Status: DISCONTINUED | OUTPATIENT
Start: 2020-08-16 | End: 2020-08-19 | Stop reason: HOSPADM

## 2020-08-16 RX ORDER — POTASSIUM CL/LIDO/0.9 % NACL 10MEQ/0.1L
10 INTRAVENOUS SOLUTION, PIGGYBACK (ML) INTRAVENOUS
Status: DISCONTINUED | OUTPATIENT
Start: 2020-08-16 | End: 2020-08-19 | Stop reason: HOSPADM

## 2020-08-16 RX ORDER — MAGNESIUM SULFATE HEPTAHYDRATE 40 MG/ML
4 INJECTION, SOLUTION INTRAVENOUS EVERY 4 HOURS PRN
Status: DISCONTINUED | OUTPATIENT
Start: 2020-08-16 | End: 2020-08-19 | Stop reason: HOSPADM

## 2020-08-16 RX ADMIN — CEFTRIAXONE SODIUM 1 G: 1 INJECTION, POWDER, FOR SOLUTION INTRAMUSCULAR; INTRAVENOUS at 23:39

## 2020-08-16 RX ADMIN — LEVETIRACETAM 1000 MG: 10 INJECTION INTRAVENOUS at 15:03

## 2020-08-16 RX ADMIN — SODIUM CHLORIDE: 9 INJECTION, SOLUTION INTRAVENOUS at 15:10

## 2020-08-16 RX ADMIN — LEVETIRACETAM 500 MG: 5 INJECTION INTRAVENOUS at 15:12

## 2020-08-16 RX ADMIN — SODIUM CHLORIDE: 9 INJECTION, SOLUTION INTRAVENOUS at 21:48

## 2020-08-16 ASSESSMENT — ACTIVITIES OF DAILY LIVING (ADL): ADLS_ACUITY_SCORE: 14

## 2020-08-16 NOTE — ED PROVIDER NOTES
History   Chief Complaint:  Seizures    HPI   Shanna Shanks is a 45 year old female, with a history of seizures, MS, and herpes encephalitis, who presents to the ED for evaluation of seizures. At 1411 EMS reports they received a called after the patient was having a prolonged seizure with a known seizure disorder. Per EMS, the spouse states the patient had been seizing for roughly 15 minutes prior to the paramedics arriving. They state she does take Keppra for her seizures, but spouse says she may have missed a dose. On scene, they gave 5 mg IM Versed and then 5 mg more when she continued to have a clonic tonic seizure. Paramedics continued to report the seizure activity and gave another 2.5 mg Versed IV and then the patient ceased seizing. They convey the patient has not had any COVID type symptoms or been around any ill contacts. She did have some dried blood on her lips. The spouse, per EMS, reports she has not had a prolonged seizure like this in the past. Paramedics say her saturation and blood pressure were good during the drive over.     The  came and states that it started as a staring episode.  She has had petit mall seizures and also these grand mal seizures.  She has had long ones in the past.  He agrees that she may have missed a few doses of Keppra recently but does not drink alcohol other than she had 1 beer last night.  She has not been ill.    Allergies:  Diphenhydramine  Macrobid  Nickel    Medications:    Baclofen  Tecfidera  Levetiracetam  Norethidrone  Oxybutynin  Effexor    Past Medical History:    Seizure  Herpes encephalitis  Encephalopathy  MS  Tobacco dependency    Past Surgical History:    D & C    Family History:    Diabetes    Social History:  Smoking status: Former-1/1/2017  Alcohol use: No  Drug use: No  PCP: Malvin Beck MD  Marital Status:   [2]    Review of Systems   Unable to perform ROS: Other (Versed)     Physical Exam     Patient Vitals for  the past 24 hrs:   BP Temp Temp src Pulse Heart Rate Resp SpO2 Weight   08/16/20 1545 120/73 -- -- 107 98 10 100 % --   08/16/20 1530 112/76 -- -- 102 96 9 100 % --   08/16/20 1515 123/76 -- -- 106 107 18 100 % --   08/16/20 1505 113/73 -- -- 105 105 19 100 % --   08/16/20 1500 113/71 99.4  F (37.4  C) Temporal -- 112 14 99 % 65.8 kg (145 lb)       Physical Exam  Nursing note and vitals reviewed.    Constitutional:  Obtunded.  HENT:    Nose normal.  No discharge. Small amount of dried blood on lips with evidence of right tongue bite.      Oral mucosa is moist.  Eyes:    Conjunctivae are normal without injection.     Pupils are equal and reactive to light.  Lymph:  No enlarged or tender cervical or submandibular lymph nodes.   Cardiovascular:  Normal rate, regular rhythm with normal S1 and S2.      Normal heart sounds and peripheral pulses 2+ and equal.       No murmur or vipul.  Pulmonary:  Effort normal and breath sounds clear to auscultation bilaterally.  No respiratory distress.  No stridor.     No wheezes. No rales.     GI:    Soft. No distension and no mass.     No HSM.  Musculoskeletal:  No extremity deformity.     No edema.  Neurological:   Somnolent from Versed and being postictal. No seizure activity. Breathing normally on her own. Maintaining airway.   Skin:    Skin is warm and dry. No rash noted. No diaphoresis.      No erythema. No pallor.  No lesions.  Psychiatric:   Unable to assess.     Emergency Department Course   ECG (15:01:15):  Rate 108 bpm. IN interval 192. QRS duration 80. QT/QTc 334/447. P-R-T axes 59 41 69. Sinus tachycardia. Otherwise normal ECG. Interpreted at 1502 by Liza Harris MD.    Laboratory:  Laboratory findings were communicated with the patient who voiced understanding of the findings.    CBC: WNL (WBC 9.9, HGB 12.1, )    CMP:  (H), o/w WNL (Creatinine 0.78)    Magnesium: 1.8    Alcohol ethyl: <0.01    Keppra Level: Pending    UA: Pending    COVID-19 Virus  PCR: Pending     Interventions:  1503: Keppra 1000 mg IV   1510: NS Bolus Infusion  1512: Keppra 500 mg IV     Emergency Department Course:  Past medical records, nursing notes, and vitals reviewed.    1453 I performed an exam of the patient as documented above.      EKG performed, results above.  IV was inserted and blood was drawn for laboratory testing, results above.  The patient provided a urine sample here in the emergency department. This was sent for laboratory testing, findings above.    1615 I rechecked the patient and discussed the results of her workup thus far to her spouse.     Findings and plan explained to the Patient who consents to admission. Discussed the patient with Dr. Loyola, who will admit the patient to a neuro bed for further monitoring, evaluation, and treatment.     I personally reviewed the laboratory results with the spouse and answered all related questions prior to admission.     Impression & Plan   Covid-19  Shanna Shanks was evaluated during a global COVID-19 pandemic, which necessitated consideration that the patient might be at risk for infection with the SARS-CoV-2 virus that causes COVID-19.   Applicable protocols for evaluation were followed during the patient's care.   COVID-19 was considered as part of the patient's evaluation. The plan for testing is:  a test was obtained during this visit.    Medical Decision Making:  Patient comes in after being a status epilepticus for at least 20 minutes with a grand mal seizure.  She did bite the right side of her tongue.  She required 12.5 mg of parenteral Versed to stop the seizures.  Sounds like she had maybe missed a dose or 2 of her Keppra so I loaded her with 20 mg/kg IV.  Labs are obtained and her comprehensive panel, magnesium, and CBC are all normal.  Glucose 103.  She had no further seizures here and she was maintaining her vital signs and airway just fine.  She was starting to wake up but still pretty sleepy when her   arrived.  I was able to get more history from him and it sounds like she has had other long seizures in the past.  She needs to come in the hospital as this was a 20 to 30-minute seizure for continued observation and possible neurology consultation.  I talked with Dr. Loyola who will be admitting the patient to the neuro floor with seizure precautions.  She did have a urinary tract infection in July and so she will be catheterized for a urine specimen and will be started on antibiotics if it is positive.    Critical Care Time: was 55 minutes for this patient excluding procedures    Diagnosis:    ICD-10-CM    1. 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 MS (multiple sclerosis) (H)  G35        Disposition:  Admitted to a neuro bed.    Scribe Disclosure:  I, Preet Souza, am serving as a scribe at 2:53 PM on 8/16/2020 to document services personally performed by Liza Harris MD based on my observations and the provider's statements to me.        Liza Harris MD  08/16/20 [id number]

## 2020-08-16 NOTE — PHARMACY-ADMISSION MEDICATION HISTORY
"Pharmacy Medication History  Admission medication history interview status for the 8/16/2020  admission is complete. See EPIC admission navigator for prior to admission medications     Medication history sources: Patient's family/friend ( [place]) and [employer]  Medication history source reliability: Good  Adherence assessment: Moderate    Significant changes made to the medication list:  Added melatonin dose unknown per       Additional medication history information:   Spoke to . He is well aware of meds and the times last taken.  notes that Shanna is periodically on bactrim/septra but wasn't on it recently. He also mentioned that \"if you have a hard time putting your hands on Tecfidera I can bring home supply, as it is a specialty drug)    Medication reconciliation completed by provider prior to medication history? No    Time spent in this activity: 25min      Prior to Admission medications    Medication Sig Last Dose Taking? Auth Provider   baclofen (LIORESAL) 10 MG tablet Take 10-20 mg by mouth 4 times daily as needed  prn at prn Yes Reported, Patient   CRANBERRY EXTRACT PO Take 1 tablet by mouth daily 8/16/2020 at am Yes Unknown, Entered By History   dimethyl fumarate (TECFIDERA) delayed release capsule Take 240 mg by mouth 2 times daily  8/16/2020 at am Yes Reported, Patient   levETIRAcetam 1000 MG TABS Take 1,000 mg by mouth 2 times daily 8/16/2020 at am Yes Yuliya Aldrich MD   MELATONIN PO Take 1 tablet by mouth as needed Past Week at Unknown time Yes Unknown, Entered By History   norethindrone (NORLYDA) 0.35 MG tablet Take 1 tablet (0.35 mg) by mouth daily DUE FOR APPOINTMENT July 2020-NO FURTHER REFILLS 8/16/2020 at am Yes Lola Wahl APRN CNP   order for DME Equipment being ordered: four wheeled walker with seat and brakes dme at dme Yes Malvin Beck MD   oxybutynin (DITROPAN-XL) 5 MG 24 hr tablet Take 5 mg by mouth daily 8/14/2020 at n/a Yes " Unknown, Entered By History   venlafaxine (EFFEXOR-ER) 75 MG 24 hr tablet Take 75 mg by mouth daily 8/16/2020 at am Yes Unknown, Entered By History   VITAMIN D, CHOLECALCIFEROL, PO Take 1,000 Units by mouth daily Past Week at Unknown time Yes Unknown, Entered By History

## 2020-08-16 NOTE — H&P
[facility]    History and Physical  Hospitalist       Date of Admission:  8/16/2020    Assessment & Plan   Shanna Shanks is a 45 year old female who presents with seizure activity after possibly missing 2 doses of keppra in setting of known seizure disorder    Seizure disorder, breakthrough seizure  Diagnosed with seizure disorder 3 years ago, initially thought secondary to encephalitis and was briefly on antiepileptics. Then off for about 6 months prior to her 2nd seizure in April 2018. At that time, started on keppra 1000mg BID and had good compliance. Seizure activity started with petit mal symptoms, then proceeded to grand mal, EMS called and she required 10mg IM versed and 2.5mg IV versed in order to break activity (had been ongoing for ~20min).  believes she missed BOTH doses keppra on 8/15 due to missed pills in her pill-box  - Admit inpatient  - Neurology consult  - Received 1500mg keppra load in ED, continue 1000mg keppra BID this evening  - NPO until passes bedside swallow with nursing  - Seizure precautions  - follow up keppra level  - IVF  - Hx recent UTI in July, UA similar to then--will presumptively treat--follow up urine culture.    Tongue laceration  Occurred during seizure activity  - Lidocaine solution available for swishing (unable to do magic mouthwash due to benadryl allergy)    Multiple sclerosis  PTA tecfidera, oxybutynin, PRN baclofen, venlafaxine continued    Hx recurrent UTI  7/22 with >100k E coli resistant to ampicillin and bactrim. Treated with ciprofloxacin.  denies any urinary symptoms (usually noted by increased frequency), she has been on bactrim prophylaxis, but not taking currently  - UA similar appearance to July UA with small leuk esterase and 10-20 WBCs. At that time, grew e. Coli  - start ceftriaxone 1gm x97--fbgfjy up urine culture result    Asymptomatic COVID-19 screening  Low suspicion from symptom standpoint. As being admitted  during global pandemic, screening for COVID-19 necessitated  - Add precautions if positive    DVT Prophylaxis: Pneumatic Compression Devices  Code Status: Full Code  Expected discharge: 24-48 hours, recommended to prior living arrangement once seizure work-up completed    Kyung Loyola DO    Primary Care Physician   Malvin Beck MD    Chief Complaint   seizure    History is obtained from the patient's  at bedside    History of Present Illness   Shanna Shanks is a 45 year old female who presents with breakthrough seizure activity. Started with petit mal activity where she was staring off to the right and was still, for a few minutes. Then had some mild shaking of her hands for a few minutes. Seemed to quiet and then proceeded to become a grand mal seizure with more tonic-clonic shaking.  moved her to the floor, laid her on her side, tried to support her and then was able to call EMS. They arrived shortly after, gave IM versed 5mg X2 and then 2.5mg versed IV with resolution of seizure activity. Grand total was about 20 minutes of seizures. Last seizure activity was 2 years ago April.  He believes she missed 2 doses of keppra on 8/15 due to some missed doses in her pill case. She does not normally drink alcohol, but had one beer on 8/15 evening. No infection symptoms. Recently had UTI, but no new urinary symptoms per  (she usually has more frequency associated with UTI). Sleep and dietary intake has been stable.    Past Medical History    I have reviewed this patient's medical history and updated it with pertinent information if needed.   Past Medical History:   Diagnosis Date     Multiple sclerosis (H) 3/2/96    last exacerbation 3yrs ago, no meds x 6 months     Seizure disorder (H)      Tobacco dependency        Past Surgical History   I have reviewed this patient's surgical history and updated it with pertinent information if needed.  Past Surgical History:   Procedure  Laterality Date     HC DILATION/CURETTAGE DIAG/THER NON OB  1/1/05    D & C, missed AB       Prior to Admission Medications   Prior to Admission Medications   Prescriptions Last Dose Informant Patient Reported? Taking?   CRANBERRY EXTRACT PO  Care Giver Yes No   Sig: Take 1 tablet by mouth daily   VITAMIN D, CHOLECALCIFEROL, PO   Yes No   Sig: Take 1,000 Units by mouth daily   baclofen (LIORESAL) 10 MG tablet  Care Giver Yes No   Sig: Take 10-20 mg by mouth 4 times daily as needed    dimethyl fumarate (TECFIDERA) delayed release capsule   Yes No   Sig: Take 240 mg by mouth 2 times daily    levETIRAcetam 1000 MG TABS   No No   Sig: Take 1,000 mg by mouth 2 times daily   norethindrone (NORLYDA) 0.35 MG tablet   No No   Sig: Take 1 tablet (0.35 mg) by mouth daily DUE FOR APPOINTMENT July 2020-NO FURTHER REFILLS   order for DME   No No   Sig: Equipment being ordered: four wheeled walker with seat and brakes   oxybutynin (DITROPAN-XL) 5 MG 24 hr tablet   Yes No   Sig: Take 5 mg by mouth daily   sulfamethoxazole-trimethoprim (BACTRIM/SEPTRA) 400-80 MG per tablet   Yes No   Sig: Take 1 tablet by mouth daily   venlafaxine (EFFEXOR-ER) 75 MG 24 hr tablet   Yes Yes   Sig: Take 75 mg by mouth daily      Facility-Administered Medications: None     Allergies   Allergies   Allergen Reactions     Diphenhydramine Rash     benadryl cream     Nickel      Macrobid [Nitrofurantoin] Rash       Social History   I have reviewed this patient's social history and updated it with pertinent information if needed. Shanna Shanks  reports that she has quit smoking. Her smoking use included cigarettes. She smoked 0.25 packs per day. She has never used smokeless tobacco. She reports that she does not drink alcohol or use drugs.    Family History   I have reviewed this patient's family history and updated it with pertinent information if needed.   Family History   Problem Relation Age of Onset     Family History Negative Mother       Diabetes Father      Cancer - colorectal Paternal Grandmother      Heart Disease Paternal Grandfather         MI       Review of Systems   The 10 point Review of Systems is negative other than noted in the HPI     Physical Exam   Temp: 99.4  F (37.4  C) Temp src: Temporal BP: 120/64 Pulse: 112 Heart Rate: 108 Resp: 9 SpO2: 97 % O2 Device: Nasal cannula Oxygen Delivery: 2 LPM  Vital Signs with Ranges  Temp:  [99.4  F (37.4  C)] 99.4  F (37.4  C)  Pulse:  [102-112] 112  Heart Rate:  [] 108  Resp:  [9-19] 9  BP: (112-123)/(64-76) 120/64  SpO2:  [97 %-100 %] 97 %  145 lbs 0 oz    Constitutional: somnolent, minimally cooperative, no apparent distress.  Eyes: Conjunctiva and pupils examined and normal.  HEENT: tacky mucous membranes, dried blood on lips, no visible laceration to front right side tongue (all she was able to open her mouth to visualize)  Respiratory: Clear to auscultation bilaterally, no crackles or wheezing.  Cardiovascular: Regular rate and rhythm, normal S1 and S2, and no murmur noted.  GI: Soft, non-distended, non-tender, normal bowel sounds.  Lymph/Hematologic: No anterior cervical or supraclavicular adenopathy.  Skin: No rashes, no cyanosis, no edema.  Musculoskeletal: No joint swelling, erythema or tenderness.  Neurologic: Cranial nerves 2-12 intact, normal strength and sensation.  Psychiatric: somnolent, unable to assess orientation.    Data   Data reviewed today:  I personally reviewed EKG sinus tachycardia.  Recent Labs   Lab 08/16/20  1459   WBC 9.9   HGB 12.1   MCV 91         POTASSIUM 4.2   CHLORIDE 105   CO2 26   BUN 12   CR 0.78   ANIONGAP 4   JULIET 8.5   *   ALBUMIN 3.9   PROTTOTAL 7.1   BILITOTAL 0.6   ALKPHOS 42   ALT 16   AST 14       No results found for this or any previous visit (from the past 24 hour(s)).

## 2020-08-16 NOTE — ED TRIAGE NOTES
Patient had grand maul full body seizure around 215pm, lasted approx 15min. versad given in route.

## 2020-08-16 NOTE — PROGRESS NOTES
RECEIVING UNIT ED HANDOFF REVIEW    ED Nurse Handoff Report was reviewed by: Daisha Guillen RN on August 16, 2020 at 6:06 PM

## 2020-08-16 NOTE — ED NOTES
[facility]  ED Nurse Handoff Report    ED Chief complaint: Seizures      ED Diagnosis:   Final diagnoses:   None       Code Status: Full Code    Allergies:   Allergies   Allergen Reactions     Diphenhydramine Rash     benadryl cream     Nickel      Macrobid [Nitrofurantoin] Rash       Patient Story: 45 year old female, with a history of seizures, MS, and herpes enchepalitis, who presents to the ED for evaluation of seizures.EMS reports they received a called after the patient was having a prolonged seizure with a known seizure disorder. Per EMS, the spouse states the patient had been seizing for roughly 15 minutes prior to the paramedics arriving. EMS gave a total of 12.5 mg versed combined IM and IV doses.    Focused Assessment:  Patient arrived sedated and remains so. VSS.    Treatments and/or interventions provided:   Medications   sodium chloride 0.9% infusion ( Intravenous New Bag 8/16/20 1510)   levETIRAcetam (KEPPRA) intermittent infusion 1,000 mg (1,000 mg Intravenous Given 8/16/20 1503)   levETIRAcetam (KEPPRA) intermittent infusion 500 mg (500 mg Intravenous Given 8/16/20 1512)       Patient's response to treatments and/or interventions: sedated with stable VS    To be done/followed up on inpatient unit:  see in-patient orders    Does this patient have any cognitive concerns?: none    Activity level - Baseline/Home:  Independent  Activity Level - Current:   currently sedated    Patient's Preferred language: English   Needed?: No    Isolation: None  Infection: Not Applicable  Bariatric?: No    Vital Signs:   Vitals:    08/16/20 1500 08/16/20 1505 08/16/20 1515   BP: 113/71 113/73 123/76   Pulse:  105 106   Resp: 14 19 18   Temp: 99.4  F (37.4  C)     TempSrc: Temporal     SpO2: 99% 100% 100%       Cardiac Rhythm:     Was the PSS-3 completed:   Yes  What interventions are required if any?               Family Comments:  at bedside, very helpful historian  OBS brochure/video  discussed/provided to patient/family: Yes              Name of person given brochure if not patient:               Relationship to patient:     For the majority of the shift this patient's behavior was Green.   Behavioral interventions performed were.    ED NURSE PHONE NUMBER: [phone]

## 2020-08-17 ENCOUNTER — HOSPITAL ENCOUNTER (OUTPATIENT)
Dept: NEUROLOGY | Facility: CLINIC | Age: 45
DRG: 101 | End: 2020-08-17
Attending: PSYCHIATRY & NEUROLOGY
Payer: COMMERCIAL

## 2020-08-17 LAB
ANION GAP SERPL CALCULATED.3IONS-SCNC: 6 MMOL/L (ref 3–14)
BUN SERPL-MCNC: 6 MG/DL (ref 7–30)
CALCIUM SERPL-MCNC: 7.7 MG/DL (ref 8.5–10.1)
CHLORIDE SERPL-SCNC: 108 MMOL/L (ref 94–109)
CO2 SERPL-SCNC: 22 MMOL/L (ref 20–32)
CREAT SERPL-MCNC: 0.67 MG/DL (ref 0.52–1.04)
ERYTHROCYTE [DISTWIDTH] IN BLOOD BY AUTOMATED COUNT: 12.6 % (ref 10–15)
GFR SERPL CREATININE-BSD FRML MDRD: >90 ML/MIN/{1.73_M2}
GLUCOSE SERPL-MCNC: 93 MG/DL (ref 70–99)
HCT VFR BLD AUTO: 36.7 % (ref 35–47)
HGB BLD-MCNC: 12.1 G/DL (ref 11.7–15.7)
MCH RBC QN AUTO: 29.7 PG (ref 26.5–33)
MCHC RBC AUTO-ENTMCNC: 33 G/DL (ref 31.5–36.5)
MCV RBC AUTO: 90 FL (ref 78–100)
PLATELET # BLD AUTO: 225 10E9/L (ref 150–450)
POTASSIUM SERPL-SCNC: 3.7 MMOL/L (ref 3.4–5.3)
RBC # BLD AUTO: 4.07 10E12/L (ref 3.8–5.2)
SARS-COV-2 RNA SPEC QL NAA+PROBE: NOT DETECTED
SODIUM SERPL-SCNC: 136 MMOL/L (ref 133–144)
SPECIMEN SOURCE: NORMAL
WBC # BLD AUTO: 7.9 10E9/L (ref 4–11)

## 2020-08-17 PROCEDURE — 40000061 EEG ROUTINE

## 2020-08-17 PROCEDURE — 85027 COMPLETE CBC AUTOMATED: CPT | Performed by: HOSPITALIST

## 2020-08-17 PROCEDURE — 25000128 H RX IP 250 OP 636: Performed by: HOSPITALIST

## 2020-08-17 PROCEDURE — 99232 SBSQ HOSP IP/OBS MODERATE 35: CPT | Performed by: HOSPITALIST

## 2020-08-17 PROCEDURE — 25800030 ZZH RX IP 258 OP 636: Performed by: HOSPITALIST

## 2020-08-17 PROCEDURE — 80048 BASIC METABOLIC PNL TOTAL CA: CPT | Performed by: HOSPITALIST

## 2020-08-17 PROCEDURE — 36415 COLL VENOUS BLD VENIPUNCTURE: CPT | Performed by: HOSPITALIST

## 2020-08-17 PROCEDURE — 12000000 ZZH R&B MED SURG/OB

## 2020-08-17 RX ADMIN — SODIUM CHLORIDE: 9 INJECTION, SOLUTION INTRAVENOUS at 20:05

## 2020-08-17 RX ADMIN — SODIUM CHLORIDE: 9 INJECTION, SOLUTION INTRAVENOUS at 09:44

## 2020-08-17 RX ADMIN — LEVETIRACETAM 1250 MG: 100 INJECTION, SOLUTION INTRAVENOUS at 16:09

## 2020-08-17 RX ADMIN — LEVETIRACETAM 1000 MG: 10 INJECTION INTRAVENOUS at 04:03

## 2020-08-17 ASSESSMENT — ACTIVITIES OF DAILY LIVING (ADL)
ADLS_ACUITY_SCORE: 18
ADLS_ACUITY_SCORE: 15
ADLS_ACUITY_SCORE: 15
ADLS_ACUITY_SCORE: 14
ADLS_ACUITY_SCORE: 15
ADLS_ACUITY_SCORE: 15

## 2020-08-17 NOTE — PLAN OF CARE
Pt here with breakthrough grand mal seizure lasting 20 min at home after missing meds. Alert to self, calm and cooperative, clear speech but illogical. Neuros grossly intact EMPERATRIZ PERDEBBIE, not following commands. VSS. NPO pending more alert/following better. Takes pills whole with water at baseline,  is bringing in some special MS meds today. Has not been OOB. No signs/symptoms of pain at rest, sometimes says 'ouch' with cares, more fluent later in the night, speaking sentence but not always logical. Incontinent, purewick in place.  Pt scoring yellow on the Aggression Stop Light Tool for confusion. Plan to see general neuro. Discharge pending improvement.

## 2020-08-17 NOTE — PROGRESS NOTES
[facility]    Hospitalist Progress Note    Date of Admission:  8/16/2020    Assessment & Plan     Shanna Shanks is a 45 year old female who presents with seizure activity after possibly missing 2 doses of keppra in setting of known seizure disorder     Seizure disorder, breakthrough seizure  Acute metabolic encephalopathy,?  Due to breakthrough seizures  Diagnosed with seizure disorder 3 years ago, initially thought secondary to encephalitis and was briefly on antiepileptics. Then off for about 6 months prior to her 2nd seizure in April 2018. At that time, started on keppra 1000mg BID and had good compliance. Seizure activity started with petit mal symptoms, then proceeded to grand mal, EMS called and she required 10mg IM versed and 2.5mg IV versed in order to break activity (had been ongoing for ~20min).  believes she missed BOTH doses keppra on 8/15 due to missed pills in her pill-box  - Admit inpatient  - Neurology consult  - Received 1500mg keppra load in ED  - NPO until passes bedside swallow with nursing  - Seizure precautions  - follow up keppra level, pending  - IVF  - Hx recent UTI in July, UA similar to then--started on empiric IV ceftriaxone.  Discontinue if urine cultures negative.  -Patient seen by neurology on 8/17.  Keppra dose increased to 1250 mg IV twice daily.  Also recommended to consider MRI.  Will order MRI brain tomorrow if encephalopathy not improving.     Tongue laceration  Occurred during seizure activity  - Lidocaine solution available for swishing (unable to do magic mouthwash due to benadryl allergy)     Multiple sclerosis  PTA tecfidera, oxybutynin, PRN baclofen, venlafaxine continued     Hx recurrent UTI  7/22 with >100k E coli resistant to ampicillin and bactrim. Treated with ciprofloxacin.  denies any urinary symptoms (usually noted by increased frequency), she has been on bactrim prophylaxis, but not taking currently  - UA similar appearance  to July UA with small leuk esterase and 10-20 WBCs. At that time, grew e. Coli  - start ceftriaxone 1gm n19--nygngs up urine culture result     Asymptomatic COVID-19 screening, pending  Low suspicion from symptom standpoint. As being admitted during global pandemic, screening for COVID-19 necessitated  - Add precautions if positive     DVT Prophylaxis: Pneumatic Compression Devices  Code Status: Full Code  Expected discharge: 24-48 hours, recommended to prior living arrangement once seizure work-up completed          Shelley Harmon MD  Text Page (7am - 6pm, M-F)    Interval History   Stable overnight.  This morning she is laying in bed, is very confused.  Gives me current confused/mumbled responses to my questions.  Disoriented x3.  Is moving all extremities.  Unable to do reliable ROS given confusion.    -Data reviewed today: I reviewed all new labs and imaging results over the last 24 hours.     Physical Exam   Temp: 98.1  F (36.7  C) Temp src: Oral BP: 99/61 Pulse: 111 Heart Rate: 71 Resp: 18 SpO2: 99 % O2 Device: None (Room air) Oxygen Delivery: 2 LPM  Vitals:    08/16/20 1500   Weight: 65.8 kg (145 lb)     Vital Signs with Ranges  Temp:  [98.1  F (36.7  C)-99.6  F (37.6  C)] 98.1  F (36.7  C)  Pulse:  [102-121] 111  Heart Rate:  [] 71  Resp:  [9-22] 18  BP: ()/(58-90) 99/61  SpO2:  [96 %-100 %] 99 %  I/O last 3 completed shifts:  In: 1050 [I.V.:1050]  Out: 800 [Urine:800]    Constitutional: Alert, appears comfortable, in no acute distress, laying in bed, smiling and laughing inappropriately, disoriented x3  Respiratory: Non labored breathing, clear to auscultation bilaterally, no crackles or wheezes  Cardiovascular: Heart sounds regular rate and rhythm, no murmurs, no leg edema  GI: Abdomen is soft, non distended, non tender. Normal BS  Skin/Integumen: no rashes, no pressure sores  Neuro: alert, disoriented x3, moving all extremities, no facial asymmetry  Psych: Calm    Medications     sodium  chloride 100 mL/hr at 08/17/20 0944       cefTRIAXone  1 g Intravenous Q24H     dimethyl fumarate  240 mg Oral BID     levETIRAcetam  1,000 mg Intravenous Q12H     oxybutynin ER  5 mg Oral Daily     sodium chloride (PF)  3 mL Intracatheter Q8H     venlafaxine  75 mg Oral Daily       Data   Recent Labs   Lab 08/17/20  0746 08/16/20  1459   WBC 7.9 9.9   HGB 12.1 12.1   MCV 90 91    229    135   POTASSIUM 3.7 4.2   CHLORIDE 108 105   CO2 22 26   BUN 6* 12   CR 0.67 0.78   ANIONGAP 6 4   JULIET 7.7* 8.5   GLC 93 103*   ALBUMIN  --  3.9   PROTTOTAL  --  7.1   BILITOTAL  --  0.6   ALKPHOS  --  42   ALT  --  16   AST  --  14       Imaging  No results found for this or any previous visit (from the past 24 hour(s)).

## 2020-08-17 NOTE — PROGRESS NOTES
EEG portable done on pleasantly confused pt.  Needs rending-direction--  Awke,alert.  Photic Dr [facility] ordering  [id number],

## 2020-08-17 NOTE — CONSULTS
"[facility]    Neurology Consultation     Date of Admission:  8/16/2020    Assessment & Plan   Shanna Shanks is a 45 year old female who was admitted on 8/16/2020. I was asked to see the patient for breakthrough seizure.  Missed dosages of Keppra.  The patient was loaded with Keppra.  The patient remains encephalopathic however she is better than she was on admission.  The EEG done this morning does not show ongoing seizures.    - I would recommend to exclude any infection or other metabolic abnormality  - MRI of the head will help us determine if the patient has reversible encephalopathy due to seizure or other new abnormality.  - I would recommend to increase Keppra to 1250 twice a day.    We will follow-up with you    Sue Bustamante MD    Code Status    Full Code    Reason for Consult   Reason for consult: I was asked by Dr Loyola to evaluate this patient for breakthrough seizures    Primary Care Physician   Malvin Beck MD    Chief Complaint   breakthrough seizures    History is obtained from the patient's  and reading the chart    History of Present Illness   Shanna Shanks is a 45 year old female who presents with prolonged breakthrough seizures.  Per patient  story, the patient has missed some dosages of Keppra.  Yesterday, he found her confused and \"absent which is usually the prodrome of a seizure.  He actually stayed with her to see what happens the patient seems slightly better and soon after that had a generalized tonicoclonic seizure.  Patient  called 911 when paramedics arrived the patient was still seizing.  Apparently the seizure lasted for 10-15 minutes.  There was no tongue biting.  The seizure apparently stopped after 5 mg of Versed x2.    Patient's  denies any other symptoms fevers or infections diseases.    The patient has history of multiple sclerosis and has been on Tecfidera.  The seizure started in 2017, " evaluation during that time shows of abnormality on diffusion and flair in the left temporal lobe thought to be due to herpes versus allergic reaction to medication.  Lumbar puncture shows 8 white blood cells, PCR to herpes was negative however the patient was treated with Acyclovir.  Eventually she improved however she continued to have seizures.  Eventually she was put on Keppra.  In 2018 the patient has had another seizure and hospitalized with the same clinical picture as this time.  Patient's  states that the patient has a long period of confusion following the seizure.    At baseline the patient has some cognitive difficulties, she is not employed she has not been driving for a long time.  She goes to the day program which is stopped right now due to COVID pandemia.     The patient drinks occasionally.    Past Medical History   I have reviewed this patient's medical history and updated it with pertinent information if needed.   Past Medical History:   Diagnosis Date     Multiple sclerosis (H) 3/2/96    last exacerbation 3yrs ago, no meds x 6 months     Seizure disorder (H)      Tobacco dependency        Past Surgical History   I have reviewed this patient's surgical history and updated it with pertinent information if needed.  Past Surgical History:   Procedure Laterality Date     HC DILATION/CURETTAGE DIAG/THER NON OB  1/1/05    D & C, missed AB       Prior to Admission Medications   Prior to Admission Medications   Prescriptions Last Dose Informant Patient Reported? Taking?   CRANBERRY EXTRACT PO 8/16/2020 at am Care Giver Yes Yes   Sig: Take 1 tablet by mouth daily   MELATONIN PO Past Week at Unknown time  Yes Yes   Sig: Take 1 tablet by mouth as needed   VITAMIN D, CHOLECALCIFEROL, PO Past Week at Unknown time  Yes Yes   Sig: Take 1,000 Units by mouth daily   baclofen (LIORESAL) 10 MG tablet prn at prn Care Giver Yes Yes   Sig: Take 10-20 mg by mouth 4 times daily as needed    dimethyl fumarate  (TECFIDERA) delayed release capsule 8/16/2020 at am  Yes Yes   Sig: Take 240 mg by mouth 2 times daily    levETIRAcetam 1000 MG TABS 8/16/2020 at am  No Yes   Sig: Take 1,000 mg by mouth 2 times daily   norethindrone (NORLYDA) 0.35 MG tablet 8/16/2020 at am  No Yes   Sig: Take 1 tablet (0.35 mg) by mouth daily DUE FOR APPOINTMENT July 2020-NO FURTHER REFILLS   order for DME dme at [facility]  No Yes   Sig: Equipment being ordered: four wheeled walker with seat and brakes   oxybutynin (DITROPAN-XL) 5 MG 24 hr tablet 8/14/2020 at n/a  Yes Yes   Sig: Take 5 mg by mouth daily   venlafaxine (EFFEXOR-ER) 75 MG 24 hr tablet 8/16/2020 at am  Yes Yes   Sig: Take 75 mg by mouth daily      Facility-Administered Medications: None     Allergies   Allergies   Allergen Reactions     Diphenhydramine Rash     benadryl cream     Nickel      Macrobid [Nitrofurantoin] Rash       Social History   I have reviewed this patient's social history and updated it with pertinent information if needed. Shanna Shanks  reports that she has quit smoking. Her smoking use included cigarettes. She smoked 0.25 packs per day. She has never used smokeless tobacco. She reports that she does not drink alcohol or use drugs.  She lives with her .  She does not work.  She does not drive    Family History   I have reviewed this patient's family history and updated it with pertinent information if needed.   Family History   Problem Relation Age of Onset     Family History Negative Mother      Diabetes Father      Cancer - colorectal Paternal Grandmother      Heart Disease Paternal Grandfather         MI       Review of Systems   Unable to do review of system due to patient's confusion.    Physical Exam   Temp: 98.1  F (36.7  C) Temp src: Oral BP: 99/61 Pulse: 111 Heart Rate: 71 Resp: 18 SpO2: 99 % O2 Device: None (Room air) Oxygen Delivery: 2 LPM  Vital Signs with Ranges  Temp:  [98.1  F (36.7  C)-99.6  F (37.6  C)] 98.1  F (36.7  C)  Pulse:  [102-121]  "111  Heart Rate:  [] 71  Resp:  [9-22] 18  BP: ()/(58-90) 99/61  SpO2:  [96 %-100 %] 99 %  145 lbs 0 oz    Constitutional: normal  Eyes: No conjunctival erythema  ENT: Supple  Respiratory: No shortness of breath or wheezing  Skin: No obvious lesions, bruising in the left arm.    Musculoskeletal: normal  Neurologic: The patient is alert, in no acute distress.  She is oriented to person only.  She is not able to answer my questions she keeps on saying \"I am your kid\", \"I could be your kid\".  There is perseveration, she is following only very simple commands.  Face is symmetric, facial muscles seem to be moving equally.  There is no pronator drift.  There is no asterixis.  Neuropsychiatric: confused    Data   Results for orders placed or performed during the hospital encounter of 08/16/20 (from the past 24 hour(s))   CBC with platelets differential   Result Value Ref Range    WBC 9.9 4.0 - 11.0 10e9/L    RBC Count 4.00 3.8 - 5.2 10e12/L    Hemoglobin 12.1 11.7 - 15.7 g/dL    Hematocrit 36.3 35.0 - 47.0 %    MCV 91 78 - 100 fl    MCH 30.3 26.5 - 33.0 pg    MCHC 33.3 31.5 - 36.5 g/dL    RDW 12.6 10.0 - 15.0 %    Platelet Count 229 150 - 450 10e9/L    Diff Method Automated Method     % Neutrophils 79.8 %    % Lymphocytes 13.5 %    % Monocytes 6.2 %    % Eosinophils 0.2 %    % Basophils 0.2 %    % Immature Granulocytes 0.1 %    Absolute Neutrophil 7.9 1.6 - 8.3 10e9/L    Absolute Lymphocytes 1.3 0.8 - 5.3 10e9/L    Absolute Monocytes 0.6 0.0 - 1.3 10e9/L    Absolute Eosinophils 0.0 0.0 - 0.7 10e9/L    Absolute Basophils 0.0 0.0 - 0.2 10e9/L    Abs Immature Granulocytes 0.0 0 - 0.4 10e9/L   Comprehensive metabolic panel   Result Value Ref Range    Sodium 135 133 - 144 mmol/L    Potassium 4.2 3.4 - 5.3 mmol/L    Chloride 105 94 - 109 mmol/L    Carbon Dioxide 26 20 - 32 mmol/L    Anion Gap 4 3 - 14 mmol/L    Glucose 103 (H) 70 - 99 mg/dL    Urea Nitrogen 12 7 - 30 mg/dL    Creatinine 0.78 0.52 - 1.04 mg/dL    GFR " Estimate >90 >60 mL/min/[1.73_m2]    GFR Estimate If Black >90 >60 mL/min/[1.73_m2]    Calcium 8.5 8.5 - 10.1 mg/dL    Bilirubin Total 0.6 0.2 - 1.3 mg/dL    Albumin 3.9 3.4 - 5.0 g/dL    Protein Total 7.1 6.8 - 8.8 g/dL    Alkaline Phosphatase 42 40 - 150 U/L    ALT 16 0 - 50 U/L    AST 14 0 - 45 U/L   Magnesium   Result Value Ref Range    Magnesium 1.8 1.6 - 2.3 mg/dL   Alcohol ethyl   Result Value Ref Range    Ethanol g/dL <0.01 <0.01 g/dL   EKG 12-lead, tracing only   Result Value Ref Range    Interpretation ECG Click View Image link to view waveform and result    UA reflex to Microscopic   Result Value Ref Range    Color Urine Yellow     Appearance Urine Clear     Glucose Urine Negative NEG^Negative mg/dL    Bilirubin Urine Negative NEG^Negative    Ketones Urine 80 (A) NEG^Negative mg/dL    Specific Gravity Urine 1.019 1.003 - 1.035    Blood Urine Trace (A) NEG^Negative    pH Urine 6.0 5.0 - 7.0 pH    Protein Albumin Urine Negative NEG^Negative mg/dL    Urobilinogen mg/dL Normal 0.0 - 2.0 mg/dL    Nitrite Urine Negative NEG^Negative    Leukocyte Esterase Urine Small (A) NEG^Negative    Source Catheterized Urine     RBC Urine 3 (H) 0 - 2 /HPF    WBC Urine 16 (H) 0 - 5 /HPF    Mucous Urine Present (A) NEG^Negative /LPF   Urine Culture Aerobic Bacterial    Specimen: Catheterized Urine   Result Value Ref Range    Specimen Description Catheterized Urine     Special Requests Specimen received in preservative     Culture Micro PENDING    Basic metabolic panel   Result Value Ref Range    Sodium 136 133 - 144 mmol/L    Potassium 3.7 3.4 - 5.3 mmol/L    Chloride 108 94 - 109 mmol/L    Carbon Dioxide 22 20 - 32 mmol/L    Anion Gap 6 3 - 14 mmol/L    Glucose 93 70 - 99 mg/dL    Urea Nitrogen 6 (L) 7 - 30 mg/dL    Creatinine 0.67 0.52 - 1.04 mg/dL    GFR Estimate >90 >60 mL/min/[1.73_m2]    GFR Estimate If Black >90 >60 mL/min/[1.73_m2]    Calcium 7.7 (L) 8.5 - 10.1 mg/dL   CBC with platelets   Result Value Ref Range    WBC  7.9 4.0 - 11.0 10e9/L    RBC Count 4.07 3.8 - 5.2 10e12/L    Hemoglobin 12.1 11.7 - 15.7 g/dL    Hematocrit 36.7 35.0 - 47.0 %    MCV 90 78 - 100 fl    MCH 29.7 26.5 - 33.0 pg    MCHC 33.0 31.5 - 36.5 g/dL    RDW 12.6 10.0 - 15.0 %    Platelet Count 225 150 - 450 10e9/L     This is a telemedicine visit that was performed with the originating site at [facility] bed and the distant side at [facility] neurology, in [place].  Verbal consent to participate in video visit was obtained.  This visit occurred during the coronavirus (COVID-19)  public health emergency.  I discussed with the patient the nature of our telemedicine visits, that:  - I would evaluate the patient and recommend diagnostics and treatments based on my assessment.   - Our sessions are not being recorded and that personal health information is protected.    - Our team would provide follow-up care in person if and when the patient need it.  Patient identification was verified before the start of the encounter.  I spent with the patient 50  minutes, more than 50% of time in counseling and coordination of care.

## 2020-08-17 NOTE — UTILIZATION REVIEW
"  Admission Status; Secondary Review Determination         Under the authority of the Utilization Management Committee, the utilization review process indicated a secondary review on the above patient.  The review outcome is based on review of the medical records, discussions with staff, and applying clinical experience noted on the date of the review.        (x)      Inpatient Status Appropriate - This patient's medical care is consistent with medical management for inpatient care and reasonable inpatient medical practice.      () Observation Status Appropriate - This patient does not meet hospital inpatient criteria and is placed in observation status. If this patient's primary payer is Medicare and was admitted as an inpatient, Condition Code 44 should be used and patient status changed to \"observation\".   () Admission Status NOT Appropriate - This patient's medical care is not consistent with medical management for Inpatient or Observation Status.          RATIONALE FOR DETERMINATION     45 year old female who presents with seizure activity after possibly missing 2 doses of keppra in setting of known seizure disorder.  She bit her tongue and suffered a laceration.  She continues to be confused and has illogical speech despite being beyond her typical postictal period.  She was loaded with Keppra, and was placed on seizure precautions.  She will have neuro checks and a neurology consult.  A swallow study will be performed, and patient was made npo.  Patient is appropriate for inpatient status.    The severity of illness, intensity of service provided, expected LOS and risk for adverse outcome make the care complex, high risk and appropriate for hospital admission.        The information on this document is developed by the utilization review team in order for the business office to ensure compliance.  This only denotes the appropriateness of proper admission status and does not reflect the quality of care " rendered.         The definitions of Inpatient Status and Observation Status used in making the determination above are those provided in the CMS Coverage Manual, Chapter 1 and Chapter 6, section 70.4.      Sincerely,     Issac Tobias MD  Physician Advisor  Utilization Review/ Case Management  [facility].

## 2020-08-17 NOTE — PLAN OF CARE
Pt is Alert to self. VSS on RA. No noted seizures today. Attempted bedside swallow eval, Pt isn't able to follow commands. Continue NPO. Illogical thinking. Purewick in place. EEG done today. Bedrest. Pt hasn't gotten out of bed today. Video call with neurology.  brought in home MS med.

## 2020-08-18 ENCOUNTER — APPOINTMENT (OUTPATIENT)
Dept: OCCUPATIONAL THERAPY | Facility: CLINIC | Age: 45
DRG: 101 | End: 2020-08-18
Attending: HOSPITALIST
Payer: COMMERCIAL

## 2020-08-18 ENCOUNTER — APPOINTMENT (OUTPATIENT)
Dept: MRI IMAGING | Facility: CLINIC | Age: 45
DRG: 101 | End: 2020-08-18
Attending: PSYCHIATRY & NEUROLOGY
Payer: COMMERCIAL

## 2020-08-18 LAB — LEVETIRACETAM SERPL-MCNC: 27 UG/ML (ref 12–46)

## 2020-08-18 PROCEDURE — 25000132 ZZH RX MED GY IP 250 OP 250 PS 637: Performed by: HOSPITALIST

## 2020-08-18 PROCEDURE — 97166 OT EVAL MOD COMPLEX 45 MIN: CPT | Mod: GO

## 2020-08-18 PROCEDURE — 25800030 ZZH RX IP 258 OP 636: Performed by: HOSPITALIST

## 2020-08-18 PROCEDURE — 97530 THERAPEUTIC ACTIVITIES: CPT | Mod: GO

## 2020-08-18 PROCEDURE — 99232 SBSQ HOSP IP/OBS MODERATE 35: CPT | Performed by: HOSPITALIST

## 2020-08-18 PROCEDURE — 97535 SELF CARE MNGMENT TRAINING: CPT | Mod: GO

## 2020-08-18 PROCEDURE — 25500064 ZZH RX 255 OP 636: Performed by: HOSPITALIST

## 2020-08-18 PROCEDURE — 25000128 H RX IP 250 OP 636: Performed by: HOSPITALIST

## 2020-08-18 PROCEDURE — A9585 GADOBUTROL INJECTION: HCPCS | Performed by: HOSPITALIST

## 2020-08-18 PROCEDURE — 70553 MRI BRAIN STEM W/O & W/DYE: CPT

## 2020-08-18 PROCEDURE — 12000000 ZZH R&B MED SURG/OB

## 2020-08-18 RX ORDER — GADOBUTROL 604.72 MG/ML
7 INJECTION INTRAVENOUS ONCE
Status: COMPLETED | OUTPATIENT
Start: 2020-08-18 | End: 2020-08-18

## 2020-08-18 RX ORDER — HYDROXYZINE HYDROCHLORIDE 25 MG/1
25 TABLET, FILM COATED ORAL 3 TIMES DAILY PRN
Status: DISCONTINUED | OUTPATIENT
Start: 2020-08-18 | End: 2020-08-19 | Stop reason: HOSPADM

## 2020-08-18 RX ADMIN — SODIUM CHLORIDE: 9 INJECTION, SOLUTION INTRAVENOUS at 06:16

## 2020-08-18 RX ADMIN — DIMETHYL FUMARATE 240 MG: 240 CAPSULE ORAL at 19:57

## 2020-08-18 RX ADMIN — LEVETIRACETAM 1250 MG: 100 INJECTION, SOLUTION INTRAVENOUS at 16:19

## 2020-08-18 RX ADMIN — DIMETHYL FUMARATE 240 MG: 240 CAPSULE ORAL at 08:49

## 2020-08-18 RX ADMIN — HYDROXYZINE HYDROCHLORIDE 25 MG: 25 TABLET, FILM COATED ORAL at 12:36

## 2020-08-18 RX ADMIN — GADOBUTROL 7 ML: 604.72 INJECTION INTRAVENOUS at 15:13

## 2020-08-18 RX ADMIN — CEFTRIAXONE SODIUM 1 G: 1 INJECTION, POWDER, FOR SOLUTION INTRAMUSCULAR; INTRAVENOUS at 00:02

## 2020-08-18 RX ADMIN — OXYBUTYNIN 5 MG: 5 TABLET, FILM COATED, EXTENDED RELEASE ORAL at 08:48

## 2020-08-18 RX ADMIN — LEVETIRACETAM 1250 MG: 100 INJECTION, SOLUTION INTRAVENOUS at 04:35

## 2020-08-18 ASSESSMENT — ACTIVITIES OF DAILY LIVING (ADL)
ADLS_ACUITY_SCORE: 16
ADLS_ACUITY_SCORE: 15
PREVIOUS_RESPONSIBILITIES: WORK
ADLS_ACUITY_SCORE: 15
ADLS_ACUITY_SCORE: 15
ADLS_ACUITY_SCORE: 18
ADLS_ACUITY_SCORE: 14

## 2020-08-18 NOTE — PLAN OF CARE
Pt here with breakthrough seizure. Alert, oriented to self only, pleasantly confused. Neuros grossly intact, following most commands. VSS on R/A. Mechanical/dental soft diet, thin liquids. Has not been up yet. Incontinent, purewick in place. Denies pain. Receiving IV abx treatment for UTI. NS running at 100mL/hr. Pt scoring green on the Aggression Stop Light Tool. Plan for MRI today. Discharge pending.

## 2020-08-18 NOTE — PROVIDER NOTIFICATION
"Text page sent to hospitalist: \"FYI Pt has new diffuse red rash on arms, legs, chest and back. Has allergy listed to Benadryl. Do you want to order a different antihistamine? Pt has received 2 doses of abx - possible cause of rash? Please advise. Thank you.\"  "

## 2020-08-18 NOTE — PLAN OF CARE
"Pt admitted with seizure. Alert, oriented to self and able to state that she is in a hospital. VSS on RA. On seizure precautions, no witnessed or reported seizure activity. Confused and forgetful, neuros otherwise intact. CMS intact at first assessment, at second assessment pt endorsed feeling \"numb all over\" but appeared to be confused by question. Denies pain. Ambulating with asst of 1-2 and GB. Currently at MRI.  at the bedside, supportive of pt.    1850 addendum: Pt's spouse reports pt's cognition is close to her baseline. At 1600 assessment pt denied any numbness (CMS intact).  "

## 2020-08-18 NOTE — PROGRESS NOTES
[facility]    Hospitalist Progress Note    Date of Admission:  8/16/2020    Assessment & Plan     Shanna Shanks is a 45 year old female who presents with seizure activity after possibly missing 2 doses of keppra in setting of known seizure disorder     Seizure disorder, breakthrough seizure  Acute metabolic encephalopathy,?  Due to breakthrough seizures  Diagnosed with seizure disorder 3 years ago, initially thought secondary to encephalitis and was briefly on antiepileptics. Then off for about 6 months prior to her 2nd seizure in April 2018. At that time, started on keppra 1000mg BID and had good compliance. Seizure activity started with petit mal symptoms, then proceeded to grand mal, EMS called and she required 10mg IM versed and 2.5mg IV versed in order to break activity (had been ongoing for ~20min).  believes she missed BOTH doses keppra on 8/15 due to missed pills in her pill-box  - Admit inpatient  - Neurology consult  - Received 1500mg keppra load in ED  - Seizure precautions  -Keppra level sent from ED was therapeutic at 27.  - IVF  - Hx recent UTI in July, UA similar to then--started on empiric IV ceftriaxone.  Have discontinued antibiotic on 8/18.  See below.  -Patient seen by neurology on 8/17.  Keppra dose increased to 1250 mg IV twice daily.  MRI brain has been ordered and is pending.     Tongue laceration  Occurred during seizure activity  - Lidocaine solution available for swishing (unable to do magic mouthwash due to benadryl allergy)     Multiple sclerosis  PTA tecfidera, oxybutynin, PRN baclofen, venlafaxine continued     Hx recurrent UTI  Possible UTI  7/22 with >100k E coli resistant to ampicillin and bactrim. Treated with ciprofloxacin.  denies any urinary symptoms (usually noted by increased frequency), she has been on bactrim prophylaxis, but not taking currently  - UA similar appearance to July UA with small leuk esterase and 10-20 WBCs. At that time,  grew e. Coli  - started ceftriaxone at admission.  Received 2 doses.  On 8/18 noted to have diffuse rash on arms, legs, chest and back.?  Possible drug allergy.  New medications have been ceftriaxone.  Was on oral Keppra at home.  Receiving IV Keppra in the hospital.  No other new medications.  -Urine cultures done at admission growing 10,000 and 50,000 gram-positive cocci.?  Possibly enterococcus.  She has had enterococcus UTI in the past and has been treated with amoxicillin.  -She remains afebrile with a normal white count.  Unsure how much of a role possible enterococcus UTI is playing in her presentation.  She is incontinent of urine at baseline.  Have discontinued ceftriaxone.  Await final urine culture.  Observe off antibiotics.     Asymptomatic COVID-19 screening, pending  Low suspicion from symptom standpoint. As being admitted during global pandemic, screening for COVID-19 necessitated  - Add precautions if positive     DVT Prophylaxis: Pneumatic Compression Devices  Code Status: Full Code  Expected discharge: 24-48 hours, recommended to prior living arrangement once seizure work-up completed          Shelley Harmon MD  Text Page (7am - 6pm, M-F)    Interval History   Stable overnight.  This morning she is laying in bed.  Still appears confused but much better than yesterday.  Is able to coherently respond to most of my questions but still appears forgetful at times and responds inappropriately.  Denies any pain or headache.  Moving all extremities.  Eating breakfast.  No fevers.  Denies any dysuria or abdominal pain.    -Data reviewed today: I reviewed all new labs and imaging results over the last 24 hours.     Physical Exam   Temp: 99.1  F (37.3  C) Temp src: Oral BP: 95/56   Heart Rate: 86 Resp: 16 SpO2: 98 % O2 Device: None (Room air)    Vitals:    08/16/20 1500   Weight: 65.8 kg (145 lb)     Vital Signs with Ranges  Temp:  [98.1  F (36.7  C)-99.2  F (37.3  C)] 99.1  F (37.3  C)  Heart Rate:  [71-93]  86  Resp:  [16-18] 16  BP: ()/(56-67) 95/56  SpO2:  [98 %-100 %] 98 %  I/O last 3 completed shifts:  In: -   Out: 1750 [Urine:1750]    Constitutional: Alert, appears comfortable, in no acute distress, laying in bed, in good spirits  Respiratory: Non labored breathing, clear to auscultation bilaterally, no crackles or wheezes  Cardiovascular: Heart sounds regular rate and rhythm, no murmurs, no leg edema  GI: Abdomen is soft, non distended, non tender. Normal BS  Skin/Integumen: no rashes, no pressure sores  Neuro: alert, more coherent and appropriately responding to questions than yesterday, moving all extremities, no facial asymmetry  Psych: Calm    Medications       dimethyl fumarate  240 mg Oral BID     levETIRAcetam  1,250 mg Intravenous Q12H     oxybutynin ER  5 mg Oral Daily     sodium chloride (PF)  3 mL Intracatheter Q8H     [Held by provider] venlafaxine  75 mg Oral Daily       Data   Recent Labs   Lab 08/17/20  0746 08/16/20  1459   WBC 7.9 9.9   HGB 12.1 12.1   MCV 90 91    229    135   POTASSIUM 3.7 4.2   CHLORIDE 108 105   CO2 22 26   BUN 6* 12   CR 0.67 0.78   ANIONGAP 6 4   JULIET 7.7* 8.5   GLC 93 103*   ALBUMIN  --  3.9   PROTTOTAL  --  7.1   BILITOTAL  --  0.6   ALKPHOS  --  42   ALT  --  16   AST  --  14       Imaging  No results found for this or any previous visit (from the past 24 hour(s)).

## 2020-08-18 NOTE — PROGRESS NOTES
08/18/20 1415   Quick Adds   Type of Visit Initial Occupational Therapy Evaluation   Living Environment   Lives With spouse   Living Arrangements house   Home Accessibility stairs to enter home   Number of Stairs, Main Entrance 1   Stair Railings, Main Entrance railing on left side (ascending)   Transportation Anticipated family or friend will provide   Self-Care   Usual Activity Tolerance moderate   Current Activity Tolerance fair   Equipment Currently Used at Home grab bar, tub/shower   Functional Level   Ambulation 0-->independent   Transferring 0-->independent   Toileting 0-->independent   Bathing 1-->assistive equipment   Dressing 0-->independent   Eating 0-->independent   Communication 0-->understands/communicates without difficulty   Swallowing 0-->swallows foods/liquids without difficulty   Cognition 1 - attention or memory deficits   Fall history within last six months no   Which of the above functional risks had a recent onset or change? ambulation;transferring;cognition;bathing;toileting   General Information   Onset of Illness/Injury or Date of Surgery - Date 08/16/20   Referring Physician Shelley Harmon MD   Patient/Family Goals Statement none stated   Additional Occupational Profile Info/Pertinent History of Current Problem 45 year old female who presents with seizure activity after possibly missing 2 doses of keppra in setting of known seizure disorder, PMH: MS   Precautions/Limitations fall precautions;seizure precautions   Cognitive Status Examination   Orientation person   Level of Consciousness alert;confused   Visual Perception   Visual Perception No deficits were identified   Sensory Examination   Sensory Quick Adds No deficits were identified   Pain Assessment   Patient Currently in Pain No   Posture   Posture not impaired   Range of Motion (ROM)   ROM Quick Adds No deficits were identified   Strength   Strength Comments generalized deconditioning    Muscle Tone Assessment   Muscle  Tone Quick Adds No deficits were identified   Coordination   Upper Extremity Coordination No deficits were identified   Mobility   Bed Mobility Bed mobility skill: Sit to supine;Bed mobility skill: Supine to sit   Bed Mobility Skill: Sit to Supine   Level of Major: Sit/Supine stand-by assist   Physical Assist/Nonphysical Assist: Sit/Supine set-up required;supervision;verbal cues   Bed Mobility Skill: Supine to Sit   Level of Major: Supine/Sit stand-by assist   Physical Assist/Nonphysical Assist: Supine/Sit set-up required;supervision;verbal cues   Transfer Skill: Sit to Stand   Level of Major: Sit/Stand stand-by assist   Physical Assist/Nonphysical Assist: Sit/Stand set-up required;supervision   Transfer Skill: Toilet Transfer   Level of Major: Toilet minimum assist (75% patients effort)   Physical Assist/Nonphysical Assist: Toilet set-up required;supervision;verbal cues   Toileting   Level of Major: Toilet stand-by assist   Grooming   Level of Major: Grooming stand-by assist   Physical Assist/Nonphysical Assist: Grooming set-up required;verbal cues   Instrumental Activities of Daily Living (IADL)   Previous Responsibilities work   Activities of Daily Living Analysis   Impairments Contributing to Impaired Activities of Daily Living balance impaired;cognition impaired;coordination impaired;flexibility decreased;pain;strength decreased   General Therapy Interventions   Planned Therapy Interventions ADL retraining;IADL retraining;balance training;cognition;ROM;strengthening;transfer training;home program guidelines;progressive activity/exercise   Clinical Impression   Criteria for Skilled Therapeutic Interventions Met yes, treatment indicated   OT Diagnosis decreased independence in ADLs/IADLs    Influenced by the following impairments cognitive deficits, deconditioning, decreased balance    Assessment of Occupational Performance 1-3 Performance Deficits   Identified  "Performance Deficits social skills, dressing, toileting , work, bathing    Clinical Decision Making (Complexity) Moderate complexity   Therapy Frequency Daily   Predicted Duration of Therapy Intervention (days/wks) 4 days    Anticipated Discharge Disposition Home with Assist;Home with Home Therapy   Risks and Benefits of Treatment have been explained. Yes   Patient, Family & other staff in agreement with plan of care Yes   [facility] TM \"6 Clicks\"   2016, Trustees of [facility], under license to "[employer].  All rights reserved.   6 Clicks Short Forms Daily Activity Inpatient Short Form   [facility]  \"6 Clicks\" Daily Activity Inpatient Short Form   1. Putting on and taking off regular lower body clothing? 3 - A Little   2. Bathing (including washing, rinsing, drying)? 2 - A Lot   3. Toileting, which includes using toilet, bedpan or urinal? 3 - A Little   4. Putting on and taking off regular upper body clothing? 3 - A Little   5. Taking care of personal grooming such as brushing teeth? 3 - A Little   6. Eating meals? 3 - A Little   Daily Activity Raw Score (Score out of 24.Lower scores equate to lower levels of function) 17   Total Evaluation Time   Total Evaluation Time (Minutes) 10     "

## 2020-08-19 ENCOUNTER — APPOINTMENT (OUTPATIENT)
Dept: OCCUPATIONAL THERAPY | Facility: CLINIC | Age: 45
DRG: 101 | End: 2020-08-19
Attending: HOSPITALIST
Payer: COMMERCIAL

## 2020-08-19 ENCOUNTER — APPOINTMENT (OUTPATIENT)
Dept: PHYSICAL THERAPY | Facility: CLINIC | Age: 45
DRG: 101 | End: 2020-08-19
Attending: HOSPITALIST
Payer: COMMERCIAL

## 2020-08-19 VITALS
BODY MASS INDEX: 22.05 KG/M2 | SYSTOLIC BLOOD PRESSURE: 108 MMHG | TEMPERATURE: 98.5 F | HEART RATE: 69 BPM | RESPIRATION RATE: 16 BRPM | WEIGHT: 145 LBS | DIASTOLIC BLOOD PRESSURE: 64 MMHG | OXYGEN SATURATION: 97 %

## 2020-08-19 LAB
BACTERIA SPEC CULT: ABNORMAL
Lab: ABNORMAL
SPECIMEN SOURCE: ABNORMAL

## 2020-08-19 PROCEDURE — 99239 HOSP IP/OBS DSCHRG MGMT >30: CPT | Performed by: HOSPITALIST

## 2020-08-19 PROCEDURE — 25800030 ZZH RX IP 258 OP 636: Performed by: HOSPITALIST

## 2020-08-19 PROCEDURE — 97161 PT EVAL LOW COMPLEX 20 MIN: CPT | Mod: GP | Performed by: PHYSICAL THERAPIST

## 2020-08-19 PROCEDURE — 97535 SELF CARE MNGMENT TRAINING: CPT | Mod: GO | Performed by: OCCUPATIONAL THERAPIST

## 2020-08-19 PROCEDURE — 25000128 H RX IP 250 OP 636: Performed by: HOSPITALIST

## 2020-08-19 PROCEDURE — 97530 THERAPEUTIC ACTIVITIES: CPT | Mod: GP | Performed by: PHYSICAL THERAPIST

## 2020-08-19 PROCEDURE — 25000132 ZZH RX MED GY IP 250 OP 250 PS 637: Performed by: HOSPITALIST

## 2020-08-19 RX ORDER — LEVETIRACETAM 250 MG/1
250 TABLET ORAL 2 TIMES DAILY
Qty: 60 TABLET | Refills: 0 | Status: SHIPPED | OUTPATIENT
Start: 2020-08-19

## 2020-08-19 RX ADMIN — OXYBUTYNIN 5 MG: 5 TABLET, FILM COATED, EXTENDED RELEASE ORAL at 08:31

## 2020-08-19 RX ADMIN — LEVETIRACETAM 1250 MG: 100 INJECTION, SOLUTION INTRAVENOUS at 04:51

## 2020-08-19 RX ADMIN — DIMETHYL FUMARATE 240 MG: 240 CAPSULE ORAL at 08:34

## 2020-08-19 ASSESSMENT — ACTIVITIES OF DAILY LIVING (ADL)
ADLS_ACUITY_SCORE: 14
ADLS_ACUITY_SCORE: 14
ADLS_ACUITY_SCORE: 16
ADLS_ACUITY_SCORE: 14

## 2020-08-19 NOTE — PLAN OF CARE
Pt here with breakthrough seizure. Alert, oriented to self and place. Neuros grossly intact. Soft BPs, otherwise VSS on R/A. Mechanical/dental soft diet, thin liquids. Up w/ A1 GB. Incontinent, purewick in place. Denies pain. Pt scoring green on the Aggression Stop Light Tool. Plan to go home at discharge, pending.

## 2020-08-19 NOTE — PLAN OF CARE
Pt in for seizures. Alert and oriented x2. VSS. On seizure precautions, no witnessed seizure activity during hospital stay. Reviewed discharge instructions with pt and , all questions answered. Pt discharged home with .

## 2020-08-19 NOTE — PLAN OF CARE
Discharge Planner OT   Patient plan for discharge: Home with 24 hour care from family  Current status: Pt completed SLUMS with score of 9/30, indicating severe deficits with recall, short term memory, planning, math calculations, concentration, language. She reports her family helps with all IADL such as transportation, medications, meals. This is also reflected in yesterday's OT note when spouse present and reporting she has 24 hour care at home. Pt was very pleasant and cooperative and in a jovial mood.  Barriers to return to prior living situation: Impaired cognition, balance  Recommendations for discharge: Home with 24 hour care and assist for mobility, bathing, dressing, toileting, assist with all IADL; OP therapy PT/OT at [facility]  Rationale for recommendations: Due to cognition and falls risk, pt would benefit from 24 hour care which she reportedly has. OP PT/OT indicated to maximize (I), strength and activity tolerance. Pt very familiar with [facility].       Entered by: Carla Lima 08/19/2020 12:43 PM     Occupational Therapy Discharge Summary    Reason for therapy discharge:    Discharged to home with outpatient therapy.    Progress towards therapy goal(s). See goals on Care Plan in [facility] electronic health record for goal details.  Goals partially met.  Barriers to achieving goals:   discharge from facility.    Therapy recommendation(s):    Continued therapy is recommended.  Rationale/Recommendations:  24 hour care from family as above; OP OT at [facility].

## 2020-08-19 NOTE — PLAN OF CARE
Discharge Planner PT   Patient plan for discharge: to go home with assist of spouse and daughter  Current status: Orders received. Chart reviewed. Evaluation and treatment initiated. Pt is a  45 year old female with hx of MS admitted for seizure. It is presumed, pt missed some doses of meds. Pt lives in house with spouse and 14 year old daughter and at baseline has memory deficits. Pt reports she gets assist from spouse and daughter but cannot recall if she has a walker to use. One will be ordered if pt does not have one.     Currently , pt is oriented to self. Pt cannot remember why she is in hospital. Pt has decreased strength and coordination in left LE. Pt requires support for static standing and CGA to Tiffanie for transfers and gait. Pt ambulated with walker for 200ft with cues and CGA. Stairs were not assessed due to MD visit.  Barriers to return to prior living situation: fall risk, cognitive deficits  Recommendations for discharge: home with use of walker, 24 hour supervision and assist of spouse and daughter for gait, stairs and  ADLs as needed. Per OT note, [facility] and this is recommended to address balance and mobility deficits.  Rationale for recommendations: Pt is a fall risk and has mobility deficits would benefit from OP setting but pt has mobility with assist of one to return home       Entered by: Yadira Morales 08/19/2020 10:59 AM    PT has own 4WW  Physical Therapy Discharge Summary    Reason for therapy discharge:    Discharged to home with outpatient therapy.    Progress towards therapy goal(s). See goals on Care Plan in [facility] electronic health record for goal details.  Goals partially met.  Barriers to achieving goals:   discharge on same date as initial evaluation.    Therapy recommendation(s):    Continued therapy is recommended.  Rationale/Recommendations:  Recommend [facility].  Continue home exercise program.

## 2020-08-19 NOTE — PROGRESS NOTES
08/19/20 1037   Quick Adds   Type of Visit Initial PT Evaluation   Living Environment   Lives With child(hugo), dependent;spouse   Living Arrangements house   Home Accessibility stairs to enter home;stairs within home   Number of Stairs, Main Entrance 1   Number of Stairs, Within Home, Primary 10   Transportation Anticipated family or friend will provide   Living Environment Comment Stairs to basement living room   Self-Care   Usual Activity Tolerance moderate   Current Activity Tolerance moderate   Regular Exercise No   Equipment Currently Used at Home grab bar, tub/shower   Activity/Exercise/Self-Care Comment Pt cannot remember if she has a walker and spouse did not answer phone. pt cannot remember how much help she gets but says spouse and daughter help   Functional Level Prior   Ambulation 0-->independent   Transferring 0-->independent   Toileting 0-->independent   Bathing 1-->assistive equipment   Communication 0-->understands/communicates without difficulty   Swallowing 0-->swallows foods/liquids without difficulty   Cognition 1 - attention or memory deficits   Fall history within last six months no   Which of the above functional risks had a recent onset or change? ambulation;transferring   Prior Functional Level Comment Pt cannot remember if she has fallen   General Information   Onset of Illness/Injury or Date of Surgery - Date 08/16/20   Referring Physician Dr. Beck   Patient/Family Goals Statement to go home   Pertinent History of Current Problem (include personal factors and/or comorbidities that impact the POC) Pt is a 45 year old female with hx of MS admitted for seizure. Pt  apparantly did not take meds for 2 days.   Precautions/Limitations fall precautions   General Info Comments Pt on commode, getting assist for toileting   Cognitive Status Examination   Orientation person   Level of Consciousness alert   Follows Commands and Answers Questions 100% of the time   Personal Safety and Judgment  "intact   Memory impaired   Pain Assessment   Patient Currently in Pain No   Range of Motion (ROM)   ROM Comment WFL   Strength   Strength Comments Pt has generalized weakness and decreased strength left LE compared to right   Bed Mobility   Bed Mobility Comments independent   Transfer Skills   Transfer Comments sit to stand: CGA   Gait   Gait Comments Pt ambulated with FWW with Tiffanie and cues.    Balance   Balance Comments requires AD   Sensory Examination   Sensory Perception no deficits were identified   Coordination   Coordination Comments Decreased heel to shin left LE compared to right   General Therapy Interventions   Planned Therapy Interventions balance training;gait training;neuromuscular re-education;strengthening;transfer training;home program guidelines;progressive activity/exercise   Clinical Impression   Criteria for Skilled Therapeutic Intervention yes, treatment indicated   PT Diagnosis impaired gait   Influenced by the following impairments Decraesed strength and coordination, decreased balance, decreased memory and impaired cognition   Functional limitations due to impairments fall risk, memory deficits   Clinical Presentation Stable/Uncomplicated   Clinical Presentation Rationale clinical judgmjent   Clinical Decision Making (Complexity) Low complexity   Therapy Frequency Daily   Predicted Duration of Therapy Intervention (days/wks) 1-2 days   Anticipated Equipment Needs at Discharge front wheeled walker   Anticipated Discharge Disposition Home with Outpatient Therapy   Risk & Benefits of therapy have been explained Yes   Patient, Family & other staff in agreement with plan of care Yes   [facility] TM \"6 Clicks\"   2016, Trustees of [facility], under license to [employer].  All rights reserved.   6 Clicks Short Forms Basic Mobility Inpatient Short Form   [facility] [employer]  \"6 Clicks\" V.2 Basic Mobility Inpatient Short Form   1. Turning from your back to your side while " in a flat bed without using bedrails? 4 - None   2. Moving from lying on your back to sitting on the side of a flat bed without using bedrails? 4 - None   3. Moving to and from a bed to a chair (including a wheelchair)? 3 - A Little   4. Standing up from a chair using your arms (e.g., wheelchair, or bedside chair)? 3 - A Little   5. To walk in hospital room? 3 - A Little   6. Climbing 3-5 steps with a railing? 3 - A Little   Basic Mobility Raw Score (Score out of 24.Lower scores equate to lower levels of function) 20   Total Evaluation Time   Total Evaluation Time (Minutes) 15

## 2020-08-19 NOTE — DISCHARGE SUMMARY
Discharge Summary  Hospitalist    Date of Admission:  8/16/2020  Date of Discharge:  8/19/2020  Discharging Provider: Shelley Harmon MD  Date of Service (when I saw the patient): 08/19/20    Discharge Diagnoses   Seizure disorder, breakthrough seizure  Acute metabolic encephalopathy,?  Due to breakthrough seizures    History of Present Illness   Please refer H & P for details.      Hospital Course   Autumn Yazmin Shanks is a 45 year old female who presents with seizure activity after possibly missing 2 doses of keppra in setting of known seizure disorder     Seizure disorder, breakthrough seizure  Acute metabolic encephalopathy,?  Due to breakthrough seizures  Diagnosed with seizure disorder 3 years ago, initially thought secondary to encephalitis and was briefly on antiepileptics. Then off for about 6 months prior to her 2nd seizure in April 2018. At that time, started on keppra 1000mg BID and had good compliance. Seizure activity started with petit mal symptoms, then proceeded to grand mal, EMS called and she required 10mg IM versed and 2.5mg IV versed in order to break activity (had been ongoing for ~20min).  believes she missed BOTH doses keppra on 8/15 due to missed pills in her pill-box  - Admit inpatient  - Neurology consult  - Received 1500mg keppra load in ED  - Seizure precautions  -Keppra level sent from ED was therapeutic at 27.  - IVF  - Hx recent UTI in July, UA similar to then--started on empiric IV ceftriaxone.  Have discontinued antibiotic on 8/18.  See below.  -Patient seen by neurology on 8/17.  Keppra dose increased to 1250 mg  twice daily.  MRI brain showed no acute pathology.  Neurology recommended discharging patient on oral Keppra 1250 mg twice daily.  Follow-up with neurology in 2 to 3 weeks.  They did not recommend holding Effexor.  -Patient felt to be back to cognitive baseline at discharge per spouse.    Tongue laceration  Occurred during seizure activity  - Lidocaine solution  available for swishing (unable to do magic mouthwash due to benadryl allergy)     Multiple sclerosis  PTA tecfidera, oxybutynin, PRN baclofen, venlafaxine continued     Hx recurrent UTI  Possible UTI  7/22 with >100k E coli resistant to ampicillin and bactrim. Treated with ciprofloxacin.  denies any urinary symptoms (usually noted by increased frequency), she has been on bactrim prophylaxis, but not taking currently  - UA similar appearance to July UA with small leuk esterase and 10-20 WBCs. At that time, grew e. Coli  - started ceftriaxone at admission.  Received 2 doses.    -Urine cultures done at admission growing 10,000 and 50,000 gram-positive cocci [4 different strains].  This is most likely contaminant.  Discontinued antibiotics.  -She remains afebrile with a normal white count. She is incontinent of urine at baseline.       Asymptomatic COVID-19 screening  Low suspicion from symptom standpoint. As being admitted during global pandemic, screening for COVID-19 necessitated  -Negative     Patient stable at discharge home with 24-hour assist for all ADLs. OP therapy PT/OT at [facility].  Walker recommended.  Will be ordered if patient does not have one already.    Shelley Harmon MD, MD      Pending Results   These results will be followed up by Hospitalist team.  Unresulted Labs Ordered in the Past 30 Days of this Admission     No orders found from 7/17/2020 to 8/17/2020.          Code Status   Full Code       Primary Care Physician   Malvin Beck MD    Follow-ups Needed After Discharge   Follow-up Appointments     Follow-up and recommended labs and tests       Follow up with primary care provider, Malvin Beck MD, within   7 days for hospital follow- up.  No follow up labs or test are needed.  Follow-up with your neurologist in 2 to 3 weeks.             Physical Exam   Temp: 98.5  F (36.9  C) Temp src: Oral BP: 108/64 Pulse: 69 RETIRE: Heart Rate: 90 Resp: 16 SpO2: 97  % O2 Device: None (Room air)    Vitals:    08/16/20 1500   Weight: 65.8 kg (145 lb)     Vital Signs with Ranges  Temp:  [98.1  F (36.7  C)-98.8  F (37.1  C)] 98.5  F (36.9  C)  Pulse:  [69-80] 69  RETIRE: Heart Rate:  [76-90] 90  Resp:  [16] 16  BP: ()/(59-64) 108/64  SpO2:  [96 %-100 %] 97 %  I/O last 3 completed shifts:  In: 480 [P.O.:480]  Out: -        Constitutional: Alert, appears comfortable, in no acute distress, laying in bed, in good spirits  Respiratory: Non labored breathing, clear to auscultation bilaterally, no crackles or wheezes  Cardiovascular: Heart sounds regular rate and rhythm, no murmurs, no leg edema  GI: Abdomen is soft, non distended, non tender. Normal BS  Skin/Integumen: no rashes, no pressure sores  Neuro: alert, more coherent and appropriately responding to questions than yesterday, moving all extremities, no facial asymmetry  Psych: Calm         Discharge Disposition   Discharged to home  Condition at discharge: Stable    Consultations This Hospital Stay   NEUROLOGY IP CONSULT  PHYSICAL THERAPY ADULT IP CONSULT  OCCUPATIONAL THERAPY ADULT IP CONSULT    Time Spent on this Encounter   IShelley MD, personally saw the patient today and spent greater than 30 minutes discharging this patient.    Discharge Orders      Reason for your hospital stay    You were hospitalized with altered mental status likely secondary to breakthrough seizure.  Your Keppra dose has been increased.     Follow-up and recommended labs and tests     Follow up with primary care provider, Malvin Beck MD, within 7 days for hospital follow- up.  No follow up labs or test are needed.  Follow-up with your neurologist in 2 to 3 weeks.     Activity    Your activity upon discharge: activity as tolerated     When to contact your care team    Call your primary doctor if you have any of the following: Worsening confusion, lethargy, dizziness, breakthrough seizures, difficulty urinating, fever,  abdominal pain     Full Code     Walker    DME Documentation:   Describe the reason for need to support medical necessity: impaired gait    I, the undersigned, certify that the above prescribed supplies are medically necessary for this patient and is both reasonable and necessary in reference to accepted standards of medical and necessary in reference to accepted standards of medical practice in the treatment of this patient's condition and is not prescribed as a convenience.     Diet    Follow this diet upon discharge: Orders Placed This Encounter      Mechanical/Dental Soft Diet     Discharge Medications   Current Discharge Medication List      START taking these medications    Details   !! levETIRAcetam (KEPPRA) 250 MG tablet Take 1 tablet (250 mg) by mouth 2 times daily  Qty: 60 tablet, Refills: 0    Comments: Take along with levetiracetam 1000mg tablets 2 times daily to equal 1250 mg 2 times daily total.  Associated Diagnoses: Seizures (H)       !! - Potential duplicate medications found. Please discuss with provider.      CONTINUE these medications which have NOT CHANGED    Details   baclofen (LIORESAL) 10 MG tablet Take 10-20 mg by mouth 4 times daily as needed   Refills: 3      CRANBERRY EXTRACT PO Take 1 tablet by mouth daily      dimethyl fumarate (TECFIDERA) delayed release capsule Take 240 mg by mouth 2 times daily       !! levETIRAcetam 1000 MG TABS Take 1,000 mg by mouth 2 times daily  Qty: 60 tablet, Refills: 1    Associated Diagnoses: Multiple sclerosis (H)      MELATONIN PO Take 1 tablet by mouth as needed      norethindrone (NORLYDA) 0.35 MG tablet Take 1 tablet (0.35 mg) by mouth daily DUE FOR APPOINTMENT July 2020-NO FURTHER REFILLS  Qty: 84 tablet, Refills: 0    Associated Diagnoses: Surveillance of previously prescribed contraceptive pill      order for DME Equipment being ordered: four wheeled walker with seat and brakes  Qty: 1 Units, Refills: 0    Associated Diagnoses: Encephalopathy       oxybutynin (DITROPAN-XL) 5 MG 24 hr tablet Take 5 mg by mouth daily      venlafaxine (EFFEXOR-ER) 75 MG 24 hr tablet Take 75 mg by mouth daily      VITAMIN D, CHOLECALCIFEROL, PO Take 1,000 Units by mouth daily       !! - Potential duplicate medications found. Please discuss with provider.        Allergies   Allergies   Allergen Reactions     Diphenhydramine Rash     benadryl cream     Nickel      Macrobid [Nitrofurantoin] Rash     Data   Most Recent 3 CBC's:  Recent Labs   Lab Test 08/17/20  0746 08/16/20  1459 10/31/18  1722   WBC 7.9 9.9 4.6   HGB 12.1 12.1 11.8   MCV 90 91 93    229 307      Most Recent 3 BMP's:  Recent Labs   Lab Test 08/17/20  0746 08/16/20  1459 07/22/20  1540 10/31/18  1722    135  --  136   POTASSIUM 3.7 4.2  --  3.7   CHLORIDE 108 105  --  102   CO2 22 26  --  27   BUN 6* 12  --  10   CR 0.67 0.78  --  0.79   ANIONGAP 6 4  --  7   JULIET 7.7* 8.5  --  9.0   GLC 93 103* 93 93     Most Recent 2 LFT's:  Recent Labs   Lab Test 08/16/20  1459 10/03/19  0859   AST 14 16   ALT 16 22   ALKPHOS 42 52   BILITOTAL 0.6 0.5     Most Recent INR's and Anticoagulation Dosing History:  Anticoagulation Dose History     Recent Dosing and Labs Latest Ref Rng & Units 7/24/2017 6/4/2018    INR 0.86 - 1.14 1.05 1.05        Most Recent 3 Troponin's:No lab results found.  Most Recent Cholesterol Panel:  Recent Labs   Lab Test 07/22/20  1540   CHOL 222*   *   HDL 53   TRIG 113     Most Recent 6 Bacteria Isolates From Any Culture (See EPIC Reports for Culture Details):  Recent Labs   Lab Test 08/16/20  2223 07/22/20  1606 06/21/19  0909 03/22/19  0854 01/26/19  1336 12/18/18  0909   CULT 50,000 to 100,000 colonies/mL  Gram positive cocci  *  50,000 to 100,000 colonies/mL  Strain 2  Gram positive cocci  *  50,000 to 100,000 colonies/mL  Strain 3  Gram positive cocci  *  50,000 to 100,000 colonies/mL  Strain 4  Gram positive cocci  * >100,000 colonies/mL  Escherichia coli  *  <10,000  colonies/mL  mixed urogenital jen   >100,000 colonies/mL  urogenital jen  Susceptibility testing not routinely done   >100,000 colonies/mL  mixed urogenital jen   50,000 to 100,000 colonies/mL  urogenital jen  Susceptibility testing not routinely done   >100,000 colonies/mL  mixed urogenital jen  Susceptibility testing not routinely done       Most Recent TSH, T4 and A1c Labs:  Recent Labs   Lab Test 06/04/18  1904 03/07/18  1725   TSH 1.77  --    A1C  --  5.2       Results for orders placed or performed during the hospital encounter of 08/16/20   MR Brain w/o & w Contrast    Narrative    MRI BRAIN WITHOUT AND WITH CONTRAST August 18, 2020 3:14 PM     HISTORY: Altered mental status.     TECHNIQUE: Multiplanar, multisequence MRI of the brain without and  with 7 mL Gadavist.     COMPARISON: Brain MR 6/5/2018.     FINDINGS: There is no evidence of acute infarct, hemorrhage, mass, or  herniation. There is generalized atrophy of the brain. White matter T2  hyperintensities are seen in the cerebral hemispheres consistent with  sequelae of small vessel ischemic disease. Ventricular size is within  normal limits without evidence of hydrocephalus.     There is no abnormal intracranial enhancement.     The facial structures appear normal. The major arterial T2 flow voids  at the base of the brain appear patent.       Impression    IMPRESSION:    1. No evidence of acute infarct, mass, hemorrhage, or herniation.  2. Marked diffuse parenchymal volume loss and white matter changes  likely due to chronic microvascular ischemic disease. Findings appear  grossly similar to most recent MR 6/5/2018.    ROSA MARIA LY MD

## 2020-08-24 ENCOUNTER — TELEPHONE (OUTPATIENT)
Dept: FAMILY MEDICINE | Facility: CLINIC | Age: 45
End: 2020-08-24

## 2020-08-24 NOTE — TELEPHONE ENCOUNTER
ED / Discharge Outreach Protocol    Patient Contact    Attempt # 1    Was call answered?  No.  Left message on voicemail with information to call me back.    Citlaly Quintana RN

## 2020-08-25 NOTE — TELEPHONE ENCOUNTER
ED / Discharge Outreach Protocol    Patient Contact    Attempt # 2    Was call answered?  No.  Left message on voicemail with information to call me back.    Citlaly Quintana RN

## 2020-10-08 ENCOUNTER — OFFICE VISIT (OUTPATIENT)
Dept: URGENT CARE | Facility: URGENT CARE | Age: 45
End: 2020-10-08
Payer: COMMERCIAL

## 2020-10-08 VITALS
WEIGHT: 145.9 LBS | BODY MASS INDEX: 22.18 KG/M2 | SYSTOLIC BLOOD PRESSURE: 104 MMHG | OXYGEN SATURATION: 98 % | TEMPERATURE: 99.3 F | HEART RATE: 76 BPM | DIASTOLIC BLOOD PRESSURE: 64 MMHG

## 2020-10-08 DIAGNOSIS — G35 MULTIPLE SCLEROSIS (H): ICD-10-CM

## 2020-10-08 DIAGNOSIS — R35.0 URINARY FREQUENCY: Primary | ICD-10-CM

## 2020-10-08 LAB
ALBUMIN UR-MCNC: NEGATIVE MG/DL
APPEARANCE UR: ABNORMAL
BILIRUB UR QL STRIP: NEGATIVE
COLOR UR AUTO: YELLOW
GLUCOSE UR STRIP-MCNC: NEGATIVE MG/DL
HGB UR QL STRIP: ABNORMAL
KETONES UR STRIP-MCNC: NEGATIVE MG/DL
LEUKOCYTE ESTERASE UR QL STRIP: NEGATIVE
NITRATE UR QL: NEGATIVE
PH UR STRIP: 7 PH (ref 5–7)
RBC #/AREA URNS AUTO: NORMAL /HPF
SOURCE: ABNORMAL
SP GR UR STRIP: 1.02 (ref 1–1.03)
UROBILINOGEN UR STRIP-ACNC: 0.2 EU/DL (ref 0.2–1)
WBC #/AREA URNS AUTO: NORMAL /HPF

## 2020-10-08 PROCEDURE — 81001 URINALYSIS AUTO W/SCOPE: CPT | Performed by: NURSE PRACTITIONER

## 2020-10-08 PROCEDURE — 99214 OFFICE O/P EST MOD 30 MIN: CPT | Performed by: NURSE PRACTITIONER

## 2020-10-09 NOTE — PROGRESS NOTES
SUBJECTIVE:   Shanna Shanks is a 45 year old female who  presents today for a possible UTI. Symptoms of frequency have been going on for 2day(s).  Hematuria no.  sudden onsetand mild.  There is no history of fever, chills, nausea or vomiting.  No history of vaginal or penile discharge. This patient does  have a history of urinary tract infections. Patient denies long duration, flank pain, temperature > 101 degrees F. and Vomiting, significant nausea or diarrhea or vaginal discharge, vaginal odor and vaginal itching   Has MS and gets UTI's frequently. Last UTI was in August 2020. See's a urologist.     Past Medical History:   Diagnosis Date     Multiple sclerosis (H) 3/2/96    last exacerbation 3yrs ago, no meds x 6 months     Seizure disorder (H)      Tobacco dependency      Current Outpatient Medications   Medication Sig Dispense Refill     baclofen (LIORESAL) 10 MG tablet Take 10-20 mg by mouth 4 times daily as needed   3     CRANBERRY EXTRACT PO Take 1 tablet by mouth daily       dimethyl fumarate (TECFIDERA) delayed release capsule Take 240 mg by mouth 2 times daily        levETIRAcetam (KEPPRA) 250 MG tablet Take 1 tablet (250 mg) by mouth 2 times daily 60 tablet 0     MELATONIN PO Take 1 tablet by mouth as needed       NORLYDA 0.35 MG tablet TAKE 1 TABLET(0.35 MG) BY MOUTH DAILY 84 tablet 0     oxybutynin (DITROPAN-XL) 5 MG 24 hr tablet Take 5 mg by mouth daily       venlafaxine (EFFEXOR-ER) 75 MG 24 hr tablet Take 75 mg by mouth daily       VITAMIN D, CHOLECALCIFEROL, PO Take 1,000 Units by mouth daily       levETIRAcetam 1000 MG TABS Take 1,000 mg by mouth 2 times daily 60 tablet 1     order for DME Equipment being ordered: four wheeled walker with seat and brakes 1 Units 0     Social History     Tobacco Use     Smoking status: Former Smoker     Packs/day: 0.25     Types: Cigarettes     Smokeless tobacco: Never Used     Tobacco comment: quit in 2017   Substance Use Topics     Alcohol use: No      Alcohol/week: 5.8 standard drinks     Types: 7 Standard drinks or equivalent per week       ROS:   Review of systems negative except as stated above.    OBJECTIVE:  /64   Pulse 76   Temp 99.3  F (37.4  C) (Temporal)   Wt 66.2 kg (145 lb 14.4 oz)   SpO2 98%   BMI 22.18 kg/m    GENERAL APPEARANCE: healthy, alert and no distress  RESP: lungs clear to auscultation - no rales, rhonchi or wheezes  CV: regular rates and rhythm, normal S1 S2, no murmur noted  ABDOMEN:  soft, nontender, no HSM or masses and bowel sounds normal  BACK: No CVA tenderness  SKIN: no suspicious lesions or rashes    Results for orders placed or performed in visit on 10/08/20   *UA reflex to Microscopic and Culture ([facility] (except [place] and [place])     Status: Abnormal    Specimen: Midstream Urine   Result Value Ref Range    Color Urine Yellow     Appearance Urine Slightly Cloudy     Glucose Urine Negative NEG^Negative mg/dL    Bilirubin Urine Negative NEG^Negative    Ketones Urine Negative NEG^Negative mg/dL    Specific Gravity Urine 1.025 1.003 - 1.035    Blood Urine Trace (A) NEG^Negative    pH Urine 7.0 5.0 - 7.0 pH    Protein Albumin Urine Negative NEG^Negative mg/dL    Urobilinogen Urine 0.2 0.2 - 1.0 EU/dL    Nitrite Urine Negative NEG^Negative    Leukocyte Esterase Urine Negative NEG^Negative    Source Midstream Urine    Urine Microscopic     Status: None   Result Value Ref Range    WBC Urine 0 - 5 OTO5^0 - 5 /HPF    RBC Urine O - 2 OTO2^O - 2 /HPF       Shanna was seen today for urgent care and uti.    Diagnoses and all orders for this visit:    Urinary frequency  -     *UA reflex to Microscopic and Culture ([facility] (except [place] and [place])    Multiple sclerosis (H)    Urine not suggestive of UTI. Symptoms most likely related to her MS.   Will have her f/u with urology for further issues.

## 2021-01-15 ENCOUNTER — HEALTH MAINTENANCE LETTER (OUTPATIENT)
Age: 46
End: 2021-01-15

## 2021-01-23 ENCOUNTER — HEALTH MAINTENANCE LETTER (OUTPATIENT)
Age: 46
End: 2021-01-23

## 2021-04-05 ENCOUNTER — HOSPITAL ENCOUNTER (OUTPATIENT)
Dept: REHABILITATION | Facility: CLINIC | Age: 46
End: 2021-04-05
Attending: FAMILY MEDICINE
Payer: COMMERCIAL

## 2021-04-05 PROCEDURE — S5102 ADULT DAY CARE PER DIEM: HCPCS | Performed by: OCCUPATIONAL THERAPIST

## 2021-04-05 NOTE — PROGRESS NOTES
INDIVIDUAL PLAN OF CARE   [facility]    Member Name: Shanna Shanks; YOB: 1975  MRN: [id number]  4/5/2021    Goals developed in collaboration with: member  Staff responsible for plan: Nyla GARCIA 2022  1. Long Term Goal (concrete, measurable, and time specific outcomes): Member will improve or maintain cognitive functioning while maintaining dignity and respect.  Member will improve or maintain social skills through specially designed activities.   Target Date: July 2021  Bi-Annual Review:    2. Short Term Goal: (concrete, measurable, and time specific outcomes): Member will use both upper extremity for 15 minutes while engaging in art activity.   Target Date: July 2021  Bi-Annual Review:    3. Short Term Goal: (concrete, measurable, and time specific outcomes): Member will explore various options for improved nutrition and water consumption in order to prevent dehydration.  Member will have staff assist her with ambulating and help in the bathroom. Staff will facilitate bathroom visits 2-3 per day.    Target Date: July 2021  Bi-Annual Review:

## 2021-04-05 NOTE — PROGRESS NOTES
[facility]  Needs Assessment  Falls:      Have you fallen 2 or more times in the past year? no    Have you fallen and gotten hurt in the past year? no  Ambulation:    Are you able to ambulate? yes    Do you need a physical device when walking? no  Mobility:     What type of wheelchair will you use at the day program? NA  For safety, do you need assistance maneuvering your wheelchair? NA  Medication:    Will you need assistance with medication during your day program hours? No  Eating:    How much assistance is needed with self-feeding? independent    Do you have any food allergies? No    Do you have any swallowing precautions? No  Dressing:    How much assistance is needed with upper body dressing? Min assist    How much assistance is needed with lower body dressing? Min assist  Toileting:    Do you have a catheter? no    If so, can you empty the catheter yourself? NA    How much assistance is needed with a toilet transfer? SBA    Do you need a lift for a toilet transfer? NO  Vision:  WFL  Hearing: WFL  Communication:  WFL  Cognition: impaired

## 2021-04-06 NOTE — PROGRESS NOTES
"SELF-PRESERVATION FORM  [facility]    Member Name: Shanna Shanks; YOB: 1975  MRN: [id number]  4/6/2021    A. The person is ambulatory or mobile. Yes  B. The person has the combined physical and mental capacity to:  1. Recognize a danger, signal or alarm requiring evacuation from the center: Yes  2. Initiate and complete the evacuation without requiring more than sporadic assistance from another person, such as help in opening a door or getting into a wheelchair: No  3. Select an alternative means of escape or take another appropriate action if the primary escape route is blocked: No  4. Remain at a designated location outside the center until further instruction is given: Yes    \"Capable of taking appropriate action for self-preservation under emergency conditions\" is the designation applied in parts 1674.3356 to 0408.5114 to an adult who meets the criteria in items A and B.    FAC Self-Preservation Status: Not capable of self-preservation    "

## 2021-04-06 NOTE — PROGRESS NOTES
Individual abuse prevention plan (IAPP)  [facility]     Assessment of Susceptibility to Abuse, Including Self Abuse, Neglect (Identification of characteristics, which make the individual susceptible to abuse, and how these characteristics cause the individual to be susceptible to abuse.)     Is the person susceptible to abuse in each area?  Sexual Abuse:   No  Referrals made when the person is susceptible to abuse outside the scope or control of this program (Identify the referral and date it occurred): No additional risk reduction means needed at this time    Physical Abuse:   No  Referrals made when the person is susceptible to abuse outside the scope or control of this program (Identify the referral and date it occurred): No additional risk reduction means needed at this time    Self-Abuse:   No  Referrals made when the person is susceptible to abuse outside the scope or control of this program (Identify the referral and date it occurred): No additional risk reduction means needed at this time    Financial  Exploitation  No  Referrals made when the person is susceptible to abuse outside the scope or control of this program (Identify the referral and date it occurred): No additional risk reduction means needed at this time    Is the program aware of this person committing a violent crime or act of physical aggression towards others?  No  Referrals made when the person is susceptible to abuse outside the scope or control of this program (Identify the referral and date it occurred): No additional risk reduction means needed at this time    INDIVIDUAL ABUSE PREVENTION PLAN-MEASURES TAKEN TO MINIMIZE RISK OF ABUSE   ADL:   Assist with clothing management  Assist with toileting  Ambulation/Transfers/Wheelchairs:  Assist with all transfers and/or ambulation   Behavior:   No concerns  Communication:  Encourage verbalization of needs/concerns  Cognitive Issues:   Moderate cognitive impairment due to  MS.  Diet:   No concerns  Exercise:   Encourage participation in maintenance program  Hearing:   No concerns  Isolation:   Encourage socialization due to isolation in home environment  Medical Monitoring:   Monitor physical and emotional comfort  Mental Health:   Encourage client to express feelings  Encourage regular attendance for socialization and stimulation  Offer emotional support  Sensory:   Provide and encourage participation in activities for stimulation  Vision:   No concerns  Other: N/A    Developed in consultation with:  Client  The Program Abuse Prevention Plan/IAPP identifies the specific actions that minimize abuse to the [facility] participant.  Yes

## 2021-04-08 ENCOUNTER — HOSPITAL ENCOUNTER (OUTPATIENT)
Dept: REHABILITATION | Facility: CLINIC | Age: 46
End: 2021-04-08
Attending: FAMILY MEDICINE
Payer: COMMERCIAL

## 2021-04-08 PROCEDURE — S5102 ADULT DAY CARE PER DIEM: HCPCS | Performed by: OCCUPATIONAL THERAPIST

## 2021-04-12 ENCOUNTER — HOSPITAL ENCOUNTER (OUTPATIENT)
Dept: REHABILITATION | Facility: CLINIC | Age: 46
End: 2021-04-12
Attending: FAMILY MEDICINE
Payer: COMMERCIAL

## 2021-04-12 PROCEDURE — S5102 ADULT DAY CARE PER DIEM: HCPCS | Performed by: OCCUPATIONAL THERAPIST

## 2021-04-15 ENCOUNTER — HOSPITAL ENCOUNTER (OUTPATIENT)
Dept: REHABILITATION | Facility: CLINIC | Age: 46
End: 2021-04-15
Attending: FAMILY MEDICINE
Payer: COMMERCIAL

## 2021-04-15 PROCEDURE — S5102 ADULT DAY CARE PER DIEM: HCPCS

## 2021-04-22 ENCOUNTER — HOSPITAL ENCOUNTER (OUTPATIENT)
Dept: REHABILITATION | Facility: CLINIC | Age: 46
End: 2021-04-22
Attending: FAMILY MEDICINE
Payer: COMMERCIAL

## 2021-04-22 PROCEDURE — S5102 ADULT DAY CARE PER DIEM: HCPCS

## 2021-04-23 NOTE — PROGRESS NOTES
[facility] RN Note    Previous documentation reviewed.  No concerns reported by AC staff or member.

## 2021-04-26 ENCOUNTER — HOSPITAL ENCOUNTER (OUTPATIENT)
Dept: REHABILITATION | Facility: CLINIC | Age: 46
End: 2021-04-26
Attending: FAMILY MEDICINE
Payer: COMMERCIAL

## 2021-04-26 PROCEDURE — S5102 ADULT DAY CARE PER DIEM: HCPCS | Performed by: OCCUPATIONAL THERAPIST

## 2021-04-29 ENCOUNTER — HOSPITAL ENCOUNTER (OUTPATIENT)
Dept: REHABILITATION | Facility: CLINIC | Age: 46
End: 2021-04-29
Attending: FAMILY MEDICINE
Payer: COMMERCIAL

## 2021-04-29 PROCEDURE — S5102 ADULT DAY CARE PER DIEM: HCPCS | Performed by: OCCUPATIONAL THERAPIST

## 2021-05-03 ENCOUNTER — HOSPITAL ENCOUNTER (OUTPATIENT)
Dept: REHABILITATION | Facility: CLINIC | Age: 46
End: 2021-05-03
Attending: FAMILY MEDICINE
Payer: COMMERCIAL

## 2021-05-03 PROCEDURE — S5102 ADULT DAY CARE PER DIEM: HCPCS

## 2021-05-06 NOTE — PROGRESS NOTES
"[facility] Social History    Full Name: Shanna Shanks        MRN: [id number]    YOB: 1975  Nickname:TYRELL      Sex: F     Home Phone: [phone]      Cell Phone: [phone]  Address: [address] [place] [address]//Zip: [place]  [address]  County: [place]       E-mail:[email or web address]        Transportation:    [employer]  Language:English         needed? No       Ethnicity: Caucasion  Race: White      Country of Origin: [place]       Sabianist: [employer]  Marital Status:                   Spouse/Significant Other: [place] Petit   ______________________________________________________________________________________     [place]: No    Branch of Service: NA      Education Level: High School- [employer]   Job History: Self employeed       Organizations/Clubs: NA  Whom do you live with?  and daughter  Current living arrangement:  Home   Number of Children: 2  List: Michel and Tegan  Number of Siblings: 7     List: Narendra, Aleksandra, Daisha, Elida, Jesus, Iain, Kyle  Other Important People/Pets: 2 cats and 1 dog  What else should we know about you?  Loves watching juggling.  Favorite color is bably blue.  George with her mother.  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     Primary Care Provider: Dr. Beck        Neurologist: Dr. Talha Reese  Emergency Contacts:  1. [place] Petmark      Relationship: Spouse    Phone: [phone]  2. Aleksandra Shanks         Relationship: sister     Phone:[phone]    Updated on 7/30/2018,  7/29/2019, 5/6/2021        "

## 2021-05-07 NOTE — PROGRESS NOTES
MONTHLY PROGRESS NOTE AND PARTICIPATION REPORT   [facility]    Shanna Shanks, 1975  Attendance: Please see Epic for attendance record.    Communication:   Does communicate   Hearing:   No impairment  Vision:   No impairment  Orientation/Cognition:   Short term memory loss  Behavior:   No behavioral concerns  Requires redirection  Self-Preservation Skills:   Not capable of self-preservation  Eating:   Independent  Ambulation Walking:   Needs assistance SBA rolling walker  Transferring:   Aide of one person/two  Wheelchair:   NA  Toileting:   Needs assistance  Maintenance Program:     [employer]  Living Arrangements:   Lives with family  Spiritual Needs: Needs are being met through support group  Medication Assistance:   Medication not taken during program hours  Participation Report:   Support Group  Cognitive Stimulation  Creative Arts/Crafts  Games  Speakers/Entertainment  Socialization  Current Events/News  Education/Health Topic  Level of Participation:   Active     May

## 2021-05-10 ENCOUNTER — HOSPITAL ENCOUNTER (OUTPATIENT)
Dept: REHABILITATION | Facility: CLINIC | Age: 46
End: 2021-05-10
Attending: FAMILY MEDICINE
Payer: COMMERCIAL

## 2021-05-10 PROCEDURE — S5102 ADULT DAY CARE PER DIEM: HCPCS | Performed by: OCCUPATIONAL THERAPIST

## 2021-05-13 ENCOUNTER — HOSPITAL ENCOUNTER (OUTPATIENT)
Dept: REHABILITATION | Facility: CLINIC | Age: 46
End: 2021-05-13
Attending: FAMILY MEDICINE
Payer: COMMERCIAL

## 2021-05-13 PROCEDURE — S5102 ADULT DAY CARE PER DIEM: HCPCS | Performed by: REHABILITATION PRACTITIONER

## 2021-05-17 ENCOUNTER — HOSPITAL ENCOUNTER (OUTPATIENT)
Dept: REHABILITATION | Facility: CLINIC | Age: 46
End: 2021-05-17
Attending: FAMILY MEDICINE
Payer: COMMERCIAL

## 2021-05-17 PROCEDURE — S5102 ADULT DAY CARE PER DIEM: HCPCS | Performed by: OCCUPATIONAL THERAPIST

## 2021-05-20 ENCOUNTER — HOSPITAL ENCOUNTER (OUTPATIENT)
Dept: REHABILITATION | Facility: CLINIC | Age: 46
End: 2021-05-20
Attending: FAMILY MEDICINE
Payer: COMMERCIAL

## 2021-05-20 PROCEDURE — S5102 ADULT DAY CARE PER DIEM: HCPCS

## 2021-05-24 ENCOUNTER — HOSPITAL ENCOUNTER (OUTPATIENT)
Dept: REHABILITATION | Facility: CLINIC | Age: 46
End: 2021-05-24
Attending: FAMILY MEDICINE
Payer: COMMERCIAL

## 2021-05-24 PROCEDURE — S5102 ADULT DAY CARE PER DIEM: HCPCS

## 2021-06-03 ENCOUNTER — HOSPITAL ENCOUNTER (OUTPATIENT)
Dept: REHABILITATION | Facility: CLINIC | Age: 46
End: 2021-06-03
Attending: FAMILY MEDICINE
Payer: COMMERCIAL

## 2021-06-03 PROCEDURE — S5102 ADULT DAY CARE PER DIEM: HCPCS

## 2021-06-14 ENCOUNTER — HOSPITAL ENCOUNTER (OUTPATIENT)
Dept: REHABILITATION | Facility: CLINIC | Age: 46
End: 2021-06-14
Attending: FAMILY MEDICINE
Payer: COMMERCIAL

## 2021-06-14 PROCEDURE — S5102 ADULT DAY CARE PER DIEM: HCPCS | Performed by: REHABILITATION PRACTITIONER

## 2021-06-17 ENCOUNTER — HOSPITAL ENCOUNTER (OUTPATIENT)
Dept: REHABILITATION | Facility: CLINIC | Age: 46
End: 2021-06-17
Attending: FAMILY MEDICINE
Payer: COMMERCIAL

## 2021-06-17 PROCEDURE — S5102 ADULT DAY CARE PER DIEM: HCPCS | Performed by: REHABILITATION PRACTITIONER

## 2021-06-18 NOTE — PROGRESS NOTES
MONTHLY PROGRESS NOTE AND PARTICIPATION REPORT   [facility]    Shanna Shanks, 1975  Attendance: Please see Epic for attendance record.    Communication:   Does communicate   Hearing:   No impairment  Vision:   No impairment  Orientation/Cognition:   Short term memory loss  Behavior:   No behavioral concerns  Requires redirection  Self-Preservation Skills:   Not capable of self-preservation  Eating:   Independent  Ambulation Walking:   Needs assistance SBA rolling walker  Transferring:   Aide of one person/two  Wheelchair:   NA  Toileting:   Needs assistance  Maintenance Program:     [employer]  Living Arrangements:   Lives with family  Spiritual Needs: Needs are being met through support group  Medication Assistance:   Medication not taken during program hours  Participation Report:   Support Group  Cognitive Stimulation  Creative Arts/Crafts  Games  Speakers/Entertainment  Socialization  Current Events/News  Education/Health Topic  Level of Participation:   Active     May

## 2021-06-21 ENCOUNTER — HOSPITAL ENCOUNTER (OUTPATIENT)
Dept: REHABILITATION | Facility: CLINIC | Age: 46
End: 2021-06-21
Attending: FAMILY MEDICINE
Payer: COMMERCIAL

## 2021-06-21 PROCEDURE — S5102 ADULT DAY CARE PER DIEM: HCPCS

## 2021-06-21 NOTE — PROGRESS NOTES
"[facility] Note:  The Joseph Cognitive Assessment (MoCa) was completed on this day program member on 6/21/21 by HOLLIE Gaytan. The patient scored 11/30. The MOCA is a 30 point cognitive screening assessment. Normal score is considered ? 26/30. The following ranges may be used to grade severity: 18-26 = mild cognitive impairment, 10-17= moderate cognitive impairment (MCI) and less than 10= severe cognitive impairment. The MoCA assesses different cognitive domains including attention and concentration, executive functions, memory, language, visuoconstructional skills, conceptual thinking, calculations and orientation.   Pt completed the MoCa basic on 6/21/21. Pt performed the assessment on 5/15/18, version 7.1, and scored a 9/30. During the assessment pt was frequently distracted by the office. Pt would frequently forget what OTAS had asked and would look at the OTAS and ask \"what?\". The current score shows not much change from last MoCa that was performed and pt has a severe cognitive impairment.   "

## 2021-06-24 ENCOUNTER — HOSPITAL ENCOUNTER (OUTPATIENT)
Dept: REHABILITATION | Facility: CLINIC | Age: 46
End: 2021-06-24
Attending: FAMILY MEDICINE
Payer: COMMERCIAL

## 2021-06-24 PROCEDURE — S5102 ADULT DAY CARE PER DIEM: HCPCS | Performed by: REHABILITATION PRACTITIONER

## 2021-06-28 ENCOUNTER — HOSPITAL ENCOUNTER (OUTPATIENT)
Dept: REHABILITATION | Facility: CLINIC | Age: 46
End: 2021-06-28
Attending: FAMILY MEDICINE
Payer: COMMERCIAL

## 2021-06-28 PROCEDURE — S5102 ADULT DAY CARE PER DIEM: HCPCS | Performed by: OCCUPATIONAL THERAPIST

## 2021-07-01 ENCOUNTER — HOSPITAL ENCOUNTER (OUTPATIENT)
Dept: REHABILITATION | Facility: CLINIC | Age: 46
End: 2021-07-01
Attending: FAMILY MEDICINE
Payer: COMMERCIAL

## 2021-07-01 PROCEDURE — S5102 ADULT DAY CARE PER DIEM: HCPCS | Performed by: OCCUPATIONAL THERAPIST

## 2021-07-15 ENCOUNTER — HOSPITAL ENCOUNTER (OUTPATIENT)
Dept: REHABILITATION | Facility: CLINIC | Age: 46
End: 2021-07-15
Attending: FAMILY MEDICINE
Payer: COMMERCIAL

## 2021-07-15 PROCEDURE — S5102 ADULT DAY CARE PER DIEM: HCPCS | Performed by: OCCUPATIONAL THERAPIST

## 2021-07-19 ENCOUNTER — HOSPITAL ENCOUNTER (OUTPATIENT)
Dept: REHABILITATION | Facility: CLINIC | Age: 46
End: 2021-07-19
Attending: FAMILY MEDICINE
Payer: COMMERCIAL

## 2021-07-19 PROCEDURE — S5102 ADULT DAY CARE PER DIEM: HCPCS

## 2021-07-19 NOTE — PROGRESS NOTES
Individual abuse prevention plan (IAPP)  [facility]     Assessment of Susceptibility to Abuse, Including Self Abuse, Neglect (Identification of characteristics, which make the individual susceptible to abuse, and how these characteristics cause the individual to be susceptible to abuse.)     Is the person susceptible to abuse in each area?  Sexual Abuse:   No  Referrals made when the person is susceptible to abuse outside the scope or control of this program (Identify the referral and date it occurred): No additional risk reduction means needed at this time    Physical Abuse:   No  Referrals made when the person is susceptible to abuse outside the scope or control of this program (Identify the referral and date it occurred): No additional risk reduction means needed at this time    Self-Abuse:   No  Referrals made when the person is susceptible to abuse outside the scope or control of this program (Identify the referral and date it occurred): No additional risk reduction means needed at this time    Financial  Exploitation  No  Referrals made when the person is susceptible to abuse outside the scope or control of this program (Identify the referral and date it occurred): No additional risk reduction means needed at this time    Is the program aware of this person committing a violent crime or act of physical aggression towards others?  No  Referrals made when the person is susceptible to abuse outside the scope or control of this program (Identify the referral and date it occurred): No additional risk reduction means needed at this time    INDIVIDUAL ABUSE PREVENTION PLAN-MEASURES TAKEN TO MINIMIZE RISK OF ABUSE   ADL:   Assist with toileting  Ambulation/Transfers/Wheelchairs:  Assist with all transfers and/or ambulation   Behavior:   Patient has cognitive impairment and is often confused.  Communication:  Encourage verbalization of needs/concerns  Cognitive Issues:   Provider reminders due to  confusion, forgetfulness  Diet:   no concerns  Exercise:   Encourage participation in maintenance program  Hearing:   No concerns  Isolation:   Encourage socialization due to isolation in home environment  Medical Monitoring:   Monitor physical and emotional comfort  Mental Health:   Motivate client to join in activities that are beneficial to client  Sensory:   Provide and encourage participation in activities for stimulation  Vision:   No concerns  Other: N/A    Developed in consultation with:  Client  The Program Abuse Prevention Plan/IAPP identifies the specific actions that minimize abuse to the [facility] participant.  Yes

## 2021-07-20 NOTE — PROGRESS NOTES
MONTHLY PROGRESS NOTE AND PARTICIPATION REPORT   [facility]    Shanna Shanks, 1975  Attendance: Please see Epic for attendance record.    Communication:   Does communicate   Hearing:   No impairment  Vision:   No impairment  Orientation/Cognition:   Minor forgetfulness  Partial disorientation  Short term memory loss  Behavior:   No behavioral concerns  Self-Preservation Skills:   Not capable of self-preservation  Eating:   Independent  Ambulation Walking:   Needs assistance  Transferring:   Aide of one person/two  Wheelchair:   NA  Toileting:   Needs assistance  Maintenance Program:     [facility]  Living Arrangements:   Lives with family  Spiritual Needs: Needs are being met through support group  Medication Assistance:   Medication not taken during program hours  Participation Report:   Aerobics/Exercise  Support Group  Cognitive Stimulation  Fire Drill  Creative Arts/Crafts  Games  Gardening  Speakers/Entertainment  Socialization  Current Events/News  Education/Health Topic  Level of Participation:   Active

## 2021-07-21 NOTE — PROGRESS NOTES
[facility] RN Note    Previous documentation reviewed.  No concerns reported by member.    Shanna requires supervision with certain tasks.  See Abuse prevention plan.

## 2021-07-22 ENCOUNTER — HOSPITAL ENCOUNTER (OUTPATIENT)
Dept: REHABILITATION | Facility: CLINIC | Age: 46
End: 2021-07-22
Attending: FAMILY MEDICINE
Payer: COMMERCIAL

## 2021-07-22 PROCEDURE — S5102 ADULT DAY CARE PER DIEM: HCPCS

## 2021-07-23 NOTE — PROGRESS NOTES
INDIVIDUAL PLAN OF CARE   [facility]    Member Name: Shanna Shanks; YOB: 1975  MRN: [id number]  4/5/2021    Goals developed in collaboration with: member  Staff responsible for plan: Nyla GARCIA 2022  1. Long Term Goal (concrete, measurable, and time specific outcomes): Member will improve or maintain cognitive functioning while maintaining dignity and respect.  Member will improve or maintain social skills through specially designed activities.   Target Date: July 2021  Bi-Annual Review:  Goal remains appropriate    2. Short Term Goal: (concrete, measurable, and time specific outcomes): Member will use both upper extremity for 15 minutes while engaging in art activity.   Target Date: July 2021  Bi-Annual Review:  Goal remains appropriate    3. Short Term Goal: (concrete, measurable, and time specific outcomes): Member will explore various options for improved nutrition and water consumption in order to prevent dehydration.  Member will have staff assist her with ambulating and help in the bathroom. Staff will facilitate bathroom visits 2-3 per day.    Target Date: July 2021  Bi-Annual Review:  Goal remains appropriate

## 2021-07-26 ENCOUNTER — HOSPITAL ENCOUNTER (OUTPATIENT)
Dept: REHABILITATION | Facility: CLINIC | Age: 46
End: 2021-07-26
Attending: FAMILY MEDICINE
Payer: COMMERCIAL

## 2021-07-26 PROCEDURE — S5102 ADULT DAY CARE PER DIEM: HCPCS

## 2021-07-29 ENCOUNTER — HOSPITAL ENCOUNTER (OUTPATIENT)
Dept: REHABILITATION | Facility: CLINIC | Age: 46
End: 2021-07-29
Attending: FAMILY MEDICINE
Payer: COMMERCIAL

## 2021-07-29 PROCEDURE — S5102 ADULT DAY CARE PER DIEM: HCPCS | Performed by: REHABILITATION PRACTITIONER

## 2021-08-02 ENCOUNTER — HOSPITAL ENCOUNTER (OUTPATIENT)
Dept: REHABILITATION | Facility: CLINIC | Age: 46
End: 2021-08-02
Attending: FAMILY MEDICINE
Payer: COMMERCIAL

## 2021-08-02 PROCEDURE — S5102 ADULT DAY CARE PER DIEM: HCPCS | Performed by: OCCUPATIONAL THERAPIST

## 2021-08-05 ENCOUNTER — HOSPITAL ENCOUNTER (OUTPATIENT)
Dept: REHABILITATION | Facility: CLINIC | Age: 46
End: 2021-08-05
Attending: FAMILY MEDICINE
Payer: COMMERCIAL

## 2021-08-05 PROCEDURE — S5102 ADULT DAY CARE PER DIEM: HCPCS | Performed by: REHABILITATION PRACTITIONER

## 2021-08-06 NOTE — PROGRESS NOTES
MONTHLY PROGRESS NOTE AND PARTICIPATION REPORT   [facility]    Shanna Shanks, 1975  Attendance: Please see Epic for attendance record.    Communication:   Does communicate   Hearing:   No impairment  Vision:   No impairment  Orientation/Cognition:   Minor forgetfulness  Partial disorientation  Short term memory loss  Behavior:   No behavioral concerns  Self-Preservation Skills:   Not capable of self-preservation  Eating:   Independent  Assist with set up  Ambulation Walking:   Needs assistance  Shanna demonstrates a shuffled gait and is provided a 4ww [place] [facility] [place] functional movility.  Transferring:   Independent  Wheelchair:   Shanna is ambulatory w/ 4ww at program  Toileting:   Needs assistance  Larons cognitive impairment prevents her from IND completing safe bathroom hygiene so staff x 1 provides constant supervision, set up assist and verbal cues for hygience completion.  Maintenance Program:     [employer]  Living Arrangements:   Lives with family  Spiritual Needs: Needs are being met through support group  Medication Assistance:   Medication not taken during program hours  Participation Report:   Aerobics/Exercise  Support Group  Cognitive Stimulation  Fire Drill  Creative Arts/Crafts  Games  Gardening  Speakers/Entertainment  Socialization  Current Events/News  Education/Health Topic  D/2 Shanna's cognition, she requires v/c to initiate and continue most activities.  Level of Participation:   Active

## 2021-08-09 ENCOUNTER — HOSPITAL ENCOUNTER (OUTPATIENT)
Dept: REHABILITATION | Facility: CLINIC | Age: 46
End: 2021-08-09
Attending: FAMILY MEDICINE
Payer: COMMERCIAL

## 2021-08-09 PROCEDURE — S5102 ADULT DAY CARE PER DIEM: HCPCS

## 2021-08-12 ENCOUNTER — HOSPITAL ENCOUNTER (OUTPATIENT)
Dept: REHABILITATION | Facility: CLINIC | Age: 46
End: 2021-08-12
Attending: FAMILY MEDICINE
Payer: COMMERCIAL

## 2021-08-12 PROCEDURE — S5102 ADULT DAY CARE PER DIEM: HCPCS | Performed by: REHABILITATION PRACTITIONER

## 2021-08-19 ENCOUNTER — HOSPITAL ENCOUNTER (OUTPATIENT)
Dept: REHABILITATION | Facility: CLINIC | Age: 46
End: 2021-08-19
Payer: COMMERCIAL

## 2021-08-19 PROCEDURE — S5102 ADULT DAY CARE PER DIEM: HCPCS

## 2021-08-23 ENCOUNTER — HOSPITAL ENCOUNTER (OUTPATIENT)
Dept: REHABILITATION | Facility: CLINIC | Age: 46
End: 2021-08-23
Attending: FAMILY MEDICINE
Payer: COMMERCIAL

## 2021-08-23 PROCEDURE — S5102 ADULT DAY CARE PER DIEM: HCPCS

## 2021-08-26 ENCOUNTER — HOSPITAL ENCOUNTER (OUTPATIENT)
Dept: REHABILITATION | Facility: CLINIC | Age: 46
End: 2021-08-26
Attending: FAMILY MEDICINE
Payer: COMMERCIAL

## 2021-08-26 PROCEDURE — S5102 ADULT DAY CARE PER DIEM: HCPCS

## 2021-08-29 ENCOUNTER — HEALTH MAINTENANCE LETTER (OUTPATIENT)
Age: 46
End: 2021-08-29

## 2021-08-30 ENCOUNTER — HOSPITAL ENCOUNTER (OUTPATIENT)
Dept: REHABILITATION | Facility: CLINIC | Age: 46
End: 2021-08-30
Attending: FAMILY MEDICINE
Payer: COMMERCIAL

## 2021-08-30 PROCEDURE — S5102 ADULT DAY CARE PER DIEM: HCPCS

## 2021-09-02 ENCOUNTER — HOSPITAL ENCOUNTER (OUTPATIENT)
Dept: REHABILITATION | Facility: CLINIC | Age: 46
End: 2021-09-02
Attending: FAMILY MEDICINE
Payer: COMMERCIAL

## 2021-09-02 PROCEDURE — S5102 ADULT DAY CARE PER DIEM: HCPCS

## 2021-09-09 ENCOUNTER — HOSPITAL ENCOUNTER (OUTPATIENT)
Dept: REHABILITATION | Facility: CLINIC | Age: 46
End: 2021-09-09
Attending: FAMILY MEDICINE
Payer: COMMERCIAL

## 2021-09-09 PROCEDURE — S5102 ADULT DAY CARE PER DIEM: HCPCS

## 2021-09-13 ENCOUNTER — HOSPITAL ENCOUNTER (OUTPATIENT)
Dept: REHABILITATION | Facility: CLINIC | Age: 46
End: 2021-09-13
Attending: FAMILY MEDICINE
Payer: COMMERCIAL

## 2021-09-13 PROCEDURE — S5102 ADULT DAY CARE PER DIEM: HCPCS

## 2021-09-17 NOTE — PROGRESS NOTES
MONTHLY PROGRESS NOTE AND PARTICIPATION REPORT   [facility]    Shanna Shanks, 1975  Attendance: Please see Epic for attendance record.    Communication:   Does communicate   Hearing:   No impairment  Vision:   No impairment  Orientation/Cognition:   Minor forgetfulness  Partial disorientation  Short term memory loss  Behavior:   No behavioral concerns  Self-Preservation Skills:   Not capable of self-preservation  Eating:   Independent  Assist with set up  Ambulation Walking:   Needs assistance  Shanna demonstrates a shuffled gait and is provided a 4ww upon arrival to the [facility] for safe functional movility.  Transferring:   Independent  Wheelchair:   Shanna is ambulatory w/ 4ww at program  Toileting:   Needs assistance  Larons cognitive impairment prevents her from IND completing safe bathroom hygiene so staff x 1 provides constant supervision, set up assist and verbal cues for hygience completion.  Maintenance Program:     [facility]  Living Arrangements:   Lives with family  Spiritual Needs: Needs are being met through support group  Medication Assistance:   Medication not taken during program hours  Participation Report:   Aerobics/Exercise  Support Group  Cognitive Stimulation  Fire Drill  Creative Arts/Crafts  Games  Gardening  Speakers/Entertainment  Socialization  Current Events/News  Education/Health Topic  D/2 Shanna's cognition, she requires v/c to initiate and continue most activities.  Level of Participation:   Active

## 2021-09-20 ENCOUNTER — HOSPITAL ENCOUNTER (OUTPATIENT)
Dept: REHABILITATION | Facility: CLINIC | Age: 46
End: 2021-09-20
Attending: FAMILY MEDICINE
Payer: COMMERCIAL

## 2021-09-20 PROCEDURE — S5102 ADULT DAY CARE PER DIEM: HCPCS

## 2021-09-23 ENCOUNTER — HOSPITAL ENCOUNTER (OUTPATIENT)
Dept: REHABILITATION | Facility: CLINIC | Age: 46
End: 2021-09-23
Attending: FAMILY MEDICINE
Payer: COMMERCIAL

## 2021-09-23 PROCEDURE — S5102 ADULT DAY CARE PER DIEM: HCPCS

## 2021-09-27 ENCOUNTER — HOSPITAL ENCOUNTER (OUTPATIENT)
Dept: REHABILITATION | Facility: CLINIC | Age: 46
End: 2021-09-27
Attending: FAMILY MEDICINE
Payer: COMMERCIAL

## 2021-09-27 PROCEDURE — S5102 ADULT DAY CARE PER DIEM: HCPCS

## 2021-09-28 ENCOUNTER — OFFICE VISIT (OUTPATIENT)
Dept: URGENT CARE | Facility: URGENT CARE | Age: 46
End: 2021-09-28
Payer: COMMERCIAL

## 2021-09-28 VITALS
OXYGEN SATURATION: 97 % | WEIGHT: 155 LBS | DIASTOLIC BLOOD PRESSURE: 64 MMHG | HEART RATE: 62 BPM | HEIGHT: 68 IN | BODY MASS INDEX: 23.49 KG/M2 | TEMPERATURE: 98.3 F | SYSTOLIC BLOOD PRESSURE: 101 MMHG

## 2021-09-28 DIAGNOSIS — N39.0 URINARY TRACT INFECTION WITHOUT HEMATURIA, SITE UNSPECIFIED: ICD-10-CM

## 2021-09-28 DIAGNOSIS — R30.0 DYSURIA: Primary | ICD-10-CM

## 2021-09-28 LAB
ALBUMIN UR-MCNC: NEGATIVE MG/DL
APPEARANCE UR: CLEAR
BACTERIA #/AREA URNS HPF: ABNORMAL /HPF
BILIRUB UR QL STRIP: NEGATIVE
COLOR UR AUTO: YELLOW
GLUCOSE UR STRIP-MCNC: NEGATIVE MG/DL
HGB UR QL STRIP: NEGATIVE
KETONES UR STRIP-MCNC: NEGATIVE MG/DL
LEUKOCYTE ESTERASE UR QL STRIP: ABNORMAL
NITRATE UR QL: POSITIVE
PH UR STRIP: 8.5 [PH] (ref 5–7)
RBC #/AREA URNS AUTO: ABNORMAL /HPF
SP GR UR STRIP: 1.01 (ref 1–1.03)
TRI-PHOS CRY #/AREA URNS HPF: ABNORMAL /HPF
UROBILINOGEN UR STRIP-ACNC: 0.2 E.U./DL
WBC #/AREA URNS AUTO: ABNORMAL /HPF

## 2021-09-28 PROCEDURE — 99213 OFFICE O/P EST LOW 20 MIN: CPT | Performed by: FAMILY MEDICINE

## 2021-09-28 PROCEDURE — 81001 URINALYSIS AUTO W/SCOPE: CPT

## 2021-09-28 PROCEDURE — 87086 URINE CULTURE/COLONY COUNT: CPT | Performed by: FAMILY MEDICINE

## 2021-09-28 PROCEDURE — 87186 SC STD MICRODIL/AGAR DIL: CPT | Performed by: FAMILY MEDICINE

## 2021-09-28 RX ORDER — SULFAMETHOXAZOLE/TRIMETHOPRIM 800-160 MG
1 TABLET ORAL 2 TIMES DAILY
Qty: 10 TABLET | Refills: 0 | Status: SHIPPED | OUTPATIENT
Start: 2021-09-28 | End: 2021-10-03

## 2021-09-28 ASSESSMENT — MIFFLIN-ST. JEOR: SCORE: 1386.58

## 2021-09-28 NOTE — PROGRESS NOTES
Chief Complaint   Patient presents with     Urgent Care     Pt in clinic to have eval for urinary frequency.     Frequency       Medical Decision Making:    ASSESMENT AND PLAN   Shanna was seen today for urgent care and frequency.    Diagnoses and all orders for this visit:    Dysuria  -     Cancel: UA macro with reflex to Microscopic and Culture - Clinc Collect  -     UA macro with reflex to Microscopic and Culture - Clinc Collect  -     Urine Microscopic Exam  -     Urine Culture    Urinary tract infection without hematuria, site unspecified  -     sulfamethoxazole-trimethoprim (BACTRIM DS) 800-160 MG tablet; Take 1 tablet by mouth 2 times daily for 5 days          Tylenol, Fluids and Rest  Routine discharge counseling was given to the patient and the patient understands that worsening, changing or persistent symptoms should prompt an immediate call or follow up with their primary physician or the emergency department. The importance of appropriate follow up was also discussed with the patient.     I have reviewed the nursing notes.    Differential Diagnosis:  UTI: UTI, Dysuria, Pyelonephritis, Kidney Stone, Urethritis and Vaginitis    Review of the result(s) of each unique test -       Time  spent on the date of the encounter doing chart review, review of test results, interpretation of tests, patient visit and documentation     see orders in Epic  Pt verbalized and agreed with the plan and is aware of the worsening symptoms for which would need to follow up .  Pt was stable during time of discharge from the clinic     SUBJECTIVE     Autumn Yazmin Shanks is a 46 year old female presenting with a chief complaint of    Chief Complaint   Patient presents with     Urgent Care     Pt in clinic to have eval for urinary frequency.     Frequency     UTI    Onset of symptoms was 2day(s).  Course of illness is worsening  Severity moderate  Current and associated symptoms frequency and urgency  Treatment and measures  tried None  Predisposing factors include none  Patient denies rigors, flank pain and temperature > 101 degrees F.                Past Medical History:   Diagnosis Date     Multiple sclerosis (H) 3/2/96    last exacerbation 3yrs ago, no meds x 6 months     Seizure disorder (H)      Tobacco dependency      Current Outpatient Medications   Medication Sig Dispense Refill     baclofen (LIORESAL) 10 MG tablet Take 10-20 mg by mouth 4 times daily as needed   3     CRANBERRY EXTRACT PO Take 1 tablet by mouth daily       dimethyl fumarate (TECFIDERA) delayed release capsule Take 240 mg by mouth 2 times daily        levETIRAcetam (KEPPRA) 250 MG tablet Take 1 tablet (250 mg) by mouth 2 times daily 60 tablet 0     levETIRAcetam 1000 MG TABS Take 1,000 mg by mouth 2 times daily 60 tablet 1     MELATONIN PO Take 1 tablet by mouth as needed       NORLYDA 0.35 MG tablet TAKE 1 TABLET(0.35 MG) BY MOUTH DAILY 84 tablet 0     order for DME Equipment being ordered: four wheeled walker with seat and brakes 1 Units 0     oxybutynin (DITROPAN-XL) 5 MG 24 hr tablet Take 5 mg by mouth daily       venlafaxine (EFFEXOR-ER) 75 MG 24 hr tablet Take 75 mg by mouth daily       VITAMIN D, CHOLECALCIFEROL, PO Take 1,000 Units by mouth daily       Social History     Tobacco Use     Smoking status: Former Smoker     Packs/day: 0.25     Types: Cigarettes     Smokeless tobacco: Never Used     Tobacco comment: quit in 2017   Substance Use Topics     Alcohol use: No     Alcohol/week: 5.8 standard drinks     Types: 7 Standard drinks or equivalent per week     Family History   Problem Relation Age of Onset     Family History Negative Mother      Diabetes Father      Cancer - colorectal Paternal Grandmother      Heart Disease Paternal Grandfather         MI         ROS:    10 point ROS of systems including Constitutional, Eyes, Respiratory, Cardiovascular, Gastroenterology,Integumentary, Muscularskeletal, Psychiatric ,neurological were all negative except  "for pertinent positives noted in my HPI         OBJECTIVE:    /64   Pulse 62   Temp 98.3  F (36.8  C) (Oral)   Ht 1.727 m (5' 8\")   Wt 70.3 kg (155 lb)   SpO2 97%   BMI 23.57 kg/m    GENERAL APPEARANCE: healthy, alert and no distress  EYES: EOMI,  PERRL, conjunctiva clear  HENT: ear canals and TM's normal.  Nose and mouth without ulcers, erythema or lesions, oral mucosa moist   ABDOMEN:  soft, nontender, no HSM or masses and bowel sounds normal  MS: no CVA tenderness   PSYCH: mentation appears normal  Physical Exam      (Note was completed, in part, with [employer] voice-recognition software. Documentation reviewed, but some grammatical, spelling, and word errors may remain.)  Liberty Vergara MD on 9/28/2021 at 12:40 PM              "

## 2021-09-29 LAB — BACTERIA UR CULT: ABNORMAL

## 2021-09-30 ENCOUNTER — HOSPITAL ENCOUNTER (OUTPATIENT)
Dept: REHABILITATION | Facility: CLINIC | Age: 46
End: 2021-09-30
Attending: FAMILY MEDICINE
Payer: COMMERCIAL

## 2021-09-30 PROCEDURE — S5102 ADULT DAY CARE PER DIEM: HCPCS | Performed by: REHABILITATION PRACTITIONER

## 2021-10-04 ENCOUNTER — HOSPITAL ENCOUNTER (OUTPATIENT)
Dept: REHABILITATION | Facility: CLINIC | Age: 46
End: 2021-10-04
Attending: FAMILY MEDICINE
Payer: COMMERCIAL

## 2021-10-04 PROCEDURE — S5102 ADULT DAY CARE PER DIEM: HCPCS

## 2021-10-07 ENCOUNTER — HOSPITAL ENCOUNTER (OUTPATIENT)
Dept: REHABILITATION | Facility: CLINIC | Age: 46
End: 2021-10-07
Attending: FAMILY MEDICINE
Payer: COMMERCIAL

## 2021-10-07 PROCEDURE — S5102 ADULT DAY CARE PER DIEM: HCPCS

## 2021-10-11 ENCOUNTER — HOSPITAL ENCOUNTER (OUTPATIENT)
Dept: REHABILITATION | Facility: CLINIC | Age: 46
End: 2021-10-11
Attending: FAMILY MEDICINE
Payer: COMMERCIAL

## 2021-10-11 PROCEDURE — S5102 ADULT DAY CARE PER DIEM: HCPCS

## 2021-10-20 NOTE — PROGRESS NOTES
MONTHLY PROGRESS NOTE AND PARTICIPATION REPORT   [facility]    Shanna Shanks, 1975  Attendance: Please see Epic for attendance record.    Communication:   Does communicate   Hearing:   No impairment  Vision:   No impairment  Orientation/Cognition:   Minor forgetfulness  Partial disorientation  Short term memory loss  Behavior:   Autumn requires vc's to initiate and continue activities d/2 cognitive impairment.  Self-Preservation Skills:   Not capable of self-preservation  Eating:   Assist with set up  Ambulation Walking:   Independent  Autumn is IND at home.  Staff provides 4ww at program for safe FM and falls prevention.  Transferring:   Independent  Wheelchair:   n/a  Toileting:   Needs assistance  Autumn requires reminders to use restroom and staff provides SBA for safety and garment set up.  Maintenance Program:     [employer]  Gait  Living Arrangements:   Lives with family  Spiritual Needs: Needs are being met through support group  Medication Assistance:   Medication not taken during program hours  Participation Report:   Aerobics/Exercise  Support Group  Cognitive Stimulation  Fire Drill  Creative Arts/Crafts  Games  Gardening  Speakers/Entertainment  Socialization  Current Events/News  Education/Health Topic  Level of Participation:   To the best of their ability  Autumn requires v/c's from staff to initiate activity and actively participate ~50% of the time.      [facility] Note:  Shanna was recently diagnosed with UTI.  Staff at  noted urine frequency and reported to her .  Antibiotics prescribed.  Will monitor progress while at program.

## 2021-10-23 ENCOUNTER — OFFICE VISIT (OUTPATIENT)
Dept: URGENT CARE | Facility: URGENT CARE | Age: 46
End: 2021-10-23
Payer: COMMERCIAL

## 2021-10-23 VITALS
SYSTOLIC BLOOD PRESSURE: 99 MMHG | TEMPERATURE: 98.1 F | DIASTOLIC BLOOD PRESSURE: 63 MMHG | OXYGEN SATURATION: 97 % | HEART RATE: 68 BPM

## 2021-10-23 DIAGNOSIS — R35.0 URINARY FREQUENCY: ICD-10-CM

## 2021-10-23 DIAGNOSIS — R39.9 UTI SYMPTOMS: Primary | ICD-10-CM

## 2021-10-23 LAB
ALBUMIN UR-MCNC: NEGATIVE MG/DL
APPEARANCE UR: ABNORMAL
BACTERIA #/AREA URNS HPF: ABNORMAL /HPF
BILIRUB UR QL STRIP: NEGATIVE
COLOR UR AUTO: YELLOW
GLUCOSE UR STRIP-MCNC: NEGATIVE MG/DL
HGB UR QL STRIP: NEGATIVE
KETONES UR STRIP-MCNC: NEGATIVE MG/DL
LEUKOCYTE ESTERASE UR QL STRIP: NEGATIVE
NITRATE UR QL: NEGATIVE
PH UR STRIP: 7.5 [PH] (ref 5–7)
RBC #/AREA URNS AUTO: ABNORMAL /HPF
SP GR UR STRIP: 1.02 (ref 1–1.03)
SQUAMOUS #/AREA URNS AUTO: ABNORMAL /LPF
UROBILINOGEN UR STRIP-ACNC: 0.2 E.U./DL
WBC #/AREA URNS AUTO: ABNORMAL /HPF

## 2021-10-23 PROCEDURE — 81001 URINALYSIS AUTO W/SCOPE: CPT

## 2021-10-23 PROCEDURE — 87086 URINE CULTURE/COLONY COUNT: CPT | Performed by: FAMILY MEDICINE

## 2021-10-23 PROCEDURE — 99213 OFFICE O/P EST LOW 20 MIN: CPT | Performed by: FAMILY MEDICINE

## 2021-10-23 RX ORDER — CEPHALEXIN 500 MG/1
500 CAPSULE ORAL 3 TIMES DAILY
Qty: 15 CAPSULE | Refills: 0 | Status: SHIPPED | OUTPATIENT
Start: 2021-10-23 | End: 2021-10-28

## 2021-10-23 NOTE — PROGRESS NOTES
Assessment & Plan     (R39.9) UTI symptoms  (primary encounter diagnosis)  Comment: UA findings unremarkable.  Treatment options reviewed.  Cephalexin prescribed considering symptomatology and previous urine culture findings.  Urine culture ordered.  Suggested to continue well hydration and follow-up if symptoms persist or worsen.  All questions answered.  Plan: cephALEXin (KEFLEX) 500 MG capsule          (R35.0) Urinary frequency  Comment: As above  Plan: UA Macro with Reflex to Micro and Culture - lab        collect, Wet preparation, Urine Microscopic,         Urine Culture Aerobic Bacterial - lab collect           Johnny Sullivan MD  [facility] VIJAYA Otero is a 46 year old who presents for the following health issues  accompanied by her spouse.    HPI     Concern   Onset: last night  Description: urinary frequency, urgency  Intensity: mild  Progression of Symptoms:  same  Accompanying Signs & Symptoms: no fever, chills, abdominal pain  Previous history of similar problem: UTI  Precipitating factors:   Therapies tried and outcome: hydration      Review of Systems   Constitutional, HEENT, cardiovascular, pulmonary, gi and gu systems are negative, except as otherwise noted.      Objective    BP 99/63 (BP Location: Left arm, Patient Position: Sitting, Cuff Size: Adult Regular)   Pulse 68   Temp 98.1  F (36.7  C) (Oral)   SpO2 97%   Breastfeeding No   There is no height or weight on file to calculate BMI.  Physical Exam   GENERAL: healthy, alert and no distress  NECK: no adenopathy, no asymmetry, masses, or scars and thyroid normal to palpation  RESP: lungs clear to auscultation - no rales, rhonchi or wheezes  CV: regular rate and rhythm, normal S1 S2, no S3 or S4, no murmur, click or rub  ABDOMEN: soft, nontender, no hepatosplenomegaly, no masses  MS: no gross musculoskeletal defects noted, no edema    Results for orders placed or performed in visit on 10/23/21   UA Macro  with Reflex to Micro and Culture - lab collect     Status: Abnormal    Specimen: Urine, Clean Catch   Result Value Ref Range    Color Urine Yellow Colorless, Straw, Light Yellow, Yellow    Appearance Urine Cloudy (A) Clear    Glucose Urine Negative Negative mg/dL    Bilirubin Urine Negative Negative    Ketones Urine Negative Negative mg/dL    Specific Gravity Urine 1.020 1.003 - 1.035    Blood Urine Negative Negative    pH Urine 7.5 (H) 5.0 - 7.0    Protein Albumin Urine Negative Negative mg/dL    Urobilinogen Urine 0.2 0.2, 1.0 E.U./dL    Nitrite Urine Negative Negative    Leukocyte Esterase Urine Negative Negative   Urine Microscopic     Status: Abnormal   Result Value Ref Range    Bacteria Urine Moderate (A) None Seen /HPF    RBC Urine 0-2 0-2 /HPF /HPF    WBC Urine 0-5 0-5 /HPF /HPF    Squamous Epithelials Urine Few (A) None Seen /LPF    Narrative    Urine Culture not indicated

## 2021-10-23 NOTE — LETTER
October 28, 2021      Shanna Shanks  [address]  [place] [id number]        Dear ,    We are writing to inform you of your test results.    Urine culture came back unremarkable.  Will recommend to complete cephalexin antibiotic course and follow-up if urinary symptoms persist. Let us know if there are any questions      Resulted Orders   UA Macro with Reflex to Micro and Culture - lab collect   Result Value Ref Range    Color Urine Yellow Colorless, Straw, Light Yellow, Yellow    Appearance Urine Cloudy (A) Clear    Glucose Urine Negative Negative mg/dL    Bilirubin Urine Negative Negative    Ketones Urine Negative Negative mg/dL    Specific Gravity Urine 1.020 1.003 - 1.035    Blood Urine Negative Negative    pH Urine 7.5 (H) 5.0 - 7.0    Protein Albumin Urine Negative Negative mg/dL    Urobilinogen Urine 0.2 0.2, 1.0 E.U./dL    Nitrite Urine Negative Negative    Leukocyte Esterase Urine Negative Negative   Urine Microscopic   Result Value Ref Range    Bacteria Urine Moderate (A) None Seen /HPF    RBC Urine 0-2 0-2 /HPF /HPF    WBC Urine 0-5 0-5 /HPF /HPF    Squamous Epithelials Urine Few (A) None Seen /LPF    Narrative    Urine Culture not indicated   Urine Culture Aerobic Bacterial - lab collect   Result Value Ref Range    Culture >100,000 CFU/mL Mixture of urogenital jen     Narrative    Multiple morphotypes present with no predominant organism.  Growth consistent with probable contamination during collection.  Suggest repeat specimen if clinically indicated.        If you have any questions or concerns, please call the clinic at the number listed above.       Sincerely,      Johnny Sullivan MD

## 2021-10-24 ENCOUNTER — HEALTH MAINTENANCE LETTER (OUTPATIENT)
Age: 46
End: 2021-10-24

## 2021-10-24 LAB — BACTERIA UR CULT: NORMAL

## 2021-10-25 ENCOUNTER — HOSPITAL ENCOUNTER (OUTPATIENT)
Dept: REHABILITATION | Facility: CLINIC | Age: 46
End: 2021-10-25
Attending: FAMILY MEDICINE
Payer: COMMERCIAL

## 2021-10-25 PROCEDURE — S5102 ADULT DAY CARE PER DIEM: HCPCS

## 2021-10-28 ENCOUNTER — HOSPITAL ENCOUNTER (OUTPATIENT)
Dept: REHABILITATION | Facility: CLINIC | Age: 46
End: 2021-10-28
Attending: FAMILY MEDICINE
Payer: COMMERCIAL

## 2021-10-28 PROCEDURE — S5102 ADULT DAY CARE PER DIEM: HCPCS | Performed by: REHABILITATION PRACTITIONER

## 2021-10-29 NOTE — PROGRESS NOTES
INDIVIDUAL PLAN OF CARE   [facility]    Member Name: Shanna Shanks; YOB: 1975  MRN: [id number]  4/5/2021    Goals developed in collaboration with: member  Staff responsible for plan: Nyla GARCIA 2022  1. Long Term Goal (concrete, measurable, and time specific outcomes): Member will improve or maintain cognitive functioning while maintaining dignity and respect.  Member will improve or maintain social skills through specially designed activities.   Target Date: July 2022  Bi-Annual Review:  Goal remains appropriate    2. Short Term Goal: (concrete, measurable, and time specific outcomes): Member will use both upper extremity for 15 minutes while engaging in art activity.   Target Date: January 2022  Bi-Annual Review:  Goal remains appropriate    3. Short Term Goal: (concrete, measurable, and time specific outcomes): Member will explore various options for improved nutrition and water consumption in order to prevent dehydration.  Member will have staff assist her with ambulating and help in the bathroom. Staff will facilitate bathroom visits 2-3 per day.    Target Date: January 2022  Bi-Annual Review:  Goal remains appropriate

## 2021-10-29 NOTE — PROGRESS NOTES
Individual abuse prevention plan (IAPP)  [facility]     Assessment of Susceptibility to Abuse, Including Self Abuse, Neglect (Identification of characteristics, which make the individual susceptible to abuse, and how these characteristics cause the individual to be susceptible to abuse.)     Is the person susceptible to abuse in each area?  Sexual Abuse:   No  Referrals made when the person is susceptible to abuse outside the scope or control of this program (Identify the referral and date it occurred): No additional risk reduction means needed at this time    Physical Abuse:   No  Referrals made when the person is susceptible to abuse outside the scope or control of this program (Identify the referral and date it occurred): No additional risk reduction means needed at this time    Self-Abuse:   No  Referrals made when the person is susceptible to abuse outside the scope or control of this program (Identify the referral and date it occurred): No additional risk reduction means needed at this time    Financial  Exploitation  No  Referrals made when the person is susceptible to abuse outside the scope or control of this program (Identify the referral and date it occurred): No additional risk reduction means needed at this time    Is the program aware of this person committing a violent crime or act of physical aggression towards others?  No  Referrals made when the person is susceptible to abuse outside the scope or control of this program (Identify the referral and date it occurred): No additional risk reduction means needed at this time    INDIVIDUAL ABUSE PREVENTION PLAN-MEASURES TAKEN TO MINIMIZE RISK OF ABUSE   ADL:   Assist with toileting  Ambulation/Transfers/Wheelchairs:  Assist with all transfers and/or ambulation   Behavior:   Patient has cognitive impairment and is often confused.  Communication:  Encourage verbalization of needs/concerns  Cognitive Issues:   Provider reminders due to  confusion, forgetfulness  Diet:   no concerns  Exercise:   Encourage participation in maintenance program  Hearing:   No concerns  Isolation:   Encourage socialization due to isolation in home environment  Medical Monitoring:   Monitor physical and emotional comfort  Mental Health:   Motivate client to join in activities that are beneficial to client  Sensory:   Provide and encourage participation in activities for stimulation  Vision:   No concerns  Other: N/A    Developed in consultation with:  Client  The Program Abuse Prevention Plan/IAPP identifies the specific actions that minimize abuse to the [facility] participant.  Yes

## 2021-11-01 ENCOUNTER — HOSPITAL ENCOUNTER (OUTPATIENT)
Dept: REHABILITATION | Facility: CLINIC | Age: 46
End: 2021-11-01
Attending: FAMILY MEDICINE
Payer: COMMERCIAL

## 2021-11-01 PROCEDURE — S5102 ADULT DAY CARE PER DIEM: HCPCS

## 2021-11-04 ENCOUNTER — HOSPITAL ENCOUNTER (OUTPATIENT)
Dept: REHABILITATION | Facility: CLINIC | Age: 46
End: 2021-11-04
Attending: FAMILY MEDICINE
Payer: COMMERCIAL

## 2021-11-04 PROCEDURE — S5102 ADULT DAY CARE PER DIEM: HCPCS

## 2021-11-08 ENCOUNTER — HOSPITAL ENCOUNTER (OUTPATIENT)
Dept: REHABILITATION | Facility: CLINIC | Age: 46
End: 2021-11-08
Attending: FAMILY MEDICINE
Payer: COMMERCIAL

## 2021-11-08 PROCEDURE — S5102 ADULT DAY CARE PER DIEM: HCPCS

## 2021-11-09 NOTE — PROGRESS NOTES
MONTHLY PROGRESS NOTE AND PARTICIPATION REPORT   [facility]    Shanna Shanks, 1975  Attendance: Please see Epic for attendance record.    Communication:   Does communicate   Hearing:   No impairment  Vision:   No impairment  Orientation/Cognition:   Minor forgetfulness  Partial disorientation  Short term memory loss  Behavior:   Shanna requires vc's to initiate and continue activities d/2 cognitive impairment.  Self-Preservation Skills:   Not capable of self-preservation  Eating:   Assist with set up  Ambulation Walking:   Independent  Shanna is IND at home.  Staff provides 4ww at program for safe FM and falls prevention.  Transferring:   Independent  Wheelchair:   n/a  Toileting:   Needs assistance  Shanna requires reminders to use restroom and staff provides SBA for safety and garment set up.  Maintenance Program:     NuStep  Gait  Living Arrangements:   Lives with family  Spiritual Needs: Needs are being met through support group  Medication Assistance:   Medication not taken during program hours  Participation Report:   Aerobics/Exercise  Support Group  Cognitive Stimulation  Fire Drill  Creative Arts/Crafts  Games  Gardening  Speakers/Entertainment  Socialization  Current Events/News  Education/Health Topic  Level of Participation:   To the best of their ability  Shanna requires v/c's from staff to initiate activity and actively participate ~50% of the time.      [facility] Note:  Shanna was recently diagnosed with UTI.  Staff at  noted urine frequency and reported to her .  Antibiotics prescribed.  Will monitor progress while at program.    [facility] Note:  Shanna has been treated for UTI and no longer has issues at this time.

## 2021-11-11 ENCOUNTER — HOSPITAL ENCOUNTER (OUTPATIENT)
Dept: REHABILITATION | Facility: CLINIC | Age: 46
End: 2021-11-11
Attending: FAMILY MEDICINE
Payer: COMMERCIAL

## 2021-11-11 PROCEDURE — S5102 ADULT DAY CARE PER DIEM: HCPCS

## 2021-11-15 ENCOUNTER — HOSPITAL ENCOUNTER (OUTPATIENT)
Dept: REHABILITATION | Facility: CLINIC | Age: 46
End: 2021-11-15
Attending: FAMILY MEDICINE
Payer: COMMERCIAL

## 2021-11-15 PROCEDURE — S5102 ADULT DAY CARE PER DIEM: HCPCS

## 2021-11-18 ENCOUNTER — HOSPITAL ENCOUNTER (OUTPATIENT)
Dept: REHABILITATION | Facility: CLINIC | Age: 46
End: 2021-11-18
Attending: FAMILY MEDICINE
Payer: COMMERCIAL

## 2021-11-18 PROCEDURE — S5102 ADULT DAY CARE PER DIEM: HCPCS

## 2021-11-22 ENCOUNTER — HOSPITAL ENCOUNTER (OUTPATIENT)
Dept: REHABILITATION | Facility: CLINIC | Age: 46
End: 2021-11-22
Attending: FAMILY MEDICINE
Payer: COMMERCIAL

## 2021-11-22 PROCEDURE — S5102 ADULT DAY CARE PER DIEM: HCPCS

## 2021-12-06 ENCOUNTER — HOSPITAL ENCOUNTER (OUTPATIENT)
Dept: REHABILITATION | Facility: CLINIC | Age: 46
End: 2021-12-06
Attending: FAMILY MEDICINE
Payer: COMMERCIAL

## 2021-12-06 PROCEDURE — S5102 ADULT DAY CARE PER DIEM: HCPCS

## 2021-12-09 ENCOUNTER — HOSPITAL ENCOUNTER (OUTPATIENT)
Dept: REHABILITATION | Facility: CLINIC | Age: 46
End: 2021-12-09
Attending: FAMILY MEDICINE
Payer: COMMERCIAL

## 2021-12-09 PROCEDURE — S5102 ADULT DAY CARE PER DIEM: HCPCS

## 2021-12-16 ENCOUNTER — HOSPITAL ENCOUNTER (OUTPATIENT)
Dept: REHABILITATION | Facility: CLINIC | Age: 46
End: 2021-12-16
Attending: FAMILY MEDICINE
Payer: COMMERCIAL

## 2021-12-16 PROCEDURE — S5102 ADULT DAY CARE PER DIEM: HCPCS

## 2021-12-27 ENCOUNTER — HOSPITAL ENCOUNTER (OUTPATIENT)
Dept: REHABILITATION | Facility: CLINIC | Age: 46
End: 2021-12-27
Attending: FAMILY MEDICINE
Payer: COMMERCIAL

## 2021-12-27 PROCEDURE — S5102 ADULT DAY CARE PER DIEM: HCPCS | Performed by: REHABILITATION PRACTITIONER

## 2021-12-30 ENCOUNTER — HOSPITAL ENCOUNTER (OUTPATIENT)
Dept: REHABILITATION | Facility: CLINIC | Age: 46
End: 2021-12-30
Attending: FAMILY MEDICINE
Payer: COMMERCIAL

## 2021-12-30 PROCEDURE — S5102 ADULT DAY CARE PER DIEM: HCPCS

## 2022-01-03 ENCOUNTER — HOSPITAL ENCOUNTER (OUTPATIENT)
Dept: REHABILITATION | Facility: CLINIC | Age: 47
End: 2022-01-03
Attending: FAMILY MEDICINE
Payer: COMMERCIAL

## 2022-01-03 PROCEDURE — S5102 ADULT DAY CARE PER DIEM: HCPCS | Performed by: REHABILITATION PRACTITIONER

## 2022-01-06 ENCOUNTER — HOSPITAL ENCOUNTER (OUTPATIENT)
Dept: REHABILITATION | Facility: CLINIC | Age: 47
End: 2022-01-06
Attending: FAMILY MEDICINE
Payer: COMMERCIAL

## 2022-01-06 PROCEDURE — S5102 ADULT DAY CARE PER DIEM: HCPCS

## 2022-01-10 ENCOUNTER — HOSPITAL ENCOUNTER (OUTPATIENT)
Dept: REHABILITATION | Facility: CLINIC | Age: 47
End: 2022-01-10
Attending: FAMILY MEDICINE
Payer: COMMERCIAL

## 2022-01-10 PROCEDURE — S5102 ADULT DAY CARE PER DIEM: HCPCS

## 2022-01-12 NOTE — PROGRESS NOTES
MONTHLY PROGRESS NOTE AND PARTICIPATION REPORT   [facility]    Shanna Shanks, 1975  Attendance: Please see Epic for attendance record.    Communication:   Does communicate   Hearing:   No impairment  Vision:   No impairment  Orientation/Cognition:   Minor forgetfulness  Partial disorientation  Short term memory loss  Behavior:   Shanna requires vc's to initiate and continue activities d/2 cognitive impairment.  Self-Preservation Skills:   Not capable of self-preservation  Eating:   Assist with set up  Ambulation Walking:   Independent  Shanna is IND at home.  Staff provides 4ww at program for safe FM and falls prevention.  Transferring:   Independent  Wheelchair:   n/a  Toileting:   Needs assistance  Shanna requires reminders to use restroom and staff provides SBA for safety and garment set up.  Maintenance Program:     NuStep  Gait  Living Arrangements:   Lives with family  Spiritual Needs: Needs are being met through support group  Medication Assistance:   Medication not taken during program hours  Participation Report:   Aerobics/Exercise  Support Group  Cognitive Stimulation  Fire Drill  Creative Arts/Crafts  Games  Gardening  Speakers/Entertainment  Socialization  Current Events/News  Education/Health Topic  Level of Participation:   To the best of their ability  Shanna requires v/c's from staff to initiate activity and actively participate ~50% of the time.      [facility] Note:  Shanna was recently diagnosed with UTI.  Staff at  noted urine frequency and reported to her .  Antibiotics prescribed.  Will monitor progress while at program.    [facility] Note:  Shanna has been treated for UTI and no longer has issues at this time.  1/12/22

## 2022-01-12 NOTE — PROGRESS NOTES
INDIVIDUAL PLAN OF CARE   [facility]    Member Name: Shanna Shanks; YOB: 1975  MRN: [id number]  4/5/2021    Goals developed in collaboration with: member  Staff responsible for plan: Nyla GARCIA 2022  1. Long Term Goal (concrete, measurable, and time specific outcomes): Member will improve or maintain cognitive functioning while maintaining dignity and respect.  Member will improve or maintain social skills through specially designed activities.   Target Date: 12/2022  Bi-Annual Review:  Goal remains appropriate    2. Short Term Goal: (concrete, measurable, and time specific outcomes): Member will use both upper extremity for 15 minutes while engaging in art activity.   Target Date: July 2022  Bi-Annual Review:  Goal remains appropriate    3. Short Term Goal: (concrete, measurable, and time specific outcomes): Member will explore various options for improved nutrition and water consumption in order to prevent dehydration.  Member will have staff assist her with ambulating and help in the bathroom. Staff will facilitate bathroom visits 2-3 per day.    Target Date: July 2022  Bi-Annual Review:  Goal remains appropriate

## 2022-01-13 ENCOUNTER — HOSPITAL ENCOUNTER (OUTPATIENT)
Dept: REHABILITATION | Facility: CLINIC | Age: 47
End: 2022-01-13
Attending: FAMILY MEDICINE
Payer: COMMERCIAL

## 2022-01-13 PROCEDURE — S5102 ADULT DAY CARE PER DIEM: HCPCS

## 2022-01-17 ENCOUNTER — HOSPITAL ENCOUNTER (OUTPATIENT)
Dept: REHABILITATION | Facility: CLINIC | Age: 47
End: 2022-01-17
Attending: FAMILY MEDICINE
Payer: COMMERCIAL

## 2022-01-17 PROCEDURE — S5102 ADULT DAY CARE PER DIEM: HCPCS

## 2022-01-24 ENCOUNTER — HOSPITAL ENCOUNTER (OUTPATIENT)
Dept: REHABILITATION | Facility: CLINIC | Age: 47
End: 2022-01-24
Attending: FAMILY MEDICINE
Payer: COMMERCIAL

## 2022-01-24 PROCEDURE — S5102 ADULT DAY CARE PER DIEM: HCPCS

## 2022-01-27 ENCOUNTER — HOSPITAL ENCOUNTER (OUTPATIENT)
Dept: REHABILITATION | Facility: CLINIC | Age: 47
End: 2022-01-27
Attending: FAMILY MEDICINE
Payer: COMMERCIAL

## 2022-01-27 PROCEDURE — S5102 ADULT DAY CARE PER DIEM: HCPCS

## 2022-01-31 ENCOUNTER — HOSPITAL ENCOUNTER (OUTPATIENT)
Dept: REHABILITATION | Facility: CLINIC | Age: 47
End: 2022-01-31
Attending: FAMILY MEDICINE
Payer: COMMERCIAL

## 2022-01-31 PROCEDURE — S5102 ADULT DAY CARE PER DIEM: HCPCS

## 2022-02-03 ENCOUNTER — HOSPITAL ENCOUNTER (OUTPATIENT)
Dept: REHABILITATION | Facility: CLINIC | Age: 47
End: 2022-02-03
Attending: FAMILY MEDICINE
Payer: COMMERCIAL

## 2022-02-03 PROCEDURE — S5102 ADULT DAY CARE PER DIEM: HCPCS

## 2022-02-10 ENCOUNTER — HOSPITAL ENCOUNTER (OUTPATIENT)
Dept: REHABILITATION | Facility: CLINIC | Age: 47
End: 2022-02-10
Attending: FAMILY MEDICINE
Payer: COMMERCIAL

## 2022-02-10 PROCEDURE — S5102 ADULT DAY CARE PER DIEM: HCPCS

## 2022-02-13 ENCOUNTER — HEALTH MAINTENANCE LETTER (OUTPATIENT)
Age: 47
End: 2022-02-13

## 2022-02-14 ENCOUNTER — HOSPITAL ENCOUNTER (OUTPATIENT)
Dept: REHABILITATION | Facility: CLINIC | Age: 47
End: 2022-02-14
Attending: FAMILY MEDICINE
Payer: COMMERCIAL

## 2022-02-14 PROCEDURE — S5102 ADULT DAY CARE PER DIEM: HCPCS

## 2022-02-17 ENCOUNTER — HOSPITAL ENCOUNTER (OUTPATIENT)
Dept: REHABILITATION | Facility: CLINIC | Age: 47
End: 2022-02-17
Attending: FAMILY MEDICINE
Payer: COMMERCIAL

## 2022-02-17 PROCEDURE — S5102 ADULT DAY CARE PER DIEM: HCPCS

## 2022-02-28 ENCOUNTER — HOSPITAL ENCOUNTER (OUTPATIENT)
Dept: REHABILITATION | Facility: CLINIC | Age: 47
End: 2022-02-28
Attending: FAMILY MEDICINE
Payer: COMMERCIAL

## 2022-02-28 PROCEDURE — S5102 ADULT DAY CARE PER DIEM: HCPCS

## 2022-03-01 NOTE — PROGRESS NOTES
History and Physical Form:  We have attempted to contact the MD multiple times via fax to fill out the annual History and Physical Form.  Another fax was sent on this date.  Will continue to follow-up as needed.

## 2022-03-02 NOTE — PROGRESS NOTES
MONTHLY PROGRESS NOTE AND PARTICIPATION REPORT   [facility]  (late February entry)    Shanna Shanks, 1975  Attendance: Please see Epic for attendance record.    Communication:   Does communicate   Hearing:   No impairment  Vision:   No impairment  Orientation/Cognition:   Minor forgetfulness  Partial disorientation  Short term memory loss  Behavior:   Shanna requires vc's to initiate and continue activities d/2 cognitive impairment.  Self-Preservation Skills:   Not capable of self-preservation  Eating:   Assist with set up, Shanna needs a few reminders to continue eating her food.  Ambulation Walking:   Independent  Shanna is IND at home.  Staff provides 4ww at program for safe FM and falls prevention.  Transferring:   Independent  Wheelchair:   n/a  Toileting:   Needs assistance  Shanna requires reminders to use restroom and staff provides SBA for safety and garment set up.  Maintenance Program:     NuStep  Gait  Living Arrangements:   Lives with family  Spiritual Needs: Needs are being met through support group  Medication Assistance:   Medication not taken during program hours  Participation Report:   Aerobics/Exercise  Support Group  Cognitive Stimulation  Fire Drill  Creative Arts/Crafts  Games  Gardening  Speakers/Entertainment  Socialization  Current Events/News  Education/Health Topic  Level of Participation:   To the best of their ability  Shanna requires v/c's from staff to initiate activity and actively participate ~50% of the time.      [facility] Note:  Shanna was recently diagnosed with UTI.  Staff at  noted urine frequency and reported to her .  Antibiotics prescribed.  Will monitor progress while at program.    [facility] Note:  Shanna has been treated for UTI and no longer has issues at this time.  1/12/22

## 2022-03-03 ENCOUNTER — HOSPITAL ENCOUNTER (OUTPATIENT)
Dept: REHABILITATION | Facility: CLINIC | Age: 47
End: 2022-03-03
Attending: FAMILY MEDICINE
Payer: COMMERCIAL

## 2022-03-03 PROCEDURE — S5102 ADULT DAY CARE PER DIEM: HCPCS

## 2022-03-07 ENCOUNTER — HOSPITAL ENCOUNTER (OUTPATIENT)
Dept: REHABILITATION | Facility: CLINIC | Age: 47
End: 2022-03-07
Attending: FAMILY MEDICINE
Payer: COMMERCIAL

## 2022-03-07 PROCEDURE — S5102 ADULT DAY CARE PER DIEM: HCPCS

## 2022-03-10 ENCOUNTER — HOSPITAL ENCOUNTER (OUTPATIENT)
Dept: REHABILITATION | Facility: CLINIC | Age: 47
Discharge: HOME | End: 2022-03-10
Attending: FAMILY MEDICINE
Payer: COMMERCIAL

## 2022-03-10 PROCEDURE — S5102 ADULT DAY CARE PER DIEM: HCPCS

## 2022-03-14 ENCOUNTER — HOSPITAL ENCOUNTER (OUTPATIENT)
Dept: REHABILITATION | Facility: CLINIC | Age: 47
Discharge: HOME | End: 2022-03-14
Attending: FAMILY MEDICINE
Payer: COMMERCIAL

## 2022-03-14 PROCEDURE — S5102 ADULT DAY CARE PER DIEM: HCPCS

## 2022-03-17 ENCOUNTER — HOSPITAL ENCOUNTER (OUTPATIENT)
Dept: REHABILITATION | Facility: CLINIC | Age: 47
Discharge: HOME | End: 2022-03-17
Attending: FAMILY MEDICINE
Payer: COMMERCIAL

## 2022-03-17 PROCEDURE — S5102 ADULT DAY CARE PER DIEM: HCPCS

## 2022-03-21 ENCOUNTER — HOSPITAL ENCOUNTER (OUTPATIENT)
Dept: REHABILITATION | Facility: CLINIC | Age: 47
Discharge: HOME | End: 2022-03-21
Attending: FAMILY MEDICINE
Payer: COMMERCIAL

## 2022-03-21 PROCEDURE — S5102 ADULT DAY CARE PER DIEM: HCPCS

## 2022-03-24 ENCOUNTER — HOSPITAL ENCOUNTER (OUTPATIENT)
Dept: REHABILITATION | Facility: CLINIC | Age: 47
Discharge: HOME | End: 2022-03-24
Attending: FAMILY MEDICINE
Payer: COMMERCIAL

## 2022-03-24 PROCEDURE — S5102 ADULT DAY CARE PER DIEM: HCPCS

## 2022-03-24 NOTE — PROGRESS NOTES
[facility] RN Note    Previous documentation reviewed.  No concerns reported by AC staff or member.    Late entry, feb.

## 2022-03-28 ENCOUNTER — HOSPITAL ENCOUNTER (OUTPATIENT)
Dept: REHABILITATION | Facility: CLINIC | Age: 47
Discharge: HOME | End: 2022-03-28
Attending: FAMILY MEDICINE
Payer: COMMERCIAL

## 2022-03-28 PROCEDURE — S5102 ADULT DAY CARE PER DIEM: HCPCS

## 2022-03-31 ENCOUNTER — HOSPITAL ENCOUNTER (OUTPATIENT)
Dept: REHABILITATION | Facility: CLINIC | Age: 47
Discharge: HOME | End: 2022-03-31
Attending: FAMILY MEDICINE
Payer: COMMERCIAL

## 2022-03-31 PROCEDURE — S5102 ADULT DAY CARE PER DIEM: HCPCS

## 2022-03-31 NOTE — PROGRESS NOTES
INDIVIDUAL PLAN OF CARE   [facility]    Member Name: Shanna Shanks; YOB: 1975  MRN: [id number]  3/31/2022    Goals developed in collaboration with: member  Staff responsible for plan: Huseyin LOWE  1. Long Term Goal (concrete, measurable, and time specific outcomes):  Member will maintain present level of physical and cognitive function through active participation in structured [facility] programming with staff set up, facilitation, and supervision provided every day of program attendance.  Target Date: April 2023  Bi-Annual Review:    2. Short Term Goal: (concrete, measurable, and time specific outcomes):  To maintain physical strength and endurance, member will actively participate in prescribed Nu-Step exercise program, with set up, VC's and supervision provided by staff, for at least 20 minutes, at least 1x/week during program attendance.  Target Date: September 2022  Bi-Annual Review:    3. Short Term Goal: (concrete, measurable, and time specific outcomes):  To maintain current level of cognitive function, member will actively participate in therapeutic creative arts and Brain/Body programming with set up, VC's and supervision provided by staff every day of program attendance.  Target Date: September 2022  Bi-Annual Review:

## 2022-03-31 NOTE — PROGRESS NOTES
Individual abuse prevention plan (IAPP)  [facility]     Assessment of Susceptibility to Abuse, Including Self Abuse, Neglect (Identification of characteristics, which make the individual susceptible to abuse, and how these characteristics cause the individual to be susceptible to abuse.)     Is the person susceptible to abuse in each area?  Sexual Abuse:   No  Referrals made when the person is susceptible to abuse outside the scope or control of this program (Identify the referral and date it occurred): No additional risk reduction means needed at this time    Physical Abuse:   No  Referrals made when the person is susceptible to abuse outside the scope or control of this program (Identify the referral and date it occurred): No additional risk reduction means needed at this time    Self-Abuse:   No  Referrals made when the person is susceptible to abuse outside the scope or control of this program (Identify the referral and date it occurred): No additional risk reduction means needed at this time    Financial  Exploitation  No  Referrals made when the person is susceptible to abuse outside the scope or control of this program (Identify the referral and date it occurred): No additional risk reduction means needed at this time    Is the program aware of this person committing a violent crime or act of physical aggression towards others?  No  Referrals made when the person is susceptible to abuse outside the scope or control of this program (Identify the referral and date it occurred): No additional risk reduction means needed at this time    INDIVIDUAL ABUSE PREVENTION PLAN-MEASURES TAKEN TO MINIMIZE RISK OF ABUSE   ADL:   Assist with clothing management  Assist with feeding  Assist with toileting  Staff will provide set up and VC for eating.  Staff will provide 1:1 supervision and set up and VC assist for post toilet use self-hygiene, garment management, and hand  hygiene.  Ambulation/Transfers/Wheelchairs:  Assist with all transfers and/or ambulation  Encourage client to ambulate short distances with walker and provide wheelchair for distance  Ensure client uses cane and/or walker  Provide standby assist due to periods of dizziness, fatigue  Provide standby assist when client ambulates prn   Behavior:   No behavior issues  Communication:  Encourage verbalization of needs/concerns  Observe body language/gestures to assist in anticipating client's needs  Cognitive Issues:   Require aide to be with member if wandering  Provider reminders due to confusion, forgetfulness  Give simple step-by-step direction  Contact caregiver to inform of special activities days  Diet:   Staff will prepare food to facilitate client to feed self  Monitor client when eating and/or drinking fluids   Exercise:   Encourage participation in maintenance program  Encourage participation in wheelchair aerobics  Hearing:   Speak distinctly and use gestures  Isolation:   Encourage socialization due to isolation in home environment  Medical Monitoring:   Monitor physical and emotional comfort  Monitor physical symptoms due to diagnosis and report significant changes to nurse, caregiver and physician   Mental Health:   Motivate client to join in activities that are beneficial to client  Encourage client to express feelings  Monitor anxiety level and intervene when appropriate  Encourage regular attendance for socialization and stimulation  Offer emotional support  Observe for symptoms of depression and notify appropriate staff/caregiver  Encourage independent decision making  Encourage social interaction to assist in increasing client's self-image  Provide client with choices of programming to encourage independent decision making  Provide activities in which client can be successful  Sensory:   Provide and encourage participation in activities for stimulation  Vision:   Provide verbal cues  Other:  N/A    Developed in consultation with:  Client  The Program Abuse Prevention Plan/IAPP identifies the specific actions that minimize abuse to the [facility] participant.  No

## 2022-03-31 NOTE — PROGRESS NOTES
MONTHLY PROGRESS NOTE AND PARTICIPATION REPORT   [facility]    Shanna Shanks, 1975  Attendance: Please see Epic for attendance record.    Communication:   Does communicate   Hearing:   No impairment  Vision:   No impairment  Orientation/Cognition:   Minor forgetfulness  Partial disorientation  Short term memory loss  Behavior:   No behavioral concerns  Self-Preservation Skills:   Shanna presents with MOD cogntive impairment and slow shuffled gait with balance deficits during ambulation 2/2 her MS diagnosis.  She requires staff instructions and assistance for self preservatioin.    Eating:   Assist with set up  Shanna requires verbal cues from staff ~75% of the time to initiate and continue eathing activities during lunch service D/2 her cognitive impairment.  Ambulation Walking:   Needs assistance  Staff provides Shanna a site owned 4WW for safe ambulation during program attendance for falls prevention D/2 her short slow shuffled gait and balance deficits 2/2 her MS diagnosis.  Transferring:   Independent  Wheelchair:   Shanna is fully ambulatory.  However, she will be offered a manual WC for any off site group outings for energy conservation and falls prevention.  Toileting:   Staff assists Shanna in the bathroom 1:1 for supervision and provides VC for self-hygiene and garment management after toilet use and hand hygiene.  Maintenance Program:     NuStep  Living Arrangements:   Lives with family  Spiritual Needs: Needs are being met through support group  Medication Assistance:   Medication not taken during program hours  Participation Report:   Aerobics/Exercise  Support Group  Cognitive Stimulation  Fire Drill  Creative Arts/Crafts  Games  Gardening  Speakers/Entertainment  Socialization  Current Events/News  Education/Health Topic  Level of Participation:   Active

## 2022-04-04 ENCOUNTER — OFFICE VISIT (OUTPATIENT)
Dept: URGENT CARE | Facility: URGENT CARE | Age: 47
End: 2022-04-04
Payer: COMMERCIAL

## 2022-04-04 VITALS
OXYGEN SATURATION: 97 % | WEIGHT: 134.5 LBS | DIASTOLIC BLOOD PRESSURE: 64 MMHG | BODY MASS INDEX: 20.45 KG/M2 | TEMPERATURE: 97.4 F | SYSTOLIC BLOOD PRESSURE: 92 MMHG | HEART RATE: 67 BPM

## 2022-04-04 DIAGNOSIS — R30.0 DYSURIA: ICD-10-CM

## 2022-04-04 DIAGNOSIS — G35 MULTIPLE SCLEROSIS (H): ICD-10-CM

## 2022-04-04 DIAGNOSIS — R56.9 SEIZURES (H): ICD-10-CM

## 2022-04-04 DIAGNOSIS — N30.01 ACUTE CYSTITIS WITH HEMATURIA: Primary | ICD-10-CM

## 2022-04-04 LAB
ALBUMIN UR-MCNC: 30 MG/DL
APPEARANCE UR: ABNORMAL
BACTERIA #/AREA URNS HPF: ABNORMAL /HPF
BILIRUB UR QL STRIP: NEGATIVE
COLOR UR AUTO: YELLOW
GLUCOSE UR STRIP-MCNC: NEGATIVE MG/DL
HGB UR QL STRIP: ABNORMAL
KETONES UR STRIP-MCNC: NEGATIVE MG/DL
LEUKOCYTE ESTERASE UR QL STRIP: ABNORMAL
NITRATE UR QL: NEGATIVE
PH UR STRIP: 6 [PH] (ref 5–7)
RBC #/AREA URNS AUTO: ABNORMAL /HPF
SP GR UR STRIP: 1.01 (ref 1–1.03)
SQUAMOUS #/AREA URNS AUTO: ABNORMAL /LPF
UROBILINOGEN UR STRIP-ACNC: 0.2 E.U./DL
WBC #/AREA URNS AUTO: ABNORMAL /HPF
WBC CLUMPS #/AREA URNS HPF: PRESENT /HPF

## 2022-04-04 PROCEDURE — 87086 URINE CULTURE/COLONY COUNT: CPT | Performed by: PHYSICIAN ASSISTANT

## 2022-04-04 PROCEDURE — 99203 OFFICE O/P NEW LOW 30 MIN: CPT | Performed by: PHYSICIAN ASSISTANT

## 2022-04-04 PROCEDURE — 81001 URINALYSIS AUTO W/SCOPE: CPT

## 2022-04-04 RX ORDER — SULFAMETHOXAZOLE/TRIMETHOPRIM 800-160 MG
1 TABLET ORAL 2 TIMES DAILY
Qty: 14 TABLET | Refills: 0 | Status: SHIPPED | OUTPATIENT
Start: 2022-04-04 | End: 2022-04-11

## 2022-04-04 ASSESSMENT — ENCOUNTER SYMPTOMS
DYSURIA: 1
FREQUENCY: 1
FEVER: 0

## 2022-04-04 NOTE — PATIENT INSTRUCTIONS

## 2022-04-04 NOTE — PROGRESS NOTES
SUBJECTIVE:   Shanna Shanks is a 46 year old female presenting with a chief complaint of   Chief Complaint   Patient presents with     Urgent Care     Pt in clinic to have eval for urinary frequency.     Frequency       She is a new patient of [facility].  Patient presents with dysuria and frequency x 2 days.  No fevers or back pain.  Last UTI more than 1 year ago.  Last seizure over a year    Treatment:  Nothing.          Review of Systems   Constitutional: Negative for fever.   Genitourinary: Positive for dysuria and frequency. Negative for vaginal discharge.   All other systems reviewed and are negative.      Past Medical History:   Diagnosis Date     Multiple sclerosis (H) 3/2/96    last exacerbation 3yrs ago, no meds x 6 months     Seizure disorder (H)      Tobacco dependency      Family History   Problem Relation Age of Onset     Family History Negative Mother      Diabetes Father      Cancer - colorectal Paternal Grandmother      Heart Disease Paternal Grandfather         MI     Current Outpatient Medications   Medication Sig Dispense Refill     baclofen (LIORESAL) 10 MG tablet Take 10-20 mg by mouth 4 times daily as needed   3     CRANBERRY EXTRACT PO Take 1 tablet by mouth daily       dimethyl fumarate (TECFIDERA) delayed release capsule Take 240 mg by mouth 2 times daily        levETIRAcetam (KEPPRA) 250 MG tablet Take 1 tablet (250 mg) by mouth 2 times daily 60 tablet 0     levETIRAcetam 1000 MG TABS Take 1,000 mg by mouth 2 times daily 60 tablet 1     MELATONIN PO Take 1 tablet by mouth as needed       NORLYDA 0.35 MG tablet TAKE 1 TABLET(0.35 MG) BY MOUTH DAILY 84 tablet 0     order for DME Equipment being ordered: four wheeled walker with seat and brakes 1 Units 0     oxybutynin (DITROPAN-XL) 5 MG 24 hr tablet Take 5 mg by mouth daily       sulfamethoxazole-trimethoprim (BACTRIM DS) 800-160 MG tablet Take 1 tablet by mouth 2 times daily for 7 days 14 tablet 0     venlafaxine (EFFEXOR-ER) 75 MG  24 hr tablet Take 75 mg by mouth daily       VITAMIN D, CHOLECALCIFEROL, PO Take 1,000 Units by mouth daily       Social History     Tobacco Use     Smoking status: Former Smoker     Packs/day: 0.25     Types: Cigarettes     Smokeless tobacco: Never Used     Tobacco comment: quit in 2017   Substance Use Topics     Alcohol use: No     Alcohol/week: 5.8 standard drinks     Types: 7 Standard drinks or equivalent per week       OBJECTIVE  BP 92/64   Pulse 67   Temp 97.4  F (36.3  C) (Temporal)   Wt 61 kg (134 lb 8 oz)   LMP  (LMP Unknown)   SpO2 97%   BMI 20.45 kg/m      Physical Exam  Vitals and nursing note reviewed.   Constitutional:       Appearance: Normal appearance. She is normal weight.   Eyes:      Extraocular Movements: Extraocular movements intact.      Conjunctiva/sclera: Conjunctivae normal.   Cardiovascular:      Rate and Rhythm: Normal rate and regular rhythm.      Pulses: Normal pulses.      Heart sounds: Normal heart sounds.   Pulmonary:      Effort: Pulmonary effort is normal.      Breath sounds: Normal breath sounds.   Abdominal:      Tenderness: There is no right CVA tenderness or left CVA tenderness.   Skin:     General: Skin is warm and dry.   Neurological:      General: No focal deficit present.      Mental Status: She is alert.   Psychiatric:         Mood and Affect: Mood normal.         Behavior: Behavior normal.         Labs:  Results for orders placed or performed in visit on 04/04/22 (from the past 24 hour(s))   UA Macro with Reflex to Micro and Culture - lab collect    Specimen: Urine, Midstream   Result Value Ref Range    Color Urine Yellow Colorless, Straw, Light Yellow, Yellow    Appearance Urine Cloudy (A) Clear    Glucose Urine Negative Negative mg/dL    Bilirubin Urine Negative Negative    Ketones Urine Negative Negative mg/dL    Specific Gravity Urine 1.015 1.003 - 1.035    Blood Urine Trace (A) Negative    pH Urine 6.0 5.0 - 7.0    Protein Albumin Urine 30  (A) Negative mg/dL     Urobilinogen Urine 0.2 0.2, 1.0 E.U./dL    Nitrite Urine Negative Negative    Leukocyte Esterase Urine Moderate (A) Negative   Urine Microscopic Exam   Result Value Ref Range    Bacteria Urine Many (A) None Seen /HPF    RBC Urine 0-2 0-2 /HPF /HPF    WBC Urine  (A) 0-5 /HPF /HPF    Squamous Epithelials Urine Few (A) None Seen /LPF    WBC Clumps Urine Present (A) None Seen /HPF       X-Ray was not done.    ASSESSMENT:      ICD-10-CM    1. Acute cystitis with hematuria  N30.01 sulfamethoxazole-trimethoprim (BACTRIM DS) 800-160 MG tablet   2. Dysuria  R30.0 UA Macro with Reflex to Micro and Culture - lab collect     Urine Microscopic Exam     Urine Culture   3. Multiple sclerosis (H)  G35    4. Seizures (H)  R56.9         Medical Decision Making:    Differential Diagnosis:  UTI: UTI, Dysuria, Pyelonephritis and Kidney Stone    Serious Comorbid Conditions:  Adult:  reviewed    PLAN:    Rx for bactrim.  Urine culture pending.  Increase fluid intake. Discussed reasons to seek immediate medical attention - specifically, fevers or vomiting.  Finish all medications.          Followup:    If not improving or if condition worsens, follow up with your Primary Care Provider, If not improving or if conditions worsens over the next 12-24 hours, go to the Emergency Department    Patient Instructions     Patient Education     Bladder Infection, Female (Adult)     Urine normally doesn't have any germs (bacteria) in it. But bacteria can get into the urinary tract from the skin around the rectum. Or they can travel in the blood from other parts of the body. Once they are in your urinary tract, they can cause infection in these areas:    The urethra (urethritis)    The bladder (cystitis)    The kidneys (pyelonephritis)  The most common place for an infection is in the bladder. This is called a bladder infection. This is one of the most common infections in women. Most bladder infections are easily treated. They are not serious  unless the infection spreads to the kidney.  The terms bladder infection, UTI, and cystitis are often used to describe the same thing. But they are not always the same. Cystitis is an inflammation of the bladder. The most common cause of cystitis is an infection.  Symptoms  The infection causes inflammation in the urethra and bladder. This causes many of the symptoms. The most common symptoms of a bladder infection are:    Pain or burning when urinating    Having to urinate more often than normal    Urgent need to urinate    Only a small amount of urine comes out    Blood in urine    Belly (abdominal) discomfort. This is often in the lower belly above the pubic bone.    Cloudy urine    Strong- or bad-smelling urine    Unable to urinate (urinary retention)    Unable to hold urine in (urinary incontinence)    Fever    Loss of appetite    Confusion (in older adults)  Causes  Bladder infections are not contagious. You can't get one from someone else, from a toilet seat, or from sharing a bath.  The most common cause of bladder infections is bacteria from the bowels. The bacteria get onto the skin around the opening of the urethra. From there, they can get into the urine. Then they travel up to the bladder, causing inflammation and infection. This often happens because of:    Wiping incorrectly after urinating. Always wipe from front to back.    Bowel incontinence    Pregnancy    Procedures such as having a catheter put in    Older age    Not emptying your bladder. This can give bacteria a chance to grow in your urine.    Fluid loss (dehydration)    Constipation    Having sex    Using a diaphragm for birth control   Treatment  Bladder infections are diagnosed by a urine test and urine culture. They are treated with antibiotics. They often clear up quickly without problems. Treatment helps prevent a more serious kidney infection.  Medicines  Medicines can help in the treatment of a bladder infection:    Take antibiotics  until they are used up, even if you feel better. It's important to finish them to make sure the infection has cleared.    You can use acetaminophen or ibuprofen for pain, fever, or discomfort, unless another medicine was prescribed. If you have long-term (chronic) liver or kidney disease, talk with your healthcare provider before using these medicines. Also talk with your provider if you've ever had a stomach ulcer or GI (gastrointestinal) bleeding, or are taking blood-thinner medicines.    If you are given phenazopydridine to reduce burning with urination, it will make your urine a bright orange color. This can stain clothing.  Care and prevention  These self-care steps can help prevent future infections:    Drink plenty of fluids. This helps to prevent dehydration and flush out your bladder. Do this unless you must restrict fluids for other health reasons, or your healthcare provider told you not to.    Clean yourself correctly after going to the bathroom. Wipe from front to back after using the toilet. This helps prevent the spread of bacteria.    Urinate more often. Don't try to hold urine in for a long time.    Wear loose-fitting clothes and cotton underwear. Don't wear tight-fitting pants.    Improve your diet and prevent constipation. Eat more fresh fruits and vegetables, and fiber. Eat less junk foods and fatty foods.    Don't have sex until your symptoms are gone.    Don't have caffeine, alcohol, and spicy foods. These can irritate your bladder.    Urinate right after you have sex to flush out your bladder.    If you use birth control pills and have frequent bladder infections, discuss it with your healthcare provider.  Follow-up care  Call your healthcare provider if all symptoms are not gone after 3 days of treatment. This is especially important if you have repeat infections.  If a culture was done, you will be told if your treatment needs to be changed. If directed, you can call to find out the  results.  If X-rays were done, you will be told if the results will affect your treatment.  Call 911  Call 911 if any of the following occur:    Trouble breathing    Hard to wake up or confusion    Fainting (loss of consciousness)    Fast heart rate  When to get medical advice  Call your healthcare provider right away if any of these occur:    Fever of 100.4 F (38.0 C) or higher, or as directed by your healthcare provider    Symptoms are not better after 3 days of treatment    Back or belly pain that gets worse    Repeated vomiting, or unable to keep medicine down    Weakness or dizziness    Vaginal discharge    Pain, redness, or swelling in the outer vaginal area (labia)  [employer] last reviewed this educational content on 11/1/2019 2000-2021 [employer]. All rights reserved. This information is not intended as a substitute for professional medical care. Always follow your healthcare professional's instructions.

## 2022-04-06 LAB — BACTERIA UR CULT: NORMAL

## 2022-04-07 ENCOUNTER — HOSPITAL ENCOUNTER (OUTPATIENT)
Dept: REHABILITATION | Facility: CLINIC | Age: 47
Discharge: HOME | End: 2022-04-07
Attending: FAMILY MEDICINE
Payer: COMMERCIAL

## 2022-04-07 PROCEDURE — S5102 ADULT DAY CARE PER DIEM: HCPCS

## 2022-04-11 ENCOUNTER — HOSPITAL ENCOUNTER (OUTPATIENT)
Dept: REHABILITATION | Facility: CLINIC | Age: 47
Discharge: HOME | End: 2022-04-11
Attending: FAMILY MEDICINE
Payer: COMMERCIAL

## 2022-04-11 PROCEDURE — S5102 ADULT DAY CARE PER DIEM: HCPCS | Performed by: REHABILITATION PRACTITIONER

## 2022-04-14 ENCOUNTER — HOSPITAL ENCOUNTER (OUTPATIENT)
Dept: REHABILITATION | Facility: CLINIC | Age: 47
Discharge: HOME | End: 2022-04-14
Attending: FAMILY MEDICINE
Payer: COMMERCIAL

## 2022-04-14 PROCEDURE — S5102 ADULT DAY CARE PER DIEM: HCPCS

## 2022-04-18 ENCOUNTER — HOSPITAL ENCOUNTER (OUTPATIENT)
Dept: REHABILITATION | Facility: CLINIC | Age: 47
Discharge: HOME | End: 2022-04-18
Attending: FAMILY MEDICINE
Payer: COMMERCIAL

## 2022-04-18 PROCEDURE — S5102 ADULT DAY CARE PER DIEM: HCPCS

## 2022-04-21 ENCOUNTER — HOSPITAL ENCOUNTER (OUTPATIENT)
Dept: REHABILITATION | Facility: CLINIC | Age: 47
Discharge: HOME | End: 2022-04-21
Attending: FAMILY MEDICINE
Payer: COMMERCIAL

## 2022-04-21 PROCEDURE — S5102 ADULT DAY CARE PER DIEM: HCPCS

## 2022-04-25 ENCOUNTER — HOSPITAL ENCOUNTER (OUTPATIENT)
Dept: REHABILITATION | Facility: CLINIC | Age: 47
Discharge: HOME | End: 2022-04-25
Attending: FAMILY MEDICINE
Payer: COMMERCIAL

## 2022-04-25 PROCEDURE — S5102 ADULT DAY CARE PER DIEM: HCPCS

## 2022-05-05 ENCOUNTER — HOSPITAL ENCOUNTER (OUTPATIENT)
Dept: REHABILITATION | Facility: CLINIC | Age: 47
Discharge: HOME | End: 2022-05-05
Attending: FAMILY MEDICINE
Payer: COMMERCIAL

## 2022-05-05 PROCEDURE — S5102 ADULT DAY CARE PER DIEM: HCPCS

## 2022-05-09 ENCOUNTER — HOSPITAL ENCOUNTER (OUTPATIENT)
Dept: REHABILITATION | Facility: CLINIC | Age: 47
Discharge: HOME | End: 2022-05-09
Attending: FAMILY MEDICINE
Payer: COMMERCIAL

## 2022-05-09 PROCEDURE — S5102 ADULT DAY CARE PER DIEM: HCPCS

## 2022-05-09 NOTE — PROGRESS NOTES
[facility] RN Note    Previous documentation reviewed.  No concerns reported by AC staff or member.    Vit D. Ambulatory with wheeled walker.

## 2022-05-11 NOTE — PROGRESS NOTES
MONTHLY PROGRESS NOTE AND PARTICIPATION REPORT   [facility]    Shanna Shanks, 1975  Attendance: Please see Epic for attendance record.    Communication:   Does communicate   Hearing:   No impairment  Vision:   No impairment  Orientation/Cognition:   Minor forgetfulness  Partial disorientation  Short term memory loss  Behavior:   No behavioral concerns  Self-Preservation Skills:   Shanna presents with MOD cogntive impairment and slow shuffled gait with balance deficits during ambulation 2/2 her MS diagnosis.  She requires staff instructions and assistance for self preservatioin.    Eating:   Assist with set up  Shanna requires verbal cues from staff ~75% of the time to initiate and continue eathing activities during lunch service D/2 her cognitive impairment.  Ambulation Walking:   Needs assistance  Staff provides Shanna a site owned 4WW for safe ambulation during program attendance for falls prevention D/2 her short slow shuffled gait and balance deficits 2/2 her MS diagnosis.  Transferring:   Independent  Wheelchair:   Shanna is fully ambulatory.  However, she will be offered a manual WC for any off site group outings for energy conservation and falls prevention.  Toileting:   Staff assists Shanna in the bathroom 1:1 for supervision and provides VC for self-hygiene and garment management after toilet use and hand hygiene.  Maintenance Program:     NuStep  Living Arrangements:   Lives with family  Spiritual Needs: Needs are being met through support group  Medication Assistance:   Medication not taken during program hours  Participation Report:   Aerobics/Exercise  Support Group  Cognitive Stimulation  Fire Drill  Creative Arts/Crafts  Games  Gardening  Speakers/Entertainment  Socialization  Current Events/News  Education/Health Topic   Community Based Outings  Level of Participation:   Active

## 2022-05-12 ENCOUNTER — HOSPITAL ENCOUNTER (OUTPATIENT)
Dept: REHABILITATION | Facility: CLINIC | Age: 47
Discharge: HOME | End: 2022-05-12
Attending: FAMILY MEDICINE
Payer: COMMERCIAL

## 2022-05-12 PROCEDURE — S5102 ADULT DAY CARE PER DIEM: HCPCS

## 2022-05-16 ENCOUNTER — HOSPITAL ENCOUNTER (OUTPATIENT)
Dept: REHABILITATION | Facility: CLINIC | Age: 47
Discharge: HOME | End: 2022-05-16
Attending: FAMILY MEDICINE
Payer: COMMERCIAL

## 2022-05-16 PROCEDURE — S5102 ADULT DAY CARE PER DIEM: HCPCS

## 2022-05-19 ENCOUNTER — HOSPITAL ENCOUNTER (OUTPATIENT)
Dept: REHABILITATION | Facility: CLINIC | Age: 47
Discharge: HOME | End: 2022-05-19
Attending: FAMILY MEDICINE
Payer: COMMERCIAL

## 2022-05-19 PROCEDURE — S5102 ADULT DAY CARE PER DIEM: HCPCS | Performed by: REHABILITATION PRACTITIONER

## 2022-05-23 ENCOUNTER — HOSPITAL ENCOUNTER (OUTPATIENT)
Dept: REHABILITATION | Facility: CLINIC | Age: 47
Discharge: HOME | End: 2022-05-23
Attending: FAMILY MEDICINE
Payer: COMMERCIAL

## 2022-05-23 PROCEDURE — S5102 ADULT DAY CARE PER DIEM: HCPCS

## 2022-05-23 NOTE — PROGRESS NOTES
"  [facility] Social History    Full Name: Shanna Shanks        MRN: [id number]    YOB: 1975  Nickname:TYRELL      Sex: F     Home Phone: [phone]      Cell Phone: [phone]  Address: [address] [place] [address]//Zip: [place]  [address]  County: [place]       E-mail:[email or web address]        Transportation:    [employer]  Language:English         needed? No       Ethnicity: Caucasion  Race: White      Country of Origin: [place]       Zoroastrian: [employer]  Marital Status:                   Spouse/Significant Other: Jama Petit   ______________________________________________________________________________________     : No    Branch of Service: NA      Education Level: High School- [employer]   Job History: Self employeed       Organizations/Clubs: NA  Whom do you live with?  and daughter  Current living arrangement:  Home   Number of Children: 2  List: Michel and Tegan  Number of Siblings: 7     List: Narendra, Aleksandra, Daisha, Elida, Jesus, Iain, Kyle  Other Important People/Pets: 2 cats and 1 dog  What else should we know about you?  Loves watching juggling.  Favorite color is bably blue.  George with her mother.  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 Beck        Neurologist: Dr. Talha Reese  Emergency Contacts:  1. Jama Petmark      Relationship: Spouse    Phone: [phone]  2. Aleksandra Shanks         Relationship: sister     Phone:[phone]    Updated on 7/30/2018,  7/29/2019, 5/23/2022        "

## 2022-05-26 ENCOUNTER — HOSPITAL ENCOUNTER (OUTPATIENT)
Dept: REHABILITATION | Facility: CLINIC | Age: 47
Discharge: HOME | End: 2022-05-26
Attending: FAMILY MEDICINE
Payer: COMMERCIAL

## 2022-05-26 PROCEDURE — S5102 ADULT DAY CARE PER DIEM: HCPCS

## 2022-05-26 NOTE — PROGRESS NOTES
5/26/2022  History and Physical Form:   We have attempted to contact the MD via fax to fill out the annual History and Physical Form.  Another fax was sent on this date.  Will continue to follow-up as needed.

## 2022-06-02 ENCOUNTER — HOSPITAL ENCOUNTER (OUTPATIENT)
Dept: REHABILITATION | Facility: CLINIC | Age: 47
Discharge: HOME | End: 2022-06-02
Attending: FAMILY MEDICINE
Payer: COMMERCIAL

## 2022-06-02 PROCEDURE — S5102 ADULT DAY CARE PER DIEM: HCPCS

## 2022-06-02 NOTE — PROGRESS NOTES
[facility] Note:  Home and Community Based Services Outing     Destination:  [employer].                          [facility]     Description of Outing:  Member boarded a [employer] Bus and actively participated in group volunteer activity at [employer].  For safety and enhanced member experience, constant supervision and moderate assistance was provided by staff throughout the entire activity.  Member labeled empty food bags with expiration dates so they could be filled and shipped around the world by fellow volunteer groups.  Member then returned by bus with fellow members and staff to [facility].  Snacks were provided during outing and lunch was provided upon return to [facility].     Member Response:  Member reported that she enjoyed the activity and would like to do it again sometime when given the opportunity.

## 2022-06-06 ENCOUNTER — HOSPITAL ENCOUNTER (OUTPATIENT)
Dept: REHABILITATION | Facility: CLINIC | Age: 47
Discharge: HOME | End: 2022-06-06
Attending: FAMILY MEDICINE
Payer: COMMERCIAL

## 2022-06-06 PROCEDURE — S5102 ADULT DAY CARE PER DIEM: HCPCS

## 2022-06-06 NOTE — PROGRESS NOTES
[facility] Note:  Home and Community Based Services Outing    Destination:  [facility]    Description of Outing: Snacks, drinks, and music were provided by staff. Member listened to music and had the ability to socialize with those peers around her.     Member Response:  Member enjoyed the sun on her face and listening to music with her peers around her.

## 2022-06-09 ENCOUNTER — HOSPITAL ENCOUNTER (OUTPATIENT)
Dept: REHABILITATION | Facility: CLINIC | Age: 47
Discharge: HOME | End: 2022-06-09
Attending: FAMILY MEDICINE
Payer: COMMERCIAL

## 2022-06-09 PROCEDURE — S5102 ADULT DAY CARE PER DIEM: HCPCS

## 2022-06-09 NOTE — PROGRESS NOTES
[facility] Note:  Home and Community Based Services Outing     Destination:  [facility]     Description of Outing:  Went to the garden for our annual Garden Party for active participation in socialization,  Refreshments, games, and discussion of the history of the garden.     Member Response:  Member enjoys their time in the garden and reports that they would like to participate again.

## 2022-06-15 NOTE — PROGRESS NOTES
MONTHLY PROGRESS NOTE AND PARTICIPATION REPORT   [facility]    Shanna Shanks, 1975  Attendance: Please see Epic for attendance record.    Communication:   Does communicate   Hearing:   No impairment  Vision:   No impairment  Orientation/Cognition:   Minor forgetfulness  Partial disorientation  Short term memory loss  Behavior:   No behavioral concerns  Self-Preservation Skills:   Shanna presents with MOD cogntive impairment and slow shuffled gait with balance deficits during ambulation 2/2 her MS diagnosis.  She requires staff instructions and assistance for self preservatioin.    Eating:   Assist with set up  Shanna requires verbal cues from staff ~75% of the time to initiate and continue eathing activities during lunch service D/2 her cognitive impairment.  Ambulation Walking:   Needs assistance  Staff provides Shanna a site owned 4WW for safe ambulation during program attendance for falls prevention D/2 her short slow shuffled gait and balance deficits 2/2 her MS diagnosis.  Transferring:   Independent  Wheelchair:   Shanna is fully ambulatory.  However, she will be offered a manual WC for any off site group outings for energy conservation and falls prevention.  Toileting:   Staff assists Shanna in the bathroom 1:1 for supervision and provides VC for self-hygiene and garment management after toilet use and hand hygiene.  Maintenance Program:     NuStep  Living Arrangements:   Lives with family  Spiritual Needs: Needs are being met through support group  Medication Assistance:   Medication not taken during program hours  Participation Report:   Aerobics/Exercise  Support Group  Cognitive Stimulation  Fire Drill  Creative Arts/Crafts  Games  Gardening  Speakers/Entertainment  Socialization  Current Events/News  Education/Health Topic   Community Based Outings  Level of Participation:   Active

## 2022-06-17 ENCOUNTER — OFFICE VISIT (OUTPATIENT)
Dept: URGENT CARE | Facility: URGENT CARE | Age: 47
End: 2022-06-17

## 2022-06-17 VITALS
TEMPERATURE: 98.7 F | HEART RATE: 94 BPM | SYSTOLIC BLOOD PRESSURE: 110 MMHG | DIASTOLIC BLOOD PRESSURE: 77 MMHG | BODY MASS INDEX: 20.37 KG/M2 | OXYGEN SATURATION: 96 % | WEIGHT: 134 LBS

## 2022-06-17 DIAGNOSIS — G40.909 SEIZURE DISORDER (H): ICD-10-CM

## 2022-06-17 DIAGNOSIS — G35 MS (MULTIPLE SCLEROSIS) (H): ICD-10-CM

## 2022-06-17 DIAGNOSIS — R41.0 DISORIENTATION: ICD-10-CM

## 2022-06-17 DIAGNOSIS — R63.0 POOR APPETITE: Primary | ICD-10-CM

## 2022-06-17 DIAGNOSIS — N30.00 ACUTE CYSTITIS WITHOUT HEMATURIA: Primary | ICD-10-CM

## 2022-06-17 LAB
ALBUMIN UR-MCNC: 30 MG/DL
APPEARANCE UR: ABNORMAL
BACTERIA #/AREA URNS HPF: ABNORMAL /HPF
BILIRUB UR QL STRIP: ABNORMAL
COLOR UR AUTO: YELLOW
GLUCOSE UR STRIP-MCNC: NEGATIVE MG/DL
HGB UR QL STRIP: ABNORMAL
KETONES UR STRIP-MCNC: 40 MG/DL
LEUKOCYTE ESTERASE UR QL STRIP: ABNORMAL
NITRATE UR QL: NEGATIVE
PH UR STRIP: 6 [PH] (ref 5–7)
RBC #/AREA URNS AUTO: ABNORMAL /HPF
SP GR UR STRIP: >=1.03 (ref 1–1.03)
SQUAMOUS #/AREA URNS AUTO: ABNORMAL /LPF
UROBILINOGEN UR STRIP-ACNC: 0.2 E.U./DL
WBC #/AREA URNS AUTO: ABNORMAL /HPF

## 2022-06-17 PROCEDURE — 87086 URINE CULTURE/COLONY COUNT: CPT | Performed by: NURSE PRACTITIONER

## 2022-06-17 PROCEDURE — 81001 URINALYSIS AUTO W/SCOPE: CPT | Performed by: NURSE PRACTITIONER

## 2022-06-17 PROCEDURE — 99214 OFFICE O/P EST MOD 30 MIN: CPT | Performed by: NURSE PRACTITIONER

## 2022-06-17 RX ORDER — CEPHALEXIN 500 MG/1
500 CAPSULE ORAL 3 TIMES DAILY
Qty: 21 CAPSULE | Refills: 0 | Status: SHIPPED | OUTPATIENT
Start: 2022-06-17 | End: 2022-06-24

## 2022-06-18 NOTE — PROGRESS NOTES
Chief Complaint   Patient presents with     Urgent Care     Weight Loss     C/O loss of appetite for 1 week     SUBJECTIVE:  Shanna Shanks is a 46 year old female who presents to the clinic today with her  for loss of appetite for 3 days. She has had some sips of water with pills and yogurt. In the past when she has had poor appetite it has been due to UTIs and dental issues.  is making a dentist appointment. No fevers, flank pain, vomiting. No change in her baseline mental status with progressive MS, seizure disorder.    Past Medical History:   Diagnosis Date     Multiple sclerosis (H) 3/2/96    last exacerbation 3yrs ago, no meds x 6 months     Seizure disorder (H)      Tobacco dependency      baclofen (LIORESAL) 10 MG tablet, Take 10-20 mg by mouth 4 times daily as needed   CRANBERRY EXTRACT PO, Take 1 tablet by mouth daily  dimethyl fumarate (TECFIDERA) delayed release capsule, Take 240 mg by mouth 2 times daily   levETIRAcetam (KEPPRA) 250 MG tablet, Take 1 tablet (250 mg) by mouth 2 times daily  levETIRAcetam 1000 MG TABS, Take 1,000 mg by mouth 2 times daily  MELATONIN PO, Take 1 tablet by mouth as needed  NORLYDA 0.35 MG tablet, TAKE 1 TABLET(0.35 MG) BY MOUTH DAILY  order for DME, Equipment being ordered: four wheeled walker with seat and brakes  oxybutynin (DITROPAN-XL) 5 MG 24 hr tablet, Take 5 mg by mouth daily  venlafaxine (EFFEXOR-ER) 75 MG 24 hr tablet, Take 75 mg by mouth daily  VITAMIN D, CHOLECALCIFEROL, PO, Take 1,000 Units by mouth daily    No current facility-administered medications on file prior to visit.    Social History     Tobacco Use     Smoking status: Former Smoker     Packs/day: 0.25     Types: Cigarettes     Smokeless tobacco: Never Used     Tobacco comment: quit in 2017   Substance Use Topics     Alcohol use: No     Alcohol/week: 5.8 standard drinks     Types: 7 Standard drinks or equivalent per week     Allergies   Allergen Reactions     Diphenhydramine Rash      benadryl cream     Nickel      Macrobid [Nitrofurantoin] Rash     Review of Systems   All systems negative except for those listed above in HPI.    EXAM:   /77   Pulse 94   Temp 98.7  F (37.1  C) (Tympanic)   Wt 60.8 kg (134 lb)   SpO2 96%   BMI 20.37 kg/m      Physical Exam  Vitals reviewed.   Constitutional:       General: She is not in acute distress.     Appearance: Normal appearance. She is not ill-appearing, toxic-appearing or diaphoretic.   HENT:      Head: Normocephalic and atraumatic.   Cardiovascular:      Rate and Rhythm: Normal rate.      Pulses: Normal pulses.   Pulmonary:      Effort: Pulmonary effort is normal. No respiratory distress.      Breath sounds: Normal breath sounds. No stridor. No wheezing, rhonchi or rales.   Chest:      Chest wall: No tenderness.   Abdominal:      Tenderness: There is no right CVA tenderness or left CVA tenderness.   Musculoskeletal:         General: Normal range of motion.   Skin:     General: Skin is warm and dry.      Findings: No rash.   Neurological:      Mental Status: She is alert. She is disoriented.      Motor: Weakness present.   Psychiatric:         Mood and Affect: Mood normal.         Behavior: Behavior normal.       ASSESSMENT:    ICD-10-CM    1. Poor appetite  R63.0 UA Macro with Reflex to Micro and Culture - lab collect     UA Macro with Reflex to Micro and Culture - lab collect   2. MS (multiple sclerosis) (H)  G35    3. Seizure disorder (H)  G40.909    4. Disorientation  R41.0      PLAN:    Explained that urgent care can certainly check a UA and we will call for abnormals  However, we are limited without stat labs, IV fluids, advanced diagnostics  PCP for follow-up  ER if severe worsening lost appetite, dizziness, fever, agitation      Follow up with primary care provider with any problems, questions or concerns or if symptoms worsen or fail to improve. Patient agreed to plan and verbalized understanding.    EARNEST Arias-ROSALINDA ARGUETA  [facility] [place]

## 2022-06-20 ENCOUNTER — HOSPITAL ENCOUNTER (OUTPATIENT)
Dept: REHABILITATION | Facility: CLINIC | Age: 47
Discharge: HOME | End: 2022-06-20
Attending: FAMILY MEDICINE
Payer: COMMERCIAL

## 2022-06-20 LAB — BACTERIA UR CULT: NORMAL

## 2022-06-20 PROCEDURE — S5102 ADULT DAY CARE PER DIEM: HCPCS

## 2022-06-20 NOTE — PROGRESS NOTES
[facility] Note:  Home and Community Based Services Outing    Destination:      Description of Outing:      Member Response:      No outing offered today due to a 105 degree outdoor heat index.

## 2022-06-20 NOTE — PROGRESS NOTES
[facility] Note:  Home and Community Based Services Outing    Destination:  No outing offered today due to extreme heat index of 105 degrees.     Description of Outing:     Member Response:

## 2022-06-29 NOTE — PROGRESS NOTES
[facility] RN Note    Previous documentation reviewed.  No concerns reported by AC staff or member.    Late entry

## 2022-07-07 ENCOUNTER — HOSPITAL ENCOUNTER (OUTPATIENT)
Dept: REHABILITATION | Facility: CLINIC | Age: 47
Discharge: HOME | End: 2022-07-07
Attending: FAMILY MEDICINE
Payer: COMMERCIAL

## 2022-07-07 PROCEDURE — S5102 ADULT DAY CARE PER DIEM: HCPCS

## 2022-07-07 NOTE — PROGRESS NOTES
[facility] Note:  Home and Community Based Services Outing    Destination:  Member refused outing due weather, would rather stay inside and play cards.     Description of Outing:      Member Response:

## 2022-07-07 NOTE — PROGRESS NOTES
Facilitated physical maintenance of bilateral upper extremity and bilateral lower extrimity strength and endurance through NuStep therapeutic exercise.  Patient completed 20 minutes of NuStep activity.    Nu Step Settings:  Seat:  10  Arms:  12  Resistance:  5    Patient tolerated this exercise well.

## 2022-07-11 ENCOUNTER — HOSPITAL ENCOUNTER (OUTPATIENT)
Dept: REHABILITATION | Facility: CLINIC | Age: 47
Discharge: HOME | End: 2022-07-11
Attending: FAMILY MEDICINE
Payer: COMMERCIAL

## 2022-07-11 PROCEDURE — S5102 ADULT DAY CARE PER DIEM: HCPCS

## 2022-07-12 ENCOUNTER — MYC MEDICAL ADVICE (OUTPATIENT)
Dept: FAMILY MEDICINE | Facility: CLINIC | Age: 47
End: 2022-07-12

## 2022-07-12 ENCOUNTER — TELEPHONE (OUTPATIENT)
Dept: FAMILY MEDICINE | Facility: CLINIC | Age: 47
End: 2022-07-12

## 2022-07-12 DIAGNOSIS — B35.1 TOENAIL FUNGUS: Primary | ICD-10-CM

## 2022-07-12 DIAGNOSIS — L60.9 NAIL ABNORMALITY: Primary | ICD-10-CM

## 2022-07-12 NOTE — TELEPHONE ENCOUNTER
Looks like there are 3 encounters for this for some reason. Closing encounter    KONG CarvajalN RN  [facility]

## 2022-07-12 NOTE — TELEPHONE ENCOUNTER
Podiatry referral signed.   She is also overdue to have follow up with me. Last seen in 2020. Pleas encourage her to have follow up.           Shanna Shanks would like to request a referral.  Reason: Treatment for toenail fungal infection  Requested provider: Podiatrist  Comment:  Have had a fungal infection in my toenails. It has started to become uncomfortable and need to discuss treatment and/or trimming options.

## 2022-07-14 ENCOUNTER — HOSPITAL ENCOUNTER (OUTPATIENT)
Dept: REHABILITATION | Facility: CLINIC | Age: 47
Discharge: HOME | End: 2022-07-14
Attending: FAMILY MEDICINE
Payer: COMMERCIAL

## 2022-07-14 PROCEDURE — S5102 ADULT DAY CARE PER DIEM: HCPCS

## 2022-07-14 NOTE — PROGRESS NOTES
[facility] RN Note    Previous documentation reviewed.  No concerns reported by AC staff or member.    Ambulatory with limitations.

## 2022-07-18 ENCOUNTER — HOSPITAL ENCOUNTER (OUTPATIENT)
Dept: REHABILITATION | Facility: CLINIC | Age: 47
Discharge: HOME | End: 2022-07-18
Attending: FAMILY MEDICINE
Payer: COMMERCIAL

## 2022-07-18 PROCEDURE — S5102 ADULT DAY CARE PER DIEM: HCPCS

## 2022-07-18 NOTE — PROGRESS NOTES
[facility] Note:  Home and Community Based Services Outing    Destination:  No outing was offered today due to extreme high temperatures and heat advisory.    Description of Outing:      Member Response:

## 2022-07-21 ENCOUNTER — HOSPITAL ENCOUNTER (OUTPATIENT)
Dept: REHABILITATION | Facility: CLINIC | Age: 47
Discharge: HOME | End: 2022-07-21
Attending: FAMILY MEDICINE
Payer: COMMERCIAL

## 2022-07-21 PROCEDURE — S5102 ADULT DAY CARE PER DIEM: HCPCS

## 2022-07-21 NOTE — PROGRESS NOTES
"[facility] Note:  Home and Community Based Services Outing    Destination:  [facility]    Description of Outing:  Member was helped outside by staff to the garden where morning aerobics were performed to the best of their ability, freeze pops were enjoyed, music was played, and they were able to socialize with other members around.    Member Response:  \"It was super nice to get outside before getting too warm. I like that we were able to eat the freeze pops.\"  "

## 2022-07-25 ENCOUNTER — HOSPITAL ENCOUNTER (OUTPATIENT)
Dept: REHABILITATION | Facility: CLINIC | Age: 47
Discharge: HOME | End: 2022-07-25
Attending: FAMILY MEDICINE
Payer: COMMERCIAL

## 2022-07-25 PROCEDURE — S5102 ADULT DAY CARE PER DIEM: HCPCS

## 2022-07-25 NOTE — PROGRESS NOTES
Individual abuse prevention plan (IAPP)  [facility]     Assessment of Susceptibility to Abuse, Including Self Abuse, Neglect (Identification of characteristics, which make the individual susceptible to abuse, and how these characteristics cause the individual to be susceptible to abuse.)     Is the person susceptible to abuse in each area?  Sexual Abuse:   No  Referrals made when the person is susceptible to abuse outside the scope or control of this program (Identify the referral and date it occurred): No additional risk reduction means needed at this time    Physical Abuse:   No  Referrals made when the person is susceptible to abuse outside the scope or control of this program (Identify the referral and date it occurred): No additional risk reduction means needed at this time    Self-Abuse:   No  Referrals made when the person is susceptible to abuse outside the scope or control of this program (Identify the referral and date it occurred): No additional risk reduction means needed at this time    Financial  Exploitation  No  Referrals made when the person is susceptible to abuse outside the scope or control of this program (Identify the referral and date it occurred): No additional risk reduction means needed at this time    Is the program aware of this person committing a violent crime or act of physical aggression towards others?  No  Referrals made when the person is susceptible to abuse outside the scope or control of this program (Identify the referral and date it occurred): No additional risk reduction means needed at this time    INDIVIDUAL ABUSE PREVENTION PLAN-MEASURES TAKEN TO MINIMIZE RISK OF ABUSE   ADL:   Assist with clothing management  Assist with feeding  Assist with toileting  Staff will provide set up and VC for eating.  Staff will provide 1:1 supervision and set up and VC assist for post toilet use self-hygiene, garment management, and hand  hygiene.  Ambulation/Transfers/Wheelchairs:  Assist with all transfers and/or ambulation  Encourage client to ambulate short distances with walker and provide wheelchair for distance  Ensure client uses cane and/or walker  Provide standby assist due to periods of dizziness, fatigue  Provide standby assist when client ambulates prn   Behavior:   No behavior issues  Communication:  Encourage verbalization of needs/concerns  Observe body language/gestures to assist in anticipating client's needs  Cognitive Issues:   Require aide to be with member if wandering  Provider reminders due to confusion, forgetfulness  Give simple step-by-step direction  Contact caregiver to inform of special activities days  Diet:   Staff will prepare food to facilitate client to feed self  Monitor client when eating and/or drinking fluids   Exercise:   Encourage participation in maintenance program  Encourage participation in wheelchair aerobics  Hearing:   Speak distinctly and use gestures  Isolation:   Encourage socialization due to isolation in home environment  Medical Monitoring:   Monitor physical and emotional comfort  Monitor physical symptoms due to diagnosis and report significant changes to nurse, caregiver and physician   Mental Health:   Motivate client to join in activities that are beneficial to client  Encourage client to express feelings  Monitor anxiety level and intervene when appropriate  Encourage regular attendance for socialization and stimulation  Offer emotional support  Observe for symptoms of depression and notify appropriate staff/caregiver  Encourage independent decision making  Encourage social interaction to assist in increasing client's self-image  Provide client with choices of programming to encourage independent decision making  Provide activities in which client can be successful  Sensory:   Provide and encourage participation in activities for stimulation  Vision:   Provide verbal cues  Other:  N/A    Developed in consultation with:  Client  The Program Abuse Prevention Plan/IAPP identifies the specific actions that minimize abuse to the [facility] participant.  No

## 2022-07-25 NOTE — PROGRESS NOTES
MONTHLY PROGRESS NOTE AND PARTICIPATION REPORT   [facility]    Shanna Shanks, 1975  Attendance: Please see Epic for attendance record.    Communication:   Does communicate   Hearing:   No impairment  Vision:   No impairment  Orientation/Cognition:   Minor forgetfulness  Partial disorientation  Short term memory loss  Behavior:   No behavioral concerns  Self-Preservation Skills:   Shanna presents with MOD cogntive impairment and slow shuffled gait with balance deficits during ambulation 2/2 her MS diagnosis.  She requires staff instructions and assistance for self preservatioin.    Eating:   Assist with set up  Shanna requires verbal cues from staff ~75% of the time to initiate and continue eathing activities during lunch service D/2 her cognitive impairment.  Ambulation Walking:   Needs assistance  Staff provides Shanna a site owned 4WW for safe ambulation during program attendance for falls prevention D/2 her short slow shuffled gait and balance deficits 2/2 her MS diagnosis.  Transferring:   Independent  Wheelchair:   Shanna is fully ambulatory.  However, she will be offered a manual WC for any off site group outings for energy conservation and falls prevention.  Toileting:   Staff assists Shanna in the bathroom 1:1 for supervision and provides VC for self-hygiene and garment management after toilet use and hand hygiene.  Maintenance Program:     NuStep  Living Arrangements:   Lives with family  Spiritual Needs: Needs are being met through support group  Medication Assistance:   Medication not taken during program hours  Participation Report:   Aerobics/Exercise  Support Group  Cognitive Stimulation  Fire Drill  Creative Arts/Crafts  Games  Gardening  Speakers/Entertainment  Socialization  Current Events/News  Education/Health Topic  Level of Participation:   [facility] Note:  Shanna has visibly lost weight and has experienced a reduced appetite.  Staff is  encouraging her daily to eat all of her lunch and snacks while at program.  Her  has taken her to her PCP to address this issue.  Otherwise, Shanna reports she is doing well.

## 2022-07-25 NOTE — PROGRESS NOTES
"[facility] Note:  Home and Community Based Services Outing    Destination:  [facility]    Description of Outing:  Patient was helped outside by staff to garden. Outside ice cream sandwiches were enjoyed by member for another members birthday. Morning aerobics were performed to best of their ability. While at the garden staff picked an eggplant and member was able to learn different ways to cook and eat the vegetable.     Member Response:  \"It was great to be outside and enjoy the cooler weather!\"  "

## 2022-07-25 NOTE — PROGRESS NOTES
INDIVIDUAL PLAN OF CARE   [facility]    Member Name: Shanna Shanks; YOB: 1975  MRN: [id number]  3/31/2022    Goals developed in collaboration with: member  Staff responsible for plan: Huseyin LOWE  1. Long Term Goal (concrete, measurable, and time specific outcomes):  Member will maintain present level of physical and cognitive function through active participation in structured [facility] programming with staff set up, facilitation, and supervision provided every day of program attendance.  Target Date:  July 2023  Bi-Annual Review:    2. Short Term Goal: (concrete, measurable, and time specific outcomes):  To maintain physical strength and endurance, member will actively participate in prescribed Nu-Step exercise program, with set up, VC's and supervision provided by staff, for at least 20 minutes, at least 1x/week during program attendance.  Target Date:  Jan 2023  Bi-Annual Review:    3. Short Term Goal: (concrete, measurable, and time specific outcomes):  To maintain current level of cognitive function, member will actively participate in therapeutic creative arts and Brain/Body programming with set up, VC's and supervision provided by staff every day of program attendance.  Target Date: Jan 2023  Bi-Annual Review:

## 2022-07-28 ENCOUNTER — HOSPITAL ENCOUNTER (OUTPATIENT)
Dept: REHABILITATION | Facility: CLINIC | Age: 47
Discharge: HOME | End: 2022-07-28
Attending: FAMILY MEDICINE
Payer: COMMERCIAL

## 2022-07-28 PROCEDURE — S5102 ADULT DAY CARE PER DIEM: HCPCS

## 2022-07-28 NOTE — PROGRESS NOTES
"[facility] Note:  Home and Community Based Services Outing    Destination:  [facility]    Description of Outing:  Member was helped outside to garden by staff. While outside member enjoyed freeze pops and morning aerobics were performed. Member socialized with others around while staff talked about all the different vegetables growing.     Member Response:  \"I really enjoyed being here while it was cool outside, I look forward to coming again.\"  "

## 2022-08-01 ENCOUNTER — HOSPITAL ENCOUNTER (OUTPATIENT)
Dept: REHABILITATION | Facility: CLINIC | Age: 47
Discharge: HOME | End: 2022-08-01
Attending: FAMILY MEDICINE
Payer: COMMERCIAL

## 2022-08-01 PROCEDURE — S5102 ADULT DAY CARE PER DIEM: HCPCS | Performed by: REHABILITATION PRACTITIONER

## 2022-08-01 NOTE — PROGRESS NOTES
"[facility] Note:  Home and Community Based Services Outing     Destination:  [facility]     Description of Outing:  Member was helped outside to garden by staff. While outside member enjoyed freeze pops and morning aerobics were performed. Member socialized with others around while staff talked about all the different vegetables growing.      Member Response:  \"I really enjoyed being here while it was cool outside, I look forward to coming again.\"  "

## 2022-08-04 ENCOUNTER — HOSPITAL ENCOUNTER (OUTPATIENT)
Dept: REHABILITATION | Facility: CLINIC | Age: 47
Discharge: HOME | End: 2022-08-04
Attending: FAMILY MEDICINE
Payer: COMMERCIAL

## 2022-08-04 PROCEDURE — S5102 ADULT DAY CARE PER DIEM: HCPCS

## 2022-08-04 NOTE — PROGRESS NOTES
"[facility] Note:  Home and Community Based Services Outing     Destination:  [facility]     Description of Outing:  Member was assisted outside to the garden by staff. While outside, member enjoyed freeze pops and performed morning aerobics to the best of their ability. Member was able to socialize with peers and observe the vegetables growing in the garden.     Member Response:  \"It was a beautiful day to be outside. I look forward to being in the garden.\"  "

## 2022-08-05 ENCOUNTER — OFFICE VISIT (OUTPATIENT)
Dept: URGENT CARE | Facility: URGENT CARE | Age: 47
End: 2022-08-05
Payer: COMMERCIAL

## 2022-08-05 VITALS
SYSTOLIC BLOOD PRESSURE: 92 MMHG | TEMPERATURE: 97.2 F | OXYGEN SATURATION: 98 % | HEIGHT: 68 IN | WEIGHT: 134 LBS | RESPIRATION RATE: 18 BRPM | BODY MASS INDEX: 20.31 KG/M2 | HEART RATE: 86 BPM | DIASTOLIC BLOOD PRESSURE: 68 MMHG

## 2022-08-05 DIAGNOSIS — G35 MULTIPLE SCLEROSIS (H): ICD-10-CM

## 2022-08-05 DIAGNOSIS — N30.00 ACUTE CYSTITIS WITHOUT HEMATURIA: Primary | ICD-10-CM

## 2022-08-05 DIAGNOSIS — N39.0 FREQUENT UTI: ICD-10-CM

## 2022-08-05 LAB
ALBUMIN UR-MCNC: NEGATIVE MG/DL
APPEARANCE UR: CLEAR
BACTERIA #/AREA URNS HPF: ABNORMAL /HPF
BILIRUB UR QL STRIP: NEGATIVE
COLOR UR AUTO: YELLOW
GLUCOSE UR STRIP-MCNC: NEGATIVE MG/DL
HGB UR QL STRIP: NEGATIVE
KETONES UR STRIP-MCNC: NEGATIVE MG/DL
LEUKOCYTE ESTERASE UR QL STRIP: ABNORMAL
NITRATE UR QL: NEGATIVE
PH UR STRIP: 6.5 [PH] (ref 5–7)
RBC #/AREA URNS AUTO: ABNORMAL /HPF
SP GR UR STRIP: 1.02 (ref 1–1.03)
UROBILINOGEN UR STRIP-ACNC: 0.2 E.U./DL
WBC #/AREA URNS AUTO: ABNORMAL /HPF

## 2022-08-05 PROCEDURE — 87086 URINE CULTURE/COLONY COUNT: CPT | Performed by: EMERGENCY MEDICINE

## 2022-08-05 PROCEDURE — 81001 URINALYSIS AUTO W/SCOPE: CPT | Performed by: EMERGENCY MEDICINE

## 2022-08-05 PROCEDURE — 99214 OFFICE O/P EST MOD 30 MIN: CPT | Performed by: EMERGENCY MEDICINE

## 2022-08-05 RX ORDER — SULFAMETHOXAZOLE/TRIMETHOPRIM 800-160 MG
1 TABLET ORAL 2 TIMES DAILY
Qty: 10 TABLET | Refills: 0 | Status: SHIPPED | OUTPATIENT
Start: 2022-08-05 | End: 2022-08-10

## 2022-08-05 NOTE — PROGRESS NOTES
Assessment & Plan     Diagnosis:    (N30.00) Acute cystitis without hematuria  (primary encounter diagnosis)  Plan:  Urine Culture, sulfamethoxazole-trimethoprim (BACTRIM        DS) 800-160 MG tablet, Adult Urology          Referral      (G35) Multiple sclerosis (H)    (N39.0) Frequent UTI  Plan: Adult Urology  Referral      Medical Decision Making:  Patient is a 47-year-old female with history of MS and notes some urinary incontinence issues at baseline, notes that this has been getting worse and more frequency of urination.    This clinically is consistent with a urinary tract infection.  Urinalysis confirms the infection.  There has been no fever, confusion, flank pain or significant abdominal pain.  The patient is not concerned about STDs and testing not indicated. There is no clinical evidence of pyelonephritis, appendicitis, colitis, diverticulitis or any intraabdominal catastrophe. The patient will be started on antibiotics for the infection.  Given history of frequent UTIs, patient is requesting to see a new urologist as her old one is through [place] she no longer has insurance through, therefore I did place a urology referral through [facility] system.  Go to the ER if increasing pain, vomiting, fever, or inability to tolerate the oral antibiotic.  Follow up with primary physician is indicated if not improving in 2-3 days.     30 minutes spent on the date of the encounter doing chart review, review of outside records, review of test results, interpretation of tests, patient visit, documentation and discussion with family       Glenroy Maxwell PA-C  [facility]    Subjective     Shanna Shanks is a 47 year old female with history of MS and frequent UTIs, has baseline urinary incontinence issues,  presents to clinic today for the following health issues:  Chief Complaint   Patient presents with     Urgent Care     UTI     Frequency urinate x3 days.   "        HPI  Patient states that for the past 2 days they experienced frequency and urgency of urination.  She notes she has a history of MS with issues with urinary incontinence at baseline. This has gotten over time. They note that they have mild lower abdominal pain; but is more of a \"just uncomfortable feeling.\". Patient denies any flank or back pain, fever, nausea, vomiting, diarrhea, inability to urinate, vaginal discharge/bleeding.  Patient notes that she normally sees urology through [facility] but would prefer to now go through [facility] as her insurance is changed and she frequently goes to our clinics.    Review of Systems    See [facility]    Objective      Vitals:    Patient Vitals for the past 24 hrs:   BP Temp Temp src Pulse Resp SpO2 Height Weight   08/05/22 1506 92/68 97.2  F (36.2  C) Temporal 86 18 98 % 1.727 m (5' 8\") 60.8 kg (134 lb)         Vital signs reviewed by: Glenroy Maxwell PA-C    Physical Exam   Constitutional: The patient is oriented to person, place, and time. Alert and cooperative. No acute distress.  Cardiovascular: Regular rate and rhythm.  Pulmonary/Chest: Effort normal. No respiratory distress.   GI: Abdomen with mild suprapubic tenderness to palpation, no rebound or guarding. Abdomen is soft and not distended. No McBurney point tenderness.   MSK: No CVA tenderness bilaterally.  Neurological: Alert and oriented x3. Pleasant.      Labs/Imaging:    Results for orders placed or performed in visit on 08/05/22   UA macro with reflex to Microscopic and Culture - Clinc Collect     Status: Abnormal    Specimen: Urine, Midstream   Result Value Ref Range    Color Urine Yellow Colorless, Straw, Light Yellow, Yellow    Appearance Urine Clear Clear    Glucose Urine Negative Negative mg/dL    Bilirubin Urine Negative Negative    Ketones Urine Negative Negative mg/dL    Specific Gravity Urine 1.020 1.003 - 1.035    Blood Urine Negative Negative    pH Urine 6.5 5.0 - 7.0    Protein Albumin " Urine Negative Negative mg/dL    Urobilinogen Urine 0.2 0.2, 1.0 E.U./dL    Nitrite Urine Negative Negative    Leukocyte Esterase Urine Small (A) Negative   Urine Microscopic     Status: Abnormal   Result Value Ref Range    Bacteria Urine Many (A) None Seen /HPF    RBC Urine 2-5 (A) 0-2 /HPF /HPF    WBC Urine 10-25 (A) 0-5 /HPF /HPF     UC pending    Glenroy Maxwell PA-C, August 5, 2022

## 2022-08-07 LAB — BACTERIA UR CULT: NORMAL

## 2022-08-08 ENCOUNTER — HOSPITAL ENCOUNTER (OUTPATIENT)
Dept: REHABILITATION | Facility: CLINIC | Age: 47
Discharge: HOME | End: 2022-08-08
Attending: FAMILY MEDICINE
Payer: COMMERCIAL

## 2022-08-08 PROCEDURE — S5102 ADULT DAY CARE PER DIEM: HCPCS

## 2022-08-08 NOTE — PROGRESS NOTES
[facility] Note:  Home and Community Based Services Outing    Destination:  [facility]    Description of Outing:  Member went to garden for socialization, freeze pop snack, music, seated aerobic exercise, and healing touch hand massage therapy, with supervision provided by staff.    Member Response:  Member response was that they had a great time and would like to do it again.

## 2022-08-08 NOTE — PROGRESS NOTES
[facility] RN Note    Previous documentation reviewed.  No concerns reported by AC staff or member.    Walked in from garden with walker steadily.

## 2022-08-18 ENCOUNTER — HOSPITAL ENCOUNTER (OUTPATIENT)
Dept: REHABILITATION | Facility: CLINIC | Age: 47
Discharge: HOME | End: 2022-08-18
Attending: FAMILY MEDICINE
Payer: COMMERCIAL

## 2022-08-18 PROCEDURE — S5102 ADULT DAY CARE PER DIEM: HCPCS

## 2022-08-18 NOTE — PROGRESS NOTES
"[facility] Note:  Home and Community Based Services Outing     Destination:  [facility]     Description of Outing:  Member was assisted outside by staff to the garden. While outside member enjoyed freeze pops and listened to music of their choice. They performed aerobics to the best of their ability and socialized with their peers.     Member Response:  \"The weather was nice today and I enjoyed being in the garden.\"  "

## 2022-08-22 ENCOUNTER — HOSPITAL ENCOUNTER (OUTPATIENT)
Dept: REHABILITATION | Facility: CLINIC | Age: 47
Discharge: HOME | End: 2022-08-22
Attending: FAMILY MEDICINE
Payer: COMMERCIAL

## 2022-08-22 PROCEDURE — S5102 ADULT DAY CARE PER DIEM: HCPCS

## 2022-08-22 NOTE — PROGRESS NOTES
MONTHLY PROGRESS NOTE AND PARTICIPATION REPORT   [facility]    Shanna Shanks, 1975  Attendance: Please see Epic for attendance record.    Communication:   Does communicate   Hearing:   No impairment  Vision:   No impairment  Orientation/Cognition:   Minor forgetfulness  Partial disorientation  Short term memory loss  Behavior:   No behavioral concerns  Self-Preservation Skills:   Shanna presents with MOD cognitive impairment and slow shuffled gait with balance deficits during ambulation D/2 her MS diagnosis. She requires staff instructions and assistance for self preservation.  Eating:   Assist with set up  Shanna requires verbal cues from staff ~75% of the time to initiate and continue eating activities during lunch service D/2 her cognitive impairment  Ambulation Walking:   Needs assistance  Staff provides Shanna a site owned 4WW for safe ambulation during program attendance for falls prevention D/2 her short slow shuffled gait and balance defecits D/2 her MS diagnosis  Transferring:   Independent  Wheelchair:   Shanna is fully ambulatory. However, she will be offered a manual WC for any off site group outings for energy conservation and falls prevention  Toileting:   Staff assists Shanna in the bathroom 1:1 for supervision and provides VC for self-hygiene and garment management after toilet use and hand hygiene  Maintenance Program:     NuStep  Living Arrangements:   Lives with family  Spiritual Needs: Needs are being met through support group  Medication Assistance:   Medication not taken during program hours  Participation Report:   Aerobics/Exercise  Support Group  Cognitive Stimulation  Creative Arts/Crafts  Games  Gardening  Speakers/Entertainment  Socialization  Current Events/News  Education/Health Topic  Level of Participation:   Active

## 2022-08-22 NOTE — PROGRESS NOTES
"[facility] Note:  Home and Community Based Services Outing     Destination:  [facility]     Description of Outing:  Member was assisted outside to the [facility]. Member performed morning aerobics to the best of their ability, socialized with their peers, and listened to music of their choice. Healing touch hand massage therapy was offered and supervision provided by staff.     Member Response:  \"The weather was very nice outside today and it was nice to talk amongst everyone.\"  "

## 2022-08-25 ENCOUNTER — HOSPITAL ENCOUNTER (OUTPATIENT)
Dept: REHABILITATION | Facility: CLINIC | Age: 47
Discharge: HOME | End: 2022-08-25
Attending: FAMILY MEDICINE
Payer: COMMERCIAL

## 2022-08-25 PROCEDURE — S5102 ADULT DAY CARE PER DIEM: HCPCS

## 2022-08-25 NOTE — PROGRESS NOTES
[facility] Note:  Home and Community Based Services Outing    Destination:  [facility]    Description of Outing:  Members were transported to [facility] for lunch and socialization with other members.    Member Response:  Member showed excellent response to transport and destination. Member also participated in socialization with other members before, during, and after lunch.

## 2022-08-29 ENCOUNTER — HOSPITAL ENCOUNTER (OUTPATIENT)
Dept: REHABILITATION | Facility: CLINIC | Age: 47
Discharge: HOME | End: 2022-08-29
Attending: FAMILY MEDICINE
Payer: COMMERCIAL

## 2022-08-29 PROCEDURE — S5102 ADULT DAY CARE PER DIEM: HCPCS

## 2022-08-30 ENCOUNTER — VIRTUAL VISIT (OUTPATIENT)
Dept: UROLOGY | Facility: CLINIC | Age: 47
End: 2022-08-30
Payer: COMMERCIAL

## 2022-08-30 DIAGNOSIS — N30.00 ACUTE CYSTITIS WITHOUT HEMATURIA: Primary | ICD-10-CM

## 2022-08-30 DIAGNOSIS — Z87.440 HISTORY OF UTI: ICD-10-CM

## 2022-08-30 DIAGNOSIS — R31.29 MICROSCOPIC HEMATURIA: ICD-10-CM

## 2022-08-30 PROCEDURE — 99204 OFFICE O/P NEW MOD 45 MIN: CPT | Mod: 95 | Performed by: PHYSICIAN ASSISTANT

## 2022-08-30 RX ORDER — ESTRADIOL 0.1 MG/G
CREAM VAGINAL
Qty: 42.5 G | Refills: 3 | Status: SHIPPED | OUTPATIENT
Start: 2022-08-30 | End: 2024-02-06

## 2022-08-30 NOTE — LETTER
8/30/2022       RE: Shanna Shanks  [address]  [facility] [address]     Dear Colleague,    Thank you for referring your patient, Shanna Shanks, to the [facility] [place] at [facility]. Please see a copy of my visit note below.    Shanna is a 47 year old who is being evaluated via a billable video visit.      How would you like to obtain your AVS? MyChart  If the video visit is dropped, the invitation should be resent by: text  Will anyone else be joining your video visit? Yes: . How would they like to receive their invitation? Text to cell phone: [phone]      Video-Visit Details    Video Start Time: 11:10AM    Type of service:  Video Visit    Video End Time:11:21 AM    Originating Location (pt. Location): Home    Distant Location (provider location):  [facility] [place]     Platform used for Video Visit: [employer]    CC: Hematuria.    HPI: It is a pleasure to see Ms. Shanna Shanks, a pleasant 47 year old female, asked to be seen in consultation via self referral for evaluation of rUTIs and noted to have micro hematuria (x 2) with neg UCs. Ex-smoker.    Hx of rUTIs (enteroccous, e coli). Sx include more frequency and incontinence.     The patient denies any gross hematuria.  Ms. Shanks voids without difficulty.  She currently denies any dysuria, pyuria, hesitancy, intermittency, feelings of incomplete emptying, or any recent history of urinary tract infections or stones.     Has MS, ambulatory. Uses oxybutynin 5mg for urge incontinence.     Hematuria Risk Factors:  Age >40: Yes     Smoking history: yes  Occupational exposure to chemicals or dyes (ie, benzenes, aromatic amines): no  History of urologic disorder or disease: no  History of irritative voiding symptoms: no  History of urinary tract infection: no  Analgesic abuse: no  History of pelvic irradiation: no    Past Medical  History:   Diagnosis Date     Multiple sclerosis (H) 3/2/96    last exacerbation 3yrs ago, no meds x 6 months     Seizure disorder (H)      Tobacco dependency      Past Surgical History:   Procedure Laterality Date     HC DILATION/CURETTAGE DIAG/THER NON OB  1/1/05    D & C, missed AB     Current Outpatient Medications   Medication Sig Dispense Refill     estradiol (ESTRACE VAGINAL) 0.1 MG/GM vaginal cream Apply small amount to the vaginal opening and urethra M, W, F @ h.s. 42.5 g 3     baclofen (LIORESAL) 10 MG tablet Take 10-20 mg by mouth 4 times daily as needed   3     CRANBERRY EXTRACT PO Take 1 tablet by mouth daily       dimethyl fumarate (TECFIDERA) delayed release capsule Take 240 mg by mouth 2 times daily        levETIRAcetam (KEPPRA) 250 MG tablet Take 1 tablet (250 mg) by mouth 2 times daily 60 tablet 0     levETIRAcetam 1000 MG TABS Take 1,000 mg by mouth 2 times daily 60 tablet 1     MELATONIN PO Take 1 tablet by mouth as needed       NORLYDA 0.35 MG tablet TAKE 1 TABLET(0.35 MG) BY MOUTH DAILY 84 tablet 0     order for DME Equipment being ordered: four wheeled walker with seat and brakes 1 Units 0     oxybutynin (DITROPAN-XL) 5 MG 24 hr tablet Take 5 mg by mouth daily       venlafaxine (EFFEXOR-ER) 75 MG 24 hr tablet Take 75 mg by mouth daily       VITAMIN D, CHOLECALCIFEROL, PO Take 1,000 Units by mouth daily       Allergies   Allergen Reactions     Diphenhydramine Rash     benadryl cream     Nickel      Macrobid [Nitrofurantoin] Rash     FAMILY HISTORY: There is no reported history of genitourinary carcinoma.  There is no history of urolithiasis.      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 No   Social History Narrative    Caffeine intake/servings daily - 2    Calcium intake/servings daily - 2-3    Exercise 7 times weekly - describe runs on treadmill for 5 minutes    Sunscreen used - Yes    Seatbelts used - Yes    Guns stored in the home - No    Self Breast Exam - Yes    Pap test up to date -  Yes    Eye exam up to date -  Yes    Dental exam up to date -  Yes    DEXA scan up to date -  Yes    Flex Sig/Colonoscopy up to date -  Not Applicable    Mammography up to date -  Not Applicable    Immunizations reviewed and up to date - Yes    Abuse: Current or Past (Physical, Sexual or Emotional) - No    Do you feel safe in your environment - Yes    Do you cope well with stress - Yes    Do you suffer from insomnia - No    Last updated by: Michelle Sanchez  12/22/2010                 Social Determinants of Health     Financial Resource Strain: Not on file   Food Insecurity: Not on file   Transportation Needs: Not on file   Physical Activity: Not on file   Stress: Not on file   Social Connections: Not on file   Intimate Partner Violence: Not on file   Housing Stability: Not on file       PHYSICAL EXAM:   PSYCH: NAD  EYES: EOMI      Office Visit on 08/05/2022   Component Date Value Ref Range Status     Color Urine 08/05/2022 Yellow  Colorless, Straw, Light Yellow, Yellow Final     Appearance Urine 08/05/2022 Clear  Clear Final     Glucose Urine 08/05/2022 Negative  Negative mg/dL Final     Bilirubin Urine 08/05/2022 Negative  Negative Final     Ketones Urine 08/05/2022 Negative  Negative mg/dL Final      Specific Gravity Urine 08/05/2022 1.020  1.003 - 1.035 Final     Blood Urine 08/05/2022 Negative  Negative Final     pH Urine 08/05/2022 6.5  5.0 - 7.0 Final     Protein Albumin Urine 08/05/2022 Negative  Negative mg/dL Final     Urobilinogen Urine 08/05/2022 0.2  0.2, 1.0 E.U./dL Final     Nitrite Urine 08/05/2022 Negative  Negative Final     Leukocyte Esterase Urine 08/05/2022 Small (A) Negative Final     Bacteria Urine 08/05/2022 Many (A) None Seen /HPF Final     RBC Urine 08/05/2022 2-5 (A) 0-2 /HPF /HPF Final     WBC Urine 08/05/2022 10-25 (A) 0-5 /HPF /HPF Final     Culture 08/05/2022 >100,000 CFU/mL Mixture of urogenital jen   Final       IMAGING: none    ASSESSMENT and PLAN:    47 year old female with intermediate risk hematuria (smoking hx, UA + x 2).  The differential diagnosis at this point includes stone disease, infection, vaginal contaminant, urothelial malignancy, renal disorder versus another yet unknown diagnosis.    At this time, recommend proceeding with comprehensive hematuria evaluation to include:  - Urine cytology to look for cells concerning for malignancy.  - CT urogram for upper tract imaging.  - Cystoscopy with the first available urologist to evaluate the interior of the bladder. Follow up for hematuria as recommended by urologist performing cystoscopic evaluation.    Thank you for allowing me to participate in Ms. Shanks's care. I will keep you updated of her progress, but please do not hesitate to contact me with any questions.    Trinidad Decker PA-C  [facility]  32 minutes spent on the date of the encounter doing chart review, review of outside records, review of test results, interpretation of tests, patient visit and documentation.

## 2022-08-30 NOTE — PATIENT INSTRUCTIONS
- Urine cytology to look for abnormal cells. Please make an appointment at your local [place] lab to leave a urine sample.  - CT scan of the kidneys.   - Cystoscopy with the  urologist to evaluate the interior of the bladder. Follow up as recommended by the urologist.    CYSTOSCOPY    What is a Cystoscopy?  This is a procedure done to check for problems inside the bladder.  Problems may include polyps (growths), tumors, inflammation (swelling and redness) and other concerns.    The Urologist inserts a thin tube (called a cystoscope) into the bladder.  The tube is about the size of a pencil.  We will give you numbing medicine to reduce the pain or discomfort you may feel.    The Urologist will be able to see inside the bladder by filling the bladder with water.  The water makes it easier to see any problems that may be present. You will have a sense to need to urinate and this is normal.       How should I get ready for the exam?  Nothing to do to prepare. You may eat normally the day of the exam. There is no sedation, so you may drive yourself to and from if you can drive.       Please tell your doctor if:  You have a history of urinary tract infections.  You know that you have a tumor in your bladder.  You have bleeding problems.  You have any allergies.  You are or may be pregnant.      What happens after the exam?  You may go back to your normal diet and activity as you feel ready.    For the next two days after the exam, you may notice:  Some blood in your urine.  Some burning when you urinate (use the toilet).  An urge to urinate more often.  Bladder spasms.    These are normal after the procedure. They should go away on their own after a day or two.      You can help to relieve the above listed symptoms by:  Drinking 6 to 8 large glasses of water each day (includes drinks at meals).  This will help clear the urine.  Take warm baths to relieve pain and bladder spasms.  Do not add anything to the bath  water.  You may take Tylenol (acetaminophen) per label instructions for discomfort.

## 2022-08-30 NOTE — PROGRESS NOTES
Shanna is a 47 year old who is being evaluated via a billable video visit.      How would you like to obtain your AVS? MyChart  If the video visit is dropped, the invitation should be resent by: text  Will anyone else be joining your video visit? Yes: . How would they like to receive their invitation? Text to cell phone: [phone]      Video-Visit Details    Video Start Time: 11:10AM    Type of service:  Video Visit    Video End Time:11:21 AM    Originating Location (pt. Location): Home    Distant Location (provider location):  [facility] [place]     Platform used for Video Visit: [employer]    CC: Hematuria.    HPI: It is a pleasure to see Ms. Shanna Shanks, a pleasant 47 year old female, asked to be seen in consultation via self referral for evaluation of rUTIs and noted to have micro hematuria (x 2) with neg UCs. Ex-smoker.    Hx of rUTIs (enteroccous, e coli). Sx include more frequency and incontinence.     The patient denies any gross hematuria.  Ms. Shanks voids without difficulty.  She currently denies any dysuria, pyuria, hesitancy, intermittency, feelings of incomplete emptying, or any recent history of urinary tract infections or stones.     Has MS, ambulatory. Uses oxybutynin 5mg for urge incontinence.     Hematuria Risk Factors:  Age >40: Yes     Smoking history: yes  Occupational exposure to chemicals or dyes (ie, benzenes, aromatic amines): no  History of urologic disorder or disease: no  History of irritative voiding symptoms: no  History of urinary tract infection: no  Analgesic abuse: no  History of pelvic irradiation: no    Past Medical History:   Diagnosis Date     Multiple sclerosis (H) 3/2/96    last exacerbation 3yrs ago, no meds x 6 months     Seizure disorder (H)      Tobacco dependency      Past Surgical History:   Procedure Laterality Date     HC DILATION/CURETTAGE DIAG/THER NON OB  1/1/05    D & C, missed AB     Current Outpatient Medications    Medication Sig Dispense Refill     estradiol (ESTRACE VAGINAL) 0.1 MG/GM vaginal cream Apply small amount to the vaginal opening and urethra M, W, F @ h.s. 42.5 g 3     baclofen (LIORESAL) 10 MG tablet Take 10-20 mg by mouth 4 times daily as needed   3     CRANBERRY EXTRACT PO Take 1 tablet by mouth daily       dimethyl fumarate (TECFIDERA) delayed release capsule Take 240 mg by mouth 2 times daily        levETIRAcetam (KEPPRA) 250 MG tablet Take 1 tablet (250 mg) by mouth 2 times daily 60 tablet 0     levETIRAcetam 1000 MG TABS Take 1,000 mg by mouth 2 times daily 60 tablet 1     MELATONIN PO Take 1 tablet by mouth as needed       NORLYDA 0.35 MG tablet TAKE 1 TABLET(0.35 MG) BY MOUTH DAILY 84 tablet 0     order for DME Equipment being ordered: four wheeled walker with seat and brakes 1 Units 0     oxybutynin (DITROPAN-XL) 5 MG 24 hr tablet Take 5 mg by mouth daily       venlafaxine (EFFEXOR-ER) 75 MG 24 hr tablet Take 75 mg by mouth daily       VITAMIN D, CHOLECALCIFEROL, PO Take 1,000 Units by mouth daily       Allergies   Allergen Reactions     Diphenhydramine Rash     benadryl cream     Nickel      Macrobid [Nitrofurantoin] Rash     FAMILY HISTORY: There is no reported history of genitourinary carcinoma.  There is no history of urolithiasis.      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 No   Social History Narrative    Caffeine intake/servings daily - 2    Calcium intake/servings daily - 2-3    Exercise 7 times weekly - describe runs on treadmill for 5 minutes    Sunscreen used - Yes    Seatbelts used - Yes    Guns stored in the home - No    Self Breast Exam - Yes    Pap test up to date -  Yes    Eye exam up to date -  Yes    Dental exam up to date -  Yes    DEXA scan up to date -  Yes    Flex Sig/Colonoscopy up to date -  Not Applicable    Mammography up to date -  Not Applicable    Immunizations reviewed and up to date - Yes    Abuse: Current or Past (Physical, Sexual or Emotional) - No    Do you feel safe in your environment - Yes    Do you cope well with stress - Yes    Do you suffer from insomnia - No    Last updated by: Michelle Sanchez  12/22/2010                 Social Determinants of Health     Financial Resource Strain: Not on file   Food Insecurity: Not on file   Transportation Needs: Not on file   Physical Activity: Not on file   Stress: Not on file   Social Connections: Not on file   Intimate Partner Violence: Not on file   Housing Stability: Not on file       PHYSICAL EXAM:   PSYCH: NAD  EYES: EOMI      Office Visit on 08/05/2022   Component Date Value Ref Range Status     Color Urine 08/05/2022 Yellow  Colorless, Straw, Light Yellow, Yellow Final     Appearance Urine 08/05/2022 Clear  Clear Final     Glucose Urine 08/05/2022 Negative  Negative mg/dL Final     Bilirubin Urine 08/05/2022 Negative  Negative Final     Ketones Urine 08/05/2022 Negative  Negative mg/dL Final     Specific Gravity Urine 08/05/2022 1.020  1.003 - 1.035 Final     Blood Urine 08/05/2022 Negative  Negative Final     pH Urine 08/05/2022 6.5  5.0 - 7.0 Final     Protein Albumin Urine 08/05/2022 Negative  Negative mg/dL Final     Urobilinogen Urine 08/05/2022 0.2  0.2, 1.0 E.U./dL Final     Nitrite Urine 08/05/2022 Negative   Negative Final     Leukocyte Esterase Urine 08/05/2022 Small (A) Negative Final     Bacteria Urine 08/05/2022 Many (A) None Seen /HPF Final     RBC Urine 08/05/2022 2-5 (A) 0-2 /HPF /HPF Final     WBC Urine 08/05/2022 10-25 (A) 0-5 /HPF /HPF Final     Culture 08/05/2022 >100,000 CFU/mL Mixture of urogenital jen   Final       IMAGING: none    ASSESSMENT and PLAN:    47 year old female with intermediate risk hematuria (smoking hx, UA + x 2).  The differential diagnosis at this point includes stone disease, infection, vaginal contaminant, urothelial malignancy, renal disorder versus another yet unknown diagnosis.    At this time, recommend proceeding with comprehensive hematuria evaluation to include:  - Urine cytology to look for cells concerning for malignancy.  - CT urogram for upper tract imaging.  - Cystoscopy with the first available urologist to evaluate the interior of the bladder. Follow up for hematuria as recommended by urologist performing cystoscopic evaluation.    Thank you for allowing me to participate in Ms. Shanks's care. I will keep you updated of her progress, but please do not hesitate to contact me with any questions.    Trinidad Decker PA-C  [facility]  32 minutes spent on the date of the encounter doing chart review, review of outside records, review of test results, interpretation of tests, patient visit and documentation.

## 2022-09-01 ENCOUNTER — TELEPHONE (OUTPATIENT)
Dept: UROLOGY | Facility: CLINIC | Age: 47
End: 2022-09-01

## 2022-09-01 ENCOUNTER — HOSPITAL ENCOUNTER (OUTPATIENT)
Dept: REHABILITATION | Facility: CLINIC | Age: 47
Discharge: HOME | End: 2022-09-01
Attending: FAMILY MEDICINE
Payer: COMMERCIAL

## 2022-09-01 PROCEDURE — S5102 ADULT DAY CARE PER DIEM: HCPCS

## 2022-09-01 NOTE — TELEPHONE ENCOUNTER
----- Message from Inna Whittaker sent at 8/31/2022  8:46 AM CDT -----  Micro hem/MS/UTIs--pls arrange cysto, CT and cytology prior    LANCE  8/30/22

## 2022-09-01 NOTE — PROGRESS NOTES
"[facility] Note:  Home and Community Based Services Outing     Destination:  [facility]     Description of Outing:  Member was assisted outside by staff to the [facility]. While outside, member performed morning aerobics to the best of their ability. Member listened to music of their choice, observed the vegetables growing in the garden, and socialized with their peers.      Member Response:  \"It was a little warmer today, but it was nice to sit outside with everyone.\"  "

## 2022-09-08 ENCOUNTER — HOSPITAL ENCOUNTER (OUTPATIENT)
Dept: REHABILITATION | Facility: CLINIC | Age: 47
Discharge: HOME | End: 2022-09-08
Attending: FAMILY MEDICINE
Payer: COMMERCIAL

## 2022-09-08 PROCEDURE — S5102 ADULT DAY CARE PER DIEM: HCPCS

## 2022-09-08 NOTE — PROGRESS NOTES
"[facility] Note:  Home and Community Based Services Outing     Destination:  [facility]     Description of Outing:  Member was assisted outside by staff. While outside, member performed morning aerobics to the best of their ability, listen to music of their choice, and enjoy the weather and vegetables growing in the garden.     Member Response:  \"It is always nice sitting outside in the garden and talking with everyone.\"  "

## 2022-09-12 ENCOUNTER — HOSPITAL ENCOUNTER (OUTPATIENT)
Dept: REHABILITATION | Facility: CLINIC | Age: 47
Discharge: HOME | End: 2022-09-12
Attending: FAMILY MEDICINE
Payer: COMMERCIAL

## 2022-09-12 PROCEDURE — S5102 ADULT DAY CARE PER DIEM: HCPCS

## 2022-09-12 NOTE — PROGRESS NOTES
[facility] Note:  Home and Community Based Services Outing     Destination:  [employer]  [address] [place] [id number]     Description of Outing: Member, along with 7 other members and 2 staff members, went to [employer]. Members and staff socialized and drank a coffee for roughly 1 hour. Members reported during town rojas meetings that they have had a desire to help local community businesses. This coffee shop is in the community.      Member Response:  All members reported that they enjoyed the coffee and talking amongst each other.

## 2022-09-19 ENCOUNTER — HOSPITAL ENCOUNTER (OUTPATIENT)
Dept: REHABILITATION | Facility: CLINIC | Age: 47
Discharge: HOME | End: 2022-09-19
Attending: FAMILY MEDICINE
Payer: COMMERCIAL

## 2022-09-19 PROCEDURE — S5102 ADULT DAY CARE PER DIEM: HCPCS

## 2022-09-19 NOTE — PROGRESS NOTES
"[facility] Note:  Home and Community Based Services Outing     Destination:  [facility]     Description of Outing:  Member was assisted outside by staff to the garden. While at the garden, member listened to music of their choice, socialized with their peers, and observed the plants growing. Morning aerobics were performed to the best of their ability.     Member Response:  \"I really enjoy doing aerobics outside in the garden. It is nice to get outside while it is still warm.\"  "

## 2022-09-19 NOTE — PROGRESS NOTES
MONTHLY PROGRESS NOTE AND PARTICIPATION REPORT   [facility]    Shanna Shanks, 1975  Attendance: Please see Epic for attendance record.    Communication:   Does communicate   Hearing:   No impairment  Vision:   No impairment  Orientation/Cognition:   Minor forgetfulness  Partial disorientation  Short term memory loss  Behavior:   No behavioral concerns  Self-Preservation Skills:   Shanna presents with MOD cognitive impairment and slow shuffled gait with balance deficits during ambulation D/2 her MS diagnosis. She requires staff instructions and assistance for self preservation.  Eating:   Assist with set up  Shanna requires verbal cues from staff ~75% of the time to initiate and continue eating activities during lunch service D/2 her cognitive impairment  Ambulation Walking:   Needs assistance  Staff provides Shanna a site owned 4WW for safe ambulation during program attendance for falls prevention D/2 her short slow shuffled gait and balance defecits D/2 her MS diagnosis  Transferring:   Independent  Wheelchair:   Shanna is fully ambulatory. However, she will be offered a manual WC for any off site group outings for energy conservation and falls prevention  Toileting:   Staff assists Shanna in the bathroom 1:1 for supervision and provides VC for self-hygiene and garment management after toilet use and hand hygiene  Maintenance Program:     [employer]  Living Arrangements:   Lives with family  Spiritual Needs: Needs are being met through support group  Medication Assistance:   Medication not taken during program hours  Participation Report:   Aerobics/Exercise  Support Group  Cognitive Stimulation  Fire Drill  Creative Arts/Crafts  Games  Gardening  Speakers/Entertainment  Socialization  Current Events/News  Education/Health Topic  Community Based Outings  Level of Participation:   Active

## 2022-09-22 ENCOUNTER — TELEPHONE (OUTPATIENT)
Dept: UROLOGY | Facility: CLINIC | Age: 47
End: 2022-09-22

## 2022-09-22 ENCOUNTER — HOSPITAL ENCOUNTER (OUTPATIENT)
Dept: REHABILITATION | Facility: CLINIC | Age: 47
Discharge: HOME | End: 2022-09-22
Attending: FAMILY MEDICINE
Payer: COMMERCIAL

## 2022-09-22 PROCEDURE — S5102 ADULT DAY CARE PER DIEM: HCPCS

## 2022-09-26 ENCOUNTER — HOSPITAL ENCOUNTER (OUTPATIENT)
Dept: REHABILITATION | Facility: CLINIC | Age: 47
Discharge: HOME | End: 2022-09-26
Attending: FAMILY MEDICINE
Payer: COMMERCIAL

## 2022-09-26 PROCEDURE — S5102 ADULT DAY CARE PER DIEM: HCPCS

## 2022-09-27 ENCOUNTER — ANCILLARY PROCEDURE (OUTPATIENT)
Dept: CT IMAGING | Facility: CLINIC | Age: 47
End: 2022-09-27
Attending: PHYSICIAN ASSISTANT
Payer: COMMERCIAL

## 2022-09-27 DIAGNOSIS — Z87.440 HISTORY OF UTI: ICD-10-CM

## 2022-09-27 DIAGNOSIS — R31.29 MICROSCOPIC HEMATURIA: ICD-10-CM

## 2022-09-27 PROCEDURE — 74178 CT ABD&PLV WO CNTR FLWD CNTR: CPT

## 2022-09-27 PROCEDURE — 255N000002 HC RX 255 OP 636: Performed by: PHYSICIAN ASSISTANT

## 2022-09-27 RX ADMIN — IOHEXOL 100 ML: 350 INJECTION, SOLUTION INTRAVENOUS at 12:19

## 2022-09-29 ENCOUNTER — HOSPITAL ENCOUNTER (OUTPATIENT)
Dept: REHABILITATION | Facility: CLINIC | Age: 47
Discharge: HOME | End: 2022-09-29
Attending: FAMILY MEDICINE
Payer: COMMERCIAL

## 2022-09-29 PROCEDURE — S5102 ADULT DAY CARE PER DIEM: HCPCS

## 2022-09-29 NOTE — PROGRESS NOTES
"[facility] Note:  Home and Community Based Services Outing     Destination:  [facility]     Description of Outing:  Member was assisted outside by staff. While outside, member participated in trivia, observing the garden, and socializing with others.     Member Response:  \"It was nice to sit outside before it gets too cold.\"  "

## 2022-10-03 ENCOUNTER — HOSPITAL ENCOUNTER (OUTPATIENT)
Dept: REHABILITATION | Facility: CLINIC | Age: 47
Discharge: HOME | End: 2022-10-03
Attending: FAMILY MEDICINE
Payer: COMMERCIAL

## 2022-10-03 PROCEDURE — S5102 ADULT DAY CARE PER DIEM: HCPCS

## 2022-10-06 ENCOUNTER — HOSPITAL ENCOUNTER (OUTPATIENT)
Dept: REHABILITATION | Facility: CLINIC | Age: 47
Discharge: HOME | End: 2022-10-06
Attending: FAMILY MEDICINE
Payer: COMMERCIAL

## 2022-10-06 PROCEDURE — S5102 ADULT DAY CARE PER DIEM: HCPCS

## 2022-10-10 ENCOUNTER — HOSPITAL ENCOUNTER (OUTPATIENT)
Dept: REHABILITATION | Facility: CLINIC | Age: 47
Discharge: HOME | End: 2022-10-10
Attending: FAMILY MEDICINE
Payer: COMMERCIAL

## 2022-10-10 PROCEDURE — S5102 ADULT DAY CARE PER DIEM: HCPCS

## 2022-10-13 ENCOUNTER — HOSPITAL ENCOUNTER (OUTPATIENT)
Dept: REHABILITATION | Facility: CLINIC | Age: 47
Discharge: HOME | End: 2022-10-13
Attending: FAMILY MEDICINE
Payer: COMMERCIAL

## 2022-10-13 PROCEDURE — S5102 ADULT DAY CARE PER DIEM: HCPCS

## 2022-10-16 ENCOUNTER — HEALTH MAINTENANCE LETTER (OUTPATIENT)
Age: 47
End: 2022-10-16

## 2022-10-17 ENCOUNTER — HOSPITAL ENCOUNTER (OUTPATIENT)
Dept: REHABILITATION | Facility: CLINIC | Age: 47
Discharge: HOME | End: 2022-10-17
Attending: FAMILY MEDICINE
Payer: COMMERCIAL

## 2022-10-17 PROCEDURE — S5102 ADULT DAY CARE PER DIEM: HCPCS

## 2022-10-17 NOTE — PROGRESS NOTES
"[facility] Note:  Home and Community Based Services Outing     Destination:  [facility]     Description of Outing:  Members were assisted on/off [employer] bus by staff and .  When at the zoo members were assisted by staff for navigation and mobility (when needed).  The zoo outing allowed member for socialization with other members and community.  Member enjoyed looking at the animals at the zoo.  Member wore warm clothing including jacket and gloves to keep warm on a chilly fall day.     Member Response:  \"I am so excited to see all of the animals!\"  "

## 2022-10-18 NOTE — PROGRESS NOTES
[facility] Note:  Home and Community Based Services Outing    Destination:  [facility]    Description of Outing:    Member visited [facility] with 5 other members and 2 [facility] staff members.  Member [employer] bus with staff assist to transfer on and off bus.  Staff and [employer]  secured member safely in bus with proper provided belts and harnesses.  Members participated socialization with others in attendance and observed multiple animals and educational exhibits available at the zoo for ~2 hours before boarding the bus back to the [facility] for lunch and other daily activities.       Member Response:    Member responded that they thoroughly enjoyed the community outing and would chose to participate again in the future.

## 2022-10-20 ENCOUNTER — HOSPITAL ENCOUNTER (OUTPATIENT)
Dept: REHABILITATION | Facility: CLINIC | Age: 47
Discharge: HOME | End: 2022-10-20
Attending: FAMILY MEDICINE
Payer: COMMERCIAL

## 2022-10-20 PROCEDURE — S5102 ADULT DAY CARE PER DIEM: HCPCS

## 2022-10-24 ENCOUNTER — HOSPITAL ENCOUNTER (OUTPATIENT)
Dept: REHABILITATION | Facility: CLINIC | Age: 47
Discharge: HOME | End: 2022-10-24
Attending: FAMILY MEDICINE
Payer: COMMERCIAL

## 2022-10-24 PROCEDURE — S5102 ADULT DAY CARE PER DIEM: HCPCS

## 2022-10-26 NOTE — PROGRESS NOTES
MONTHLY PROGRESS NOTE AND PARTICIPATION REPORT   [facility]    Shanna Shanks, 1975  Attendance: Please see Epic for attendance record.    Communication:   Does communicate   Hearing:   No impairment  Vision:   No impairment  Orientation/Cognition:   Minor forgetfulness  Partial disorientation  Short term memory loss  Behavior:   No behavioral concerns  Self-Preservation Skills:   Shanna presents with MOD cogntive impairment and slow shuffled gait with balance deficits during ambulation 2/2 her MS diagnosis.  She requires staff instructions and assistance for self preservatioin.    Eating:   Assist with set up  Shanna requires verbal cues from staff ~75% of the time to initiate and continue eathing activities during lunch service D/2 her cognitive impairment.  Ambulation Walking:   Needs assistance  Staff provides Shanna a site owned 4WW for safe ambulation during program attendance for falls prevention D/2 her short slow shuffled gait and balance deficits 2/2 her MS diagnosis.  Transferring:   Independent  Wheelchair:   Shanna is fully ambulatory.  However, she will be offered a manual WC for any off site group outings for energy conservation and falls prevention.  Toileting:   Staff assists Shanna in the bathroom 1:1 for supervision and provides VC for self-hygiene and garment management after toilet use and hand hygiene.  Maintenance Program:     NuStep  Living Arrangements:   Lives with family  Spiritual Needs: Needs are being met through support group  Medication Assistance:   Medication not taken during program hours  Participation Report:   Aerobics/Exercise  Support Group  Cognitive Stimulation  Fire Drill  Creative Arts/Crafts  Games  Gardening  Speakers/Entertainment  Socialization  Current Events/News  Education/Health Topic  Level of Participation:   [facility] Note:  Shanna has visibly lost weight and has experienced a reduced appetite.  Staff is  encouraging her daily to eat all of her lunch and snacks while at program.  Her  has taken her to her PCP to address this issue.  Otherwise, Shanna reports she is doing well.

## 2022-10-26 NOTE — PROGRESS NOTES
INDIVIDUAL PLAN OF CARE   [facility]    Member Name: Shanna Shanks; YOB: 1975  MRN: [id number]  3/31/2022    Goals developed in collaboration with: member  Staff responsible for plan: Huseyin LOWE  1. Long Term Goal (concrete, measurable, and time specific outcomes):  Member will maintain present level of physical and cognitive function through active participation in structured [facility] programming with staff set up, facilitation, and supervision provided every day of program attendance.  Target Date:  October 2023  Bi-Annual Review:  Goal remains appropriate.    2. Short Term Goal: (concrete, measurable, and time specific outcomes):  To maintain physical strength and endurance, member will actively participate in prescribed Nu-Step exercise program, with set up, VC's and supervision provided by staff, for at least 20 minutes, at least 1x/week during program attendance.  Target Date:  April 2023  Bi-Annual Review:  Goal remains appropriate.  Shanna requires VC's and positive reinforcement to participate in Nu Step activity.    3. Short Term Goal: (concrete, measurable, and time specific outcomes):  To maintain current level of cognitive function, member will actively participate in therapeutic creative arts and Brain/Body programming with set up, VC's and supervision provided by staff every day of program attendance.  Target Date:  April 2023  Bi-Annual Review:  Goal remains appropriate.

## 2022-10-26 NOTE — PROGRESS NOTES
Individual abuse prevention plan (IAPP)  [facility]     Assessment of Susceptibility to Abuse, Including Self Abuse, Neglect (Identification of characteristics, which make the individual susceptible to abuse, and how these characteristics cause the individual to be susceptible to abuse.)     Is the person susceptible to abuse in each area?  Sexual Abuse:   No  Referrals made when the person is susceptible to abuse outside the scope or control of this program (Identify the referral and date it occurred): No additional risk reduction means needed at this time    Physical Abuse:   No  Referrals made when the person is susceptible to abuse outside the scope or control of this program (Identify the referral and date it occurred): No additional risk reduction means needed at this time    Self-Abuse:   No  Referrals made when the person is susceptible to abuse outside the scope or control of this program (Identify the referral and date it occurred): No additional risk reduction means needed at this time    Financial  Exploitation  No  Referrals made when the person is susceptible to abuse outside the scope or control of this program (Identify the referral and date it occurred): No additional risk reduction means needed at this time    Is the program aware of this person committing a violent crime or act of physical aggression towards others?  No  Referrals made when the person is susceptible to abuse outside the scope or control of this program (Identify the referral and date it occurred): No additional risk reduction means needed at this time    INDIVIDUAL ABUSE PREVENTION PLAN-MEASURES TAKEN TO MINIMIZE RISK OF ABUSE   ADL:   Assist with clothing management  Assist with feeding  Assist with toileting  Staff will provide set up and VC for eating.  Staff will provide 1:1 supervision and set up and VC assist for post toilet use self-hygiene, garment management, and hand  hygiene.  Ambulation/Transfers/Wheelchairs:  Assist with all transfers and/or ambulation  Encourage client to ambulate short distances with walker and provide wheelchair for distance  Ensure client uses cane and/or walker  Provide standby assist due to periods of dizziness, fatigue  Provide standby assist when client ambulates prn   Behavior:   No behavior issues  Communication:  Encourage verbalization of needs/concerns  Observe body language/gestures to assist in anticipating client's needs  Cognitive Issues:   Require aide to be with member if wandering  Provider reminders due to confusion, forgetfulness  Give simple step-by-step direction  Contact caregiver to inform of special activities days  Diet:   Staff will prepare food to facilitate client to feed self  Monitor client when eating and/or drinking fluids   Exercise:   Encourage participation in maintenance program  Encourage participation in wheelchair aerobics  Hearing:   Speak distinctly and use gestures  Isolation:   Encourage socialization due to isolation in home environment  Medical Monitoring:   Monitor physical and emotional comfort  Monitor physical symptoms due to diagnosis and report significant changes to nurse, caregiver and physician   Mental Health:   Motivate client to join in activities that are beneficial to client  Encourage client to express feelings  Monitor anxiety level and intervene when appropriate  Encourage regular attendance for socialization and stimulation  Offer emotional support  Observe for symptoms of depression and notify appropriate staff/caregiver  Encourage independent decision making  Encourage social interaction to assist in increasing client's self-image  Provide client with choices of programming to encourage independent decision making  Provide activities in which client can be successful  Sensory:   Provide and encourage participation in activities for stimulation  Vision:   Provide verbal cues  Other:  N/A    Developed in consultation with:  Client  The Program Abuse Prevention Plan/IAPP identifies the specific actions that minimize abuse to the [facility] participant.  No

## 2022-10-31 ENCOUNTER — HOSPITAL ENCOUNTER (OUTPATIENT)
Dept: REHABILITATION | Facility: CLINIC | Age: 47
Discharge: HOME | End: 2022-10-31
Attending: FAMILY MEDICINE
Payer: COMMERCIAL

## 2022-10-31 PROCEDURE — S5102 ADULT DAY CARE PER DIEM: HCPCS

## 2022-11-03 NOTE — PROGRESS NOTES
Health Care Summary Report:    Contact was made with the member's care team and family on 10/18/2022 in order to get an updated Health Care Summary Report.  Will continue to follow-up as needed.

## 2022-11-10 ENCOUNTER — HOSPITAL ENCOUNTER (OUTPATIENT)
Dept: REHABILITATION | Facility: CLINIC | Age: 47
Discharge: HOME | End: 2022-11-10
Attending: FAMILY MEDICINE
Payer: COMMERCIAL

## 2022-11-10 PROCEDURE — S5102 ADULT DAY CARE PER DIEM: HCPCS

## 2022-11-10 NOTE — PROGRESS NOTES
MONTHLY PROGRESS NOTE AND PARTICIPATION REPORT   [facility]    Shanna Shanks, 1975  Attendance: Please see Epic for attendance record.    Communication:   Does communicate   Hearing:   No impairment  Vision:   No impairment  Orientation/Cognition:   Minor forgetfulness  Partial disorientation  Short term memory loss  Behavior:   No behavioral concerns  Self-Preservation Skills:   Shanna presents with MOD cogntive impairment and slow shuffled gait with balance deficits during ambulation 2/2 her MS diagnosis.  She requires staff instructions and assistance for self preservatioin.   Eating:   Assist with set up  Shanna requires verbal cues from staff ~75% of the time to initiate and continue eathing activities during lunch service D/2 her cognitive impairment.  Ambulation Walking:   Needs assistance  Staff provides Shanna a site owned 4WW for safe ambulation during program attendance for falls prevention D/2 her short slow shuffled gait and balance deficits 2/2 her MS diagnosis.  Transferring:   Independent  Wheelchair:   Shanna is fully ambulatory.  However, she will be offered a manual WC for any off site group outings for energy conservation and falls prevention.  Toileting:   Staff assists Shanna in the bathroom 1:1 for supervision and provides VC for self-hygiene and garment management after toilet use and hand hygiene.  Maintenance Program:     NuStep  Living Arrangements:   Lives with family  Spiritual Needs: Needs are being met through support group  Medication Assistance:   Medication not taken during program hours  Participation Report:   Aerobics/Exercise  Support Group  Cognitive Stimulation  Fire Drill  Creative Arts/Crafts  Games  Gardening  Speakers/Entertainment  Socialization  Current Events/News  Education/Health Topic  Community Based Outings  Level of Participation:   [facility] Note:  Shanna has visibly lost weight and has experienced a reduced  appetite.  Staff is encouraging her daily to eat all of her lunch and snacks while at program.  Her  has taken her to her PCP to address this issue.  Otherwise, Shanna reports she is doing well.

## 2022-11-14 ENCOUNTER — HOSPITAL ENCOUNTER (OUTPATIENT)
Dept: REHABILITATION | Facility: CLINIC | Age: 47
Discharge: HOME | End: 2022-11-14
Attending: FAMILY MEDICINE
Payer: COMMERCIAL

## 2022-11-14 PROCEDURE — S5102 ADULT DAY CARE PER DIEM: HCPCS

## 2022-11-17 ENCOUNTER — HOSPITAL ENCOUNTER (OUTPATIENT)
Dept: REHABILITATION | Facility: CLINIC | Age: 47
Discharge: HOME | End: 2022-11-17
Attending: FAMILY MEDICINE
Payer: COMMERCIAL

## 2022-11-17 PROCEDURE — S5102 ADULT DAY CARE PER DIEM: HCPCS

## 2022-11-17 NOTE — PROGRESS NOTES
[facility] RN Note    Previous documentation 10/17/22 reviewed. Participant was seen participating in group activity.  No concerns reported by [facility] staff or member.

## 2022-11-21 ENCOUNTER — HOSPITAL ENCOUNTER (OUTPATIENT)
Dept: REHABILITATION | Facility: CLINIC | Age: 47
Discharge: HOME | End: 2022-11-21
Attending: FAMILY MEDICINE
Payer: COMMERCIAL

## 2022-11-21 PROCEDURE — S5102 ADULT DAY CARE PER DIEM: HCPCS

## 2022-11-28 ENCOUNTER — HOSPITAL ENCOUNTER (OUTPATIENT)
Dept: REHABILITATION | Facility: CLINIC | Age: 47
Discharge: HOME | End: 2022-11-28
Attending: FAMILY MEDICINE
Payer: COMMERCIAL

## 2022-11-28 PROCEDURE — S5102 ADULT DAY CARE PER DIEM: HCPCS

## 2022-11-29 ENCOUNTER — OFFICE VISIT (OUTPATIENT)
Dept: PODIATRY | Facility: CLINIC | Age: 47
End: 2022-11-29
Payer: COMMERCIAL

## 2022-11-29 VITALS
BODY MASS INDEX: 20.31 KG/M2 | WEIGHT: 134 LBS | HEIGHT: 68 IN | DIASTOLIC BLOOD PRESSURE: 64 MMHG | SYSTOLIC BLOOD PRESSURE: 98 MMHG

## 2022-11-29 DIAGNOSIS — B35.1 ONYCHOMYCOSIS: Primary | ICD-10-CM

## 2022-11-29 PROCEDURE — 99203 OFFICE O/P NEW LOW 30 MIN: CPT | Performed by: PODIATRIST

## 2022-11-29 NOTE — PATIENT INSTRUCTIONS
PATIENT INSTRUCTIONS - Podiatry / Foot & Ankle Surgery    PCP or dermatology for fungus treatment          Here is a list of routine foot care resources, which includes toenail trimming and callus/corn management.     This is not a referral. It is your responsibility to contact the organization and your insurance to confirm cost and coverage.      ROUTINE FOOT CARE (NAIL TRIMMING / CALLUSES)      [facility] (in home)  [phone]   [facility] (out of pocket)  [phone]  Multiple locations   [facility]  [phone] [facility]  [phone]  Multiple locations   [facility]  [phone]  [facility]  [phone]  Multiple locations   [facility]  [phone]  Multiple locations [facility]   [phone]   Multiple locations

## 2022-11-29 NOTE — LETTER
11/29/2022         RE: Shanna Shanks  [address]  [place] [address]        Dear Colleague,    Thank you for referring your patient, Shanna Shanks, to the [facility]. Please see a copy of my visit note below.    Assessment:      ICD-10-CM    1. Onychomycosis  B35.1              Plan:  No orders of the defined types were placed in this encounter.      Discussed the etiology and treatment of the condition with the patient.  Discussed treatment options.    Dermatology for definitive biopsy & treatment.    Foot care nurse for nail trimming    See ROGELIO Driscoll DPM                Chief Complaint:     Patient presents with:  Foot Problems     Fungus    HPI:  Shanna Shanks is a 47 year old year old female who presents for evaluation of fungus.        Past Medical & Surgical History:  Past Medical History:   Diagnosis Date     Multiple sclerosis (H) 3/2/96    last exacerbation 3yrs ago, no meds x 6 months     Seizure disorder (H)      Tobacco dependency       Past Surgical History:   Procedure Laterality Date     HC DILATION/CURETTAGE DIAG/THER NON OB  1/1/05    D & C, missed AB      Family History   Problem Relation Age of Onset     Family History Negative Mother      Diabetes Father      Cancer - colorectal Paternal Grandmother      Heart Disease Paternal Grandfather         MI        Social History:  ?  History   Smoking Status     Former     Packs/day: 0.25     Types: Cigarettes   Smokeless Tobacco     Never     History   Drug Use No     Social History    Substance and Sexual Activity      Alcohol use: No        Alcohol/week: 5.8 standard drinks        Types: 7 Standard drinks or equivalent per week      Allergies:  ?   Allergies   Allergen Reactions     Diphenhydramine Rash     benadryl cream     Nickel      Macrobid [Nitrofurantoin] Rash        Medications:    Current Outpatient Medications   Medication     baclofen (LIORESAL)  "10 MG tablet     CRANBERRY EXTRACT PO     dimethyl fumarate (TECFIDERA) delayed release capsule     estradiol (ESTRACE VAGINAL) 0.1 MG/GM vaginal cream     levETIRAcetam (KEPPRA) 250 MG tablet     levETIRAcetam 1000 MG TABS     MELATONIN PO     NORLYDA 0.35 MG tablet     order for DME     oxybutynin (DITROPAN-XL) 5 MG 24 hr tablet     venlafaxine (EFFEXOR-ER) 75 MG 24 hr tablet     VITAMIN D, CHOLECALCIFEROL, PO     No current facility-administered medications for this visit.       Physical Exam:  ?  Vitals:  BP 98/64   Ht 1.727 m (5' 8\")   Wt 60.8 kg (134 lb)   BMI 20.37 kg/m     General:  WD/WN, in NAD.  A&O x3.  Dermatologic:    Skin is intact, open lesions absent.  Fungus present  bilateral.  Vascular:  Pulses palpable bilateral.  Digital capillary refill time delayed bilateral.  Generalized edema- none bilateral.  Neurologic:    Gross sensation normal.  Gait and balance normal.  Musculoskeletal:  No pain to palpation of foot & ankle.  aROM intact to foot & ankle.  Muscle strength 5/5 foot & ankle.                  Again, thank you for allowing me to participate in the care of your patient.        Sincerely,        Charo Driscoll DPM    "

## 2022-11-29 NOTE — PROGRESS NOTES
Assessment:      ICD-10-CM    1. Onychomycosis  B35.1              Plan:  No orders of the defined types were placed in this encounter.      Discussed the etiology and treatment of the condition with the patient.  Discussed treatment options.    Dermatology for definitive biopsy & treatment.    Foot care nurse for nail trimming    See ROGELIO Driscoll DPM                Chief Complaint:     Patient presents with:  Foot Problems     Fungus    HPI:  Shanna Shanks is a 47 year old year old female who presents for evaluation of fungus.        Past Medical & Surgical History:  Past Medical History:   Diagnosis Date     Multiple sclerosis (H) 3/2/96    last exacerbation 3yrs ago, no meds x 6 months     Seizure disorder (H)      Tobacco dependency       Past Surgical History:   Procedure Laterality Date     HC DILATION/CURETTAGE DIAG/THER NON OB  1/1/05    D & C, missed AB      Family History   Problem Relation Age of Onset     Family History Negative Mother      Diabetes Father      Cancer - colorectal Paternal Grandmother      Heart Disease Paternal Grandfather         MI        Social History:  ?  History   Smoking Status     Former     Packs/day: 0.25     Types: Cigarettes   Smokeless Tobacco     Never     History   Drug Use No     Social History    Substance and Sexual Activity      Alcohol use: No        Alcohol/week: 5.8 standard drinks        Types: 7 Standard drinks or equivalent per week      Allergies:  ?   Allergies   Allergen Reactions     Diphenhydramine Rash     benadryl cream     Nickel      Macrobid [Nitrofurantoin] Rash        Medications:    Current Outpatient Medications   Medication     baclofen (LIORESAL) 10 MG tablet     CRANBERRY EXTRACT PO     dimethyl fumarate (TECFIDERA) delayed release capsule     estradiol (ESTRACE VAGINAL) 0.1 MG/GM vaginal cream     levETIRAcetam (KEPPRA) 250 MG tablet     levETIRAcetam 1000 MG TABS     MELATONIN PO     NORLYDA 0.35 MG tablet     order  "for DME     oxybutynin (DITROPAN-XL) 5 MG 24 hr tablet     venlafaxine (EFFEXOR-ER) 75 MG 24 hr tablet     VITAMIN D, CHOLECALCIFEROL, PO     No current facility-administered medications for this visit.       Physical Exam:  ?  Vitals:  BP 98/64   Ht 1.727 m (5' 8\")   Wt 60.8 kg (134 lb)   BMI 20.37 kg/m     General:  WD/WN, in NAD.  A&O x3.  Dermatologic:    Skin is intact, open lesions absent.  Fungus present  bilateral.  Vascular:  Pulses palpable bilateral.  Digital capillary refill time delayed bilateral.  Generalized edema- none bilateral.  Neurologic:    Gross sensation normal.  Gait and balance normal.  Musculoskeletal:  No pain to palpation of foot & ankle.  aROM intact to foot & ankle.  Muscle strength 5/5 foot & ankle.              "

## 2022-12-01 ENCOUNTER — HOSPITAL ENCOUNTER (OUTPATIENT)
Dept: REHABILITATION | Facility: CLINIC | Age: 47
Discharge: HOME | End: 2022-12-01
Attending: FAMILY MEDICINE
Payer: COMMERCIAL

## 2022-12-01 PROCEDURE — S5102 ADULT DAY CARE PER DIEM: HCPCS

## 2022-12-05 ENCOUNTER — HOSPITAL ENCOUNTER (OUTPATIENT)
Dept: REHABILITATION | Facility: CLINIC | Age: 47
Discharge: HOME | End: 2022-12-05
Attending: FAMILY MEDICINE
Payer: COMMERCIAL

## 2022-12-05 PROCEDURE — S5102 ADULT DAY CARE PER DIEM: HCPCS

## 2022-12-05 NOTE — PROGRESS NOTES
MONTHLY PROGRESS NOTE AND PARTICIPATION REPORT   [facility]    Shanna Shanks, 1975  Attendance: Please see Epic for attendance record.    Communication:   Does communicate   Hearing:   No impairment  Vision:   No impairment  Orientation/Cognition:   Minor forgetfulness  Partial disorientation  Short term memory loss  Behavior:   No behavioral concerns  Self-Preservation Skills:   Shanna presents with MOD cognitive impairment and slow shuffled gait with balance deficits during amnulation 2/2 her MS diagnosis. She requires staff instructions and assistance for self preservation.  Eating:   Assist with set up  Shanna requires verbal cues from staff ~75% of the time to initiate and continue eathing activities during lunch service D/2 her cognitive impairment.  Ambulation Walking:   Needs assistance  Staff provides Shanna a site owned 4WW for safe ambulation during program attendance for falls prevention D/2 her short slow shuffled gait and balance deficits 2/2 her MS diagnosis  Transferring:   Independent  Wheelchair:   Shanna is fully ambulatory.  However, she will be offered a manual WC for any off site group outings for energy conservation and falls prevention.  Toileting:   Staff assists Shanna in the bathroom 1:1 for supervision and provides VC for self-hygiene and garment management after toilet use and hand hygiene.  Maintenance Program:     NuStep  Living Arrangements:   Lives with family  Spiritual Needs: Needs are being met through support group  Medication Assistance:   Medication not taken during program hours  Participation Report:   Aerobics/Exercise  Support Group  Cognitive Stimulation  Fire Drill  Creative Arts/Crafts  Games  Gardening  Speakers/Entertainment  Socialization  Current Events/News  Education/Health Topic  Community Based Outings  Level of Participation:   [facility] Note:  Shanna has visibly lost weight and has experienced a reduced appetite.   Staff is encouraging her daily to eat all of her lunch and snacks while at program.  Her  has taken her to her PCP to address this issue.  Otherwise, Autumn reports she is doing well.  12/5: Shanna is currently working on a 'devil child' ceramic in art and enjoys playing [employer] in the afternoon.

## 2022-12-08 ENCOUNTER — HOSPITAL ENCOUNTER (OUTPATIENT)
Dept: REHABILITATION | Facility: CLINIC | Age: 47
Discharge: HOME | End: 2022-12-08
Attending: FAMILY MEDICINE
Payer: COMMERCIAL

## 2022-12-08 PROCEDURE — S5102 ADULT DAY CARE PER DIEM: HCPCS

## 2022-12-08 NOTE — PROGRESS NOTES
[facility] RN Note    Previous documentation since 11/17/22 reviewed.  Participant seen working on a ceramic project in the art room. No concerns reported by AC staff or member.

## 2022-12-12 ENCOUNTER — HOSPITAL ENCOUNTER (OUTPATIENT)
Dept: REHABILITATION | Facility: CLINIC | Age: 47
Discharge: HOME | End: 2022-12-12
Attending: FAMILY MEDICINE
Payer: COMMERCIAL

## 2022-12-12 PROCEDURE — S5102 ADULT DAY CARE PER DIEM: HCPCS

## 2022-12-15 ENCOUNTER — HOSPITAL ENCOUNTER (OUTPATIENT)
Dept: REHABILITATION | Facility: CLINIC | Age: 47
Discharge: HOME | End: 2022-12-15
Attending: FAMILY MEDICINE
Payer: COMMERCIAL

## 2022-12-15 PROCEDURE — S5102 ADULT DAY CARE PER DIEM: HCPCS

## 2022-12-19 ENCOUNTER — HOSPITAL ENCOUNTER (OUTPATIENT)
Dept: REHABILITATION | Facility: CLINIC | Age: 47
Discharge: HOME | End: 2022-12-19
Attending: FAMILY MEDICINE
Payer: COMMERCIAL

## 2022-12-19 PROCEDURE — S5102 ADULT DAY CARE PER DIEM: HCPCS

## 2022-12-29 ENCOUNTER — HOSPITAL ENCOUNTER (OUTPATIENT)
Dept: REHABILITATION | Facility: CLINIC | Age: 47
Discharge: HOME | End: 2022-12-29
Attending: FAMILY MEDICINE
Payer: COMMERCIAL

## 2022-12-29 PROCEDURE — S5102 ADULT DAY CARE PER DIEM: HCPCS

## 2023-01-02 ENCOUNTER — HOSPITAL ENCOUNTER (OUTPATIENT)
Dept: REHABILITATION | Facility: CLINIC | Age: 48
Discharge: HOME | End: 2023-01-02
Attending: FAMILY MEDICINE
Payer: COMMERCIAL

## 2023-01-02 PROCEDURE — S5102 ADULT DAY CARE PER DIEM: HCPCS

## 2023-01-09 ENCOUNTER — HOSPITAL ENCOUNTER (OUTPATIENT)
Dept: REHABILITATION | Facility: CLINIC | Age: 48
Discharge: HOME | End: 2023-01-09
Attending: FAMILY MEDICINE
Payer: COMMERCIAL

## 2023-01-09 PROCEDURE — S5102 ADULT DAY CARE PER DIEM: HCPCS

## 2023-01-09 NOTE — PROGRESS NOTES
[facility] RN Note    No new progress notes noted since 12/8/22.   No concerns reported by [facility] staff or member.

## 2023-01-10 ENCOUNTER — OFFICE VISIT (OUTPATIENT)
Dept: FAMILY MEDICINE | Facility: CLINIC | Age: 48
End: 2023-01-10
Payer: COMMERCIAL

## 2023-01-10 VITALS
OXYGEN SATURATION: 97 % | HEART RATE: 72 BPM | RESPIRATION RATE: 20 BRPM | HEIGHT: 66 IN | TEMPERATURE: 99 F | SYSTOLIC BLOOD PRESSURE: 104 MMHG | BODY MASS INDEX: 20.09 KG/M2 | WEIGHT: 125 LBS | DIASTOLIC BLOOD PRESSURE: 74 MMHG

## 2023-01-10 DIAGNOSIS — Z12.31 VISIT FOR SCREENING MAMMOGRAM: ICD-10-CM

## 2023-01-10 DIAGNOSIS — Z13.220 SCREENING FOR HYPERLIPIDEMIA: ICD-10-CM

## 2023-01-10 DIAGNOSIS — Z00.00 ROUTINE GENERAL MEDICAL EXAMINATION AT A HEALTH CARE FACILITY: Primary | ICD-10-CM

## 2023-01-10 DIAGNOSIS — Z12.4 CERVICAL CANCER SCREENING: ICD-10-CM

## 2023-01-10 DIAGNOSIS — G35 MULTIPLE SCLEROSIS (H): ICD-10-CM

## 2023-01-10 DIAGNOSIS — Z11.1 SCREENING EXAMINATION FOR PULMONARY TUBERCULOSIS: ICD-10-CM

## 2023-01-10 DIAGNOSIS — Z12.11 SCREEN FOR COLON CANCER: ICD-10-CM

## 2023-01-10 LAB
ALBUMIN SERPL BCG-MCNC: 4.5 G/DL (ref 3.5–5.2)
ALP SERPL-CCNC: 47 U/L (ref 35–104)
ALT SERPL W P-5'-P-CCNC: 49 U/L (ref 10–35)
ANION GAP SERPL CALCULATED.3IONS-SCNC: 11 MMOL/L (ref 7–15)
AST SERPL W P-5'-P-CCNC: 34 U/L (ref 10–35)
BILIRUB SERPL-MCNC: 0.2 MG/DL
BUN SERPL-MCNC: 14.3 MG/DL (ref 6–20)
CALCIUM SERPL-MCNC: 9.2 MG/DL (ref 8.6–10)
CHLORIDE SERPL-SCNC: 102 MMOL/L (ref 98–107)
CHOLEST SERPL-MCNC: 143 MG/DL
CREAT SERPL-MCNC: 0.78 MG/DL (ref 0.51–0.95)
DEPRECATED HCO3 PLAS-SCNC: 26 MMOL/L (ref 22–29)
GFR SERPL CREATININE-BSD FRML MDRD: >90 ML/MIN/1.73M2
GLUCOSE SERPL-MCNC: 99 MG/DL (ref 70–99)
HDLC SERPL-MCNC: 68 MG/DL
LDLC SERPL CALC-MCNC: 60 MG/DL
NONHDLC SERPL-MCNC: 75 MG/DL
POTASSIUM SERPL-SCNC: 4.5 MMOL/L (ref 3.4–5.3)
PROT SERPL-MCNC: 6.9 G/DL (ref 6.4–8.3)
SODIUM SERPL-SCNC: 139 MMOL/L (ref 136–145)
TRIGL SERPL-MCNC: 73 MG/DL

## 2023-01-10 PROCEDURE — 90686 IIV4 VACC NO PRSV 0.5 ML IM: CPT | Performed by: FAMILY MEDICINE

## 2023-01-10 PROCEDURE — 90471 IMMUNIZATION ADMIN: CPT | Performed by: FAMILY MEDICINE

## 2023-01-10 PROCEDURE — 99396 PREV VISIT EST AGE 40-64: CPT | Mod: 25 | Performed by: FAMILY MEDICINE

## 2023-01-10 PROCEDURE — 90715 TDAP VACCINE 7 YRS/> IM: CPT | Performed by: FAMILY MEDICINE

## 2023-01-10 PROCEDURE — 87624 HPV HI-RISK TYP POOLED RSLT: CPT | Performed by: FAMILY MEDICINE

## 2023-01-10 PROCEDURE — 90472 IMMUNIZATION ADMIN EACH ADD: CPT | Performed by: FAMILY MEDICINE

## 2023-01-10 PROCEDURE — 80053 COMPREHEN METABOLIC PANEL: CPT | Performed by: FAMILY MEDICINE

## 2023-01-10 PROCEDURE — 86481 TB AG RESPONSE T-CELL SUSP: CPT | Performed by: FAMILY MEDICINE

## 2023-01-10 PROCEDURE — G0145 SCR C/V CYTO,THINLAYER,RESCR: HCPCS | Performed by: FAMILY MEDICINE

## 2023-01-10 PROCEDURE — 80061 LIPID PANEL: CPT | Performed by: FAMILY MEDICINE

## 2023-01-10 PROCEDURE — 36415 COLL VENOUS BLD VENIPUNCTURE: CPT | Performed by: FAMILY MEDICINE

## 2023-01-10 RX ORDER — ATORVASTATIN CALCIUM 40 MG/1
40 TABLET, FILM COATED ORAL DAILY
COMMUNITY
Start: 2023-01-10 | End: 2024-02-06

## 2023-01-10 ASSESSMENT — ENCOUNTER SYMPTOMS
ABDOMINAL PAIN: 0
NAUSEA: 0
NERVOUS/ANXIOUS: 0
SHORTNESS OF BREATH: 0
CONSTIPATION: 0
CHILLS: 0
PARESTHESIAS: 0
MYALGIAS: 0
DYSURIA: 0
BREAST MASS: 0
HEADACHES: 0
SORE THROAT: 0
DIARRHEA: 0
JOINT SWELLING: 0
HEMATOCHEZIA: 0
HEARTBURN: 0
COUGH: 0
FEVER: 0
DIZZINESS: 0
HEMATURIA: 0
EYE PAIN: 0
WEAKNESS: 0
ARTHRALGIAS: 0
FREQUENCY: 0
PALPITATIONS: 0

## 2023-01-10 NOTE — PATIENT INSTRUCTIONS
Please call [phone] to schedule an appointment for mammogram.     Preventive Health Recommendations  Female Ages 40 to 49    Yearly exam:   See your health care provider every year in order to  Review health changes.   Discuss preventive care.    Review your medicines if your doctor prescribed any.    Get a Pap test every three years (unless you have an abnormal result and your provider advises testing more often).    If you get Pap tests with HPV test, you only need to test every 5 years, unless you have an abnormal result. You do not need a Pap test if your uterus was removed (hysterectomy) and you have not had cancer.    You should be tested each year for STDs (sexually transmitted diseases), if you're at risk.   Ask your doctor if you should have a mammogram.    Have a colonoscopy (test for colon cancer) if someone in your family has had colon cancer or polyps before age 50.     Have a cholesterol test every 5 years.     Have a diabetes test (fasting glucose) after age 45. If you are at risk for diabetes, you should have this test every 3 years.    Shots: Get a flu shot each year. Get a tetanus shot every 10 years.     Nutrition:   Eat at least 5 servings of fruits and vegetables each day.  Eat whole-grain bread, whole-wheat pasta and brown rice instead of white grains and rice.  Get adequate Calcium and Vitamin D.      Lifestyle  Exercise at least 150 minutes a week (an average of 30 minutes a day, 5 days a week). This will help you control your weight and prevent disease.  Limit alcohol to one drink per day.  No smoking.   Wear sunscreen to prevent skin cancer.  See your dentist every six months for an exam and cleaning.

## 2023-01-10 NOTE — PROGRESS NOTES
SUBJECTIVE:   CC: Shanna is an 47 year old who presents for preventive health visit.     Patient has been advised of split billing requirements and indicates understanding: Yes  Healthy Habits:     Getting at least 3 servings of Calcium per day:  Yes    Bi-annual eye exam:  NO    Dental care twice a year:  Yes    Sleep apnea or symptoms of sleep apnea:  None    Diet:  Regular (no restrictions)    Frequency of exercise:  2-3 days/week    Duration of exercise:  Less than 15 minutes    Taking medications regularly:  Yes    Medication side effects:  Significant flushing    PHQ-2 Total Score: 0    Additional concerns today:  No    Today's PHQ-2 Score:   PHQ-2 ( 1999 Pf[employer]) 1/10/2023   Q1: Little interest or pleasure in doing things 0   Q2: Feeling down, depressed or hopeless 0   PHQ-2 Score 0   PHQ-2 Total Score (12-17 Years)- Positive if 3 or more points; Administer PHQ-A if positive -   Q1: Little interest or pleasure in doing things Not at all   Q2: Feeling down, depressed or hopeless Not at all   PHQ-2 Score 0     Have you ever done Advance Care Planning? (For example, a Health Directive, POLST, or a discussion with a medical provider or your loved ones about your wishes): No, advance care planning information given to patient to review.  Patient plans to discuss their wishes with loved ones or provider.      Social History     Tobacco Use     Smoking status: Former     Packs/day: 0.25     Types: Cigarettes     Smokeless tobacco: Never     Tobacco comments:     quit in 2017   Substance Use Topics     Alcohol use: No     Alcohol/week: 5.8 standard drinks     Types: 7 Standard drinks or equivalent per week     If you drink alcohol do you typically have >3 drinks per day or >7 drinks per week? No    Alcohol Use 1/10/2023   Prescreen: >3 drinks/day or >7 drinks/week? No   Prescreen: >3 drinks/day or >7 drinks/week? -   No flowsheet data found.    Reviewed orders with patient.  Reviewed health maintenance and updated  orders accordingly - Yes     Breast Cancer Screening:    Breast CA Risk Assessment (FHS-7) 1/10/2023   Do you have a family history of breast, colon, or ovarian cancer? No / Unknown         Mammogram Screening: Recommended annual mammography  Pertinent mammograms are reviewed under the imaging tab.    History of abnormal Pap smear: NO - age 30-65 PAP every 5 years with negative HPV co-testing recommended  PAP / HPV Latest Ref Rng & Units 3/7/2018 3/16/2015 2/20/2012   PAP (Historical) - NIL NIL NIL   HPV16 NEG:Negative Negative - -   HPV18 NEG:Negative Negative - -   HRHPV NEG:Negative Negative - -     Reviewed and updated as needed this visit by clinical staff      Reviewed and updated as needed this visit by Provider    Neurologist started atorvastatin - positive effects with MS as per neurologist.   keppra 1250 mg twice per day.   Neurologist prescribes - baclofen,tecfidera, keppra, effexor.   Estradiol and ditropan through urologist.   Goes to day program - Monday and Thursday. Needs form to be filled out.     Not paying close attention to periods but they are regular. It was sometime last year.     Review of Systems   Constitutional: Negative for chills and fever.   HENT: Negative for congestion, ear pain, hearing loss and sore throat.    Eyes: Negative for pain and visual disturbance.   Respiratory: Negative for cough and shortness of breath.    Cardiovascular: Negative for chest pain, palpitations and peripheral edema.   Gastrointestinal: Negative for abdominal pain, constipation, diarrhea, heartburn, hematochezia and nausea.   Breasts:  Negative for tenderness, breast mass and discharge.   Genitourinary: Negative for dysuria, frequency, genital sores, hematuria, pelvic pain, urgency, vaginal bleeding and vaginal discharge.   Musculoskeletal: Negative for arthralgias, joint swelling and myalgias.   Skin: Negative for rash.   Neurological: Negative for dizziness, weakness, headaches and paresthesias.    Psychiatric/Behavioral: Negative for mood changes. The patient is not nervous/anxious.       OBJECTIVE:   There were no vitals taken for this visit.  Physical Exam  GENERAL: healthy, alert and no distress  EYES: Eyes grossly normal to inspection, PERRL and conjunctivae and sclerae normal  HENT: ear canals and TM's normal, nose and mouth without ulcers or lesions  NECK: no adenopathy, no asymmetry, masses, or scars and thyroid normal to palpation  RESP: lungs clear to auscultation - no rales, rhonchi or wheezes  CV: regular rate and rhythm, normal S1 S2, no S3 or S4, no murmur, click or rub, no peripheral edema and peripheral pulses strong  ABDOMEN: soft, nontender, no hepatosplenomegaly, no masses and bowel sounds normal   (female): normal female external genitalia, normal urethral meatus, vaginal mucosa pink, moist, well rugated, and normal cervix/adnexa/uterus without masses or discharge hard to collect pap due to her MS and exam position and cervix was not fully visualized.   MS: no gross musculoskeletal defects noted, no edema  SKIN: no suspicious lesions or rashes  NEURO: Normal strength and tone, mentation intact and speech normal  PSYCH: mentation appears normal, affect normal/bright    Female private parts examination chaperoned by Yazmin PURI      ASSESSMENT/PLAN:   (Z00.00) Routine general medical examination at a health care facility  (primary encounter diagnosis)  Comment:    Plan:      (G35) Multiple sclerosis (H)  Comment:  Goes to day care. Filled out form. Unable to see neurology notes.   Plan: Comprehensive metabolic panel (BMP + Alb, Alk         Phos, ALT, AST, Total. Bili, TP)             (Z12.31) Visit for screening mammogram  Comment:    Plan: MA SCREENING DIGITAL BILAT - Future  (s+30)              (Z12.11) Screen for colon cancer  Comment:    Plan: Colonoscopy Screening  Referral             (Z12.4) Cervical cancer screening  Comment:    Plan: Pap Screen with HPV - recommended age 30  - 65         years             (Z11.1) Screening examination for pulmonary tuberculosis  Comment:    Plan: Quantiferon TB Gold Plus             (Z13.220) Screening for hyperlipidemia  Comment:  on statin for MS benefit.   Plan: Lipid panel reflex to direct LDL Fasting                COUNSELING:  Reviewed preventive health counseling, as reflected in patient instructions        She reports that she has quit smoking. Her smoking use included cigarettes. She smoked an average of .25 packs per day. She has never used smokeless tobacco.          Malvin Beck MD, MD  [facility]

## 2023-01-10 NOTE — NURSING NOTE
Prior to immunization administration, verified patients identity using patient s name and date of birth. Please see Immunization Activity for additional information.     Screening Questionnaire for Adult Immunization    Are you sick today?   No   Do you have allergies to medications, food, a vaccine component or latex?   No   Have you ever had a serious reaction after receiving a vaccination?   No   Do you have a long-term health problem with heart, lung, kidney, or metabolic disease (e.g., diabetes), asthma, a blood disorder, no spleen, complement component deficiency, a cochlear implant, or a spinal fluid leak?  Are you on long-term aspirin therapy?   No   Do you have cancer, leukemia, HIV/AIDS, or any other immune system problem?   No   Do you have a parent, brother, or sister with an immune system problem?   No   In the past 3 months, have you taken medications that affect  your immune system, such as prednisone, other steroids, or anticancer drugs; drugs for the treatment of rheumatoid arthritis, Crohn s disease, or psoriasis; or have you had radiation treatments?   No   Have you had a seizure, or a brain or other nervous system problem?   No   During the past year, have you received a transfusion of blood or blood    products, or been given immune (gamma) globulin or antiviral drug?   No   For women: Are you pregnant or is there a chance you could become       pregnant during the next month?   No   Have you received any vaccinations in the past 4 weeks?   No     Immunization questionnaire answers were all negative.        Per orders of Dr. Beck, injection of tdap/flu given by Yazmin Nava MA. Patient instructed to remain in clinic for 15 minutes afterwards, and to report any adverse reaction to me immediately.       Screening performed by Yazmin Nava MA on 1/10/2023 at 3:55 PM.

## 2023-01-11 LAB
QUANTIFERON MITOGEN: 10 IU/ML
QUANTIFERON NIL TUBE: 0.13 IU/ML
QUANTIFERON TB1 TUBE: 0.47 IU/ML
QUANTIFERON TB2 TUBE: 0.48

## 2023-01-12 ENCOUNTER — HOSPITAL ENCOUNTER (OUTPATIENT)
Dept: REHABILITATION | Facility: CLINIC | Age: 48
Discharge: HOME | End: 2023-01-12
Attending: FAMILY MEDICINE
Payer: COMMERCIAL

## 2023-01-12 ENCOUNTER — TELEPHONE (OUTPATIENT)
Dept: FAMILY MEDICINE | Facility: CLINIC | Age: 48
End: 2023-01-12
Payer: COMMERCIAL

## 2023-01-12 LAB
BKR LAB AP GYN ADEQUACY: NORMAL
BKR LAB AP GYN INTERPRETATION: NORMAL
BKR LAB AP HPV REFLEX: NORMAL
BKR LAB AP LMP: NORMAL
BKR LAB AP PREVIOUS ABNORMAL: NORMAL
GAMMA INTERFERON BACKGROUND BLD IA-ACNC: 0.13 IU/ML
M TB IFN-G BLD-IMP: POSITIVE
M TB IFN-G CD4+ BCKGRND COR BLD-ACNC: 9.87 IU/ML
MITOGEN IGNF BCKGRD COR BLD-ACNC: 0.34 IU/ML
MITOGEN IGNF BCKGRD COR BLD-ACNC: 0.35 IU/ML
PATH REPORT.COMMENTS IMP SPEC: NORMAL
PATH REPORT.COMMENTS IMP SPEC: NORMAL
PATH REPORT.RELEVANT HX SPEC: NORMAL

## 2023-01-12 NOTE — TELEPHONE ENCOUNTER
quantiferon is positive.   Needs appt with first available provider for chest xray and latent TB treatment options discussion if xray is normal. Please call her and schedule an appointment and she should hold off on going to day care until evaluation is complete in the clinic. Ok to use same day hold for my slot if needed.

## 2023-01-12 NOTE — TELEPHONE ENCOUNTER
Consent to Communicate in chart.   will pick her up this morning as she was already in Day Care and will keep her out until further workup completed.  Per  she shows no signs of active disease.  Looking for earliest option for appointment.  Patient scheduled with Mali French for 1/16/23.  They will be staying home for the weekend.  Radha Stevenson RN  [facility]

## 2023-01-15 LAB
HUMAN PAPILLOMA VIRUS 16 DNA: NEGATIVE
HUMAN PAPILLOMA VIRUS 18 DNA: NEGATIVE
HUMAN PAPILLOMA VIRUS FINAL DIAGNOSIS: NORMAL
HUMAN PAPILLOMA VIRUS OTHER HR: NEGATIVE

## 2023-01-16 ENCOUNTER — OFFICE VISIT (OUTPATIENT)
Dept: FAMILY MEDICINE | Facility: CLINIC | Age: 48
End: 2023-01-16
Payer: COMMERCIAL

## 2023-01-16 ENCOUNTER — ANCILLARY PROCEDURE (OUTPATIENT)
Dept: GENERAL RADIOLOGY | Facility: CLINIC | Age: 48
End: 2023-01-16
Attending: NURSE PRACTITIONER
Payer: COMMERCIAL

## 2023-01-16 VITALS
OXYGEN SATURATION: 98 % | RESPIRATION RATE: 16 BRPM | TEMPERATURE: 98.4 F | HEIGHT: 66 IN | DIASTOLIC BLOOD PRESSURE: 83 MMHG | WEIGHT: 121 LBS | BODY MASS INDEX: 19.44 KG/M2 | HEART RATE: 73 BPM | SYSTOLIC BLOOD PRESSURE: 122 MMHG

## 2023-01-16 DIAGNOSIS — R76.12 POSITIVE QUANTIFERON-TB GOLD TEST: ICD-10-CM

## 2023-01-16 DIAGNOSIS — Z22.7 LATENT TUBERCULOSIS BY BLOOD TEST: Primary | ICD-10-CM

## 2023-01-16 PROCEDURE — 71046 X-RAY EXAM CHEST 2 VIEWS: CPT | Mod: TC | Performed by: RADIOLOGY

## 2023-01-16 PROCEDURE — 99214 OFFICE O/P EST MOD 30 MIN: CPT | Performed by: NURSE PRACTITIONER

## 2023-01-16 RX ORDER — RIFAMPIN 300 MG/1
300 CAPSULE ORAL DAILY
Qty: 90 CAPSULE | Refills: 0 | Status: SHIPPED | OUTPATIENT
Start: 2023-01-16 | End: 2023-05-10

## 2023-01-16 RX ORDER — ISONIAZID 100 MG/1
100 TABLET ORAL DAILY
Qty: 90 TABLET | Refills: 0 | Status: SHIPPED | OUTPATIENT
Start: 2023-01-16 | End: 2023-05-10

## 2023-01-16 ASSESSMENT — PAIN SCALES - GENERAL: PAINLEVEL: NO PAIN (0)

## 2023-01-16 NOTE — PROGRESS NOTES
"  Assessment & Plan     Latent tuberculosis by blood test  Chest x-ray clear, patient is asymptomatic.  We will treat for Latent TB, she elected daily treatment vs weekly dose.  OK to return to her day program.   - isoniazid (NYDRAZID) 100 MG tablet; Take 1 tablet (100 mg) by mouth daily  - rifampin (RIFADIN) 300 MG capsule; Take 1 capsule (300 mg) by mouth daily    Positive QuantiFERON-TB Gold test  - XR Chest 2 Views; Future    Ordering of each unique test  Prescription drug management          No follow-ups on file.    [facility]    Melba Otero is a 47 year old accompanied by her spouse, presenting for the following health issues:  Follow Up      History of Present Illness       Reason for visit:  Xray    She eats 2-3 servings of fruits and vegetables daily.She consumes 1 sweetened beverage(s) daily.She exercises with enough effort to increase her heart rate 10 to 19 minutes per day.  She exercises with enough effort to increase her heart rate 3 or less days per week.   She is taking medications regularly.     Here to follow up on recent positive gold quant.  New finding since her last test, required for her day program.      No new respiratory symptoms, night sweats, or decreased appetite/weight loss    Review of Systems   Constitutional, HEENT, cardiovascular, pulmonary, gi and gu systems are negative, except as otherwise noted.      Objective    /83 (BP Location: Right arm, Patient Position: Sitting, Cuff Size: Adult Regular)   Pulse 73   Temp 98.4  F (36.9  C) (Tympanic)   Resp 16   Ht 1.68 m (5' 6.14\")   Wt 54.9 kg (121 lb)   LMP  (LMP Unknown)   SpO2 98%   BMI 19.45 kg/m    Body mass index is 19.45 kg/m .  Physical Exam   GENERAL: healthy, alert and no distress  NECK: no adenopathy, no asymmetry, masses, or scars and thyroid normal to palpation  RESP: lungs clear to auscultation - no rales, rhonchi or wheezes  CV: regular rate and " rhythm, normal S1 S2, no S3 or S4, no murmur, click or rub, no peripheral edema and peripheral pulses strong  MS: no gross musculoskeletal defects noted, no edema

## 2023-01-19 ENCOUNTER — HOSPITAL ENCOUNTER (OUTPATIENT)
Dept: REHABILITATION | Facility: CLINIC | Age: 48
Discharge: HOME | End: 2023-01-19
Attending: FAMILY MEDICINE
Payer: COMMERCIAL

## 2023-01-19 PROCEDURE — S5102 ADULT DAY CARE PER DIEM: HCPCS

## 2023-01-19 NOTE — PROGRESS NOTES
Individual abuse prevention plan (IAPP)  [facility]     Assessment of Susceptibility to Abuse, Including Self Abuse, Neglect (Identification of characteristics, which make the individual susceptible to abuse, and how these characteristics cause the individual to be susceptible to abuse.)     Is the person susceptible to abuse in each area?  Sexual Abuse:   No  Referrals made when the person is susceptible to abuse outside the scope or control of this program (Identify the referral and date it occurred): No additional risk reduction means needed at this time    Physical Abuse:   No  Referrals made when the person is susceptible to abuse outside the scope or control of this program (Identify the referral and date it occurred): No additional risk reduction means needed at this time    Self-Abuse:   No  Referrals made when the person is susceptible to abuse outside the scope or control of this program (Identify the referral and date it occurred): No additional risk reduction means needed at this time    Financial  Exploitation  No  Referrals made when the person is susceptible to abuse outside the scope or control of this program (Identify the referral and date it occurred): No additional risk reduction means needed at this time    Is the program aware of this person committing a violent crime or act of physical aggression towards others?  No  Referrals made when the person is susceptible to abuse outside the scope or control of this program (Identify the referral and date it occurred): No additional risk reduction means needed at this time    INDIVIDUAL ABUSE PREVENTION PLAN-MEASURES TAKEN TO MINIMIZE RISK OF ABUSE   ADL:   Assist with clothing management  Assist with feeding  Assist with toileting  Staff will provide set up and VC for eating.  Staff will provide 1:1 supervision and set up and VC assist for post toilet use self-hygiene, garment management, and hand  hygiene.  Ambulation/Transfers/Wheelchairs:  Assist with all transfers and/or ambulation  Encourage client to ambulate short distances with walker and provide wheelchair for distance  Ensure client uses cane and/or walker  Provide standby assist due to periods of dizziness, fatigue  Provide standby assist when client ambulates prn   Behavior:   No behavior issues  Communication:  Encourage verbalization of needs/concerns  Observe body language/gestures to assist in anticipating client's needs  Cognitive Issues:   Require aide to be with member if wandering  Provider reminders due to confusion, forgetfulness  Give simple step-by-step direction  Contact caregiver to inform of special activities days  Diet:   Staff will prepare food to facilitate client to feed self  Monitor client when eating and/or drinking fluids   Exercise:   Encourage participation in maintenance program  Encourage participation in wheelchair aerobics  Hearing:   Speak distinctly and use gestures  Isolation:   Encourage socialization due to isolation in home environment  Medical Monitoring:   Monitor physical and emotional comfort  Monitor physical symptoms due to diagnosis and report significant changes to nurse, caregiver and physician   Mental Health:   Motivate client to join in activities that are beneficial to client  Encourage client to express feelings  Monitor anxiety level and intervene when appropriate  Encourage regular attendance for socialization and stimulation  Offer emotional support  Observe for symptoms of depression and notify appropriate staff/caregiver  Encourage independent decision making  Encourage social interaction to assist in increasing client's self-image  Provide client with choices of programming to encourage independent decision making  Provide activities in which client can be successful  Sensory:   Provide and encourage participation in activities for stimulation  Vision:   Provide verbal cues  Other:  N/A    Developed in consultation with:  Client  The Program Abuse Prevention Plan/IAPP identifies the specific actions that minimize abuse to the [facility] participant.  No

## 2023-01-19 NOTE — PROGRESS NOTES
MONTHLY PROGRESS NOTE AND PARTICIPATION REPORT   [facility]    Shanna Shanks, 1975  Attendance: Please see Epic for attendance record.    Communication:   Does communicate   Hearing:   No impairment  Vision:   No impairment  Orientation/Cognition:   Minor forgetfulness  Partial disorientation  Short term memory loss  Behavior:   No behavioral concerns  Self-Preservation Skills:   Shanna presents with MOD cogntive impairment and slow shuffled gait with balance deficits during ambulation 2/2 her MS diagnosis.  She requires staff instructions and assistance for self preservatioin.    Eating:   Assist with set up  Shanna requires verbal cues from staff ~75% of the time to initiate and continue eathing activities during lunch service D/2 her cognitive impairment.  Ambulation Walking:   Needs assistance  Staff provides Shanna a site owned 4WW for safe ambulation during program attendance for falls prevention D/2 her short slow shuffled gait and balance deficits 2/2 her MS diagnosis.  Transferring:   Independent  Wheelchair:   Shanna is fully ambulatory.  However, she will be offered a manual WC for any off site group outings for energy conservation and falls prevention.  Toileting:   Staff assists Shanna in the bathroom 1:1 for supervision and provides VC for self-hygiene and garment management after toilet use and hand hygiene.  Maintenance Program:     NuStep  Living Arrangements:   Lives with family  Spiritual Needs: Needs are being met through support group  Medication Assistance:   Medication not taken during program hours  Participation Report:   Aerobics/Exercise  Support Group  Cognitive Stimulation  Fire Drill  Creative Arts/Crafts  Games  Gardening  Speakers/Entertainment  Socialization  Current Events/News  Education/Health Topic  Level of Participation:   [facility] Note:  Shanna has still has had declined appetite and staff continue to encourage her to eat her  lunch and snacks while at the day program.  Autumn is social with her peer and participates in activities provided by staff.

## 2023-01-19 NOTE — PROGRESS NOTES
INDIVIDUAL PLAN OF CARE   [facility]    Member Name: Shanna Shanks; YOB: 1975  MRN: [id number]  3/31/2022    Goals developed in collaboration with: member  Staff responsible for plan: Analisa LOWE  1. Long Term Goal (concrete, measurable, and time specific outcomes):  Member will maintain present level of physical and cognitive function through active participation in structured [facility] programming with staff set up, facilitation, and supervision provided every day of program attendance.  Target Date:  January 2024  Bi-Annual Review:  Goal remains appropriate.    2. Short Term Goal: (concrete, measurable, and time specific outcomes):  To maintain physical strength and endurance, member will actively participate in prescribed Nu-Step exercise program, with set up, VC's and supervision provided by staff, for at least 20 minutes, at least 1x/week during program attendance.  Target Date:  July 2023  Bi-Annual Review:  Goal remains appropriate.  Shanna requires VC's and positive reinforcement to participate in Nu Step activity.    3. Short Term Goal: (concrete, measurable, and time specific outcomes):  To maintain current level of cognitive function, member will actively participate in therapeutic creative arts and Brain/Body programming with set up, VC's and supervision provided by staff every day of program attendance.  Target Date:  July 2023  Bi-Annual Review:  Goal remains appropriate.

## 2023-01-27 ENCOUNTER — TELEPHONE (OUTPATIENT)
Dept: UROLOGY | Facility: CLINIC | Age: 48
End: 2023-01-27

## 2023-01-27 NOTE — TELEPHONE ENCOUNTER
[facility] Call Center    Phone Message    May a detailed message be left on voicemail: yes     Reason for Call: Pt  called to reschedule pt cysto currently scheduled 1/31. Please call to reschedule. Thank you    Action Taken: Message routed to:  Other: Uro    Travel Screening: Not Applicable

## 2023-01-30 ENCOUNTER — HOSPITAL ENCOUNTER (OUTPATIENT)
Dept: REHABILITATION | Facility: CLINIC | Age: 48
Discharge: HOME | End: 2023-01-30
Attending: FAMILY MEDICINE
Payer: COMMERCIAL

## 2023-01-30 PROCEDURE — S5102 ADULT DAY CARE PER DIEM: HCPCS

## 2023-02-02 ENCOUNTER — HOSPITAL ENCOUNTER (OUTPATIENT)
Dept: REHABILITATION | Facility: CLINIC | Age: 48
Discharge: HOME | End: 2023-02-02
Attending: FAMILY MEDICINE
Payer: COMMERCIAL

## 2023-02-02 PROCEDURE — S5102 ADULT DAY CARE PER DIEM: HCPCS

## 2023-02-06 ENCOUNTER — HOSPITAL ENCOUNTER (OUTPATIENT)
Dept: REHABILITATION | Facility: CLINIC | Age: 48
Discharge: HOME | End: 2023-02-06
Attending: FAMILY MEDICINE
Payer: COMMERCIAL

## 2023-02-06 PROCEDURE — S5102 ADULT DAY CARE PER DIEM: HCPCS

## 2023-02-09 ENCOUNTER — HOSPITAL ENCOUNTER (OUTPATIENT)
Facility: CLINIC | Age: 48
End: 2023-02-09
Attending: SPECIALIST | Admitting: SPECIALIST
Payer: COMMERCIAL

## 2023-02-09 ENCOUNTER — TELEPHONE (OUTPATIENT)
Dept: GASTROENTEROLOGY | Facility: CLINIC | Age: 48
End: 2023-02-09
Payer: COMMERCIAL

## 2023-02-09 ENCOUNTER — HOSPITAL ENCOUNTER (OUTPATIENT)
Dept: REHABILITATION | Facility: CLINIC | Age: 48
Discharge: HOME | End: 2023-02-09
Attending: FAMILY MEDICINE
Payer: COMMERCIAL

## 2023-02-09 DIAGNOSIS — Z12.11 ENCOUNTER FOR SCREENING COLONOSCOPY: Primary | ICD-10-CM

## 2023-02-09 PROCEDURE — S5102 ADULT DAY CARE PER DIEM: HCPCS

## 2023-02-09 NOTE — PROGRESS NOTES
[facility] RN Note    Previous documentation since 1/9/23 reviewed.  No concerns reported by [facility] staff or member.

## 2023-02-09 NOTE — TELEPHONE ENCOUNTER
Screening Questions  BLUE  KIND OF PREP RED  LOCATION [review exclusion criteria] GREEN  SEDATION TYPE    Y  Are you active on mychart?   Malvin Beck MD   Ordering/Referring Provider?    [employer]  What type of coverage do you have?  N  Have you had a positive covid test in the last 14 days?    21.3  1. BMI  [BMI 40+ - review exclusion criteria - [facility] + [facility]]            *NEED PAC APPT AT [facility] + [facility] (ONLY)*     SELF/KACY ()   2. Are you able to give consent for your medical care? [IF NO,RN REVIEW]          N  3. Are you taking any prescription pain medications on a routine schedule   (ex narcotics: tramadol, oxycodone, roxicodone, oxycontin,  and percocet)?    [RN Review]             N  3a. EXTENDED PREP What kind of prescription?     N 4. Do you have any chemical dependencies such as alcohol, street drugs, or methadone?    **If yes 3- 5 , please schedule with MAC sedation.**          IF YES TO ANY 6 - 10 - HOSPITAL SETTING ONLY.     Y - NEEDS ASSISTANCE  6.   Do you need assistance transferring?     N 7.   Have you had a heart or lung transplant?    N 8.   Are you currently on dialysis?   N 9.   Do you use daily home oxygen?   N 10. Do you take nitroglycerin?   10a. N If yes, how often?     11. [FEMALES]   Are you currently pregnant?     11a.  If yes, how many weeks? [ Greater than 12 weeks, OR NEEDED]    N 12. [review exclusion criteria]  Do you have any implantable devices in your body (pacemaker, defib, LVAD)?            *NEED PAC APPT AT [facility]*     N 13. Do you have Pulmonary Hypertension?             *NEED PAC APPT AT [facility]*     N 14. In the past 6 months, have you had any heart related issues including cardiomyopathy or heart attack?     N 14a. If yes, did it require cardiac stenting if so when?     N 15. Have you had a stroke or Transient ischemic attack (TIA - aka  mini stroke ) within 6 months?      N 16. Do you have mod to severe Obstructive Sleep Apnea?  [Hospital only]    N 17. Do you have  "SEVERE AND UNCONTROLLED asthma?              *NEED PAC APPT AT [facility]*     N 18. Are you currently taking any blood thinners?     18a. If yes, inform patient to \"follow up w/ ordering provider for bridging instructions.\"    N 19. Do you take the medication Phentermine?    19a. If yes, \"Hold for 7 days before procedure.  Please consult your prescribing provider if you have questions about holding this medication.\"     N  20. Do you have chronic kidney disease?      N  21. Do you have a diagnosis of diabetes?     N  22. On a regular basis do you go 3-5 days between bowel movements?     23. Preferred LOCAL Pharmacy for Pre Prescription    [ LIST ONLY ONE PHARMACY]        [id number] - [place], [place] - [address] [place] [address] AT [address] [place] [address] & [address]        - CLOSING REMINDERS -    Informed patient they will need an adult          Cannot take any type of public or medical transportation alone    Conscious Sedation- Needs  for 6 hours after the procedure        MAC/General-Needs  for 24 hours after procedure    Pre-Procedure Covid test to be completed         [[facility]/HOME Testing Required] + ADULT to ACCOMPANY     Confirmed Nurse will call to complete assessment       - SCHEDULING DETAILS -      Hospital Setting Required? If yes, what is the exclusion?:      Y - NEEDS ASSISTANCE TO TRANSFER       Additional comments:  MIKEL ÁLVAREZ   Surgeon   03/16/2023  Date of Procedure  MODERATE  Sedation Type     N PAC / Pre-op Required   Location  [facility]       Type of Procedure Scheduled  Lower Endoscopy [Colonoscopy]      Which Colonoscopy Prep was Sent?     STANDARD GOLYTELY-If you answer yes to questions #8, #20, #21          "

## 2023-02-12 ENCOUNTER — OFFICE VISIT (OUTPATIENT)
Dept: URGENT CARE | Facility: URGENT CARE | Age: 48
End: 2023-02-12
Payer: COMMERCIAL

## 2023-02-12 VITALS
DIASTOLIC BLOOD PRESSURE: 70 MMHG | RESPIRATION RATE: 15 BRPM | OXYGEN SATURATION: 97 % | TEMPERATURE: 98.9 F | HEART RATE: 83 BPM | SYSTOLIC BLOOD PRESSURE: 103 MMHG

## 2023-02-12 DIAGNOSIS — N30.00 ACUTE CYSTITIS WITHOUT HEMATURIA: ICD-10-CM

## 2023-02-12 DIAGNOSIS — R82.90 CLOUDY URINE: Primary | ICD-10-CM

## 2023-02-12 LAB
ALBUMIN UR-MCNC: >=300 MG/DL
AMORPH CRY #/AREA URNS HPF: ABNORMAL /HPF
APPEARANCE UR: CLEAR
BACTERIA #/AREA URNS HPF: ABNORMAL /HPF
BILIRUB UR QL STRIP: NEGATIVE
COLOR UR AUTO: YELLOW
GLUCOSE UR STRIP-MCNC: NEGATIVE MG/DL
HGB UR QL STRIP: ABNORMAL
KETONES UR STRIP-MCNC: ABNORMAL MG/DL
LEUKOCYTE ESTERASE UR QL STRIP: ABNORMAL
NITRATE UR QL: NEGATIVE
PH UR STRIP: 6.5 [PH] (ref 5–7)
RBC #/AREA URNS AUTO: ABNORMAL /HPF
SP GR UR STRIP: 1.02 (ref 1–1.03)
SQUAMOUS #/AREA URNS AUTO: ABNORMAL /LPF
UROBILINOGEN UR STRIP-ACNC: 1 E.U./DL
WBC #/AREA URNS AUTO: ABNORMAL /HPF

## 2023-02-12 PROCEDURE — 81001 URINALYSIS AUTO W/SCOPE: CPT | Performed by: FAMILY MEDICINE

## 2023-02-12 PROCEDURE — 87086 URINE CULTURE/COLONY COUNT: CPT | Performed by: FAMILY MEDICINE

## 2023-02-12 PROCEDURE — 99213 OFFICE O/P EST LOW 20 MIN: CPT | Performed by: FAMILY MEDICINE

## 2023-02-12 RX ORDER — SULFAMETHOXAZOLE/TRIMETHOPRIM 800-160 MG
1 TABLET ORAL 2 TIMES DAILY
Qty: 10 TABLET | Refills: 0 | Status: SHIPPED | OUTPATIENT
Start: 2023-02-12 | End: 2023-04-05

## 2023-02-12 NOTE — PROGRESS NOTES
ASSESSMENT/  PLAN:   Cloudy urine     - UA reflex to Microscopic and Culture  - Urine Microscopic Exam  - Urine Culture    Acute cystitis without hematuria     - sulfamethoxazole-trimethoprim (BACTRIM DS) 800-160 MG tablet; Take 1 tablet by mouth 2 times daily       Drink plenty of fluids.  Prevention and treatment of UTI's discussed.Signs and symptoms of pyelonephritis mentioned.  Follow up with primary care physician if not improving    We discussed that a urine culture is being performed and that if we find that if there is a bacteria in the urine that is resistant to the prescribed antibiotic he/she will receive call to change the antibiotic to better cover the infection.         -------------------------------------------------------------------------------------------    SUBJECTIVE:  Chief Complaint   Patient presents with     Urgent Care     Present for cloudy urine and urine leakage more than usual for the last couple of days.         Shanna Shanks is a 47 year old female who  presents today for a possible UTI. Symptoms of urgency and frequency have been going on for 1day(s).  Hematuria no.  sudden onset, still present and constantand moderate.  There is no history of fever, chills, nausea or vomiting.  No history of vaginal or   discharge. This patient does   have a history of urinary tract infections. Patient denies long duration, rigors, flank pain and temperature > 101 degrees F. or vaginal discharge, vaginal odor and vaginal itching     Past Medical History:   Diagnosis Date     Multiple sclerosis (H) 3/2/96    last exacerbation 3yrs ago, no meds x 6 months     Seizure disorder (H)      Tobacco dependency      Patient Active Problem List   Diagnosis     Multiple sclerosis (H)     UTI (urinary tract infection)     CARDIOVASCULAR SCREENING; LDL GOAL LESS THAN 160     Dysmenorrhea     Nicotine dependence in remission     Encephalopathy     Herpes encephalitis     Fever of unknown origin      Seizure (H)     Traumatic hematoma of left lower leg     Seizures (H)       ALLERGIES:  Diphenhydramine, Nickel, and Macrobid [nitrofurantoin]    atorvastatin (LIPITOR) 40 MG tablet, Take 1 tablet (40 mg) by mouth daily  baclofen (LIORESAL) 10 MG tablet, Take 10-20 mg by mouth 4 times daily as needed   CRANBERRY EXTRACT PO, Take 1 tablet by mouth daily  dimethyl fumarate (TECFIDERA) delayed release capsule, Take 240 mg by mouth 2 times daily   estradiol (ESTRACE VAGINAL) 0.1 MG/GM vaginal cream, Apply small amount to the vaginal opening and urethra M, W, F @ h.s.  isoniazid (NYDRAZID) 100 MG tablet, Take 1 tablet (100 mg) by mouth daily  levETIRAcetam (KEPPRA) 250 MG tablet, Take 1 tablet (250 mg) by mouth 2 times daily  levETIRAcetam 1000 MG TABS, Take 1,000 mg by mouth 2 times daily  MELATONIN PO, Take 1 tablet by mouth as needed  oxybutynin (DITROPAN-XL) 5 MG 24 hr tablet, Take 5 mg by mouth daily  rifampin (RIFADIN) 300 MG capsule, Take 1 capsule (300 mg) by mouth daily  venlafaxine (EFFEXOR-ER) 75 MG 24 hr tablet, Take 75 mg by mouth daily  VITAMIN D, CHOLECALCIFEROL, PO, Take 1,000 Units by mouth daily  order for DME, Equipment being ordered: four wheeled walker with seat and brakes    No current facility-administered medications on file prior to visit.      Social History     Tobacco Use     Smoking status: Former     Packs/day: 0.25     Types: Cigarettes     Smokeless tobacco: Never     Tobacco comments:     quit in 2017   Substance Use Topics     Alcohol use: No     Alcohol/week: 5.8 standard drinks     Types: 7 Standard drinks or equivalent per week       Family History   Problem Relation Age of Onset     Family History Negative Mother      Diabetes Father      Cancer - colorectal Paternal Grandmother      Heart Disease Paternal Grandfather         MI       ROS:   CONSTITUTIONAL:NEGATIVE for fever, chills   INTEGUMENTARY/SKIN: NEGATIVE for worrisome rashes  or lesions  EYES: NEGATIVE for vision changes or  irritation  ENT/MOUTH: NEGATIVE for ear, mouth and throat problems  RESP:NEGATIVE for significant cough or SOB    OBJECTIVE:  /70 (BP Location: Left arm, Patient Position: Sitting, Cuff Size: Adult Small)   Pulse 83   Temp 98.9  F (37.2  C) (Tympanic)   Resp 15   LMP  (LMP Unknown)   SpO2 97%   GENERAL APPEARANCE: healthy, alert and no distress.  Ambulating slow, holding onto son for balance  RESP: lungs clear to auscultation - no rales, rhonchi or wheezes  CV: regular rates and rhythm, normal S1 S2, no murmur noted  ABDOMEN:  soft, nontender, no HSM or masses and bowel sounds normal  BACK: No CVA tenderness  SKIN: no suspicious lesions or rashes    Results for orders placed or performed in visit on 02/12/23   UA reflex to Microscopic and Culture     Status: Abnormal    Specimen: Urine, Clean Catch   Result Value Ref Range    Color Urine Yellow Colorless, Straw, Light Yellow, Yellow    Appearance Urine Clear Clear    Glucose Urine Negative Negative mg/dL    Bilirubin Urine Negative Negative    Ketones Urine Trace (A) Negative mg/dL    Specific Gravity Urine 1.025 1.003 - 1.035    Blood Urine Trace (A) Negative    pH Urine 6.5 5.0 - 7.0    Protein Albumin Urine >=300 (A) Negative mg/dL    Urobilinogen Urine 1.0 0.2, 1.0 E.U./dL    Nitrite Urine Negative Negative    Leukocyte Esterase Urine Moderate (A) Negative   Urine Microscopic Exam     Status: Abnormal   Result Value Ref Range    Bacteria Urine Many (A) None Seen /HPF    RBC Urine 0-2 0-2 /HPF /HPF    WBC Urine 10-25 (A) 0-5 /HPF /HPF    Squamous Epithelials Urine Few (A) None Seen /LPF    Amorphous Crystals Urine Few (A) None Seen /HPF

## 2023-02-13 ENCOUNTER — HOSPITAL ENCOUNTER (OUTPATIENT)
Dept: REHABILITATION | Facility: CLINIC | Age: 48
Discharge: HOME | End: 2023-02-13
Attending: FAMILY MEDICINE
Payer: COMMERCIAL

## 2023-02-13 PROCEDURE — S5102 ADULT DAY CARE PER DIEM: HCPCS

## 2023-02-15 ENCOUNTER — TELEPHONE (OUTPATIENT)
Dept: UROLOGY | Facility: CLINIC | Age: 48
End: 2023-02-15

## 2023-02-15 ENCOUNTER — OFFICE VISIT (OUTPATIENT)
Dept: UROLOGY | Facility: CLINIC | Age: 48
End: 2023-02-15
Payer: COMMERCIAL

## 2023-02-15 VITALS
HEIGHT: 68 IN | OXYGEN SATURATION: 97 % | SYSTOLIC BLOOD PRESSURE: 90 MMHG | DIASTOLIC BLOOD PRESSURE: 60 MMHG | HEART RATE: 84 BPM | WEIGHT: 150 LBS | BODY MASS INDEX: 22.73 KG/M2

## 2023-02-15 DIAGNOSIS — R31.29 MICROSCOPIC HEMATURIA: Primary | ICD-10-CM

## 2023-02-15 DIAGNOSIS — G35 MULTIPLE SCLEROSIS (H): ICD-10-CM

## 2023-02-15 DIAGNOSIS — Z87.440 HISTORY OF UTI: ICD-10-CM

## 2023-02-15 LAB
ALBUMIN UR-MCNC: 100 MG/DL
APPEARANCE UR: CLEAR
BACTERIA UR CULT: NORMAL
BILIRUB UR QL STRIP: NEGATIVE
COLOR UR AUTO: YELLOW
GLUCOSE UR STRIP-MCNC: NEGATIVE MG/DL
HGB UR QL STRIP: ABNORMAL
KETONES UR STRIP-MCNC: NEGATIVE MG/DL
LEUKOCYTE ESTERASE UR QL STRIP: ABNORMAL
NITRATE UR QL: NEGATIVE
PH UR STRIP: 6.5 [PH] (ref 5–7)
SP GR UR STRIP: 1.02 (ref 1–1.03)
UROBILINOGEN UR STRIP-ACNC: 0.2 E.U./DL

## 2023-02-15 PROCEDURE — 52000 CYSTOURETHROSCOPY: CPT | Performed by: UROLOGY

## 2023-02-15 PROCEDURE — 81003 URINALYSIS AUTO W/O SCOPE: CPT | Performed by: UROLOGY

## 2023-02-15 PROCEDURE — 99214 OFFICE O/P EST MOD 30 MIN: CPT | Mod: 25 | Performed by: UROLOGY

## 2023-02-15 RX ORDER — LIDOCAINE HYDROCHLORIDE 20 MG/ML
JELLY TOPICAL ONCE
Status: COMPLETED | OUTPATIENT
Start: 2023-02-15 | End: 2023-02-15

## 2023-02-15 RX ADMIN — LIDOCAINE HYDROCHLORIDE 5 ML: 20 JELLY TOPICAL at 14:07

## 2023-02-15 ASSESSMENT — PAIN SCALES - GENERAL: PAINLEVEL: NO PAIN (0)

## 2023-02-15 NOTE — PROGRESS NOTES
EMILY   CHIEF COMPLAINT   It was my pleasure to see Shanna Shanks who is a 47 year old female for follow-up of microscopic hematuria, MS, UTIs.      HPI   Shanna Shanks is a very pleasant 47 year old female     Initially seen 8/30/2022 with Trinidad Decker:  HPI: It is a pleasure to see Ms. Shanna Shanks, a pleasant 47 year old female, asked to be seen in consultation via self referral for evaluation of rUTIs and noted to have micro hematuria (x 2) with neg UCs. Ex-smoker.     Hx of rUTIs (enteroccous, e coli). Sx include more frequency and incontinence.      The patient denies any gross hematuria.  Ms. Shanks voids without difficulty.  She currently denies any dysuria, pyuria, hesitancy, intermittency, feelings of incomplete emptying, or any recent history of urinary tract infections or stones.      Has MS, ambulatory. Uses oxybutynin 5mg for urge incontinence.      Hematuria Risk Factors:  Age >40: Yes                                       Smoking history: yes  Occupational exposure to chemicals or dyes (ie, benzenes, aromatic amines): no  History of urologic disorder or disease: no  History of irritative voiding symptoms: no  History of urinary tract infection: no  Analgesic abuse: no  History of pelvic irradiation: no    TODAY 2/15/2023:  Follow-up today for surveillance cystoscopy  Her  joins her for this visit  He notes that she has been having some increase in her mixed urinary incontinence  She was recently started on a course of antibiotics for a presumed UTI    PHYSICAL EXAM  Patient is a 47 year old  female   Vitals: not currently breastfeeding.  There is no height or weight on file to calculate BMI.  General Appearance Adult:   Alert, no acute distress, oriented  HENT: throat/mouth:normal, good dentition  Lungs: no respiratory distress, or pursed lip breathing  Heart: No obvious jugular venous distension present  Abdomen: soft, nontender, no organomegaly or  masses  Musculoskeltal: extremities normal, no peripheral edema  Skin: no suspicious lesions or rashes  Neuro: Alert, oriented, speech and mentation normal  Psych: affect and mood normal  Gait: Normal  : deferred to cystoscopy    UA RESULTS:  Recent Labs   Lab Test 02/15/23  1341 02/12/23  1634   COLOR Yellow Yellow   APPEARANCE Clear Clear   URINEGLC Negative Negative   URINEBILI Negative Negative   URINEKETONE Negative Trace*   SG 1.020 1.025   UBLD Trace* Trace*   URINEPH 6.5 6.5   PROTEIN 100* >=300*   UROBILINOGEN 0.2 1.0   NITRITE Negative Negative   LEUKEST Small* Moderate*   RBCU  --  0-2   WBCU  --  10-25*     Creatinine   Date Value Ref Range Status   01/10/2023 0.78 0.51 - 0.95 mg/dL Final   08/17/2020 0.67 0.52 - 1.04 mg/dL Final         IMAGING:  All pertinent imaging reviewed:    All imaging studies reviewed by me.  I personally reviewed these imaging films.  A formal report from radiology will follow.    CT UROGRAM 9/27/2022:  FINDINGS:   LOWER CHEST: Normal.     HEPATOBILIARY: Normal.     PANCREAS: Normal.     SPLEEN: Normal.     ADRENAL GLANDS: Normal.     RIGHT KIDNEY/URETER: Normal.     LEFT KIDNEY/URETER: Normal.     BLADDER: Mild bladder wall thickening. No focal lesion.     BOWEL: Normal.     LYMPH NODES: Normal.     VASCULATURE: Unremarkable.     PELVIC ORGANS: Normal.     MUSCULOSKELETAL: Normal.                                                         IMPRESSION:  1.  Mild bladder wall thickening is nonspecific, possibly related to cystitis.  2.  Normal kidneys and ureters. No urinary tract calculi.          ASSESSMENT and PLAN  47-year-old female with history of microscopic hematuria as well as multiple sclerosis with mixed urinary incontinence and recurrent urinary tract infections    Microscopic hematuria  - I reviewed her labs which are notable for microscopic hematuria  - I reviewed her serum creatinine which is stable and normal  - I reviewed her previous CT urogram and reviewed these  images personally.  There is no evidence of upper tract abnormalities  - Cystoscopy today with no evidence of intravesical abnormalities    History of multiple sclerosis with mixed urinary incontinence  - We discussed the need for baseline urodynamics  - She may follow-up with Trinidad Decker after the urodynamics to discuss the results      Time spent: 20 minutes spent on the date of the encounter doing chart review, history and exam, documentation and further activities as noted above.  This was in addition to cystoscopy time    Tye Monson MD   Urology  [facility]  [facility] Phone: [phone]  [facility] Phone: [phone]

## 2023-02-15 NOTE — PATIENT INSTRUCTIONS
"AFTER YOUR CYSTOSCOPY  ?  ?  You have just completed a cystoscopy, or \"cysto\", which allowed your physician to learn more about your bladder (or to remove a stent placed after surgery). We suggest that you continue to avoid caffeine, fruit juice, and alcohol for the next 24 hours, however, you are encouraged to return to your normal activities.  ?  ?  A few things that are considered normal after your cystoscopy:  ?  * small amount of bleeding (or spotting) that clears within the next 24 hours  ?  * slight burning sensation with urination  ?  * sensation of needing to void (urinate) more frequently  ?  * the feeling of \"air\" in your urine  ?  * mild discomfort that is relieved with Tylenol    * bladder spasms  ?  ?  ?  Please contact our office promptly if you:  ?  * develop a fever above 101 degrees  ?  * are unable to urinate  ?  * develop bright red blood that does not stop  ?  * experience severe pain or swelling  ?  ?  ?  And of course, please contact our office with any concerns or questions [phone].  ?   AFTER YOUR CYSTOSCOPY        You have just completed a cystoscopy, or \"cysto\", which allowed your physician to learn more about your bladder (or to remove a stent placed after surgery). We suggest that you continue to avoid caffeine, fruit juice, and alcohol for the next 24 hours, however, you are encouraged to return to your normal activities.         A few things that are considered normal after your cystoscopy:     * Small amount of bleeding (or spotting) that clears within the next 24 hours     * Slight burning sensation with urination     * Sensation to of needing to avoid more frequently     * The feeling of \"air\" in your urine     * Mild discomfort that is relieved with Tylenol        Please contact our office promptly if you:     * Develop a fever above 101 degrees     * Are unable to urinate     * Develop bright red blood that does not stop     * Severe pain or swelling         Please contact " our office with any concerns or questions @[facility].

## 2023-02-15 NOTE — TELEPHONE ENCOUNTER
[facility] Call Center    Phone Message    May a detailed message be left on voicemail: yes     Reason for Call: Other: . pt has a cysto today at 1:30- she just started a round of antibiotics for a UTI- will that cause an issue with having her cysto? Writer unable to get through to backline- please call dom, thank you    Action Taken: Message routed to:  Other: uro    Travel Screening: Not Applicable

## 2023-02-15 NOTE — LETTER
2/15/2023       RE: Shanna Shanks  [address]  [facility] [address]     Dear Colleague,    Thank you for referring your patient, Shanna Shanks, to the [facility] at [facility]. Please see a copy of my visit note below.    [place]   CHIEF COMPLAINT   It was my pleasure to see Shanna Shanks who is a 47 year old female for follow-up of microscopic hematuria, MS, UTIs.      HPI   Shanna Shanks is a very pleasant 47 year old female     Initially seen 8/30/2022 with Trinidad Decker:  HPI: It is a pleasure to see Ms. Shanna Shanks, a pleasant 47 year old female, asked to be seen in consultation via self referral for evaluation of rUTIs and noted to have micro hematuria (x 2) with neg UCs. Ex-smoker.     Hx of rUTIs (enteroccous, e coli). Sx include more frequency and incontinence.      The patient denies any gross hematuria.  Ms. Shanks voids without difficulty.  She currently denies any dysuria, pyuria, hesitancy, intermittency, feelings of incomplete emptying, or any recent history of urinary tract infections or stones.      Has MS, ambulatory. Uses oxybutynin 5mg for urge incontinence.      Hematuria Risk Factors:  Age >40: Yes                                       Smoking history: yes  Occupational exposure to chemicals or dyes (ie, benzenes, aromatic amines): no  History of urologic disorder or disease: no  History of irritative voiding symptoms: no  History of urinary tract infection: no  Analgesic abuse: no  History of pelvic irradiation: no    TODAY 2/15/2023:  Follow-up today for surveillance cystoscopy  Her  joins her for this visit  He notes that she has been having some increase in her mixed urinary incontinence  She was recently started on a course of antibiotics for a presumed UTI    PHYSICAL EXAM  Patient is a 47 year old  female   Vitals: not currently  breastfeeding.  There is no height or weight on file to calculate BMI.  General Appearance Adult:   Alert, no acute distress, oriented  HENT: throat/mouth:normal, good dentition  Lungs: no respiratory distress, or pursed lip breathing  Heart: No obvious jugular venous distension present  Abdomen: soft, nontender, no organomegaly or masses  Musculoskeltal: extremities normal, no peripheral edema  Skin: no suspicious lesions or rashes  Neuro: Alert, oriented, speech and mentation normal  Psych: affect and mood normal  Gait: Normal  : deferred to cystoscopy    UA RESULTS:  Recent Labs   Lab Test 02/15/23  1341 02/12/23  1634   COLOR Yellow Yellow   APPEARANCE Clear Clear   URINEGLC Negative Negative   URINEBILI Negative Negative   URINEKETONE Negative Trace*   SG 1.020 1.025   UBLD Trace* Trace*   URINEPH 6.5 6.5   PROTEIN 100* >=300*   UROBILINOGEN 0.2 1.0   NITRITE Negative Negative   LEUKEST Small* Moderate*   RBCU  --  0-2   WBCU  --  10-25*     Creatinine   Date Value Ref Range Status   01/10/2023 0.78 0.51 - 0.95 mg/dL Final   08/17/2020 0.67 0.52 - 1.04 mg/dL Final         IMAGING:  All pertinent imaging reviewed:    All imaging studies reviewed by me.  I personally reviewed these imaging films.  A formal report from radiology will follow.    CT UROGRAM 9/27/2022:  FINDINGS:   LOWER CHEST: Normal.     HEPATOBILIARY: Normal.     PANCREAS: Normal.     SPLEEN: Normal.     ADRENAL GLANDS: Normal.     RIGHT KIDNEY/URETER: Normal.     LEFT KIDNEY/URETER: Normal.     BLADDER: Mild bladder wall thickening. No focal lesion.     BOWEL: Normal.     LYMPH NODES: Normal.     VASCULATURE: Unremarkable.     PELVIC ORGANS: Normal.     MUSCULOSKELETAL: Normal.                                                         IMPRESSION:  1.  Mild bladder wall thickening is nonspecific, possibly related to cystitis.  2.  Normal kidneys and ureters. No urinary tract calculi.          ASSESSMENT and PLAN  47-year-old female with history of  microscopic hematuria as well as multiple sclerosis with mixed urinary incontinence and recurrent urinary tract infections    Microscopic hematuria  - I reviewed her labs which are notable for microscopic hematuria  - I reviewed her serum creatinine which is stable and normal  - I reviewed her previous CT urogram and reviewed these images personally.  There is no evidence of upper tract abnormalities  - Cystoscopy today with no evidence of intravesical abnormalities    History of multiple sclerosis with mixed urinary incontinence  - We discussed the need for baseline urodynamics  - She may follow-up with Trinidad Decker after the urodynamics to discuss the results      Time spent: 20 minutes spent on the date of the encounter doing chart review, history and exam, documentation and further activities as noted above.  This was in addition to cystoscopy time    Tye Monson MD   Urology  [facility]  [facility] Phone: [phone]  [facility] Phone: [phone]

## 2023-02-15 NOTE — PROCEDURES
CYSTOSCOPY PROCEDURE NOTE:    Shanna Shanks is a 47 year old female who presents with MICROSCOPIC HEMATURIA for a CYSTOSCOPY.    Pt ID verified with patient: YES     Procedure verified with patient: YES     Procedure confirmed with physician and support staff: YES     Consent confirmed with physician and support staff.    Sign In:  History and Physical Exam reviewed   Primary Diagnosis: microscopic hematuria   Informed Consent Discussed: Yes   Sign in Communication: Yes   Time Out:  Team Confirms the Correct Patient, Correct Procedure; Yes , Correct Site and Site Marking, Correct Position (if applicable).  Affirmation of Time Out: Yes   Sign Out:  Sign Out Discussion: Yes   Physician: Tye Monson MD  Indications for procedure:   Shanna Shanks is a 47 year old female with a history of microscopic hematuria, MS, UTIs.    Description of procedure:   After fully informed, voluntary consent was obtained, the patient was brought into the procedure room, identified and placed in a dorsal lithotomy position on the cystoscopy table.  The vagina/introitus were prepped with betadine and draped in a sterile fashion. Urojet lidocaine gel was introduced.  A 15F flexible cystoscope was inserted into the urethra, and the bladder and urethra were examined in a systematic manner.  The patient tolerated the procedure well and there were no complications.      Findings:  External exam revealed no cystocele and no rectocele.   Cystoscopy then showed the urethra to be normal in appearance with normal coaptation. The bladder itself was completely surveyed.  The ureteric orifices were normal in position and number and effluxing clear urine.  There was no trabeculation.  There were no neoplasms, stones, or diverticula identifed.  There was some mild diffuse erythema consistent with a recently treated urinary tract infection    Assessment/Plan:   Shanna Shanks is a 47 year old female with a history of  microscopic hematuria, now with no concerning findings on cystoscopy.      -See clinic note    Tye Monson MD

## 2023-02-15 NOTE — NURSING NOTE
Chief Complaint   Patient presents with     micro hematuria     Here in clinic for cystoscopy with DR Monson   Prior to the start of the procedure and with procedural staff participation, I verbally confirmed the patient s identity using two indicators, relevant allergies, that the procedure was appropriate and matched the consent or emergent situation, and that the correct equipment/implants were available. Immediately prior to starting the procedure I conducted the Time Out with the procedural staff and re-confirmed the patient s name, procedure, and site/side. I have wiped the patient off with the povidone-Iodine solution, used Lidocaine jelly,draped them,   and instilled sterile water into the bladder. (The Joint Commission universal protocol was followed.)  Yes    Sedation (Moderate or Deep): None  5mL 2% lidocaine hydrochloride Urojet instilled into urethra.    NDC# [id number]  Lot #: [id number]  Expiration Date:  8-24    Nicole Sims LPN

## 2023-02-16 ENCOUNTER — HOSPITAL ENCOUNTER (OUTPATIENT)
Dept: REHABILITATION | Facility: CLINIC | Age: 48
Discharge: HOME | End: 2023-02-16
Attending: FAMILY MEDICINE
Payer: COMMERCIAL

## 2023-02-16 PROCEDURE — S5102 ADULT DAY CARE PER DIEM: HCPCS

## 2023-02-17 ENCOUNTER — PRE VISIT (OUTPATIENT)
Dept: UROLOGY | Facility: CLINIC | Age: 48
End: 2023-02-17
Payer: COMMERCIAL

## 2023-02-23 ENCOUNTER — TELEPHONE (OUTPATIENT)
Dept: UROLOGY | Facility: CLINIC | Age: 48
End: 2023-02-23
Payer: COMMERCIAL

## 2023-02-23 NOTE — TELEPHONE ENCOUNTER
Detailed voicemail left for patient today.  Patient is scheduled for Urodynamics testing with Maya Ordoñez PA-C on 3/1 and has a hx of recurrent UTI's.  Would like to know how she is feeling- if she is currently having any UTI symptoms (fever, chills, dysuria, hematuria, flank pain, cloudy/malodorous urine).  If so, I asked her to please call us back right away.  If feeling fine, no need for a return call.  Phone number to the Urology department provided.    Camila Parra CMA

## 2023-02-28 NOTE — PROGRESS NOTES
MONTHLY PROGRESS NOTE AND PARTICIPATION REPORT   [facility]     Shanna Shanks, 1975  Attendance: Please see Epic for attendance record.     Communication:   Does communicate   Hearing:   No impairment  Vision:   No impairment  Orientation/Cognition:   Minor forgetfulness  Partial disorientation  Short term memory loss  Behavior:   No behavioral concerns  Self-Preservation Skills:   Shanna presents with MOD cognitive impairment and slow shuffled gait with balance deficits during amnulation 2/2 her MS diagnosis. She requires staff instructions and assistance for self preservation.  Eating:   Assist with set up  Shanna requires verbal cues from staff ~75% of the time to initiate and continue eathing activities during lunch service D/2 her cognitive impairment.  Ambulation Walking:   Needs assistance  Staff provides Shanna a site owned 4WW for safe ambulation during program attendance for falls prevention D/2 her short slow shuffled gait and balance deficits 2/2 her MS diagnosis  Transferring:   Independent  Wheelchair:   Shanna is fully ambulatory.  However, she will be offered a manual WC for any off site group outings for energy conservation and falls prevention.  Toileting:   Staff assists Shanna in the bathroom 1:1 for supervision and provides VC for self-hygiene and garment management after toilet use and hand hygiene.  Maintenance Program:     [employer]  Living Arrangements:   Lives with family  Spiritual Needs: Needs are being met through support group  Medication Assistance:   Medication not taken during program hours  Participation Report:   Aerobics/Exercise  Support Group  Cognitive Stimulation  Fire Drill  Creative Arts/Crafts  Games  Gardening  Speakers/Entertainment  Socialization  Current Events/News  Education/Health Topic  Community Based Outings  Level of Participation:   [facility] Note:  Shanna has visibly lost weight and has experienced a reduced  appetite.  Staff is encouraging her daily to eat all of her lunch and snacks while at program.  Her  has taken her to her PCP to address this issue.  Otherwise, Shanna reports she is doing well.  12/5: Shanna is currently working on a 'devil child' ceramic in art and enjoys playing Fantasy [employer]pper in the afternoon.    2/28/23 Shanna is an active member and enjoys her time in the program. She has not been as vocal as of late but appears to be in good spirits.

## 2023-03-08 RX ORDER — BISACODYL 5 MG/1
TABLET, DELAYED RELEASE ORAL
Qty: 4 TABLET | Refills: 0 | Status: SHIPPED | OUTPATIENT
Start: 2023-03-08 | End: 2023-03-14 | Stop reason: HOSPADM

## 2023-03-10 ENCOUNTER — TRANSFERRED RECORDS (OUTPATIENT)
Dept: HEALTH INFORMATION MANAGEMENT | Facility: CLINIC | Age: 48
End: 2023-03-10

## 2023-03-16 ENCOUNTER — HOSPITAL ENCOUNTER (OUTPATIENT)
Dept: REHABILITATION | Facility: CLINIC | Age: 48
Discharge: HOME | End: 2023-03-16
Attending: FAMILY MEDICINE
Payer: COMMERCIAL

## 2023-03-16 PROCEDURE — S5102 ADULT DAY CARE PER DIEM: HCPCS

## 2023-03-20 ENCOUNTER — HOSPITAL ENCOUNTER (OUTPATIENT)
Dept: REHABILITATION | Facility: CLINIC | Age: 48
Discharge: HOME | End: 2023-03-20
Attending: FAMILY MEDICINE
Payer: COMMERCIAL

## 2023-03-20 PROCEDURE — S5102 ADULT DAY CARE PER DIEM: HCPCS

## 2023-03-20 NOTE — PROGRESS NOTES
"[facility] Note:  Home and Community Based Services Outing     Destination:  [facility]                         [address] [place] [address]     Description of Outing:  Staff (2) and Members (6) went to [facility] for free cone day via [employer] services.  Staff and members socialized and ate ice cream, then brought an ice cream cake back to the [facility] for other members to share.  Outing was scheduled for [employer] for volunteer opportunity.  However, when we arrived at [facility], staff there informed us that they were not proceeding with the scheduled session of labeling and filling bags.  So, staff made decision to take members to [facility].     Member Response:  Members were very excited for ice cream and socializing in the community.  \"We made something out of nothing!  That was some good ice cream!\"     "

## 2023-03-23 ENCOUNTER — HOSPITAL ENCOUNTER (OUTPATIENT)
Dept: REHABILITATION | Facility: CLINIC | Age: 48
Discharge: HOME | End: 2023-03-23
Attending: FAMILY MEDICINE
Payer: COMMERCIAL

## 2023-03-23 PROCEDURE — S5102 ADULT DAY CARE PER DIEM: HCPCS

## 2023-03-26 ENCOUNTER — HEALTH MAINTENANCE LETTER (OUTPATIENT)
Age: 48
End: 2023-03-26

## 2023-03-27 ENCOUNTER — PRE VISIT (OUTPATIENT)
Dept: UROLOGY | Facility: CLINIC | Age: 48
End: 2023-03-27
Payer: COMMERCIAL

## 2023-03-27 NOTE — TELEPHONE ENCOUNTER
Spoke with patients spouse, Jama, today.  Patient is scheduled for Urodynamics testing with Maya Ordoñez PA-C on 4/5 and has a hx of recurrent UTI's.  Per Jama, patient is currently doing well- no UTI symptoms.  They will contact the clinic right away should she develop any UTI symptoms leading up to her Urodynamics appointment.    Camila Parra, CMA

## 2023-03-27 NOTE — PROGRESS NOTES
[facility] RN Note    Previous documentation since 2/9/23  reviewed.  Participant not in facility at this time.

## 2023-03-28 NOTE — PROGRESS NOTES
MONTHLY PROGRESS NOTE AND PARTICIPATION REPORT   [facility]     Shanna Shanks, 1975  Attendance: Please see Epic for attendance record.     Communication:   Does communicate   Hearing:   No impairment  Vision:   No impairment  Orientation/Cognition:   Minor forgetfulness  Partial disorientation  Short term memory loss  Behavior:   No behavioral concerns  Self-Preservation Skills:   Shanna presents with MOD cognitive impairment and slow shuffled gait with balance deficits during amnulation 2/2 her MS diagnosis. She requires staff instructions and assistance for self preservation.  Eating:   Assist with set up  Shanna requires verbal cues from staff ~75% of the time to initiate and continue eathing activities during lunch service D/2 her cognitive impairment.  Ambulation Walking:   Needs assistance  Staff provides Shanna a site owned 4WW for safe ambulation during program attendance for falls prevention D/2 her short slow shuffled gait and balance deficits 2/2 her MS diagnosis  Transferring:   Independent  Wheelchair:   Shanna is fully ambulatory.  However, she will be offered a manual WC for any off site group outings for energy conservation and falls prevention.  Toileting:   Staff assists Shanna in the bathroom 1:1 for supervision and provides VC for self-hygiene and garment management after toilet use and hand hygiene.  Maintenance Program:     NuStep  Living Arrangements:   Lives with family  Spiritual Needs: Needs are being met through support group  Medication Assistance:   Medication not taken during program hours  Participation Report:   Aerobics/Exercise  Support Group  Cognitive Stimulation  Fire Drill  Creative Arts/Crafts  Games  Gardening  Speakers/Entertainment  Socialization  Current Events/News  Education/Health Topic  Community Based Outings  Level of Participation:   [facility] Note:  Shanna has visibly lost weight and has experienced a reduced  appetite.  Staff is encouraging her daily to eat all of her lunch and snacks while at program.  Her  has taken her to her PCP to address this issue.  Otherwise, Shanna reports she is doing well.  12/5: Shanna is currently working on a 'devil child' ceramic in art and enjoys playing [employer] in the afternoon.     2/28/23 Shanna is an active member and enjoys her time in the program. She has not been as vocal as of late but appears to be in good spirits.    3/28/23 Shanna missed some weeks and was visibly missed by her friends in the program. When she returned she was and is in high spirits and loves making jokes.

## 2023-04-03 ENCOUNTER — HOSPITAL ENCOUNTER (OUTPATIENT)
Dept: REHABILITATION | Facility: CLINIC | Age: 48
Discharge: HOME | End: 2023-04-03
Attending: FAMILY MEDICINE
Payer: COMMERCIAL

## 2023-04-03 PROCEDURE — S5102 ADULT DAY CARE PER DIEM: HCPCS

## 2023-04-03 NOTE — PROGRESS NOTES
[facility] RN Note    Previous documentation since 3/27/23 reviewed.  No concerns reported by [facility] staff or member.

## 2023-04-05 ENCOUNTER — ALLIED HEALTH/NURSE VISIT (OUTPATIENT)
Dept: UROLOGY | Facility: CLINIC | Age: 48
End: 2023-04-05
Payer: COMMERCIAL

## 2023-04-05 VITALS
HEART RATE: 67 BPM | DIASTOLIC BLOOD PRESSURE: 54 MMHG | WEIGHT: 152 LBS | BODY MASS INDEX: 23.04 KG/M2 | HEIGHT: 68 IN | SYSTOLIC BLOOD PRESSURE: 100 MMHG

## 2023-04-05 DIAGNOSIS — N39.46 URINARY INCONTINENCE, MIXED: Primary | ICD-10-CM

## 2023-04-05 DIAGNOSIS — R82.90 ABNORMAL URINALYSIS: ICD-10-CM

## 2023-04-05 LAB
ALBUMIN UR-MCNC: NEGATIVE MG/DL
ALBUMIN UR-MCNC: NEGATIVE MG/DL
AMORPH CRY #/AREA URNS HPF: ABNORMAL /HPF
APPEARANCE UR: ABNORMAL
APPEARANCE UR: CLEAR
BACTERIA #/AREA URNS HPF: ABNORMAL /HPF
BILIRUB UR QL STRIP: NEGATIVE
BILIRUB UR QL STRIP: NEGATIVE
COLOR UR AUTO: YELLOW
COLOR UR AUTO: YELLOW
GLUCOSE UR STRIP-MCNC: NEGATIVE MG/DL
GLUCOSE UR STRIP-MCNC: NEGATIVE MG/DL
HGB UR QL STRIP: NEGATIVE
HGB UR QL STRIP: NEGATIVE
KETONES UR STRIP-MCNC: NEGATIVE MG/DL
KETONES UR STRIP-MCNC: NEGATIVE MG/DL
LEUKOCYTE ESTERASE UR QL STRIP: ABNORMAL
LEUKOCYTE ESTERASE UR QL STRIP: NEGATIVE
NITRATE UR QL: POSITIVE
NITRATE UR QL: POSITIVE
PH UR STRIP: 7.5 [PH] (ref 5–7)
PH UR STRIP: 8.5 [PH] (ref 5–8)
RBC URINE: 2 /HPF
SP GR UR STRIP: 1.01 (ref 1–1.03)
SP GR UR STRIP: 1.02 (ref 1–1.03)
SQUAMOUS EPITHELIAL: <1 /HPF
TRI-PHOS CRY #/AREA URNS HPF: ABNORMAL /HPF
UROBILINOGEN UR STRIP-ACNC: 0.2 E.U./DL
UROBILINOGEN UR STRIP-MCNC: NORMAL MG/DL
WBC URINE: 17 /HPF

## 2023-04-05 PROCEDURE — 87086 URINE CULTURE/COLONY COUNT: CPT | Performed by: PATHOLOGY

## 2023-04-05 PROCEDURE — 99000 SPECIMEN HANDLING OFFICE-LAB: CPT | Performed by: PATHOLOGY

## 2023-04-05 PROCEDURE — 81001 URINALYSIS AUTO W/SCOPE: CPT | Performed by: PATHOLOGY

## 2023-04-05 PROCEDURE — 87186 SC STD MICRODIL/AGAR DIL: CPT | Performed by: PATHOLOGY

## 2023-04-05 PROCEDURE — 87088 URINE BACTERIA CULTURE: CPT | Performed by: PATHOLOGY

## 2023-04-05 PROCEDURE — 99213 OFFICE O/P EST LOW 20 MIN: CPT | Performed by: PHYSICIAN ASSISTANT

## 2023-04-05 ASSESSMENT — PAIN SCALES - GENERAL: PAINLEVEL: NO PAIN (0)

## 2023-04-05 NOTE — NURSING NOTE
"  Chief Complaint   Patient presents with     Urodynamics Study     Recurrent UTI/Mixed urinary incontinence       Blood pressure 100/54, pulse 67, height 1.727 m (5' 8\"), weight 68.9 kg (152 lb), not currently breastfeeding. Body mass index is 23.11 kg/m .    Patient Active Problem List   Diagnosis     Multiple sclerosis (H)     UTI (urinary tract infection)     CARDIOVASCULAR SCREENING; LDL GOAL LESS THAN 160     Dysmenorrhea     Nicotine dependence in remission     Encephalopathy     Herpes encephalitis     Fever of unknown origin     Seizure (H)     Traumatic hematoma of left lower leg     Seizures (H)       Allergies   Allergen Reactions     Diphenhydramine Rash     benadryl cream     Nickel      Macrobid [Nitrofurantoin] Rash       Current Outpatient Medications   Medication Sig Dispense Refill     atorvastatin (LIPITOR) 40 MG tablet Take 1 tablet (40 mg) by mouth daily       baclofen (LIORESAL) 10 MG tablet Take 10-20 mg by mouth 4 times daily as needed   3     CRANBERRY EXTRACT PO Take 1 tablet by mouth daily       dimethyl fumarate (TECFIDERA) delayed release capsule Take 240 mg by mouth 2 times daily        estradiol (ESTRACE VAGINAL) 0.1 MG/GM vaginal cream Apply small amount to the vaginal opening and urethra M, W, F @ h.s. 42.5 g 3     isoniazid (NYDRAZID) 100 MG tablet Take 1 tablet (100 mg) by mouth daily 90 tablet 0     levETIRAcetam (KEPPRA) 250 MG tablet Take 1 tablet (250 mg) by mouth 2 times daily 60 tablet 0     levETIRAcetam 1000 MG TABS Take 1,000 mg by mouth 2 times daily 60 tablet 1     MELATONIN PO Take 1 tablet by mouth as needed       order for DME Equipment being ordered: four wheeled walker with seat and brakes 1 Units 0     oxybutynin (DITROPAN-XL) 5 MG 24 hr tablet Take 5 mg by mouth daily       rifampin (RIFADIN) 300 MG capsule Take 1 capsule (300 mg) by mouth daily 90 capsule 0     venlafaxine (EFFEXOR-ER) 75 MG 24 hr tablet Take 75 mg by mouth daily       VITAMIN D, " CHOLECALCIFEROL, PO Take 1,000 Units by mouth daily         Social History     Tobacco Use     Smoking status: Former     Packs/day: 0.25     Types: Cigarettes     Smokeless tobacco: Never     Tobacco comments:     quit in 2017   Vaping Use     Vaping status: Never Used   Substance Use Topics     Alcohol use: No     Alcohol/week: 5.8 standard drinks of alcohol     Types: 7 Standard drinks or equivalent per week     Drug use: No         Camila Parra [facility]  4/5/2023  9:16 AM

## 2023-04-05 NOTE — PATIENT INSTRUCTIONS
Schedule appointment for Urodynamics testing.  We will keep you on the waiting list in case a sooner appointment becomes available.    It was a pleasure meeting with you today.  Thank you for allowing me and my team the privilege of caring for you today.  YOU are the reason we are here, and I truly hope we provided you with the excellent service you deserve.  Please let us know if there is anything else we can do for you so that we can be sure you are leaving completely satisfied with your care experience.      Camila Parra, CMA

## 2023-04-05 NOTE — PROGRESS NOTES
PREPROCEDURE DIAGNOSES:    1. Mixed urinary incontinence  2. Recurrent UTI  3. Multiple sclerosis    POSTPROCEDURE DIAGNOSES:  1. Same as above.   2. Possible UTI.    PROCEDURE:    1. Sterile urethral catheterization for measurement of residual urine volume.    INDICATIONS FOR PROCEDURE:  Ms. Shanna Shanks is a pleasant 47 year old female with a history of MS with mixed urinary incontinence and recurrent UTI. Baseline video urodynamic assessment is requested today by Dr. Monson to better characterize Ms. Shanna Shanks's voiding dysfunction.      VOIDING DIARY:  Did not complete.    DESCRIPTION OF PROCEDURE:  Risks, benefits, and alternatives to urodynamics were discussed with the patient and she wished to proceed.  Urodynamics are planned to better assess the primary etiology for Ms. Shanks's urologic dysfunction. After informed consent was obtained, the patient was taken to the procedure room where uroflowmetry was performed. Findings below.     PRE-STUDY UROFLOWMETRY:  Not performed as patient had no urge to void.  Residual by catheter: 400 mL.  Pretest urine dipstick was positive for leukocytes and nitrites.  There was a foul odor noted to the urine and it appeared cloudy and dark.   She denies any overt UTI symptoms today such as dysuria, gross hematuria, fevers, chills, or flank pain. However, patient and spouse report that she does not always experience typical UTI symptoms with her previous UTIs.  As such, it was mutually agreed to postpone UDS given the possible UTI.     ASSESSMENT/PLAN:  Ms. Shanna Shanks is a pleasant 47 year old female with MS and mixed incontinence who presented for urodynamics testing but was found to have a possible urinary tract infection.  -Discussed urine dipstick results with patient and spouse and concern for possible UTI.  -[place] decision to postpone UDS.  -Urine sent for formal UA/UC. Treat pending results.  -Reschedule UDS in the next 2-4 weeks.  Consider repeat UA/UC 1 week prior.     Thank you for allowing me to participate in the care of Ms. Shanna Shanks and please don't hesitate to contact me with any questions or concerns.      Maya Ordoñez PA-C  Urology Physician Assistant      25 minutes spent on the date of the encounter doing chart review, review of test results, interpretation of tests, patient visit, documentation and discussion with family

## 2023-04-06 ENCOUNTER — HOSPITAL ENCOUNTER (OUTPATIENT)
Dept: REHABILITATION | Facility: CLINIC | Age: 48
Discharge: HOME | End: 2023-04-06
Attending: FAMILY MEDICINE
Payer: COMMERCIAL

## 2023-04-06 PROCEDURE — S5102 ADULT DAY CARE PER DIEM: HCPCS

## 2023-04-08 LAB — BACTERIA UR CULT: ABNORMAL

## 2023-04-10 ENCOUNTER — HOSPITAL ENCOUNTER (OUTPATIENT)
Dept: REHABILITATION | Facility: CLINIC | Age: 48
Discharge: HOME | End: 2023-04-10
Attending: FAMILY MEDICINE
Payer: COMMERCIAL

## 2023-04-10 PROCEDURE — S5102 ADULT DAY CARE PER DIEM: HCPCS

## 2023-04-11 ENCOUNTER — TELEPHONE (OUTPATIENT)
Dept: UROLOGY | Facility: CLINIC | Age: 48
End: 2023-04-11
Payer: COMMERCIAL

## 2023-04-11 DIAGNOSIS — N39.0 URINARY TRACT INFECTION: Primary | ICD-10-CM

## 2023-04-11 RX ORDER — AMOXICILLIN 500 MG/1
500 CAPSULE ORAL 2 TIMES DAILY
Qty: 14 CAPSULE | Refills: 0 | Status: CANCELLED | OUTPATIENT
Start: 2023-04-11 | End: 2023-04-18

## 2023-04-11 NOTE — TELEPHONE ENCOUNTER
Detailed voicemail left for patient today.  Patients recent urine culture came back positive for UTI.  Per Maya Ordoñez PA-C, OK to Rx Amoxicillin 500 mg BID x 7 days.  Would need to know what pharmacy to send the Rx to.  Also, there is an opening for Urodynamics on 4/13 at 3 PM that I put on hold for her, if that date/time works for her.  I asked that they please call back with Pharmacy information, and to let me know if Thursday at 3 PM will work for Urodynamics.    Camila Parra, CMA

## 2023-04-13 ENCOUNTER — HOSPITAL ENCOUNTER (OUTPATIENT)
Dept: REHABILITATION | Facility: CLINIC | Age: 48
Discharge: HOME | End: 2023-04-13
Attending: FAMILY MEDICINE
Payer: COMMERCIAL

## 2023-04-13 PROCEDURE — S5102 ADULT DAY CARE PER DIEM: HCPCS

## 2023-04-13 RX ORDER — AMOXICILLIN 500 MG/1
500 CAPSULE ORAL 2 TIMES DAILY
Qty: 14 CAPSULE | Refills: 0 | Status: SHIPPED | OUTPATIENT
Start: 2023-04-13 | End: 2023-04-20

## 2023-04-13 NOTE — PROGRESS NOTES
"[facility] Note:  Home and Community Based Services Outing    Destination:  [facility]    Description of Outing:  Member was assisted outside by staff to the community garden.  While at the garden member listened to music, socialized, and participated in aerobics.  Staff members were present to instruct aerobics and monitor safety for all.    Member Response:  \"It's nice to be outside again!\"    "

## 2023-04-13 NOTE — TELEPHONE ENCOUNTER
Second message left yesterday on both phone numbers listed in patients chart.  Patients recent urine culture came back positive for UTI.  Per Maya Ordoñez PA-C, OK to Rx Amoxicillin 500 mg BID x 7 days.  Would need to know what pharmacy to send the prescription to.  I asked for a call back to let us know ASAP.    Camila Parra, CMA

## 2023-04-13 NOTE — TELEPHONE ENCOUNTER
Please send prescription to [id number] - [place], [place] - [address] [place] [address] AT [address] [place] [address] & [address] per Jama murguia. Thank you.

## 2023-04-13 NOTE — PROGRESS NOTES
INDIVIDUAL PLAN OF CARE   [facility]    Member Name: Shanna Shanks; YOB: 1975  MRN: [id number]  3/31/2022    Goals developed in collaboration with: member  Staff responsible for plan: Analisa LOWE  1. Long Term Goal (concrete, measurable, and time specific outcomes):  Member will maintain present level of physical and cognitive function through active participation in structured [facility] programming with staff set up, facilitation, and supervision provided every day of program attendance.  Target Date:  April 2024  Bi-Annual Review:  Goal remains appropriate.    2. Short Term Goal: (concrete, measurable, and time specific outcomes):  To maintain physical strength and endurance, member will actively participate in prescribed Nu-Step exercise program, with set up, VC's and supervision provided by staff, for at least 20 minutes, at least 1x/week during program attendance.  Target Date:  July 2023  Bi-Annual Review:  Goal remains appropriate.  Shanna requires VC's and positive reinforcement to participate in Nu Step activity.    3. Short Term Goal: (concrete, measurable, and time specific outcomes):  To maintain current level of cognitive function, member will actively participate in therapeutic creative arts and Brain/Body programming with set up, VC's and supervision provided by staff every day of program attendance.  Target Date:  October 2023  Bi-Annual Review:  Goal remains appropriate.

## 2023-04-13 NOTE — PROGRESS NOTES
Individual abuse prevention plan (IAPP)  [facility]     Assessment of Susceptibility to Abuse, Including Self Abuse, Neglect (Identification of characteristics, which make the individual susceptible to abuse, and how these characteristics cause the individual to be susceptible to abuse.)     Is the person susceptible to abuse in each area?  Sexual Abuse:   No  Referrals made when the person is susceptible to abuse outside the scope or control of this program (Identify the referral and date it occurred): No additional risk reduction means needed at this time    Physical Abuse:   No  Referrals made when the person is susceptible to abuse outside the scope or control of this program (Identify the referral and date it occurred): No additional risk reduction means needed at this time    Self-Abuse:   No  Referrals made when the person is susceptible to abuse outside the scope or control of this program (Identify the referral and date it occurred): No additional risk reduction means needed at this time    Financial  Exploitation  No  Referrals made when the person is susceptible to abuse outside the scope or control of this program (Identify the referral and date it occurred): No additional risk reduction means needed at this time    Is the program aware of this person committing a violent crime or act of physical aggression towards others?  No  Referrals made when the person is susceptible to abuse outside the scope or control of this program (Identify the referral and date it occurred): No additional risk reduction means needed at this time    INDIVIDUAL ABUSE PREVENTION PLAN-MEASURES TAKEN TO MINIMIZE RISK OF ABUSE   ADL:   Assist with clothing management  Assist with feeding  Assist with toileting  Staff will provide set up and VC for eating.  Staff will provide 1:1 supervision and set up and VC assist for post toilet use self-hygiene, garment management, and hand  hygiene.  Ambulation/Transfers/Wheelchairs:  Assist with all transfers and/or ambulation  Encourage client to ambulate short distances with walker and provide wheelchair for distance  Ensure client uses cane and/or walker  Provide standby assist due to periods of dizziness, fatigue  Provide standby assist when client ambulates prn   Behavior:   No behavior issues  Communication:  Encourage verbalization of needs/concerns  Observe body language/gestures to assist in anticipating client's needs  Cognitive Issues:   Require aide to be with member if wandering  Provider reminders due to confusion, forgetfulness  Give simple step-by-step direction  Contact caregiver to inform of special activities days  Diet:   Staff will prepare food to facilitate client to feed self  Monitor client when eating and/or drinking fluids   Exercise:   Encourage participation in maintenance program  Encourage participation in wheelchair aerobics  Hearing:   Speak distinctly and use gestures  Isolation:   Encourage socialization due to isolation in home environment  Medical Monitoring:   Monitor physical and emotional comfort  Monitor physical symptoms due to diagnosis and report significant changes to nurse, caregiver and physician   Mental Health:   Motivate client to join in activities that are beneficial to client  Encourage client to express feelings  Monitor anxiety level and intervene when appropriate  Encourage regular attendance for socialization and stimulation  Offer emotional support  Observe for symptoms of depression and notify appropriate staff/caregiver  Encourage independent decision making  Encourage social interaction to assist in increasing client's self-image  Provide client with choices of programming to encourage independent decision making  Provide activities in which client can be successful  Sensory:   Provide and encourage participation in activities for stimulation  Vision:   Provide verbal cues  Other:  N/A    Developed in consultation with:  Client  The Program Abuse Prevention Plan/IAPP identifies the specific actions that minimize abuse to the [facility] participant.  No

## 2023-04-13 NOTE — PROGRESS NOTES
MONTHLY PROGRESS NOTE AND PARTICIPATION REPORT   [facility]    Shanna Shanks, 1975  Attendance: Please see Epic for attendance record.    Communication:   Does communicate   Hearing:   No impairment  Vision:   No impairment  Orientation/Cognition:   Minor forgetfulness  Partial disorientation  Short term memory loss  Behavior:   No behavioral concerns  Self-Preservation Skills:   Shanna presents with MOD cogntive impairment and slow shuffled gait with balance deficits during ambulation 2/2 her MS diagnosis.  She requires staff instructions and assistance for self preservatioin.    Eating:   Assist with set up  Shanna requires verbal cues from staff ~75% of the time to initiate and continue eathing activities during lunch service D/2 her cognitive impairment.  Ambulation Walking:   Needs assistance  Staff provides Shanna a site owned 4WW for safe ambulation during program attendance for falls prevention D/2 her short slow shuffled gait and balance deficits 2/2 her MS diagnosis.  Transferring:   Independent  Wheelchair:   Shanna is fully ambulatory.  However, she will be offered a manual WC for any off site group outings for energy conservation and falls prevention.  Toileting:   Staff assists Shanna in the bathroom 1:1 for supervision and provides VC for self-hygiene and garment management after toilet use and hand hygiene.  Maintenance Program:     NuStep  Living Arrangements:   Lives with family  Spiritual Needs: Needs are being met through support group  Medication Assistance:   Medication not taken during program hours  Participation Report:   Aerobics/Exercise  Support Group  Cognitive Stimulation  Fire Drill  Creative Arts/Crafts  Games  Gardening  Speakers/Entertainment  Socialization  Current Events/News  Education/Health Topic  Level of Participation:   [facility] Note:  Shanna has still has had declined appetite and staff continue to encourage her to eat her  lunch and snacks while at the day program.  Shnana has started a new project in art that she seems to be excited about.  Recently Shanna has also participated in going outside to the garden which she seems to enjoy with others.

## 2023-04-17 ENCOUNTER — TRANSFERRED RECORDS (OUTPATIENT)
Dept: FAMILY MEDICINE | Facility: CLINIC | Age: 48
End: 2023-04-17
Payer: COMMERCIAL

## 2023-04-20 ENCOUNTER — HOSPITAL ENCOUNTER (OUTPATIENT)
Dept: REHABILITATION | Facility: CLINIC | Age: 48
Discharge: HOME | End: 2023-04-20
Attending: FAMILY MEDICINE
Payer: COMMERCIAL

## 2023-04-20 PROCEDURE — S5102 ADULT DAY CARE PER DIEM: HCPCS

## 2023-04-24 ENCOUNTER — HOSPITAL ENCOUNTER (OUTPATIENT)
Dept: REHABILITATION | Facility: CLINIC | Age: 48
Discharge: HOME | End: 2023-04-24
Attending: FAMILY MEDICINE
Payer: COMMERCIAL

## 2023-04-24 PROCEDURE — S5102 ADULT DAY CARE PER DIEM: HCPCS

## 2023-04-25 ENCOUNTER — TELEPHONE (OUTPATIENT)
Dept: UROLOGY | Facility: CLINIC | Age: 48
End: 2023-04-25
Payer: COMMERCIAL

## 2023-04-25 NOTE — TELEPHONE ENCOUNTER
LVM for patient to call clinic back to ask if she would be able to move her UDS appt from 5/3 to 4/26 at 3 PM. Slot on hold for patient.  Kacie HASSAN LPN  Urology Clinic Service   [facility]

## 2023-04-27 ENCOUNTER — HOSPITAL ENCOUNTER (OUTPATIENT)
Dept: REHABILITATION | Facility: CLINIC | Age: 48
Discharge: HOME | End: 2023-04-27
Attending: FAMILY MEDICINE
Payer: COMMERCIAL

## 2023-04-27 PROCEDURE — S5102 ADULT DAY CARE PER DIEM: HCPCS

## 2023-05-01 ENCOUNTER — HOSPITAL ENCOUNTER (OUTPATIENT)
Dept: REHABILITATION | Facility: CLINIC | Age: 48
Discharge: HOME | End: 2023-05-01
Attending: FAMILY MEDICINE
Payer: COMMERCIAL

## 2023-05-01 PROCEDURE — S5102 ADULT DAY CARE PER DIEM: HCPCS

## 2023-05-04 ENCOUNTER — HOSPITAL ENCOUNTER (OUTPATIENT)
Dept: REHABILITATION | Facility: CLINIC | Age: 48
Discharge: HOME | End: 2023-05-04
Attending: FAMILY MEDICINE
Payer: COMMERCIAL

## 2023-05-04 PROCEDURE — S5102 ADULT DAY CARE PER DIEM: HCPCS

## 2023-05-08 ENCOUNTER — HOSPITAL ENCOUNTER (OUTPATIENT)
Dept: REHABILITATION | Facility: CLINIC | Age: 48
Discharge: HOME | End: 2023-05-08
Attending: FAMILY MEDICINE
Payer: COMMERCIAL

## 2023-05-08 PROCEDURE — S5102 ADULT DAY CARE PER DIEM: HCPCS

## 2023-05-09 DIAGNOSIS — Z22.7 LATENT TUBERCULOSIS BY BLOOD TEST: ICD-10-CM

## 2023-05-10 RX ORDER — ISONIAZID 300 MG/1
300 TABLET ORAL DAILY
Qty: 90 TABLET | Refills: 0 | Status: SHIPPED | OUTPATIENT
Start: 2023-05-10 | End: 2023-08-08

## 2023-05-10 RX ORDER — RIFAMPIN 300 MG/1
600 CAPSULE ORAL DAILY
Qty: 180 CAPSULE | Refills: 0 | Status: SHIPPED | OUTPATIENT
Start: 2023-05-10 | End: 2023-08-08

## 2023-05-10 NOTE — TELEPHONE ENCOUNTER
Isoniazide and rifampin dose were low with previous rx.   Sent new rx INH 5mg x kg (270mg) and rifampin 10mg/kg (540mg)  So new rx - INH 300mg daily and rifampin 600mg daily for next 3 months and then done with rx.   Called patient x2 . Goes to voicemail - left them a voicemail with instructions.

## 2023-05-11 ENCOUNTER — HOSPITAL ENCOUNTER (OUTPATIENT)
Dept: REHABILITATION | Facility: CLINIC | Age: 48
Discharge: HOME | End: 2023-05-11
Attending: FAMILY MEDICINE
Payer: COMMERCIAL

## 2023-05-11 PROCEDURE — S5102 ADULT DAY CARE PER DIEM: HCPCS

## 2023-05-18 NOTE — PROGRESS NOTES
[facility] RN Note    Previous documentation since 4/3/23 reviewed. Participant not in facility today (5/18/23).

## 2023-05-22 ENCOUNTER — HOSPITAL ENCOUNTER (OUTPATIENT)
Dept: REHABILITATION | Facility: CLINIC | Age: 48
Discharge: HOME | End: 2023-05-22
Attending: FAMILY MEDICINE
Payer: COMMERCIAL

## 2023-05-22 PROCEDURE — S5102 ADULT DAY CARE PER DIEM: HCPCS

## 2023-05-23 NOTE — PROGRESS NOTES
MONTHLY PROGRESS NOTE AND PARTICIPATION REPORT   [facility]    Shanna Shanks, 1975  Attendance: Please see Epic for attendance record.    Communication:   Does communicate   Hearing:   No impairment  Vision:   No impairment  Orientation/Cognition:   Minor forgetfulness  Partial disorientation  Short term memory loss  Behavior:   No behavioral concerns  Self-Preservation Skills:   Shanna presents with MOD cogntive impairment and slow shuffled gait with balance deficits during ambulation 2/2 her MS diagnosis.  She requires staff instructions and assistance for self preservatioin.    Eating:   Assist with set up  Shanna requires verbal cues from staff ~75% of the time to initiate and continue eathing activities during lunch service D/2 her cognitive impairment.  Ambulation Walking:   Needs assistance  Staff provides Shanna a site owned 4WW for safe ambulation during program attendance for falls prevention D/2 her short slow shuffled gait and balance deficits 2/2 her MS diagnosis.  Transferring:   Independent  Wheelchair:   Shanna is fully ambulatory.  However, she will be offered a manual WC for any off site group outings for energy conservation and falls prevention.  Toileting:   Staff assists Shanna in the bathroom 1:1 for supervision and provides VC for self-hygiene and garment management after toilet use and hand hygiene.  Maintenance Program:     NuStep  Living Arrangements:   Lives with family  Spiritual Needs: Needs are being met through support group  Medication Assistance:   Medication not taken during program hours  Participation Report:   Aerobics/Exercise  Support Group  Cognitive Stimulation  Fire Drill  Creative Arts/Crafts  Games  Gardening  Speakers/Entertainment  Socialization  Current Events/News  Education/Health Topic  Level of Participation:   [facility] Note:  Shanna has been working on a llama ceramic project in art.  Larons appetite continues to  be poor and staff encourages healthy eating habits by encouraging her to eat her lunch when at the day program.

## 2023-05-25 ENCOUNTER — HOSPITAL ENCOUNTER (OUTPATIENT)
Dept: REHABILITATION | Facility: CLINIC | Age: 48
Discharge: HOME | End: 2023-05-25
Attending: FAMILY MEDICINE
Payer: COMMERCIAL

## 2023-05-25 PROCEDURE — S5102 ADULT DAY CARE PER DIEM: HCPCS | Performed by: OCCUPATIONAL THERAPIST

## 2023-05-25 NOTE — PROGRESS NOTES
Facilitated physical maintenance of bilateral upper extremity and bilateral lower extrimity strength and endurance through NuStep therapeutic exercise.  Patient completed 20 minutes of NuStep activity.    Nu Step Settings:  Seat:  10  Arms:  12  Resistance:  5

## 2023-05-26 ENCOUNTER — TELEPHONE (OUTPATIENT)
Dept: UROLOGY | Facility: CLINIC | Age: 48
End: 2023-05-26
Payer: COMMERCIAL

## 2023-05-26 NOTE — TELEPHONE ENCOUNTER
Detailed voicemail left for patient today.  Patient is scheduled for Urodynamics testing with Maya Ordoñez PA-C on 5/31 and has a hx of recurrent UTI's.  Would like to know how she is feeling- if she is currently having any UTI symptoms (fever, chills, dysuria, hematuria, flank pain, cloudy/malodorous urine).  If so, I asked her to please call us back right away.  If feeling fine, no need for a return call.  Phone number to the Urology department provided.     Camila Parra CMA

## 2023-05-30 ENCOUNTER — OFFICE VISIT (OUTPATIENT)
Dept: URGENT CARE | Facility: URGENT CARE | Age: 48
End: 2023-05-30
Payer: COMMERCIAL

## 2023-05-30 VITALS
TEMPERATURE: 97.4 F | HEART RATE: 76 BPM | SYSTOLIC BLOOD PRESSURE: 96 MMHG | OXYGEN SATURATION: 98 % | DIASTOLIC BLOOD PRESSURE: 63 MMHG | WEIGHT: 152 LBS | BODY MASS INDEX: 23.11 KG/M2

## 2023-05-30 DIAGNOSIS — N39.0 ACUTE UTI: Primary | ICD-10-CM

## 2023-05-30 DIAGNOSIS — R35.0 URINARY FREQUENCY: ICD-10-CM

## 2023-05-30 LAB
ALBUMIN UR-MCNC: NEGATIVE MG/DL
AMORPH CRY #/AREA URNS HPF: ABNORMAL /HPF
APPEARANCE UR: CLEAR
BACTERIA #/AREA URNS HPF: ABNORMAL /HPF
BILIRUB UR QL STRIP: NEGATIVE
COLOR UR AUTO: YELLOW
GLUCOSE UR STRIP-MCNC: NEGATIVE MG/DL
HGB UR QL STRIP: NEGATIVE
KETONES UR STRIP-MCNC: NEGATIVE MG/DL
LEUKOCYTE ESTERASE UR QL STRIP: ABNORMAL
NITRATE UR QL: POSITIVE
PH UR STRIP: 8 [PH] (ref 5–7)
RBC #/AREA URNS AUTO: ABNORMAL /HPF
SP GR UR STRIP: 1.01 (ref 1–1.03)
SQUAMOUS #/AREA URNS AUTO: ABNORMAL /LPF
UROBILINOGEN UR STRIP-ACNC: 0.2 E.U./DL
WBC #/AREA URNS AUTO: ABNORMAL /HPF

## 2023-05-30 PROCEDURE — 99213 OFFICE O/P EST LOW 20 MIN: CPT | Performed by: PHYSICIAN ASSISTANT

## 2023-05-30 PROCEDURE — 81001 URINALYSIS AUTO W/SCOPE: CPT | Performed by: PHYSICIAN ASSISTANT

## 2023-05-30 PROCEDURE — 87086 URINE CULTURE/COLONY COUNT: CPT | Performed by: PHYSICIAN ASSISTANT

## 2023-05-30 RX ORDER — CEFDINIR 300 MG/1
300 CAPSULE ORAL 2 TIMES DAILY
Qty: 14 CAPSULE | Refills: 0 | Status: SHIPPED | OUTPATIENT
Start: 2023-05-30 | End: 2023-06-06

## 2023-05-31 ENCOUNTER — TELEPHONE (OUTPATIENT)
Dept: UROLOGY | Facility: CLINIC | Age: 48
End: 2023-05-31

## 2023-05-31 NOTE — PROGRESS NOTES
"  [facility] Social History    Full Name: Shanna Shanks        MRN: [id number]    YOB: 1975  Nickname:TYRELL      Sex: F     Home Phone: [phone]      Cell Phone: [phone]  Address: [address] [place] [address]/Zip: [place]  [address]  County: [place]       E-mail:[email or web address]        Transportation:    [employer]  Language:English         needed? No       Ethnicity: Caucasion  Race: White      Country of Origin: [place]       Taoist: [employer]  Marital Status:                   Spouse/Significant Other: Jama Petit   ______________________________________________________________________________________     : No    Branch of Service: NA      Education Level: High School- Likes [employer]   Job History: Self employeed       Organizations/Clubs: NA  Whom do you live with?  and daughter  Current living arrangement:  Home   Number of Children: 2  List: Michel and Tegan  Number of Siblings: 7     List: Narendra, Aleksandra, Daisha, Elida, Jesus, Iain, Kyle  Other Important People/Pets: 2 cats and 1 dog  What else should we know about you?  Loves watching juggling.  Favorite color is bably blue.  George with her mother.  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 Beck        Neurologist: Dr. Talha Reese  Emergency Contacts:  1. Jama Petmark      Relationship: Spouse    Phone: [phone]  2. Aleksandra Shanks         Relationship: sister     Phone:[phone]    Updated on 7/30/2018,  7/29/2019, 5/23/2022 , 5/31/23        "

## 2023-05-31 NOTE — PROGRESS NOTES
Patient presents with:  Urgent Care: UTI, frequency      N39.0) Acute UTI  (primary encounter diagnosis)  Comment:   Plan: cefdinir (OMNICEF) 300 MG capsule            (R35.0) Urinary frequency  Comment:   Plan: UA Macroscopic with reflex to Microscopic and         Culture, Urine Microscopic Exam, Urine Culture,          Contact urology  Urine Culture pending, will be back in 3 days            If not improving or if condition worsens, follow up with your Primary Care Provider            SUBJECTIVE:   Shanna Shanks is a 47 year old female who presents today with urinary frequency for the past 3 days.    She is here today with her .  Past medical history is significant for multiple sclerosis.      Past Medical History:   Diagnosis Date     Multiple sclerosis (H) 03/02/1996    last exacerbation 3yrs ago, no meds x 6 months     Seizure disorder (H)      Tobacco dependency          Current Outpatient Medications   Medication Sig Dispense Refill     Multiple Vitamins-Iron (DAILY-LAURA/IRON/BETA-CAROTENE) TABS TAKE 1 TABLET BY MOUTH DAILY. (Patient not taking: Reported on 10/19/2020) 30 tablet 7     Social History     Tobacco Use     Smoking status: Never Smoker     Smokeless tobacco: Never Used   Substance Use Topics     Alcohol use: Not on file     Family History   Problem Relation Age of Onset     Diabetes Mother      Diabetes Father          ROS:    10 point ROS of systems including Constitutional, Eyes, Respiratory, Cardiovascular, Gastroenterology, Genitourinary, Integumentary, Muscularskeletal, Psychiatric ,neurological were all negative except for pertinent positives noted in my HPI       OBJECTIVE:  BP 96/63 (BP Location: Left arm, Patient Position: Sitting, Cuff Size: Adult Small)   Pulse 76   Temp 97.4  F (36.3  C) (Tympanic)   Wt 68.9 kg (152 lb)   SpO2 98%   Breastfeeding No   BMI 23.11 kg/m    Physical Exam:  GENERAL APPEARANCE: healthy, alert and no distress  ABDOMEN:  soft, nontender,  no HSM or masses and bowel sounds normal  SKIN: no suspicious lesions or rashes  BACK: No CVAT    Results for orders placed or performed in visit on 05/30/23   UA Macroscopic with reflex to Microscopic and Culture     Status: Abnormal    Specimen: Urine, Midstream   Result Value Ref Range    Color Urine Yellow Colorless, Straw, Light Yellow, Yellow    Appearance Urine Clear Clear    Glucose Urine Negative Negative mg/dL    Bilirubin Urine Negative Negative    Ketones Urine Negative Negative mg/dL    Specific Gravity Urine 1.010 1.003 - 1.035    Blood Urine Negative Negative    pH Urine 8.0 (H) 5.0 - 7.0    Protein Albumin Urine Negative Negative mg/dL    Urobilinogen Urine 0.2 0.2, 1.0 E.U./dL    Nitrite Urine Positive (A) Negative    Leukocyte Esterase Urine Trace (A) Negative   Urine Microscopic Exam     Status: Abnormal   Result Value Ref Range    Bacteria Urine Many (A) None Seen /HPF    RBC Urine 0-2 0-2 /HPF /HPF    WBC Urine 5-10 (A) 0-5 /HPF /HPF    Squamous Epithelials Urine Few (A) None Seen /LPF    Amorphous Crystals Urine Moderate (A) None Seen /HPF   Urine Culture     Status: None    Specimen: Urine, Midstream   Result Value Ref Range    Culture No Growth

## 2023-05-31 NOTE — PATIENT INSTRUCTIONS
(N39.0) Acute UTI  (primary encounter diagnosis)  Comment:   Plan: cefdinir (OMNICEF) 300 MG capsule            (R35.0) Urinary frequency  Comment:   Plan: UA Macroscopic with reflex to Microscopic and         Culture, Urine Microscopic Exam, Urine Culture,          Contact urology  Urine Culture pending, will be back in 3 days

## 2023-05-31 NOTE — TELEPHONE ENCOUNTER
Voicemail left for patient today.  There is an opening for Urodynamics testing with Sangita Kraus CNP on Monday 6/5 at 1:00 PM that I would like to offer her.  Spot is put on hold for her if that date/time works.  I asked her to call back to confirm.    Camila Parra, CMA

## 2023-06-01 ENCOUNTER — HOSPITAL ENCOUNTER (OUTPATIENT)
Dept: REHABILITATION | Facility: CLINIC | Age: 48
Discharge: HOME | End: 2023-06-01
Attending: FAMILY MEDICINE
Payer: COMMERCIAL

## 2023-06-01 LAB — BACTERIA UR CULT: NO GROWTH

## 2023-06-01 PROCEDURE — S5102 ADULT DAY CARE PER DIEM: HCPCS

## 2023-06-01 NOTE — PROGRESS NOTES
Facilitated physical maintenance of bilateral upper extremity and bilateral lower extrimity strength and endurance through NuStep therapeutic exercise.  Patient completed 30 minutes of NuStep activity.    Nu Step Settings:  Seat:  10  Arms:  12  Resistance:   5

## 2023-06-01 NOTE — PROGRESS NOTES
[facility] RN Note    Previous documentation since 5/18/23 reviewed.  No concerns reported by AC staff or member.

## 2023-06-05 NOTE — PROGRESS NOTES
"SELF-PRESERVATION FORM  [facility]    Member Name: Shanna Shanks; YOB: 1975  MRN: [id number]  6/5/2023    A. The person is ambulatory or mobile. Yes  B. The person has the combined physical and mental capacity to:  1. Recognize a danger, signal or alarm requiring evacuation from the center: No  2. Initiate and complete the evacuation without requiring more than sporadic assistance from another person, such as help in opening a door or getting into a wheelchair: No  3. Select an alternative means of escape or take another appropriate action if the primary escape route is blocked: No  4. Remain at a designated location outside the center until further instruction is given: No    \"Capable of taking appropriate action for self-preservation under emergency conditions\" is the designation applied in parts 6321.5195 to 1665.5777 to an adult who meets the criteria in items A and B.    FAC Self-Preservation Status: Not capable of self-preservation    "

## 2023-06-12 ENCOUNTER — HOSPITAL ENCOUNTER (OUTPATIENT)
Dept: REHABILITATION | Facility: CLINIC | Age: 48
Discharge: HOME | End: 2023-06-12
Attending: FAMILY MEDICINE
Payer: COMMERCIAL

## 2023-06-12 PROCEDURE — S5102 ADULT DAY CARE PER DIEM: HCPCS

## 2023-06-15 ENCOUNTER — HOSPITAL ENCOUNTER (OUTPATIENT)
Dept: REHABILITATION | Facility: CLINIC | Age: 48
Discharge: HOME | End: 2023-06-15
Attending: FAMILY MEDICINE
Payer: COMMERCIAL

## 2023-06-15 PROCEDURE — S5102 ADULT DAY CARE PER DIEM: HCPCS

## 2023-06-19 ENCOUNTER — HOSPITAL ENCOUNTER (OUTPATIENT)
Dept: REHABILITATION | Facility: CLINIC | Age: 48
Discharge: HOME | End: 2023-06-19
Attending: FAMILY MEDICINE
Payer: COMMERCIAL

## 2023-06-19 PROCEDURE — S5102 ADULT DAY CARE PER DIEM: HCPCS

## 2023-06-22 ENCOUNTER — HOSPITAL ENCOUNTER (OUTPATIENT)
Dept: REHABILITATION | Facility: CLINIC | Age: 48
Discharge: HOME | End: 2023-06-22
Attending: FAMILY MEDICINE
Payer: COMMERCIAL

## 2023-06-22 PROCEDURE — S5102 ADULT DAY CARE PER DIEM: HCPCS

## 2023-06-26 ENCOUNTER — HOSPITAL ENCOUNTER (OUTPATIENT)
Dept: REHABILITATION | Facility: CLINIC | Age: 48
Discharge: HOME | End: 2023-06-26
Attending: FAMILY MEDICINE
Payer: COMMERCIAL

## 2023-06-26 PROCEDURE — S5102 ADULT DAY CARE PER DIEM: HCPCS

## 2023-06-26 NOTE — PROGRESS NOTES
MONTHLY PROGRESS NOTE AND PARTICIPATION REPORT   [facility]    Shanna Shanks, 1975  Attendance: Please see Epic for attendance record.    Communication:   Does communicate   Hearing:   No impairment  Vision:   No impairment  Orientation/Cognition:   Minor forgetfulness  Partial disorientation  Short term memory loss  Behavior:   No behavioral concerns  Self-Preservation Skills:   Shanna presents with MOD cogntive impairment and slow shuffled gait with balance deficits during ambulation 2/2 her MS diagnosis.  She requires staff instructions and assistance for self preservatioin.    Eating:   Assist with set up  Shanna requires verbal cues from staff ~75% of the time to initiate and continue eathing activities during lunch service D/2 her cognitive impairment.  Ambulation Walking:   Needs assistance  Staff provides Shanna a site owned 4WW for safe ambulation during program attendance for falls prevention D/2 her short slow shuffled gait and balance deficits 2/2 her MS diagnosis.  Transferring:   Independent  Wheelchair:   Shanna is fully ambulatory.  However, she will be offered a manual WC for any off site group outings for energy conservation and falls prevention.  Toileting:   Staff assists Shanna in the bathroom 1:1 for supervision and provides VC for self-hygiene and garment management after toilet use and hand hygiene.  Maintenance Program:     NuStep  Living Arrangements:   Lives with family  Spiritual Needs: Needs are being met through support group  Medication Assistance:   Medication not taken during program hours  Participation Report:   Aerobics/Exercise  Support Group  Cognitive Stimulation  Fire Drill  Creative Arts/Crafts  Games  Gardening  Speakers/Entertainment  Socialization  Current Events/News  Education/Health Topic  Level of Participation:   [facility] Note:  Shanna has been working on a llama ceramic project in art. Shanna enjoys joking around with  staff and members. Autumn has been practicing walking and standing up during Day Program throughout transitional times.

## 2023-06-29 ENCOUNTER — HOSPITAL ENCOUNTER (OUTPATIENT)
Dept: REHABILITATION | Facility: CLINIC | Age: 48
Discharge: HOME | End: 2023-06-29
Attending: FAMILY MEDICINE
Payer: COMMERCIAL

## 2023-06-29 PROCEDURE — S5102 ADULT DAY CARE PER DIEM: HCPCS

## 2023-07-03 ENCOUNTER — OFFICE VISIT (OUTPATIENT)
Dept: URGENT CARE | Facility: URGENT CARE | Age: 48
End: 2023-07-03
Payer: COMMERCIAL

## 2023-07-03 VITALS
WEIGHT: 152 LBS | SYSTOLIC BLOOD PRESSURE: 109 MMHG | BODY MASS INDEX: 23.11 KG/M2 | HEART RATE: 69 BPM | RESPIRATION RATE: 16 BRPM | DIASTOLIC BLOOD PRESSURE: 74 MMHG | OXYGEN SATURATION: 98 % | TEMPERATURE: 97.8 F

## 2023-07-03 DIAGNOSIS — N39.0 URINARY TRACT INFECTION WITH HEMATURIA, SITE UNSPECIFIED: ICD-10-CM

## 2023-07-03 DIAGNOSIS — R30.0 DYSURIA: Primary | ICD-10-CM

## 2023-07-03 DIAGNOSIS — R31.9 URINARY TRACT INFECTION WITH HEMATURIA, SITE UNSPECIFIED: ICD-10-CM

## 2023-07-03 DIAGNOSIS — D84.9 IMMUNOCOMPROMISED PATIENT (H): ICD-10-CM

## 2023-07-03 LAB
ALBUMIN UR-MCNC: ABNORMAL MG/DL
AMORPH CRY #/AREA URNS HPF: ABNORMAL /HPF
APPEARANCE UR: ABNORMAL
BACTERIA #/AREA URNS HPF: ABNORMAL /HPF
BILIRUB UR QL STRIP: NEGATIVE
COLOR UR AUTO: YELLOW
GLUCOSE UR STRIP-MCNC: NEGATIVE MG/DL
HGB UR QL STRIP: NEGATIVE
KETONES UR STRIP-MCNC: ABNORMAL MG/DL
LEUKOCYTE ESTERASE UR QL STRIP: ABNORMAL
NITRATE UR QL: POSITIVE
PH UR STRIP: 7.5 [PH] (ref 5–7)
RBC #/AREA URNS AUTO: ABNORMAL /HPF
SP GR UR STRIP: 1.01 (ref 1–1.03)
SQUAMOUS #/AREA URNS AUTO: ABNORMAL /LPF
TRI-PHOS CRY #/AREA URNS HPF: ABNORMAL /HPF
UROBILINOGEN UR STRIP-ACNC: 0.2 E.U./DL
WBC #/AREA URNS AUTO: ABNORMAL /HPF
WBC CLUMPS #/AREA URNS HPF: PRESENT /HPF

## 2023-07-03 PROCEDURE — 99214 OFFICE O/P EST MOD 30 MIN: CPT | Performed by: PHYSICIAN ASSISTANT

## 2023-07-03 PROCEDURE — 81001 URINALYSIS AUTO W/SCOPE: CPT | Performed by: PHYSICIAN ASSISTANT

## 2023-07-03 PROCEDURE — 87086 URINE CULTURE/COLONY COUNT: CPT | Performed by: PHYSICIAN ASSISTANT

## 2023-07-03 RX ORDER — CEFDINIR 300 MG/1
300 CAPSULE ORAL 2 TIMES DAILY
Qty: 14 CAPSULE | Refills: 0 | Status: SHIPPED | OUTPATIENT
Start: 2023-07-03 | End: 2023-07-10

## 2023-07-03 NOTE — PROGRESS NOTES
Assessment & Plan     Dysuria    UA pos  Urine culture pending    - UA Macroscopic with reflex to Microscopic and Culture  - Urine Microscopic Exam  - Urine Culture    Immunocompromised patient (H)    Patient is immunocompromised  Make sure to finish full week of antibiotics  If symptoms worsen then have immediate follow up    Urinary tract infection with hematuria, site unspecified    Bladder infections are not contagious. You can't get one from someone else, from a toilet seat, or from sharing a bath.  The most common cause of bladder infections is bacteria from the bowels. The bacteria get onto the skin around the opening of the urethra. From there, they can get into the urine. Then they travel up to the bladder, causing inflammation and infection. This often happens because of:    Wiping incorrectly after urinating. Always wipe from front to back.    Bowel incontinence    Pregnancy    Procedures such as having a catheter put in    Older age    Not emptying your bladder. This can give bacteria a chance to grow in your urine.    Fluid loss (dehydration)    Constipation    Having sex    Using a diaphragm for birth control   Treatment  Bladder infections are diagnosed by a urine test and urine culture. They are treated with antibiotics. They often clear up quickly without problems. Treatment helps prevent a more serious kidney infection.  Medicines  Medicines can help in the treatment of a bladder infection:    Take antibiotics until they are used up, even if you feel better. It's important to finish them to make sure the infection has cleared.    You can use acetaminophen or ibuprofen for pain, fever, or discomfort, unless another medicine was prescribed. If you have long-term (chronic) liver or kidney disease, talk with your healthcare provider before using these medicines. Also talk with your provider if you've ever had a stomach ulcer or GI (gastrointestinal) bleeding, or are taking blood-thinner  medicines.    If you are given phenazopydridine to reduce burning with urination, it will make your urine a bright orange color. This can stain clothing.  Care and prevention  These self-care steps can help prevent future infections:    Drink plenty of fluids. This helps to prevent dehydration and flush out your bladder. Do this unless you must restrict fluids for other health reasons, or your healthcare provider told you not to.    Clean yourself correctly after going to the bathroom. Wipe from front to back after using the toilet. This helps prevent the spread of bacteria.    Urinate more often. Don't try to hold urine in for a long time.    Wear loose-fitting clothes and cotton underwear. Don't wear tight-fitting pants.    Improve your diet and prevent constipation. Eat more fresh fruits and vegetables, and fiber. Eat less junk foods and fatty foods.    Don't have sex until your symptoms are gone.    Don't have caffeine, alcohol, and spicy foods. These can irritate your bladder.    Urinate right after you have sex to flush out your bladder.    - cefdinir (OMNICEF) 300 MG capsule; Take 1 capsule (300 mg) by mouth 2 times daily for 7 days    Review of external notes as documented elsewhere in note    At today's visit with Shanna Shanks , we discussed results, diagnosis, medications and formulated a plan.  We also discussed red flags for immediate return to clinic/ER, as well as indications for follow up with PCP if not improved in 3 days. Patient understood and agreed to plan. Shanna Shanks was discharged with stable vitals and has no further questions.       No follow-ups on file.    Nigel Lee, [facility], PA-C  [facility] FREDI Otero is a 47 year old, presenting for the following health issues:  No chief complaint on file.         No data to display              HPI   Review of Systems   Constitutional, HEENT, cardiovascular, pulmonary, gi and gu systems are  negative, except as otherwise noted.      Objective    /74   Pulse 69   Temp 97.8  F (36.6  C)   Resp 16   Wt 68.9 kg (152 lb)   SpO2 98%   BMI 23.11 kg/m    Body mass index is 23.11 kg/m .  Physical Exam   GENERAL: healthy, alert and no distress  ABDOMEN: soft, nontender, no hepatosplenomegaly, no masses and bowel sounds normal  MS: no gross musculoskeletal defects noted, no edema  SKIN: no suspicious lesions or rashes  NEURO: Normal strength and tone, mentation intact and speech normal  PSYCH: mentation appears normal, affect normal/bright      Results for orders placed or performed in visit on 07/03/23   UA Macroscopic with reflex to Microscopic and Culture     Status: Abnormal    Specimen: Urine, Clean Catch   Result Value Ref Range    Color Urine Yellow Colorless, Straw, Light Yellow, Yellow    Appearance Urine Cloudy (A) Clear    Glucose Urine Negative Negative mg/dL    Bilirubin Urine Negative Negative    Ketones Urine Trace (A) Negative mg/dL    Specific Gravity Urine 1.010 1.003 - 1.035    Blood Urine Negative Negative    pH Urine 7.5 (H) 5.0 - 7.0    Protein Albumin Urine Trace (A) Negative mg/dL    Urobilinogen Urine 0.2 0.2, 1.0 E.U./dL    Nitrite Urine Positive (A) Negative    Leukocyte Esterase Urine Small (A) Negative

## 2023-07-05 ENCOUNTER — ALLIED HEALTH/NURSE VISIT (OUTPATIENT)
Dept: UROLOGY | Facility: CLINIC | Age: 48
End: 2023-07-05
Payer: COMMERCIAL

## 2023-07-05 ENCOUNTER — ANCILLARY PROCEDURE (OUTPATIENT)
Dept: RADIOLOGY | Facility: AMBULATORY SURGERY CENTER | Age: 48
End: 2023-07-05
Attending: PHYSICIAN ASSISTANT
Payer: COMMERCIAL

## 2023-07-05 VITALS
WEIGHT: 152 LBS | HEIGHT: 68 IN | SYSTOLIC BLOOD PRESSURE: 96 MMHG | DIASTOLIC BLOOD PRESSURE: 66 MMHG | BODY MASS INDEX: 23.04 KG/M2 | HEART RATE: 85 BPM

## 2023-07-05 DIAGNOSIS — R32 URINARY INCONTINENCE: ICD-10-CM

## 2023-07-05 DIAGNOSIS — N39.46 URINARY INCONTINENCE, MIXED: Primary | ICD-10-CM

## 2023-07-05 LAB
ALBUMIN UR-MCNC: NEGATIVE MG/DL
APPEARANCE UR: CLEAR
BACTERIA UR CULT: NORMAL
BILIRUB UR QL STRIP: NEGATIVE
COLOR UR AUTO: YELLOW
GLUCOSE UR STRIP-MCNC: NEGATIVE MG/DL
HGB UR QL STRIP: NEGATIVE
KETONES UR STRIP-MCNC: ABNORMAL MG/DL
LEUKOCYTE ESTERASE UR QL STRIP: ABNORMAL
NITRATE UR QL: NEGATIVE
PH UR STRIP: 7 [PH] (ref 5–8)
SP GR UR STRIP: 1.02 (ref 1–1.03)
UROBILINOGEN UR STRIP-ACNC: 0.2 E.U./DL

## 2023-07-05 PROCEDURE — 51741 ELECTRO-UROFLOWMETRY FIRST: CPT | Performed by: PHYSICIAN ASSISTANT

## 2023-07-05 PROCEDURE — 51784 ANAL/URINARY MUSCLE STUDY: CPT | Performed by: PHYSICIAN ASSISTANT

## 2023-07-05 PROCEDURE — 74455 X-RAY URETHRA/BLADDER: CPT | Performed by: PHYSICIAN ASSISTANT

## 2023-07-05 PROCEDURE — 51797 INTRAABDOMINAL PRESSURE TEST: CPT | Performed by: PHYSICIAN ASSISTANT

## 2023-07-05 PROCEDURE — 51600 INJECTION FOR BLADDER X-RAY: CPT | Performed by: PHYSICIAN ASSISTANT

## 2023-07-05 PROCEDURE — 81003 URINALYSIS AUTO W/O SCOPE: CPT | Performed by: PATHOLOGY

## 2023-07-05 PROCEDURE — 51728 CYSTOMETROGRAM W/VP: CPT | Performed by: PHYSICIAN ASSISTANT

## 2023-07-05 PROCEDURE — 81003 URINALYSIS AUTO W/O SCOPE: CPT | Performed by: PHYSICIAN ASSISTANT

## 2023-07-05 RX ORDER — IOPAMIDOL 510 MG/ML
100 INJECTION, SOLUTION INTRAVASCULAR ONCE
Status: COMPLETED | OUTPATIENT
Start: 2023-07-05 | End: 2023-07-05

## 2023-07-05 RX ADMIN — IOPAMIDOL 100 ML: 510 INJECTION, SOLUTION INTRAVASCULAR at 16:12

## 2023-07-05 ASSESSMENT — PAIN SCALES - GENERAL: PAINLEVEL: NO PAIN (0)

## 2023-07-05 NOTE — PROGRESS NOTES
PREPROCEDURE DIAGNOSES:    1. Mixed urinary incontinence  2. Recurrent UTI  3. Multiple sclerosis    POSTPROCEDURE DIAGNOSES:  -Maximum cystometric capacity 320 mL with intact filling sensations.  -Good bladder compliance with uninhibited detrusor contractions (UDC) associated with urgency incontinence at volumes >240 mL.  -No stress urinary incontinence.  -She reports capacity and is given permission to void in conjunction with an UDC. Maximum voiding detrusor pressure reaches 48 cm H2O, but suspect this is more of an unintentional UDC rather than a volitional detrusor contraction. She leaks/voids 34 mL with slow flow (Qmax 3.8 ml/s), mostly quiet EMG activity, and incomplete bladder emptying ( mL). BOOI is 38.0 which is equivocal for bladder outlet obstruction. Question whether she may have some mild DESD that was not reflected in the EMG tracings.  -Fluoroscopy reveals a moderately trabeculated bladder wall without diverticuli or VUR. The bladder neck initially appears closed during filling and opens during episodes of incontinence and voiding.     PROCEDURE:    1. Sterile urethral catheterization for measurement of residual urine volume.  2. Complex filling cystometrogram with measurement of bladder and rectal pressures.  3. Complex voiding cystometrogram with measurement of bladder and rectal pressures.  4. Electromyography of the pelvic floor during urodynamics.  5. Fluoroscopic imaging of the bladder during urodynamics, at least 3 views.    6. Interpretation of urodynamics and flouroscopic imaging.      INDICATIONS FOR PROCEDURE:  Ms. Shanna Shanks is a pleasant 47 year old female with a history of MS with mixed urinary incontinence and recurrent UTI. Baseline video urodynamic assessment is requested today by Dr. Monson to better characterize Ms. Shanna Shanks's voiding dysfunction.     VOIDING DIARY:  Did not complete.     DESCRIPTION OF PROCEDURE:  Risks, benefits, and alternatives  to urodynamics were discussed with the patient and she wished to proceed.  Urodynamics are planned to better assess the primary etiology for Ms. Shanks's urologic dysfunction.  The patient last took oxybutynin within the last 12 hours.  After informed consent was obtained, the patient was taken to the procedure room where uroflowmetry was performed. Findings below.     PRE-STUDY UROFLOWMETRY:  Not performed as patient had no urge to void.  Residual by catheter: 440 mL.  Pretest urine dipstick was negative for nitrites, positive for trace leukocytes. Patient currently taking cefdinir x 48 hours for possible UTI, though UCx from 7/3/23 with >100K mixed  jen. UA looks improved from 7/3/23 UA which demonstrated + nitrites, small leukocytes. Patient reports improvement in dysuria and UTI symptoms which prompted urgent care visit 7/3/23. Okay to proceed with today's study as scheduled.     Next a 7F double-lumen urodynamics catheter was inserted into the bladder under sterile technique via urethra.  A 7F abdominal manometry catheter was placed in the vagina.  EMG pads were placed on both sides of the anal verge.  The bladder was filled with 100 mL of Isovue-250 at 50 mL/minute and serial pressures were recorded.  With coughing there was an appropriate rise in vesical and abdominal pressures with no change in detrusor pressure, confirming good study catheter placement.    DURING THE FILLING PHASE:  First sensation: 162 mL.  First Desire: 169 mL.  Strong Desire: 244 mL.  Maximum Capacity: 320 mL.    Uninhibited detrusor contractions: DO with incontinence at volumes > 240 mL. She leaks when PDet pressures >40 cm H2O.  Compliance: good between UDCs.  Continence: DOI as described above. No JERMAINE.  EMG: mostly concordant during filling.    DURING THE VOIDING PHASE:  Maximum detrusor contraction with void: 48 cm of H2O pressure, but suspect this likely represents another UDC rather than a volitional detrusor  contraction.  Voided volume: 34 mL.  Maximum flow rate: 3.8 mL/sec.  Average flow rate: 1.6 mL/sec.  Postvoid Residual: 275 mL.  EMG activity: mostly quiet.  Character of voiding curve: voided volume insufficient to reliably interpret.  BOOI: 38.0 (suggesting equivocal for obstruction - see key below)  [obstructed (NEGRON index [BOOI] ? 40); equivocal (no definite   obstruction; BOOI 20-40); and no obstruction (BOOI ? 20)]    FLUOROSCOPIC IMAGING OF THE BLADDER DURING URODYNAMICS:  Please note, image numbers on UDS tracings correlate with iSite series numbers on PACS images. Fluoroscopy during today's procedure demonstrated a moderately trabeculated bladder wall without diverticulae or cellules.  No vesicoureteral reflux was observed.  The bladder neck was initially closed during filling and appeared open during episodes of incontinence and voiding.  After voiding to completion, all catheters were removed, and the patient was straight catheterized for residual volume.    ASSESSMENT/PLAN:  Ms. Shanna Shanks is a pleasant 47 year old female with mixed incontinence who demonstrated the following findings today on urodynamic evaluation:    -Maximum cystometric capacity 320 mL with intact filling sensations.  -Good bladder compliance with uninhibited detrusor contractions (UDC) associated with urgency incontinence at volumes >240 mL.  -No stress urinary incontinence.  -She reports capacity and is given permission to void in conjunction with an UDC. Maximum voiding detrusor pressure reaches 48 cm H2O, but suspect this is more of an unintentional UDC rather than a volitional detrusor contraction. She leaks/voids 34 mL with slow flow (Qmax 3.8 ml/s), mostly quiet EMG activity, and incomplete bladder emptying ( mL). BOOI is 38.0 which is equivocal for bladder outlet obstruction. Question whether she may have some mild DESD that was not reflected in the EMG tracings.  -Fluoroscopy reveals a moderately  trabeculated bladder wall without diverticuli or VUR. The bladder neck initially appears closed during filling and opens during episodes of incontinence and voiding.     The patient will follow up as scheduled with Trinidad Decker PA-C to further discuss today's study results and make plans for how best to proceed.      - The patient is currently taking cefdinir for recent possible UTI (UCx ultimately with mixed  jen); therefore, additional antibiotic prophylaxis was not administered today.  The risk of UTI with VUDS is low at ~2.5-3%.      Thank you for allowing me to participate in the care of Ms. Shanna Shanks and please don't hesitate to contact me with any questions or concerns.      Maya Ordoñez PA-C  Urology Physician Assistant

## 2023-07-05 NOTE — NURSING NOTE
"  Chief Complaint   Patient presents with     Urodynamics Study     Mixed urinary incontinence/Recurrent UTI       Blood pressure 96/66, pulse 85, height 1.727 m (5' 8\"), weight 68.9 kg (152 lb), not currently breastfeeding. Body mass index is 23.11 kg/m .    Patient Active Problem List   Diagnosis     Multiple sclerosis (H)     UTI (urinary tract infection)     CARDIOVASCULAR SCREENING; LDL GOAL LESS THAN 160     Dysmenorrhea     Nicotine dependence in remission     Encephalopathy     Herpes encephalitis     Fever of unknown origin     Seizure (H)     Traumatic hematoma of left lower leg     Seizures (H)       Allergies   Allergen Reactions     Antihistamines, Diphenhydramine-Type Rash     benadryl cream     Nickel      Macrobid [Nitrofurantoin] Rash       Current Outpatient Medications   Medication Sig Dispense Refill     atorvastatin (LIPITOR) 40 MG tablet Take 1 tablet (40 mg) by mouth daily       baclofen (LIORESAL) 10 MG tablet Take 10-20 mg by mouth 4 times daily as needed   3     cefdinir (OMNICEF) 300 MG capsule Take 1 capsule (300 mg) by mouth 2 times daily for 7 days 14 capsule 0     CRANBERRY EXTRACT PO Take 1 tablet by mouth daily       dimethyl fumarate (TECFIDERA) delayed release capsule Take 240 mg by mouth 2 times daily        estradiol (ESTRACE VAGINAL) 0.1 MG/GM vaginal cream Apply small amount to the vaginal opening and urethra M, W, F @ h.s. 42.5 g 3     isoniazid (NYDRAZID) 300 MG tablet Take 1 tablet (300 mg) by mouth daily for 90 days 90 tablet 0     levETIRAcetam (KEPPRA) 250 MG tablet Take 1 tablet (250 mg) by mouth 2 times daily 60 tablet 0     levETIRAcetam 1000 MG TABS Take 1,000 mg by mouth 2 times daily 60 tablet 1     MELATONIN PO Take 1 tablet by mouth as needed       order for DME Equipment being ordered: four wheeled walker with seat and brakes 1 Units 0     oxybutynin (DITROPAN-XL) 5 MG 24 hr tablet Take 5 mg by mouth daily       rifampin (RIFADIN) 300 MG capsule Take 2 capsules " (600 mg) by mouth daily for 90 days 180 capsule 0     venlafaxine (EFFEXOR-ER) 75 MG 24 hr tablet Take 75 mg by mouth daily       VITAMIN D, CHOLECALCIFEROL, PO Take 1,000 Units by mouth daily         Social History     Tobacco Use     Smoking status: Former     Packs/day: 0.25     Types: Cigarettes     Smokeless tobacco: Never     Tobacco comments:     quit in 2017   Vaping Use     Vaping Use: Never used   Substance Use Topics     Alcohol use: No     Alcohol/week: 5.8 standard drinks of alcohol     Types: 7 Standard drinks or equivalent per week     Drug use: No       Invasive Procedure Safety Checklist:    Procedure: Urodynamics    Action: Complete sections and checkboxes as appropriate.    Pre-procedure:    1. Patient ID Verified with 2 identifiers (Aye and  or MRN) : YES    2. Procedure and site verified with patient/designee (when able) : YES    3. Accurate consent documentation in medical record : YES    4. H&P (or appropriate assessment) documented in medical record : N/A    H&P must be up to 30 days prior to procedure an updated within 24 hours of Procedure as applicable.     5. Relevant diagnostic and radiology test results appropriately labeled and displayed as applicable : YES    6. Blood products, implants, devices, and/or special equipment available for the procedure as applicable : YES    7. Procedure site(s) marked with provider initials [Exclusions: none] : NO    8. Marking not required. Reason : Yes  Procedure does not require site marking    Time Out:     Time-Out performed immediately prior to starting procedure, including verbal and active participation of all team members addressing: YES    1. Correct patient identity.  2. Confirmed that the correct side and site are marked.  3. An accurate procedure to be done.  4. Agreement on the procedure to be done.  5. Correct patient position.  6. Relevant images and results are properly labeled and appropriately displayed.  7. The need to administer  antibiotics or fluids for irrigation purposes during the procedure as applicable.  8. Safety precautions based on patient history or medication use.    During Procedure: Verification of correct person, site, and procedure occurs any time the responsibility for care of the patient is transferred to another member of the care team.        MEDICATION:  Isovue-250  ROUTE:  Provider Administered  SITE: Provider Administered  DOSE: 100 mL  LOT #: [id number]  : "[employer]  EXPIRATION DATE: 7/2025  NDC#: 4755-0025-99  Was there drug waste? No            Camila Parra CMA  7/5/2023  3:05 PM

## 2023-07-06 ENCOUNTER — HOSPITAL ENCOUNTER (OUTPATIENT)
Dept: REHABILITATION | Facility: CLINIC | Age: 48
Discharge: HOME | End: 2023-07-06
Attending: FAMILY MEDICINE
Payer: COMMERCIAL

## 2023-07-06 PROCEDURE — S5102 ADULT DAY CARE PER DIEM: HCPCS

## 2023-07-10 ENCOUNTER — HOSPITAL ENCOUNTER (OUTPATIENT)
Dept: REHABILITATION | Facility: CLINIC | Age: 48
Discharge: HOME | End: 2023-07-10
Attending: FAMILY MEDICINE
Payer: COMMERCIAL

## 2023-07-10 PROCEDURE — S5102 ADULT DAY CARE PER DIEM: HCPCS

## 2023-07-10 NOTE — PROGRESS NOTES
INDIVIDUAL PLAN OF CARE   [facility]    Member Name: Shanna Shanks; YOB: 1975  MRN: [id number]  3/31/2022    Goals developed in collaboration with: member  Staff responsible for plan: Analisa LOWE  1. Long Term Goal (concrete, measurable, and time specific outcomes):  Member will maintain present level of physical and cognitive function through active participation in structured [facility] programming with staff set up, facilitation, and supervision provided every day of program attendance.  Target Date:  July 2024  Bi-Annual Review:  Goal remains appropriate.    2. Short Term Goal: (concrete, measurable, and time specific outcomes):  To maintain physical strength and endurance, member will actively participate in prescribed Nu-Step exercise program, with set up, VC's and supervision provided by staff, for at least 20 minutes, at least 1x/week during program attendance.  Target Date:  January 2024  Bi-Annual Review:  Goal remains appropriate.  Shanna requires VC's and positive reinforcement to participate in Nu Step activity.    3. Short Term Goal: (concrete, measurable, and time specific outcomes):  To maintain current level of cognitive function, member will actively participate in therapeutic creative arts and Brain/Body programming with set up, VC's and supervision provided by staff every day of program attendance.  Target Date:  January 2024  Bi-Annual Review:  Goal remains appropriate.

## 2023-07-10 NOTE — PROGRESS NOTES
MONTHLY PROGRESS NOTE AND PARTICIPATION REPORT   [facility]    Shanna Shanks, 1975  Attendance: Please see Epic for attendance record.    Communication:   Does communicate   Hearing:   No impairment  Vision:   No impairment  Orientation/Cognition:   Minor forgetfulness  Partial disorientation  Short term memory loss  Behavior:   No behavioral concerns  Self-Preservation Skills:   Shanna presents with MOD cogntive impairment and slow shuffled gait with balance deficits during ambulation 2/2 her MS diagnosis.  She requires staff instructions and assistance for self preservatioin.    Eating:   Assist with set up  Shanna requires verbal cues from staff ~75% of the time to initiate and continue eathing activities during lunch service D/2 her cognitive impairment.  Ambulation Walking:   Needs assistance  Staff provides Shanna a site owned 4WW for safe ambulation during program attendance for falls prevention D/2 her short slow shuffled gait and balance deficits 2/2 her MS diagnosis.  Transferring:   Independent  Wheelchair:   Shanna is fully ambulatory.  However, she will be offered a manual WC for any off site group outings for energy conservation and falls prevention.  Toileting:   Staff assists Shanan in the bathroom 1:1 for supervision and provides VC for self-hygiene and garment management after toilet use and hand hygiene.  Maintenance Program:     NuStep  Living Arrangements:   Lives with family  Spiritual Needs: Needs are being met through support group  Medication Assistance:   Medication not taken during program hours  Participation Report:   Aerobics/Exercise  Support Group  Cognitive Stimulation  Fire Drill  Creative Arts/Crafts  Games  Gardening  Speakers/Entertainment  Socialization  Current Events/News  Education/Health Topic  Level of Participation:   [facility] Note:  Shanna has still has had declined appetite and staff continue to encourage her to eat her  lunch and snacks while at the day program.  Shanna continues to work on her [employer] project in art.  Shanna enjoys spending time with her peers.

## 2023-07-10 NOTE — PROGRESS NOTES
Individual abuse prevention plan (IAPP)  [facility]     Assessment of Susceptibility to Abuse, Including Self Abuse, Neglect (Identification of characteristics, which make the individual susceptible to abuse, and how these characteristics cause the individual to be susceptible to abuse.)     Is the person susceptible to abuse in each area?  Sexual Abuse:   No  Referrals made when the person is susceptible to abuse outside the scope or control of this program (Identify the referral and date it occurred): No additional risk reduction means needed at this time    Physical Abuse:   No  Referrals made when the person is susceptible to abuse outside the scope or control of this program (Identify the referral and date it occurred): No additional risk reduction means needed at this time    Self-Abuse:   No  Referrals made when the person is susceptible to abuse outside the scope or control of this program (Identify the referral and date it occurred): No additional risk reduction means needed at this time    Financial  Exploitation  No  Referrals made when the person is susceptible to abuse outside the scope or control of this program (Identify the referral and date it occurred): No additional risk reduction means needed at this time    Is the program aware of this person committing a violent crime or act of physical aggression towards others?  No  Referrals made when the person is susceptible to abuse outside the scope or control of this program (Identify the referral and date it occurred): No additional risk reduction means needed at this time    INDIVIDUAL ABUSE PREVENTION PLAN-MEASURES TAKEN TO MINIMIZE RISK OF ABUSE   ADL:   Assist with clothing management  Assist with feeding  Assist with toileting  Staff will provide set up and VC for eating.  Staff will provide 1:1 supervision and set up and VC assist for post toilet use self-hygiene, garment management, and hand  hygiene.  Ambulation/Transfers/Wheelchairs:  Assist with all transfers and/or ambulation  Encourage client to ambulate short distances with walker and provide wheelchair for distance  Ensure client uses cane and/or walker  Provide standby assist due to periods of dizziness, fatigue  Provide standby assist when client ambulates prn   Behavior:   No behavior issues  Communication:  Encourage verbalization of needs/concerns  Observe body language/gestures to assist in anticipating client's needs  Cognitive Issues:   Require aide to be with member if wandering  Provider reminders due to confusion, forgetfulness  Give simple step-by-step direction  Contact caregiver to inform of special activities days  Diet:   Staff will prepare food to facilitate client to feed self  Monitor client when eating and/or drinking fluids   Exercise:   Encourage participation in maintenance program  Encourage participation in wheelchair aerobics  Hearing:   Speak distinctly and use gestures  Isolation:   Encourage socialization due to isolation in home environment  Medical Monitoring:   Monitor physical and emotional comfort  Monitor physical symptoms due to diagnosis and report significant changes to nurse, caregiver and physician   Mental Health:   Motivate client to join in activities that are beneficial to client  Encourage client to express feelings  Monitor anxiety level and intervene when appropriate  Encourage regular attendance for socialization and stimulation  Offer emotional support  Observe for symptoms of depression and notify appropriate staff/caregiver  Encourage independent decision making  Encourage social interaction to assist in increasing client's self-image  Provide client with choices of programming to encourage independent decision making  Provide activities in which client can be successful  Sensory:   Provide and encourage participation in activities for stimulation  Vision:   Provide verbal cues  Other:  N/A    Developed in consultation with:  Client  The Program Abuse Prevention Plan/IAPP identifies the specific actions that minimize abuse to the [facility] participant.  No

## 2023-07-13 ENCOUNTER — HOSPITAL ENCOUNTER (OUTPATIENT)
Dept: REHABILITATION | Facility: CLINIC | Age: 48
Discharge: HOME | End: 2023-07-13
Attending: FAMILY MEDICINE
Payer: COMMERCIAL

## 2023-07-13 PROCEDURE — S5102 ADULT DAY CARE PER DIEM: HCPCS

## 2023-07-24 ENCOUNTER — HOSPITAL ENCOUNTER (OUTPATIENT)
Dept: REHABILITATION | Facility: CLINIC | Age: 48
Discharge: HOME | End: 2023-07-24
Attending: FAMILY MEDICINE
Payer: COMMERCIAL

## 2023-07-24 PROCEDURE — S5102 ADULT DAY CARE PER DIEM: HCPCS

## 2023-07-26 NOTE — PROGRESS NOTES
[facility] RN Note    Previous documentation since 6/1/23 reviewed. Participant not in facility at this time.

## 2023-07-27 ENCOUNTER — HOSPITAL ENCOUNTER (OUTPATIENT)
Dept: REHABILITATION | Facility: CLINIC | Age: 48
Discharge: HOME | End: 2023-07-27
Attending: FAMILY MEDICINE
Payer: COMMERCIAL

## 2023-07-27 PROCEDURE — S5102 ADULT DAY CARE PER DIEM: HCPCS

## 2023-07-31 ENCOUNTER — HOSPITAL ENCOUNTER (OUTPATIENT)
Dept: REHABILITATION | Facility: CLINIC | Age: 48
Discharge: HOME | End: 2023-07-31
Attending: FAMILY MEDICINE
Payer: COMMERCIAL

## 2023-07-31 PROCEDURE — S5102 ADULT DAY CARE PER DIEM: HCPCS

## 2023-08-03 ENCOUNTER — HOSPITAL ENCOUNTER (OUTPATIENT)
Dept: REHABILITATION | Facility: CLINIC | Age: 48
Discharge: HOME | End: 2023-08-03
Attending: FAMILY MEDICINE
Payer: COMMERCIAL

## 2023-08-03 PROCEDURE — S5102 ADULT DAY CARE PER DIEM: HCPCS

## 2023-08-07 ENCOUNTER — HOSPITAL ENCOUNTER (OUTPATIENT)
Dept: REHABILITATION | Facility: CLINIC | Age: 48
Discharge: HOME | End: 2023-08-07
Attending: FAMILY MEDICINE
Payer: COMMERCIAL

## 2023-08-07 PROCEDURE — S5102 ADULT DAY CARE PER DIEM: HCPCS

## 2023-08-07 NOTE — PROGRESS NOTES
[facility] RN Note    No new progress notes noted since 7/26/23. No concerns reported by [facility] staff or member.

## 2023-08-10 ENCOUNTER — HOSPITAL ENCOUNTER (OUTPATIENT)
Dept: REHABILITATION | Facility: CLINIC | Age: 48
Discharge: HOME | End: 2023-08-10
Attending: FAMILY MEDICINE
Payer: COMMERCIAL

## 2023-08-10 PROCEDURE — S5102 ADULT DAY CARE PER DIEM: HCPCS

## 2023-08-14 ENCOUNTER — HOSPITAL ENCOUNTER (OUTPATIENT)
Dept: REHABILITATION | Facility: CLINIC | Age: 48
Discharge: HOME | End: 2023-08-14
Attending: FAMILY MEDICINE
Payer: COMMERCIAL

## 2023-08-14 PROCEDURE — S5102 ADULT DAY CARE PER DIEM: HCPCS

## 2023-08-15 ENCOUNTER — TELEPHONE (OUTPATIENT)
Dept: FAMILY MEDICINE | Facility: CLINIC | Age: 48
End: 2023-08-15
Payer: COMMERCIAL

## 2023-08-15 DIAGNOSIS — Z22.7 LATENT TUBERCULOSIS BY BLOOD TEST: ICD-10-CM

## 2023-08-15 RX ORDER — ISONIAZID 300 MG/1
300 TABLET ORAL DAILY
Qty: 90 TABLET | Refills: 0 | Status: CANCELLED | OUTPATIENT
Start: 2023-08-15

## 2023-08-15 NOTE — TELEPHONE ENCOUNTER
"Per 8/7/23 refill encounter:      \"Malvin Beck MD     SK    8/10/23  8:34 AM  Note  Patient is done with treatment. No further rx needed. \"          Called patient:  Left message to call back and ask to speak with an available triage nurse.    KONG CastanedaN, RN-[facility]    "

## 2023-08-17 ENCOUNTER — HOSPITAL ENCOUNTER (OUTPATIENT)
Dept: REHABILITATION | Facility: CLINIC | Age: 48
Discharge: HOME | End: 2023-08-17
Attending: FAMILY MEDICINE
Payer: COMMERCIAL

## 2023-08-17 PROCEDURE — S5102 ADULT DAY CARE PER DIEM: HCPCS

## 2023-08-18 NOTE — TELEPHONE ENCOUNTER
Writer called and left message on patient's voicemail (mobile) to call back and speak with a triage nurse.    Writer called and left message on patient's voicemail (home) to call back and speak with a triage nurse.    Writer responded via [employer].    Camila Richter, KONGN MAZIN  [facility]

## 2023-08-21 ENCOUNTER — HOSPITAL ENCOUNTER (OUTPATIENT)
Dept: REHABILITATION | Facility: CLINIC | Age: 48
Discharge: HOME | End: 2023-08-21
Attending: FAMILY MEDICINE
Payer: COMMERCIAL

## 2023-08-21 PROCEDURE — S5102 ADULT DAY CARE PER DIEM: HCPCS

## 2023-08-24 ENCOUNTER — HOSPITAL ENCOUNTER (OUTPATIENT)
Dept: REHABILITATION | Facility: CLINIC | Age: 48
Discharge: HOME | End: 2023-08-24
Attending: FAMILY MEDICINE
Payer: COMMERCIAL

## 2023-08-24 PROCEDURE — S5102 ADULT DAY CARE PER DIEM: HCPCS

## 2023-08-25 NOTE — PROGRESS NOTES
[facility] RN Note    Staff report that Shanna has been complaining of facial pain.  Participant not in facility at this time.  Staff sent home a Medication Orders for Day Program form with Shanna and requested family have MD complete for approval to administer an OTC pain medication at day program. Participant's  plans to schedule a neurology evaluation.

## 2023-08-28 ENCOUNTER — HOSPITAL ENCOUNTER (OUTPATIENT)
Dept: REHABILITATION | Facility: CLINIC | Age: 48
Discharge: HOME | End: 2023-08-28
Attending: FAMILY MEDICINE
Payer: COMMERCIAL

## 2023-08-28 PROCEDURE — S5102 ADULT DAY CARE PER DIEM: HCPCS

## 2023-08-28 NOTE — PROGRESS NOTES
MONTHLY PROGRESS NOTE AND PARTICIPATION REPORT   [facility]    Shanna Shanks, 1975  Attendance: Please see Epic for attendance record.    Communication:   Does communicate   Hearing:   No impairment  Vision:   No impairment  Orientation/Cognition:   Minor forgetfulness  Partial disorientation  Short term memory loss  Behavior:   No behavioral concerns  Self-Preservation Skills:   Shanna presents with MOD cogntive impairment and slow shuffled gait with balance deficits during ambulation 2/2 her MS diagnosis.  She requires staff instructions and assistance for self preservatioin.    Eating:   Assist with set up  Shanna requires verbal cues from staff ~75% of the time to initiate and continue eathing activities during lunch service D/2 her cognitive impairment.  Ambulation Walking:   Needs assistance  Staff provides Shanna a site owned 4WW for safe ambulation during program attendance for falls prevention D/2 her short slow shuffled gait and balance deficits 2/2 her MS diagnosis.  Transferring:   Independent  Wheelchair:   Shanna is fully ambulatory.  However, she will be offered a manual WC for any off site group outings for energy conservation and falls prevention.  Toileting:   Staff assists Shanna in the bathroom 1:1 for supervision and provides VC for self-hygiene and garment management after toilet use and hand hygiene.  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 Shanna socializes throughout the day.  Shanna has been experiencing debilitating nerve pains in her jaw but has contacted further medical assistance.

## 2023-08-31 ENCOUNTER — HOSPITAL ENCOUNTER (OUTPATIENT)
Dept: REHABILITATION | Facility: CLINIC | Age: 48
Discharge: HOME | End: 2023-08-31
Attending: FAMILY MEDICINE
Payer: COMMERCIAL

## 2023-08-31 PROCEDURE — S5102 ADULT DAY CARE PER DIEM: HCPCS

## 2023-09-05 ENCOUNTER — OFFICE VISIT (OUTPATIENT)
Dept: URGENT CARE | Facility: URGENT CARE | Age: 48
End: 2023-09-05
Payer: COMMERCIAL

## 2023-09-05 VITALS
SYSTOLIC BLOOD PRESSURE: 88 MMHG | HEART RATE: 67 BPM | TEMPERATURE: 97.5 F | BODY MASS INDEX: 21.22 KG/M2 | OXYGEN SATURATION: 96 % | DIASTOLIC BLOOD PRESSURE: 63 MMHG | WEIGHT: 140 LBS | RESPIRATION RATE: 15 BRPM | HEIGHT: 68 IN

## 2023-09-05 DIAGNOSIS — N30.01 ACUTE CYSTITIS WITH HEMATURIA: Primary | ICD-10-CM

## 2023-09-05 DIAGNOSIS — Z87.440 HISTORY OF RECURRENT UTIS: ICD-10-CM

## 2023-09-05 DIAGNOSIS — G35 MULTIPLE SCLEROSIS (H): ICD-10-CM

## 2023-09-05 DIAGNOSIS — R39.15 URGENCY OF URINATION: ICD-10-CM

## 2023-09-05 LAB
ALBUMIN UR-MCNC: ABNORMAL MG/DL
AMORPH CRY #/AREA URNS HPF: ABNORMAL /HPF
APPEARANCE UR: CLEAR
BACTERIA #/AREA URNS HPF: ABNORMAL /HPF
BILIRUB UR QL STRIP: NEGATIVE
CLUE CELLS: NORMAL
COLOR UR AUTO: YELLOW
GLUCOSE UR STRIP-MCNC: NEGATIVE MG/DL
HGB UR QL STRIP: ABNORMAL
KETONES UR STRIP-MCNC: NEGATIVE MG/DL
LEUKOCYTE ESTERASE UR QL STRIP: ABNORMAL
NITRATE UR QL: NEGATIVE
PH UR STRIP: 6.5 [PH] (ref 5–7)
RBC #/AREA URNS AUTO: ABNORMAL /HPF
SP GR UR STRIP: 1.01 (ref 1–1.03)
SQUAMOUS #/AREA URNS AUTO: ABNORMAL /LPF
TRANS CELLS #/AREA URNS HPF: ABNORMAL /HPF
TRICHOMONAS, WET PREP: NORMAL
UROBILINOGEN UR STRIP-ACNC: 0.2 E.U./DL
WBC #/AREA URNS AUTO: ABNORMAL /HPF
WBC'S/HIGH POWER FIELD, WET PREP: NORMAL
YEAST, WET PREP: NORMAL

## 2023-09-05 PROCEDURE — 87210 SMEAR WET MOUNT SALINE/INK: CPT | Performed by: NURSE PRACTITIONER

## 2023-09-05 PROCEDURE — 81001 URINALYSIS AUTO W/SCOPE: CPT | Performed by: NURSE PRACTITIONER

## 2023-09-05 PROCEDURE — 87086 URINE CULTURE/COLONY COUNT: CPT | Performed by: NURSE PRACTITIONER

## 2023-09-05 PROCEDURE — 99214 OFFICE O/P EST MOD 30 MIN: CPT | Performed by: NURSE PRACTITIONER

## 2023-09-05 PROCEDURE — 87088 URINE BACTERIA CULTURE: CPT | Performed by: NURSE PRACTITIONER

## 2023-09-05 RX ORDER — SULFAMETHOXAZOLE/TRIMETHOPRIM 800-160 MG
1 TABLET ORAL 2 TIMES DAILY
Qty: 10 TABLET | Refills: 0 | Status: SHIPPED | OUTPATIENT
Start: 2023-09-05 | End: 2023-09-10

## 2023-09-05 RX ORDER — GABAPENTIN 300 MG/1
TABLET, FILM COATED ORAL
COMMUNITY
End: 2024-02-06

## 2023-09-05 NOTE — PROGRESS NOTES
Chief Complaint   Patient presents with    Urinary Problem     X2 days of urinary issues, not being able to hold it and cloudy urine      SUBJECTIVE:   Shanna Shanks is a 48 year old female who  presents today with urinary incontinence and cloudy discoloration over the last 2 days.  She has MS and her symptoms are often quite subtle.  No fevers sweats chills nausea vomiting flank pain.  Has urology appointment at the end of the week to discuss cathing.  Last antibiotic was Omnicef 7/3/2023.    Past Medical History:   Diagnosis Date    Multiple sclerosis (H) 03/02/1996    last exacerbation 3yrs ago, no meds x 6 months    Seizure disorder (H)     Tobacco dependency      Current Outpatient Medications   Medication Sig Dispense Refill    atorvastatin (LIPITOR) 40 MG tablet Take 1 tablet (40 mg) by mouth daily      baclofen (LIORESAL) 10 MG tablet Take 10-20 mg by mouth 4 times daily as needed   3    CRANBERRY EXTRACT PO Take 1 tablet by mouth daily      dimethyl fumarate (TECFIDERA) delayed release capsule Take 240 mg by mouth 2 times daily       estradiol (ESTRACE VAGINAL) 0.1 MG/GM vaginal cream Apply small amount to the vaginal opening and urethra M, W, F @ h.s. 42.5 g 3    gabapentin (GRALISE) 300 MG 24 hr tablet Take by mouth daily (with dinner)      levETIRAcetam (KEPPRA) 250 MG tablet Take 1 tablet (250 mg) by mouth 2 times daily 60 tablet 0    levETIRAcetam 1000 MG TABS Take 1,000 mg by mouth 2 times daily 60 tablet 1    MELATONIN PO Take 1 tablet by mouth as needed      order for DME Equipment being ordered: four wheeled walker with seat and brakes 1 Units 0    oxybutynin (DITROPAN-XL) 5 MG 24 hr tablet Take 5 mg by mouth daily      sulfamethoxazole-trimethoprim (BACTRIM DS) 800-160 MG tablet Take 1 tablet by mouth 2 times daily for 5 days 10 tablet 0    venlafaxine (EFFEXOR-ER) 75 MG 24 hr tablet Take 75 mg by mouth daily      VITAMIN D, CHOLECALCIFEROL, PO Take 1,000 Units by mouth daily       Social  "History     Tobacco Use    Smoking status: Former     Packs/day: 0.25     Types: Cigarettes    Smokeless tobacco: Never    Tobacco comments:     quit in 2017   Substance Use Topics    Alcohol use: No     Alcohol/week: 5.8 standard drinks of alcohol     Types: 7 Standard drinks or equivalent per week     Allergies   Allergen Reactions    Antihistamines, Diphenhydramine-Type Rash     benadryl cream    Nickel     Macrobid [Nitrofurantoin] Rash       Review of Systems  All systems negative except for those listed above in HPI.    OBJECTIVE:  BP (!) 88/63   Pulse 67   Temp 97.5  F (36.4  C) (Temporal)   Resp 15   Ht 1.727 m (5' 8\")   Wt 63.5 kg (140 lb)   SpO2 96%   BMI 21.29 kg/m      Physical Exam  Constitutional:       General: She is not in acute distress.     Appearance: She is well-developed. She is not diaphoretic.   Cardiovascular:      Pulses: Normal pulses.   Pulmonary:      Effort: Pulmonary effort is normal.   Abdominal:      General: Bowel sounds are normal.      Palpations: Abdomen is soft.      Tenderness: There is no abdominal tenderness. There is no right CVA tenderness or left CVA tenderness.   Musculoskeletal:         General: Normal range of motion.   Skin:     General: Skin is warm and dry.      Capillary Refill: Capillary refill takes less than 2 seconds.      Coloration: Skin is not pale.   Neurological:      General: No focal deficit present.      Mental Status: She is alert and oriented to person, place, and time.   Psychiatric:         Mood and Affect: Mood normal.         Behavior: Behavior normal.       Results for orders placed or performed in visit on 09/05/23   UA Macroscopic with reflex to Microscopic and Culture - Clinic Collect     Status: Abnormal    Specimen: Urine, Clean Catch   Result Value Ref Range    Color Urine Yellow Colorless, Straw, Light Yellow, Yellow    Appearance Urine Clear Clear    Glucose Urine Negative Negative mg/dL    Bilirubin Urine Negative Negative    " Ketones Urine Negative Negative mg/dL    Specific Gravity Urine 1.015 1.003 - 1.035    Blood Urine Trace (A) Negative    pH Urine 6.5 5.0 - 7.0    Protein Albumin Urine Trace (A) Negative mg/dL    Urobilinogen Urine 0.2 0.2, 1.0 E.U./dL    Nitrite Urine Negative Negative    Leukocyte Esterase Urine Moderate (A) Negative   Urine Microscopic Exam     Status: Abnormal   Result Value Ref Range    Bacteria Urine Many (A) None Seen /HPF    RBC Urine 2-5 (A) 0-2 /HPF /HPF    WBC Urine 10-25 (A) 0-5 /HPF /HPF    Squamous Epithelials Urine Few (A) None Seen /LPF    Transitional Epithelials Urine Few (A) None Seen /HPF    Amorphous Crystals Urine Moderate (A) None Seen /HPF   Wet prep - Clinic Collect     Status: Normal    Specimen: Vagina; Swab   Result Value Ref Range    Trichomonas Absent Absent    Yeast Absent Absent    Clue Cells Absent Absent    WBCs/high power field None None     ASSESSMENT:    ICD-10-CM    1. Acute cystitis with hematuria  N30.01 sulfamethoxazole-trimethoprim (BACTRIM DS) 800-160 MG tablet      2. Urgency of urination  R39.15 UA Macroscopic with reflex to Microscopic and Culture - Clinic Collect     Wet prep - Clinic Collect     Urine Microscopic Exam     Urine Culture      3. Multiple sclerosis (H)  G35       4. History of recurrent UTIs  Z87.440         PLAN:     Take full course of antibiotics.  Keep follow-up with urologist.  Urine culture pending, we will call you only if culture shows resistance and change of antibiotic is required or if no growth to stop antibiotics and to follow-up with your primary care provider.  May use over the counter AZO pain relief or Uristat (phenazopyridine) for urinary burning but do not use for 24 hours before future urine tests.  Drink plenty of fluids. Limit caffeine and alcohol as these are bladder irritants.  May take tylenol or ibuprofen as needed for discomfort.   If you develop any vomiting, high fevers or lower back pain, these can be signs of a kidney  infection and you should be seen in urgent care or in the ER.  Prevention of future infections by drinking cranberry juice, urination after intercourse and wiping from front to back after using the toilet.  Please follow up with primary care provider if symptoms return, if you're not improving, worsening or new symptoms or for any adverse reactions to medications.     Follow up with primary care provider with any problems, questions or concerns or if symptoms worsen or fail to improve. Patient agreed to plan and verbalized understanding.    Kristin Su, EARNEST-BC  [facility] CARE [place]

## 2023-09-07 ENCOUNTER — HOSPITAL ENCOUNTER (OUTPATIENT)
Dept: REHABILITATION | Facility: CLINIC | Age: 48
Discharge: HOME | End: 2023-09-07
Attending: FAMILY MEDICINE
Payer: COMMERCIAL

## 2023-09-07 PROCEDURE — S5102 ADULT DAY CARE PER DIEM: HCPCS

## 2023-09-08 ENCOUNTER — OFFICE VISIT (OUTPATIENT)
Dept: UROLOGY | Facility: CLINIC | Age: 48
End: 2023-09-08
Payer: COMMERCIAL

## 2023-09-08 VITALS
BODY MASS INDEX: 18.25 KG/M2 | HEART RATE: 68 BPM | OXYGEN SATURATION: 97 % | WEIGHT: 120 LBS | DIASTOLIC BLOOD PRESSURE: 64 MMHG | SYSTOLIC BLOOD PRESSURE: 105 MMHG

## 2023-09-08 DIAGNOSIS — R31.29 MICROSCOPIC HEMATURIA: ICD-10-CM

## 2023-09-08 DIAGNOSIS — R32 URINARY INCONTINENCE, UNSPECIFIED TYPE: Primary | ICD-10-CM

## 2023-09-08 DIAGNOSIS — Z87.440 HISTORY OF UTI: ICD-10-CM

## 2023-09-08 PROCEDURE — 99214 OFFICE O/P EST MOD 30 MIN: CPT | Performed by: PHYSICIAN ASSISTANT

## 2023-09-08 RX ORDER — ESTRADIOL 0.1 MG/G
CREAM VAGINAL
Qty: 42.5 G | Refills: 3 | Status: SHIPPED | OUTPATIENT
Start: 2023-09-08 | End: 2024-09-13

## 2023-09-08 NOTE — PROGRESS NOTES
EMILY   CHIEF COMPLAINT   It was my pleasure to see Shanna Shanks who is a 47 year old female for follow-up of microscopic hematuria, MS, UTIs.      HPI   Shanna Shanks is a very pleasant 48 year old female     Initially seen 8/30/2022 with Trinidad Decker:  HPI: It is a pleasure to see Ms. Shanna Shanks, a pleasant 47 year old female, asked to be seen in consultation via self referral for evaluation of rUTIs and noted to have micro hematuria (x 2) with neg UCs. Ex-smoker.     Hx of rUTIs (enteroccous, e coli). Sx include more frequency and incontinence.      The patient denies any gross hematuria.  Ms. Shanks voids without difficulty.  She currently denies any dysuria, pyuria, hesitancy, intermittency, feelings of incomplete emptying, or any recent history of urinary tract infections or stones.      Has MS, ambulatory. Uses oxybutynin 5mg for urge incontinence. Benign hematuria eval     TODAY 9/8/23:  UDS in the interim and one UTI. UDS with urge incontinence and incomplete emptying (PVR 275cc).   Noting using topical estrogen routinely.   UTI symptoms: worsening urge incontinence    Hematuria Risk Factors:  Age >40: Yes                                       Smoking history: yes  Occupational exposure to chemicals or dyes (ie, benzenes, aromatic amines): no  History of urologic disorder or disease: no  History of irritative voiding symptoms: no  History of urinary tract infection: no  Analgesic abuse: no  History of pelvic irradiation: no    TODAY 2/15/2023:  Follow-up today for surveillance cystoscopy  Her  joins her for this visit  He notes that she has been having some increase in her mixed urinary incontinence  She was recently started on a course of antibiotics for a presumed UTI    PHYSICAL EXAM  Patient is a 48 year old  female   Vitals: Blood pressure 105/64, pulse 68, weight 54.4 kg (120 lb), SpO2 97 %, not currently breastfeeding.  Body mass index is 18.25  kg/m .  General Appearance Adult:   Alert, no acute distress, oriented      UA RESULTS:  Recent Labs   Lab Test 02/15/23  1341 02/12/23  1634   COLOR Yellow Yellow   APPEARANCE Clear Clear   URINEGLC Negative Negative   URINEBILI Negative Negative   URINEKETONE Negative Trace*   SG 1.020 1.025   UBLD Trace* Trace*   URINEPH 6.5 6.5   PROTEIN 100* >=300*   UROBILINOGEN 0.2 1.0   NITRITE Negative Negative   LEUKEST Small* Moderate*   RBCU  --  0-2   WBCU  --  10-25*     Creatinine   Date Value Ref Range Status   01/10/2023 0.78 0.51 - 0.95 mg/dL Final   08/17/2020 0.67 0.52 - 1.04 mg/dL Final         IMAGING:  All pertinent imaging reviewed:    All imaging studies reviewed by me.  I personally reviewed these imaging films.  A formal report from radiology will follow.    CT UROGRAM 9/27/2022:  FINDINGS:   LOWER CHEST: Normal.     HEPATOBILIARY: Normal.     PANCREAS: Normal.     SPLEEN: Normal.     ADRENAL GLANDS: Normal.     RIGHT KIDNEY/URETER: Normal.     LEFT KIDNEY/URETER: Normal.     BLADDER: Mild bladder wall thickening. No focal lesion.     BOWEL: Normal.     LYMPH NODES: Normal.     VASCULATURE: Unremarkable.     PELVIC ORGANS: Normal.     MUSCULOSKELETAL: Normal.                                                         IMPRESSION:  1.  Mild bladder wall thickening is nonspecific, possibly related to cystitis.  2.  Normal kidneys and ureters. No urinary tract calculi.          ASSESSMENT and PLAN  48-year-old female with history of microscopic hematuria as well as multiple sclerosis with mixed urinary incontinence and recurrent urinary tract infections, incomplete emptying    Microscopic hematuria eval was benign.     History of multiple sclerosis with mixed urinary incontinence, incomplete emptying  -We dicussed restarting estrace routinely 3x per week  -Will stop oxybutynin to see if emptying improves  -We touched on CIC to aid in emptying completely to prevent UTIs. They would like to try the above first and  this seems very reasonable.       Time spent: 20 minutes spent on the date of the encounter doing chart review, history and exam, documentation and further activities as noted above.     Trinidad Decker PA-C  [facility]

## 2023-09-08 NOTE — LETTER
9/8/2023       RE: Shanna Shanks  [address]  [place] [facility] [address]     Dear Colleague,    Thank you for referring your patient, Shanna Shanks, to the [facility] at [facility]. Please see a copy of my visit note below.    [place]   CHIEF COMPLAINT   It was my pleasure to see Shanna Shanks who is a 47 year old female for follow-up of microscopic hematuria, MS, UTIs.      HPI   Shanna Shanks is a very pleasant 48 year old female     Initially seen 8/30/2022 with Trinidad Decker:  HPI: It is a pleasure to see Ms. Shanna Shanks, a pleasant 47 year old female, asked to be seen in consultation via self referral for evaluation of rUTIs and noted to have micro hematuria (x 2) with neg UCs. Ex-smoker.     Hx of rUTIs (enteroccous, e coli). Sx include more frequency and incontinence.      The patient denies any gross hematuria.  Ms. Shanks voids without difficulty.  She currently denies any dysuria, pyuria, hesitancy, intermittency, feelings of incomplete emptying, or any recent history of urinary tract infections or stones.      Has MS, ambulatory. Uses oxybutynin 5mg for urge incontinence. Benign hematuria eval     TODAY 9/8/23:  UDS in the interim and one UTI. UDS with urge incontinence and incomplete emptying (PVR 275cc).   Noting using topical estrogen routinely.   UTI symptoms: worsening urge incontinence    Hematuria Risk Factors:  Age >40: Yes                                       Smoking history: yes  Occupational exposure to chemicals or dyes (ie, benzenes, aromatic amines): no  History of urologic disorder or disease: no  History of irritative voiding symptoms: no  History of urinary tract infection: no  Analgesic abuse: no  History of pelvic irradiation: no    TODAY 2/15/2023:  Follow-up today for surveillance cystoscopy  Her  joins her for this visit  He notes that she has been  having some increase in her mixed urinary incontinence  She was recently started on a course of antibiotics for a presumed UTI    PHYSICAL EXAM  Patient is a 48 year old  female   Vitals: Blood pressure 105/64, pulse 68, weight 54.4 kg (120 lb), SpO2 97 %, not currently breastfeeding.  Body mass index is 18.25 kg/m .  General Appearance Adult:   Alert, no acute distress, oriented      UA RESULTS:  Recent Labs   Lab Test 02/15/23  1341 02/12/23  1634   COLOR Yellow Yellow   APPEARANCE Clear Clear   URINEGLC Negative Negative   URINEBILI Negative Negative   URINEKETONE Negative Trace*   SG 1.020 1.025   UBLD Trace* Trace*   URINEPH 6.5 6.5   PROTEIN 100* >=300*   UROBILINOGEN 0.2 1.0   NITRITE Negative Negative   LEUKEST Small* Moderate*   RBCU  --  0-2   WBCU  --  10-25*     Creatinine   Date Value Ref Range Status   01/10/2023 0.78 0.51 - 0.95 mg/dL Final   08/17/2020 0.67 0.52 - 1.04 mg/dL Final         IMAGING:  All pertinent imaging reviewed:    All imaging studies reviewed by me.  I personally reviewed these imaging films.  A formal report from radiology will follow.    CT UROGRAM 9/27/2022:  FINDINGS:   LOWER CHEST: Normal.     HEPATOBILIARY: Normal.     PANCREAS: Normal.     SPLEEN: Normal.   ADRENAL GLANDS: Normal.   RIGHT KIDNEY/URETER: Normal.   LEFT KIDNEY/URETER: Normal.   BLADDER: Mild bladder wall thickening. No focal lesion.   BOWEL: Normal.   LYMPH NODES: Normal.   VASCULATURE: Unremarkable.   PELVIC ORGANS: Normal.   MUSCULOSKELETAL: Normal.                                                         IMPRESSION:  1.  Mild bladder wall thickening is nonspecific, possibly related to cystitis.  2.  Normal kidneys and ureters. No urinary tract calculi.          ASSESSMENT and PLAN  48-year-old female with history of microscopic hematuria as well as multiple sclerosis with mixed urinary incontinence and recurrent urinary tract infections, incomplete emptying    Microscopic hematuria eval was benign.      History of multiple sclerosis with mixed urinary incontinence, incomplete emptying  -We dicussed restarting estrace routinely 3x per week  -Will stop oxybutynin to see if emptying improves  -We touched on CIC to aid in emptying completely to prevent UTIs. They would like to try the above first and this seems very reasonable.       Time spent: 20 minutes spent on the date of the encounter doing chart review, history and exam, documentation and further activities as noted above.     Trinidad Decker PA-C  [facility]

## 2023-09-11 ENCOUNTER — HOSPITAL ENCOUNTER (OUTPATIENT)
Dept: REHABILITATION | Facility: CLINIC | Age: 48
Discharge: HOME | End: 2023-09-11
Attending: FAMILY MEDICINE
Payer: COMMERCIAL

## 2023-09-11 LAB — BACTERIA UR CULT: ABNORMAL

## 2023-09-11 PROCEDURE — S5102 ADULT DAY CARE PER DIEM: HCPCS

## 2023-09-11 NOTE — PROGRESS NOTES
[facility] RN Note    Previous documentation since 8/7/23 reviewed.  No concerns reported by AC staff or member.       Seen at urgent care on 9/5 for urinary incontinence and cloudy urine. Diagnosed with acute cystitis, started on Bactrim.

## 2023-09-14 ENCOUNTER — HOSPITAL ENCOUNTER (OUTPATIENT)
Dept: REHABILITATION | Facility: CLINIC | Age: 48
Discharge: HOME | End: 2023-09-14
Attending: FAMILY MEDICINE
Payer: COMMERCIAL

## 2023-09-14 PROCEDURE — S5102 ADULT DAY CARE PER DIEM: HCPCS

## 2023-09-21 NOTE — PROGRESS NOTES
MONTHLY PROGRESS NOTE AND PARTICIPATION REPORT   [facility]    Shanna Shanks, 1975  Attendance: Please see Epic for attendance record.    Communication:   Does communicate   Hearing:   No impairment  Vision:   No impairment  Orientation/Cognition:   Minor forgetfulness  Partial disorientation  Short term memory loss  Behavior:   No behavioral concerns  Self-Preservation Skills:   Shanna presents with MOD cogntive impairment and slow shuffled gait with balance deficits during ambulation 2/2 her MS diagnosis.  She requires staff instructions and assistance for self preservatioin.    Eating:   Assist with set up  Shanna requires verbal cues from staff ~75% of the time to initiate and continue eathing activities during lunch service D/2 her cognitive impairment.  Ambulation Walking:   Needs assistance  Staff provides Shanna a site owned 4WW for safe ambulation during program attendance for falls prevention D/2 her short slow shuffled gait and balance deficits 2/2 her MS diagnosis.  Transferring:   Independent  Wheelchair:   Shanna is fully ambulatory.  However, she will be offered a manual WC for any off site group outings for energy conservation and falls prevention.  Toileting:   Staff assists Shanna in the bathroom 1:1 for supervision and provides VC for self-hygiene and garment management after toilet use and hand hygiene.  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 Shanna complained of jaw  pains a few weeks ago, but has been doing better since receiving treatment. Shanna loves to joke around with staff.

## 2023-09-25 ENCOUNTER — HOSPITAL ENCOUNTER (OUTPATIENT)
Dept: REHABILITATION | Facility: CLINIC | Age: 48
Discharge: HOME | End: 2023-09-25
Attending: FAMILY MEDICINE
Payer: COMMERCIAL

## 2023-09-25 PROCEDURE — S5102 ADULT DAY CARE PER DIEM: HCPCS

## 2023-09-28 ENCOUNTER — HOSPITAL ENCOUNTER (OUTPATIENT)
Dept: REHABILITATION | Facility: CLINIC | Age: 48
Discharge: HOME | End: 2023-09-28
Attending: FAMILY MEDICINE
Payer: COMMERCIAL

## 2023-09-28 PROCEDURE — S5102 ADULT DAY CARE PER DIEM: HCPCS

## 2023-10-09 ENCOUNTER — HOSPITAL ENCOUNTER (OUTPATIENT)
Dept: REHABILITATION | Facility: CLINIC | Age: 48
Discharge: HOME | End: 2023-10-09
Attending: FAMILY MEDICINE
Payer: COMMERCIAL

## 2023-10-09 PROCEDURE — S5102 ADULT DAY CARE PER DIEM: HCPCS

## 2023-10-09 NOTE — PROGRESS NOTES
MONTHLY PROGRESS NOTE AND PARTICIPATION REPORT   [facility]    Shanna Shanks, 1975  Attendance: Please see Epic for attendance record.    Communication:   Does communicate   Hearing:   No impairment  Vision:   No impairment  Orientation/Cognition:   Minor forgetfulness  Partial disorientation  Short term memory loss  Behavior:   No behavioral concerns  Self-Preservation Skills:   Shanna presents with MOD cogntive impairment and slow shuffled gait with balance deficits during ambulation 2/2 her MS diagnosis.  She requires staff instructions and assistance for self preservatioin.    Eating:   Assist with set up  Shanna requires verbal cues from staff ~75% of the time to initiate and continue eathing activities during lunch service D/2 her cognitive impairment.  Ambulation Walking:   Needs assistance  Staff provides Shanna a site owned 4WW for safe ambulation during program attendance for falls prevention D/2 her short slow shuffled gait and balance deficits 2/2 her MS diagnosis.  Transferring:   Independent  Wheelchair:   Shanna is fully ambulatory.  However, she will be offered a manual WC for any off site group outings for energy conservation and falls prevention.  Toileting:   Staff assists Shanna in the bathroom 1:1 for supervision and provides VC for self-hygiene and garment management after toilet use and hand hygiene.  Maintenance Program:     NuStep  Living Arrangements:   Lives with family  Spiritual Needs: Needs are being met through support group  Medication Assistance:   Medication not taken during program hours  Participation Report:   Aerobics/Exercise  Support Group  Cognitive Stimulation  Fire Drill  Creative Arts/Crafts  Games  Gardening  Speakers/Entertainment  Socialization  Current Events/News  Education/Health Topic  Level of Participation:   [facility] Note:  Shanna's appetite has still been declined.  Seems that is is due to mouth pain.  Her   does know about this.  Autumn enjoys socializing with others at the [facility]

## 2023-10-09 NOTE — PROGRESS NOTES
Individual abuse prevention plan (IAPP)  [facility]     Assessment of Susceptibility to Abuse, Including Self Abuse, Neglect (Identification of characteristics, which make the individual susceptible to abuse, and how these characteristics cause the individual to be susceptible to abuse.)     Is the person susceptible to abuse in each area?  Sexual Abuse:   No  Referrals made when the person is susceptible to abuse outside the scope or control of this program (Identify the referral and date it occurred): No additional risk reduction means needed at this time    Physical Abuse:   No  Referrals made when the person is susceptible to abuse outside the scope or control of this program (Identify the referral and date it occurred): No additional risk reduction means needed at this time    Self-Abuse:   No  Referrals made when the person is susceptible to abuse outside the scope or control of this program (Identify the referral and date it occurred): No additional risk reduction means needed at this time    Financial  Exploitation  No  Referrals made when the person is susceptible to abuse outside the scope or control of this program (Identify the referral and date it occurred): No additional risk reduction means needed at this time    Is the program aware of this person committing a violent crime or act of physical aggression towards others?  No  Referrals made when the person is susceptible to abuse outside the scope or control of this program (Identify the referral and date it occurred): No additional risk reduction means needed at this time    INDIVIDUAL ABUSE PREVENTION PLAN-MEASURES TAKEN TO MINIMIZE RISK OF ABUSE   ADL:   Assist with clothing management  Assist with feeding  Assist with toileting  Staff will provide set up and VC for eating.  Staff will provide 1:1 supervision and set up and VC assist for post toilet use self-hygiene, garment management, and hand  hygiene.  Ambulation/Transfers/Wheelchairs:  Assist with all transfers and/or ambulation  Encourage client to ambulate short distances with walker and provide wheelchair for distance  Ensure client uses cane and/or walker  Provide standby assist due to periods of dizziness, fatigue  Provide standby assist when client ambulates prn   Behavior:   No behavior issues  Communication:  Encourage verbalization of needs/concerns  Observe body language/gestures to assist in anticipating client's needs  Cognitive Issues:   Require aide to be with member if wandering  Provider reminders due to confusion, forgetfulness  Give simple step-by-step direction  Contact caregiver to inform of special activities days  Diet:   Staff will prepare food to facilitate client to feed self  Monitor client when eating and/or drinking fluids   Exercise:   Encourage participation in maintenance program  Encourage participation in wheelchair aerobics  Hearing:   Speak distinctly and use gestures  Isolation:   Encourage socialization due to isolation in home environment  Medical Monitoring:   Monitor physical and emotional comfort  Monitor physical symptoms due to diagnosis and report significant changes to nurse, caregiver and physician   Mental Health:   Motivate client to join in activities that are beneficial to client  Encourage client to express feelings  Monitor anxiety level and intervene when appropriate  Encourage regular attendance for socialization and stimulation  Offer emotional support  Observe for symptoms of depression and notify appropriate staff/caregiver  Encourage independent decision making  Encourage social interaction to assist in increasing client's self-image  Provide client with choices of programming to encourage independent decision making  Provide activities in which client can be successful  Sensory:   Provide and encourage participation in activities for stimulation  Vision:   Provide verbal cues  Other:  N/A    Developed in consultation with:  Client  The Program Abuse Prevention Plan/IAPP identifies the specific actions that minimize abuse to the [facility] participant.  No

## 2023-10-12 ENCOUNTER — HOSPITAL ENCOUNTER (OUTPATIENT)
Dept: REHABILITATION | Facility: CLINIC | Age: 48
Discharge: HOME | End: 2023-10-12
Attending: FAMILY MEDICINE
Payer: COMMERCIAL

## 2023-10-12 PROCEDURE — S5102 ADULT DAY CARE PER DIEM: HCPCS | Performed by: OCCUPATIONAL THERAPIST

## 2023-10-12 PROCEDURE — S5100 ADULT DAYCARE SERVICES 15MIN: HCPCS

## 2023-10-16 ENCOUNTER — HOSPITAL ENCOUNTER (OUTPATIENT)
Dept: REHABILITATION | Facility: CLINIC | Age: 48
Discharge: HOME | End: 2023-10-16
Attending: FAMILY MEDICINE
Payer: COMMERCIAL

## 2023-10-16 PROCEDURE — S5102 ADULT DAY CARE PER DIEM: HCPCS

## 2023-10-16 NOTE — PROGRESS NOTES
[facility] RN Note    Previous documentation since 9/11/23 reviewed.  No concerns reported by AC staff or member.

## 2023-10-19 ENCOUNTER — HOSPITAL ENCOUNTER (OUTPATIENT)
Dept: REHABILITATION | Facility: CLINIC | Age: 48
Discharge: HOME | End: 2023-10-19
Attending: FAMILY MEDICINE
Payer: COMMERCIAL

## 2023-10-19 PROCEDURE — S5102 ADULT DAY CARE PER DIEM: HCPCS

## 2023-10-20 ENCOUNTER — OFFICE VISIT (OUTPATIENT)
Dept: URGENT CARE | Facility: URGENT CARE | Age: 48
End: 2023-10-20
Payer: COMMERCIAL

## 2023-10-20 VITALS
OXYGEN SATURATION: 97 % | BODY MASS INDEX: 18.25 KG/M2 | HEART RATE: 72 BPM | TEMPERATURE: 98.2 F | SYSTOLIC BLOOD PRESSURE: 107 MMHG | DIASTOLIC BLOOD PRESSURE: 72 MMHG | WEIGHT: 120 LBS

## 2023-10-20 DIAGNOSIS — R30.0 DYSURIA: Primary | ICD-10-CM

## 2023-10-20 LAB
ALBUMIN UR-MCNC: NEGATIVE MG/DL
APPEARANCE UR: ABNORMAL
BACTERIA #/AREA URNS HPF: ABNORMAL /HPF
BILIRUB UR QL STRIP: NEGATIVE
COLOR UR AUTO: YELLOW
GLUCOSE UR STRIP-MCNC: NEGATIVE MG/DL
HGB UR QL STRIP: ABNORMAL
KETONES UR STRIP-MCNC: NEGATIVE MG/DL
LEUKOCYTE ESTERASE UR QL STRIP: ABNORMAL
NITRATE UR QL: NEGATIVE
PH UR STRIP: 7 [PH] (ref 5–7)
RBC #/AREA URNS AUTO: ABNORMAL /HPF
SP GR UR STRIP: 1.01 (ref 1–1.03)
UROBILINOGEN UR STRIP-ACNC: 0.2 E.U./DL
WBC #/AREA URNS AUTO: ABNORMAL /HPF

## 2023-10-20 PROCEDURE — 87086 URINE CULTURE/COLONY COUNT: CPT | Performed by: NURSE PRACTITIONER

## 2023-10-20 PROCEDURE — 99213 OFFICE O/P EST LOW 20 MIN: CPT | Performed by: NURSE PRACTITIONER

## 2023-10-20 PROCEDURE — 87088 URINE BACTERIA CULTURE: CPT | Performed by: NURSE PRACTITIONER

## 2023-10-20 PROCEDURE — 81001 URINALYSIS AUTO W/SCOPE: CPT

## 2023-10-20 NOTE — PATIENT INSTRUCTIONS
Urine with slight discrepancies, added on urine culture to check for true organism growth  Recommend pushing fluids and avoiding bladder irritants  Follow-up with urologist in regards to estradiol cream  Reevaluation if fevers flank pain vomiting

## 2023-10-20 NOTE — PROGRESS NOTES
Chief Complaint   Patient presents with    Urgent Care    UTI     C/O dysuria for 1 week     SUBJECTIVE:   Shanna Shanks is a 48 year old female who presents today for a possible UTI.   She has symptoms of dysuria cloudy urine and frequency that have been going on for 1 week(s).    Symptom timing described as gradual onset and mild in severity.    This patient does have a history of urinary tract infections.  There is no history of hematuria, flank pain, fever, chills, nausea or vomiting.   Patient is followed by urology put on estradiol cream recently.    Past Medical History:   Diagnosis Date    Multiple sclerosis (H) 03/02/1996    last exacerbation 3yrs ago, no meds x 6 months    Seizure disorder (H)     Tobacco dependency      Current Outpatient Medications   Medication Sig Dispense Refill    atorvastatin (LIPITOR) 40 MG tablet Take 1 tablet (40 mg) by mouth daily      baclofen (LIORESAL) 10 MG tablet Take 10-20 mg by mouth 4 times daily as needed   3    CRANBERRY EXTRACT PO Take 1 tablet by mouth daily      dimethyl fumarate (TECFIDERA) delayed release capsule Take 240 mg by mouth 2 times daily       estradiol (ESTRACE VAGINAL) 0.1 MG/GM vaginal cream Apply small amount to the vaginal opening and urethra M, W, F @ h.s. 42.5 g 3    estradiol (ESTRACE VAGINAL) 0.1 MG/GM vaginal cream Apply small amount to the vaginal opening and urethra M, W, F @ h.s. 42.5 g 3    gabapentin (GRALISE) 300 MG 24 hr tablet Take by mouth daily (with dinner)      levETIRAcetam (KEPPRA) 250 MG tablet Take 1 tablet (250 mg) by mouth 2 times daily 60 tablet 0    levETIRAcetam 1000 MG TABS Take 1,000 mg by mouth 2 times daily 60 tablet 1    MELATONIN PO Take 1 tablet by mouth as needed      order for DME Equipment being ordered: four wheeled walker with seat and brakes 1 Units 0    oxybutynin (DITROPAN-XL) 5 MG 24 hr tablet Take 5 mg by mouth daily      venlafaxine (EFFEXOR-ER) 75 MG 24 hr tablet Take 75 mg by mouth daily       VITAMIN D, CHOLECALCIFEROL, PO Take 1,000 Units by mouth daily       Social History     Tobacco Use    Smoking status: Former     Packs/day: .25     Types: Cigarettes     Passive exposure: Never    Smokeless tobacco: Never    Tobacco comments:     quit in 2017   Substance Use Topics    Alcohol use: No     Alcohol/week: 5.8 standard drinks of alcohol     Types: 7 Standard drinks or equivalent per week     Allergies   Allergen Reactions    Antihistamines, Diphenhydramine-Type Rash     benadryl cream    Nickel     Macrobid [Nitrofurantoin] Rash       Review of Systems  All systems negative except for those listed above in HPI.    OBJECTIVE:  /72   Pulse 72   Temp 98.2  F (36.8  C) (Tympanic)   Wt 54.4 kg (120 lb)   SpO2 97%   BMI 18.25 kg/m      Physical Exam  Vitals reviewed.   Constitutional:       General: She is not in acute distress.     Appearance: Normal appearance. She is well-developed. She is not ill-appearing, toxic-appearing or diaphoretic.   Cardiovascular:      Pulses: Normal pulses.   Pulmonary:      Effort: Pulmonary effort is normal.   Abdominal:      General: Bowel sounds are normal.      Palpations: Abdomen is soft.      Tenderness: There is no abdominal tenderness. There is no right CVA tenderness or left CVA tenderness.   Musculoskeletal:         General: Normal range of motion.   Skin:     General: Skin is warm and dry.      Capillary Refill: Capillary refill takes less than 2 seconds.      Coloration: Skin is not pale.   Neurological:      General: No focal deficit present.      Mental Status: She is alert and oriented to person, place, and time.   Psychiatric:         Mood and Affect: Mood normal.         Behavior: Behavior normal.       Results for orders placed or performed in visit on 10/20/23   UA Macroscopic with reflex to Microscopic and Culture - Clinic Collect     Status: Abnormal    Specimen: Urine, Clean Catch   Result Value Ref Range    Color Urine Yellow Colorless, Straw,  Light Yellow, Yellow    Appearance Urine Slightly Cloudy (A) Clear    Glucose Urine Negative Negative mg/dL    Bilirubin Urine Negative Negative    Ketones Urine Negative Negative mg/dL    Specific Gravity Urine 1.010 1.003 - 1.035    Blood Urine Trace (A) Negative    pH Urine 7.0 5.0 - 7.0    Protein Albumin Urine Negative Negative mg/dL    Urobilinogen Urine 0.2 0.2, 1.0 E.U./dL    Nitrite Urine Negative Negative    Leukocyte Esterase Urine Small (A) Negative   UA Microscopic with Reflex to Culture     Status: Abnormal   Result Value Ref Range    Bacteria Urine Few (A) None Seen /HPF    RBC Urine None Seen 0-2 /HPF /HPF    WBC Urine 0-5 0-5 /HPF /HPF    Narrative    [facility];  QNS  Urine Culture not indicated     ASSESSMENT:    ICD-10-CM    1. Dysuria  R30.0 UA Macroscopic with reflex to Microscopic and Culture - Clinic Collect     UA Microscopic with Reflex to Culture     Urine Culture Aerobic Bacterial - lab collect     Urine Culture Aerobic Bacterial - lab collect        PLAN:     Urine with slight discrepancies, added on urine culture to check for true organism growth  Recommend pushing fluids and avoiding bladder irritants  Follow-up with urologist in regards to estradiol cream  Reevaluation if fevers flank pain vomiting    Follow up with primary care provider with any problems, questions or concerns or if symptoms worsen or fail to improve. Patient agreed to plan and verbalized understanding.    Kristin Su, EARNEST-BC  [facility]

## 2023-10-23 ENCOUNTER — HOSPITAL ENCOUNTER (OUTPATIENT)
Dept: REHABILITATION | Facility: CLINIC | Age: 48
Discharge: HOME | End: 2023-10-23
Attending: FAMILY MEDICINE
Payer: COMMERCIAL

## 2023-10-23 LAB — BACTERIA UR CULT: ABNORMAL

## 2023-10-23 PROCEDURE — S5102 ADULT DAY CARE PER DIEM: HCPCS

## 2023-10-23 RX ORDER — CEPHALEXIN 500 MG/1
500 CAPSULE ORAL 2 TIMES DAILY
Qty: 14 CAPSULE | Refills: 0 | Status: SHIPPED | OUTPATIENT
Start: 2023-10-23 | End: 2023-10-30

## 2023-10-23 NOTE — PROGRESS NOTES
"[facility] Center Note:  Home and Community Based Services Outing     Destination:  [facility]                         [address], [place] [address]     Description of Outing:  Staff assisted members onto [employer] accessible bus and secured members and their adaptive functional mobility equipment inside the bus for safety.  Upon arrival and entrance into the Center, members (6) and staff (2) participated in a guided tour of [facility]'s 2 currated exhibits and learned about the featured artists and [facility]'s guiding principals and objectives.   Staff and members were then guided to the juan r wheel room where Camila from [facility] provided an in depth educational demonstration of using a juan r wheel.  Camila made a bowl and a cylinder on the wheel while providing education and step by step instruction.  Staff member then volunteered to make a large mug on the juan r wheel as members encouraged him.  Staff then assisted members to exit the building and reloading the accessible bus and [place] [facility] [place]         Member Response:  All members in attendance report that they enjoyed the experience.  Especially the juan r wheel demonstration.  \"Fantastic\", \"We thought it was good and next time we will ask them to show us the kiln.\", \"I liked it, it was fun watching Huseyin play in the dirt.\"     "

## 2023-10-26 ENCOUNTER — HOSPITAL ENCOUNTER (OUTPATIENT)
Dept: REHABILITATION | Facility: CLINIC | Age: 48
Discharge: HOME | End: 2023-10-26
Attending: FAMILY MEDICINE
Payer: COMMERCIAL

## 2023-10-26 PROCEDURE — S5102 ADULT DAY CARE PER DIEM: HCPCS

## 2023-11-06 ENCOUNTER — HOSPITAL ENCOUNTER (OUTPATIENT)
Dept: REHABILITATION | Facility: CLINIC | Age: 48
Discharge: HOME | End: 2023-11-06
Attending: FAMILY MEDICINE
Payer: COMMERCIAL

## 2023-11-06 PROCEDURE — S5102 ADULT DAY CARE PER DIEM: HCPCS

## 2023-11-09 ENCOUNTER — HOSPITAL ENCOUNTER (OUTPATIENT)
Dept: REHABILITATION | Facility: CLINIC | Age: 48
Discharge: HOME | End: 2023-11-09
Attending: FAMILY MEDICINE
Payer: COMMERCIAL

## 2023-11-09 PROCEDURE — S5102 ADULT DAY CARE PER DIEM: HCPCS

## 2023-11-13 ENCOUNTER — HOSPITAL ENCOUNTER (OUTPATIENT)
Dept: REHABILITATION | Facility: CLINIC | Age: 48
Discharge: HOME | End: 2023-11-13
Attending: FAMILY MEDICINE
Payer: COMMERCIAL

## 2023-11-13 PROCEDURE — S5102 ADULT DAY CARE PER DIEM: HCPCS

## 2023-11-16 ENCOUNTER — HOSPITAL ENCOUNTER (OUTPATIENT)
Dept: REHABILITATION | Facility: CLINIC | Age: 48
Discharge: HOME | End: 2023-11-16
Attending: FAMILY MEDICINE
Payer: COMMERCIAL

## 2023-11-16 PROCEDURE — S5102 ADULT DAY CARE PER DIEM: HCPCS

## 2023-11-20 ENCOUNTER — HOSPITAL ENCOUNTER (OUTPATIENT)
Dept: REHABILITATION | Facility: CLINIC | Age: 48
Discharge: HOME | End: 2023-11-20
Attending: FAMILY MEDICINE
Payer: COMMERCIAL

## 2023-11-20 PROCEDURE — S5102 ADULT DAY CARE PER DIEM: HCPCS

## 2023-11-20 NOTE — PROGRESS NOTES
[facility] RN Note    Previous documentation since 10/16/23 reviewed.  No concerns reported by AC staff or member.

## 2023-11-24 NOTE — PROGRESS NOTES
MONTHLY PROGRESS NOTE AND PARTICIPATION REPORT   [facility]    Shanna Shanks, 1975  Attendance: Please see Epic for attendance record.    Communication:   Does communicate   Hearing:   No impairment  Vision:   No impairment  Orientation/Cognition:   Minor forgetfulness  Partial disorientation  Short term memory loss  Behavior:   No behavioral concerns  Self-Preservation Skills:   Shanna presents with MOD cogntive impairment and slow shuffled gait with balance deficits during ambulation 2/2 her MS diagnosis.  She requires staff instructions and assistance for self preservatioin.    Eating:   Assist with set up  Shanna requires verbal cues from staff ~75% of the time to initiate and continue eathing activities during lunch service D/2 her cognitive impairment.  Ambulation Walking:   Needs assistance  Staff provides Shanna a site owned 4WW for safe ambulation during program attendance for falls prevention D/2 her short slow shuffled gait and balance deficits 2/2 her MS diagnosis.  Transferring:   Independent  Wheelchair:   Shanna is fully ambulatory.  However, she will be offered a manual WC for any off site group outings for energy conservation and falls prevention.  Toileting:   Staff assists Shanna in the bathroom 1:1 for supervision and provides VC for self-hygiene and garment management after toilet use and hand hygiene.  Maintenance Program:     NuStep  Living Arrangements:   Lives with family  Spiritual Needs: Needs are being met through support group  Medication Assistance:   Medication not taken during program hours  Participation Report:   Aerobics/Exercise  Support Group  Cognitive Stimulation  Fire Drill  Creative Arts/Crafts  Games  Gardening  Speakers/Entertainment  Socialization  Current Events/News  Education/Health Topic  Level of Participation:   [facility] Note:  Shanna has seemed more fatigued in the past month. Shanna no longer complains of jaw pain.  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

## 2023-11-27 ENCOUNTER — HOSPITAL ENCOUNTER (OUTPATIENT)
Dept: REHABILITATION | Facility: CLINIC | Age: 48
Discharge: HOME | End: 2023-11-27
Attending: FAMILY MEDICINE
Payer: COMMERCIAL

## 2023-11-27 PROCEDURE — S5102 ADULT DAY CARE PER DIEM: HCPCS

## 2023-11-30 ENCOUNTER — HOSPITAL ENCOUNTER (OUTPATIENT)
Dept: REHABILITATION | Facility: CLINIC | Age: 48
Discharge: HOME | End: 2023-11-30
Attending: FAMILY MEDICINE
Payer: COMMERCIAL

## 2023-11-30 PROCEDURE — S5102 ADULT DAY CARE PER DIEM: HCPCS

## 2023-12-04 ENCOUNTER — HOSPITAL ENCOUNTER (OUTPATIENT)
Dept: REHABILITATION | Facility: CLINIC | Age: 48
Discharge: HOME | End: 2023-12-04
Attending: FAMILY MEDICINE
Payer: COMMERCIAL

## 2023-12-04 PROCEDURE — S5102 ADULT DAY CARE PER DIEM: HCPCS

## 2023-12-04 NOTE — PROGRESS NOTES
[facility] RN Note    Previous documentation since 11/20/23 reviewed.  No concerns reported by AC staff or member.

## 2023-12-07 ENCOUNTER — HOSPITAL ENCOUNTER (OUTPATIENT)
Dept: REHABILITATION | Facility: CLINIC | Age: 48
Discharge: HOME | End: 2023-12-07
Attending: FAMILY MEDICINE
Payer: COMMERCIAL

## 2023-12-07 PROCEDURE — S5102 ADULT DAY CARE PER DIEM: HCPCS

## 2023-12-11 ENCOUNTER — HOSPITAL ENCOUNTER (OUTPATIENT)
Dept: REHABILITATION | Facility: CLINIC | Age: 48
Discharge: HOME | End: 2023-12-11
Attending: FAMILY MEDICINE
Payer: COMMERCIAL

## 2023-12-11 PROCEDURE — S5102 ADULT DAY CARE PER DIEM: HCPCS

## 2023-12-14 ENCOUNTER — HOSPITAL ENCOUNTER (OUTPATIENT)
Dept: REHABILITATION | Facility: CLINIC | Age: 48
Discharge: HOME | End: 2023-12-14
Attending: FAMILY MEDICINE
Payer: COMMERCIAL

## 2023-12-14 PROCEDURE — S5102 ADULT DAY CARE PER DIEM: HCPCS

## 2023-12-18 ENCOUNTER — HOSPITAL ENCOUNTER (OUTPATIENT)
Dept: REHABILITATION | Facility: CLINIC | Age: 48
Discharge: HOME | End: 2023-12-18
Attending: FAMILY MEDICINE
Payer: COMMERCIAL

## 2023-12-18 PROCEDURE — S5102 ADULT DAY CARE PER DIEM: HCPCS

## 2023-12-21 ENCOUNTER — HOSPITAL ENCOUNTER (OUTPATIENT)
Dept: REHABILITATION | Facility: CLINIC | Age: 48
Discharge: HOME | End: 2023-12-21
Attending: FAMILY MEDICINE
Payer: COMMERCIAL

## 2023-12-21 PROCEDURE — S5102 ADULT DAY CARE PER DIEM: HCPCS

## 2023-12-21 NOTE — PROGRESS NOTES
MONTHLY PROGRESS NOTE AND PARTICIPATION REPORT   [facility]    Shanna Shanks, 1975  Attendance: Please see Epic for attendance record.    Communication:   Does communicate   Hearing:   No impairment  Vision:   No impairment  Orientation/Cognition:   Minor forgetfulness  Partial disorientation  Short term memory loss  Behavior:   No behavioral concerns  Self-Preservation Skills:   Shanna presents with MOD cogntive impairment and slow shuffled gait with balance deficits during ambulation 2/2 her MS diagnosis.  She requires staff instructions and assistance for self preservatioin.    Eating:   Assist with set up  Shanna requires verbal cues from staff ~75% of the time to initiate and continue eathing activities during lunch service D/2 her cognitive impairment.  Ambulation Walking:   Needs assistance  Staff provides hSanna a site owned 4WW for safe ambulation during program attendance for falls prevention D/2 her short slow shuffled gait and balance deficits 2/2 her MS diagnosis.  Transferring:   Independent  Wheelchair:   Shanna is fully ambulatory.  However, she will be offered a manual WC for any off site group outings for energy conservation and falls prevention.  Toileting:   Staff assists Shanna in the bathroom 1:1 for supervision and provides VC for self-hygiene and garment management after toilet use and hand hygiene.  Maintenance Program:     NuStep  Living Arrangements:   Lives with family  Spiritual Needs: Needs are being met through support group  Medication Assistance:   Medication not taken during program hours  Participation Report:   Aerobics/Exercise  Support Group  Cognitive Stimulation  Fire Drill  Creative Arts/Crafts  Games  Gardening  Speakers/Entertainment  Socialization  Current Events/News  Education/Health Topic  Level of Participation:   [facility] Note:  Shanna works diligently on ceramic paintings during art time as long as she is encouraged  by staff. Shanna enjoys joking around with everyone in the program. Shanna picked out gifts for her  and grandchild at the [facility].

## 2024-01-04 ENCOUNTER — PATIENT OUTREACH (OUTPATIENT)
Dept: CARE COORDINATION | Facility: CLINIC | Age: 49
End: 2024-01-04
Payer: COMMERCIAL

## 2024-01-08 ENCOUNTER — HOSPITAL ENCOUNTER (OUTPATIENT)
Dept: REHABILITATION | Facility: CLINIC | Age: 49
Discharge: HOME | End: 2024-01-08
Attending: FAMILY MEDICINE
Payer: COMMERCIAL

## 2024-01-08 PROCEDURE — S5102 ADULT DAY CARE PER DIEM: HCPCS

## 2024-01-08 NOTE — PROGRESS NOTES
[facility] RN Note    Previous documentation since 12/4/23 reviewed.  No concerns reported by AC staff or member.

## 2024-01-11 ENCOUNTER — HOSPITAL ENCOUNTER (OUTPATIENT)
Dept: REHABILITATION | Facility: CLINIC | Age: 49
Discharge: HOME | End: 2024-01-11
Attending: FAMILY MEDICINE
Payer: COMMERCIAL

## 2024-01-11 PROCEDURE — S5102 ADULT DAY CARE PER DIEM: HCPCS

## 2024-01-11 PROCEDURE — S5100 ADULT DAYCARE SERVICES 15MIN: HCPCS

## 2024-01-11 NOTE — PROGRESS NOTES
Individual abuse prevention plan (IAPP)  [facility]     Assessment of Susceptibility to Abuse, Including Self Abuse, Neglect (Identification of characteristics, which make the individual susceptible to abuse, and how these characteristics cause the individual to be susceptible to abuse.)     Is the person susceptible to abuse in each area?  Sexual Abuse:   No  Referrals made when the person is susceptible to abuse outside the scope or control of this program (Identify the referral and date it occurred): No additional risk reduction means needed at this time    Physical Abuse:   No  Referrals made when the person is susceptible to abuse outside the scope or control of this program (Identify the referral and date it occurred): No additional risk reduction means needed at this time    Self-Abuse:   No  Referrals made when the person is susceptible to abuse outside the scope or control of this program (Identify the referral and date it occurred): No additional risk reduction means needed at this time    Financial  Exploitation  No  Referrals made when the person is susceptible to abuse outside the scope or control of this program (Identify the referral and date it occurred): No additional risk reduction means needed at this time    Is the program aware of this person committing a violent crime or act of physical aggression towards others?  No  Referrals made when the person is susceptible to abuse outside the scope or control of this program (Identify the referral and date it occurred): No additional risk reduction means needed at this time    INDIVIDUAL ABUSE PREVENTION PLAN-MEASURES TAKEN TO MINIMIZE RISK OF ABUSE   ADL:   Assist with clothing management  Assist with feeding  Assist with toileting  Staff will provide set up and VC for eating.  Staff will provide 1:1 supervision and set up and VC assist for post toilet use self-hygiene, garment management, and hand  hygiene.  Ambulation/Transfers/Wheelchairs:  Assist with all transfers and/or ambulation  Encourage client to ambulate short distances with walker and provide wheelchair for distance  Ensure client uses cane and/or walker  Provide standby assist due to periods of dizziness, fatigue  Provide standby assist when client ambulates prn   Behavior:   No behavior issues  Communication:  Encourage verbalization of needs/concerns  Observe body language/gestures to assist in anticipating client's needs  Cognitive Issues:   Require aide to be with member if wandering  Provider reminders due to confusion, forgetfulness  Give simple step-by-step direction  Contact caregiver to inform of special activities days  Diet:   Staff will prepare food to facilitate client to feed self  Monitor client when eating and/or drinking fluids   Exercise:   Encourage participation in maintenance program  Encourage participation in wheelchair aerobics  Hearing:   Speak distinctly and use gestures  Isolation:   Encourage socialization due to isolation in home environment  Medical Monitoring:   Monitor physical and emotional comfort  Monitor physical symptoms due to diagnosis and report significant changes to nurse, caregiver and physician   Mental Health:   Motivate client to join in activities that are beneficial to client  Encourage client to express feelings  Monitor anxiety level and intervene when appropriate  Encourage regular attendance for socialization and stimulation  Offer emotional support  Observe for symptoms of depression and notify appropriate staff/caregiver  Encourage independent decision making  Encourage social interaction to assist in increasing client's self-image  Provide client with choices of programming to encourage independent decision making  Provide activities in which client can be successful  Sensory:   Provide and encourage participation in activities for stimulation  Vision:   Provide verbal cues  Other:  N/A    Developed in consultation with:  Client  The Program Abuse Prevention Plan/IAPP identifies the specific actions that minimize abuse to the [facility] participant.  No

## 2024-01-11 NOTE — PROGRESS NOTES
INDIVIDUAL PLAN OF CARE   [facility]    Member Name: Shanna Shanks; YOB: 1975  MRN: [id number]  3/31/2022    Goals developed in collaboration with: member  Staff responsible for plan: Analisa LOWE  1. Long Term Goal (concrete, measurable, and time specific outcomes):  Member will maintain present level of physical and cognitive function through active participation in structured [facility] programming with staff set up, facilitation, and supervision provided every day of program attendance.  Target Date:  January 2024  Bi-Annual Review:  Goal remains appropriate.    2. Short Term Goal: (concrete, measurable, and time specific outcomes):  To maintain physical strength and endurance, member will actively participate in prescribed Nu-Step exercise program, with set up, VC's and supervision provided by staff, for at least 20 minutes, at least 1x/week during program attendance.  Target Date:  July 2024  Bi-Annual Review:  Goal remains appropriate.  Shanna requires VC's and positive reinforcement to participate in Nu Step activity.    3. Short Term Goal: (concrete, measurable, and time specific outcomes):  To maintain current level of cognitive function, member will actively participate in therapeutic creative arts and Brain/Body programming with set up, VC's and supervision provided by staff every day of program attendance.  Target Date:  July 2024  Bi-Annual Review:  Goal remains appropriate.

## 2024-01-11 NOTE — PROGRESS NOTES
MONTHLY PROGRESS NOTE AND PARTICIPATION REPORT   [facility]    Shanna Shanks, 1975  Attendance: Please see Epic for attendance record.    Communication:   Does communicate   Hearing:   No impairment  Vision:   No impairment  Orientation/Cognition:   Minor forgetfulness  Partial disorientation  Short term memory loss  Behavior:   No behavioral concerns  Self-Preservation Skills:   Shanna presents with MOD cogntive impairment and slow shuffled gait with balance deficits during ambulation 2/2 her MS diagnosis.  She requires staff instructions and assistance for self preservatioin.    Eating:   Assist with set up  Shanna requires verbal cues from staff ~75% of the time to initiate and continue eathing activities during lunch service D/2 her cognitive impairment.  Ambulation Walking:   Needs assistance  Staff provides Shanna a site owned 4WW for safe ambulation during program attendance for falls prevention D/2 her short slow shuffled gait and balance deficits 2/2 her MS diagnosis.  Transferring:   Independent  Wheelchair:   Shanna is fully ambulatory.  However, she will be offered a manual WC for any off site group outings for energy conservation and falls prevention.  Toileting:   Staff assists Shanna in the bathroom 1:1 for supervision and provides VC for self-hygiene and garment management after toilet use and hand hygiene.  Maintenance Program:     NuStep  Living Arrangements:   Lives with family  Spiritual Needs: Needs are being met through support group  Medication Assistance:   Medication not taken during program hours  Participation Report:   Aerobics/Exercise  Support Group  Cognitive Stimulation  Fire Drill  Creative Arts/Crafts  Games  Gardening  Speakers/Entertainment  Socialization  Current Events/News  Education/Health Topic  Level of Participation:   [facility] Note:  Shanna seems to enjoy herself when at the [facility].  Shanna socializes with peers and  participates in group activities.

## 2024-01-12 ENCOUNTER — OFFICE VISIT (OUTPATIENT)
Dept: URGENT CARE | Facility: URGENT CARE | Age: 49
End: 2024-01-12
Payer: COMMERCIAL

## 2024-01-12 VITALS
WEIGHT: 115 LBS | TEMPERATURE: 97.7 F | RESPIRATION RATE: 16 BRPM | SYSTOLIC BLOOD PRESSURE: 98 MMHG | HEIGHT: 68 IN | HEART RATE: 58 BPM | BODY MASS INDEX: 17.43 KG/M2 | OXYGEN SATURATION: 100 % | DIASTOLIC BLOOD PRESSURE: 66 MMHG

## 2024-01-12 DIAGNOSIS — N30.01 ACUTE CYSTITIS WITH HEMATURIA: Primary | ICD-10-CM

## 2024-01-12 DIAGNOSIS — Z87.440 HISTORY OF RECURRENT UTIS: ICD-10-CM

## 2024-01-12 PROBLEM — B00.4 HERPES ENCEPHALITIS: Status: RESOLVED | Noted: 2017-07-30 | Resolved: 2024-01-12

## 2024-01-12 LAB
ALBUMIN UR-MCNC: 100 MG/DL
AMORPH CRY #/AREA URNS HPF: ABNORMAL /HPF
APPEARANCE UR: ABNORMAL
BACTERIA #/AREA URNS HPF: ABNORMAL /HPF
BILIRUB UR QL STRIP: ABNORMAL
COLOR UR AUTO: ABNORMAL
GLUCOSE UR STRIP-MCNC: 100 MG/DL
HGB UR QL STRIP: ABNORMAL
KETONES UR STRIP-MCNC: 15 MG/DL
LEUKOCYTE ESTERASE UR QL STRIP: ABNORMAL
MUCOUS THREADS #/AREA URNS LPF: PRESENT /LPF
NITRATE UR QL: POSITIVE
PH UR STRIP: 7 [PH] (ref 5–7)
RBC #/AREA URNS AUTO: ABNORMAL /HPF
SP GR UR STRIP: 1.02 (ref 1–1.03)
SQUAMOUS #/AREA URNS AUTO: ABNORMAL /LPF
TRANS CELLS #/AREA URNS HPF: ABNORMAL /HPF
UROBILINOGEN UR STRIP-ACNC: 1 E.U./DL
WBC #/AREA URNS AUTO: ABNORMAL /HPF
WBC CLUMPS #/AREA URNS HPF: PRESENT /HPF

## 2024-01-12 PROCEDURE — 81001 URINALYSIS AUTO W/SCOPE: CPT | Performed by: STUDENT IN AN ORGANIZED HEALTH CARE EDUCATION/TRAINING PROGRAM

## 2024-01-12 PROCEDURE — 87086 URINE CULTURE/COLONY COUNT: CPT | Performed by: STUDENT IN AN ORGANIZED HEALTH CARE EDUCATION/TRAINING PROGRAM

## 2024-01-12 PROCEDURE — 99213 OFFICE O/P EST LOW 20 MIN: CPT | Performed by: STUDENT IN AN ORGANIZED HEALTH CARE EDUCATION/TRAINING PROGRAM

## 2024-01-12 RX ORDER — SULFAMETHOXAZOLE/TRIMETHOPRIM 800-160 MG
1 TABLET ORAL 2 TIMES DAILY
Qty: 10 TABLET | Refills: 0 | Status: SHIPPED | OUTPATIENT
Start: 2024-01-12 | End: 2024-01-17

## 2024-01-12 NOTE — PROGRESS NOTES
Assessment & Plan     Acute cystitis with hematuria  History of recurrent UTIs  - UA Macroscopic with reflex to Microscopic and Culture - Clinic Collect  - Urine Microscopic Exam  - Urine Culture  - sulfamethoxazole-trimethoprim (BACTRIM DS) 800-160 MG tablet  Dispense: 10 tablet; Refill: 0    Medical Decision Making  48-year-old female with history of MS, recurrent UTIs, last treated with Keflex 10/23/2023.  UA today nitrite + so we will treat with Bactrim.  My exam is reassuring against kidney stones or pyelonephritis. Low blood pressure stable from past visits, she does not appear hypovolemic on exam and low concern for sepsis. Patient has plans to follow-up with urologist.  ED and return precautions provided.    Patient instructions provided in AVS.      No follow-ups on file.    Jillian Nicholson, DO  she/her  [facility]    Subjective     Shanna Yazmin Shanks is a 48 year old female who presents to clinic today for the following health issues:    HPI  Last UTI 10/23, grew Aerococcus urinae and treated with Cephalexin  Saw urology 9/23 for recurrent UTIs.  CT urogram completed.  Restarted Estrace 3 times weekly (having a hard time with application) and stopped oxybutynin    Today, here for concern of UTI, symptoms dark urine with possible blood, urgency and leaking. 1 week ago worsening  No fever, new back pain, suprapubic pain    Past Medical History:   Diagnosis Date    Multiple sclerosis (H) 03/02/1996    last exacerbation 3yrs ago, no meds x 6 months    Seizure disorder (H)     Tobacco dependency      Allergies   Allergen Reactions    Antihistamines, Diphenhydramine-Type Rash     benadryl cream    Nickel     Macrobid [Nitrofurantoin] Rash     Current Outpatient Medications   Medication    atorvastatin (LIPITOR) 40 MG tablet    CRANBERRY EXTRACT PO    dimethyl fumarate (TECFIDERA) delayed release capsule    estradiol (ESTRACE VAGINAL) 0.1 MG/GM vaginal cream    gabapentin (GRALISE) 300 MG 24  "hr tablet    levETIRAcetam (KEPPRA) 250 MG tablet    MELATONIN PO    sulfamethoxazole-trimethoprim (BACTRIM DS) 800-160 MG tablet    VITAMIN D, CHOLECALCIFEROL, PO    baclofen (LIORESAL) 10 MG tablet    estradiol (ESTRACE VAGINAL) 0.1 MG/GM vaginal cream    levETIRAcetam 1000 MG TABS    order for DME    oxybutynin (DITROPAN-XL) 5 MG 24 hr tablet    venlafaxine (EFFEXOR-ER) 75 MG 24 hr tablet     No current facility-administered medications for this visit.      Review of Systems  Constitutional, HEENT, cardiovascular, pulmonary, gi and gu systems are negative, except as otherwise noted.      Objective    BP 98/66   Pulse 58   Temp 97.7  F (36.5  C) (Temporal)   Resp 16   Ht 1.727 m (5' 8\")   Wt 52.2 kg (115 lb)   SpO2 100%   BMI 17.49 kg/m      Physical Exam   General: in a wheel chair, present with , thin, interactive with provider  : no CVA tenderness, no suprapubic tenderness    Results for orders placed or performed in visit on 01/12/24   UA Macroscopic with reflex to Microscopic and Culture - Clinic Collect     Status: Abnormal    Specimen: Urine, Midstream   Result Value Ref Range    Color Urine Orange (A) Colorless, Straw, Light Yellow, Yellow    Appearance Urine Cloudy (A) Clear    Glucose Urine 100 (A) Negative mg/dL    Bilirubin Urine Small (A) Negative    Ketones Urine 15 (A) Negative mg/dL    Specific Gravity Urine 1.020 1.003 - 1.035    Blood Urine Small (A) Negative    pH Urine 7.0 5.0 - 7.0    Protein Albumin Urine 100 (A) Negative mg/dL    Urobilinogen Urine 1.0 0.2, 1.0 E.U./dL    Nitrite Urine Positive (A) Negative    Leukocyte Esterase Urine Large (A) Negative   Urine Microscopic Exam     Status: Abnormal   Result Value Ref Range    Bacteria Urine Many (A) None Seen /HPF    RBC Urine 0-2 0-2 /HPF /HPF    WBC Urine  (A) 0-5 /HPF /HPF    Squamous Epithelials Urine Few (A) None Seen /LPF    WBC Clumps Urine Present (A) None Seen /HPF    Transitional Epithelials Urine Few (A) None " Seen /HPF    Mucus Urine Present (A) None Seen /LPF    Amorphous Crystals Urine Few (A) None Seen /HPF           The use of Dragon/Geeksphone dictation services may have been used to construct the content in this note; any grammatical or spelling errors are non-intentional. Please contact the author of this note directly if you are in need of any clarification.

## 2024-01-12 NOTE — PATIENT INSTRUCTIONS
Patient education:   Return to urgent care if you develop a fever, chills, lower back pain, vomiting.    Complete the full course of antibiotics even if you start to feel better    The urine culture is pending.  Someone will call you if it grows a bacteria that requires a different antibiotic    Take Tylenol, ibuprofen every 6 hours as needed for pain    Stay hydrated.  You can try AZO 3 times a day for bladder pain    Ask urologist about Pyridium during UTI at next appointment

## 2024-01-13 LAB — BACTERIA UR CULT: NORMAL

## 2024-01-15 ENCOUNTER — HOSPITAL ENCOUNTER (OUTPATIENT)
Dept: REHABILITATION | Facility: CLINIC | Age: 49
Discharge: HOME | End: 2024-01-15
Attending: FAMILY MEDICINE
Payer: COMMERCIAL

## 2024-01-15 PROCEDURE — S5102 ADULT DAY CARE PER DIEM: HCPCS

## 2024-01-18 ENCOUNTER — PATIENT OUTREACH (OUTPATIENT)
Dept: CARE COORDINATION | Facility: CLINIC | Age: 49
End: 2024-01-18
Payer: COMMERCIAL

## 2024-01-18 ENCOUNTER — HOSPITAL ENCOUNTER (OUTPATIENT)
Dept: REHABILITATION | Facility: CLINIC | Age: 49
Discharge: HOME | End: 2024-01-18
Attending: FAMILY MEDICINE
Payer: COMMERCIAL

## 2024-01-18 PROCEDURE — S5102 ADULT DAY CARE PER DIEM: HCPCS

## 2024-01-22 ENCOUNTER — HOSPITAL ENCOUNTER (OUTPATIENT)
Dept: REHABILITATION | Facility: CLINIC | Age: 49
Discharge: HOME | End: 2024-01-22
Attending: FAMILY MEDICINE
Payer: COMMERCIAL

## 2024-01-22 PROCEDURE — S5102 ADULT DAY CARE PER DIEM: HCPCS

## 2024-01-29 ENCOUNTER — HOSPITAL ENCOUNTER (OUTPATIENT)
Dept: REHABILITATION | Facility: CLINIC | Age: 49
Discharge: HOME | End: 2024-01-29
Attending: FAMILY MEDICINE
Payer: COMMERCIAL

## 2024-01-29 PROCEDURE — S5102 ADULT DAY CARE PER DIEM: HCPCS

## 2024-02-05 ENCOUNTER — OFFICE VISIT (OUTPATIENT)
Dept: FAMILY MEDICINE | Facility: CLINIC | Age: 49
End: 2024-02-05
Payer: COMMERCIAL

## 2024-02-05 VITALS
OXYGEN SATURATION: 97 % | BODY MASS INDEX: 16.52 KG/M2 | HEART RATE: 89 BPM | HEIGHT: 68 IN | DIASTOLIC BLOOD PRESSURE: 53 MMHG | WEIGHT: 109 LBS | RESPIRATION RATE: 16 BRPM | SYSTOLIC BLOOD PRESSURE: 89 MMHG | TEMPERATURE: 97.7 F

## 2024-02-05 DIAGNOSIS — Z13.21 ENCOUNTER FOR VITAMIN DEFICIENCY SCREENING: ICD-10-CM

## 2024-02-05 DIAGNOSIS — E43 SEVERE PROTEIN-CALORIE MALNUTRITION (H): Primary | ICD-10-CM

## 2024-02-05 DIAGNOSIS — N39.0 URINARY TRACT INFECTION WITHOUT HEMATURIA, SITE UNSPECIFIED: Primary | ICD-10-CM

## 2024-02-05 DIAGNOSIS — R63.4 LOSS OF WEIGHT: ICD-10-CM

## 2024-02-05 DIAGNOSIS — Z12.11 COLON CANCER SCREENING: ICD-10-CM

## 2024-02-05 DIAGNOSIS — I95.9 HYPOTENSION, UNSPECIFIED HYPOTENSION TYPE: ICD-10-CM

## 2024-02-05 LAB
ALBUMIN UR-MCNC: 100 MG/DL
APPEARANCE UR: ABNORMAL
BACTERIA #/AREA URNS HPF: ABNORMAL /HPF
BILIRUB UR QL STRIP: ABNORMAL
COLOR UR AUTO: ABNORMAL
ERYTHROCYTE [DISTWIDTH] IN BLOOD BY AUTOMATED COUNT: 14.4 % (ref 10–15)
FERRITIN SERPL-MCNC: 3353 NG/ML (ref 6–175)
GLUCOSE UR STRIP-MCNC: 100 MG/DL
HCT VFR BLD AUTO: 42.9 % (ref 35–47)
HGB BLD-MCNC: 13.9 G/DL (ref 11.7–15.7)
HGB UR QL STRIP: NEGATIVE
KETONES UR STRIP-MCNC: 15 MG/DL
LEUKOCYTE ESTERASE UR QL STRIP: ABNORMAL
MAGNESIUM SERPL-MCNC: 1.9 MG/DL (ref 1.7–2.3)
MCH RBC QN AUTO: 29.5 PG (ref 26.5–33)
MCHC RBC AUTO-ENTMCNC: 32.4 G/DL (ref 31.5–36.5)
MCV RBC AUTO: 91 FL (ref 78–100)
MUCOUS THREADS #/AREA URNS LPF: PRESENT /LPF
NITRATE UR QL: POSITIVE
PH UR STRIP: 6 [PH] (ref 5–7)
PLATELET # BLD AUTO: 254 10E3/UL (ref 150–450)
RBC # BLD AUTO: 4.71 10E6/UL (ref 3.8–5.2)
RBC #/AREA URNS AUTO: ABNORMAL /HPF
SP GR UR STRIP: >=1.03 (ref 1–1.03)
SQUAMOUS #/AREA URNS AUTO: ABNORMAL /LPF
TSH SERPL DL<=0.005 MIU/L-ACNC: 0.63 UIU/ML (ref 0.3–4.2)
UROBILINOGEN UR STRIP-ACNC: 2 E.U./DL
VIT B12 SERPL-MCNC: 2423 PG/ML (ref 232–1245)
VIT D+METAB SERPL-MCNC: 119 NG/ML (ref 20–50)
WBC # BLD AUTO: 5.5 10E3/UL (ref 4–11)
WBC #/AREA URNS AUTO: ABNORMAL /HPF
WBC CLUMPS #/AREA URNS HPF: PRESENT /HPF

## 2024-02-05 PROCEDURE — 99496 TRANSJ CARE MGMT HIGH F2F 7D: CPT | Performed by: INTERNAL MEDICINE

## 2024-02-05 PROCEDURE — 80053 COMPREHEN METABOLIC PANEL: CPT | Performed by: INTERNAL MEDICINE

## 2024-02-05 PROCEDURE — 82306 VITAMIN D 25 HYDROXY: CPT | Performed by: INTERNAL MEDICINE

## 2024-02-05 PROCEDURE — 81001 URINALYSIS AUTO W/SCOPE: CPT | Performed by: INTERNAL MEDICINE

## 2024-02-05 PROCEDURE — 93000 ELECTROCARDIOGRAM COMPLETE: CPT | Performed by: INTERNAL MEDICINE

## 2024-02-05 PROCEDURE — 36415 COLL VENOUS BLD VENIPUNCTURE: CPT | Performed by: INTERNAL MEDICINE

## 2024-02-05 PROCEDURE — 82728 ASSAY OF FERRITIN: CPT | Performed by: INTERNAL MEDICINE

## 2024-02-05 PROCEDURE — 83735 ASSAY OF MAGNESIUM: CPT | Performed by: INTERNAL MEDICINE

## 2024-02-05 PROCEDURE — 85027 COMPLETE CBC AUTOMATED: CPT | Performed by: INTERNAL MEDICINE

## 2024-02-05 PROCEDURE — 87086 URINE CULTURE/COLONY COUNT: CPT | Performed by: INTERNAL MEDICINE

## 2024-02-05 PROCEDURE — 82607 VITAMIN B-12: CPT | Performed by: INTERNAL MEDICINE

## 2024-02-05 PROCEDURE — 84443 ASSAY THYROID STIM HORMONE: CPT | Performed by: INTERNAL MEDICINE

## 2024-02-05 RX ORDER — CEPHALEXIN 500 MG/1
500 CAPSULE ORAL 3 TIMES DAILY
Qty: 15 CAPSULE | Refills: 0 | Status: SHIPPED | OUTPATIENT
Start: 2024-02-05 | End: 2024-02-09

## 2024-02-05 ASSESSMENT — PAIN SCALES - GENERAL: PAINLEVEL: NO PAIN (0)

## 2024-02-05 NOTE — PROGRESS NOTES
Assessment & Plan   Problem List Items Addressed This Visit    None  Visit Diagnoses       Severe protein-calorie malnutrition (H24)    -  Primary    Relevant Orders    CBC with platelets (Completed)    Comprehensive metabolic panel (BMP + Alb, Alk Phos, ALT, AST, Total. Bili, TP)    TSH with free T4 reflex    Magnesium    EKG 12-lead complete w/read - Clinics (Completed)    Nutrition Referral    Urine Microscopic Exam (Completed)    Ferritin    Hypotension, unspecified hypotension type        Relevant Orders    CBC with platelets (Completed)    Comprehensive metabolic panel (BMP + Alb, Alk Phos, ALT, AST, Total. Bili, TP)    TSH with free T4 reflex    EKG 12-lead complete w/read - Clinics (Completed)    Colon cancer screening        Relevant Orders    Fecal colorectal cancer screen (FIT)    Encounter for vitamin deficiency screening        Relevant Orders    Vitamin D Deficiency    Vitamin B12    Loss of weight        Relevant Orders    CBC with platelets (Completed)    Comprehensive metabolic panel (BMP + Alb, Alk Phos, ALT, AST, Total. Bili, TP)    TSH with free T4 reflex    Nutrition Referral    UA with Microscopic reflex to Culture - lab collect (Completed)    Urine Microscopic Exam (Completed)    Urine Culture    Ferritin             Concern for significant protein malnutrition and cachexia.  Unsure etiology at this point, discussed differential is broad including possible underlying organic pathology versus malabsorption versus possible eating disorder versus drug/medication side effects.  Patient does have history of MS and follows with neurology.  She has history of left chin pain she is maintained a very high dose of gabapentin 3200 mg but did not help much with the pain symptoms.  Patient denies being depressed ; PHQ 2 score is 0.  Advised increase calories in the form of protein shakes and referral to nutrition/dietitian.  Significant other will discuss with neurology about possible eating disorder  which I raised the current.  There is no diarrhea.  Patient was very pleasant during the visit, though most of the history was through her significant other.  Patient showed some home flat apathy during the visit, though showed happy demeanor when asked about her 2 children.  Follow-up with neurology and as needed, further recommendation pending lab results.  Patient does have low blood pressure but could be appropriate given her weight.  Patient denies any dizziness, she does have an assistive device/cane for ambulating when needed.  No history of recurrent falls.  Advised need on vitamin D calcium supplement, multivitamin supplements as well as recommend screening for densitometry.  Also stressed importance of getting age-appropriate cancer screening including mammogram and colon cancer screening.  Stool fit test ordered.    Melba Otero is a 48 year old, presenting for the following health issues:  RECHECK         No data to display              History of Present Illness       Reason for visit:  Appetite, lack of  Symptom onset:  More than a month  Symptom intensity:  Moderate  Symptom progression:  Staying the same  Had these symptoms before:  Yes  Has tried/received treatment for these symptoms:  Yes  Previous treatment was successful:  No    She eats 0-1 servings of fruits and vegetables daily.She consumes 1 sweetened beverage(s) daily.She exercises with enough effort to increase her heart rate 9 or less minutes per day.  She exercises with enough effort to increase her heart rate 3 or less days per week.   She is taking medications regularly.     Patient presenting accompanied by her significant other/ [place] for discussion of weight loss concern was decreased appetite/significant weight loss decreased feeding habits, patient was in the 150 pound range now one 109 pound.  She has not undergone routine screening tests such as mammogram and colon cancer screening, though both were ordered by  "PCP/provider.  As per significant other patient was visiting her mother who reported that patient has not been eating well.  Patient denies being depressed.  Denies decreased appetite, denies any vomiting diarrhea or food purging.  Denies any heartburn or reflux symptoms.  Denies any shortness of breath, fevers sweats or other systemic complaints.  Denies any focal weakness.  Denies any rashes joint pains.  Denies any lethargy.  Patient maintained on high-dose gabapentin 3200 mg for history of neuralgia left chin pain that is per significant other did not respond to gabapentin high-dose.  She is also maintained antiepileptic medication Keppra due to history of seizure as well as follows with neurology for history from as.  She is on treatment for such.  Also of note patient was treated for latent tuberculosis back in January 2023 was I denies and rifampin.  Chest x-ray showed no acute disease in January 2023.    Review of Systems  Constitutional, neuro, ENT, endocrine, pulmonary, cardiac, gastrointestinal, genitourinary, musculoskeletal, integument and psychiatric systems are negative, except as otherwise noted.      Objective    BP (!) 89/53   Pulse 89   Temp 97.7  F (36.5  C) (Temporal)   Resp 16   Ht 1.727 m (5' 8\")   Wt 49.4 kg (109 lb)   SpO2 97%   BMI 16.57 kg/m    Body mass index is 16.57 kg/m .  Physical Exam   GENERAL: alert and no distress, thin appearing, cachectic looking, slightly pale, pleasant,  EYES: Eyes grossly normal to inspection, PERRL and conjunctivae and sclerae normal  NECK: no adenopathy, no asymmetry, masses, or scars  RESP: lungs clear to auscultation - no rales, rhonchi or wheezes  CV: regular rate and rhythm, normal S1 S2, no S3 or S4, no murmur, click or rub, no peripheral edema  ABDOMEN: soft, nontender, no hepatosplenomegaly, no masses and bowel sounds normal  MS: no gross musculoskeletal defects noted, no edema  SKIN: no suspicious lesions or rashes  NEURO: Mentation intact " and speech normal, generalized weakness with decreased tone diffuse  PSYCH: mentation appears normal, affect flat, smiles occasionally, poor eye contact, most of the history through her significant other.    Office Visit on 01/12/2024   Component Date Value Ref Range Status    Color Urine 01/12/2024 Orange (A)  Colorless, Straw, Light Yellow, Yellow Final    Appearance Urine 01/12/2024 Cloudy (A)  Clear Final    Glucose Urine 01/12/2024 100 (A)  Negative mg/dL Final    Bilirubin Urine 01/12/2024 Small (A)  Negative Final    Ketones Urine 01/12/2024 15 (A)  Negative mg/dL Final    Specific Gravity Urine 01/12/2024 1.020  1.003 - 1.035 Final    Blood Urine 01/12/2024 Small (A)  Negative Final    pH Urine 01/12/2024 7.0  5.0 - 7.0 Final    Protein Albumin Urine 01/12/2024 100 (A)  Negative mg/dL Final    Urobilinogen Urine 01/12/2024 1.0  0.2, 1.0 E.U./dL Final    Nitrite Urine 01/12/2024 Positive (A)  Negative Final    Leukocyte Esterase Urine 01/12/2024 Large (A)  Negative Final    Bacteria Urine 01/12/2024 Many (A)  None Seen /HPF Final    RBC Urine 01/12/2024 0-2  0-2 /HPF /HPF Final    WBC Urine 01/12/2024  (A)  0-5 /HPF /HPF Final    Squamous Epithelials Urine 01/12/2024 Few (A)  None Seen /LPF Final    WBC Clumps Urine 01/12/2024 Present (A)  None Seen /HPF Final    Transitional Epithelials Urine 01/12/2024 Few (A)  None Seen /HPF Final    Mucus Urine 01/12/2024 Present (A)  None Seen /LPF Final    Amorphous Crystals Urine 01/12/2024 Few (A)  None Seen /HPF Final    Culture 01/12/2024 >100,000 CFU/mL Mixture of Urogenital Sara   Final           Signed Electronically by: Latasha Cavanaugh MD.  Total time spent was 40 minutes review of records addressing multiple health issues documentation and exam.

## 2024-02-06 ENCOUNTER — APPOINTMENT (OUTPATIENT)
Dept: ULTRASOUND IMAGING | Facility: CLINIC | Age: 49
End: 2024-02-06
Attending: EMERGENCY MEDICINE
Payer: COMMERCIAL

## 2024-02-06 ENCOUNTER — HOSPITAL ENCOUNTER (INPATIENT)
Facility: CLINIC | Age: 49
LOS: 2 days | Discharge: HOME | End: 2024-02-08
Attending: PHYSICIAN ASSISTANT | Admitting: INTERNAL MEDICINE
Payer: COMMERCIAL

## 2024-02-06 ENCOUNTER — APPOINTMENT (OUTPATIENT)
Dept: CT IMAGING | Facility: CLINIC | Age: 49
End: 2024-02-06
Attending: PHYSICIAN ASSISTANT
Payer: COMMERCIAL

## 2024-02-06 ENCOUNTER — TELEPHONE (OUTPATIENT)
Dept: FAMILY MEDICINE | Facility: CLINIC | Age: 49
End: 2024-02-06

## 2024-02-06 ENCOUNTER — TELEPHONE (OUTPATIENT)
Dept: FAMILY MEDICINE | Facility: CLINIC | Age: 49
End: 2024-02-06
Payer: COMMERCIAL

## 2024-02-06 DIAGNOSIS — N63.10 MASS OF RIGHT BREAST, UNSPECIFIED QUADRANT: ICD-10-CM

## 2024-02-06 DIAGNOSIS — B17.9 ACUTE HEPATITIS: ICD-10-CM

## 2024-02-06 DIAGNOSIS — R63.4 WEIGHT LOSS: ICD-10-CM

## 2024-02-06 PROBLEM — R79.89 ABNORMAL LIVER FUNCTION TESTS: Status: ACTIVE | Noted: 2024-02-06

## 2024-02-06 PROBLEM — R56.9 SEIZURE (H): Status: ACTIVE | Noted: 2018-06-05

## 2024-02-06 PROBLEM — R94.5 ABNORMAL RESULTS OF LIVER FUNCTION STUDIES: Status: ACTIVE | Noted: 2024-02-06

## 2024-02-06 PROBLEM — R56.9 SEIZURES (H): Status: ACTIVE | Noted: 2020-08-16

## 2024-02-06 PROBLEM — N63.0 BREAST MASS: Status: ACTIVE | Noted: 2024-02-06

## 2024-02-06 LAB
ALBUMIN SERPL BCG-MCNC: 3.5 G/DL (ref 3.5–5.2)
ALBUMIN SERPL BCG-MCNC: 3.5 G/DL (ref 3.5–5.2)
ALBUMIN SERPL BCG-MCNC: 3.8 G/DL (ref 3.5–5.2)
ALP SERPL-CCNC: 265 U/L (ref 40–150)
ALP SERPL-CCNC: 280 U/L (ref 40–150)
ALP SERPL-CCNC: 282 U/L (ref 40–150)
ALT SERPL W P-5'-P-CCNC: 898 U/L (ref 0–50)
ALT SERPL W P-5'-P-CCNC: 945 U/L (ref 0–50)
ALT SERPL W P-5'-P-CCNC: ABNORMAL U/L
ANION GAP SERPL CALCULATED.3IONS-SCNC: 11 MMOL/L (ref 7–15)
ANION GAP SERPL CALCULATED.3IONS-SCNC: 7 MMOL/L (ref 7–15)
APAP SERPL-MCNC: <5 UG/ML (ref 10–30)
AST SERPL W P-5'-P-CCNC: 1316 U/L (ref 0–45)
AST SERPL W P-5'-P-CCNC: 1411 U/L (ref 0–45)
AST SERPL W P-5'-P-CCNC: ABNORMAL U/L
BACTERIA UR CULT: NORMAL
BASOPHILS # BLD AUTO: 0.1 10E3/UL (ref 0–0.2)
BASOPHILS NFR BLD AUTO: 1 %
BILIRUB DIRECT SERPL-MCNC: 1.75 MG/DL (ref 0–0.3)
BILIRUB DIRECT SERPL-MCNC: 2.14 MG/DL (ref 0–0.3)
BILIRUB SERPL-MCNC: 3 MG/DL
BILIRUB SERPL-MCNC: 3.1 MG/DL
BILIRUB SERPL-MCNC: 3.7 MG/DL
BUN SERPL-MCNC: 8 MG/DL (ref 6–20)
BUN SERPL-MCNC: 8.2 MG/DL (ref 6–20)
CALCIUM SERPL-MCNC: 9.1 MG/DL (ref 8.6–10)
CALCIUM SERPL-MCNC: 9.4 MG/DL (ref 8.6–10)
CHLORIDE SERPL-SCNC: 102 MMOL/L (ref 98–107)
CHLORIDE SERPL-SCNC: 105 MMOL/L (ref 98–107)
CREAT SERPL-MCNC: 0.59 MG/DL (ref 0.51–0.95)
CREAT SERPL-MCNC: 0.64 MG/DL (ref 0.51–0.95)
DEPRECATED HCO3 PLAS-SCNC: 25 MMOL/L (ref 22–29)
DEPRECATED HCO3 PLAS-SCNC: 27 MMOL/L (ref 22–29)
EGFRCR SERPLBLD CKD-EPI 2021: >90 ML/MIN/1.73M2
EGFRCR SERPLBLD CKD-EPI 2021: >90 ML/MIN/1.73M2
EOSINOPHIL # BLD AUTO: 0.2 10E3/UL (ref 0–0.7)
EOSINOPHIL NFR BLD AUTO: 3 %
ERYTHROCYTE [DISTWIDTH] IN BLOOD BY AUTOMATED COUNT: 14.6 % (ref 10–15)
ETHANOL SERPL-MCNC: <0.01 G/DL
GLUCOSE SERPL-MCNC: 147 MG/DL (ref 70–99)
GLUCOSE SERPL-MCNC: 97 MG/DL (ref 70–99)
HCG SER QL IA.RAPID: NEGATIVE
HCT VFR BLD AUTO: 40 % (ref 35–47)
HGB BLD-MCNC: 13.3 G/DL (ref 11.7–15.7)
IMM GRANULOCYTES # BLD: 0 10E3/UL
IMM GRANULOCYTES NFR BLD: 0 %
INR PPP: 1.25 (ref 0.85–1.15)
LIPASE SERPL-CCNC: 99 U/L (ref 13–60)
LYMPHOCYTES # BLD AUTO: 1.6 10E3/UL (ref 0.8–5.3)
LYMPHOCYTES NFR BLD AUTO: 32 %
MCH RBC QN AUTO: 29.2 PG (ref 26.5–33)
MCHC RBC AUTO-ENTMCNC: 33.3 G/DL (ref 31.5–36.5)
MCV RBC AUTO: 88 FL (ref 78–100)
MONOCYTES # BLD AUTO: 0.8 10E3/UL (ref 0–1.3)
MONOCYTES NFR BLD AUTO: 15 %
MONOCYTES NFR BLD AUTO: NEGATIVE %
NEUTROPHILS # BLD AUTO: 2.5 10E3/UL (ref 1.6–8.3)
NEUTROPHILS NFR BLD AUTO: 49 %
NRBC # BLD AUTO: 0 10E3/UL
NRBC BLD AUTO-RTO: 0 /100
PLATELET # BLD AUTO: 239 10E3/UL (ref 150–450)
POTASSIUM SERPL-SCNC: 3.7 MMOL/L (ref 3.4–5.3)
POTASSIUM SERPL-SCNC: 5.1 MMOL/L (ref 3.4–5.3)
PROT SERPL-MCNC: 7.4 G/DL (ref 6.4–8.3)
PROT SERPL-MCNC: 7.7 G/DL (ref 6.4–8.3)
PROT SERPL-MCNC: 7.9 G/DL (ref 6.4–8.3)
RBC # BLD AUTO: 4.55 10E6/UL (ref 3.8–5.2)
SARS-COV-2 RNA RESP QL NAA+PROBE: NEGATIVE
SODIUM SERPL-SCNC: 138 MMOL/L (ref 135–145)
SODIUM SERPL-SCNC: 139 MMOL/L (ref 135–145)
WBC # BLD AUTO: 5.1 10E3/UL (ref 4–11)

## 2024-02-06 PROCEDURE — 250N000009 HC RX 250: Performed by: PHYSICIAN ASSISTANT

## 2024-02-06 PROCEDURE — 80048 BASIC METABOLIC PNL TOTAL CA: CPT | Performed by: PHYSICIAN ASSISTANT

## 2024-02-06 PROCEDURE — 258N000003 HC RX IP 258 OP 636: Performed by: PHYSICIAN ASSISTANT

## 2024-02-06 PROCEDURE — 99285 EMERGENCY DEPT VISIT HI MDM: CPT | Mod: 25

## 2024-02-06 PROCEDURE — 84155 ASSAY OF PROTEIN SERUM: CPT | Performed by: EMERGENCY MEDICINE

## 2024-02-06 PROCEDURE — 83690 ASSAY OF LIPASE: CPT | Performed by: EMERGENCY MEDICINE

## 2024-02-06 PROCEDURE — 36415 COLL VENOUS BLD VENIPUNCTURE: CPT | Performed by: EMERGENCY MEDICINE

## 2024-02-06 PROCEDURE — 80053 COMPREHEN METABOLIC PANEL: CPT | Performed by: EMERGENCY MEDICINE

## 2024-02-06 PROCEDURE — 250N000013 HC RX MED GY IP 250 OP 250 PS 637: Performed by: INTERNAL MEDICINE

## 2024-02-06 PROCEDURE — 85610 PROTHROMBIN TIME: CPT | Performed by: EMERGENCY MEDICINE

## 2024-02-06 PROCEDURE — 99223 1ST HOSP IP/OBS HIGH 75: CPT | Performed by: INTERNAL MEDICINE

## 2024-02-06 PROCEDURE — 80143 DRUG ASSAY ACETAMINOPHEN: CPT | Performed by: EMERGENCY MEDICINE

## 2024-02-06 PROCEDURE — 82077 ASSAY SPEC XCP UR&BREATH IA: CPT | Performed by: PHYSICIAN ASSISTANT

## 2024-02-06 PROCEDURE — 87635 SARS-COV-2 COVID-19 AMP PRB: CPT | Performed by: PHYSICIAN ASSISTANT

## 2024-02-06 PROCEDURE — 99418 PROLNG IP/OBS E/M EA 15 MIN: CPT | Performed by: INTERNAL MEDICINE

## 2024-02-06 PROCEDURE — 120N000001 HC R&B MED SURG/OB

## 2024-02-06 PROCEDURE — 85041 AUTOMATED RBC COUNT: CPT | Performed by: EMERGENCY MEDICINE

## 2024-02-06 PROCEDURE — 85025 COMPLETE CBC W/AUTO DIFF WBC: CPT | Performed by: PHYSICIAN ASSISTANT

## 2024-02-06 PROCEDURE — 82040 ASSAY OF SERUM ALBUMIN: CPT | Performed by: PHYSICIAN ASSISTANT

## 2024-02-06 PROCEDURE — 80074 ACUTE HEPATITIS PANEL: CPT | Performed by: EMERGENCY MEDICINE

## 2024-02-06 PROCEDURE — 86038 ANTINUCLEAR ANTIBODIES: CPT | Performed by: PHYSICIAN ASSISTANT

## 2024-02-06 PROCEDURE — 84075 ASSAY ALKALINE PHOSPHATASE: CPT | Performed by: EMERGENCY MEDICINE

## 2024-02-06 PROCEDURE — 96360 HYDRATION IV INFUSION INIT: CPT | Mod: 59

## 2024-02-06 PROCEDURE — 86645 CMV ANTIBODY IGM: CPT | Performed by: PHYSICIAN ASSISTANT

## 2024-02-06 PROCEDURE — 250N000011 HC RX IP 250 OP 636: Performed by: PHYSICIAN ASSISTANT

## 2024-02-06 PROCEDURE — 84703 CHORIONIC GONADOTROPIN ASSAY: CPT

## 2024-02-06 PROCEDURE — 96361 HYDRATE IV INFUSION ADD-ON: CPT

## 2024-02-06 PROCEDURE — 86015 ACTIN ANTIBODY EACH: CPT | Performed by: PHYSICIAN ASSISTANT

## 2024-02-06 PROCEDURE — 86256 FLUORESCENT ANTIBODY TITER: CPT | Performed by: PHYSICIAN ASSISTANT

## 2024-02-06 PROCEDURE — 83690 ASSAY OF LIPASE: CPT | Performed by: PHYSICIAN ASSISTANT

## 2024-02-06 PROCEDURE — 74177 CT ABD & PELVIS W/CONTRAST: CPT

## 2024-02-06 PROCEDURE — 80048 BASIC METABOLIC PNL TOTAL CA: CPT | Performed by: EMERGENCY MEDICINE

## 2024-02-06 PROCEDURE — 86308 HETEROPHILE ANTIBODY SCREEN: CPT | Performed by: EMERGENCY MEDICINE

## 2024-02-06 PROCEDURE — 84155 ASSAY OF PROTEIN SERUM: CPT | Performed by: PHYSICIAN ASSISTANT

## 2024-02-06 PROCEDURE — 76705 ECHO EXAM OF ABDOMEN: CPT

## 2024-02-06 PROCEDURE — 87529 HSV DNA AMP PROBE: CPT | Performed by: PHYSICIAN ASSISTANT

## 2024-02-06 PROCEDURE — 36415 COLL VENOUS BLD VENIPUNCTURE: CPT | Performed by: PHYSICIAN ASSISTANT

## 2024-02-06 RX ORDER — VENLAFAXINE HYDROCHLORIDE 75 MG/1
75 TABLET, EXTENDED RELEASE ORAL DAILY
Status: DISCONTINUED | OUTPATIENT
Start: 2024-02-07 | End: 2024-02-08 | Stop reason: HOSPADM

## 2024-02-06 RX ORDER — MULTIVITAMIN,THERAPEUTIC
1 TABLET ORAL DAILY
COMMUNITY

## 2024-02-06 RX ORDER — IOPAMIDOL 755 MG/ML
55 INJECTION, SOLUTION INTRAVASCULAR ONCE
Status: COMPLETED | OUTPATIENT
Start: 2024-02-06 | End: 2024-02-06

## 2024-02-06 RX ORDER — ONDANSETRON 4 MG/1
4 TABLET, ORALLY DISINTEGRATING ORAL EVERY 6 HOURS PRN
Status: DISCONTINUED | OUTPATIENT
Start: 2024-02-06 | End: 2024-02-08 | Stop reason: HOSPADM

## 2024-02-06 RX ORDER — CALCIUM CARBONATE 500 MG/1
1000 TABLET, CHEWABLE ORAL 4 TIMES DAILY PRN
Status: DISCONTINUED | OUTPATIENT
Start: 2024-02-06 | End: 2024-02-08 | Stop reason: HOSPADM

## 2024-02-06 RX ORDER — ONDANSETRON 2 MG/ML
4 INJECTION INTRAMUSCULAR; INTRAVENOUS EVERY 6 HOURS PRN
Status: DISCONTINUED | OUTPATIENT
Start: 2024-02-06 | End: 2024-02-08 | Stop reason: HOSPADM

## 2024-02-06 RX ORDER — LEVETIRACETAM 500 MG/1
1000 TABLET ORAL 2 TIMES DAILY
Status: DISCONTINUED | OUTPATIENT
Start: 2024-02-06 | End: 2024-02-08 | Stop reason: HOSPADM

## 2024-02-06 RX ORDER — AMOXICILLIN 250 MG
1 CAPSULE ORAL 2 TIMES DAILY PRN
Status: DISCONTINUED | OUTPATIENT
Start: 2024-02-06 | End: 2024-02-08 | Stop reason: HOSPADM

## 2024-02-06 RX ORDER — LEVETIRACETAM 250 MG/1
250 TABLET ORAL 2 TIMES DAILY
Status: DISCONTINUED | OUTPATIENT
Start: 2024-02-06 | End: 2024-02-08 | Stop reason: HOSPADM

## 2024-02-06 RX ORDER — GABAPENTIN 300 MG/1
1200 CAPSULE ORAL 3 TIMES DAILY
Status: DISCONTINUED | OUTPATIENT
Start: 2024-02-06 | End: 2024-02-08 | Stop reason: HOSPADM

## 2024-02-06 RX ORDER — GABAPENTIN 300 MG/1
1200 CAPSULE ORAL 3 TIMES DAILY
COMMUNITY
End: 2024-02-13

## 2024-02-06 RX ORDER — ATORVASTATIN CALCIUM 80 MG/1
80 TABLET, FILM COATED ORAL DAILY
Status: ON HOLD | COMMUNITY
End: 2024-02-08

## 2024-02-06 RX ORDER — AMOXICILLIN 250 MG
2 CAPSULE ORAL 2 TIMES DAILY PRN
Status: DISCONTINUED | OUTPATIENT
Start: 2024-02-06 | End: 2024-02-08 | Stop reason: HOSPADM

## 2024-02-06 RX ORDER — LIDOCAINE 40 MG/G
CREAM TOPICAL
Status: DISCONTINUED | OUTPATIENT
Start: 2024-02-06 | End: 2024-02-08 | Stop reason: HOSPADM

## 2024-02-06 RX ADMIN — IOPAMIDOL 55 ML: 755 INJECTION, SOLUTION INTRAVENOUS at 18:09

## 2024-02-06 RX ADMIN — LEVETIRACETAM 250 MG: 250 TABLET, FILM COATED ORAL at 22:34

## 2024-02-06 RX ADMIN — SODIUM CHLORIDE 60 ML: 9 INJECTION, SOLUTION INTRAVENOUS at 18:09

## 2024-02-06 RX ADMIN — SODIUM CHLORIDE 1000 ML: 9 INJECTION, SOLUTION INTRAVENOUS at 16:50

## 2024-02-06 RX ADMIN — LEVETIRACETAM 1000 MG: 500 TABLET, FILM COATED ORAL at 22:34

## 2024-02-06 NOTE — TELEPHONE ENCOUNTER
Latasha Cavanaugh MD  P Cs Triage Im  Autumn, I reviewed rest of your labs,  chemistry shows very elevated liver enzymes AST/ALT, suggestive of acute liver failure/liver injury and strongly recommend that you go to the ER now/today for further evaluation and probable admission and emergent GI consult.  Reassuring kidney functions and electrolytes are normal.  Vitamin B12 is very elevated, please hold on taking any vitamin B12 supplement, ferritin level which is iron stores is also very elevated could be an inflammatory marker suggestive of acute inflammation due to underlying acute liver disease  Again; Recommended you go to the ER now/today for further evaluation and admission and further imaging.  Any further questions please let me know,  Regards,  Dr. Cavanaugh        Patient Contact    Attempt # 1    Was call answered? No.    Left message for patient to call triage back on both cell and home number.    Pamela James RN

## 2024-02-06 NOTE — ED PROVIDER NOTES
History     Chief Complaint:  Abnormal Labs (/)       HPI   Shanna Shanks is a 48 year old female with Multiple Sclerosis who presents due to low appetite and abnormal labs. Patient presented with her  dur to concerns regarding her low appetite. Patient's  reports that patient has not been eating well for months now, and he has noticed significant weight loss about 1 month ago. The weight loss is estimated to be about 20 pounds since Fall of 2023. Patient's loss of appetite began around 1.5 years ago with associated mouth pain for which she takes gabapentin. The loss in appetite persisted well past when the mouth pain subsided, however.  also endorses a slight jaundiced appearance on her skin. He denies emesis, abdominal pain, tylenol use, recent fever, chest pain, shortness of breath, other drug use, foreign travel, abdominal surgery, itchiness, vitamin B12 use, or Iron supplement use. Patient is unsure of bloody stool. Patient's  additionally noted red spotting on patient's back, but he notes that the MS medications also cause her to flush.     Labs from yesterday's visit 2/5/24:  CBC: WBC 5.5, RBC count 4.71, hemoglobin 13.9, hematocrit 42.9, MCV 91, MCH 29.5, MCHC 32.4, RDW 14.4, platelet count 254  CMP: K3.7, CO2 25, anion gap 11, urea nitrogen 8.2, creatinine 0.64, GFR  >90, calcium 9.4, chloride 102, glucose 97, alkaline phosphatase 282, AST 1411, , protein total 7.9, albumin 3.8, total bilirubin 3.7  TSH 0.63  Vitamin D total 119  Mag museum: 1.9  Vitamin B12: 2423  Ferritin: 3353  UA: [place], cloudy, glucose urine 100, bilirubin urine large, ketones 15, specific gravity 1.030, blood urine negative, pH urine 6.0, protein albumin 100, urobilinogen urine 2.0, nitrate urine positive, leukoesterase large  Urine microscopy: Bacteria moderate, RBC-2-5, WBC urine 25-50, squamous epithelials moderate, white blood cell clumps urine present, mucus urine present  Urine  "culture: <10,000 CFU-mL mixture of urogenital jen            Independent Historian:   Spouse/Partner - They report primary information    Review of External Notes:   Office Visit 2/5/24     Medications:    atorvastatin (LIPITOR) 40 MG tablet  baclofen (LIORESAL) 10 MG tablet  cephALEXin (KEFLEX) 500 MG capsule  CRANBERRY EXTRACT PO  dimethyl fumarate (TECFIDERA) delayed release capsule  gabapentin (GRALISE) 300 MG 24 hr tablet  levETIRAcetam (KEPPRA) 250 MG tablet  levETIRAcetam 1000 MG TABS  oxybutynin (DITROPAN-XL) 5 MG 24 hr tablet  venlafaxine (EFFEXOR-ER) 75 MG 24 hr tablet  Dimethyl fumarate    Past Medical History:    Encephalopathy  Meltiple Sclerosis  Seizure Disorder  Tobacco Dependency    Past Surgical History:    Dilation/curettage     Allergies:   Antihistamines  Nickel  Macrobid    Physical Exam   Patient Vitals for the past 24 hrs:   BP Temp Temp src Pulse Resp SpO2 Height Weight   02/07/24 0744 97/60 99.4  F (37.4  C) Oral 69 16 96 % -- --   02/06/24 2157 115/73 98.7  F (37.1  C) Oral 76 16 95 % 1.727 m (5' 8\") 50.7 kg (111 lb 12.4 oz)   02/06/24 1930 -- -- -- -- -- 100 % -- --   02/06/24 1915 96/62 -- -- -- 18 100 % -- --   02/06/24 1847 -- -- -- -- 14 100 % -- --   02/06/24 1700 100/64 -- -- 65 14 99 % -- --   02/06/24 1135 107/62 98  F (36.7  C) Oral 81 16 99 % 1.727 m (5' 8\") 49.9 kg (110 lb)        Physical Exam  Vitals and nursing note reviewed.   HENT:      Mouth/Throat:      Mouth: Mucous membranes are moist.      Pharynx: Oropharynx is clear.   Eyes:      General: Lids are normal. Scleral icterus (slight) present.      Conjunctiva/sclera: Conjunctivae normal.      Pupils: Pupils are equal, round, and reactive to light.   Cardiovascular:      Rate and Rhythm: Normal rate and regular rhythm.      Pulses:           Radial pulses are 2+ on the right side and 2+ on the left side.      Heart sounds: Normal heart sounds, S1 normal and S2 normal. No murmur heard.     No friction rub. No gallop. "   Pulmonary:      Effort: Pulmonary effort is normal. No respiratory distress.      Breath sounds: No stridor. No wheezing, rhonchi or rales.   Abdominal:      General: There is no distension.      Palpations: There is no mass.      Tenderness: There is no abdominal tenderness. There is no guarding or rebound.   Musculoskeletal:      Right lower leg: No edema.      Left lower leg: No edema.   Skin:     General: Skin is warm.      Capillary Refill: Capillary refill takes less than 2 seconds.      Coloration: Skin is jaundiced (slight).      Findings: No bruising, ecchymosis or erythema.   Neurological:      Mental Status: She is alert and oriented to person, place, and time. Mental status is at baseline.   Psychiatric:         Attention and Perception: Attention normal.         Mood and Affect: Affect is flat.         Speech: Speech normal.         Behavior: Behavior is cooperative.           Emergency Department Course     Imaging:  CT Abdomen Pelvis w Contrast   Final Result   IMPRESSION:    1.  Right breast mass. Neoplasm should be excluded. Follow-up mammography/ultrasound recommended.   2.  Heterogeneous enhancement of the liver with further assessment limited by high noise. Findings could be seen in the setting of hepatitis. Chronic liver disease not excluded. Follow-up is advised.      Critical Result: New Mass or Tumor      Finding was identified on 2/6/2024 6:36 PM CST.      Branden Tang PA-C was contacted by me on 2/6/2024 6:36 PM CST and verbalized understanding of the critical result.          US Abdomen Limited (RUQ)   Final Result   IMPRESSION:   1.  Mild gallbladder distention and gallbladder wall thickening to 4   mm. No gallstones. Mild intrahepatic biliary ductal dilatation.   Consider follow-up CT or MRCP for further evaluation.   2.  Hepatic steatosis.      KEIRY MCCULLOUGH MD            SYSTEM ID:  [id number]       Report per radiology.    Laboratory:  Labs Ordered and Resulted from Time of ED  Arrival to Time of ED Departure   BASIC METABOLIC PANEL - Abnormal       Result Value    Sodium 139      Potassium 5.1      Chloride 105      Carbon Dioxide (CO2) 27      Anion Gap 7      Urea Nitrogen 8.0      Creatinine 0.59      GFR Estimate >90      Calcium 9.1      Glucose 147 (*)    HEPATIC FUNCTION PANEL - Abnormal    Protein Total 7.7      Albumin 3.5      Bilirubin Total 3.0 (*)     Alkaline Phosphatase 280 (*)     AST        ALT        Bilirubin Direct 1.75 (*)    LIPASE - Abnormal    Lipase 99 (*)    INR - Abnormal    INR 1.25 (*)    ACETAMINOPHEN LEVEL - Abnormal    Acetaminophen <5.0 (*)    HEPATIC FUNCTION PANEL - Abnormal    Protein Total 7.4      Albumin 3.5      Bilirubin Total 3.1 (*)     Alkaline Phosphatase 265 (*)     AST 1,316 (*)      (*)     Bilirubin Direct 2.14 (*)    MONONUCLEOSIS SCREEN - Normal    Mononucleosis Screen Negative     ISTAT HCG QUALITATIVE PREGNANCY POCT - Normal    HCG Qualitative POCT Negative     ETHYL ALCOHOL LEVEL - Normal    Alcohol ethyl <0.01     COVID-19 VIRUS (CORONAVIRUS) BY PCR - Normal    SARS CoV2 PCR Negative     CBC WITH PLATELETS AND DIFFERENTIAL    WBC Count 5.1      RBC Count 4.55      Hemoglobin 13.3      Hematocrit 40.0      MCV 88      MCH 29.2      MCHC 33.3      RDW 14.6      Platelet Count 239      % Neutrophils 49      % Lymphocytes 32      % Monocytes 15      % Eosinophils 3      % Basophils 1      % Immature Granulocytes 0      NRBCs per 100 WBC 0      Absolute Neutrophils 2.5      Absolute Lymphocytes 1.6      Absolute Monocytes 0.8      Absolute Eosinophils 0.2      Absolute Basophils 0.1      Absolute Immature Granulocytes 0.0      Absolute NRBCs 0.0     ACUTE HEPATITIS PANEL   ANTI NUCLEAR OSCAR IGG BY IFA WITH REFLEX   HERPES SIMPLEX VIRUS 1&2 PCR      Emergency Department Course & Assessments:       Interventions:  Medications   gabapentin (NEURONTIN) capsule 1,200 mg ( Oral Automatically Held 2/12/24 2200)   levETIRAcetam (KEPPRA)  tablet 250 mg (250 mg Oral $Given 2/7/24 0945)   levETIRAcetam (KEPPRA) tablet 1,000 mg (1,000 mg Oral $Given 2/7/24 0945)   venlafaxine (EFFEXOR-ER) 24 hr tablet 75 mg ( Oral Automatically Held 2/10/24 0900)   lidocaine 1 % 0.1-1 mL (has no administration in time range)   lidocaine (LMX4) cream (has no administration in time range)   sodium chloride (PF) 0.9% PF flush 3 mL (3 mLs Intracatheter $Given 2/7/24 0947)   sodium chloride (PF) 0.9% PF flush 3 mL (has no administration in time range)   senna-docusate (SENOKOT-S/PERICOLACE) 8.6-50 MG per tablet 1 tablet (has no administration in time range)     Or   senna-docusate (SENOKOT-S/PERICOLACE) 8.6-50 MG per tablet 2 tablet (has no administration in time range)   calcium carbonate (TUMS) chewable tablet 1,000 mg (has no administration in time range)   ondansetron (ZOFRAN ODT) ODT tab 4 mg (has no administration in time range)     Or   ondansetron (ZOFRAN) injection 4 mg (has no administration in time range)   sodium chloride 0.9% BOLUS 1,000 mL (0 mLs Intravenous Stopped 2/6/24 1920)   iopamidol (ISOVUE-370) solution 55 mL (55 mLs Intravenous $Given 2/6/24 1809)   sodium chloride 0.9 % bag 100mL (60 mLs Intravenous $Given 2/6/24 1809)        Independent Interpretation (X-rays, CTs, rhythm strip):  None    Assessments/Consultations/Discussion of Management or Tests:   ED Course as of 02/07/24 1025   Tue Feb 06, 2024   1545 I obtained history and examined the patient as noted above.     1838 I spoke with Dr. Ma from Radiology regarding patient and the results of the imaging.    1843 I spoke with the GI team regarding patient's LFT and liver enzyme readings.    1917 I spoke with Dr. Yanes of the Hospitalist team regarding patient and admission.    1930 I rechecked patient and updated her with findings       Social Determinants of Health affecting care:   None    Disposition:  The patient was admitted to the hospital under the care of Dr. Yanes.     Impression &  Plan    CMS Diagnoses: None    Medical Decision Making:  This is a 48-year-old female that presents after being referred by her primary care provider for concerns regarding elevated LFTs in the setting of recent increased weight lites and loss of appetite.  LFTs today do show significant increased AST ALT and alk phosphatase.  Ultrasound imaging of her gallbladder shows some mild wall thickening without stones or clear evidence of cholecystitis.  CT imaging was obtained showing heterogenous liver without obvious mass cholecystitis or other obvious obstructing pathology.  Symptoms are consistent with acute hepatitis from source unknown at this time.  Patient has an negative Tylenol level she has not been using any Tylenol.  She has no history of alcohol abuse with a negative ethanol level.  Mono was negative.  Acute hepatitis panel pending with labs also added on tonight as recommended by Dr. Valente.  I did speak with Dr. Valente GI provider who is on-call who recommended further hepatitis labs and given the patient's current status we will admit for further workup in the hospital with GI formal consultation tomorrow.  Patient's vitals are normal.  Low clinical concern for infection such as cholangitis or cholecystitis she has a normal white blood cell count hemoglobin is also normal.  There is no evidence based on history or exam findings of acute blood loss or GI bleeding.  Patient has actually no abdominal pain or tenderness on exam.  Fortunately her INR is 1.25.  Incidental finding of right breast mass which she is unaware of which will require further workup such as mass mammography or ultrasound.  Spoke with Dr. Valente regarding this he thinks this is not related to her current symptoms regarding her hepatitis.  Spoke with Dr. Joaquín Salgado hospitalist who is accepting the patient for admission.    Diagnosis:    ICD-10-CM    1. Mass of right breast, unspecified quadrant  N63.10       2. Acute hepatitis  B17.9        3. Weight loss  R63.4            Discharge Medications:  Current Discharge Medication List             Scribe Disclosure:  I, Pieter Tammie, am serving as a scribe at 3:58 PM on 2/6/2024 to document services personally performed by Branden Tang, GHANSHYAM, based on my observations and the provider's statements to me.     2/6/2024   Branden Tang, Branden Meneses PA-C  02/07/24 1030

## 2024-02-06 NOTE — PHARMACY-ADMISSION MEDICATION HISTORY
"Pharmacist Admission Medication History    Admission medication history is complete. The information provided in this note is only as accurate as the sources available at the time of the update.    Information Source(s): Family member and CareEverywhere/SureScripts via in-person    Pertinent Information: spouse manages patient's medications.  Spouse can bring Tecfidera if/when reordered inpatient. Spouse stated in interview that patient \"just got done\" with TB treatment about 1 week PTA - Bactrim filled 1/12/24 #5ds, isoniazid and rifampin last filled 5/2023 #85ds. Unclear if patient was taking any other anti-infectives in the last month PTA - unable to find in fill history or chart review.     Gabapentin 300 mg capsules filled 12/29/23 #30ds #180 - spouse states patient is taking 12 capsules/day. Note from 2/5/24 indicates \"maintained on very high dose of gabapentin 3200 mg but did not help much with the pain symptoms\" - spouse in interview 2/6/24 with writer again endorses gabapentin is not fully effective, at higher dose of 3600 mg daily.     Changes made to PTA medication list:  Added: multivitamin   Deleted: baclofen 10-20 mg 4x daily PRN, duplicate Estrace, oxybutynin 5 mg daily (stopped in fall 2023, no longer taking, last filled 7/2023 #90ds), vitamin D 1000 units daily (level drawn 2/5/24 = 119, high - spouse confirms this was stopped within 1 week ago PTA.   Changed: atorvastatin 40 mg --> 80 mg, Gralise --> IR gabapentin (per fill history, and spouse, who does not believe patient to be taking ER formulation)    Allergies reviewed with patient and updates made in EHR: no - reviewed by nursing this encounter    Medication History Completed By: Erlinda Valencia [facility] 2/6/2024 5:38 PM    Prior to Admission medications    Medication Sig Last Dose Taking? Auth Provider Long Term End Date   atorvastatin (LIPITOR) 80 MG tablet Take 80 mg by mouth daily 2/6/2024 at AM Yes Unknown, Entered By History No  "   cephALEXin (KEFLEX) 500 MG capsule Take 1 capsule (500 mg) by mouth 3 times daily  at not yet started, Rx 2/6/24 Yes Latasha Cavanaugh MD     CRANBERRY EXTRACT PO Take 1 tablet by mouth daily 2/6/2024 at AM Yes Unknown, Entered By History     dimethyl fumarate (TECFIDERA) delayed release capsule Take 240 mg by mouth 2 times daily  2/6/2024 at AM Yes Reported, Patient No    estradiol (ESTRACE VAGINAL) 0.1 MG/GM vaginal cream Apply small amount to the vaginal opening and urethra M, W, F @ h.s. 2/5/2024 at PM Yes Trinidad Decker, GHANSHYAM     gabapentin (NEURONTIN) 300 MG capsule Take 1,200 mg by mouth 3 times daily 2/6/2024 at X1 dose Yes Unknown, Entered By History Yes    levETIRAcetam (KEPPRA) 250 MG tablet Take 1 tablet (250 mg) by mouth 2 times daily 2/6/2024 at AM Yes Shelley Harmon MD Yes    levETIRAcetam 1000 MG TABS Take 1,000 mg by mouth 2 times daily 2/6/2024 at AM Yes Yuliya Aldrich MD Yes    MELATONIN PO Take 1 chew tab by mouth as needed (sleep) Takes gummy formulation Unknown at PRN, not for a while Yes Unknown, Entered By History     multivitamin, therapeutic (THERA-VIT) TABS tablet Take 1 tablet by mouth daily 2/6/2024 at just started taking in general Yes Unknown, Entered By History     venlafaxine (EFFEXOR-ER) 75 MG 24 hr tablet Take 75 mg by mouth daily 2/6/2024 at AM Yes Unknown, Entered By History No    order for DME Equipment being ordered: four wheeled walker with seat and brakes   Malvin Beck MD

## 2024-02-06 NOTE — TELEPHONE ENCOUNTER
Jama, pt's , returning call to clinic.     Relayed provider messages:    ----- Message from Latasha Cavanaugh MD sent at 2/6/2024 10:06 AM CST -----  Shanna, I reviewed rest of your labs,  chemistry shows very elevated liver enzymes AST/ALT, suggestive of acute liver failure/liver injury and strongly recommend that you go to the ER now/today for further evaluation and probable admission and emergent GI consult.  Reassuring kidney functions and electrolytes are normal.  Vitamin B12 is very elevated, please hold on taking any vitamin B12 supplement, ferritin level which is iron stores is also very elevated could be an inflammatory marker suggestive of acute inflammation due to underlying acute liver disease  Again; Recommended you go to the ER now/today for further evaluation and admission and further imaging.  Any further questions please let me know,  Regards,  Dr. Afshan Cavanaugh MD  2/6/2024 10:14 AM CST Back to Top      Provider called patient (*several times) with lab results- there was no answer and provider left voice message urging patient to call clinic for urgent lab results (acute liver failure with very elevated liver enzymes) and need to go to ER for further evaluation.      Pt's  verbalizes understanding and agrees to plan of care. Pt on the way to the ER now.

## 2024-02-06 NOTE — TELEPHONE ENCOUNTER
Patient Contact    Attempt # 1    Was call answered?  No.  Left message on voicemail with information to call me back.    Upon call back, please review lab result note below with pt (especially regarding IV fluid recommendations).    Thank you,  Kristin Jim, MAZIN Otero, reviewed your labs,     Urine test shows ketones which means you need to drink more fluids as this could be a sign of dehydration, also probably you still have a urine infection.  I will send a prescription for Keflex antibiotics to take 500 mg 3 times a day for 5 days, I do recommend that you will benefit from going to the ER to get IV fluids.     Reassuring your CBC does not show elevated white count, rather  normal white count and normal hemoglobin/hematocrit that is no anemia and normal platelet count.  Rest of labs are pending ,     Will wait for the urine culture results.     Dr Afshan Otero, reviewed rest of your labs,     Vitamin D level is very elevated, please cut down on vitamin D3 supplements.     Thyroid test called TSH is normal,     Magnesium level is normal,     Rest of labs are pending,  Dr Cavanaugh   Written by Latasha Cavanaugh MD on 2/5/2024 11:04 PM CST  Seen by patient Shanna Shanks on 2/6/2024 12:53 AM

## 2024-02-06 NOTE — TELEPHONE ENCOUNTER
Patient Contact    Attempt # 1    Was call answered?  No.  Left message on voicemail with information to call me back.    Writer tried calling both numbers on file without an answer.     Will send to team to follow-up with pt.     Thank you,  MAZIN Bui MD  2/6/2024 10:14 AM CST Back to Top      Provider called patient (*several times) with lab results- there was no answer and provider left voice message urging patient to call clinic for urgent lab results (acute liver failure with very elevated liver enzymes) and need to go to ER for further evaluation.

## 2024-02-06 NOTE — RESULT ENCOUNTER NOTE
Provider called patient (*several times) with lab results- there was no answer and provider left voice message urging patient to call clinic for urgent lab results (acute liver failure with very elevated liver enzymes) and need to go to ER for further evaluation.

## 2024-02-06 NOTE — RESULT ENCOUNTER NOTE
Vinnie Otero, reviewed your labs,    Urine test shows ketones which means you need to drink more fluids as this could be a sign of dehydration, also probably you still have a urine infection.  I will send a prescription for Keflex antibiotics to take 500 mg 3 times a day for 5 days, I do recommend that you will benefit from going to the ER to get IV fluids.    Reassuring your CBC does not show elevated white count, rather  normal white count and normal hemoglobin/hematocrit that is no anemia and normal platelet count.  Rest of labs are pending ,    Will wait for the urine culture results.    Dr Cavanaugh

## 2024-02-06 NOTE — TELEPHONE ENCOUNTER
----- Message from Latasha Cavanaugh MD sent at 2/6/2024 10:06 AM CST -----  Shanna, I reviewed rest of your labs,  chemistry shows very elevated liver enzymes AST/ALT, suggestive of acute liver failure/liver injury and strongly recommend that you go to the ER now/today for further evaluation and probable admission and emergent GI consult.  Reassuring kidney functions and electrolytes are normal.  Vitamin B12 is very elevated, please hold on taking any vitamin B12 supplement, ferritin level which is iron stores is also very elevated could be an inflammatory marker suggestive of acute inflammation due to underlying acute liver disease  Again; Recommended you go to the ER now/today for further evaluation and admission and further imaging.  Any further questions please let me know,  Regards,  Dr. Cavanaugh

## 2024-02-06 NOTE — TELEPHONE ENCOUNTER
----- Message from Latasha Cavanaugh MD sent at 2/5/2024 10:06 PM CST -----  Vinnie Otero, reviewed your labs,    Urine test shows ketones which means you need to drink more fluids as this could be a sign of dehydration, also probably you still have a urine infection.  I will send a prescription for Keflex antibiotics to take 500 mg 3 times a day for 5 days, I do recommend that you will benefit from going to the ER to get IV fluids.    Reassuring your CBC does not show elevated white count, rather  normal white count and normal hemoglobin/hematocrit that is no anemia and normal platelet count.  Rest of labs are pending ,    Will wait for the urine culture results.    Dr Cavanaugh

## 2024-02-06 NOTE — ED TRIAGE NOTES
Pt has hx of MS   Pt had elevated liver labs   Pt was seen a [facility] and referred to ed

## 2024-02-06 NOTE — RESULT ENCOUNTER NOTE
Shanna, I reviewed rest of your labs,  chemistry shows very elevated liver enzymes AST/ALT, suggestive of acute liver failure/liver injury and strongly recommend that you go to the ER now/today for further evaluation and probable admission and emergent GI consult.  Reassuring kidney functions and electrolytes are normal.  Vitamin B12 is very elevated, please hold on taking any vitamin B12 supplement, ferritin level which is iron stores is also very elevated could be an inflammatory marker suggestive of acute inflammation due to underlying acute liver disease  Again; Recommended you go to the ER now/today for further evaluation and admission and further imaging.  Any further questions please let me know,  Regards,  Dr. Cavanaugh

## 2024-02-06 NOTE — TELEPHONE ENCOUNTER
Call transferred to RN via Priority line.     Jama, pt's spouse (CTC on file), returning clinic call. States he is currently in ED with pt. RN relayed message in note below. Jama states he had already received this message (see separate telephone encounter dated 2/6/24). This RN was unable to find any additional messages needing to be relayed.     Routing to RN pool. Please reach out to pt's  if needed or close encounter.     Melodie Krishna RN  [facility] [place] RN

## 2024-02-07 ENCOUNTER — APPOINTMENT (OUTPATIENT)
Dept: MRI IMAGING | Facility: CLINIC | Age: 49
End: 2024-02-07
Attending: PHYSICIAN ASSISTANT
Payer: COMMERCIAL

## 2024-02-07 LAB
ALBUMIN SERPL BCG-MCNC: 3 G/DL (ref 3.5–5.2)
ALP SERPL-CCNC: 236 U/L (ref 40–150)
ALT SERPL W P-5'-P-CCNC: 788 U/L (ref 0–50)
ANION GAP SERPL CALCULATED.3IONS-SCNC: 5 MMOL/L (ref 7–15)
AST SERPL W P-5'-P-CCNC: 1219 U/L (ref 0–45)
BILIRUB SERPL-MCNC: 2.5 MG/DL
BUN SERPL-MCNC: 5 MG/DL (ref 6–20)
CALCIUM SERPL-MCNC: 8.7 MG/DL (ref 8.6–10)
CHLORIDE SERPL-SCNC: 110 MMOL/L (ref 98–107)
CK SERPL-CCNC: 177 U/L (ref 26–192)
CREAT SERPL-MCNC: 0.62 MG/DL (ref 0.51–0.95)
DEPRECATED HCO3 PLAS-SCNC: 26 MMOL/L (ref 22–29)
EGFRCR SERPLBLD CKD-EPI 2021: >90 ML/MIN/1.73M2
GLUCOSE SERPL-MCNC: 96 MG/DL (ref 70–99)
HAV IGM SERPL QL IA: NONREACTIVE
HBV CORE IGM SERPL QL IA: NONREACTIVE
HBV SURFACE AG SERPL QL IA: NONREACTIVE
HCV AB SERPL QL IA: NONREACTIVE
HSV1 DNA SPEC QL NAA+PROBE: NEGATIVE
HSV2 DNA SPEC QL NAA+PROBE: NEGATIVE
POTASSIUM SERPL-SCNC: 3.9 MMOL/L (ref 3.4–5.3)
PROT SERPL-MCNC: 6.5 G/DL (ref 6.4–8.3)
SODIUM SERPL-SCNC: 141 MMOL/L (ref 135–145)

## 2024-02-07 PROCEDURE — A9585 GADOBUTROL INJECTION: HCPCS | Performed by: INTERNAL MEDICINE

## 2024-02-07 PROCEDURE — 84155 ASSAY OF PROTEIN SERUM: CPT | Performed by: INTERNAL MEDICINE

## 2024-02-07 PROCEDURE — 36415 COLL VENOUS BLD VENIPUNCTURE: CPT | Performed by: INTERNAL MEDICINE

## 2024-02-07 PROCEDURE — 74183 MRI ABD W/O CNTR FLWD CNTR: CPT

## 2024-02-07 PROCEDURE — 255N000002 HC RX 255 OP 636: Performed by: INTERNAL MEDICINE

## 2024-02-07 PROCEDURE — 99232 SBSQ HOSP IP/OBS MODERATE 35: CPT | Performed by: STUDENT IN AN ORGANIZED HEALTH CARE EDUCATION/TRAINING PROGRAM

## 2024-02-07 PROCEDURE — 120N000001 HC R&B MED SURG/OB

## 2024-02-07 PROCEDURE — 250N000013 HC RX MED GY IP 250 OP 250 PS 637: Performed by: INTERNAL MEDICINE

## 2024-02-07 PROCEDURE — 82550 ASSAY OF CK (CPK): CPT | Performed by: INTERNAL MEDICINE

## 2024-02-07 RX ORDER — GADOBUTROL 604.72 MG/ML
10 INJECTION INTRAVENOUS ONCE
Status: COMPLETED | OUTPATIENT
Start: 2024-02-08 | End: 2024-02-07

## 2024-02-07 RX ADMIN — LEVETIRACETAM 1000 MG: 500 TABLET, FILM COATED ORAL at 09:45

## 2024-02-07 RX ADMIN — LEVETIRACETAM 250 MG: 250 TABLET, FILM COATED ORAL at 09:45

## 2024-02-07 RX ADMIN — GADOBUTROL 10 ML: 604.72 INJECTION INTRAVENOUS at 23:52

## 2024-02-07 RX ADMIN — LEVETIRACETAM 250 MG: 250 TABLET, FILM COATED ORAL at 21:29

## 2024-02-07 RX ADMIN — LEVETIRACETAM 1000 MG: 500 TABLET, FILM COATED ORAL at 21:29

## 2024-02-07 NOTE — PROGRESS NOTES
CLINICAL NUTRITION SERVICES  -  ASSESSMENT NOTE      RECOMMENDATIONS FOR MD/PROVIDER TO ORDER:   Recommend consideration of enteral nutrition if pt unable to start improving PO intake w/in next 3-5 days. Low threshold w/ chronic wt loss (26% wt loss in <8 months, underwt BMI) and minimal appetite     Recommendations Ordered by Registered Dietitian (RD):   RD introduced self to pt and , discussed role in care. RD encouraged pt to order 3 meals/day, try to eat what she can. Offered to continue w/ Ensure Enlive supplements as she started prior to admission. Can mix w/ ice cream. Will schedule between meals. Discussed that when appetite returns, optimize PO intake. Can add oils, butter, milk powders, etc to meals to increase nutrient density.     Ordered BID Ensure Enlive shakes w/ ice cream      Malnutrition:   % Weight Loss:  > 20% in 1 year (severe malnutrition)-- 26% loss w/in 7-8 months  % Intake:  </= 50% for >/= 1 month (severe malnutrition)-- chronic  Subcutaneous Fat Loss:  Orbital region moderate depletion  Muscle Loss:  Temporal region mild depletion and Clavicle bone region severe depletion  Fluid Retention:  None noted    Malnutrition Diagnosis: Severe malnutrition in the context of --  Acute illness or injury  Chronic illness or disease         REASON FOR ASSESSMENT  Shanna Shanks is a 48 year old female seen by Registered Dietitian for Admission Nutrition Risk Screen for positive. Recent wt loss of 14-23# w/ yes decreased appetite reported.        NUTRITION HISTORY  Information obtained from chart review and d/w pt and her     PMH of Multiple Sclerosis, seizure disorder, hx of tobacco use disorder     Presented w/ poor appetite and abnormal labs  Admitted for: Acute hepatitis     ED note = Patient's  reports that patient has not been eating well for months now, and he has noticed significant weight loss about 1 month ago. Patient's loss of appetite began around 1.5 years ago  with associated mouth pain for which she takes gabapentin. The loss in appetite persisted well past when the mouth pain subsided, however.   H&P = They reported that she has had significant weight loss in recent months, previously weighed 150 pounds, now 109 pounds. Sounds like Shanna has had an exhaustive workup for this problem, has seen multiple specialists including dentists and periodontists; she is now seeing a neurologist for possible trigeminal neuralgia. Consider outpatient workup for unexplained weight loss, malabsorption is possible     Pt and  reported that she has had poor PO intake for about 1 year. Probable ~40# wt loss over past year. Most recently, about 30# loss since September. UBW was 150's#. Her mouth pain has been improving, followed by neurologist. Lack of appetite remains issue. It will come and go, but decreased typically. Declined N/V, no altered sense of taste. About 1 week ago, she started to drink high protein Ensure oral nutrition supplements.       CURRENT NUTRITION ORDERS  Diet: Regular Diet Adult      Current Intake/Tolerance:  Pt had lunch in front of her, appeared to have no intakes yet.  reported that she received a meal in the ED which she ate 100% of. Emphasizing that appetite will come and go. RD encouraged pt to order 3 meals/day, try to eat what she can. Offered to continue w/ Ensure Enlive supplements as she started prior to admission. Can mix w/ ice cream. Will schedule between meals. Discussed that when appetite returns, optimize PO intake. Can add oils, butter, milk powders, etc to meals to increase nutrient density.   Pt received 2 good meals today per health touch.   No intakes documented per nursing flow sheet.        NUTRITION FOCUSED PHYSICAL ASSESSMENT FOR DIAGNOSING MALNUTRITION)  Yes-- visual, pt had lunch in front of her sitting up in chair           Observed:    Muscle wasting (refer to documentation in Malnutrition section)   Subcutaneous fat loss  "(refer to documentation in Malnutrition section)    Obtained from Chart/Interdisciplinary Team:  A/O x2, forgetful   2/6: abd CT =   1.  Right breast mass. Neoplasm should be excluded.   2.  Heterogeneous enhancement of the liver with further assessment limited by high noise. Findings could be seen in the setting of hepatitis. Chronic liver disease not excluded.   2/6: abd US =   1.  Mild gallbladder distention and gallbladder wall thickening to 4 mm. No gallstones. Mild intrahepatic biliary ductal dilatation.  2.  Hepatic steatosis.      ANTHROPOMETRICS  Height: 5' 8\"  Weight: 111 lbs 12.37 oz (50.7 kg)  Body mass index is 17 kg/m .  Weight Status:  Underweight BMI <18.5  IBW: 63.6 kg  % IBW: 80%  Weight History:   Wt loss of 18.2 kg over past 7-8 months (26%)  02/06/24 50.7 kg (111 lb 12.4 oz)   02/05/24 49.4 kg (109 lb)   01/12/24 52.2 kg (115 lb)   10/20/23 54.4 kg (120 lb)   09/08/23 54.4 kg (120 lb)   09/05/23 63.5 kg (140 lb)   07/05/23 68.9 kg (152 lb)   07/03/23 68.9 kg (152 lb)   05/30/23 68.9 kg (152 lb)   04/05/23 68.9 kg (152 lb)   03/17/23 68 kg (150 lb)   02/15/23 68 kg (150 lb)   01/16/23 54.9 kg (121 lb)   01/10/23 56.7 kg (125 lb)   11/29/22 60.8 kg (134 lb)   08/05/22 60.8 kg (134 lb)   06/17/22 60.8 kg (134 lb)   04/04/22 61 kg (134 lb 8 oz)   09/28/21 70.3 kg (155 lb)   10/08/20 66.2 kg (145 lb 14.4 oz)   08/16/20 65.8 kg (145 lb)   07/22/20 65.8 kg (145 lb)   10/03/19 70.8 kg (156 lb)   06/21/19 71.7 kg (158 lb)   03/22/19 72.3 kg (159 lb 8 oz)     No recent wt hx per care everywhere      LABS  Component Value Date   BUN 5.0 (L) 02/07/2024   ALKPHOS 236 (H) 02/07/2024    (HH) 02/07/2024   AST 1,219 () 02/07/2024     Lab Test 02/07/24  0824 02/06/24  1609   BILITOTAL 2.5* 3.1*   DBIL  --  2.14*     MEDICATIONS   [Held by provider] venlafaxine  75 mg Oral Daily           ASSESSED NUTRITION NEEDS PER APPROVED PRACTICE GUIDELINES:  Dosing Weight: 50.7 kg (2/6)  Estimated Energy Needs: " 2787-2726+ kcal (30-35+ Kcal/Kg)  Justification: repletion and underweight  Estimated Protein Needs: 61-91 grams protein (1.2-1.8 g pro/Kg)  Justification: repletion and underweight  Estimated Fluid Needs: 1 mL/kcal  Justification: maintenance       MALNUTRITION:  % Weight Loss:  > 20% in 1 year (severe malnutrition)-- 26% loss w/in 7-8 months  % Intake:  </= 50% for >/= 1 month (severe malnutrition)-- chronic  Subcutaneous Fat Loss:  Orbital region moderate depletion  Muscle Loss:  Temporal region mild depletion and Clavicle bone region severe depletion  Fluid Retention:  None noted    Malnutrition Diagnosis: Severe malnutrition in the context of --  Chronic illness or disease        NUTRITION DIAGNOSIS:  Inadequate oral intake related to lack of appetite, recent issues w/ mouth pain as evidenced by 26% wt loss over past 7-8 months, minimal intakes x?1 year        NUTRITION INTERVENTIONS  Recommendations / Nutrition Prescription  Recommend consideration of enteral nutrition if pt unable to start improving PO intake w/in next 3-5 days. Low threshold w/ chronic wt loss and minimal appetite w/ 26% wt loss in <8 months, underwt BMI  RD introduced self to pt and , discussed role in care. RD encouraged pt to order 3 meals/day, try to eat what she can. Offered to continue w/ Ensure Enlive supplements as she started prior to admission. Can mix w/ ice cream. Will schedule between meals. Discussed that when appetite returns, optimize PO intake. Can add oils, butter, milk powders, etc to meals to increase nutrient density.   Ordered BID Ensure Enlive shakes w/ ice cream       Implementation  Medical food supplement therapy  Nutrition education       Nutrition Goals  Pt to consume >75% of at least 2 meals/supplements per day   Wt >50 kg       MONITORING AND EVALUATION:  Progress towards goals will be monitored and evaluated per protocol and Practice Guidelines      Mirella Raymundo RD, LD   Pager: [phone]

## 2024-02-07 NOTE — PLAN OF CARE
Orientation: A&Ox2-3, forgetful. Disoriented to situation and time   Activity: A2/GB/W  Diet/BS Checks: regular diet  Tele:  N/A  IV Access/Drains: arrived from ED with no IV access. Resource RN called to place IV.   Pain Management: denies pain  Abnormal VS/Results: VSS on RA  Bowel/Bladder: incontinent this shift, no BM  Skin/Wounds: blanchable and nonblanchable redness to coccyx. Mepilex applied.   Consults: GI  D/C Disposition: pending consults to see  Other Info:  would like to be called with updates regarding plan if not present when MD's round on 2/8

## 2024-02-07 NOTE — PROGRESS NOTES
[id number]    Orientation: disoriented to time and situation, forgetful.  Aggression Stop Light: green  Mobility: Ax2 with gait belt and walker  Pain Management: denies pain  Diet: regular diet  Bowel/Bladder: incontinent. Bedside commode   Abnormal Lab/Assessments: VSS on RA  Drain/Device/Wound: PIV - SL  Consults: GI  D/C Day/Goals/Place: pending

## 2024-02-07 NOTE — H&P
[facility]    History and Physical - Hospitalist Service       Date of Admission:  2/6/2024    Assessment & Plan      Shanna Shanks is a 48 year old female with history of multiple sclerosis, seizure disorder, tobacco use disorder who presents to the emergency department with poor appetite, 20 pound weight loss and abnormal labs.  Labs show very elevated AST, ALT, alkaline phosphatase and bilirubin, suggesting acute hepatitis of unknown cause.    Principal Problem:    Abnormal results of liver function studies  Admit as inpatient  GI consult requested, ED provider discussed the case with Dr. Lopez, who recommended laboratory workup for infection, autoimmune causes.  I appreciate his evaluation and recommendations, formal consult is pending  Hold medications which could contribute to abnormal LFTs, including Tecfidera  Follow liver function tests closely  Active Problems:    Breast mass  CT scan of the abdomen pelvis showed a poorly defined mass in the right breast measuring up to 3.9 cm, this is an incidental finding but neoplasm should be excluded.  Patient needs follow-up mammogram and/or ultrasound.  Mammogram typically cannot be performed as inpatient but should be completed as soon as possible.  I discussed this with the patient and her .      Mouth pain    Anorexia  Sounds like Shanna has had an exhaustive workup for this problem, has seen multiple specialists including dentists and periodontists; she is now seeing a neurologist for possible trigeminal neuralgia  It is unclear if her mouth pain, weight loss and abnormal liver function tests have a single, unifying diagnosis or separate problems.  Consider outpatient workup for unexplained weight loss, malabsorption is possible    Multiple sclerosis (H)  See discussion above, hold Tecfidera    Nicotine dependence in remission  Continue replacement as needed    Seizures (H)  Continue Keppra        Diet:  Regular  DVT  "Prophylaxis: Pneumatic Compression Devices  Hurtado Catheter: Not present  Lines: None     Cardiac Monitoring: None  Code Status:  Full    Clinically Significant Risk Factors Present on Admission               # Coagulation Defect: INR = 1.25 (Ref range: 0.85 - 1.15) and/or PTT = N/A, will monitor for bleeding         # Cachexia: Estimated body mass index is 16.73 kg/m  as calculated from the following:    Height as of this encounter: 1.727 m (5' 8\").    Weight as of this encounter: 49.9 kg (110 lb).              Disposition Plan      Expected Discharge Date: 02/07/2024                  Carol Yanes MD  Hospitalist Service  [facility]  Securely message with [employer] (more info)  Text page via [facility] Paging/Directory     ______________________________________________________________________    Chief Complaint   \"I just didn't eat.\"          History of Present Illness   Shanna Shanks is a 48 year old female with history of multiple sclerosis, seizure disorder, tobacco use disorder who presents to the emergency department with poor appetite, 20 pound weight loss and abnormal labs.    Notes indicate that patient's  accompanied her to an office visit with FP  yesterday, 2/5.  They reported that she has had significant weight loss in recent months, previously weighed 150 pounds, now 109 pounds.  They say she has not been eating well.  She has a history of intermittent, severe pain in her mouth, present for about the past year.   says they have seen multiple specialists, including dentist, endodontist, periodontist, with no clear reason identified for the pain.  She has been seeing a neurologist who thought her pain may be related to trigeminal neuralgia and she has been treated with high-dose gabapentin (3200 mg daily) which does not help very much with the pain.  She denied feeling depressed and her PHQ 2 score was 0.    Laboratories were checked at that office " "visit and they showed very elevated liver enzymes including AST and ALT suggesting liver failure/liver injury.  They called patient at home and instructed her to come to the emergency department for evaluation.  CT scan of the abdomen pelvis shows heterogenous advancement of the liver, \"further assessment limited by hide noise.  Findings could be seen in the setting of hepatitis.  Chronic liver disease is not excluded.\"    Imaging also showed a poorly defined mass in the right breast measuring up to 3.9 cm, incidental finding.  Patient has not had routine screening mammograms.  Abdominal ultrasound shows mild gallbladder distention and gallbladder wall thickening with no gallstones, there is also evidence of hepatic steatosis.  She is admitted for further evaluation and treatment of abnormal liver function tests.      Past Medical History    Past Medical History:   Diagnosis Date    Encephalopathy 07/24/2017    Multiple sclerosis (H) 03/02/1996    last exacerbation 3yrs ago, no meds x 6 months    Seizure disorder (H)     Tobacco dependency        Past Surgical History   Past Surgical History:   Procedure Laterality Date    HC DILATION/CURETTAGE DIAG/THER NON OB  01/01/2005    D & C, missed AB       Prior to Admission Medications   Prior to Admission Medications   Prescriptions Last Dose Informant Patient Reported? Taking?   CRANBERRY EXTRACT PO 2/6/2024 at AM Spouse/Significant Other Yes Yes   Sig: Take 1 tablet by mouth daily   MELATONIN PO Unknown at PRN, not for a while Spouse/Significant Other Yes Yes   Sig: Take 1 chew tab by mouth as needed (sleep) Takes gummy formulation   atorvastatin (LIPITOR) 80 MG tablet 2/6/2024 at AM Spouse/Significant Other Yes Yes   Sig: Take 80 mg by mouth daily   cephALEXin (KEFLEX) 500 MG capsule  at not yet started, Rx 2/6/24 Spouse/Significant Other No Yes   Sig: Take 1 capsule (500 mg) by mouth 3 times daily   dimethyl fumarate (TECFIDERA) delayed release capsule 2/6/2024 at AM " Spouse/Significant Other Yes Yes   Sig: Take 240 mg by mouth 2 times daily    estradiol (ESTRACE VAGINAL) 0.1 MG/GM vaginal cream 2/5/2024 at PM Spouse/Significant Other No Yes   Sig: Apply small amount to the vaginal opening and urethra M, W, F @ h.s.   gabapentin (NEURONTIN) 300 MG capsule 2/6/2024 at X1 dose Spouse/Significant Other Yes Yes   Sig: Take 1,200 mg by mouth 3 times daily   levETIRAcetam (KEPPRA) 250 MG tablet 2/6/2024 at AM Spouse/Significant Other No Yes   Sig: Take 1 tablet (250 mg) by mouth 2 times daily   levETIRAcetam 1000 MG TABS 2/6/2024 at AM Spouse/Significant Other No Yes   Sig: Take 1,000 mg by mouth 2 times daily   multivitamin, therapeutic (THERA-VIT) TABS tablet 2/6/2024 at just started taking in general Spouse/Significant Other Yes Yes   Sig: Take 1 tablet by mouth daily   order for DME  Spouse/Significant Other No No   Sig: Equipment being ordered: four wheeled walker with seat and brakes   venlafaxine (EFFEXOR-ER) 75 MG 24 hr tablet 2/6/2024 at AM Spouse/Significant Other Yes Yes   Sig: Take 75 mg by mouth daily      Facility-Administered Medications: None           Physical Exam   Vital Signs: Temp: 98  F (36.7  C) Temp src: Oral BP: 96/62 Pulse: 65   Resp: 18 SpO2: 100 % O2 Device: Nasal cannula    Weight: 110 lbs 0 oz    Constitutional: Awake, alert, cooperative, no apparent distress  Respiratory: Clear to auscultation bilaterally, no crackles or wheezing  Cardiovascular: Regular rate and rhythm, normal S1 and S2, and no murmur noted  GI: Normal bowel sounds, soft, non-distended, non-tender  Skin/Integumen: No rash on exposed skin.  No lower extremity edema  Other: Mood is very pleasant and cooperative      Medical Decision Making       90 MINUTES SPENT BY ME on the date of service doing chart review, history, exam, documentation & further activities per the note.      Data     I have personally reviewed the following data over the past 24 hrs:    5.1  \   13.3   / 239     139 105  8.0 /  147 (H)   5.1 27 0.59 \     ALT: 898 (HH) AST: 1,316 (HH) AP: 265 (H) TBILI: 3.1 (H)   ALB: 3.5 TOT PROTEIN: 7.4 LIPASE: 99 (H)     TSH: N/A T4: N/A A1C: N/A     INR:  1.25 (H) PTT:  N/A   D-dimer:  N/A Fibrinogen:  N/A     Ferritin:  N/A % Retic:  N/A LDH:  N/A       Imaging results reviewed over the past 24 hrs:   Recent Results (from the past 24 hour(s))   US Abdomen Limited (RUQ)    Narrative    US ABDOMEN LIMITED 2/6/2024 3:43 PM    CLINICAL HISTORY: elevated LFTs  TECHNIQUE: Limited abdominal ultrasound.    COMPARISON: 9/27/2022    FINDINGS:    GALLBLADDER: Mildly distended gallbladder with mild wall thickening to  4 mm. No gallstones or sludge. No pericholecystic fluid. Sonographic  Tate sign is negative.    BILE DUCTS: Mild intrahepatic biliary ductal dilatation. The  extrahepatic bile ducts are not dilated, measuring 4 mm    LIVER: Mild diffuse hepatic steatosis. No focal hepatic masses.    RIGHT KIDNEY: No hydronephrosis.    PANCREAS: The visualized portions of the pancreas are normal. The  pancreatic duct measures 1 mm the pancreatic head.      Impression    IMPRESSION:  1.  Mild gallbladder distention and gallbladder wall thickening to 4  mm. No gallstones. Mild intrahepatic biliary ductal dilatation.  Consider follow-up CT or MRCP for further evaluation.  2.  Hepatic steatosis.    KEIRY MCCULLOUGH MD         SYSTEM ID:  [id number]   CT Abdomen Pelvis w Contrast    Narrative    EXAM: CT ABDOMEN PELVIS W CONTRAST  LOCATION: [facility]  DATE: 2/6/2024    INDICATION: eleavted LFTS, elevated bilirubin  COMPARISON: None.  TECHNIQUE: CT scan of the abdomen and pelvis was performed following injection of IV contrast. Multiplanar reformats were obtained. Dose reduction techniques were used. Exam SIGNIFICANTLY LIMITED by arm down positioning.  CONTRAST: 55mL isovue 370    FINDINGS:   LOWER CHEST: Bibasilar atelectasis.    HEPATOBILIARY: Mild gallbladder wall edema.. Diffuse  heterogeneous hepatic enhancement.    PANCREAS: No ductal dilatation.    SPLEEN: Normal size    ADRENAL GLANDS: Unremarkable    KIDNEYS/BLADDER: No hydronephrosis. Bladder is significantly distended.    BOWEL: Normal caliber retrocecal appendix.    LYMPH NODES: No significant retroperitoneal adenopathy    VASCULATURE: No abdominal aortic aneurysm. Patent portal vein.    PELVIC ORGANS: Trace free pelvic fluid.    MUSCULOSKELETAL: Poorly defined right breast mass measuring up to 3.9 cm, image 16 series 4. No suspicious lytic or blastic lesions.      Impression    IMPRESSION:   1.  Right breast mass. Neoplasm should be excluded. Follow-up mammography/ultrasound recommended.  2.  Heterogeneous enhancement of the liver with further assessment limited by high noise. Findings could be seen in the setting of hepatitis. Chronic liver disease not excluded. Follow-up is advised.    Critical Result: New Mass or Tumor    Finding was identified on 2/6/2024 6:36 PM CST.    Branden Tang PA-C was contacted by me on 2/6/2024 6:36 PM CST and verbalized understanding of the critical result.

## 2024-02-07 NOTE — TELEPHONE ENCOUNTER
Writer contacted patient and spoke with spouse, Jama.   Writer relayed PCP's interpretation of results.  Spouse states that said informed was received and that patient had been admitted 2/6/24.

## 2024-02-07 NOTE — PROVIDER NOTIFICATION
MD Notification    Notified Person: MD    Notified Person Name: Carol Yanes    Notification Date/Time: 02/06/24 11:23 PM    Notification Interaction: vocera message    Purpose of Notification: pt has nonblanchable redness/discoloration to coccyx, would you like to place a WOC consult?     Orders Received:    Comments: no response, left sticky note for rounding provider 2/7 AM

## 2024-02-07 NOTE — PROGRESS NOTES
[facility]    Medicine Progress Note - Hospitalist Service    Date of Admission:  2/6/2024    Assessment & Plan   Autumn Yazmin Shanks is a 48 year old female with history of multiple sclerosis, seizure disorder, tobacco use disorder who presents to the emergency department with poor appetite, 20 pound weight loss and abnormal labs.  Labs show very elevated AST, ALT, alkaline phosphatase and bilirubin, suggesting acute hepatitis of unknown cause.       Acute hepatitis, unclear etiology  Elevated T bili    - GI consulted on admission  - labs notable for , , AST 1219 today. Mostly stable since admission  - thus far labs for etiology returning unremarkable  - holding tecfidera, reported side affect is elevated AST and GI issues  - follow INR and daily LFTs    Breast mass  CT scan of the abdomen pelvis showed a poorly defined mass in the right breast measuring up to 3.9 cm, this is an incidental finding but neoplasm should be excluded.  - unable to perform mammogram inpatient. Family aware of need for follow up on discharge    Mouth pain  Anorexia  Sounds like Shanna has had an exhaustive workup for this problem, has seen multiple specialists including dentists and periodontists; she is now seeing a neurologist for possible trigeminal neuralgia  - current differential is her pain may be related to trigeminal neuralgia    Multiple sclerosis  See discussion above, hold Tecfidera as above    Nicotine dependence in remission    Seizures  -Continue Keppra    Weight loss  Unintended 20 pound weight loss in the last 5 months.  thought it was related to her mouth pain but not questioning if related to above    Cognitive impairment  Patient is mostly disoriented to situation and location.  reports this is baseline for her over the last few years. Primary neurologist aware    Severe malnutrition in setting of acute injury  - nutrition ordered ensure       Diet: Combination  "Diet Regular Diet Adult    DVT Prophylaxis: Pneumatic Compression Devices  Hurtado Catheter: Not present  Lines: None     Cardiac Monitoring: None  Code Status: Full Code      Clinically Significant Risk Factors Present on Admission           # Hypercalcemia: corrected calcium is >10.1, will monitor as appropriate    # Hypoalbuminemia: Lowest albumin = 3 g/dL at 2/7/2024  8:24 AM, will monitor as appropriate  # Coagulation Defect: INR = 1.25 (Ref range: 0.85 - 1.15) and/or PTT = N/A, will monitor for bleeding         # Cachexia: Estimated body mass index is 17 kg/m  as calculated from the following:    Height as of this encounter: 1.727 m (5' 8\").    Weight as of this encounter: 50.7 kg (111 lb 12.4 oz).              Disposition Plan      Expected Discharge Date: 02/08/2024                    Susan Henriquez DO  Hospitalist Service  [facility]  Securely message with [email or web address] (more info)  Text page via [facility] Paging/Directory   ______________________________________________________________________    Interval History   Patient denies any complaints for me today. She knows where she is but doesn't know why. She does say she just doesn't feel very good. Revisited with her and  at bedside updated on plan. She did not eat any of her lunch and said it just didn't taste good    Physical Exam   Vital Signs: Temp: 99.4  F (37.4  C) Temp src: Oral BP: 97/60 Pulse: 69   Resp: 16 SpO2: 96 % O2 Device: None (Room air)    Weight: 111 lbs 12.37 oz    Constitutional: Awake, alert, appears thin and ill, slightly jaundiced  Respiratory: Clear to auscultation bilaterally, no crackles or wheezing  Cardiovascular: Regular rate and rhythm, normal S1 and S2, and no murmur noted  GI: Normal bowel sounds, soft, non-distended, non-tender  Skin/Integumen:   Other:      Medical Decision Making       45 MINUTES SPENT BY ME on the date of service doing chart review, history, exam, documentation & further activities " per the note.      Data     I have personally reviewed the following data over the past 24 hrs:    N/A  \   N/A   / N/A     141 110 (H) 5.0 (L) /  96   3.9 26 0.62 \     ALT: 788 (HH) AST: 1,219 (HH) AP: 236 (H) TBILI: 2.5 (H)   ALB: 3.0 (L) TOT PROTEIN: 6.5 LIPASE: N/A     INR:  1.25 (H) PTT:  N/A   D-dimer:  N/A Fibrinogen:  N/A       Imaging results reviewed over the past 24 hrs:   Recent Results (from the past 24 hour(s))   US Abdomen Limited (RUQ)    Narrative    US ABDOMEN LIMITED 2/6/2024 3:43 PM    CLINICAL HISTORY: elevated LFTs  TECHNIQUE: Limited abdominal ultrasound.    COMPARISON: 9/27/2022    FINDINGS:    GALLBLADDER: Mildly distended gallbladder with mild wall thickening to  4 mm. No gallstones or sludge. No pericholecystic fluid. Sonographic  Tate sign is negative.    BILE DUCTS: Mild intrahepatic biliary ductal dilatation. The  extrahepatic bile ducts are not dilated, measuring 4 mm    LIVER: Mild diffuse hepatic steatosis. No focal hepatic masses.    RIGHT KIDNEY: No hydronephrosis.    PANCREAS: The visualized portions of the pancreas are normal. The  pancreatic duct measures 1 mm the pancreatic head.      Impression    IMPRESSION:  1.  Mild gallbladder distention and gallbladder wall thickening to 4  mm. No gallstones. Mild intrahepatic biliary ductal dilatation.  Consider follow-up CT or MRCP for further evaluation.  2.  Hepatic steatosis.    KEIRY MCCULLOUGH MD         SYSTEM ID:  [id number]   CT Abdomen Pelvis w Contrast    Narrative    EXAM: CT ABDOMEN PELVIS W CONTRAST  LOCATION: [facility]  DATE: 2/6/2024    INDICATION: eleavted LFTS, elevated bilirubin  COMPARISON: None.  TECHNIQUE: CT scan of the abdomen and pelvis was performed following injection of IV contrast. Multiplanar reformats were obtained. Dose reduction techniques were used. Exam SIGNIFICANTLY LIMITED by arm down positioning.  CONTRAST: 55mL isovue 370    FINDINGS:   LOWER CHEST: Bibasilar  atelectasis.    HEPATOBILIARY: Mild gallbladder wall edema.. Diffuse heterogeneous hepatic enhancement.    PANCREAS: No ductal dilatation.    SPLEEN: Normal size    ADRENAL GLANDS: Unremarkable    KIDNEYS/BLADDER: No hydronephrosis. Bladder is significantly distended.    BOWEL: Normal caliber retrocecal appendix.    LYMPH NODES: No significant retroperitoneal adenopathy    VASCULATURE: No abdominal aortic aneurysm. Patent portal vein.    PELVIC ORGANS: Trace free pelvic fluid.    MUSCULOSKELETAL: Poorly defined right breast mass measuring up to 3.9 cm, image 16 series 4. No suspicious lytic or blastic lesions.      Impression    IMPRESSION:   1.  Right breast mass. Neoplasm should be excluded. Follow-up mammography/ultrasound recommended.  2.  Heterogeneous enhancement of the liver with further assessment limited by high noise. Findings could be seen in the setting of hepatitis. Chronic liver disease not excluded. Follow-up is advised.    Critical Result: New Mass or Tumor    Finding was identified on 2/6/2024 6:36 PM CST.    Branden Tang PA-C was contacted by me on 2/6/2024 6:36 PM CST and verbalized understanding of the critical result.

## 2024-02-07 NOTE — ED NOTES
[facility]  ED Nurse Handoff Report    ED Chief complaint: Abnormal Labs (/)      ED Diagnosis:   Final diagnoses:   Mass of right breast, unspecified quadrant   Acute hepatitis   Weight loss       Code Status: Full Code    Allergies:   Allergies   Allergen Reactions    Antihistamines, Diphenhydramine-Type Rash     benadryl cream    Nickel     Macrobid [Nitrofurantoin] Rash       Patient Story: Presents from primary care office for abnormal liver function blood tests. She reports decreased appetite for several weeks. She is not having any pain.   Focused Assessment:  A&Ox4. Skin intact. Sclera slightly jaundiced. VS stable. Had 1 US IV, needs another US IV. Able to make needs known    Treatments and/or interventions provided: labs, US RUQ, ct scan abd/pelvis  Patient's response to treatments and/or interventions: stable    To be done/followed up on inpatient unit:  admission orders    Does this patient have any cognitive concerns?:  no    Activity level - Baseline/Home:  Wheelchair  Activity Level - Current:   Wheelchair    Patient's Preferred language: English   Needed?: No    Isolation: None  Infection: Not Applicable  Patient tested for COVID 19 prior to admission: YES  Bariatric?: No    Vital Signs:   Vitals:    02/06/24 1700 02/06/24 1847 02/06/24 1915 02/06/24 1930   BP: 100/64  96/62    Pulse: 65      Resp: 14 14 18    Temp:       TempSrc:       SpO2: 99% 100% 100% 100%   Weight:       Height:         Labs Ordered and Resulted from Time of ED Arrival to Time of ED Departure   BASIC METABOLIC PANEL - Abnormal       Result Value    Sodium 139      Potassium 5.1      Chloride 105      Carbon Dioxide (CO2) 27      Anion Gap 7      Urea Nitrogen 8.0      Creatinine 0.59      GFR Estimate >90      Calcium 9.1      Glucose 147 (*)    HEPATIC FUNCTION PANEL - Abnormal    Protein Total 7.7      Albumin 3.5      Bilirubin Total 3.0 (*)     Alkaline Phosphatase 280 (*)     AST        ALT         Bilirubin Direct 1.75 (*)    LIPASE - Abnormal    Lipase 99 (*)    INR - Abnormal    INR 1.25 (*)    ACETAMINOPHEN LEVEL - Abnormal    Acetaminophen <5.0 (*)    HEPATIC FUNCTION PANEL - Abnormal    Protein Total 7.4      Albumin 3.5      Bilirubin Total 3.1 (*)     Alkaline Phosphatase 265 (*)     AST 1,316 (*)      (*)     Bilirubin Direct 2.14 (*)    MONONUCLEOSIS SCREEN - Normal    Mononucleosis Screen Negative     ISTAT HCG QUALITATIVE PREGNANCY POCT - Normal    HCG Qualitative POCT Negative     ETHYL ALCOHOL LEVEL - Normal    Alcohol ethyl <0.01     CBC WITH PLATELETS AND DIFFERENTIAL    WBC Count 5.1      RBC Count 4.55      Hemoglobin 13.3      Hematocrit 40.0      MCV 88      MCH 29.2      MCHC 33.3      RDW 14.6      Platelet Count 239      % Neutrophils 49      % Lymphocytes 32      % Monocytes 15      % Eosinophils 3      % Basophils 1      % Immature Granulocytes 0      NRBCs per 100 WBC 0      Absolute Neutrophils 2.5      Absolute Lymphocytes 1.6      Absolute Monocytes 0.8      Absolute Eosinophils 0.2      Absolute Basophils 0.1      Absolute Immature Granulocytes 0.0      Absolute NRBCs 0.0     ACUTE HEPATITIS PANEL   ANTI NUCLEAR OSCAR IGG BY IFA WITH REFLEX   COVID-19 VIRUS (CORONAVIRUS) BY PCR   HERPES SIMPLEX VIRUS 1&2 PCR        Cardiac Rhythm:     Was the PSS-3 completed:   Yes  What interventions are required if any?               Family Comments:  at bedside  OBS brochure/video discussed/provided to patient/family: N/A              Name of person given brochure if not patient:               Relationship to patient:     For the majority of the shift this patient's behavior was Green.   Behavioral interventions performed were .    ED NURSE PHONE NUMBER: *[phone]

## 2024-02-07 NOTE — PROGRESS NOTES
RECEIVING UNIT ED HANDOFF REVIEW    ED Nurse Handoff Report was reviewed by: Linette Johnson RN on February 6, 2024 at 8:33 PM

## 2024-02-07 NOTE — PLAN OF CARE
Goal Outcome Evaluation:             [id number]    Orientation: disoriented to time and situation; confused at times   Aggression Stop Light: green  Mobility: Ax2 with gait belt and walker  Pain Management: denies pain  Diet: regular diet  Bowel/Bladder: incontinent. Bedside commode   Abnormal Lab/Assessments: VSS on RA  Drain/Device/Wound: PIV - SL  Consults: GI  D/C Day/Goals/Place: pending  Other:

## 2024-02-07 NOTE — CONSULTS
"[facility]  Gastroenterology Consultation         Shanna Shanks  [address]  [facility] [address]  48 year old female    Admission Date/Time: 2/6/2024  Primary Care Provider: Malvin Beck  Referring / Attending Physician:  Dr. Sangita Henriquez    We were asked to see the patient in consultation by Dr. Sangita Henriquez for evaluation of elevated LFTs.      CC: weight loss and abnormal labs    HPI:  Shanna Shanks is a 48 year old female who has history of multiple sclerosis, seizure disorder, tobacco use disorder who presents to the emergency department with poor appetite, 20 pound weight loss and abnormal labs. Entire history gathered from chart review as patient unable to provide a history. Weight loss has been over last year from 150 pounds to 109. Initially, thought to be due to mouth pain. She has had a poor appetite with significant pain in mouth. Has seen multiple specialists, including dentist, endodontist, periodontist, with no clear reason identified for the pain.  She has been seeing a neurologist who thought her pain may be related to trigeminal neuralgia and she has been treated with high-dose gabapentin (3200 mg daily) which does not help very much with the pain. Has no fever or chills    Her labs noted very elevated LFTS and \"CT scan of the abdomen pelvis shows heterogenous advancement of the liver, \"further assessment limited by hide noise. Findings could be seen in the setting of hepatitis. Chronic liver disease is not excluded.\" Imaging also noted 3.9 cm right breast mass. Patient has not had routine screening mammograms.    ROS: A comprehensive ten point review of systems was negative aside from those in mentioned in the HPI.      PAST MED HX:  I have reviewed this patient's medical history and updated it with pertinent information if needed.   Past Medical History:   Diagnosis Date    Encephalopathy 07/24/2017    Multiple sclerosis " (H) 03/02/1996    last exacerbation 3yrs ago, no meds x 6 months    Seizure disorder (H)     Tobacco dependency        MEDICATIONS:   Prior to Admission Medications   Prescriptions Last Dose Informant Patient Reported? Taking?   CRANBERRY EXTRACT PO 2/6/2024 at AM Spouse/Significant Other Yes Yes   Sig: Take 1 tablet by mouth daily   MELATONIN PO Unknown at PRN, not for a while Spouse/Significant Other Yes Yes   Sig: Take 1 chew tab by mouth as needed (sleep) Takes gummy formulation   atorvastatin (LIPITOR) 80 MG tablet 2/6/2024 at AM Spouse/Significant Other Yes Yes   Sig: Take 80 mg by mouth daily   cephALEXin (KEFLEX) 500 MG capsule  at not yet started, Rx 2/6/24 Spouse/Significant Other No Yes   Sig: Take 1 capsule (500 mg) by mouth 3 times daily   dimethyl fumarate (TECFIDERA) delayed release capsule 2/6/2024 at AM Spouse/Significant Other Yes Yes   Sig: Take 240 mg by mouth 2 times daily    estradiol (ESTRACE VAGINAL) 0.1 MG/GM vaginal cream 2/5/2024 at PM Spouse/Significant Other No Yes   Sig: Apply small amount to the vaginal opening and urethra M, W, F @ h.s.   gabapentin (NEURONTIN) 300 MG capsule 2/6/2024 at X1 dose Spouse/Significant Other Yes Yes   Sig: Take 1,200 mg by mouth 3 times daily   levETIRAcetam (KEPPRA) 250 MG tablet 2/6/2024 at AM Spouse/Significant Other No Yes   Sig: Take 1 tablet (250 mg) by mouth 2 times daily   levETIRAcetam 1000 MG TABS 2/6/2024 at AM Spouse/Significant Other No Yes   Sig: Take 1,000 mg by mouth 2 times daily   multivitamin, therapeutic (THERA-VIT) TABS tablet 2/6/2024 at just started taking in general Spouse/Significant Other Yes Yes   Sig: Take 1 tablet by mouth daily   order for DME  Spouse/Significant Other No No   Sig: Equipment being ordered: four wheeled walker with seat and brakes   venlafaxine (EFFEXOR-ER) 75 MG 24 hr tablet 2/6/2024 at AM Spouse/Significant Other Yes Yes   Sig: Take 75 mg by mouth daily      Facility-Administered Medications: None        ALLERGIES:   Allergies   Allergen Reactions    Antihistamines, Diphenhydramine-Type Rash     benadryl cream    Nickel     Macrobid [Nitrofurantoin] Rash       SOCIAL HISTORY:  Social History     Tobacco Use    Smoking status: Former     Packs/day: .25     Types: Cigarettes     Passive exposure: Never    Smokeless tobacco: Never    Tobacco comments:     quit in 2017   Vaping Use    Vaping Use: Never used   Substance Use Topics    Alcohol use: No     Alcohol/week: 5.8 standard drinks of alcohol     Types: 7 Standard drinks or equivalent per week    Drug use: No       FAMILY HISTORY:  Family History   Problem Relation Age of Onset    Family History Negative Mother     Diabetes Father     Cancer - colorectal Paternal Grandmother     Heart Disease Paternal Grandfather         MI       PHYSICAL EXAM:   General  alert, oriented and comfortable  Vital Signs with Ranges  Temp: 99.4  F (37.4  C) Temp src: Oral BP: 97/60 Pulse: 69   Resp: 16 SpO2: 96 % O2 Device: None (Room air)    No intake/output data recorded.    Constitutional: healthy, alert, and no distress   Cardiovascular: negative, PMI normal. No lifts, heaves, or thrills. RRR. No murmurs, clicks gallops or rub  Respiratory: negative, Percussion normal. Good diaphragmatic excursion. Lungs clear  Abdomen: Abdomen soft, non-tender. BS normal. No masses, organomegaly          ADDITIONAL COMMENTS:   I reviewed the patient's new clinical lab test results.   Recent Labs   Lab Test 02/06/24  1609 02/06/24  1140 02/05/24  1515 08/17/20  0746 06/05/18  0755 06/04/18  1904 07/25/17  0451 07/24/17  1808   WBC  --  5.1 5.5 7.9   < > 5.1   < > 11.5*   HGB  --  13.3 13.9 12.1   < > 12.1   < > 12.0   MCV  --  88 91 90   < > 91   < > 93   PLT  --  239 254 225   < > 208   < > 209   INR 1.25*  --   --   --   --  1.05  --  1.05    < > = values in this interval not displayed.     Recent Labs   Lab Test 02/06/24  1140 02/05/24  1515 01/10/23  1448   POTASSIUM 5.1 3.7 4.5   CHLORIDE  105 102 102   CO2 27 25 26   BUN 8.0 8.2 14.3   ANIONGAP 7 11 11     Recent Labs   Lab Test 02/06/24  1609 02/06/24  1140 02/05/24  1521 02/05/24  1515 01/12/24  1033 10/20/23  1553 02/12/23  1634 01/10/23  1448   ALBUMIN 3.5 3.5  --  3.8  --   --   --  4.5   BILITOTAL 3.1* 3.0*  --  3.7*  --   --   --  0.2   *  --   --  945*  --   --   --  49*   AST 1,316*  --   --  1,411*  --   --   --  34   PROTEIN  --   --  100*  --  100* Negative   < >  --    LIPASE  --  99*  --   --   --   --   --   --     < > = values in this interval not displayed.       I reviewed the patient's new imaging results.        CONSULTATION ASSESSMENT AND PLAN:    Shanna Shanks is a 48 year old female with history of multiple sclerosis, seizure disorder, tobacco use disorder who presents to the emergency department with poor appetite, 20 pound weight loss and abnormal labs.  Labs show very elevated AST, ALT, alkaline phosphatase and bilirubin, suggesting acute hepatitis of unknown cause.       Acute hepatitis  Weight loss  Abnormal liver function tests  AST/ALT 01178/945 >316/898  Tbili 3.1. INR 1.25  Abdominal ultrasound noted mild gallbladder distention and gallbladder wall thickening to 4 mm. Mild intrahepatic biliary ductal dilation  CT noted heterogeneous enhancement of the liver with further assessment limited by high noise. Findings could be seen in the setting of hepatitis. Chronic liver disease not excluded. Follow-up is advised.   Acetaminophen <5  Mono screen negative, acute hepatitis panel ordered and pending, ethyl alcohol level <0.01  HSV and CMV pending  JULIA and ASMA pending  Considered MRI or liver but patient does not seem to be able to sit still. Will need to discuss further with spouse. He did not answer phone.  DDx includes autoimmune, acute viral, drug induced, neoplastic vs other    - Daily INR and CMP  - regular diet  - await lab results  - possible MRI of liver to r/o neoplastic cause once determine  a bit more history from patient  - outpatient EUS with liver biopsy        Mass of right breast, unspecified quadrant  CT scan of the abdomen pelvis showed a poorly defined mass in the right breast measuring up to 3.9 cm, this is an incidental finding but neoplasm should be excluded           [facility].  Office: [phone]  Cell : [phone] (Dr. Lopez)  Cell: [phone] (Kacie Cole PA-C)

## 2024-02-07 NOTE — UTILIZATION REVIEW
Admission Status; Secondary Review Determination    Under the authority of the Utilization Management Committee, the utilization review process indicated a secondary review on the above patient. The review outcome is based on review of the medical records, discussions with staff, and applying clinical experience noted on the date of the review.    (x) Inpatient Status Appropriate - This patient's medical care is consistent with medical management for inpatient care and reasonable inpatient medical practice.    RATIONALE FOR DETERMINATION: 48-year-old female with history of multiple sclerosis, tobacco dependency, seizure disorder who presents to hospital with marked 20 pound weight loss resulted in a BMI of 17 associated with jaundice, markedly abnormal transaminases within AST initially 1400 and ALT of 950 along with a bilirubin of 3.7 with marked inflammatory changes including a ferritin of 3300 and acute hypoalbuminemia to 3.0 from 3.82 days earlier as well as abnormal INR of 1.25.  In addition to this CT imaging revealed approximately 4 cm poorly defined mass in the right breast.  Patient thus will require greater than 2 nights in the hospital due to the severity of patient's hepatitis associated with severe total caloric malnutrition and concern for associated breast cancer.    At the time of admission with the information available to the attending physician more than 2 nights Hospital complex care was anticipated, based on patient risk of adverse outcome if treated as outpatient and complex care required. Inpatient admission is appropriate based on the Medicare guidelines.    This document was produced using voice recognition software    The information on this document is developed by the utilization review team in order for the business office to ensure compliance. This only denotes the appropriateness of proper admission status and does not reflect the quality of care rendered.    The definitions of  Inpatient Status and Observation Status used in making the determination above are those provided in the CMS Coverage Manual, Chapter 1 and Chapter 6, section 70.4.    Sincerely,    Emerson Leonard MD  Utilization Review  Physician Advisor  [facility].

## 2024-02-08 VITALS
RESPIRATION RATE: 16 BRPM | DIASTOLIC BLOOD PRESSURE: 73 MMHG | TEMPERATURE: 98.9 F | HEIGHT: 68 IN | HEART RATE: 68 BPM | WEIGHT: 111.77 LBS | SYSTOLIC BLOOD PRESSURE: 105 MMHG | BODY MASS INDEX: 16.94 KG/M2 | OXYGEN SATURATION: 96 %

## 2024-02-08 LAB
ALBUMIN SERPL BCG-MCNC: 3.1 G/DL (ref 3.5–5.2)
ALP SERPL-CCNC: 252 U/L (ref 40–150)
ALT SERPL W P-5'-P-CCNC: 745 U/L (ref 0–50)
AMMONIA PLAS-SCNC: 65 UMOL/L (ref 11–51)
ANA SER QL IF: NEGATIVE
ANION GAP SERPL CALCULATED.3IONS-SCNC: 9 MMOL/L (ref 7–15)
AST SERPL W P-5'-P-CCNC: 1033 U/L (ref 0–45)
BASOPHILS # BLD AUTO: 0 10E3/UL (ref 0–0.2)
BASOPHILS NFR BLD AUTO: 1 %
BILIRUB DIRECT SERPL-MCNC: 1.7 MG/DL (ref 0–0.3)
BILIRUB SERPL-MCNC: 3.2 MG/DL
BUN SERPL-MCNC: 4.9 MG/DL (ref 6–20)
CALCIUM SERPL-MCNC: 8.8 MG/DL (ref 8.6–10)
CHLORIDE SERPL-SCNC: 109 MMOL/L (ref 98–107)
CREAT SERPL-MCNC: 0.63 MG/DL (ref 0.51–0.95)
DEPRECATED HCO3 PLAS-SCNC: 24 MMOL/L (ref 22–29)
EGFRCR SERPLBLD CKD-EPI 2021: >90 ML/MIN/1.73M2
EOSINOPHIL # BLD AUTO: 0.1 10E3/UL (ref 0–0.7)
EOSINOPHIL NFR BLD AUTO: 3 %
ERYTHROCYTE [DISTWIDTH] IN BLOOD BY AUTOMATED COUNT: 14.8 % (ref 10–15)
GLUCOSE SERPL-MCNC: 97 MG/DL (ref 70–99)
HCT VFR BLD AUTO: 37.7 % (ref 35–47)
HGB BLD-MCNC: 12.5 G/DL (ref 11.7–15.7)
IMM GRANULOCYTES # BLD: 0 10E3/UL
IMM GRANULOCYTES NFR BLD: 0 %
INR PPP: 1.31 (ref 0.85–1.15)
LYMPHOCYTES # BLD AUTO: 1.9 10E3/UL (ref 0.8–5.3)
LYMPHOCYTES NFR BLD AUTO: 41 %
MCH RBC QN AUTO: 29.6 PG (ref 26.5–33)
MCHC RBC AUTO-ENTMCNC: 33.2 G/DL (ref 31.5–36.5)
MCV RBC AUTO: 89 FL (ref 78–100)
MONOCYTES # BLD AUTO: 0.7 10E3/UL (ref 0–1.3)
MONOCYTES NFR BLD AUTO: 15 %
NEUTROPHILS # BLD AUTO: 1.8 10E3/UL (ref 1.6–8.3)
NEUTROPHILS NFR BLD AUTO: 40 %
NRBC # BLD AUTO: 0 10E3/UL
NRBC BLD AUTO-RTO: 0 /100
PLATELET # BLD AUTO: 198 10E3/UL (ref 150–450)
POTASSIUM SERPL-SCNC: 4.1 MMOL/L (ref 3.4–5.3)
PROT SERPL-MCNC: 6.8 G/DL (ref 6.4–8.3)
RBC # BLD AUTO: 4.23 10E6/UL (ref 3.8–5.2)
SODIUM SERPL-SCNC: 142 MMOL/L (ref 135–145)
WBC # BLD AUTO: 4.5 10E3/UL (ref 4–11)

## 2024-02-08 PROCEDURE — 80048 BASIC METABOLIC PNL TOTAL CA: CPT | Performed by: STUDENT IN AN ORGANIZED HEALTH CARE EDUCATION/TRAINING PROGRAM

## 2024-02-08 PROCEDURE — 85610 PROTHROMBIN TIME: CPT | Performed by: PHYSICIAN ASSISTANT

## 2024-02-08 PROCEDURE — 36415 COLL VENOUS BLD VENIPUNCTURE: CPT | Performed by: STUDENT IN AN ORGANIZED HEALTH CARE EDUCATION/TRAINING PROGRAM

## 2024-02-08 PROCEDURE — 82140 ASSAY OF AMMONIA: CPT | Performed by: PHYSICIAN ASSISTANT

## 2024-02-08 PROCEDURE — 250N000013 HC RX MED GY IP 250 OP 250 PS 637: Performed by: INTERNAL MEDICINE

## 2024-02-08 PROCEDURE — 36415 COLL VENOUS BLD VENIPUNCTURE: CPT | Performed by: PHYSICIAN ASSISTANT

## 2024-02-08 PROCEDURE — 99239 HOSP IP/OBS DSCHRG MGMT >30: CPT | Performed by: STUDENT IN AN ORGANIZED HEALTH CARE EDUCATION/TRAINING PROGRAM

## 2024-02-08 PROCEDURE — 80053 COMPREHEN METABOLIC PANEL: CPT | Performed by: STUDENT IN AN ORGANIZED HEALTH CARE EDUCATION/TRAINING PROGRAM

## 2024-02-08 PROCEDURE — 82248 BILIRUBIN DIRECT: CPT | Performed by: STUDENT IN AN ORGANIZED HEALTH CARE EDUCATION/TRAINING PROGRAM

## 2024-02-08 PROCEDURE — 85025 COMPLETE CBC W/AUTO DIFF WBC: CPT | Performed by: STUDENT IN AN ORGANIZED HEALTH CARE EDUCATION/TRAINING PROGRAM

## 2024-02-08 RX ADMIN — LEVETIRACETAM 250 MG: 250 TABLET, FILM COATED ORAL at 09:22

## 2024-02-08 RX ADMIN — LEVETIRACETAM 1000 MG: 500 TABLET, FILM COATED ORAL at 09:21

## 2024-02-08 NOTE — DISCHARGE SUMMARY
"[facility]  Hospitalist Discharge Summary      Date of Admission:  2/6/2024  Date of Discharge:  2/8/2024  Discharging Provider: Susan Henriquez DO  Discharge Service: Hospitalist Service    Discharge Diagnoses   Acute hepatitis, unclear etiology  Cirrhosis by imaging  Elevated T bili  LFTS stable/slightly drifiting  Breast mass   Mouth pain  Anorexia  Multiple sclerosis  Nicotine dependence in remission  Seizures  Weight loss  Cognitive impairment  Severe malnutrition in setting of acute injury    Clinically Significant Risk Factors     # Cachexia: Estimated body mass index is 17 kg/m  as calculated from the following:    Height as of this encounter: 1.727 m (5' 8\").    Weight as of this encounter: 50.7 kg (111 lb 12.4 oz).  # Severe Malnutrition: based on nutrition assessment      Follow-ups Needed After Discharge   Follow-up Appointments     Follow-up and recommended labs and tests       Follow up with primary neurology to discuss new MS medications. You   should also consider reducing your gabapentin dosing. It is very high and   may be contributing to some lethargy. You will need close follow up in GI   clinic and repeat labs in 1 weeks. They have been ordered.            Unresulted Labs Ordered in the Past 30 Days of this Admission       Date and Time Order Name Status Description    2/7/2024 10:12 AM CMV antibody IgM In process     2/7/2024  9:34 AM F Actin EIA with reflex In process         These results will be followed up by GI    Discharge Disposition   Discharged to home  Condition at discharge: Stable    Hospital Course   Shanna Shanks is a 48 year old female with history of multiple sclerosis, seizure disorder, tobacco use disorder who presents to the emergency department with poor appetite, 20 pound weight loss and abnormal labs.  Labs show very elevated AST, ALT, alkaline phosphatase and bilirubin, suggesting acute hepatitis of unknown cause. Patient was evaluated " by GI will full work up which returned negative for obvious etiology. Liver failure known possible rare complication with her MS medication tecfidera. This will be stopped and primary neurologist in agreement. Patient's LFTS stable/trending down on discharge and she will discharge home with close follow up with GI. She will have LFTS drawn within the week and liver biopsy in outpatient setting. Ammonia notably elevated however family and patient declined treatment for now and will monitor symptoms        Consultations This Hospital Stay   GASTROENTEROLOGY IP CONSULT  GASTROENTEROLOGY IP CONSULT    Code Status   Full Code    Time Spent on this Encounter   ISusan DO, personally saw the patient today and spent greater than 30 minutes discharging this patient.       Susan Henriquez DO  [place] [address] [facility]  [address] [place] [address] [address]VE, [address]  [facility] [address]  Phone: [phone]  ______________________________________________________________________    Physical Exam   Vital Signs: Temp: 98.9  F (37.2  C) Temp src: Oral BP: 105/73 Pulse: 68   Resp: 16 SpO2: 96 % O2 Device: None (Room air)    Weight: 111 lbs 12.37 oz       Primary Care Physician   Malvin eBck MD    Discharge Orders      Hepatic panel     Reason for your hospital stay    You presented for a slow decline at home and were found to have liver failure. The exact reason for this remains unclear but most infections have been ruled out.     Follow-up and recommended labs and tests     Follow up with primary neurology to discuss new MS medications. You should also consider reducing your gabapentin dosing. It is very high and may be contributing to some lethargy. You will need close follow up in GI clinic and repeat labs in 1 weeks. They have been ordered.     Activity    Your activity upon discharge: activity as tolerated     Diet    Follow this diet upon discharge: Orders Placed This Encounter      Snacks/Supplements  Adult: Ensure Enlive; Between Meals      Room Service      Combination Diet Regular Diet Adult       Significant Results and Procedures   Results for orders placed or performed during the hospital encounter of 02/06/24   US Abdomen Limited (RUQ)    Narrative    US ABDOMEN LIMITED 2/6/2024 3:43 PM    CLINICAL HISTORY: elevated LFTs  TECHNIQUE: Limited abdominal ultrasound.    COMPARISON: 9/27/2022    FINDINGS:    GALLBLADDER: Mildly distended gallbladder with mild wall thickening to  4 mm. No gallstones or sludge. No pericholecystic fluid. Sonographic  Tate sign is negative.    BILE DUCTS: Mild intrahepatic biliary ductal dilatation. The  extrahepatic bile ducts are not dilated, measuring 4 mm    LIVER: Mild diffuse hepatic steatosis. No focal hepatic masses.    RIGHT KIDNEY: No hydronephrosis.    PANCREAS: The visualized portions of the pancreas are normal. The  pancreatic duct measures 1 mm the pancreatic head.      Impression    IMPRESSION:  1.  Mild gallbladder distention and gallbladder wall thickening to 4  mm. No gallstones. Mild intrahepatic biliary ductal dilatation.  Consider follow-up CT or MRCP for further evaluation.  2.  Hepatic steatosis.    KEIRY MCCULLOUGH MD         SYSTEM ID:  [id number]   CT Abdomen Pelvis w Contrast    Narrative    EXAM: CT ABDOMEN PELVIS W CONTRAST  LOCATION: [facility]  DATE: 2/6/2024    INDICATION: eleavted LFTS, elevated bilirubin  COMPARISON: None.  TECHNIQUE: CT scan of the abdomen and pelvis was performed following injection of IV contrast. Multiplanar reformats were obtained. Dose reduction techniques were used. Exam SIGNIFICANTLY LIMITED by arm down positioning.  CONTRAST: 55mL isovue 370    FINDINGS:   LOWER CHEST: Bibasilar atelectasis.    HEPATOBILIARY: Mild gallbladder wall edema.. Diffuse heterogeneous hepatic enhancement.    PANCREAS: No ductal dilatation.    SPLEEN: Normal size    ADRENAL GLANDS: Unremarkable    KIDNEYS/BLADDER: No  hydronephrosis. Bladder is significantly distended.    BOWEL: Normal caliber retrocecal appendix.    LYMPH NODES: No significant retroperitoneal adenopathy    VASCULATURE: No abdominal aortic aneurysm. Patent portal vein.    PELVIC ORGANS: Trace free pelvic fluid.    MUSCULOSKELETAL: Poorly defined right breast mass measuring up to 3.9 cm, image 16 series 4. No suspicious lytic or blastic lesions.      Impression    IMPRESSION:   1.  Right breast mass. Neoplasm should be excluded. Follow-up mammography/ultrasound recommended.  2.  Heterogeneous enhancement of the liver with further assessment limited by high noise. Findings could be seen in the setting of hepatitis. Chronic liver disease not excluded. Follow-up is advised.    Critical Result: New Mass or Tumor    Finding was identified on 2/6/2024 6:36 PM CST.    Branden Tang PA-C was contacted by me on 2/6/2024 6:36 PM CST and verbalized understanding of the critical result.      MR Liver wo & w Contrast    Narrative    EXAM: MR LIVER W/O and W CONTRAST  LOCATION: [facility]  DATE: 2/8/2024    INDICATION: elevated LFTs, CT with noise, concern for metastatic disease as has breast mass  COMPARISON: 02/06/2024  TECHNIQUE: Routine MRI liver protocol including T1 in/out phase, diffusion, multiplane T2, and dynamic T1 with IV contrast.    CONTRAST: 10mL Gadavist    FINDINGS:     LIVER: Heterogeneous enhancement of the liver with findings suggestive of chronic hepatic disease/cirrhosis. Fibrotic changes greater in the right liver. No definitive mass.     ADDITIONAL FINDINGS: 4 x 2.5 cm cystic mass in the right breast. Spleen, adrenals and kidneys within normal limits.      Impression    IMPRESSION:  1.  Cirrhosis.  2.  Cystic mass right breast. Follow-up mammography/ultrasound recommended.       Discharge Medications   Current Discharge Medication List        CONTINUE these medications which have NOT CHANGED    Details   cephALEXin (KEFLEX) 500  MG capsule Take 1 capsule (500 mg) by mouth 3 times daily  Qty: 15 capsule, Refills: 0    Associated Diagnoses: Urinary tract infection without hematuria, site unspecified      CRANBERRY EXTRACT PO Take 1 tablet by mouth daily      estradiol (ESTRACE VAGINAL) 0.1 MG/GM vaginal cream Apply small amount to the vaginal opening and urethra M, W, F @ h.s.  Qty: 42.5 g, Refills: 3    Associated Diagnoses: Urinary incontinence, unspecified type      gabapentin (NEURONTIN) 300 MG capsule Take 1,200 mg by mouth 3 times daily      !! levETIRAcetam (KEPPRA) 250 MG tablet Take 1 tablet (250 mg) by mouth 2 times daily  Qty: 60 tablet, Refills: 0    Comments: Take along with levetiracetam 1000mg tablets 2 times daily to equal 1250 mg 2 times daily total.  Associated Diagnoses: Seizures (H)      !! levETIRAcetam 1000 MG TABS Take 1,000 mg by mouth 2 times daily  Qty: 60 tablet, Refills: 1    Associated Diagnoses: Multiple sclerosis (H)      MELATONIN PO Take 1 chew tab by mouth as needed (sleep) Takes gummy formulation      multivitamin, therapeutic (THERA-VIT) TABS tablet Take 1 tablet by mouth daily      venlafaxine (EFFEXOR-ER) 75 MG 24 hr tablet Take 75 mg by mouth daily      order for DME Equipment being ordered: four wheeled walker with seat and brakes  Qty: 1 Units, Refills: 0    Associated Diagnoses: Encephalopathy       !! - Potential duplicate medications found. Please discuss with provider.        STOP taking these medications       atorvastatin (LIPITOR) 80 MG tablet Comments:   Reason for Stopping:         dimethyl fumarate (TECFIDERA) delayed release capsule Comments:   Reason for Stopping:             Allergies   Allergies   Allergen Reactions    Antihistamines, Diphenhydramine-Type Rash     benadryl cream    Nickel     Macrobid [Nitrofurantoin] Rash

## 2024-02-08 NOTE — PROGRESS NOTES
Discharge Note    Patient discharged to home via private vehicle  accompanied by significant other .  IV: Discontinued  Prescriptions N/A.   Belongings reviewed and sent with family.   Home medications returned to patient: NA  Equipment sent with: N/A.   patient and family verbalizes understanding of discharge instructions. AVS given to patient and family.

## 2024-02-08 NOTE — PLAN OF CARE
Orientation: Oriented to person and place  Activity: A1-2 gb/w  Diet/BS Checks: Reg  Tele: N/A  IV Access/Drains: L PIV SL  Pain Management: Denies  Abnormal VS/Results: ,   Bowel/Bladder: Incont b/b  Skin/Wounds: Sacral mepi in place   Consults: GI, nutrition   D/C Disposition: TBD    Other Info:   -MRI completed, see results

## 2024-02-08 NOTE — PLAN OF CARE
Goal Outcome Evaluation:       [id number]:    Orientation: A/Ox2, disoriented to time, situation.   Activity: A1-2 Gb/W  Diet/BS Checks: Regular diet, very poor appetite.  Tele:  N/A  IV Access/Drains: PIV SL.  Pain Management: Denies  Abnormal VS/Results: VSS on RA  Bowel/Bladder: Incontinent. No BM, purewick in place.   Skin/Wounds: redness coccyx. Mepi in place.   Consults: GI.   D/C Disposition: pending.   Other Info:  bedside with help, being supportive.

## 2024-02-08 NOTE — PLAN OF CARE
Orientation: Alert to self and place.   Activity:A1 GB+W.   Diet/BS Checks: Regular diet.   Tele:  N/a.   IV Access/Drains: PIV SL.   Pain Management: Denies.   Abnormal VS/Results:  VSS on RA.   Bowel/Bladder: Incontinent B/B. No BM this shift.   Skin/Wounds: WDL ex peeling to sacrum, mepi in place.   Consults: GI  D/C Disposition: Pending clinical improvement.   Other Info: Pt sent down to MRI - pending results.

## 2024-02-08 NOTE — PROGRESS NOTES
[facility]  Gastroenterology Progress Note     Shanna Shanks MRN# [id number]   YOB: 1975 Age: 48 year old          Assessment and Plan:     Shanna Shanks is a 48 year old female with history of multiple sclerosis, seizure disorder, tobacco use disorder who presents to the emergency department with poor appetite, 20 pound weight loss and abnormal labs.  Labs show very elevated AST, ALT, alkaline phosphatase and bilirubin, suggesting acute hepatitis of unknown cause.       Acute hepatitis on chronic liver cirrhosis  Weight loss  Abnormal liver function tests  LFTS remain elevated and trending down. Tbili 3.2 and slightly up as expected with acute hepatitis.  Abdominal ultrasound noted mild gallbladder distention and gallbladder wall thickening to 4 mm. Mild intrahepatic biliary ductal dilation  CT noted heterogeneous enhancement of the liver with further assessment limited by high noise. Findings could be seen in the setting of hepatitis. Chronic liver disease not excluded. Follow-up is advised.   Acetaminophen <5  Mono screen negative, acute hepatitis panel negative, ethyl alcohol level <0.01  HSV negative and CMV pending  JULIA and ASMA pending  MRI of liver notes liver cirrhosis with no definitive mass  MELD 13 ( INR 1.25, Sodium 142, Cr 0.63 Tbili 3.2)  DDx includes autoimmune, acute viral, drug induced, neoplastic vs other  Given liver cirrhosis, this developed over many years. Likely has acute drug induced hepatitis from Tecfidera      - Daily INR and CMP  - regular diet  - await lab results  - Would not recommend restarting Tecfidera, as per literature review, rare but known side effect of severe liver disease  - Avoid statins  - Draw Ammonia  - outpatient EUS with liver biopsy  - Will continue to follow along but per GI ok to discharge later today/tomorrow                     Interval History:     no new complaints and doing well; no cp, sob, n/v/d, or  abd pain.              Review of Systems:     C: NEGATIVE for fever, chills, change in weight  E/M: NEGATIVE for ear, mouth and throat problems  R: NEGATIVE for significant cough or SOB  CV: NEGATIVE for chest pain, palpitations or peripheral edema             Medications:   I have reviewed this patient's current medications   [Held by provider] gabapentin  1,200 mg Oral TID    levETIRAcetam  1,000 mg Oral BID    levETIRAcetam  250 mg Oral BID    sodium chloride (PF)  3 mL Intracatheter Q8H    [Held by provider] venlafaxine  75 mg Oral Daily                  Physical Exam:   Vitals were reviewed  Vital Signs with Ranges  Temp:  [97.7  F (36.5  C)-99.4  F (37.4  C)] 98.9  F (37.2  C)  Pulse:  [56-69] 68  Resp:  [16-18] 16  BP: ()/(66-78) 105/73  SpO2:  [95 %-96 %] 96 %  I/O last 3 completed shifts:  In: 360 [P.O.:360]  Out: 700 [Urine:700]  Constitutional: healthy, alert, and no distress   Cardiovascular: negative, PMI normal. No lifts, heaves, or thrills. RRR. No murmurs, clicks gallops or rub  Respiratory: negative, Percussion normal. Good diaphragmatic excursion. Lungs clear  Abdomen: Abdomen soft, non-tender. BS normal. No masses, organomegaly           Data:   I reviewed the patient's new clinical lab test results.   Recent Labs   Lab Test 02/08/24  0738 02/06/24  1609 02/06/24  1140 02/05/24  1515 06/05/18  0755 06/04/18  1904 07/25/17  0451 07/24/17  1808   WBC 4.5  --  5.1 5.5   < > 5.1   < > 11.5*   HGB 12.5  --  13.3 13.9   < > 12.1   < > 12.0   MCV 89  --  88 91   < > 91   < > 93     --  239 254   < > 208   < > 209   INR  --  1.25*  --   --   --  1.05  --  1.05    < > = values in this interval not displayed.     Recent Labs   Lab Test 02/08/24  0738 02/07/24  0824 02/06/24  1140   POTASSIUM 4.1 3.9 5.1   CHLORIDE 109* 110* 105   CO2 24 26 27   BUN 4.9* 5.0* 8.0   ANIONGAP 9 5* 7     Recent Labs   Lab Test 02/08/24  0738 02/07/24  0824 02/06/24  1609 02/06/24  1140 02/05/24  1521 02/05/24  1515  01/12/24  1033 10/20/23  1553   ALBUMIN 3.1* 3.0* 3.5 3.5  --    < >  --   --    BILITOTAL 3.2* 2.5* 3.1* 3.0*  --    < >  --   --    * 788* 898*  --   --    < >  --   --    AST 1,033* 1,219* 1,316*  --   --    < >  --   --    PROTEIN  --   --   --   --  100*  --  100* Negative   LIPASE  --   --   --  99*  --   --   --   --     < > = values in this interval not displayed.       I reviewed the patient's new imaging results.    All laboratory data reviewed  All imaging studies reviewed by me.    Kacie Cole PA-C,  2/8/2024  [facility]  Office : [phone]  Cell: [phone] (Dr. Lopez)  Cell: [phone] (Kacie Cole PA-C)

## 2024-02-08 NOTE — RESULT ENCOUNTER NOTE
Labs were reviewed; patient was advised to go to the ER.  Patient currently admitted to the hospital for further workup.

## 2024-02-09 ENCOUNTER — PATIENT OUTREACH (OUTPATIENT)
Dept: FAMILY MEDICINE | Facility: CLINIC | Age: 49
End: 2024-02-09
Payer: COMMERCIAL

## 2024-02-09 LAB
CMV IGM SERPL IA-ACNC: 13.2 AU/ML
CMV IGM SERPL IA-ACNC: NEGATIVE
SMA IGG SER-ACNC: 32 UNITS
SMOOTH MUSCLE IGG TITR SER: NORMAL {TITER}

## 2024-02-09 NOTE — TELEPHONE ENCOUNTER
"ED/Discharge Protocol    \"Hi, my name is Camila Richter RN, a registered nurse, and I am calling on behalf of Dr. Beck's office at [place].  I am calling to follow up and see how things are going for you after your recent visit.\"    \"I see that you were in the (ER/UC/IP) on 2/6 - 2/8/2024.    How are you doing now that you are home?\" Resting at home. Doing better.     Is patient experiencing symptoms that may require a hospital visit?  No.     Discharge Instructions    \"Let's review your discharge instructions.  What is/are the follow-up recommendations?  Pt. Response:   - medication changes  - diet  - activity as tolerated  - follow-up appointments    \"Were you instructed to make a follow-up appointment?\"  Pt. Response: No.   But /patient requesting a follow-up appointment with Dr. Beck due to complexity of patient and new findings.     \"When you see the provider, I would recommend that you bring your discharge instructions with you.    Medications    \"How many new medications are you on since your hospitalization/ED visit?\"    0-1  \"How many of your current medicines changed (dose, timing, name, etc.) while you were in the hospital/ED visit?\"   2 or more - Epic MTM referral needed  \"Do you have questions about your medications?\"   No  \"Were you newly diagnosed with heart failure, COPD, diabetes or did you have a heart attack?\"   No  For patients on insulin: \"Did you start on insulin in the hospital or did you have your insulin dose changed?\"   No  Post Discharge Medication Reconciliation Status: discharge medications reconciled and changed, per note/orders.    Was MTM referral placed (*Make sure to put transitions as reason for referral)?   No    Call Summary    \"Do you have any questions or concerns about your condition or care plan at the moment?\"    Yes Patient given an antibiotic on 2/5/2024 for UTI but then was told to go to the ER.  stated that the UTI was not addressed at the hospital " "that he is aware of. /patient wondering if should take the antibiotic now.   Triage nurse advice given: Writer will discuss with DOD on sight and call back.   1/12/2024 patient diagnosed with UTI and placed on antibiotic.   2/5/2024 another suspected UTI and given antibiotic.     DOD: Dr. Tadeo: ADVISED:   -- Labs do not show an active UTI. Thus, no need to take the antibiotic.  -- Symptoms of burning most likely related to the bilirubin.   -- Fever. If fever develops patient needs to go back to the ER.   Writer called patient/spouse and relayed message from Dr. Tadeo.   Med list updated.     Patient was in ER 1x recently which was appropriate in the past year (assess appropriateness of ER visits.)      \"If you have questions or things don't continue to improve, we encourage you contact us through the main clinic number,  [phone].  Even if the clinic is not open, triage nurses are available 24/7 to help you.     We would like you to know that our clinic has extended hours (provide information).  We also have urgent care (provide details on closest location and hours/contact info)\"      \"Thank you for your time and take care!\"    CIRA Arenas RN  [facility]    "

## 2024-02-12 ENCOUNTER — HOSPITAL ENCOUNTER (OUTPATIENT)
Dept: REHABILITATION | Facility: CLINIC | Age: 49
Discharge: HOME | End: 2024-02-12
Attending: FAMILY MEDICINE
Payer: COMMERCIAL

## 2024-02-12 PROCEDURE — S5102 ADULT DAY CARE PER DIEM: HCPCS

## 2024-02-12 NOTE — PROGRESS NOTES
[facility] RN Note    Previous documentation since 1/8/24 reviewed.  No concerns reported by AC staff or member.       Seen by internal medicine provider on 2/5/24 for decreased appetite and significant weight loss. Concern for significant protein malnutrition and cachexia.  Etiology unknown, to discuss with neurologist.     Referred to ED by PCP on 2/6/24 due to abnormal labs (elevated LFTs). Slight scleral icterus and slight jaundice of skin noted on exam. Right breast mass also noted on exam.  Hospitalized 2/6-2/8/24. Diagnosed with acute hepatitis of unknown etiology, cirrhosis noted on imaging. Plan: Follow-up with neurology and GI, repeat LFTs, liver biopsy, and mammogram/ultrasound recommended.

## 2024-02-13 ENCOUNTER — OFFICE VISIT (OUTPATIENT)
Dept: FAMILY MEDICINE | Facility: CLINIC | Age: 49
End: 2024-02-13
Payer: COMMERCIAL

## 2024-02-13 VITALS
HEIGHT: 68 IN | SYSTOLIC BLOOD PRESSURE: 108 MMHG | RESPIRATION RATE: 16 BRPM | HEART RATE: 62 BPM | BODY MASS INDEX: 16.93 KG/M2 | OXYGEN SATURATION: 98 % | TEMPERATURE: 97.8 F | DIASTOLIC BLOOD PRESSURE: 74 MMHG | WEIGHT: 111.7 LBS

## 2024-02-13 DIAGNOSIS — R74.8 ELEVATED LIVER ENZYMES: Primary | ICD-10-CM

## 2024-02-13 DIAGNOSIS — R63.4 WEIGHT LOSS: ICD-10-CM

## 2024-02-13 DIAGNOSIS — N63.10 MASS OF RIGHT BREAST, UNSPECIFIED QUADRANT: ICD-10-CM

## 2024-02-13 LAB
ALBUMIN SERPL BCG-MCNC: 3.7 G/DL (ref 3.5–5.2)
ALP SERPL-CCNC: 202 U/L (ref 40–150)
ALT SERPL W P-5'-P-CCNC: 539 U/L (ref 0–50)
AST SERPL W P-5'-P-CCNC: 429 U/L (ref 0–45)
BILIRUB DIRECT SERPL-MCNC: 1.23 MG/DL (ref 0–0.3)
BILIRUB SERPL-MCNC: 2.3 MG/DL
PROT SERPL-MCNC: 7.7 G/DL (ref 6.4–8.3)

## 2024-02-13 PROCEDURE — 90471 IMMUNIZATION ADMIN: CPT | Performed by: FAMILY MEDICINE

## 2024-02-13 PROCEDURE — 36415 COLL VENOUS BLD VENIPUNCTURE: CPT | Performed by: FAMILY MEDICINE

## 2024-02-13 PROCEDURE — 99213 OFFICE O/P EST LOW 20 MIN: CPT | Mod: 25 | Performed by: FAMILY MEDICINE

## 2024-02-13 PROCEDURE — 90677 PCV20 VACCINE IM: CPT | Performed by: FAMILY MEDICINE

## 2024-02-13 PROCEDURE — 80076 HEPATIC FUNCTION PANEL: CPT | Performed by: FAMILY MEDICINE

## 2024-02-13 NOTE — PROGRESS NOTES
Assessment & Plan     Elevated liver enzymes  Repeat labs today. Clinically feeling better. Seeing GI soon. Off statin and tecfidera. Will defer further MS treatment to neurology.   - Hepatic panel (Albumin, ALT, AST, Bili, Alk Phos, TP); Future  - Hepatic panel (Albumin, ALT, AST, Bili, Alk Phos, TP)    Weight loss  Stable weight. Bmi still on lower side. Using protein supplement. Scheduled to see GI.     Mass of right breast, unspecified quadrant  Incidental finding. One image showed as mass and another as cyst. Going for mammogram. No previous mammogram.     Pcv 20 today. Will consider shingrix in future. Not too excited today.           MED REC REQUIRED  Post Medication Reconciliation Status:  Discharge medications reconciled, continue medications without change         Melba Otero is a 48 year old, presenting for the following health issues:  Hospital F/U      2/13/2024    10:54 AM   Additional Questions   Roomed by Rahat   Accompanied by      HPI         Hospital Follow-up Visit:    Hospital/Nursing Home/IP Rehab Facility: [facility]  Date of Admission: 2/6/24  Date of Discharge: 2/8/24  Reason(s) for Admission: Abnormal results of liver function studies    Was your hospitalization related to COVID-19? No   Problems taking medications regularly:  None  Medication changes since discharge: Stop taking atorvastatin 80 mg, dimethyl fumarate 240 mg, gabapentin 300 mg  Problems adhering to non-medication therapy:  None    Summary of hospitalization:  [facility] discharge summary reviewed  Diagnostic Tests/Treatments reviewed.  Follow up needed: none  Other Healthcare Providers Involved in Patient s Care:         Specialist appointment - GI  Update since discharge: improved.       Plan of care communicated with patient and family           Here with her .   Last monday - was seen in the clinic and had labs which were abnormal and was routed to ER.  "  Periodic pain in mouth. Been to dentist, endodontist. No change. Was advised to see neuro.   Started on gabapentin.   From time to time - periods of sharp pain in right jaw and it prevented her eating.   Liver enzymes were really high.   Now off gabapentin and feels no change in symptoms.   Does not feel 100% but feels like it is improving as per .   Did not lose weight since hospital discharge. No major change.   Having enough energy intake as per . Keeping an eye on weight at home. Eating better but still some hit and miss. Supplementing with protein supplements daily.   GI - Thursday.   Mood is OK as per patient.   MS med - tecfidera and statin were stopped.   Lump on breast. Scheduled to have mammogram scheduled. No previous mammogram.              Objective    /74 (BP Location: Right arm, Patient Position: Sitting, Cuff Size: Adult Regular)   Pulse 62   Temp 97.8  F (36.6  C) (Temporal)   Resp 16   Ht 1.727 m (5' 8\")   Wt 50.7 kg (111 lb 11.2 oz)   LMP  (LMP Unknown)   SpO2 98%   BMI 16.98 kg/m    Body mass index is 16.98 kg/m .  Physical Exam   Sitting in wheelchair, mostly quiet but answers questions pleasantly and appropriately.   Lungs clear  Heart RRR  No pedal edema.  No right upper quadrant pain on limited exam.             Signed Electronically by: Malvin Beck MD, MD    "

## 2024-02-13 NOTE — NURSING NOTE
Prior to immunization administration, verified patients identity using patient s name and date of birth. Please see Immunization Activity for additional information.     Screening Questionnaire for Adult Immunization    Are you sick today?   No   Do you have allergies to medications, food, a vaccine component or latex?   No   Have you ever had a serious reaction after receiving a vaccination?   Yes   Do you have a long-term health problem with heart, lung, kidney, or metabolic disease (e.g., diabetes), asthma, a blood disorder, no spleen, complement component deficiency, a cochlear implant, or a spinal fluid leak?  Are you on long-term aspirin therapy?   Yes   Do you have cancer, leukemia, HIV/AIDS, or any other immune system problem?   Yes   Do you have a parent, brother, or sister with an immune system problem?   No   In the past 3 months, have you taken medications that affect  your immune system, such as prednisone, other steroids, or anticancer drugs; drugs for the treatment of rheumatoid arthritis, Crohn s disease, or psoriasis; or have you had radiation treatments?   No   Have you had a seizure, or a brain or other nervous system problem?   No   During the past year, have you received a transfusion of blood or blood    products, or been given immune (gamma) globulin or antiviral drug?   No   For women: Are you pregnant or is there a chance you could become       pregnant during the next month?   No   Have you received any vaccinations in the past 4 weeks?   No     Immunization questionnaire was positive for at least one answer.  Notified  dr Beck.      Patient instructed to remain in clinic for 15 minutes afterwards, and to report any adverse reactions.     Screening performed by Sindhu Gramajo MA on 2/13/2024 at 11:43 AM.

## 2024-02-19 ENCOUNTER — MYC MEDICAL ADVICE (OUTPATIENT)
Dept: FAMILY MEDICINE | Facility: CLINIC | Age: 49
End: 2024-02-19

## 2024-02-19 DIAGNOSIS — N63.10 MASS OF RIGHT BREAST, UNSPECIFIED QUADRANT: Primary | ICD-10-CM

## 2024-02-21 ENCOUNTER — LAB (OUTPATIENT)
Dept: LAB | Facility: CLINIC | Age: 49
End: 2024-02-21
Payer: COMMERCIAL

## 2024-02-21 DIAGNOSIS — B17.9 ACUTE HEPATITIS: ICD-10-CM

## 2024-02-21 LAB
ALBUMIN SERPL BCG-MCNC: 4 G/DL (ref 3.5–5.2)
ALP SERPL-CCNC: 133 U/L (ref 40–150)
ALT SERPL W P-5'-P-CCNC: 262 U/L (ref 0–50)
AST SERPL W P-5'-P-CCNC: 170 U/L (ref 0–45)
BILIRUB DIRECT SERPL-MCNC: 0.78 MG/DL (ref 0–0.3)
BILIRUB SERPL-MCNC: 1.5 MG/DL
PROT SERPL-MCNC: 8.1 G/DL (ref 6.4–8.3)

## 2024-02-21 PROCEDURE — 36415 COLL VENOUS BLD VENIPUNCTURE: CPT

## 2024-02-21 PROCEDURE — 80076 HEPATIC FUNCTION PANEL: CPT

## 2024-02-22 ENCOUNTER — HOSPITAL ENCOUNTER (OUTPATIENT)
Dept: REHABILITATION | Facility: CLINIC | Age: 49
Discharge: HOME | End: 2024-02-22
Attending: FAMILY MEDICINE
Payer: COMMERCIAL

## 2024-02-22 PROCEDURE — S5102 ADULT DAY CARE PER DIEM: HCPCS

## 2024-02-26 ENCOUNTER — MYC MEDICAL ADVICE (OUTPATIENT)
Dept: FAMILY MEDICINE | Facility: CLINIC | Age: 49
End: 2024-02-26
Payer: COMMERCIAL

## 2024-02-26 ENCOUNTER — HOSPITAL ENCOUNTER (OUTPATIENT)
Dept: MAMMOGRAPHY | Facility: CLINIC | Age: 49
Discharge: HOME | End: 2024-02-26
Attending: FAMILY MEDICINE
Payer: COMMERCIAL

## 2024-02-26 DIAGNOSIS — N63.10 MASS OF RIGHT BREAST, UNSPECIFIED QUADRANT: ICD-10-CM

## 2024-02-26 DIAGNOSIS — R74.8 ELEVATED LIVER ENZYMES: Primary | ICD-10-CM

## 2024-02-26 DIAGNOSIS — R79.89 ABNORMAL LIVER FUNCTION TESTS: Primary | ICD-10-CM

## 2024-02-26 PROCEDURE — 76642 ULTRASOUND BREAST LIMITED: CPT | Mod: RT

## 2024-02-26 PROCEDURE — 77062 BREAST TOMOSYNTHESIS BI: CPT

## 2024-02-27 ENCOUNTER — HOSPITAL ENCOUNTER (OUTPATIENT)
Dept: REHABILITATION | Facility: CLINIC | Age: 49
Discharge: HOME | End: 2024-02-27
Attending: FAMILY MEDICINE
Payer: COMMERCIAL

## 2024-02-27 PROCEDURE — S5102 ADULT DAY CARE PER DIEM: HCPCS

## 2024-02-27 NOTE — PROGRESS NOTES
MONTHLY PROGRESS NOTE AND PARTICIPATION REPORT   [facility]    Shanna Shanks, 1975  Attendance: Please see Epic for attendance record.    Communication:   Does communicate   Hearing:   No impairment  Vision:   No impairment  Orientation/Cognition:   Minor forgetfulness  Partial disorientation  Short term memory loss  Behavior:   No behavioral concerns  Self-Preservation Skills:   Shanna presents with MOD cogntive impairment and slow shuffled gait with balance deficits during ambulation 2/2 her MS diagnosis.  She requires staff instructions and assistance for self preservatioin.    Eating:   Assist with set up  Shanna requires verbal cues from staff ~75% of the time to initiate and continue eathing activities during lunch service D/2 her cognitive impairment.  Ambulation Walking:   Needs assistance  Staff provides Shanna a site owned 4WW for safe ambulation during program attendance for falls prevention D/2 her short slow shuffled gait and balance deficits 2/2 her MS diagnosis.  Transferring:   Independent  Wheelchair:   Shanna is fully ambulatory.  However, she will be offered a manual WC for any off site group outings for energy conservation and falls prevention.  Toileting:   Staff assists Shanna in the bathroom 1:1 for supervision and provides VC for self-hygiene and garment management after toilet use and hand hygiene.  Maintenance Program:     [employer]  Living Arrangements:   Lives with family  Spiritual Needs: Needs are being met through support group  Medication Assistance:   Medication not taken during program hours  Participation Report:   Aerobics/Exercise  Support Group  Cognitive Stimulation  Fire Drill  Creative Arts/Crafts  Games  Gardening  Speakers/Entertainment  Socialization  Current Events/News  Education/Health Topic  Level of Participation:   [facility] Note:  Shanna enjoys playing cognitive games with the group. Shanna talks about her family often.  Shanna has worked hard on a ceramic gnome painting over the past months.

## 2024-02-27 NOTE — TELEPHONE ENCOUNTER
" -- I do not see any lab note response to 2/21/24 hepatic panel looks like this was ordered by the internist at [facility]. Your note mentioned on 2/13/24 \"will be seeing GI soon\". I also do not see this scheduled nor a referral.     Please advise on labs and follow-up    Ann-Marie Pennington RN  [facility]    "

## 2024-02-27 NOTE — TELEPHONE ENCOUNTER
Hepatic panel is improving. It was ordered by hospitalist who has discharged her.   As per patient and , they have already GI appt  and I see Dr Lopez has also ordered some labs for them.   Further recommendations as per GI

## 2024-02-29 ENCOUNTER — HOSPITAL ENCOUNTER (OUTPATIENT)
Dept: REHABILITATION | Facility: CLINIC | Age: 49
Discharge: HOME | End: 2024-02-29
Attending: FAMILY MEDICINE
Payer: COMMERCIAL

## 2024-02-29 PROCEDURE — S5102 ADULT DAY CARE PER DIEM: HCPCS

## 2024-03-07 ENCOUNTER — HOSPITAL ENCOUNTER (OUTPATIENT)
Dept: REHABILITATION | Facility: CLINIC | Age: 49
Discharge: HOME | End: 2024-03-07
Attending: FAMILY MEDICINE
Payer: COMMERCIAL

## 2024-03-07 PROCEDURE — S5102 ADULT DAY CARE PER DIEM: HCPCS

## 2024-03-08 ENCOUNTER — LAB (OUTPATIENT)
Dept: LAB | Facility: CLINIC | Age: 49
End: 2024-03-08
Payer: COMMERCIAL

## 2024-03-08 DIAGNOSIS — R79.89 ABNORMAL LIVER FUNCTION TESTS: ICD-10-CM

## 2024-03-08 LAB
BASOPHILS # BLD AUTO: 0 10E3/UL (ref 0–0.2)
BASOPHILS NFR BLD AUTO: 1 %
EOSINOPHIL # BLD AUTO: 0.2 10E3/UL (ref 0–0.7)
EOSINOPHIL NFR BLD AUTO: 4 %
ERYTHROCYTE [DISTWIDTH] IN BLOOD BY AUTOMATED COUNT: 14.6 % (ref 10–15)
HCT VFR BLD AUTO: 41.4 % (ref 35–47)
HGB BLD-MCNC: 13.4 G/DL (ref 11.7–15.7)
IMM GRANULOCYTES # BLD: 0 10E3/UL
IMM GRANULOCYTES NFR BLD: 0 %
LYMPHOCYTES # BLD AUTO: 2.3 10E3/UL (ref 0.8–5.3)
LYMPHOCYTES NFR BLD AUTO: 51 %
MCH RBC QN AUTO: 30.2 PG (ref 26.5–33)
MCHC RBC AUTO-ENTMCNC: 32.4 G/DL (ref 31.5–36.5)
MCV RBC AUTO: 94 FL (ref 78–100)
MONOCYTES # BLD AUTO: 0.5 10E3/UL (ref 0–1.3)
MONOCYTES NFR BLD AUTO: 12 %
NEUTROPHILS # BLD AUTO: 1.4 10E3/UL (ref 1.6–8.3)
NEUTROPHILS NFR BLD AUTO: 32 %
PLATELET # BLD AUTO: 200 10E3/UL (ref 150–450)
RBC # BLD AUTO: 4.43 10E6/UL (ref 3.8–5.2)
WBC # BLD AUTO: 4.4 10E3/UL (ref 4–11)

## 2024-03-08 PROCEDURE — 36415 COLL VENOUS BLD VENIPUNCTURE: CPT

## 2024-03-08 PROCEDURE — 86140 C-REACTIVE PROTEIN: CPT

## 2024-03-08 PROCEDURE — 80053 COMPREHEN METABOLIC PANEL: CPT

## 2024-03-08 PROCEDURE — 85025 COMPLETE CBC W/AUTO DIFF WBC: CPT

## 2024-03-09 LAB
ALBUMIN SERPL BCG-MCNC: 4.2 G/DL (ref 3.5–5.2)
ALP SERPL-CCNC: 92 U/L (ref 40–150)
ALT SERPL W P-5'-P-CCNC: 80 U/L (ref 0–50)
ANION GAP SERPL CALCULATED.3IONS-SCNC: 8 MMOL/L (ref 7–15)
AST SERPL W P-5'-P-CCNC: 60 U/L (ref 0–45)
BILIRUB SERPL-MCNC: 1 MG/DL
BUN SERPL-MCNC: 12.4 MG/DL (ref 6–20)
CALCIUM SERPL-MCNC: 9.8 MG/DL (ref 8.6–10)
CHLORIDE SERPL-SCNC: 103 MMOL/L (ref 98–107)
CREAT SERPL-MCNC: 0.79 MG/DL (ref 0.51–0.95)
CRP SERPL-MCNC: <3 MG/L
DEPRECATED HCO3 PLAS-SCNC: 30 MMOL/L (ref 22–29)
EGFRCR SERPLBLD CKD-EPI 2021: >90 ML/MIN/1.73M2
GLUCOSE SERPL-MCNC: 109 MG/DL (ref 70–99)
POTASSIUM SERPL-SCNC: 4.2 MMOL/L (ref 3.4–5.3)
PROT SERPL-MCNC: 7.6 G/DL (ref 6.4–8.3)
SODIUM SERPL-SCNC: 141 MMOL/L (ref 135–145)

## 2024-03-12 ENCOUNTER — HOSPITAL ENCOUNTER (OUTPATIENT)
Dept: REHABILITATION | Facility: CLINIC | Age: 49
Discharge: HOME | End: 2024-03-12
Attending: FAMILY MEDICINE
Payer: COMMERCIAL

## 2024-03-12 PROCEDURE — S5102 ADULT DAY CARE PER DIEM: HCPCS

## 2024-03-14 ENCOUNTER — HOSPITAL ENCOUNTER (OUTPATIENT)
Dept: REHABILITATION | Facility: CLINIC | Age: 49
Discharge: HOME | End: 2024-03-14
Attending: FAMILY MEDICINE
Payer: COMMERCIAL

## 2024-03-14 PROCEDURE — S5102 ADULT DAY CARE PER DIEM: HCPCS

## 2024-03-19 ENCOUNTER — HOSPITAL ENCOUNTER (OUTPATIENT)
Dept: REHABILITATION | Facility: CLINIC | Age: 49
Discharge: HOME | End: 2024-03-19
Attending: FAMILY MEDICINE
Payer: COMMERCIAL

## 2024-03-19 PROCEDURE — S5102 ADULT DAY CARE PER DIEM: HCPCS

## 2024-03-23 ENCOUNTER — HEALTH MAINTENANCE LETTER (OUTPATIENT)
Age: 49
End: 2024-03-23

## 2024-03-25 NOTE — PROGRESS NOTES
MONTHLY PROGRESS NOTE AND PARTICIPATION REPORT   [facility]    Shanna Shanks, 1975  Attendance: Please see Epic for attendance record.    Communication:   Does communicate   Hearing:   No impairment  Vision:   No impairment  Orientation/Cognition:   Minor forgetfulness  Partial disorientation  Short term memory loss  Behavior:   No behavioral concerns  Self-Preservation Skills:   Shanna presents with MOD cogntive impairment and slow shuffled gait with balance deficits during ambulation 2/2 her MS diagnosis.  She requires staff instructions and assistance for self preservatioin.    Eating:   Assist with set up  Shanna requires verbal cues from staff ~75% of the time to initiate and continue eathing activities during lunch service D/2 her cognitive impairment.  Ambulation Walking:   Needs assistance  Staff provides Shanna a site owned 4WW for safe ambulation during program attendance for falls prevention D/2 her short slow shuffled gait and balance deficits 2/2 her MS diagnosis.  Transferring:   Independent  Wheelchair:   Shanna is fully ambulatory.  However, she will be offered a manual WC for any off site group outings for energy conservation and falls prevention.  Toileting:   Staff assists Shanna in the bathroom 1:1 for supervision and provides VC for self-hygiene and garment management after toilet use and hand hygiene.  Maintenance Program:     [employer]Step  Living Arrangements:   Lives with family  Spiritual Needs: Needs are being met through support group  Medication Assistance:   Medication not taken during program hours  Participation Report:   Aerobics/Exercise  Support Group  Cognitive Stimulation  Fire Drill  Creative Arts/Crafts  Games  Gardening  Speakers/Entertainment  Socialization  Current Events/News  Education/Health Topic  Level of Participation:   [facility] Note:  Shanna has been making progress on her gnome ceramic painting. Shanna shares positive  stories with staff and others. Autumn reports enjoyment in Day Program games and activities.

## 2024-03-26 ENCOUNTER — HOSPITAL ENCOUNTER (OUTPATIENT)
Dept: REHABILITATION | Facility: CLINIC | Age: 49
Discharge: HOME | End: 2024-03-26
Attending: FAMILY MEDICINE
Payer: COMMERCIAL

## 2024-03-26 PROCEDURE — S5102 ADULT DAY CARE PER DIEM: HCPCS

## 2024-03-28 NOTE — PROGRESS NOTES
[facility] RN Note    Previous documentation since 2/12/24 reviewed.  Participant not in facility at this time.

## 2024-04-02 ENCOUNTER — HOSPITAL ENCOUNTER (OUTPATIENT)
Dept: REHABILITATION | Facility: CLINIC | Age: 49
Discharge: HOME | End: 2024-04-02
Attending: FAMILY MEDICINE
Payer: COMMERCIAL

## 2024-04-02 PROCEDURE — S5102 ADULT DAY CARE PER DIEM: HCPCS

## 2024-04-02 NOTE — PROGRESS NOTES
[facility] RN Note    No new progress notes noted since 3/28/24.  No concerns reported by AC staff or member.

## 2024-04-02 NOTE — PROGRESS NOTES
Individual abuse prevention plan (IAPP)  [facility]     Assessment of Susceptibility to Abuse, Including Self Abuse, Neglect (Identification of characteristics, which make the individual susceptible to abuse, and how these characteristics cause the individual to be susceptible to abuse.)     Is the person susceptible to abuse in each area?  Sexual Abuse:   No  Referrals made when the person is susceptible to abuse outside the scope or control of this program (Identify the referral and date it occurred): No additional risk reduction means needed at this time    Physical Abuse:   No  Referrals made when the person is susceptible to abuse outside the scope or control of this program (Identify the referral and date it occurred): No additional risk reduction means needed at this time    Self-Abuse:   No  Referrals made when the person is susceptible to abuse outside the scope or control of this program (Identify the referral and date it occurred): No additional risk reduction means needed at this time    Financial  Exploitation  No  Referrals made when the person is susceptible to abuse outside the scope or control of this program (Identify the referral and date it occurred): No additional risk reduction means needed at this time    Is the program aware of this person committing a violent crime or act of physical aggression towards others?  No  Referrals made when the person is susceptible to abuse outside the scope or control of this program (Identify the referral and date it occurred): No additional risk reduction means needed at this time    INDIVIDUAL ABUSE PREVENTION PLAN-MEASURES TAKEN TO MINIMIZE RISK OF ABUSE   ADL:   Assist with clothing management  Assist with feeding  Assist with toileting  Staff will provide set up and VC for eating.  Staff will provide 1:1 supervision and set up and VC assist for post toilet use self-hygiene, garment management, and hand  hygiene.  Ambulation/Transfers/Wheelchairs:  Assist with all transfers and/or ambulation  Encourage client to ambulate short distances with walker and provide wheelchair for distance  Ensure client uses cane and/or walker  Provide standby assist due to periods of dizziness, fatigue  Provide standby assist when client ambulates prn   Behavior:   No behavior issues  Communication:  Encourage verbalization of needs/concerns  Observe body language/gestures to assist in anticipating client's needs  Cognitive Issues:   Require aide to be with member if wandering  Provider reminders due to confusion, forgetfulness  Give simple step-by-step direction  Contact caregiver to inform of special activities days  Diet:   Staff will prepare food to facilitate client to feed self  Monitor client when eating and/or drinking fluids   Exercise:   Encourage participation in maintenance program  Encourage participation in wheelchair aerobics  Hearing:   Speak distinctly and use gestures  Isolation:   Encourage socialization due to isolation in home environment  Medical Monitoring:   Monitor physical and emotional comfort  Monitor physical symptoms due to diagnosis and report significant changes to nurse, caregiver and physician   Mental Health:   Motivate client to join in activities that are beneficial to client  Encourage client to express feelings  Monitor anxiety level and intervene when appropriate  Encourage regular attendance for socialization and stimulation  Offer emotional support  Observe for symptoms of depression and notify appropriate staff/caregiver  Encourage independent decision making  Encourage social interaction to assist in increasing client's self-image  Provide client with choices of programming to encourage independent decision making  Provide activities in which client can be successful  Sensory:   Provide and encourage participation in activities for stimulation  Vision:   Provide verbal cues  Other:  N/A    Developed in consultation with:  Client  The Program Abuse Prevention Plan/IAPP identifies the specific actions that minimize abuse to the [facility] participant.  yes

## 2024-04-02 NOTE — PROGRESS NOTES
INDIVIDUAL PLAN OF CARE   [facility]    Member Name: Shanna Shanks; YOB: 1975  MRN: [id number]  3/31/2022    Goals developed in collaboration with: member  Staff responsible for plan: Analisa LOWE  1. Long Term Goal (concrete, measurable, and time specific outcomes):  Member will maintain present level of physical and cognitive function through active participation in structured [facility] programming with staff set up, facilitation, and supervision provided every day of program attendance.  Target Date:  April 2025  Bi-Annual Review:  Goal remains appropriate.    2. Short Term Goal: (concrete, measurable, and time specific outcomes):  To maintain physical strength and endurance, member will actively participate in prescribed Nu-Step exercise program, with set up, VC's and supervision provided by staff, for at least 20 minutes, at least 1x/week during program attendance.  Target Date:  October 2024  Bi-Annual Review:  Goal remains appropriate.  Shanna requires VC's and positive reinforcement to participate in Nu Step activity.    3. Short Term Goal: (concrete, measurable, and time specific outcomes):  To maintain current level of cognitive function, member will actively participate in therapeutic creative arts and Brain/Body programming with set up, VC's and supervision provided by staff every day of program attendance.  Target Date:  October 2024  Bi-Annual Review:  Goal remains appropriate.

## 2024-04-04 ENCOUNTER — HOSPITAL ENCOUNTER (OUTPATIENT)
Dept: REHABILITATION | Facility: CLINIC | Age: 49
Discharge: HOME | End: 2024-04-04
Attending: FAMILY MEDICINE
Payer: COMMERCIAL

## 2024-04-04 PROCEDURE — S5102 ADULT DAY CARE PER DIEM: HCPCS

## 2024-04-09 ENCOUNTER — HOSPITAL ENCOUNTER (OUTPATIENT)
Dept: REHABILITATION | Facility: CLINIC | Age: 49
Discharge: HOME | End: 2024-04-09
Attending: FAMILY MEDICINE
Payer: COMMERCIAL

## 2024-04-09 PROCEDURE — S5102 ADULT DAY CARE PER DIEM: HCPCS

## 2024-04-16 ENCOUNTER — HOSPITAL ENCOUNTER (OUTPATIENT)
Dept: REHABILITATION | Facility: CLINIC | Age: 49
Discharge: HOME | End: 2024-04-16
Attending: FAMILY MEDICINE
Payer: COMMERCIAL

## 2024-04-16 PROCEDURE — S5102 ADULT DAY CARE PER DIEM: HCPCS

## 2024-04-18 ENCOUNTER — HOSPITAL ENCOUNTER (OUTPATIENT)
Dept: REHABILITATION | Facility: CLINIC | Age: 49
Discharge: HOME | End: 2024-04-18
Attending: FAMILY MEDICINE
Payer: COMMERCIAL

## 2024-04-18 PROCEDURE — S5102 ADULT DAY CARE PER DIEM: HCPCS

## 2024-04-18 NOTE — PROGRESS NOTES
Facilitated physical maintenance of bilateral upper extremity and bilateral lower extrimity strength and endurance through NuStep therapeutic exercise.  Patient completed 15 minutes of NuStep activity.    Nu Step Settings:  Seat:  10  Arms:  12  Resistance:   5

## 2024-04-23 ENCOUNTER — HOSPITAL ENCOUNTER (OUTPATIENT)
Dept: REHABILITATION | Facility: CLINIC | Age: 49
Discharge: HOME | End: 2024-04-23
Attending: FAMILY MEDICINE
Payer: COMMERCIAL

## 2024-04-23 PROCEDURE — S5102 ADULT DAY CARE PER DIEM: HCPCS

## 2024-04-25 ENCOUNTER — HOSPITAL ENCOUNTER (OUTPATIENT)
Dept: REHABILITATION | Facility: CLINIC | Age: 49
Discharge: HOME | End: 2024-04-25
Attending: FAMILY MEDICINE
Payer: COMMERCIAL

## 2024-04-25 PROCEDURE — S5102 ADULT DAY CARE PER DIEM: HCPCS

## 2024-04-29 NOTE — ADDENDUM NOTE
Encounter addended by: Janessa Scott OT on: 7/30/2018  7:24 PM<BR>     Actions taken: Episode edited, Sign clinical note
Family

## 2024-05-07 ENCOUNTER — HOSPITAL ENCOUNTER (OUTPATIENT)
Dept: REHABILITATION | Facility: CLINIC | Age: 49
Discharge: HOME | End: 2024-05-07
Attending: FAMILY MEDICINE
Payer: COMMERCIAL

## 2024-05-07 PROCEDURE — S5100 ADULT DAYCARE SERVICES 15MIN: HCPCS

## 2024-05-07 PROCEDURE — S5102 ADULT DAY CARE PER DIEM: HCPCS

## 2024-05-09 ENCOUNTER — HOSPITAL ENCOUNTER (OUTPATIENT)
Dept: REHABILITATION | Facility: CLINIC | Age: 49
Discharge: HOME | End: 2024-05-09
Attending: FAMILY MEDICINE
Payer: COMMERCIAL

## 2024-05-09 PROCEDURE — S5102 ADULT DAY CARE PER DIEM: HCPCS

## 2024-05-14 ENCOUNTER — HOSPITAL ENCOUNTER (OUTPATIENT)
Dept: REHABILITATION | Facility: CLINIC | Age: 49
Discharge: HOME | End: 2024-05-14
Attending: FAMILY MEDICINE
Payer: COMMERCIAL

## 2024-05-14 PROCEDURE — S5102 ADULT DAY CARE PER DIEM: HCPCS

## 2024-05-16 ENCOUNTER — HOSPITAL ENCOUNTER (OUTPATIENT)
Dept: REHABILITATION | Facility: CLINIC | Age: 49
Discharge: HOME | End: 2024-05-16
Attending: FAMILY MEDICINE
Payer: COMMERCIAL

## 2024-05-16 PROCEDURE — S5102 ADULT DAY CARE PER DIEM: HCPCS

## 2024-05-21 ENCOUNTER — HOSPITAL ENCOUNTER (OUTPATIENT)
Dept: REHABILITATION | Facility: CLINIC | Age: 49
Discharge: HOME | End: 2024-05-21
Attending: FAMILY MEDICINE
Payer: COMMERCIAL

## 2024-05-21 PROCEDURE — S5102 ADULT DAY CARE PER DIEM: HCPCS

## 2024-05-23 ENCOUNTER — HOSPITAL ENCOUNTER (OUTPATIENT)
Dept: REHABILITATION | Facility: CLINIC | Age: 49
Discharge: HOME | End: 2024-05-23
Attending: FAMILY MEDICINE
Payer: COMMERCIAL

## 2024-05-23 PROCEDURE — S5102 ADULT DAY CARE PER DIEM: HCPCS

## 2024-05-28 ENCOUNTER — HOSPITAL ENCOUNTER (OUTPATIENT)
Dept: REHABILITATION | Facility: CLINIC | Age: 49
Discharge: HOME | End: 2024-05-28
Attending: FAMILY MEDICINE
Payer: COMMERCIAL

## 2024-05-28 PROCEDURE — S5102 ADULT DAY CARE PER DIEM: HCPCS

## 2024-05-28 NOTE — PROGRESS NOTES
MONTHLY PROGRESS NOTE AND PARTICIPATION REPORT   [facility]    Shanna Shanks, 1975  Attendance: Please see Epic for attendance record.    Communication:   Does communicate   Hearing:   No impairment  Vision:   No impairment  Orientation/Cognition:   Minor forgetfulness  Partial disorientation  Short term memory loss  Behavior:   No behavioral concerns  Self-Preservation Skills:   Shanna presents with MOD cogntive impairment and slow shuffled gait with balance deficits during ambulation 2/2 her MS diagnosis.  She requires staff instructions and assistance for self preservatioin.    Eating:   Assist with set up  Shanna requires verbal cues from staff ~75% of the time to initiate and continue eathing activities during lunch service D/2 her cognitive impairment.  Ambulation Walking:   Needs assistance  Staff provides Shanna a site owned 4WW for safe ambulation during program attendance for falls prevention D/2 her short slow shuffled gait and balance deficits 2/2 her MS diagnosis.  Transferring:   Independent  Wheelchair:   Shanna is fully ambulatory.  However, she will be offered a manual WC for any off site group outings for energy conservation and falls prevention.  Toileting:   Staff assists Shanna in the bathroom 1:1 for supervision and provides VC for self-hygiene and garment management after toilet use and hand hygiene.  Maintenance Program:     NuStep  Living Arrangements:   Lives with family  Spiritual Needs: Needs are being met through support group  Medication Assistance:   Medication not taken during program hours  Participation Report:   Aerobics/Exercise  Support Group  Cognitive Stimulation  Fire Drill  Creative Arts/Crafts  Games  Gardening  Speakers/Entertainment  Socialization  Current Events/News  Education/Health Topic  Level of Participation:   [facility] Note:  Shanna is enthusiastic to participate in walking and upper body aerobics when encouraged by  staff. Shanna continues to attempt work on art projects with reminders. Shanna is social throughout each day.

## 2024-05-30 ENCOUNTER — HOSPITAL ENCOUNTER (OUTPATIENT)
Dept: REHABILITATION | Facility: CLINIC | Age: 49
Discharge: HOME | End: 2024-05-30
Attending: FAMILY MEDICINE
Payer: COMMERCIAL

## 2024-05-30 PROCEDURE — S5102 ADULT DAY CARE PER DIEM: HCPCS

## 2024-05-30 NOTE — PROGRESS NOTES
"  [facility] Social History    Full Name: Shanna Shanks        MRN: [id number]    YOB: 1975  Nickname:TYRELL      Sex: F     Home Phone: [phone]      Cell Phone: [phone]  Address: [address] [place] [address]/Zip: [place]  [address]  County: [place]       E-mail:[email or web address]        Transportation:    [employer]  Language:English         needed? No       Ethnicity: Caucasion  Race: White      Country of Origin: [place]       Gnosticism: [employer]  Marital Status:                   Spouse/Significant Other: Jama Petit   ______________________________________________________________________________________     : No    Branch of Service: NA      Education Level: High School- Likes [employer]   Job History: Self employeed       Organizations/Clubs: NA  Whom do you live with?  and daughter  Current living arrangement:  Home   Number of Children: 2  List: Michel and Tegan  Number of Siblings: 7     List: Narendra, Aleksandra, Daisha, Elida, Jesus, Iain, Kyle  Other Important People/Pets: 2 cats and 1 dog  What else should we know about you?  Loves watching juggling.  Favorite color is bably blue.  George with her mother.  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 Beck        Neurologist: Dr. Talha Reese  Emergency Contacts:  Jama Ford      Relationship: Spouse    Phone: [phone]  Aleksandra Shanks         Relationship: sister     Phone:[phone]    Updated on 7/30/2018,  7/29/2019, 5/23/2022 , 5/31/23, 05/30/2024        "

## 2024-05-31 ENCOUNTER — OFFICE VISIT (OUTPATIENT)
Dept: URGENT CARE | Facility: URGENT CARE | Age: 49
End: 2024-05-31
Payer: COMMERCIAL

## 2024-05-31 VITALS
HEIGHT: 69 IN | HEART RATE: 61 BPM | OXYGEN SATURATION: 98 % | DIASTOLIC BLOOD PRESSURE: 63 MMHG | WEIGHT: 120.2 LBS | RESPIRATION RATE: 14 BRPM | TEMPERATURE: 98.1 F | SYSTOLIC BLOOD PRESSURE: 95 MMHG | BODY MASS INDEX: 17.8 KG/M2

## 2024-05-31 DIAGNOSIS — N39.0 URINARY TRACT INFECTION WITH HEMATURIA, SITE UNSPECIFIED: Primary | ICD-10-CM

## 2024-05-31 DIAGNOSIS — R31.9 URINARY TRACT INFECTION WITH HEMATURIA, SITE UNSPECIFIED: Primary | ICD-10-CM

## 2024-05-31 DIAGNOSIS — G35 MULTIPLE SCLEROSIS (H): ICD-10-CM

## 2024-05-31 DIAGNOSIS — G93.40 ENCEPHALOPATHY: ICD-10-CM

## 2024-05-31 LAB
ALBUMIN UR-MCNC: 100 MG/DL
APPEARANCE UR: ABNORMAL
BACTERIA #/AREA URNS HPF: ABNORMAL /HPF
BILIRUB UR QL STRIP: NEGATIVE
COLOR UR AUTO: YELLOW
GLUCOSE UR STRIP-MCNC: NEGATIVE MG/DL
HGB UR QL STRIP: ABNORMAL
KETONES UR STRIP-MCNC: NEGATIVE MG/DL
LEUKOCYTE ESTERASE UR QL STRIP: ABNORMAL
NITRATE UR QL: NEGATIVE
PH UR STRIP: 6.5 [PH] (ref 5–7)
RBC #/AREA URNS AUTO: ABNORMAL /HPF
SP GR UR STRIP: 1.02 (ref 1–1.03)
SQUAMOUS #/AREA URNS AUTO: ABNORMAL /LPF
UROBILINOGEN UR STRIP-ACNC: 0.2 E.U./DL
WBC #/AREA URNS AUTO: ABNORMAL /HPF
WBC CLUMPS #/AREA URNS HPF: PRESENT /HPF

## 2024-05-31 PROCEDURE — 81001 URINALYSIS AUTO W/SCOPE: CPT | Performed by: FAMILY MEDICINE

## 2024-05-31 PROCEDURE — 87086 URINE CULTURE/COLONY COUNT: CPT | Performed by: FAMILY MEDICINE

## 2024-05-31 PROCEDURE — 99214 OFFICE O/P EST MOD 30 MIN: CPT | Performed by: FAMILY MEDICINE

## 2024-05-31 RX ORDER — CEPHALEXIN 500 MG/1
500 CAPSULE ORAL 2 TIMES DAILY
Qty: 10 CAPSULE | Refills: 0 | Status: SHIPPED | OUTPATIENT
Start: 2024-05-31 | End: 2024-06-05

## 2024-05-31 NOTE — PROGRESS NOTES
SUBJECTIVE: Shanna Shanks is a 48 year old female who complains of urinary frequency, urgency and cloudy urine x 3 days, without flank pain, fever, chills, or abnormal vaginal discharge or bleeding. No pain.     The patient has a history of encephalopathy and MS with recurrent uti type symptoms. The patient has a history of baseline urinary incontinence.    OBJECTIVE: Appears well, in no apparent distress.  Vital signs are normal. The abdomen is soft without tenderness, guarding, mass, rebound or organomegaly.   Results for orders placed or performed in visit on 05/31/24   UA with Microscopic reflex to Culture - Clinic Collect     Status: Abnormal    Specimen: Urine, Midstream   Result Value Ref Range    Color Urine Yellow Colorless, Straw, Light Yellow, Yellow    Appearance Urine Cloudy (A) Clear    Glucose Urine Negative Negative mg/dL    Bilirubin Urine Negative Negative    Ketones Urine Negative Negative mg/dL    Specific Gravity Urine 1.025 1.003 - 1.035    Blood Urine Trace (A) Negative    pH Urine 6.5 5.0 - 7.0    Protein Albumin Urine 100 (A) Negative mg/dL    Urobilinogen Urine 0.2 0.2, 1.0 E.U./dL    Nitrite Urine Negative Negative    Leukocyte Esterase Urine Large (A) Negative   UA Microscopic with Reflex to Culture     Status: Abnormal   Result Value Ref Range    Bacteria Urine Many (A) None Seen /HPF    RBC Urine 2-5 (A) 0-2 /HPF /HPF    WBC Urine  (A) 0-5 /HPF /HPF    Squamous Epithelials Urine Few (A) None Seen /LPF    WBC Clumps Urine Present (A) None Seen /HPF      ASSESSMENT: UTI complicated by MS and limited hx. Hx mainly by her .    PLAN: Treatment with keflex. Discussed close follow up if not resolving.

## 2024-06-01 LAB — BACTERIA UR CULT: NORMAL

## 2024-06-04 ENCOUNTER — HOSPITAL ENCOUNTER (OUTPATIENT)
Dept: REHABILITATION | Facility: CLINIC | Age: 49
Discharge: HOME | End: 2024-06-04
Attending: FAMILY MEDICINE
Payer: COMMERCIAL

## 2024-06-04 PROCEDURE — S5102 ADULT DAY CARE PER DIEM: HCPCS

## 2024-06-11 ENCOUNTER — HOSPITAL ENCOUNTER (OUTPATIENT)
Dept: REHABILITATION | Facility: CLINIC | Age: 49
Discharge: HOME | End: 2024-06-11
Attending: FAMILY MEDICINE
Payer: COMMERCIAL

## 2024-06-11 PROCEDURE — S5102 ADULT DAY CARE PER DIEM: HCPCS

## 2024-06-11 NOTE — PROGRESS NOTES
02/19/22 0859   Quick Adds   Type of Visit Initial Occupational Therapy Evaluation       Present no   Living Environment   People in Home alone   Current Living Arrangements house  (Pt moving to one level home soon.)   Living Environment Comments Pt in process of moving.  Daughter is assissting at home.   Self-Care   Usual Activity Tolerance moderate   Current Activity Tolerance fair   Equipment Currently Used at Home none   Fall history within last six months yes   Activity/Exercise/Self-Care Comment Pt needs assist with dressing, bathing, cooking, cleaning, and laundry lately.   General Information   Onset of Illness/Injury or Date of Surgery 02/18/22   Referring Physician Miriam   Patient/Family Therapy Goal Statement (OT) daughter wants pt to go to detox   Existing Precautions/Restrictions fall   Cognitive Status Examination   Orientation Status person;place;time   Cognitive Status Comments slow processing, difficulty giving history   Visual Perception   Visual Impairment/Limitations WFL   Sensory   Sensory Comments Decreased sensation B hands in fingertips.   Range of Motion Comprehensive   General Range of Motion no range of motion deficits identified   Strength Comprehensive (MMT)   Comment, General Manual Muscle Testing (MMT) Assessment generalized weakness   Coordination   Upper Extremity Coordination   (Decreased coordination B hands due to numbness)   Bed Mobility   Comment (Bed Mobility) Min A   Transfers   Transfer Comments CGA   Balance   Balance Comments CGA in standing   Activities of Daily Living   BADL Assessment/Intervention   (Min A LB dressing)   Clinical Impression   Criteria for Skilled Therapeutic Interventions Met (OT) Yes, treatment indicated   OT Diagnosis Impaired ADL independence   OT Problem List-Impairments impacting ADL activity tolerance impaired;balance;cognition;mobility;flexibility;coordination;sensation;strength   Assessment of Occupational  [facility] RN Note    Previous documentation since 5/7/24 reviewed.  No concerns reported by AC staff or member.       Seen at urgent care on 5/31/24 for urinary frequency, urgency and cloudy urine.  UA/UC positive. Rx: Keflex 500 mg. 2 times daily x 5 days.    Performance 5 or more Performance Deficits   Planned Therapy Interventions (OT) ADL retraining;balance training;bed mobility training;cognition;strengthening;transfer training   Clinical Decision Making Complexity (OT) moderate complexity   Risk & Benefits of therapy have been explained evaluation/treatment results reviewed;participants included;patient;daughter   Clinical Impression Comments Pt seen bedside for OT eval and treatment.  Pt demonstrates decreased independence with ADLs and mobility.  OT to continue to address.  Recommend inpt CE treatment at discharge vs TCU pending progress with ADLs and mobility.   OT Discharge Planning   OT Discharge Recommendation (DC Rec) Transitional Care Facility  (inpatient CD treatment)   OT Rationale for DC Rec Inpt CD treatment is best choice.  Pt may benefit from short TCU stay pending proress as he needs Min A with basic ADLs at this time and CGA with mobility.   Total Evaluation Time (Minutes)   Total Evaluation Time (Minutes) 10   OT Goals   Therapy Frequency (OT) Daily   OT Predicated Duration/Target Date for Goal Attainment 02/24/22   OT Goals Lower Body Dressing;Transfers;Hygiene/Grooming;OT Goal 1   OT: Hygiene/Grooming supervision/stand-by assist   OT: Lower Body Dressing Supervision/stand-by assist   OT: Transfer Supervision/stand-by assist   OT: Goal 1 Pt will participate in cognitive assessment to A with discharge planning.

## 2024-06-13 ENCOUNTER — HOSPITAL ENCOUNTER (OUTPATIENT)
Dept: REHABILITATION | Facility: CLINIC | Age: 49
Discharge: HOME | End: 2024-06-13
Attending: FAMILY MEDICINE
Payer: COMMERCIAL

## 2024-06-13 ENCOUNTER — TRANSFERRED RECORDS (OUTPATIENT)
Dept: HEALTH INFORMATION MANAGEMENT | Facility: CLINIC | Age: 49
End: 2024-06-13

## 2024-06-13 PROCEDURE — S5102 ADULT DAY CARE PER DIEM: HCPCS

## 2024-06-18 ENCOUNTER — HOSPITAL ENCOUNTER (OUTPATIENT)
Dept: REHABILITATION | Facility: CLINIC | Age: 49
Discharge: HOME | End: 2024-06-18
Attending: FAMILY MEDICINE
Payer: COMMERCIAL

## 2024-06-18 PROCEDURE — S5102 ADULT DAY CARE PER DIEM: HCPCS

## 2024-06-20 ENCOUNTER — HOSPITAL ENCOUNTER (OUTPATIENT)
Dept: REHABILITATION | Facility: CLINIC | Age: 49
Discharge: HOME | End: 2024-06-20
Attending: FAMILY MEDICINE
Payer: COMMERCIAL

## 2024-06-20 PROCEDURE — S5102 ADULT DAY CARE PER DIEM: HCPCS

## 2024-06-25 ENCOUNTER — HOSPITAL ENCOUNTER (OUTPATIENT)
Dept: REHABILITATION | Facility: CLINIC | Age: 49
Discharge: HOME | End: 2024-06-25
Attending: FAMILY MEDICINE
Payer: COMMERCIAL

## 2024-06-25 PROCEDURE — S5102 ADULT DAY CARE PER DIEM: HCPCS

## 2024-06-25 NOTE — PROGRESS NOTES
MONTHLY PROGRESS NOTE AND PARTICIPATION REPORT   [facility]    Shanna Shanks, 1975  Attendance: Please see Epic for attendance record.    Communication:   Does communicate   Hearing:   No impairment  Vision:   No impairment  Orientation/Cognition:   Minor forgetfulness  Behavior:   No behavioral concerns  Self-Preservation Skills:   Capable of self-preservation  Eating:   Independent  Ambulation Walking:   Needs assistance  Transferring:   Aide of one person/two  Wheelchair:   NA  Toileting:   Needs assistance  Maintenance Program:     NuStep  Living Arrangements:   Lives with family  Spiritual Needs: Needs are being met through support group  Medication Assistance:   Medication not taken during program hours  Participation Report:   Aerobics/Exercise  Support Group  Cognitive Stimulation  Fire Drill  Creative Arts/Crafts  Games  Gardening  Speakers/Entertainment  Socialization  Current Events/News  Education/Health Topic  Level of Participation:   Active    [facility] Note:  Shanna always comes to [facility] with a positive attitude and enjoys socializing with members and staff. She [place] connecting [place] people [place] She enjoys art but needs some verbal que's to continue her project throughout the hour.

## 2024-06-27 ENCOUNTER — HOSPITAL ENCOUNTER (OUTPATIENT)
Dept: REHABILITATION | Facility: CLINIC | Age: 49
Discharge: HOME | End: 2024-06-27
Attending: FAMILY MEDICINE
Payer: COMMERCIAL

## 2024-06-27 PROCEDURE — S5102 ADULT DAY CARE PER DIEM: HCPCS

## 2024-07-02 ENCOUNTER — HOSPITAL ENCOUNTER (OUTPATIENT)
Dept: REHABILITATION | Facility: CLINIC | Age: 49
Discharge: HOME | End: 2024-07-02
Attending: FAMILY MEDICINE
Payer: COMMERCIAL

## 2024-07-02 PROCEDURE — S5102 ADULT DAY CARE PER DIEM: HCPCS

## 2024-07-09 ENCOUNTER — HOSPITAL ENCOUNTER (OUTPATIENT)
Dept: REHABILITATION | Facility: CLINIC | Age: 49
Discharge: HOME | End: 2024-07-09
Attending: FAMILY MEDICINE
Payer: COMMERCIAL

## 2024-07-09 DIAGNOSIS — R94.5 ABNORMAL LIVER FUNCTION: Primary | ICD-10-CM

## 2024-07-09 PROCEDURE — S5102 ADULT DAY CARE PER DIEM: HCPCS

## 2024-07-10 ENCOUNTER — MYC MEDICAL ADVICE (OUTPATIENT)
Dept: FAMILY MEDICINE | Facility: CLINIC | Age: 49
End: 2024-07-10
Payer: COMMERCIAL

## 2024-07-11 ENCOUNTER — HOSPITAL ENCOUNTER (OUTPATIENT)
Dept: REHABILITATION | Facility: CLINIC | Age: 49
Discharge: HOME | End: 2024-07-11
Attending: FAMILY MEDICINE
Payer: COMMERCIAL

## 2024-07-11 PROCEDURE — S5102 ADULT DAY CARE PER DIEM: HCPCS

## 2024-07-12 RX ORDER — VENLAFAXINE HYDROCHLORIDE 75 MG/1
CAPSULE, EXTENDED RELEASE ORAL
COMMUNITY
Start: 2024-07-03

## 2024-07-12 NOTE — TELEPHONE ENCOUNTER
Writer responded via [employer].  Sangeeta REED, BSN, PHN, RN  [facility]  [phone]    
<-------- Click here to INCLUDE CoVID-19 Discharge Instructions

## 2024-07-15 NOTE — PROGRESS NOTES
Individual abuse prevention plan (IAPP)  [facility]     Assessment of Susceptibility to Abuse, Including Self Abuse, Neglect (Identification of characteristics, which make the individual susceptible to abuse, and how these characteristics cause the individual to be susceptible to abuse.)     Is the person susceptible to abuse in each area?  Sexual Abuse:   No  Referrals made when the person is susceptible to abuse outside the scope or control of this program (Identify the referral and date it occurred): No additional risk reduction means needed at this time    Physical Abuse:   No  Referrals made when the person is susceptible to abuse outside the scope or control of this program (Identify the referral and date it occurred): No additional risk reduction means needed at this time    Self-Abuse:   No  Referrals made when the person is susceptible to abuse outside the scope or control of this program (Identify the referral and date it occurred): No additional risk reduction means needed at this time    Financial  Exploitation  No  Referrals made when the person is susceptible to abuse outside the scope or control of this program (Identify the referral and date it occurred): No additional risk reduction means needed at this time    Is the program aware of this person committing a violent crime or act of physical aggression towards others?  No  Referrals made when the person is susceptible to abuse outside the scope or control of this program (Identify the referral and date it occurred): No additional risk reduction means needed at this time    INDIVIDUAL ABUSE PREVENTION PLAN-MEASURES TAKEN TO MINIMIZE RISK OF ABUSE   ADL:   Assist with clothing management  Assist with feeding  Assist with toileting  Staff will provide set up and VC for eating.  Staff will provide 1:1 supervision and set up and VC assist for post toilet use self-hygiene, garment management, and hand  hygiene.  Ambulation/Transfers/Wheelchairs:  Assist with all transfers and/or ambulation  Encourage client to ambulate short distances with walker and provide wheelchair for distance  Ensure client uses cane and/or walker  Provide standby assist due to periods of dizziness, fatigue  Provide standby assist when client ambulates prn   Behavior:   No behavior issues  Communication:  Encourage verbalization of needs/concerns  Observe body language/gestures to assist in anticipating client's needs  Cognitive Issues:   Require aide to be with member if wandering  Provider reminders due to confusion, forgetfulness  Give simple step-by-step direction  Contact caregiver to inform of special activities days  Diet:   Staff will prepare food to facilitate client to feed self  Monitor client when eating and/or drinking fluids   Exercise:   Encourage participation in maintenance program  Encourage participation in wheelchair aerobics  Hearing:   Speak distinctly and use gestures  Isolation:   Encourage socialization due to isolation in home environment  Medical Monitoring:   Monitor physical and emotional comfort  Monitor physical symptoms due to diagnosis and report significant changes to nurse, caregiver and physician   Mental Health:   Motivate client to join in activities that are beneficial to client  Encourage client to express feelings  Monitor anxiety level and intervene when appropriate  Encourage regular attendance for socialization and stimulation  Offer emotional support  Observe for symptoms of depression and notify appropriate staff/caregiver  Encourage independent decision making  Encourage social interaction to assist in increasing client's self-image  Provide client with choices of programming to encourage independent decision making  Provide activities in which client can be successful  Sensory:   Provide and encourage participation in activities for stimulation  Vision:   Provide verbal cues  Other:  N/A    Developed in consultation with:  Client  The Program Abuse Prevention Plan/IAPP identifies the specific actions that minimize abuse to the [facility] participant.  yes

## 2024-07-15 NOTE — PROGRESS NOTES
INDIVIDUAL PLAN OF CARE   [facility]    Member Name: Shanna Shanks; YOB: 1975  MRN: [id number]    Goals developed in collaboration with: member  Staff responsible for plan: Analisa LOWE, [facility]  1. Long Term Goal (concrete, measurable, and time specific outcomes):  Member will maintain present level of physical and cognitive function through active participation in structured [facility] programming with staff set up, facilitation, and supervision provided every day of program attendance.  Target Date:  July 2025  Quarterly Review:  Goal remains appropriate.    2. Short Term Goal: (concrete, measurable, and time specific outcomes):  To maintain physical strength and endurance, member will actively participate in 8 minutes of passive range of motion Upper Extremity aerobic exercises with supervision and cueing from staff members.  Target Date:  July 2025  Quarterly Review:  Above goal has been rewritten to emphasize UE aerobics rather than NuStep activity based on member's typical level of fatigue and engagement.    3. Short Term Goal: (concrete, measurable, and time specific outcomes):  To maintain current level of cognitive function, member will actively participate in therapeutic creative arts and Brain/Body programming with set up, VC's and supervision provided by staff every day of program attendance.  Target Date:  July 2025  Quarterly Review:  Goal remains appropriate.

## 2024-07-22 DIAGNOSIS — R94.5 ABNORMAL LIVER FUNCTION: Primary | ICD-10-CM

## 2024-07-23 ENCOUNTER — OFFICE VISIT (OUTPATIENT)
Dept: FAMILY MEDICINE | Facility: CLINIC | Age: 49
End: 2024-07-23
Payer: COMMERCIAL

## 2024-07-23 ENCOUNTER — HOSPITAL ENCOUNTER (OUTPATIENT)
Dept: REHABILITATION | Facility: CLINIC | Age: 49
Discharge: HOME | End: 2024-07-23
Attending: FAMILY MEDICINE
Payer: COMMERCIAL

## 2024-07-23 VITALS
OXYGEN SATURATION: 96 % | HEIGHT: 66 IN | BODY MASS INDEX: 18.61 KG/M2 | RESPIRATION RATE: 18 BRPM | SYSTOLIC BLOOD PRESSURE: 92 MMHG | DIASTOLIC BLOOD PRESSURE: 58 MMHG | WEIGHT: 115.8 LBS | TEMPERATURE: 97.9 F | HEART RATE: 59 BPM

## 2024-07-23 DIAGNOSIS — R68.84 JAW PAIN: Primary | ICD-10-CM

## 2024-07-23 LAB
CRP SERPL-MCNC: <3 MG/L
ERYTHROCYTE [SEDIMENTATION RATE] IN BLOOD BY WESTERGREN METHOD: 10 MM/HR (ref 0–30)

## 2024-07-23 PROCEDURE — S5102 ADULT DAY CARE PER DIEM: HCPCS

## 2024-07-23 PROCEDURE — 99214 OFFICE O/P EST MOD 30 MIN: CPT | Mod: 25 | Performed by: FAMILY MEDICINE

## 2024-07-23 PROCEDURE — 90750 HZV VACC RECOMBINANT IM: CPT | Performed by: FAMILY MEDICINE

## 2024-07-23 PROCEDURE — 85652 RBC SED RATE AUTOMATED: CPT | Performed by: FAMILY MEDICINE

## 2024-07-23 PROCEDURE — 86140 C-REACTIVE PROTEIN: CPT | Performed by: FAMILY MEDICINE

## 2024-07-23 PROCEDURE — 36415 COLL VENOUS BLD VENIPUNCTURE: CPT | Performed by: FAMILY MEDICINE

## 2024-07-23 PROCEDURE — 90471 IMMUNIZATION ADMIN: CPT | Performed by: FAMILY MEDICINE

## 2024-07-23 RX ORDER — OCRELIZUMAB 300 MG/10ML
300 INJECTION INTRAVENOUS
COMMUNITY

## 2024-07-23 RX ORDER — PREGABALIN 25 MG/1
25 CAPSULE ORAL 2 TIMES DAILY
Qty: 60 CAPSULE | Refills: 2 | Status: SHIPPED | OUTPATIENT
Start: 2024-07-23

## 2024-07-23 ASSESSMENT — PAIN SCALES - GENERAL: PAINLEVEL: NO PAIN (0)

## 2024-07-23 NOTE — NURSING NOTE
Prior to immunization administration, verified patients identity using patient s name and date of birth. Please see Immunization Activity for additional information.     Screening Questionnaire for Adult Immunization    Are you sick today?   No   Do you have allergies to medications, food, a vaccine component or latex?   No   Have you ever had a serious reaction after receiving a vaccination?   No   Do you have a long-term health problem with heart, lung, kidney, or metabolic disease (e.g., diabetes), asthma, a blood disorder, no spleen, complement component deficiency, a cochlear implant, or a spinal fluid leak?  Are you on long-term aspirin therapy?   No   Do you have cancer, leukemia, HIV/AIDS, or any other immune system problem?   Yes   Do you have a parent, brother, or sister with an immune system problem?   No   In the past 3 months, have you taken medications that affect  your immune system, such as prednisone, other steroids, or anticancer drugs; drugs for the treatment of rheumatoid arthritis, Crohn s disease, or psoriasis; or have you had radiation treatments?   No   Have you had a seizure, or a brain or other nervous system problem?   Yes   During the past year, have you received a transfusion of blood or blood    products, or been given immune (gamma) globulin or antiviral drug?   No   For women: Are you pregnant or is there a chance you could become       pregnant during the next month?   No   Have you received any vaccinations in the past 4 weeks?   No     Immunization questionnaire was positive for at least one answer.  Notified Dr. Beck.      Patient instructed to remain in clinic for 15 minutes afterwards, and to report any adverse reactions.     Screening performed by Rehana Martel MA on 7/23/2024 at 7:44 AM.

## 2024-07-23 NOTE — PROGRESS NOTES
Assessment & Plan     Jaw pain  Unclear etiology.  Does not have a typical trigeminal neuralgia symptoms.  Does not have typical TMJ symptoms.  Having more often not related symptoms and we will do trial of Lyrica.  Did not see major benefit with gabapentin.  Could be atypical presentation of temporal arteritis.  Had a slightly elevated sed rate in the remote past.  Recheck labs today.  Assuming those are fine and if pregabalin fails to improve symptoms we would consider doing an MRI.  Both patient and wife agreed.  Side effects of pregabalin control substance status discussed.   - pregabalin (LYRICA) 25 MG capsule; Take 1 capsule (25 mg) by mouth 2 times daily  - ESR: Erythrocyte sedimentation rate; Future  - CRP, inflammation; Future  - ESR: Erythrocyte sedimentation rate  - CRP, inflammation                Melba Otero is a 49 year old, presenting for the following health issues:  Jaw Pain      7/23/2024     7:08 AM   Additional Questions   Roomed by Hortencia STACK   Accompanied by      History of Present Illness       Reason for visit:  Jaw pain  Symptom onset:  More than a month  Symptoms include:  Sharp pain from time to time  Symptom intensity:  Severe  Symptom progression:  Worsening  Had these symptoms before:  Yes  Has tried/received treatment for these symptoms:  Yes  Previous treatment was successful:  No  What makes it worse:  No  What makes it better:  No    She eats 2-3 servings of fruits and vegetables daily.She consumes 1 sweetened beverage(s) daily.She exercises with enough effort to increase her heart rate 10 to 19 minutes per day.  She exercises with enough effort to increase her heart rate 3 or less days per week.   She is taking medications regularly.       Here with her .    Jaw pain for more than a year ago. Gabapentin was started by neurologist. Tried high dose and did not help. Stopped that.   Just on left side.   Trying various otc medications - aspirin, tylenol etc. Not  "helping either. Comes and goes.   Today alright.   Days without any symptoms and then gets sharp pain in mouth.   For last few months - episodes are lasting longer and smaller breaks between episodes. Episode - sharp pain for seconds which improves and comes and goes for days. Goes away for weeks.     MS meds changed. Currently on ocrevus. Feels it may be related.     Been to dentist. Endodontist and periodontist - negative work up. No TMJ issue. No clicking sounds. No vision change on left side. No headache. No grinding teeth. No numbness of face.         Objective    BP 92/58   Pulse 59   Temp 97.9  F (36.6  C) (Temporal)   Resp 18   Ht 1.68 m (5' 6.14\")   Wt 52.5 kg (115 lb 12.8 oz)   LMP  (LMP Unknown)   SpO2 96%   BMI 18.61 kg/m    Body mass index is 18.61 kg/m .  Physical Exam   Left TMJ no clicking or swelling.  No any focal tender spot.   No abnormal sensation.             Signed Electronically by: Malvin Beck MD, MD    "

## 2024-07-25 ENCOUNTER — HOSPITAL ENCOUNTER (OUTPATIENT)
Dept: REHABILITATION | Facility: CLINIC | Age: 49
Discharge: HOME | End: 2024-07-25
Attending: FAMILY MEDICINE
Payer: COMMERCIAL

## 2024-07-25 PROCEDURE — S5102 ADULT DAY CARE PER DIEM: HCPCS

## 2024-07-25 NOTE — PROGRESS NOTES
MONTHLY PROGRESS NOTE AND PARTICIPATION REPORT   [facility]    Shanna Shanks, 1975  Attendance: Please see Epic for attendance record.    Communication:   Does communicate   Hearing:   No impairment  Vision:   No impairment  Orientation/Cognition:   Minor forgetfulness  Partial disorientation  Short term memory loss  Behavior:   No behavioral concerns  Self-Preservation Skills:   Shanna presents with MOD cogntive impairment and slow shuffled gait with balance deficits during ambulation 2/2 her MS diagnosis.  She requires staff instructions and assistance for self preservatioin.    Eating:   Assist with set up  Shanna requires verbal cues from staff ~75% of the time to initiate and continue eathing activities during lunch service D/2 her cognitive impairment.  Ambulation Walking:   Needs assistance  Staff provides Shanna a site owned 4WW for safe ambulation during program attendance for falls prevention D/2 her short slow shuffled gait and balance deficits 2/2 her MS diagnosis.  Transferring:   Independent  Wheelchair:   Shanna is fully ambulatory.  However, she will be offered a manual WC for any off site group outings for energy conservation and falls prevention.  Toileting:   Staff assists Shanna in the bathroom 1:1 for supervision and provides VC for self-hygiene and garment management after toilet use and hand hygiene.  Maintenance Program:     NuStep  Living Arrangements:   Lives with family  Spiritual Needs: Needs are being met through support group  Medication Assistance:   Medication not taken during program hours  Participation Report:   Aerobics/Exercise  Support Group  Cognitive Stimulation  Fire Drill  Creative Arts/Crafts  Games  Gardening  Speakers/Entertainment  Socialization  Current Events/News  Education/Health Topic  Level of Participation:   [facility] Note:  Shanna is conversational and enthusiastic about Day Program social activities. Shanna has  worked on a ceramic painting of bears this past month with verbal cues from staff to continue her work. Shanna supports other members when she recognizes their needs.

## 2024-08-08 ENCOUNTER — HOSPITAL ENCOUNTER (OUTPATIENT)
Dept: REHABILITATION | Facility: CLINIC | Age: 49
Discharge: HOME | End: 2024-08-08
Attending: FAMILY MEDICINE
Payer: COMMERCIAL

## 2024-08-08 PROCEDURE — S5102 ADULT DAY CARE PER DIEM: HCPCS

## 2024-08-14 NOTE — PROGRESS NOTES
[facility] RN Note    Previous documentation since 7/2/24 reviewed.  Participant not in facility at this time.

## 2024-08-19 NOTE — PROGRESS NOTES
MONTHLY PROGRESS NOTE AND PARTICIPATION REPORT   [facility]    Shanna Shanks, 1975  Attendance: Please see Epic for attendance record.    Communication:   Does communicate   Hearing:   No impairment  Vision:   No impairment  Orientation/Cognition:   Minor forgetfulness  Partial disorientation  Short term memory loss  Behavior:   No behavioral concerns  Self-Preservation Skills:   Shanna presents with MOD cogntive impairment and slow shuffled gait with balance deficits during ambulation 2/2 her MS diagnosis.  She requires staff instructions and assistance for self preservatioin.    Eating:   Assist with set up  Shanna requires verbal cues from staff ~75% of the time to initiate and continue eathing activities during lunch service D/2 her cognitive impairment.  Ambulation Walking:   Needs assistance  Staff provides Shanna a site owned 4WW for safe ambulation during program attendance for falls prevention D/2 her short slow shuffled gait and balance deficits 2/2 her MS diagnosis.  Transferring:   Independent  Wheelchair:   Shanna is fully ambulatory.  However, she will be offered a manual WC for any off site group outings for energy conservation and falls prevention.  Toileting:   Staff assists Shanna in the bathroom 1:1 for supervision and provides VC for self-hygiene and garment management after toilet use and hand hygiene.  Maintenance Program:     NuStep  Living Arrangements:   Lives with family  Spiritual Needs: Needs are being met through support group  Medication Assistance:   Medication not taken during program hours  Participation Report:   Aerobics/Exercise  Support Group  Cognitive Stimulation  Fire Drill  Creative Arts/Crafts  Games  Gardening  Speakers/Entertainment  Socialization  Current Events/News  Education/Health Topic  Level of Participation:   [facility] Note:  Shanna has been consistently working on her [employer], along with word finds within [employer]  arts. In addition, Shanna continues to show her ability to socialize, and do great being apart of the group.

## 2024-08-20 ENCOUNTER — HOSPITAL ENCOUNTER (OUTPATIENT)
Dept: REHABILITATION | Facility: CLINIC | Age: 49
Discharge: HOME | End: 2024-08-20
Attending: FAMILY MEDICINE
Payer: COMMERCIAL

## 2024-08-20 PROCEDURE — S5102 ADULT DAY CARE PER DIEM: HCPCS

## 2024-08-22 ENCOUNTER — HOSPITAL ENCOUNTER (OUTPATIENT)
Dept: REHABILITATION | Facility: CLINIC | Age: 49
Discharge: HOME | End: 2024-08-22
Attending: FAMILY MEDICINE
Payer: COMMERCIAL

## 2024-08-22 PROCEDURE — S5102 ADULT DAY CARE PER DIEM: HCPCS

## 2024-08-29 ENCOUNTER — HOSPITAL ENCOUNTER (OUTPATIENT)
Dept: REHABILITATION | Facility: CLINIC | Age: 49
Discharge: HOME | End: 2024-08-29
Attending: FAMILY MEDICINE
Payer: COMMERCIAL

## 2024-08-29 PROCEDURE — S5102 ADULT DAY CARE PER DIEM: HCPCS

## 2024-09-02 DIAGNOSIS — R32 URINARY INCONTINENCE, UNSPECIFIED TYPE: ICD-10-CM

## 2024-09-03 ENCOUNTER — HOSPITAL ENCOUNTER (OUTPATIENT)
Dept: REHABILITATION | Facility: CLINIC | Age: 49
Discharge: HOME | End: 2024-09-03
Attending: FAMILY MEDICINE
Payer: COMMERCIAL

## 2024-09-03 PROCEDURE — S5102 ADULT DAY CARE PER DIEM: HCPCS

## 2024-09-05 ENCOUNTER — HOSPITAL ENCOUNTER (OUTPATIENT)
Dept: REHABILITATION | Facility: CLINIC | Age: 49
Discharge: HOME | End: 2024-09-05
Attending: FAMILY MEDICINE
Payer: COMMERCIAL

## 2024-09-05 PROCEDURE — S5102 ADULT DAY CARE PER DIEM: HCPCS

## 2024-09-05 NOTE — PROGRESS NOTES
[facility] RN Note    No new progress notes noted since 8/1/24.  No concerns reported by AC staff or member.

## 2024-09-10 ENCOUNTER — HOSPITAL ENCOUNTER (OUTPATIENT)
Dept: REHABILITATION | Facility: CLINIC | Age: 49
Discharge: HOME | End: 2024-09-10
Attending: FAMILY MEDICINE
Payer: COMMERCIAL

## 2024-09-10 PROCEDURE — S5102 ADULT DAY CARE PER DIEM: HCPCS

## 2024-09-12 ENCOUNTER — HOSPITAL ENCOUNTER (OUTPATIENT)
Dept: REHABILITATION | Facility: CLINIC | Age: 49
Discharge: HOME | End: 2024-09-12
Attending: FAMILY MEDICINE
Payer: COMMERCIAL

## 2024-09-12 PROCEDURE — S5102 ADULT DAY CARE PER DIEM: HCPCS

## 2024-09-13 RX ORDER — ESTRADIOL 0.1 MG/G
CREAM VAGINAL
Qty: 42.5 G | Refills: 0 | Status: SHIPPED | OUTPATIENT
Start: 2024-09-13

## 2024-09-17 ENCOUNTER — HOSPITAL ENCOUNTER (OUTPATIENT)
Dept: REHABILITATION | Facility: CLINIC | Age: 49
Discharge: HOME | End: 2024-09-17
Attending: FAMILY MEDICINE
Payer: COMMERCIAL

## 2024-09-17 PROCEDURE — S5102 ADULT DAY CARE PER DIEM: HCPCS

## 2024-09-19 ENCOUNTER — HOSPITAL ENCOUNTER (OUTPATIENT)
Dept: REHABILITATION | Facility: CLINIC | Age: 49
Discharge: HOME | End: 2024-09-19
Attending: FAMILY MEDICINE
Payer: COMMERCIAL

## 2024-09-19 PROCEDURE — S5102 ADULT DAY CARE PER DIEM: HCPCS

## 2024-09-24 ENCOUNTER — HOSPITAL ENCOUNTER (OUTPATIENT)
Dept: REHABILITATION | Facility: CLINIC | Age: 49
Discharge: HOME | End: 2024-09-24
Attending: FAMILY MEDICINE
Payer: COMMERCIAL

## 2024-09-24 PROCEDURE — S5102 ADULT DAY CARE PER DIEM: HCPCS

## 2024-09-24 NOTE — PROGRESS NOTES
Facilitated physical maintenance of bilateral upper extremity and bilateral lower extrimity strength and endurance through NuStep therapeutic exercise.  Patient completed 25 minutes of NuStep activity.    Nu Step Settings:  Seat:  10  Arms:  12  Resistance:   5

## 2024-10-01 ENCOUNTER — HOSPITAL ENCOUNTER (OUTPATIENT)
Dept: REHABILITATION | Facility: CLINIC | Age: 49
Discharge: HOME | End: 2024-10-01
Attending: FAMILY MEDICINE
Payer: COMMERCIAL

## 2024-10-01 PROCEDURE — S5102 ADULT DAY CARE PER DIEM: HCPCS

## 2024-10-01 NOTE — PROGRESS NOTES
[facility] RN Note    Previous documentation since 9/5/24 reviewed.  No concerns reported by AC staff or member.

## 2024-10-03 ENCOUNTER — HOSPITAL ENCOUNTER (OUTPATIENT)
Dept: REHABILITATION | Facility: CLINIC | Age: 49
Discharge: HOME | End: 2024-10-03
Attending: FAMILY MEDICINE
Payer: COMMERCIAL

## 2024-10-03 PROCEDURE — S5102 ADULT DAY CARE PER DIEM: HCPCS

## 2024-10-03 NOTE — PROGRESS NOTES
MONTHLY PROGRESS NOTE AND PARTICIPATION REPORT   [facility]    Shanna Shanks, 1975  Attendance: Please see Epic for attendance record.    Communication:   Does communicate   Hearing:   No impairment  Vision:   No impairment  Orientation/Cognition:   Minor forgetfulness  Partial disorientation  Short term memory loss  Behavior:   No behavioral concerns  Self-Preservation Skills:   Shanna presents with MOD cogntive impairment and slow shuffled gait with balance deficits during ambulation 2/2 her MS diagnosis.  She requires staff instructions and assistance for self preservatioin.    Eating:   Assist with set up  Shanna requires verbal cues from staff ~75% of the time to initiate and continue eathing activities during lunch service D/2 her cognitive impairment.  Ambulation Walking:   Needs assistance  Staff provides Shanna a site owned 4WW for safe ambulation during program attendance for falls prevention D/2 her short slow shuffled gait and balance deficits 2/2 her MS diagnosis.  Transferring:   Independent  Wheelchair:   Shanna is fully ambulatory.  However, she will be offered a manual WC for any off site group outings for energy conservation and falls prevention.  Toileting:   Staff assists Shanna in the bathroom 1:1 for supervision and provides VC for self-hygiene and garment management after toilet use and hand hygiene.  Maintenance Program:     NuStep  Living Arrangements:   Lives with family  Spiritual Needs: Needs are being met through support group  Medication Assistance:   Medication not taken during program hours  Participation Report:   Aerobics/Exercise  Support Group  Cognitive Stimulation  Fire Drill  Creative Arts/Crafts  Games  Gardening  Speakers/Entertainment  Socialization  Current Events/News  Education/Health Topic  Level of Participation:   [facility] Note:  Shanna has been having some issues recently with voicing her need for the bathroom, so  staff has found success in asking multiple times a day to help cue. Shanna continues to bring a fun personality to day program that is great for engagement and participation in the group. Shanna has continued on her bear projects, however, she has started a new after the completion of her previous project.

## 2024-10-08 ENCOUNTER — HOSPITAL ENCOUNTER (OUTPATIENT)
Dept: REHABILITATION | Facility: CLINIC | Age: 49
Discharge: HOME | End: 2024-10-08
Attending: FAMILY MEDICINE
Payer: COMMERCIAL

## 2024-10-08 PROCEDURE — S5102 ADULT DAY CARE PER DIEM: HCPCS

## 2024-10-09 ENCOUNTER — VIRTUAL VISIT (OUTPATIENT)
Dept: UROLOGY | Facility: CLINIC | Age: 49
End: 2024-10-09
Payer: COMMERCIAL

## 2024-10-09 DIAGNOSIS — Z87.440 HISTORY OF UTI: ICD-10-CM

## 2024-10-09 DIAGNOSIS — R30.0 DYSURIA: ICD-10-CM

## 2024-10-09 DIAGNOSIS — G35 MULTIPLE SCLEROSIS (H): ICD-10-CM

## 2024-10-09 DIAGNOSIS — N39.46 URINARY INCONTINENCE, MIXED: Primary | ICD-10-CM

## 2024-10-09 PROCEDURE — 99214 OFFICE O/P EST MOD 30 MIN: CPT | Mod: 95 | Performed by: PHYSICIAN ASSISTANT

## 2024-10-09 NOTE — NURSING NOTE
Is the patient currently in the state of [place]? YES    Location: home    Visit mode:VIDEO    If the visit is dropped, the patient can be reconnected by: VIDEO VISIT: Text to cell phone:   Telephone Information:   Mobile [phone]    and VIDEO VISIT: Send to e-mail at: [email or web address]    Will anyone else be joining the visit? NO  (If patient encounters technical issues they should call [phone]    How would you like to obtain your AVS? MyChart    Are changes needed to the allergy or medication list? No    Are refills needed on medications prescribed by this physician? NO    Reason for visit: ABDELRAHMAN Villatoro VVF    QNR Status: complete

## 2024-10-09 NOTE — PATIENT INSTRUCTIONS
Nice to meet you!  Trinidad Decker PA-C  [facility]  Office: [phone]  After business hours for emergency: [phone]      Please make an appointment at your local [facility] lab ([phone]) to leave a urine sample if you develop symptoms.     Estrogen cream three times a week near urethra (pea-sized amount): If >$50, let me know and we can get via a compound pharmacy or [facility].

## 2024-10-09 NOTE — PROGRESS NOTES
EMILY   CHIEF COMPLAINT   It was my pleasure to see Shanna Shanks who is a 49 year old female for follow-up of microscopic hematuria, MS, UTIs.      HPI   Shanna Shanks is a very pleasant 49 year old female     Initially seen 8/30/2022 with Trinidad Decker:  HPI: It is a pleasure to see Ms. Shanna Shanks, a pleasant 47 year old female, asked to be seen in consultation via self referral for evaluation of rUTIs and noted to have micro hematuria (x 2) with neg UCs. Ex-smoker.     Hx of rUTIs (enteroccous, e coli). Sx include more frequency and incontinence.      The patient denies any gross hematuria.  Ms. Shanks voids without difficulty.  She currently denies any dysuria, pyuria, hesitancy, intermittency, feelings of incomplete emptying, or any recent history of urinary tract infections or stones.      Has MS, ambulatory. Uses oxybutynin 5mg for urge incontinence. Benign hematuria eval    9/8/23:  UDS in the interim and one UTI. UDS with urge incontinence and incomplete emptying (PVR 275cc).   Noting using topical estrogen routinely.   UTI symptoms: worsening urge incontinence    Hematuria Risk Factors:  Age >40: Yes                                       Smoking history: yes  Occupational exposure to chemicals or dyes (ie, benzenes, aromatic amines): no  History of urologic disorder or disease: no  History of irritative voiding symptoms: no  History of urinary tract infection: no  Analgesic abuse: no  History of pelvic irradiation: no    2/15/2023 (Dr. Monson):  Follow-up today for surveillance cystoscopy  Her  joins her for this visit  He notes that she has been having some increase in her mixed urinary incontinence  She was recently started on a course of antibiotics for a presumed UTI    7/5/23:  UDS with GHANSHYAM Ordoñez  PVR 275cc    TODAY 10/9/24:  Using topical estrogen, no UTIs in the interim.   Taking more time to ensure emptying, helps reduce incontinence.     PHYSICAL  EXAM  Patient is a 49 year old  female   Vitals: not currently breastfeeding.  There is no height or weight on file to calculate BMI.  General Appearance Adult:   Alert, no acute distress, oriented      UA RESULTS:  Recent Labs   Lab Test 02/15/23  1341 02/12/23  1634   COLOR Yellow Yellow   APPEARANCE Clear Clear   URINEGLC Negative Negative   URINEBILI Negative Negative   URINEKETONE Negative Trace*   SG 1.020 1.025   UBLD Trace* Trace*   URINEPH 6.5 6.5   PROTEIN 100* >=300*   UROBILINOGEN 0.2 1.0   NITRITE Negative Negative   LEUKEST Small* Moderate*   RBCU  --  0-2   WBCU  --  10-25*     Creatinine   Date Value Ref Range Status   03/08/2024 0.79 0.51 - 0.95 mg/dL Final   08/17/2020 0.67 0.52 - 1.04 mg/dL Final         IMAGING:  All pertinent imaging reviewed:    All imaging studies reviewed by me.  I personally reviewed these imaging films.  A formal report from radiology will follow.    CT UROGRAM 9/27/2022:  FINDINGS:   LOWER CHEST: Normal.     HEPATOBILIARY: Normal.     PANCREAS: Normal.     SPLEEN: Normal.     ADRENAL GLANDS: Normal.     RIGHT KIDNEY/URETER: Normal.     LEFT KIDNEY/URETER: Normal.     BLADDER: Mild bladder wall thickening. No focal lesion.     BOWEL: Normal.     LYMPH NODES: Normal.     VASCULATURE: Unremarkable.     PELVIC ORGANS: Normal.     MUSCULOSKELETAL: Normal.                                                         IMPRESSION:  1.  Mild bladder wall thickening is nonspecific, possibly related to cystitis.  2.  Normal kidneys and ureters. No urinary tract calculi.          ASSESSMENT and PLAN  49-year-old female with history of microscopic hematuria as well as multiple sclerosis with mixed urinary incontinence and recurrent urinary tract infections, incomplete emptying    Microscopic hematuria eval was benign.     History of multiple sclerosis with mixed urinary incontinence, incomplete emptying  -Using estrace routinely 3x per week (Can refill when requested)  -Take time to feel  fully empty  -UC if symptoms    GHANSHYAM Carty [facility], [place]      Time spent: 15 minutes spent on the date of the encounter doing chart review, history and exam, documentation and further activities as noted above.     GHANSHYAM Carty [facility]

## 2024-10-09 NOTE — LETTER
10/9/2024       RE: Shanna Shanks  [address]  [place] [address]     Dear Colleague,    Thank you for referring your patient, Shanna Shanks, to the [facility] at [facility]. Please see a copy of my visit note below.    [place]   CHIEF COMPLAINT   It was my pleasure to see Shanna Shanks who is a 49 year old female for follow-up of microscopic hematuria, MS, UTIs.      HPI   Shanna Shanks is a very pleasant 49 year old female     Initially seen 8/30/2022 with Trinidad Decker:  HPI: It is a pleasure to see Ms. Shanna Shanks, a pleasant 47 year old female, asked to be seen in consultation via self referral for evaluation of rUTIs and noted to have micro hematuria (x 2) with neg UCs. Ex-smoker.     Hx of rUTIs (enteroccous, e coli). Sx include more frequency and incontinence.      The patient denies any gross hematuria.  Ms. Shanks voids without difficulty.  She currently denies any dysuria, pyuria, hesitancy, intermittency, feelings of incomplete emptying, or any recent history of urinary tract infections or stones.      Has MS, ambulatory. Uses oxybutynin 5mg for urge incontinence. Benign hematuria eval    9/8/23:  UDS in the interim and one UTI. UDS with urge incontinence and incomplete emptying (PVR 275cc).   Noting using topical estrogen routinely.   UTI symptoms: worsening urge incontinence    Hematuria Risk Factors:  Age >40: Yes                                       Smoking history: yes  Occupational exposure to chemicals or dyes (ie, benzenes, aromatic amines): no  History of urologic disorder or disease: no  History of irritative voiding symptoms: no  History of urinary tract infection: no  Analgesic abuse: no  History of pelvic irradiation: no    2/15/2023 (Dr. Monson):  Follow-up today for surveillance cystoscopy  Her  joins her for this visit  He notes that she has been  having some increase in her mixed urinary incontinence  She was recently started on a course of antibiotics for a presumed UTI    7/5/23:  UDS with GHANSHYAM Ordoñez  PVR 275cc    TODAY 10/9/24:  Using topical estrogen, no UTIs in the interim.   Taking more time to ensure emptying, helps reduce incontinence.     PHYSICAL EXAM  Patient is a 49 year old  female   Vitals: not currently breastfeeding.  There is no height or weight on file to calculate BMI.  General Appearance Adult:   Alert, no acute distress, oriented      UA RESULTS:  Recent Labs   Lab Test 02/15/23  1341 02/12/23  1634   COLOR Yellow Yellow   APPEARANCE Clear Clear   URINEGLC Negative Negative   URINEBILI Negative Negative   URINEKETONE Negative Trace*   SG 1.020 1.025   UBLD Trace* Trace*   URINEPH 6.5 6.5   PROTEIN 100* >=300*   UROBILINOGEN 0.2 1.0   NITRITE Negative Negative   LEUKEST Small* Moderate*   RBCU  --  0-2   WBCU  --  10-25*     Creatinine   Date Value Ref Range Status   03/08/2024 0.79 0.51 - 0.95 mg/dL Final   08/17/2020 0.67 0.52 - 1.04 mg/dL Final         IMAGING:  All pertinent imaging reviewed:    All imaging studies reviewed by me.  I personally reviewed these imaging films.  A formal report from radiology will follow.    CT UROGRAM 9/27/2022:  FINDINGS:   LOWER CHEST: Normal.     HEPATOBILIARY: Normal.     PANCREAS: Normal.     SPLEEN: Normal.     ADRENAL GLANDS: Normal.     RIGHT KIDNEY/URETER: Normal.     LEFT KIDNEY/URETER: Normal.     BLADDER: Mild bladder wall thickening. No focal lesion.     BOWEL: Normal.     LYMPH NODES: Normal.     VASCULATURE: Unremarkable.     PELVIC ORGANS: Normal.     MUSCULOSKELETAL: Normal.                                                         IMPRESSION:  1.  Mild bladder wall thickening is nonspecific, possibly related to cystitis.  2.  Normal kidneys and ureters. No urinary tract calculi.          ASSESSMENT and PLAN  49-year-old female with history of microscopic hematuria as well as multiple  sclerosis with mixed urinary incontinence and recurrent urinary tract infections, incomplete emptying    Microscopic hematuria eval was benign.     History of multiple sclerosis with mixed urinary incontinence, incomplete emptying  -Using estrace routinely 3x per week (Can refill when requested)  -Take time to feel fully empty  -UC if symptoms    GHANSHYAM Carty [facility], [place]      Time spent: 15 minutes spent on the date of the encounter doing chart review, history and exam, documentation and further activities as noted above.     GHANSHYAM Carty [facility]          Again, thank you for allowing me to participate in the care of your patient.      Sincerely,    Trinidad Decker PA-C, PA-C

## 2024-10-10 ENCOUNTER — HOSPITAL ENCOUNTER (OUTPATIENT)
Dept: REHABILITATION | Facility: CLINIC | Age: 49
Discharge: HOME | End: 2024-10-10
Attending: FAMILY MEDICINE
Payer: COMMERCIAL

## 2024-10-10 PROCEDURE — S5102 ADULT DAY CARE PER DIEM: HCPCS

## 2024-10-10 NOTE — PROGRESS NOTES
MONTHLY PROGRESS NOTE AND PARTICIPATION REPORT   [facility]    Shanna Shanks, 1975  Attendance: Please see Epic for attendance record.    Communication:   Does communicate   Hearing:   No impairment  Vision:   No impairment  Orientation/Cognition:   Minor forgetfulness  Partial disorientation  Short term memory loss  Behavior:   No behavioral concerns  Self-Preservation Skills:   Shanna presents with MOD cogntive impairment and slow shuffled gait with balance deficits during ambulation 2/2 her MS diagnosis.  She requires staff instructions and assistance for self preservatioin.    Eating:   Assist with set up  Shanna requires verbal cues from staff ~75% of the time to initiate and continue eathing activities during lunch service D/2 her cognitive impairment.  Ambulation Walking:   Needs assistance  Staff provides Shanna a site owned 4WW for safe ambulation during program attendance for falls prevention D/2 her short slow shuffled gait and balance deficits 2/2 her MS diagnosis.  Transferring:   Independent  Wheelchair:   Shanna is fully ambulatory.  However, she will be offered a manual WC for any off site group outings for energy conservation and falls prevention.  Toileting:   Staff assists Shanna in the bathroom 1:1 for supervision and provides VC for self-hygiene and garment management after toilet use and hand hygiene.  Maintenance Program:     NuStep  Living Arrangements:   Lives with family  Spiritual Needs: Needs are being met through support group  Medication Assistance:   Medication not taken during program hours  Participation Report:   Aerobics/Exercise  Support Group  Cognitive Stimulation  Fire Drill  Creative Arts/Crafts  Games  Gardening  Speakers/Entertainment  Socialization  Current Events/News  Education/Health Topic  Level of Participation:   [facility] Note:  Shanna continues to constantly attend the day program.  Member enjoys socializing with  others at day program as well as participating in staff lead activities.

## 2024-10-10 NOTE — PROGRESS NOTES
Individual abuse prevention plan (IAPP)  [facility]     Assessment of Susceptibility to Abuse, Including Self Abuse, Neglect (Identification of characteristics, which make the individual susceptible to abuse, and how these characteristics cause the individual to be susceptible to abuse.)     Is the person susceptible to abuse in each area?  Sexual Abuse:   No  Referrals made when the person is susceptible to abuse outside the scope or control of this program (Identify the referral and date it occurred): No additional risk reduction means needed at this time    Physical Abuse:   No  Referrals made when the person is susceptible to abuse outside the scope or control of this program (Identify the referral and date it occurred): No additional risk reduction means needed at this time    Self-Abuse:   No  Referrals made when the person is susceptible to abuse outside the scope or control of this program (Identify the referral and date it occurred): No additional risk reduction means needed at this time    Financial  Exploitation  No  Referrals made when the person is susceptible to abuse outside the scope or control of this program (Identify the referral and date it occurred): No additional risk reduction means needed at this time    Is the program aware of this person committing a violent crime or act of physical aggression towards others?  No  Referrals made when the person is susceptible to abuse outside the scope or control of this program (Identify the referral and date it occurred): No additional risk reduction means needed at this time    INDIVIDUAL ABUSE PREVENTION PLAN-MEASURES TAKEN TO MINIMIZE RISK OF ABUSE   ADL:   Assist with clothing management  Assist with feeding  Assist with toileting  Staff will provide set up and VC for eating.  Staff will provide 1:1 supervision and set up and VC assist for post toilet use self-hygiene, garment management, and hand  hygiene.  Ambulation/Transfers/Wheelchairs:  Assist with all transfers and/or ambulation  Encourage client to ambulate short distances with walker and provide wheelchair for distance  Ensure client uses cane and/or walker  Provide standby assist due to periods of dizziness, fatigue  Provide standby assist when client ambulates prn   Behavior:   No behavior issues  Communication:  Encourage verbalization of needs/concerns  Observe body language/gestures to assist in anticipating client's needs  Cognitive Issues:   Require aide to be with member if wandering  Provider reminders due to confusion, forgetfulness  Give simple step-by-step direction  Contact caregiver to inform of special activities days  Diet:   Staff will prepare food to facilitate client to feed self  Monitor client when eating and/or drinking fluids   Exercise:   Encourage participation in maintenance program  Encourage participation in wheelchair aerobics  Hearing:   Speak distinctly and use gestures  Isolation:   Encourage socialization due to isolation in home environment  Medical Monitoring:   Monitor physical and emotional comfort  Monitor physical symptoms due to diagnosis and report significant changes to nurse, caregiver and physician   Mental Health:   Motivate client to join in activities that are beneficial to client  Encourage client to express feelings  Monitor anxiety level and intervene when appropriate  Encourage regular attendance for socialization and stimulation  Offer emotional support  Observe for symptoms of depression and notify appropriate staff/caregiver  Encourage independent decision making  Encourage social interaction to assist in increasing client's self-image  Provide client with choices of programming to encourage independent decision making  Provide activities in which client can be successful  Sensory:   Provide and encourage participation in activities for stimulation  Vision:   Provide verbal cues  Other:  N/A    Developed in consultation with:  Client  The Program Abuse Prevention Plan/IAPP identifies the specific actions that minimize abuse to the [facility] participant.  yes

## 2024-10-10 NOTE — PROGRESS NOTES
INDIVIDUAL PLAN OF CARE   [facility]    Member Name: Shanna Shanks; YOB: 1975  MRN: [id number]    Goals developed in collaboration with: member  Staff responsible for plan: Analisa LOWE, Adam Corona (Rehab Tech), Dickson Briceño (Rehab Tech)  1. Long Term Goal (concrete, measurable, and time specific outcomes):  Member will maintain present level of physical and cognitive function through active participation in structured [facility] programming with staff set up, facilitation, and supervision provided every day of program attendance.  Target Date:  October 2025  Quarterly Review:  Goal remains appropriate as member continues to participate to the best of her abilities,    2. Short Term Goal: (concrete, measurable, and time specific outcomes):  To maintain physical strength and endurance, member will actively participate in NuStep maintenance program each day of attendance.  Target Date:  April 2025  Quarterly Review:  Goal has been changed to fit member's preferred exercise while still maintaining physical strength and endurance.    3. Short Term Goal: (concrete, measurable, and time specific outcomes):  To maintain current level of cognitive function, member will actively participate in therapeutic creative arts and Brain/Body programming with set up, VC's and supervision provided by staff every day of program attendance.  Target Date:  April 2025  Quarterly Review:  Goal remains appropriate as member participates in activities lead by staff to the best of her abilities.

## 2024-10-22 ENCOUNTER — HOSPITAL ENCOUNTER (OUTPATIENT)
Dept: REHABILITATION | Facility: CLINIC | Age: 49
Discharge: HOME | End: 2024-10-22
Attending: FAMILY MEDICINE
Payer: COMMERCIAL

## 2024-10-22 PROCEDURE — S5102 ADULT DAY CARE PER DIEM: HCPCS

## 2024-10-24 ENCOUNTER — HOSPITAL ENCOUNTER (OUTPATIENT)
Dept: REHABILITATION | Facility: CLINIC | Age: 49
Discharge: HOME | End: 2024-10-24
Attending: FAMILY MEDICINE
Payer: COMMERCIAL

## 2024-10-24 PROCEDURE — S5102 ADULT DAY CARE PER DIEM: HCPCS

## 2024-10-29 ENCOUNTER — HOSPITAL ENCOUNTER (OUTPATIENT)
Dept: REHABILITATION | Facility: CLINIC | Age: 49
Discharge: HOME | End: 2024-10-29
Attending: FAMILY MEDICINE
Payer: COMMERCIAL

## 2024-10-29 PROCEDURE — S5102 ADULT DAY CARE PER DIEM: HCPCS

## 2024-10-31 ENCOUNTER — HOSPITAL ENCOUNTER (OUTPATIENT)
Dept: REHABILITATION | Facility: CLINIC | Age: 49
Discharge: HOME | End: 2024-10-31
Attending: FAMILY MEDICINE
Payer: COMMERCIAL

## 2024-10-31 PROCEDURE — S5102 ADULT DAY CARE PER DIEM: HCPCS

## 2024-11-07 ENCOUNTER — HOSPITAL ENCOUNTER (OUTPATIENT)
Dept: REHABILITATION | Facility: CLINIC | Age: 49
Discharge: HOME | End: 2024-11-07
Attending: FAMILY MEDICINE
Payer: COMMERCIAL

## 2024-11-07 PROCEDURE — S5102 ADULT DAY CARE PER DIEM: HCPCS

## 2024-11-12 ENCOUNTER — HOSPITAL ENCOUNTER (OUTPATIENT)
Dept: REHABILITATION | Facility: CLINIC | Age: 49
Discharge: HOME | End: 2024-11-12
Attending: FAMILY MEDICINE
Payer: COMMERCIAL

## 2024-11-12 PROCEDURE — S5102 ADULT DAY CARE PER DIEM: HCPCS

## 2024-11-14 ENCOUNTER — HOSPITAL ENCOUNTER (OUTPATIENT)
Dept: REHABILITATION | Facility: CLINIC | Age: 49
Discharge: HOME | End: 2024-11-14
Attending: FAMILY MEDICINE
Payer: COMMERCIAL

## 2024-11-14 PROCEDURE — S5102 ADULT DAY CARE PER DIEM: HCPCS

## 2024-11-14 NOTE — PROGRESS NOTES
[facility] RN Note    Previous documentation since 10/1/24 reviewed.  No concerns reported by AC staff or member.

## 2024-11-19 ENCOUNTER — HOSPITAL ENCOUNTER (OUTPATIENT)
Dept: REHABILITATION | Facility: CLINIC | Age: 49
Discharge: HOME | End: 2024-11-19
Attending: FAMILY MEDICINE
Payer: COMMERCIAL

## 2024-11-19 PROCEDURE — S5102 ADULT DAY CARE PER DIEM: HCPCS

## 2024-11-19 NOTE — PROGRESS NOTES
MONTHLY PROGRESS NOTE AND PARTICIPATION REPORT   [facility]    Shanna Shanks, 1975  Attendance: Please see Epic for attendance record.    Communication:   Does communicate   Hearing:   No impairment  Vision:   No impairment  Orientation/Cognition:   Minor forgetfulness  Partial disorientation  Short term memory loss  Behavior:   No behavioral concerns  Self-Preservation Skills:   Shanna presents with MOD cogntive impairment and slow shuffled gait with balance deficits during ambulation 2/2 her MS diagnosis.  She requires staff instructions and assistance for self preservatioin.    Eating:   Assist with set up  Shanna requires verbal cues from staff ~75% of the time to initiate and continue eathing activities during lunch service D/2 her cognitive impairment.  Ambulation Walking:   Needs assistance  Staff provides Shanna a site owned 4WW for safe ambulation during program attendance for falls prevention D/2 her short slow shuffled gait and balance deficits 2/2 her MS diagnosis.  Transferring:   Independent  Wheelchair:   Shanna is fully ambulatory.  However, she will be offered a manual WC for any off site group outings for energy conservation and falls prevention.  Toileting:   Staff assists Shanna in the bathroom 1:1 for supervision and provides VC for self-hygiene and garment management after toilet use and hand hygiene.  Maintenance Program:     NuStep  Living Arrangements:   Lives with family  Spiritual Needs: Needs are being met through support group  Medication Assistance:   Medication not taken during program hours  Participation Report:   Aerobics/Exercise  Support Group  Cognitive Stimulation  Fire Drill  Creative Arts/Crafts  Games  Gardening  Speakers/Entertainment  Socialization  Current Events/News  Education/Health Topic  Level of Participation:   [facility] Note:  Shanna has demonstrated slight flushing in her face recently at day program, which staff  have seen her present before, but she denies any pain or discomfort when affected. Shanna has also been working hard in art, and contributing to conversations in brain and body.

## 2024-11-21 ENCOUNTER — HOSPITAL ENCOUNTER (OUTPATIENT)
Dept: REHABILITATION | Facility: CLINIC | Age: 49
Discharge: HOME | End: 2024-11-21
Attending: FAMILY MEDICINE
Payer: COMMERCIAL

## 2024-11-21 PROCEDURE — S5102 ADULT DAY CARE PER DIEM: HCPCS

## 2024-11-26 ENCOUNTER — HOSPITAL ENCOUNTER (OUTPATIENT)
Dept: REHABILITATION | Facility: CLINIC | Age: 49
Discharge: HOME | End: 2024-11-26
Attending: FAMILY MEDICINE
Payer: COMMERCIAL

## 2024-11-26 PROCEDURE — S5102 ADULT DAY CARE PER DIEM: HCPCS

## 2024-12-03 ENCOUNTER — HOSPITAL ENCOUNTER (OUTPATIENT)
Dept: REHABILITATION | Facility: CLINIC | Age: 49
Discharge: HOME | End: 2024-12-03
Attending: FAMILY MEDICINE
Payer: COMMERCIAL

## 2024-12-03 PROCEDURE — S5102 ADULT DAY CARE PER DIEM: HCPCS

## 2024-12-03 NOTE — PROGRESS NOTES
[facility] RN Note    Previous documentation since 11/14/24 reviewed.  No concerns reported by AC staff or member.

## 2024-12-05 ENCOUNTER — HOSPITAL ENCOUNTER (OUTPATIENT)
Dept: REHABILITATION | Facility: CLINIC | Age: 49
Discharge: HOME | End: 2024-12-05
Attending: FAMILY MEDICINE
Payer: COMMERCIAL

## 2024-12-05 PROCEDURE — S5102 ADULT DAY CARE PER DIEM: HCPCS

## 2024-12-09 DIAGNOSIS — R32 URINARY INCONTINENCE, UNSPECIFIED TYPE: ICD-10-CM

## 2024-12-09 RX ORDER — ESTRADIOL 0.1 MG/G
CREAM VAGINAL
Qty: 42.5 G | Refills: 0 | Status: SHIPPED | OUTPATIENT
Start: 2024-12-09

## 2024-12-10 ENCOUNTER — HOSPITAL ENCOUNTER (OUTPATIENT)
Dept: REHABILITATION | Facility: CLINIC | Age: 49
Discharge: HOME | End: 2024-12-10
Attending: FAMILY MEDICINE
Payer: COMMERCIAL

## 2024-12-10 PROCEDURE — S5102 ADULT DAY CARE PER DIEM: HCPCS

## 2024-12-12 ENCOUNTER — HOSPITAL ENCOUNTER (OUTPATIENT)
Dept: REHABILITATION | Facility: CLINIC | Age: 49
Discharge: HOME | End: 2024-12-12
Attending: FAMILY MEDICINE
Payer: COMMERCIAL

## 2024-12-12 PROCEDURE — S5102 ADULT DAY CARE PER DIEM: HCPCS

## 2024-12-17 ENCOUNTER — HOSPITAL ENCOUNTER (OUTPATIENT)
Dept: REHABILITATION | Facility: CLINIC | Age: 49
Discharge: HOME | End: 2024-12-17
Attending: FAMILY MEDICINE
Payer: COMMERCIAL

## 2024-12-17 PROCEDURE — S5102 ADULT DAY CARE PER DIEM: HCPCS

## 2024-12-19 ENCOUNTER — HOSPITAL ENCOUNTER (OUTPATIENT)
Dept: REHABILITATION | Facility: CLINIC | Age: 49
Discharge: HOME | End: 2024-12-19
Attending: FAMILY MEDICINE
Payer: COMMERCIAL

## 2024-12-19 PROCEDURE — S5102 ADULT DAY CARE PER DIEM: HCPCS

## 2024-12-31 ENCOUNTER — HOSPITAL ENCOUNTER (OUTPATIENT)
Dept: REHABILITATION | Facility: CLINIC | Age: 49
Discharge: HOME | End: 2024-12-31
Attending: FAMILY MEDICINE
Payer: COMMERCIAL

## 2024-12-31 PROCEDURE — S5102 ADULT DAY CARE PER DIEM: HCPCS

## 2025-01-02 ENCOUNTER — HOSPITAL ENCOUNTER (OUTPATIENT)
Dept: REHABILITATION | Facility: CLINIC | Age: 50
Discharge: HOME | End: 2025-01-02
Attending: FAMILY MEDICINE

## 2025-01-02 PROCEDURE — S5102 ADULT DAY CARE PER DIEM: HCPCS

## 2025-01-07 ENCOUNTER — HOSPITAL ENCOUNTER (OUTPATIENT)
Dept: REHABILITATION | Facility: CLINIC | Age: 50
Discharge: HOME | End: 2025-01-07
Attending: FAMILY MEDICINE

## 2025-01-07 PROCEDURE — S5102 ADULT DAY CARE PER DIEM: HCPCS

## 2025-01-09 ENCOUNTER — HOSPITAL ENCOUNTER (OUTPATIENT)
Dept: REHABILITATION | Facility: CLINIC | Age: 50
Discharge: HOME | End: 2025-01-09
Attending: FAMILY MEDICINE

## 2025-01-09 PROCEDURE — S5102 ADULT DAY CARE PER DIEM: HCPCS

## 2025-01-09 NOTE — PLAN OF CARE
Problem: Goal/Outcome  Goal: Goal Outcome Summary  Outcome: No Change  Alert to self only, confused to time, place and situation, did not use call light or set off bed alarm during the this shift. Needs CGA with belt and walker to the bathroom, had 2 episodes of incontinence but toileted every few hours.VSS. CMS intact, denies numbness, tingling, cp or SOB. Midline to right arm in place and patent, no redness or swelling noted, Acyclovir infused without difficulty per orders, tolerated well infusion. Moves independently in bed. PT moved to room closer to nursing station.  Pt noted to be resting comfortable in bed during rounds, continue to monitor and with POC.            What Type Of Note Output Would You Prefer (Optional)?: Standard Output How Severe Is Your Acne?: moderate Is This A New Presentation, Or A Follow-Up?: Acne Additional Comments (Use Complete Sentences): Patient would like her acne cysts injected today.

## 2025-01-14 ENCOUNTER — HOSPITAL ENCOUNTER (OUTPATIENT)
Dept: REHABILITATION | Facility: CLINIC | Age: 50
Discharge: HOME | End: 2025-01-14
Attending: FAMILY MEDICINE

## 2025-01-14 PROCEDURE — S5102 ADULT DAY CARE PER DIEM: HCPCS

## 2025-01-16 NOTE — PROGRESS NOTES
[facility] RN Note    Previous documentation since 12/19/24 reviewed.  Participant not in facility at this time.

## 2025-01-28 ENCOUNTER — HOSPITAL ENCOUNTER (OUTPATIENT)
Dept: REHABILITATION | Facility: CLINIC | Age: 50
Discharge: HOME | End: 2025-01-28
Attending: FAMILY MEDICINE

## 2025-01-28 PROCEDURE — S5102 ADULT DAY CARE PER DIEM: HCPCS

## 2025-01-28 NOTE — PROGRESS NOTES
Facilitated physical maintenance of bilateral upper extremity and bilateral lower extrimity strength and endurance through NuStep therapeutic exercise.  Patient completed minutes 15 of NuStep activity.     Nu Step Settings:  Seat:  10  Arms:  12  Resistance: 4

## 2025-01-30 ENCOUNTER — HOSPITAL ENCOUNTER (OUTPATIENT)
Dept: REHABILITATION | Facility: CLINIC | Age: 50
Discharge: HOME | End: 2025-01-30
Attending: FAMILY MEDICINE

## 2025-01-30 PROCEDURE — S5102 ADULT DAY CARE PER DIEM: HCPCS

## 2025-01-30 NOTE — PROGRESS NOTES
INDIVIDUAL PLAN OF CARE   [facility]    Member Name: Shanna Shanks; YOB: 1975  MRN: [id number]    Goals developed in collaboration with: member  Staff responsible for plan: Analisa LOWE, Adam Corona Rehab Tech, Dickson Briceño [facility]  1. Long Term Goal (concrete, measurable, and time specific outcomes):  Member will maintain present level of physical and cognitive function through active participation in structured [facility] programming with staff set up, facilitation, and supervision provided every day of program attendance.  Target Date:  January 2026  Quarterly Review:  Goal remains appropriate.  Member is encouraged to participate in activities run by staff.    2. Short Term Goal: (concrete, measurable, and time specific outcomes):  To maintain physical strength and endurance, member will actively participate in 8 minutes of active range of motion Upper Extremity aerobic exercises with supervision and cueing from staff members.  Target Date:  July 2025  Quarterly Review:  Above goal has been rewritten to provide member most beneficial way to get exercise throughout the day.    3. Short Term Goal: (concrete, measurable, and time specific outcomes):  To maintain current level of cognitive function, member will actively participate in therapeutic creative arts and Brain/Body programming with set up, VC's and supervision provided by staff every day of program attendance.  Target Date:  July 2025  Quarterly Review:  Goal remains appropriate.  Staff encourage member to participate and staff ask member questions to increase engagement.

## 2025-01-30 NOTE — PROGRESS NOTES
MONTHLY PROGRESS NOTE AND PARTICIPATION REPORT   [facility]    Shanna Shanks, 1975  Attendance: Please see Epic for attendance record.    Communication:   Does communicate   Hearing:   No impairment  Vision:   No impairment  Orientation/Cognition:   Minor forgetfulness  Partial disorientation  Short term memory loss  Behavior:   No behavioral concerns  Self-Preservation Skills:   Shanna presents with MOD cogntive impairment and slow shuffled gait with balance deficits during ambulation 2/2 her MS diagnosis.  She requires staff instructions and assistance for self preservatioin.    Eating:   Assist with set up  Shanna requires verbal cues from staff ~75% of the time to initiate and continue eathing activities during lunch service D/2 her cognitive impairment.  Ambulation Walking:   Needs assistance  Staff provides Shanna a site owned 4WW for safe ambulation during program attendance for falls prevention D/2 her short slow shuffled gait and balance deficits 2/2 her MS diagnosis.  Transferring:   Independent  Wheelchair:   Shanna is fully ambulatory.  However, she will be offered a manual WC for any off site group outings for energy conservation and falls prevention.  Toileting:   Staff assists Shanna in the bathroom 1:1 for supervision and provides VC for self-hygiene and garment management after toilet use and hand hygiene.  Maintenance Program:     NuStep  Living Arrangements:   Lives with family  Spiritual Needs: Needs are being met through support group  Medication Assistance:   Medication not taken during program hours  Participation Report:   Aerobics/Exercise  Support Group  Cognitive Stimulation  Fire Drill  Creative Arts/Crafts  Games  Gardening  Speakers/Entertainment  Socialization  Current Events/News  Education/Health Topic  Level of Participation:   Active    Shanna was very interested in the Lenz Samano personality test this month in rain and Body.  Member has shown up to  day program last 2 times without backpack, which is unusual for member.  RADHA/L asked member why that was and member said she was notable to find her backpack recently.   98.2

## 2025-02-04 ENCOUNTER — HOSPITAL ENCOUNTER (OUTPATIENT)
Dept: REHABILITATION | Facility: CLINIC | Age: 50
Discharge: HOME | End: 2025-02-04
Attending: FAMILY MEDICINE

## 2025-02-04 PROCEDURE — S5102 ADULT DAY CARE PER DIEM: HCPCS

## 2025-02-06 ENCOUNTER — HOSPITAL ENCOUNTER (OUTPATIENT)
Dept: REHABILITATION | Facility: CLINIC | Age: 50
Discharge: HOME | End: 2025-02-06
Attending: FAMILY MEDICINE

## 2025-02-06 PROCEDURE — S5102 ADULT DAY CARE PER DIEM: HCPCS

## 2025-02-11 ENCOUNTER — HOSPITAL ENCOUNTER (OUTPATIENT)
Dept: REHABILITATION | Facility: CLINIC | Age: 50
Discharge: HOME | End: 2025-02-11
Attending: FAMILY MEDICINE

## 2025-02-11 PROCEDURE — S5102 ADULT DAY CARE PER DIEM: HCPCS

## 2025-02-13 ENCOUNTER — HOSPITAL ENCOUNTER (OUTPATIENT)
Dept: REHABILITATION | Facility: CLINIC | Age: 50
Discharge: HOME | End: 2025-02-13
Attending: FAMILY MEDICINE

## 2025-02-13 PROCEDURE — S5102 ADULT DAY CARE PER DIEM: HCPCS

## 2025-02-24 NOTE — PROGRESS NOTES
MONTHLY PROGRESS NOTE AND PARTICIPATION REPORT   [facility]    Shanna Shanks, 1975  Attendance: Please see Epic for attendance record.    Communication:   Does communicate   Hearing:   No impairment  Vision:   No impairment  Orientation/Cognition:   Minor forgetfulness  Partial disorientation  Short term memory loss  Behavior:   No behavioral concerns  Self-Preservation Skills:   Shanna presents with MOD cogntive impairment and slow shuffled gait with balance deficits during ambulation 2/2 her MS diagnosis.  She requires staff instructions and assistance for self preservatioin.    Eating:   Assist with set up  Shanna requires verbal cues from staff ~75% of the time to initiate and continue eathing activities during lunch service D/2 her cognitive impairment.  Ambulation Walking:   Needs assistance  Staff provides Shanna a site owned 4WW for safe ambulation during program attendance for falls prevention D/2 her short slow shuffled gait and balance deficits 2/2 her MS diagnosis.  Transferring:   Independent  Wheelchair:   Shanna is fully ambulatory.  However, she will be offered a manual WC for any off site group outings for energy conservation and falls prevention.  Toileting:   Staff assists Shanna in the bathroom 1:1 for supervision and provides VC for self-hygiene and garment management after toilet use and hand hygiene.  Maintenance Program:     NuStep  Living Arrangements:   Lives with family  Spiritual Needs: Needs are being met through support group  Medication Assistance:   Medication not taken during program hours  Participation Report:   Aerobics/Exercise  Support Group  Cognitive Stimulation  Fire Drill  Creative Arts/Crafts  Games  Gardening  Speakers/Entertainment  Socialization  Current Events/News  Education/Health Topic  Level of Participation:   Active    Shanna has been on a more consistent bathroom schedule at the day program, which has helped her to avoid being  changed as frequently!  Shanna works very hard in day program, specifically this month, Shanna loved being able to compete and participate in games, where she found success in scattegories and Adalberto! In art, Shanna has been working on a bunny, and he has been prioritizing detail, over speed, which results in a very nice looking project that she hopes to complete next month!

## 2025-02-25 ENCOUNTER — HOSPITAL ENCOUNTER (OUTPATIENT)
Dept: REHABILITATION | Facility: CLINIC | Age: 50
Discharge: HOME | End: 2025-02-25
Attending: FAMILY MEDICINE

## 2025-02-25 PROCEDURE — S5102 ADULT DAY CARE PER DIEM: HCPCS

## 2025-03-04 ENCOUNTER — HOSPITAL ENCOUNTER (OUTPATIENT)
Dept: REHABILITATION | Facility: CLINIC | Age: 50
Discharge: HOME | End: 2025-03-04
Attending: FAMILY MEDICINE

## 2025-03-04 PROCEDURE — S5102 ADULT DAY CARE PER DIEM: HCPCS

## 2025-03-06 ENCOUNTER — HOSPITAL ENCOUNTER (OUTPATIENT)
Dept: REHABILITATION | Facility: CLINIC | Age: 50
Discharge: HOME | End: 2025-03-06
Attending: FAMILY MEDICINE

## 2025-03-06 PROCEDURE — S5102 ADULT DAY CARE PER DIEM: HCPCS

## 2025-03-06 NOTE — PROGRESS NOTES
[facility] RN Note    No new progress notes noted since 2/18/25.   No concerns reported by AC staff or member.

## 2025-03-11 ENCOUNTER — HOSPITAL ENCOUNTER (OUTPATIENT)
Dept: REHABILITATION | Facility: CLINIC | Age: 50
Discharge: HOME | End: 2025-03-11
Attending: FAMILY MEDICINE

## 2025-03-11 DIAGNOSIS — R32 URINARY INCONTINENCE, UNSPECIFIED TYPE: ICD-10-CM

## 2025-03-11 PROCEDURE — S5102 ADULT DAY CARE PER DIEM: HCPCS

## 2025-03-11 RX ORDER — ESTRADIOL 0.1 MG/G
CREAM VAGINAL
Qty: 42.5 G | Refills: 3 | Status: SHIPPED | OUTPATIENT
Start: 2025-03-11

## 2025-03-13 ENCOUNTER — HOSPITAL ENCOUNTER (OUTPATIENT)
Dept: REHABILITATION | Facility: CLINIC | Age: 50
Discharge: HOME | End: 2025-03-13
Attending: FAMILY MEDICINE

## 2025-03-13 PROCEDURE — S5102 ADULT DAY CARE PER DIEM: HCPCS

## 2025-03-18 ENCOUNTER — HOSPITAL ENCOUNTER (OUTPATIENT)
Dept: REHABILITATION | Facility: CLINIC | Age: 50
Discharge: HOME | End: 2025-03-18
Attending: FAMILY MEDICINE

## 2025-03-18 PROCEDURE — S5102 ADULT DAY CARE PER DIEM: HCPCS

## 2025-03-18 NOTE — PROGRESS NOTES
MONTHLY PROGRESS NOTE AND PARTICIPATION REPORT   [facility]    Shanna Shanks, 1975  Attendance: Please see Epic for attendance record.    Communication:   Does communicate   Hearing:   No impairment  Vision:   No impairment  Orientation/Cognition:   Minor forgetfulness  Partial disorientation  Short term memory loss  Behavior:   No behavioral concerns  Self-Preservation Skills:   Shanna presents with MOD cogntive impairment and slow shuffled gait with balance deficits during ambulation 2/2 her MS diagnosis.  She requires staff instructions and assistance for self preservatioin.    Eating:   Assist with set up  Shanna requires verbal cues from staff ~75% of the time to initiate and continue eathing activities during lunch service D/2 her cognitive impairment.  Ambulation Walking:   Needs assistance  Staff provides Shanna a site owned 4WW for safe ambulation during program attendance for falls prevention D/2 her short slow shuffled gait and balance deficits 2/2 her MS diagnosis.  Transferring:   Independent  Wheelchair:   Shanna is fully ambulatory.  However, she will be offered a manual WC for any off site group outings for energy conservation and falls prevention.  Toileting:   Staff assists Shanna in the bathroom 1:1 for supervision and provides VC for self-hygiene and garment management after toilet use and hand hygiene.  Maintenance Program:     NuStep  Living Arrangements:   Lives with family  Spiritual Needs: Needs are being met through support group  Medication Assistance:   Medication not taken during program hours  Participation Report:   Aerobics/Exercise  Support Group  Cognitive Stimulation  Fire Drill  Creative Arts/Crafts  Games  Gardening  Speakers/Entertainment  Socialization  Current Events/News  Education/Health Topic  Level of Participation:   Active    Shanna really enjoyed the horse race day this month, and she advocated for playing this game more at day program! In  "Shanna crenshaw finished her errol ruiz project this earlier this month, and she has now began working on a \"yak,\" in addition to working on word finds!  "

## 2025-03-20 ENCOUNTER — HOSPITAL ENCOUNTER (OUTPATIENT)
Dept: REHABILITATION | Facility: CLINIC | Age: 50
Discharge: HOME | End: 2025-03-20
Attending: FAMILY MEDICINE

## 2025-03-20 PROCEDURE — S5102 ADULT DAY CARE PER DIEM: HCPCS

## 2025-04-01 ENCOUNTER — HOSPITAL ENCOUNTER (OUTPATIENT)
Dept: REHABILITATION | Facility: CLINIC | Age: 50
Discharge: HOME | End: 2025-04-01
Attending: FAMILY MEDICINE

## 2025-04-01 PROCEDURE — S5102 ADULT DAY CARE PER DIEM: HCPCS

## 2025-04-01 NOTE — PROGRESS NOTES
INDIVIDUAL PLAN OF CARE   [facility]    Member Name: Shanna Shanks; YOB: 1975  MRN: [id number]    Goals developed in collaboration with: member  Staff responsible for plan: Analisa LOWE, Adam Corona Rehab Tech, Dickson Briceño [facility]  1. Long Term Goal (concrete, measurable, and time specific outcomes):  Member will maintain present level of physical and cognitive function through active participation in structured [facility] programming with staff set up, facilitation, and supervision provided every day of program attendance.  Target Date:  April 2026  Quarterly Review:  Goal remains appropriate.  Member is encouraged to participate in activities run by staff.    2. Short Term Goal: (concrete, measurable, and time specific outcomes):  To maintain physical strength and endurance, member will actively participate in 8 minutes of active range of motion Upper Extremity aerobic exercises with supervision and cueing from staff members.  Target Date:  October 2025  Quarterly Review:  Above goal has been rewritten to provide member most beneficial way to get exercise throughout the day.    3. Short Term Goal: (concrete, measurable, and time specific outcomes):  To maintain current level of cognitive function, member will actively participate in therapeutic creative arts and Brain/Body programming with set up, VC's and supervision provided by staff every day of program attendance.  Target Date:  October 2025  Quarterly Review:  Goal remains appropriate.  Staff encourage member to participate and staff ask member questions to increase engagement.

## 2025-04-01 NOTE — PROGRESS NOTES
MONTHLY PROGRESS NOTE AND PARTICIPATION REPORT   [facility]    Shanna Shanks, 1975  Attendance: Please see Epic for attendance record.    Communication:   Does communicate   Hearing:   No impairment  Vision:   No impairment  Orientation/Cognition:   Minor forgetfulness  Partial disorientation  Short term memory loss  Behavior:   No behavioral concerns  Self-Preservation Skills:   Shanna presents with MOD cogntive impairment and slow shuffled gait with balance deficits during ambulation 2/2 her MS diagnosis.  She requires staff instructions and assistance for self preservatioin.    Eating:   Assist with set up  Shanna requires verbal cues from staff ~75% of the time to initiate and continue eathing activities during lunch service D/2 her cognitive impairment.  Ambulation Walking:   Needs assistance  Staff provides Shanna a site owned 4WW for safe ambulation during program attendance for falls prevention D/2 her short slow shuffled gait and balance deficits 2/2 her MS diagnosis.  Transferring:   Independent  Wheelchair:   Shanna is fully ambulatory.  However, she will be offered a manual WC for any off site group outings for energy conservation and falls prevention.  Toileting:   Staff assists Shanna in the bathroom 1:1 for supervision and provides VC for self-hygiene and garment management after toilet use and hand hygiene.  Maintenance Program:     NuStep  Living Arrangements:   Lives with family  Spiritual Needs: Needs are being met through support group  Medication Assistance:   Medication not taken during program hours  Participation Report:   Aerobics/Exercise  Support Group  Cognitive Stimulation  Fire Drill  Creative Arts/Crafts  Games  Gardening  Speakers/Entertainment  Socialization  Current Events/News  Education/Health Topic  Level of Participation:   Active    Shanna has participated in the Easter art wall week in art.  She enjoyed black rodrigo, mell day, and dr. Justina knight  last month.  Autumn looks forward to the game LCR, learning about the [place], and gardening this month.

## 2025-04-01 NOTE — PROGRESS NOTES
Facilitated physical maintenance of bilateral upper extremity strength and endurance through upper body exercise bike.   Patient completed 40 minutes of UBE activity.

## 2025-04-01 NOTE — PROGRESS NOTES
Individual abuse prevention plan (IAPP)  [facility]     Assessment of Susceptibility to Abuse, Including Self Abuse, Neglect (Identification of characteristics, which make the individual susceptible to abuse, and how these characteristics cause the individual to be susceptible to abuse.)     Is the person susceptible to abuse in each area?  Sexual Abuse:   No  Referrals made when the person is susceptible to abuse outside the scope or control of this program (Identify the referral and date it occurred): No additional risk reduction means needed at this time    Physical Abuse:   No  Referrals made when the person is susceptible to abuse outside the scope or control of this program (Identify the referral and date it occurred): No additional risk reduction means needed at this time    Self-Abuse:   No  Referrals made when the person is susceptible to abuse outside the scope or control of this program (Identify the referral and date it occurred): No additional risk reduction means needed at this time    Financial  Exploitation  No  Referrals made when the person is susceptible to abuse outside the scope or control of this program (Identify the referral and date it occurred): No additional risk reduction means needed at this time    Is the program aware of this person committing a violent crime or act of physical aggression towards others?  No  Referrals made when the person is susceptible to abuse outside the scope or control of this program (Identify the referral and date it occurred): No additional risk reduction means needed at this time    INDIVIDUAL ABUSE PREVENTION PLAN-MEASURES TAKEN TO MINIMIZE RISK OF ABUSE   ADL:   Assist with clothing management  Assist with feeding  Assist with toileting  Staff will provide set up and VC for eating.  Staff will provide 1:1 supervision and set up and VC assist for post toilet use self-hygiene, garment management, and hand  hygiene.  Ambulation/Transfers/Wheelchairs:  Assist with all transfers and/or ambulation  Encourage client to ambulate short distances with walker and provide wheelchair for distance  Ensure client uses cane and/or walker  Provide standby assist due to periods of dizziness, fatigue  Provide standby assist when client ambulates prn   Behavior:   No behavior issues  Communication:  Encourage verbalization of needs/concerns  Observe body language/gestures to assist in anticipating client's needs  Cognitive Issues:   Require aide to be with member if wandering  Provider reminders due to confusion, forgetfulness  Give simple step-by-step direction  Contact caregiver to inform of special activities days  Diet:   Staff will prepare food to facilitate client to feed self  Monitor client when eating and/or drinking fluids   Exercise:   Encourage participation in maintenance program  Encourage participation in wheelchair aerobics  Hearing:   Speak distinctly and use gestures  Isolation:   Encourage socialization due to isolation in home environment  Medical Monitoring:   Monitor physical and emotional comfort  Monitor physical symptoms due to diagnosis and report significant changes to nurse, caregiver and physician   Mental Health:   Motivate client to join in activities that are beneficial to client  Encourage client to express feelings  Monitor anxiety level and intervene when appropriate  Encourage regular attendance for socialization and stimulation  Offer emotional support  Observe for symptoms of depression and notify appropriate staff/caregiver  Encourage independent decision making  Encourage social interaction to assist in increasing client's self-image  Provide client with choices of programming to encourage independent decision making  Provide activities in which client can be successful  Sensory:   Provide and encourage participation in activities for stimulation  Vision:   Provide verbal cues  Other:  N/A    Developed in consultation with:  Client  The Program Abuse Prevention Plan/IAPP identifies the specific actions that minimize abuse to the [facility] participant.  yes

## 2025-04-03 ENCOUNTER — HOSPITAL ENCOUNTER (OUTPATIENT)
Dept: REHABILITATION | Facility: CLINIC | Age: 50
Discharge: HOME | End: 2025-04-03
Attending: FAMILY MEDICINE

## 2025-04-03 PROCEDURE — S5102 ADULT DAY CARE PER DIEM: HCPCS

## 2025-04-08 ENCOUNTER — HOSPITAL ENCOUNTER (OUTPATIENT)
Dept: REHABILITATION | Facility: CLINIC | Age: 50
Discharge: HOME | End: 2025-04-08
Attending: FAMILY MEDICINE

## 2025-04-08 PROCEDURE — S5102 ADULT DAY CARE PER DIEM: HCPCS

## 2025-04-10 ENCOUNTER — HOSPITAL ENCOUNTER (OUTPATIENT)
Dept: REHABILITATION | Facility: CLINIC | Age: 50
Discharge: HOME | End: 2025-04-10
Attending: FAMILY MEDICINE

## 2025-04-10 PROCEDURE — S5102 ADULT DAY CARE PER DIEM: HCPCS

## 2025-04-12 ENCOUNTER — HEALTH MAINTENANCE LETTER (OUTPATIENT)
Age: 50
End: 2025-04-12

## 2025-04-15 ENCOUNTER — HOSPITAL ENCOUNTER (OUTPATIENT)
Dept: REHABILITATION | Facility: CLINIC | Age: 50
Discharge: HOME | End: 2025-04-15
Attending: FAMILY MEDICINE

## 2025-04-15 PROCEDURE — S5102 ADULT DAY CARE PER DIEM: HCPCS

## 2025-04-15 NOTE — PROGRESS NOTES
[facility] RN Note    Previous documentation since 3/6/25 reviewed.  No concerns reported by AC staff or member.

## 2025-04-17 ENCOUNTER — HOSPITAL ENCOUNTER (OUTPATIENT)
Dept: REHABILITATION | Facility: CLINIC | Age: 50
Discharge: HOME | End: 2025-04-17
Attending: FAMILY MEDICINE

## 2025-04-17 PROCEDURE — S5102 ADULT DAY CARE PER DIEM: HCPCS

## 2025-04-29 ENCOUNTER — HOSPITAL ENCOUNTER (OUTPATIENT)
Dept: REHABILITATION | Facility: CLINIC | Age: 50
Discharge: HOME | End: 2025-04-29
Attending: FAMILY MEDICINE

## 2025-04-29 PROCEDURE — S5102 ADULT DAY CARE PER DIEM: HCPCS

## 2025-05-01 ENCOUNTER — HOSPITAL ENCOUNTER (OUTPATIENT)
Dept: REHABILITATION | Facility: CLINIC | Age: 50
Discharge: HOME | End: 2025-05-01
Attending: FAMILY MEDICINE

## 2025-05-01 PROCEDURE — S5102 ADULT DAY CARE PER DIEM: HCPCS

## 2025-05-06 ENCOUNTER — HOSPITAL ENCOUNTER (OUTPATIENT)
Dept: REHABILITATION | Facility: CLINIC | Age: 50
Discharge: HOME | End: 2025-05-06
Attending: FAMILY MEDICINE

## 2025-05-06 PROCEDURE — S5102 ADULT DAY CARE PER DIEM: HCPCS

## 2025-05-08 ENCOUNTER — HOSPITAL ENCOUNTER (OUTPATIENT)
Dept: REHABILITATION | Facility: CLINIC | Age: 50
Discharge: HOME | End: 2025-05-08
Attending: FAMILY MEDICINE

## 2025-05-08 PROCEDURE — S5102 ADULT DAY CARE PER DIEM: HCPCS

## 2025-05-13 ENCOUNTER — HOSPITAL ENCOUNTER (OUTPATIENT)
Dept: REHABILITATION | Facility: CLINIC | Age: 50
Discharge: HOME | End: 2025-05-13
Attending: FAMILY MEDICINE

## 2025-05-13 PROCEDURE — S5102 ADULT DAY CARE PER DIEM: HCPCS

## 2025-05-13 NOTE — PROGRESS NOTES
MONTHLY PROGRESS NOTE AND PARTICIPATION REPORT   [facility]    Shanna Shanks, 1975  Attendance: Please see Epic for attendance record.    Communication:   Does communicate   Hearing:   No impairment  Vision:   No impairment  Orientation/Cognition:   Minor forgetfulness  Partial disorientation  Short term memory loss  Behavior:   No behavioral concerns  Self-Preservation Skills:   Shanna presents with MOD cogntive impairment and slow shuffled gait with balance deficits during ambulation 2/2 her MS diagnosis.  She requires staff instructions and assistance for self preservatioin.    Eating:   Assist with set up  Shanna requires verbal cues from staff ~75% of the time to initiate and continue eathing activities during lunch service D/2 her cognitive impairment.  Ambulation Walking:   Needs assistance  Staff provides Shanna a site owned 4WW for safe ambulation during program attendance for falls prevention D/2 her short slow shuffled gait and balance deficits 2/2 her MS diagnosis.  Transferring:   Independent  Wheelchair:   Shanna is fully ambulatory.  However, she will be offered a manual WC for any off site group outings for energy conservation and falls prevention.  Toileting:   Staff assists Shanna in the bathroom 1:1 for supervision and provides VC for self-hygiene and garment management after toilet use and hand hygiene.  Maintenance Program:     NuStep  Living Arrangements:   Lives with family  Spiritual Needs: Needs are being met through support group  Medication Assistance:   Medication not taken during program hours  Participation Report:   Aerobics/Exercise  Support Group  Cognitive Stimulation  Fire Drill  Creative Arts/Crafts  Games  Gardening  Speakers/Entertainment  Socialization  Current Events/News  Education/Health Topic  Level of Participation:   Active    Shanna has began sanding a new errol bear project in [employer], and she has mentioned that she wishes to use a  variety of colors with this project! In [facility], Autumn enjoyed thinking through riddles with the rest of the group! Staff has been working with Autumn to create a more consistent bathroom schedule!

## 2025-05-15 ENCOUNTER — HOSPITAL ENCOUNTER (OUTPATIENT)
Dept: REHABILITATION | Facility: CLINIC | Age: 50
Discharge: HOME | End: 2025-05-15
Attending: FAMILY MEDICINE

## 2025-05-15 PROCEDURE — S5102 ADULT DAY CARE PER DIEM: HCPCS

## 2025-05-15 NOTE — PROGRESS NOTES
[facility] RN Note    Previous documentation since 4/15/25 reviewed.  No concerns reported by [facility] staff or member.

## 2025-05-20 ENCOUNTER — HOSPITAL ENCOUNTER (OUTPATIENT)
Dept: REHABILITATION | Facility: CLINIC | Age: 50
Discharge: HOME | End: 2025-05-20
Attending: FAMILY MEDICINE

## 2025-05-20 PROCEDURE — S5102 ADULT DAY CARE PER DIEM: HCPCS

## 2025-05-27 ENCOUNTER — HOSPITAL ENCOUNTER (OUTPATIENT)
Dept: REHABILITATION | Facility: CLINIC | Age: 50
Discharge: HOME | End: 2025-05-27
Attending: FAMILY MEDICINE

## 2025-05-27 PROCEDURE — S5102 ADULT DAY CARE PER DIEM: HCPCS

## 2025-05-27 NOTE — PROGRESS NOTES
"  [facility] Social History    Full Name: Shanna Shanks        MRN: [id number]    YOB: 1975  Nickname:TYRELL      Sex: F     Home Phone: [phone]      Cell Phone: [phone]  Address: [address] [place] [address]/Zip: [place]  [address]  County: [place]       E-mail:[email or web address]        Transportation:    [employer]  Language:English         needed? No       Ethnicity: Caucasion  Race: White      Country of Origin: [place]       Protestant: [employer]  Marital Status:                   Spouse/Significant Other: Jama Petit   ______________________________________________________________________________________     : No    Branch of Service: NA      Education Level: High School- [employer]   Job History: Self employeed       Organizations/Clubs: NA  Whom do you live with?  and daughter  Current living arrangement:  Home   Number of Children: 2  List: Michel and Tegan  Number of Siblings: 7     List: Narendra, Aleksandra, Daisha, Elida, Jesus, Iain, Kyle  Other Important People/Pets: 2 cats and 1 dog  What else should we know about you?  Loves watching juggling.  Favorite color is bably blue.  George with her mother.  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 Beck        Neurologist: Dr. Talha Reese  Emergency Contacts:  Jama Ford      Relationship: Spouse    Phone: [phone]  Aleksandra Shanks         Relationship: sister     Phone:[phone]    Updated on 7/30/2018,  7/29/2019, 5/23/2022 , 5/31/23, 05/30/2024, 5/27/25        "

## 2025-06-03 ENCOUNTER — HOSPITAL ENCOUNTER (OUTPATIENT)
Dept: REHABILITATION | Facility: CLINIC | Age: 50
Discharge: HOME | End: 2025-06-03
Attending: FAMILY MEDICINE

## 2025-06-03 PROCEDURE — S5102 ADULT DAY CARE PER DIEM: HCPCS

## 2025-06-03 NOTE — PROGRESS NOTES
[facility] RN Note    Previous documentation since 5/15/25 reviewed.  No concerns reported by [facility] staff or member.

## 2025-06-10 ENCOUNTER — HOSPITAL ENCOUNTER (OUTPATIENT)
Dept: REHABILITATION | Facility: CLINIC | Age: 50
Discharge: HOME | End: 2025-06-10
Attending: FAMILY MEDICINE

## 2025-06-10 PROCEDURE — S5102 ADULT DAY CARE PER DIEM: HCPCS | Performed by: OCCUPATIONAL THERAPIST

## 2025-06-16 ENCOUNTER — OFFICE VISIT (OUTPATIENT)
Dept: URGENT CARE | Facility: URGENT CARE | Age: 50
End: 2025-06-16
Payer: COMMERCIAL

## 2025-06-16 VITALS
OXYGEN SATURATION: 100 % | DIASTOLIC BLOOD PRESSURE: 65 MMHG | TEMPERATURE: 98.1 F | SYSTOLIC BLOOD PRESSURE: 94 MMHG | HEART RATE: 56 BPM

## 2025-06-16 DIAGNOSIS — N30.01 ACUTE CYSTITIS WITH HEMATURIA: Primary | ICD-10-CM

## 2025-06-16 DIAGNOSIS — R39.89 URINARY PROBLEM: ICD-10-CM

## 2025-06-16 LAB
ALBUMIN UR-MCNC: ABNORMAL MG/DL
AMORPH CRY #/AREA URNS HPF: ABNORMAL /HPF
APPEARANCE UR: ABNORMAL
BACTERIA #/AREA URNS HPF: ABNORMAL /HPF
BILIRUB UR QL STRIP: NEGATIVE
COLOR UR AUTO: ABNORMAL
GLUCOSE UR STRIP-MCNC: NEGATIVE MG/DL
HGB UR QL STRIP: ABNORMAL
KETONES UR STRIP-MCNC: NEGATIVE MG/DL
LEUKOCYTE ESTERASE UR QL STRIP: ABNORMAL
NITRATE UR QL: NEGATIVE
PH UR STRIP: 6 [PH] (ref 5–7)
RBC #/AREA URNS AUTO: ABNORMAL /HPF
SP GR UR STRIP: 1.01 (ref 1–1.03)
UROBILINOGEN UR STRIP-ACNC: 0.2 E.U./DL
WBC #/AREA URNS AUTO: ABNORMAL /HPF

## 2025-06-16 PROCEDURE — 3078F DIAST BP <80 MM HG: CPT | Performed by: PHYSICIAN ASSISTANT

## 2025-06-16 PROCEDURE — 3074F SYST BP LT 130 MM HG: CPT | Performed by: PHYSICIAN ASSISTANT

## 2025-06-16 PROCEDURE — 81001 URINALYSIS AUTO W/SCOPE: CPT | Performed by: PHYSICIAN ASSISTANT

## 2025-06-16 PROCEDURE — 99214 OFFICE O/P EST MOD 30 MIN: CPT | Performed by: PHYSICIAN ASSISTANT

## 2025-06-16 PROCEDURE — 87086 URINE CULTURE/COLONY COUNT: CPT | Performed by: PHYSICIAN ASSISTANT

## 2025-06-16 RX ORDER — CEPHALEXIN 500 MG/1
500 CAPSULE ORAL 2 TIMES DAILY
Qty: 14 CAPSULE | Refills: 0 | Status: SHIPPED | OUTPATIENT
Start: 2025-06-16 | End: 2025-06-23

## 2025-06-16 ASSESSMENT — ENCOUNTER SYMPTOMS
ABDOMINAL PAIN: 0
CHILLS: 0
HEMATURIA: 0
FLANK PAIN: 0
FEVER: 0
NAUSEA: 0
DYSURIA: 1
FREQUENCY: 1
VOMITING: 0

## 2025-06-16 NOTE — PROGRESS NOTES
Urgent Care Clinic Visit    Chief Complaint   Patient presents with    Urinary Problem     Frequency, no pain, incontinence                 6/16/2025     1:51 PM   Additional Questions   Roomed by JUAN MIGUEL Marquez   Accompanied by Spouse     Pre-Provider Visit Orders- Urinalysis UA/UC  Patient reports the following symptoms:  possible urinary tract infection (UTI)  and frequent urination   Does the patient report any of the following symptoms: vaginal discharge, vaginal itching, possible yeast infection, has a urinary catheter in place, or unable to void in a specimen cup?  No

## 2025-06-17 ENCOUNTER — HOSPITAL ENCOUNTER (OUTPATIENT)
Dept: REHABILITATION | Facility: CLINIC | Age: 50
Discharge: HOME | End: 2025-06-17
Attending: FAMILY MEDICINE

## 2025-06-17 PROCEDURE — S5102 ADULT DAY CARE PER DIEM: HCPCS

## 2025-06-18 ENCOUNTER — RESULTS FOLLOW-UP (OUTPATIENT)
Dept: URGENT CARE | Facility: URGENT CARE | Age: 50
End: 2025-06-18

## 2025-06-18 LAB — BACTERIA UR CULT: NORMAL

## 2025-06-24 ENCOUNTER — HOSPITAL ENCOUNTER (OUTPATIENT)
Dept: REHABILITATION | Facility: CLINIC | Age: 50
Discharge: HOME | End: 2025-06-24
Attending: FAMILY MEDICINE

## 2025-06-24 PROCEDURE — S5102 ADULT DAY CARE PER DIEM: HCPCS

## 2025-06-25 ENCOUNTER — PATIENT OUTREACH (OUTPATIENT)
Dept: CARE COORDINATION | Facility: CLINIC | Age: 50
End: 2025-06-25
Payer: COMMERCIAL

## 2025-07-01 ENCOUNTER — HOSPITAL ENCOUNTER (OUTPATIENT)
Dept: REHABILITATION | Facility: CLINIC | Age: 50
Discharge: HOME | End: 2025-07-01
Attending: FAMILY MEDICINE

## 2025-07-01 PROCEDURE — S5102 ADULT DAY CARE PER DIEM: HCPCS

## 2025-07-01 NOTE — PROGRESS NOTES
"[facility] Note:  Home and Community Based Services Outing     Destination:  [facility]     Description of Outing:  Socialization and games.  Member Response:  \"Love to enjoy the day.\"  "

## 2025-07-01 NOTE — PROGRESS NOTES
Facilitated physical maintenance of bilateral upper extremity and bilateral lower extrimity strength and endurance through NuStep therapeutic exercise.  Patient completed minutes 30 of NuStep activity.     Nu Step Settings:  Seat:  10  Arms:  12  Resistance: 4

## 2025-07-08 ENCOUNTER — HOSPITAL ENCOUNTER (OUTPATIENT)
Dept: REHABILITATION | Facility: CLINIC | Age: 50
Discharge: HOME | End: 2025-07-08
Attending: FAMILY MEDICINE

## 2025-07-08 PROCEDURE — S5102 ADULT DAY CARE PER DIEM: HCPCS

## 2025-07-08 NOTE — PROGRESS NOTES
"     [facility] Note:  Home and Community Based Services Outing     Destination:  [facility]     Description of Outing: Brainstormed questions to ask the bikers on Thursday, socialization, played Taboo     Member Response:  \"Beautiful day to be outside.\"        "

## 2025-07-15 ENCOUNTER — HOSPITAL ENCOUNTER (OUTPATIENT)
Dept: REHABILITATION | Facility: CLINIC | Age: 50
Discharge: HOME | End: 2025-07-15
Attending: FAMILY MEDICINE

## 2025-07-15 PROCEDURE — S5102 ADULT DAY CARE PER DIEM: HCPCS

## 2025-07-29 ENCOUNTER — HOSPITAL ENCOUNTER (OUTPATIENT)
Dept: REHABILITATION | Facility: CLINIC | Age: 50
Discharge: HOME | End: 2025-07-29
Attending: FAMILY MEDICINE

## 2025-07-29 PROCEDURE — S5102 ADULT DAY CARE PER DIEM: HCPCS | Performed by: OCCUPATIONAL THERAPIST

## 2025-08-05 ENCOUNTER — HOSPITAL ENCOUNTER (OUTPATIENT)
Dept: REHABILITATION | Facility: CLINIC | Age: 50
Discharge: HOME | End: 2025-08-05
Attending: FAMILY MEDICINE

## 2025-08-05 PROCEDURE — S5102 ADULT DAY CARE PER DIEM: HCPCS

## 2025-08-12 ENCOUNTER — HOSPITAL ENCOUNTER (OUTPATIENT)
Dept: REHABILITATION | Facility: CLINIC | Age: 50
Discharge: HOME | End: 2025-08-12
Attending: FAMILY MEDICINE

## 2025-08-12 PROCEDURE — S5102 ADULT DAY CARE PER DIEM: HCPCS

## 2025-08-26 ENCOUNTER — HOSPITAL ENCOUNTER (OUTPATIENT)
Dept: REHABILITATION | Facility: CLINIC | Age: 50
Discharge: HOME | End: 2025-08-26
Attending: FAMILY MEDICINE

## 2025-08-26 PROCEDURE — S5102 ADULT DAY CARE PER DIEM: HCPCS

## 2025-09-02 ENCOUNTER — HOSPITAL ENCOUNTER (OUTPATIENT)
Dept: REHABILITATION | Facility: CLINIC | Age: 50
Discharge: HOME | End: 2025-09-02
Attending: FAMILY MEDICINE

## 2025-09-02 PROCEDURE — S5102 ADULT DAY CARE PER DIEM: HCPCS
